# Patient Record
Sex: FEMALE | Race: BLACK OR AFRICAN AMERICAN | NOT HISPANIC OR LATINO | Employment: OTHER | URBAN - METROPOLITAN AREA
[De-identification: names, ages, dates, MRNs, and addresses within clinical notes are randomized per-mention and may not be internally consistent; named-entity substitution may affect disease eponyms.]

---

## 2017-01-10 ENCOUNTER — HISTORICAL (OUTPATIENT)
Dept: ADMINISTRATIVE | Facility: HOSPITAL | Age: 82
End: 2017-01-10

## 2018-08-06 ENCOUNTER — HISTORICAL (OUTPATIENT)
Dept: ADMINISTRATIVE | Facility: HOSPITAL | Age: 83
End: 2018-08-06

## 2018-08-08 LAB — FINAL CULTURE: NORMAL

## 2019-02-08 ENCOUNTER — HISTORICAL (OUTPATIENT)
Dept: ADMINISTRATIVE | Facility: HOSPITAL | Age: 84
End: 2019-02-08

## 2019-09-05 ENCOUNTER — HOSPITAL ENCOUNTER (OUTPATIENT)
Dept: MEDSURG UNIT | Facility: HOSPITAL | Age: 84
End: 2019-09-06
Attending: INTERNAL MEDICINE | Admitting: INTERNAL MEDICINE

## 2019-09-05 LAB
ABS NEUT (OLG): 5.85 X10(3)/MCL (ref 2.1–9.2)
APTT PPP: <20 SECOND(S) (ref 24.2–33.9)
BASOPHILS # BLD AUTO: 0 X10(3)/MCL (ref 0–0.2)
BASOPHILS NFR BLD AUTO: 0 %
BUN SERPL-MCNC: 37 MG/DL (ref 7–18)
CALCIUM SERPL-MCNC: 9.6 MG/DL (ref 8.5–10.1)
CHLORIDE SERPL-SCNC: 102 MMOL/L (ref 98–107)
CO2 SERPL-SCNC: 29 MMOL/L (ref 21–32)
CREAT SERPL-MCNC: 1.73 MG/DL (ref 0.55–1.02)
CREAT/UREA NIT SERPL: 21.4
EOSINOPHIL # BLD AUTO: 0.5 X10(3)/MCL (ref 0–0.9)
EOSINOPHIL NFR BLD AUTO: 6 %
ERYTHROCYTE [DISTWIDTH] IN BLOOD BY AUTOMATED COUNT: 17 % (ref 11.5–17)
GLUCOSE SERPL-MCNC: 130 MG/DL (ref 74–106)
HCT VFR BLD AUTO: 39.4 % (ref 37–47)
HGB BLD-MCNC: 12.2 GM/DL (ref 12–16)
INR PPP: 0.9 (ref 0–1.3)
LYMPHOCYTES # BLD AUTO: 2 X10(3)/MCL (ref 0.6–4.6)
LYMPHOCYTES NFR BLD AUTO: 23 %
MCH RBC QN AUTO: 28.4 PG (ref 27–31)
MCHC RBC AUTO-ENTMCNC: 31 GM/DL (ref 33–36)
MCV RBC AUTO: 91.8 FL (ref 80–94)
MONOCYTES # BLD AUTO: 0.5 X10(3)/MCL (ref 0.1–1.3)
MONOCYTES NFR BLD AUTO: 6 %
NEUTROPHILS # BLD AUTO: 5.85 X10(3)/MCL (ref 2.1–9.2)
NEUTROPHILS NFR BLD AUTO: 65 %
PLATELET # BLD AUTO: 268 X10(3)/MCL (ref 130–400)
PMV BLD AUTO: 9.2 FL (ref 9.4–12.4)
POTASSIUM SERPL-SCNC: 5.3 MMOL/L (ref 3.5–5.1)
PROTHROMBIN TIME: 12.4 SECOND(S) (ref 12–14)
RBC # BLD AUTO: 4.29 X10(6)/MCL (ref 4.2–5.4)
SODIUM SERPL-SCNC: 136 MMOL/L (ref 136–145)
WBC # SPEC AUTO: 9 X10(3)/MCL (ref 4.5–11.5)

## 2019-09-06 LAB
ABS NEUT (OLG): 9.45 X10(3)/MCL (ref 2.1–9.2)
BASOPHILS # BLD AUTO: 0 X10(3)/MCL (ref 0–0.2)
BASOPHILS NFR BLD AUTO: 0 %
BUN SERPL-MCNC: 27 MG/DL (ref 7–18)
CALCIUM SERPL-MCNC: 9.1 MG/DL (ref 8.5–10.1)
CHLORIDE SERPL-SCNC: 106 MMOL/L (ref 98–107)
CO2 SERPL-SCNC: 31 MMOL/L (ref 21–32)
CREAT SERPL-MCNC: 1.33 MG/DL (ref 0.55–1.02)
CREAT/UREA NIT SERPL: 20.3
EOSINOPHIL # BLD AUTO: 0.4 X10(3)/MCL (ref 0–0.9)
EOSINOPHIL NFR BLD AUTO: 3 %
ERYTHROCYTE [DISTWIDTH] IN BLOOD BY AUTOMATED COUNT: 17 % (ref 11.5–17)
GLUCOSE SERPL-MCNC: 98 MG/DL (ref 74–106)
HCT VFR BLD AUTO: 35.8 % (ref 37–47)
HGB BLD-MCNC: 11 GM/DL (ref 12–16)
LYMPHOCYTES # BLD AUTO: 2.5 X10(3)/MCL (ref 0.6–4.6)
LYMPHOCYTES NFR BLD AUTO: 19 %
MCH RBC QN AUTO: 28.1 PG (ref 27–31)
MCHC RBC AUTO-ENTMCNC: 30.7 GM/DL (ref 33–36)
MCV RBC AUTO: 91.6 FL (ref 80–94)
MONOCYTES # BLD AUTO: 0.7 X10(3)/MCL (ref 0.1–1.3)
MONOCYTES NFR BLD AUTO: 5 %
NEUTROPHILS # BLD AUTO: 9.45 X10(3)/MCL (ref 2.1–9.2)
NEUTROPHILS NFR BLD AUTO: 72 %
PLATELET # BLD AUTO: 272 X10(3)/MCL (ref 130–400)
PMV BLD AUTO: 9.7 FL (ref 9.4–12.4)
POTASSIUM SERPL-SCNC: 4.7 MMOL/L (ref 3.5–5.1)
RBC # BLD AUTO: 3.91 X10(6)/MCL (ref 4.2–5.4)
SODIUM SERPL-SCNC: 141 MMOL/L (ref 136–145)
WBC # SPEC AUTO: 13.1 X10(3)/MCL (ref 4.5–11.5)

## 2020-03-11 ENCOUNTER — HISTORICAL (OUTPATIENT)
Dept: ADMINISTRATIVE | Facility: HOSPITAL | Age: 85
End: 2020-03-11

## 2020-04-29 ENCOUNTER — HISTORICAL (OUTPATIENT)
Dept: ADMINISTRATIVE | Facility: HOSPITAL | Age: 85
End: 2020-04-29

## 2020-05-01 LAB — FINAL CULTURE: NORMAL

## 2021-03-19 ENCOUNTER — HISTORICAL (OUTPATIENT)
Dept: ADMINISTRATIVE | Facility: HOSPITAL | Age: 86
End: 2021-03-19

## 2021-03-21 LAB — FINAL CULTURE: NORMAL

## 2022-04-10 ENCOUNTER — HISTORICAL (OUTPATIENT)
Dept: ADMINISTRATIVE | Facility: HOSPITAL | Age: 87
End: 2022-04-10
Payer: MEDICARE

## 2022-04-29 VITALS
DIASTOLIC BLOOD PRESSURE: 83 MMHG | WEIGHT: 171.94 LBS | BODY MASS INDEX: 28.65 KG/M2 | SYSTOLIC BLOOD PRESSURE: 134 MMHG | HEIGHT: 65 IN

## 2022-04-30 NOTE — OP NOTE
DATE OF SURGERY:    09/06/2019    SURGEON:  German Mejia MD    PREOPERATIVE DIAGNOSIS:  Foreign body in the hypopharynx and esophagus.    POSTOPERATIVE DIAGNOSIS:  Foreign body in the hypopharynx and esophagus.    PROCEDURE PERFORMED:  Laryngoscopy with removal of foreign body.    ANESTHESIA:  MAC    PROCEDURE IN DETAIL:  The patient was brought to the operative suite and placed in the supine position.  Anesthesia was administered, propofol for anesthesia.  At the point patient was asleep, the rigid laryngoscope was placed in the patient's oral cavity to the level of the hypopharynx.  The epiglottis was elevated and noted to have a large, approximately 2.5 to 3 cm, bone within the esophageal inlet extending out into the hypopharynx.  This was removed with Magill forceps.  No evidence of other foreign bodies was noted.  The patient was turned over to Anesthesia without any issue.        ______________________________  MD SAEED [place]/MARIBEL  DD:  09/06/2019  Time:  07:42AM  DT:  09/06/2019  Time:  07:48AM  Job #:  [id number]

## 2022-04-30 NOTE — CONSULTS
DATE OF CONSULTATION:  09/05/2019    ATTENDING PHYSICIAN:  Physician ER  CONSULTING PHYSICIAN:  German Mejia MD    REASON FOR CONSULTATION:  Possible foreign body of the aerodigestive tract.    HISTORY OF PRESENT ILLNESS:  This is an 84-year-old female with a history of eating catfish tonight where she felt like there was a sticking sensation upon swallowing.  The patient is not able to eat further, but there is no respiratory distress.  No hoarseness.    PAST MEDICAL HISTORY:  Reviewed.    PAST SURGICAL HISTORY:  Reviewed.    PHYSICAL EXAMINATION:  GENERAL:  Patient is alert and oriented x3.  No apparent distress, talking comfortably, coughing intermittently.     HEENT:  Oral cavity is unremarkable.  Nares are unremarkable.   NECK:  Soft, supple.  Trachea is midline.     Flexible laryngoscopy shows a bone sitting at the insert of the esophageal inlet with a lot of secretions.  AP and lateral neck exam confirmed this finding.    IMPRESSION:  This is an 84-year-old female with catfish in the hypopharynx and endolarynx inserting into the esophageal inlet.  She will be brought to the operating room for removal as multiple attempts in the emergency room were unsuccessful.        ______________________________  MD SAEED [place]/UB  DD:  09/05/2019  Time:  09:45PM  DT:  09/05/2019  Time:  10:00PM  Job #:  [id number]

## 2022-04-30 NOTE — ED PROVIDER NOTES
Patient:   Ev Alberts            MRN: [id number]            FIN: [id number]               Age:   84 years     Sex:  Female     :  1935   Associated Diagnoses:   Foreign body in hypopharynx   Author:   Tila MEAD, Rae Negrete      Basic Information   Time seen: Date & time 2019 18:56:00.   History source: Patient.   Arrival mode: Private vehicle, wheelchair.   History limitation: None.   Additional information: Chief Complaint from Nursing Triage Note : Chief Complaint   2019 18:55 CDT       Chief Complaint           patient reports catfish bone stuck in throat.    .      History of Present Illness   The patient presents with an ingested foreign body, Patient states that she feels like she has a catfish bone stuck in her throat (david, NP).  and       I, Dr. Adorno assumed care of the patient at 1943.  85 y/o AAF presents to the ED c/o throat pain secondary to a foreign body in her throat. She reports eating catfish at approximately 1830 this evening when something became lodged inher throat on the right. She believes she has a catfish bone stuck in her throat. History of CHFrEF, HTN, Afib, on Eliquis..  The onset was 3  hours ago.  The course/duration of symptoms is constant.  Symptoms: pain.  Location: Right pharynx. The type of foreign body is a bone.  The degree of symptoms is moderate.  The exacerbating factor is swallowing.  The relieving factor is none.  Risk factors consist of age.  Prior episodes: none.  Therapy today: none.  Associated symptoms: none.        Review of Systems   Constitutional symptoms:  No fever,    Skin symptoms:  No rash,    ENMT symptoms:  Throat: Pain, foreign body.   Respiratory symptoms:  No shortness of breath,    Cardiovascular symptoms:  No chest pain,    Gastrointestinal symptoms:  No abdominal pain, no nausea, no vomiting.    Genitourinary symptoms   Musculoskeletal symptoms:  No back pain,    Neurologic symptoms:  No headache,       Health Status    Allergies:    Allergic Reactions (Selected)  Severity Not Documented  Anabolic steroids- No reactions were documented.  Ibuprofen- Allergy to sulfa drugs.  Lotrel- No reactions were documented.  Sulfa drugs- No reactions were documented.  Vioxx- No reactions were documented..   Medications:  (Selected)   Prescriptions  Prescribed  Ultram 50 mg oral tablet: 1-2 tab(s), Oral, q8hr, PRN PRN for pain, # 28 tab(s), 0 Refill(s), Pharmacy: [id number]  Voltaren 1% topical gel: 2 gm =, TOP, QID, PRN PRN pain, # 100 gm, 3 Refill(s), Pharmacy: [facility] [id number]  lidocaine 4% topical film: 1 patch(es), TOP, BID, # 6 EA, 1 Refill(s), Pharm[id number]  Documented Medications  Documented  ALLOPURINOL 300MG TABLETS: 300 mg = 1 tab(s), Oral, Daily  AMITIZA 24MCG CAPSULES: 24 mcg = 1 cap(s), Oral, BID  AMOXAPINE 50 MG TABLET: 50 mg = 1 tab(s), Oral, At Bedtime  BRIMONIDINE 0.15% OPHTH SOL 10ML: 1 drop(s), Eye-Both, BID  Benadryl 25 mg oral capsule: 25 mg = 1 cap(s), Oral, Once a day (at bedtime), PRN PRN for insomnia, 0 Refill(s)  Breo Ellipta 200 mcg-25 mcg/inh inhalation powder: 1 puff(s), INH, Daily, 0 Refill(s)  CARVEDILOL 3.125MG TABLETS: 3.125 mg = 1 tab(s), BID  COMBIGAN 0.2%-0.5% EYE DROPS:   ELIQUIS 2.5MG TABLETS: 2.5 mg = 1 tab(s), BID  FERROUS SULFATE 325MG (5GR) TABS: 325 mg = 1 tab(s), Oral, Daily  FLUOCINOLONE 0.01% CREAM: rah, TOP, BID  FUROSEMIDE 20 MG TABLET: 20 mg = 1 tab(s), Daily  FUROSEMIDE 40MG TABLETS: 40 mg = 1 tab(s), Oral, Daily  GABAPENTIN 300 MG CAPSULE:   HYDROXYZINE HCL 25 MG TABLET: 25 mg = 1 tab(s), qPM  LEVOTHYROXINE 25 MCG TABLET: 25 mcg = 1 tab(s), Oral, Daily  LOSARTAN 50MG TABLETS: 50 mg = 1 tab(s), Oral, Daily  LYRICA 50 MG CAPSULE: 50 mg = 1 cap(s), Oral, BID  MECLIZINE 25 MG TABLET: 25 mg = 1 tab(s), Oral, TID  MELOXICAM 15 MG TABLET: 15 mg = 1 tab(s), Oral, qAM  NEXIUM 40MG CAPSULES: 40 mg = 1 cap(s), Oral, Daily  NITROFURANTOIN MACRO 100MG  CAPSULES: 100 mg = 1 cap(s), BID  Nitrostat 0.4 mg sublingual tablet: 0 Refill(s)  OMEPRAZOLE 20MG CAPSULES: Daily  ProAir HFA 90 mcg/inh inhalation aerosol with adapter: 1 puff(s), INH, q4hr, PRN PRN for wheezing, 0 Refill(s)  QVAR 40MCG ORAL INHALER 120 DOSES: 2 puff(s), INH, BID  RANITIDINE 300MG TABLETS: 300 mg = 1 tab(s), Oral, qPM  RIVASTIGMINE 1.5 MG CAPSULE: 1.5 mg = 1 cap(s), Oral, BID  ROPINIROLE 0.5MG TABLETS: 0.5 mg = 1 tab(s), Oral, Daily  SPIRONOLACTONE 25 MG TABLET: 25 mg = 1 tab(s), Oral, Daily  TEMAZEPAM 15 MG CAPSULE: 15 mg = 1 cap(s), Oral, qPM  TRAMADOL 50MG TABLETS: 50 mg = 1 tab(s), Oral, q4hr, PRN PRN pain, 1 to 2 tabs  TRAVATAN Z 0.004% EYE DROP:   TRAZODONE 100 MG TABLET: 100 mg = 1 tab(s), qPM  TRAZODONE 50 MG TABLET: Oral  TRULANCE 3 MG TABLET: 3 mg = 1 tab(s), qAM  VITAMIN B-12 1000MCG TABLETS: 1000 mcg = 1 tab(s), Daily  XARELTO 15 MG TABLET: 15 mg = 1 tab(s), Oral, Daily  aspirin 81 mg oral tablet: 81 mg = 1 tab(s), Oral, Daily, # 30 tab(s), 0 Refill(s)  brimonidine 0.15% ophthalmic solution: BID, 0 Refill(s)  cetirizine 10 mg oral tablet: 10 mg = 1 tab(s), Oral, Daily, # 30 tab(s), 0 Refill(s)  fluocinonide 0.1% topical cream: 1 rah, TOP, BID, 0 Refill(s)  fluticasone 50 mcg/inh nasal spray: 2 spray(s), Nasal, BID, 0 Refill(s)  ipratropium 21 mcg/inh (0.03%) nasal spray: 2 spray(s), Nasal, TID, # 30 mL, 0 Refill(s)  losartan 25 mg oral tablet: 25 mg = 1 tab(s), Oral, qPM.      Past Medical/ Family/ Social History   Medical history:    Active  Lung disease ([id number])  Resolved  Hypertension ([id number]):  Resolved.  Irregular heart beat ([id number]):  Resolved.  Sleep apnea ([id number]):  Resolved.  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  GERD (gastroesophageal reflux disease) ([id number]):  Resolved.  Heart failure ([id number]):  Resolved..   Surgical history:    RIGHT KNEE SYNVISC INJECTION on 1/1/2017 at 81 Years.  RIGHT  KNEE STERIOD INJECTION on 7/12/2016 at 81 Years.  Esophagogastroduodenoscopy on 6/28/2016 at 81 Years.  Comments:  6/28/2016 14:58 Best Storey RN  auto-populated from documented surgical case  Colonoscopy on 6/28/2016 at 81 Years.  Comments:  6/28/2016 14:58 Best Storey RN  auto-populated from documented surgical case  RIGHT KNEE SYNVISC INJECTION on 2/24/2016 at 81 Years.  HYSTERECTOMY.  KIDNEY REMOVED.  GALLBLADDER.  HERNIA REPAIR.  ORAL SURGERY.  BILATERAL FOOT SURGERY..   Family history:    History is unknown..   Social history:    Social & Psychosocial Habits    Alcohol  02/24/2016  Use: Never    Employment/School  02/08/2019  Status: Retired    Exercise  02/08/2019  Duration (average number of minutes): 0    Home/Environment  02/08/2019  Lives with: Spouse    Living situation: Home/Independent    Nutrition/Health  02/08/2019  Type of diet: Diabetic    Sexual  02/08/2019  What is your current gender identity? (Check all that apply) Identifies as female    Substance Use  02/24/2016  Use: Never    Tobacco  02/08/2019  Use: Never (less than 100 in l    Patient Wants Consult For Cessation Counseling N/A    09/05/2019  Use: Never (less than 100 in l    Patient Wants Consult For Cessation Counseling N/A    Abuse/Neglect  09/05/2019  SHX Any signs of abuse or neglect No    .      Physical Examination               Vital Signs   Vital Signs   9/5/2019 18:55 CDT       Temperature Temporal Artery               36.5 DegC                             Peripheral Pulse Rate     67 bpm                             Respiratory Rate          20 br/min                             SpO2                      98 %                             Oxygen Therapy            Room air                             Systolic Blood Pressure   161 mmHg  HI                             Diastolic Blood Pressure  66 mmHg  .   Basic Oxygen Information   9/5/2019 18:55 CDT       SpO2                      98 %                              Oxygen Therapy            Room air  .   General:  Alert, no acute distress.    Skin:  Warm, dry, intact, no pallor, no rash, normal for ethnicity.    Head:  Normocephalic, atraumatic.    Neck:  Supple, trachea midline.    Eye:  Extraocular movements are intact, normal conjunctiva.    Ears, nose, mouth and throat:  No pharyngeal erythema or exudate, Lips dry, no foreign body visualized in the posterior pharynx; patient points to the right of her thyroid cartilage when asked the position of the foreign body sensation.    Cardiovascular:  Regular rate and rhythm, Normal peripheral perfusion.    Respiratory:  Lungs are clear to auscultation, respirations are non-labored, breath sounds are equal, Symmetrical chest wall expansion.    Back:  Normal range of motion.   Musculoskeletal:  Normal ROM.   Neurological:  Alert and oriented to person, place, time, and situation, No focal neurological deficit observed.    Psychiatric:  Cooperative.      Medical Decision Making   Differential Diagnosis:  Foreign body, gastrointestinal tract, foreign body, throat, pharyngeal abrasion.    Rationale:  85 yo F with foreign body sensation after eating catfish. Xray obtained at triage and is consistent with a linear foreign body at the level of the epiglottis. An attempt was made to remove the foreign body in the ED using  atomized and topical lidocaine, however the patient continued to gag and did not tolerate the procedure. Dr. German Mejia was contacted to evaluate the patient..   Documents reviewed:  Emergency department nurses' notes.   Orders  Laboratory    CBC - includes DiffTila MD, Rachel Allison, 09/05/19, 20:02, Ordered    PT (Protime), Rae Adorno MD, 09/05/19, 20:02, Ordered    PTTTila MD, Rachel Allison, 09/05/19, 20:02, Ordered    BMP, Rae Adorno MD, 09/05/19, 20:03, Ordered  Xray    XR Soft Tissue Neck, Alfredo ZARATE, Yany BYRNES, 09/05/19, 18:58, Ordered.   Radiology results:  X-ray,  soft tissue neck, emergency physician interpretation: linear foreign body at the level of the epiglottis.       Reexamination/ Reevaluation   Time: 9/5/2019 21:45:00 .   Course: unchanged.   Assessment: exam unchanged.   Notes: Dr. Mejia has evaluated the patient. He is able to visualize the bone with flexible laryngoscopy however patient did not tolerate attempts at retrieval. He recommends patient go to the OR in the morning for removal. Hospital medicine consulted for admission. Patient given an additional lidocaine neb with relief of her throat pain. Pre-operative labs sent. Patient amenable to admission.      Procedure   Laryngoscopy   Time: 9/5/2019 20:30:00 .    Confirmed: Patient, procedure, and site correct.    Consent: Patient, Has given verbal consent.    Indication: Possible foreign body.    Procedural sedation: None.    Monitoring: Cardiac, blood pressure, continuous pulse oximetry.    Preparation: Topical anesthetic spray to pharynx.    Technique: Direct visualization, With poor visualization, unable to visualize FB, unable to adequately suppress gag reflex.    Post procedure exam: Unchanged.    Patient tolerated: Well.    Performed by: Self, Jose MEAD, Shlomo LIVE.    Total time: 20 minutes.       Impression and Plan   Diagnosis   Foreign body in hypopharynx ([id number] T17.208A)      Calls-Consults   -  9/5/2019 20:45:00 , Jackie MEAD, eGrman Houser, recommends admit to medicine, OR in the morning, NPO after midnight.    -  9/5/2019 21:45:00 , hospital medicine.    Plan   Condition: Unchanged.    Disposition: Admit time  9/5/2019 21:45:00, Place in Observation Unit, Quentin Souza MD    Counseled: Patient, Family, Regarding diagnosis, Regarding diagnostic results, Regarding treatment plan, Patient indicated understanding of instructions.    Notes: IAmy, acted solely as a scribe for and in the presence of Dr. Adorno who performed the service., I, Dr. Rae Adorno, personally  evaluated and examined this patient and agree with the documentation as above. .

## 2022-05-04 NOTE — HISTORICAL [facility] CERNER
This is a historical note converted from [employer]. Formatting and pictures may have been removed.  Please reference [employer] for original formatting and attached multimedia. Chief Complaint  F/U bilat ?knee pain--here for repeat injections  History of Present Illness  85 Years?Female?non- smoker?presented to sports medicine clinic for?follow- up visit?for?bilateral?knee pain. Patient points to?diffusely.  Patient had an injection back 6/2019 with 3 months of relief and a repeat CSI 11/2029. She did get a good amount of relief from the injections.?Her pain returned about a week ago.?She is still bothered by her right knee more than her left.  Onset:?insidious over years  Current pain level: 10/10?( rated as?moderate)?medication relief?quality described as aching.  Modifying factors:?worse with activity and improved with rest?;?stiffness after immobilization?stiffness improved with less than 30 minutes of activity  Previous treatments:?conservative treatments for at least 3 months with HEP and medications  Previous injuries:?denies?  Associated symptoms:?no crepitus/grinding;?no numbness or tingling;?no swelling;?no skin changes;?no weakness;?no decreased ROM  Activity:?sedentary; full ADLs;?pain interferes with function/daily activity (mild)  Family History:?no family history of arthritis  Review of Systems  Constitutional: no fever, no chills, no weight loss  CV: no swelling, no edema?  Resp: no shortness of breath, no cough  GI: no fecal incontinence  : no urinary retention, no urinary incontinence  Skin: no rash, no wound  Neuro: no numbness/tingling, no weakness, no saddle anesthesia  MSK: as above  Psych: no depression, no anxiety  Heme/Lymph: no easy bruising, no easy bleeding, no lymphadenopathy  ?  Physical Exam  Vitals & Measurements  T:?36.8? ?C (Oral)? HR:?76(Peripheral)? RR:?20? BP:?133/81?  HT:?165?cm? WT:?77.4?kg? BMI:?28.43?  General:?well developed; well nourished; cooperative  Neck: no abnormalities  noted  Eyes: no injection in the conjunctiva, no lid lag noted  PSYCH: alert and oriented with?appropriate mood and affect  SKIN: inspection and palpation of skin and soft tissue normal; no scars noted on upper/lower extremities  CV: vascular integrity noted; +2 symmetrical pulses, no edema  Respiratory: no increased respiratory effort noted  NEURO: sensation intact by light touch; DTRs +2 bilateral and symmetrical  LYMPH: no LAD noted  ?  MSK exam of?left knee  Inspection:?  antalgic gait ; full weight bearing;?normal alignment;?no swelling; no erythema;?no bruising;?no atrophy or deconditioning noted  Palpation:?  tender to diffusely;?no crepitus  ROM:  Active Extension/Flexion ( 0-140): 0-120  Passive Extension/Flexion ( 0-140):0-120  Strength:  Flexion 4/5, Extension 4/5  Special tests:  Ballotable effusion:?negative  Fluid wave:?negative  Anterior drawer:?negative  Lachman:?negative  Pivot shift:?negative  Posterior sag sign:?negative  Posterior drawer:?negative  Dial test:?negative  Varus stress:?LCL stable at 0 and 30 degrees  Valgus stress:?Stable at 0 and 30 degrees  Patellar grind:?negative  Corky:?negative  Apleys compression:?negative  Neurovascular:?Intact; 2+?distal pulse, sensation intact to light touch  ?  MSK exam of?right knee  Inspection:?  antalgic gait ; full weight bearing;?normal alignment;?no swelling; no erythema;?no bruising;?no atrophy or deconditioning noted  Palpation:?  tender to diffusely;?no crepitus  ROM:  Active Extension/Flexion ( 0-140): loss of 10 degrees to 100  Passive Extension/Flexion ( 0-140):loss of 10 degrees to 100  Strength:  Flexion 4/5, Extension 4/5  Special tests:  Ballotable effusion:?negative  Fluid wave:?negative  Anterior drawer:?negative  Lachman:?negative  Pivot shift:?negative  Posterior sag sign:?negative  Posterior drawer:?negative  Dial test:?negative  Varus stress:?LCL stable at 0 and 30 degrees  Valgus stress:?Stable at 0 and 30 degrees  Patellar  grind:?negative  Corky:?negative  Apleys compression:?negative  Neurovascular:?Intact; 2+?distal pulse, sensation intact to light touch  Assessment/Plan  1.?Bilateral primary osteoarthritis of knee?M17.0  85 Years ?Female ?presents for?follow up?for?bilateral ?knee osteoarthritis. Condition is?worsening  Plan: Discussed with patient and diagnosis and treatment recommendations. Handout given.  Imaging:?Radiological studies ordered and independently reviewed, radiologist read pending  Treatment plan:?topical capsaicin as needed , topical lidocaine and Aspercreme  Weight management in paramount: recommend at least 10% weight loss  Procedure:?since conservative measures did not improve symptoms patient consented today for CSI todayB/L CSI done today with improvement in pain and ROM, NV intact ?  Activity: Activity as tolerated; HEP to include aerobic conditioning with non-painful activity and ROM/STG exercises, proper footwear, assistive device to avoid limping  Therapy: Formal PT/OT ordered  Medication:?Medication precautions given , topicals discussed  RTC:?3 months  Additional work-up:?none  Ordered:  Lidocaine inj., 5 mL, form: Soln, Intra-Articular, Once, first dose 03/11/20 9:21:00 CDT, stop date 03/11/20 9:21:00 CDT  Lidocaine inj., 5 mL, form: Soln, Intra-Articular, Once, first dose 03/11/20 9:21:00 CDT, stop date 03/11/20 9:21:00 CDT  triamcinolone, 40 mg, form: Injection, Intra-Articular, Once, first dose 03/11/20 10:00:00 CDT, stop date 03/11/20 10:00:00 CDT  triamcinolone, 40 mg, form: Injection, Intra-Articular, Once, first dose 03/11/20 10:00:00 CDT, stop date 03/11/20 10:00:00 CDT  ?  2.?Obesity?E66.9  ?- hand out given  Ordered:  triamcinolone, 40 mg, form: Injection, Intra-Articular, Once, first dose 03/11/20 10:00:00 CDT, stop date 03/11/20 10:00:00 CDT  triamcinolone, 40 mg, form: Injection, Intra-Articular, Once, first dose 03/11/20 10:00:00 CDT, stop date 03/11/20 10:00:00 CDT  ?  Orders:  XR  Knee Left 4 or More Views, Routine, 03/11/20 8:38:00 CDT, Arthritis, None, Stretcher, Patient Has IV?, Rad Type, Osteoarthritis of right knee, Not Scheduled, 03/11/20 8:38:00 CDT  XR Knee Right 4 or More Views, Routine, 03/11/20 8:38:00 CDT, Arthritis, None, Stretcher, Patient Has IV?, Rad Type, Osteoarthritis of right knee, Not Scheduled, 03/11/20 8:38:00 CDT   Medications  ALLOPURINOL 300MG TABLETS, 300 mg= 1 tab(s), Oral, Daily  AMITIZA 24MCG CAPSULES, 24 mcg= 1 cap(s), Oral, BID  aspirin 81 mg oral tablet, 81 mg= 1 tab(s), Oral, Daily  Benadryl 25 mg oral capsule, 25 mg= 1 cap(s), Oral, Once a day (at bedtime), PRN  Breo Ellipta 200 mcg-25 mcg/inh inhalation powder, 1 puff(s), INH, Daily  BRIMONIDINE 0.15% OPHTH SOL 10ML, 1 drop(s), Eye-Both, BID  CARVEDILOL 3.125MG TABLETS, 3.125 mg= 1 tab(s), BID  cetirizine 10 mg oral tablet, 10 mg= 1 tab(s), Oral, Daily  COMBIGAN 0.2%-0.5% EYE DROPS  ELIQUIS 2.5MG TABLETS, 2.5 mg= 1 tab(s), BID  FERROUS SULFATE 325MG (5GR) TABS, 325 mg= 1 tab(s), Oral, Daily  FLUOCINOLONE 0.01% CREAM, TOP, BID  fluticasone 50 mcg/inh nasal spray, 2 spray(s), Nasal, BID  FUROSEMIDE 40MG TABLETS, 40 mg= 1 tab(s), Oral, Daily  GABAPENTIN 300 MG CAPSULE  HYDROXYZINE HCL 25 MG TABLET, 25 mg= 1 tab(s), qPM  ipratropium 21 mcg/inh (0.03%) nasal spray, 2 spray(s), Nasal, TID  LEVOTHYROXINE 25 MCG TABLET, 25 mcg= 1 tab(s), Oral, Daily  lidocaine 4% topical film, 1 patch(es), TOP, BID, 1 refills  losartan 25 mg oral tablet, 25 mg= 1 tab(s), Oral, qPM  LOSARTAN 50MG TABLETS, 50 mg= 1 tab(s), Oral, Daily  LYRICA 50 MG CAPSULE, 50 mg= 1 cap(s), Oral, BID  MECLIZINE 25 MG TABLET, 25 mg= 1 tab(s), Oral, TID  MELOXICAM 15 MG TABLET, 15 mg= 1 tab(s), Oral, qAM  NEXIUM 40MG CAPSULES, 40 mg= 1 cap(s), Oral, Daily  NITROFURANTOIN MACRO 100MG CAPSULES, 100 mg= 1 cap(s), BID  Nitrostat 0.4 mg sublingual tablet  OMEPRAZOLE 20MG CAPSULES, Daily  ProAir HFA 90 mcg/inh inhalation aerosol with adapter, 1 puff(s), INH,  q4hr, PRN  QVAR 40MCG ORAL INHALER 120 DOSES, 2 puff(s), INH, BID  RANITIDINE 300MG TABLETS, 300 mg= 1 tab(s), Oral, qPM  RIVASTIGMINE 1.5 MG CAPSULE, 1.5 mg= 1 cap(s), Oral, BID  ROPINIROLE 0.5MG TABLETS, 0.5 mg= 1 tab(s), Oral, Daily  SPIRONOLACTONE 25 MG TABLET, 25 mg= 1 tab(s), Oral, Daily  traMADol 50 mg oral tablet, 50 mg= 1 tab(s), Oral, q4hr, PRN, 4 refills  TRAVATAN Z 0.004% EYE DROP  TRAZODONE 50 MG TABLET, Oral  TRULANCE 3 MG TABLET, 3 mg= 1 tab(s), qAM  Tylenol 325 mg oral capsule, 325 mg= 1 cap(s), Oral, q4hr, PRN, 1 refills  VITAMIN B-12 1000MCG TABLETS, 1000 mcg= 1 tab(s), Daily  Voltaren 1% topical gel, 2 gm, TOP, QID, PRN, 3 refills  Diagnostic Results  XR B/L my read  Patient with tricompartmental arthritis B/L, left worse than right, osteophytes noted diffusely bilaterally      Discussed with the fellow at time of visit? Patient chart reviewed. Patient seen and evaluated at time of visit.?HPI, PE, and Assessment and Plan reviewed. Treatment plan is reasonable and appropriate.? All questions were answered.?Compliance with treatment plan is appropriate.  ?Radiology images independently reviewed and agree with radiologist. ?Radiology images independently reviewed and agree with fellow.

## 2022-05-04 NOTE — HISTORICAL [facility] C[facility]
This is a historical note converted from [employer]ner. Formatting and pictures may have been removed.  Please reference Cerner for original formatting and attached multimedia. Admit and Discharge Dates  Admit Date: 09/05/2019  Discharge Date: 09/06/2019  Physicians  Attending Physician - Zoila MEAD, Dimitri STRONG  Attending Physician - Harley MEAD, Quentin CASTRO  Admitting Physician - Harley MEAD, Quentin CASTRO  Consulting Physician - Jackie MEAD, Gemran Houser  Primary Care Physician - Romeo MEAD, Chelsy FERRELL  Discharge Diagnosis  1.?Foreign body - Ingested?[id number]  2.?Atrial fibrillation?I48.91  3.?Anticoagulated?Z79.01  4.?Chronic systolic heart failure?I50.22  5.?Essential hypertension?I10  Surgical Procedures  09/06/2019 - [id number] - Esophagoscopy  Immunizations  No immunizations recorded for this visit.  Hospital Course  84 year old female with a medical history of HTN, CHF,?A Fib?on Eliquis, sleep apnea, and diverticulitis. She presented to [facility] ER ?for complaints of feeling like something is stuck in her throat after eating catfish. She denies respiratory distress, chest tightness, abdominal pain, or N/V. Reports she has been coughing to try to get it up, but has been unsuccessful. Denies any other complaints.  Initial ER VS: /66, HR 67, respirations 20, temporal artery temperature 36.5, and SPO2 9% on RA.? K5.3, BUN/Cr CR 37/1.73, coags and CBC unremarkable. XR soft tissue neck with food bolus per ER MD; official read pending.?Dr. Mejia, ENT, consulted;?multiple attempts of?removal of FB from esophagus was unsuccessful. She received Ofirmev IV, glucagon, lidocaine neb, Zofran IV and VIF while in the ER. Shes been admitted to the hospitalist services; Dr. Mejia took to OR on 9/6 for retrieval.  Time Spent on discharge  Time to discharge 31 minutes  Objective  Vitals & Measurements  T:?36.4? ?C (Oral)? TMIN:?36.4? ?C (Oral)? TMAX:?36.8? ?C (Oral)? HR:?70(Peripheral)? RR:?18? BP:?129/70?  SpO2:?95%? WT:?74.7?kg?  Physical Exam  General: No acute distress, Awake, Alert,Oriented x3  HEENT: PERRL, EOMI, no scleral icterus, OP clear  Respiratory:CTA bilateral , no wheezes, rales, or rhonchi  Cardiovascular:Regular rate and rhythm , no murmurs, rubs or gallops, +s1/s2,_  Gastrointestinal: soft,nontender , nondistended, +BS  Musculoskeletal: Normal range of motion, no pertinent deformity  Integumentary: clean, warm, dry, intact  Neurologic: CN 2 - 12 grossly intact, no acute deficit noted  Patient Discharge Condition  Stable  Discharge Disposition  Home   Discharge Medication Reconciliation  Continue  albuterol (ProAir HFA 90 mcg/inh inhalation aerosol with adapter)?1 puff(s), INH, q4hr, PRN for wheezing  allopurinol (ALLOPURINOL 300MG TABLETS)?300 mg, Oral, Daily  apixaban (ELIQUIS 2.5MG TABLETS)?2.5 mg, BID  aspirin (aspirin 81 mg oral tablet)?81 mg, Oral, Daily  beclomethasone (QVAR 40MCG ORAL INHALER 120 DOSES)?2 puff(s), INH, BID  brimonidine ophthalmic (BRIMONIDINE 0.15% OPHTH SOL 10ML)?1 drop(s), Eye-Both, BID  brimonidine-timolol ophthalmic (COMBIGAN 0.2%-0.5% EYE DROPS)  carvedilol (CARVEDILOL 3.125MG TABLETS)?3.125 mg, BID  cetirizine (cetirizine 10 mg oral tablet)?10 mg, Oral, Daily  cyanocobalamin (VITAMIN B-12 1000MCG TABLETS)?1000 mcg, Daily  diclofenac topical (Voltaren 1% topical gel)?2 gm, TOP, QID, PRN pain  diphenhydrAMINE (Benadryl 25 mg oral capsule)?25 mg, Oral, Once a day (at bedtime), PRN for insomnia  esomeprazole (NEXIUM 40MG CAPSULES)?40 mg, Oral, Daily  ferrous sulfate (FERROUS SULFATE 325MG (5GR) TABS)?325 mg, Oral, Daily  fluocinolone topical (FLUOCINOLONE 0.01% CREAM)??rah, TOP, BID  fluticasone nasal (fluticasone 50 mcg/inh nasal spray)?2 spray(s), Nasal, BID  fluticasone-vilanterol (Breo Ellipta 200 mcg-25 mcg/inh inhalation powder)?1 puff(s), INH, Daily  furosemide (FUROSEMIDE 40MG TABLETS)?40 mg, Oral, Daily  gabapentin (GABAPENTIN 300 MG CAPSULE)  hydrOXYzine  (HYDROXYZINE HCL 25 MG TABLET)?25 mg, qPM  ipratropium nasal (ipratropium 21 mcg/inh (0.03%) nasal spray)?2 spray(s), Nasal, TID  levothyroxine (LEVOTHYROXINE 25 MCG TABLET)?25 mcg, Oral, Daily  lidocaine topical (lidocaine 4% topical film)?1 patch(es), TOP, BID  losartan (LOSARTAN 50MG TABLETS)?50 mg, Oral, Daily  losartan (losartan 25 mg oral tablet)?25 mg, Oral, qPM  lubiprostone (AMITIZA 24MCG CAPSULES)?24 mcg, Oral, BID  meclizine (MECLIZINE 25 MG TABLET)?25 mg, Oral, TID  meloxicam (MELOXICAM 15 MG TABLET)?15 mg, Oral, qAM  nitrofurantoin (NITROFURANTOIN MACRO 100MG CAPSULES)?100 mg, BID  nitroglycerin (Nitrostat 0.4 mg sublingual tablet)  omeprazole (OMEPRAZOLE 20MG CAPSULES), Daily  plecanatide (TRULANCE 3 MG TABLET)?3 mg, qAM  pregabalin (LYRICA 50 MG CAPSULE)?50 mg, Oral, BID  rOPINIRole (ROPINIROLE 0.5MG TABLETS)?0.5 mg, Oral, Daily  ranitidine (RANITIDINE 300MG TABLETS)?300 mg, Oral, qPM  rivastigmine (RIVASTIGMINE 1.5 MG CAPSULE)?1.5 mg, Oral, BID  spironolactone (SPIRONOLACTONE 25 MG TABLET)?25 mg, Oral, Daily  traMADol (TRAMADOL 50MG TABLETS)?50 mg, Oral, q4hr, PRN pain  traZODone (TRAZODONE 50 MG TABLET), Oral  travoprost ophthalmic (TRAVATAN Z 0.004% EYE DROP)  Education and Orders Provided  Discharge - 09/06/19 9:10:00 CDT, Home, Give all scheduled vaccinations prior to discharge.?  Discharge Activity - Activity as Tolerated?  Discharge Diet - Regular?  Follow up  As needed

## 2022-05-04 NOTE — HISTORICAL [facility] CERNER
This is a historical note converted from Darinel. Formatting and pictures may have been removed.  Please reference Darinel for original formatting and attached multimedia. Chief Complaint  Bilat knee OA  History of Present Illness  84-year-old RHD?female nonsmoker with bilateral knee osteoarthritis?has been ongoing for many years.?Due to her medical issues and?age of decided to treat her conservatively.??  ?  date of injury: none  character: heavy  7/10 without medication  duration: stiffness in morning; loosens up after <30 mins  exacerbation: standing and walking  alleviating: elevation, rest, ice, heating pad  associated: swelling, no numbness or tingling  previous treatment: PT/OT, CSI, Tramadol, Tylenol, topicals, multiple steroid injections and Synvisc injection in the past.?  previously seen by Dr. Julian Roth MD  family history of arthritis: none  imaging: XR knees b/l  social history: denies smoking, alcohol, and drugs  occupation: retired  PMH: CHF, HTN, Asthma, anemic, GERD, s/p nephrectomy  ?  Review of Systems  Constitutional: no fever, no chills, no weight loss  CV: no swelling, no edema  Res: No cough, wheezing, sob  GI: no fecal incontinence  : no urinary retention, no urinary incontinence  Skin: no rash, no wound  Neuro: no numbness/tingling, no weakness, no saddle anesthesia  MSK: as above  Psych: no depression, no anxiety  Heme/Lymph: no easy bruising, no easy bleeding, no lymphadenopathy  Immuno: no MRSA history  Physical Exam  Vitals & Measurements  T:?36.8? ?C (Oral)? HR:?57(Peripheral)? RR:?15? BP:?111/67?  HT:?165?cm? WT:?80.7?kg?  MSK:Bilateral KNEE  General: No apparent distress;  Inspection:?varus deformity, ?uses rollator walker at baseline;?mild swelling;?no erythema;?No contusion;?no atrophy?/ deconditioning noted  Palpation:?tenderness noted at medial and lateral joint lines; ?Crepitus:?positive;?Normal temperature  ROM:?  ?????Active Extension/Flexion (0-140):?10-80 (L); 10-80  (R)  Strength:?  ?????Flexion??4/5  ?????Extension??4/5  Special Tests:  ?????Ballotable Effusion: ?Negative  ?????Fluid Wave:?Negative  ?????Anterior Drawer:Negative?  ?????Lachman:?Negative  ?????Varus Stress:?LCL stable at 0 and 30 degrees  ?????Valgus Stress:?MCL stable at 0 and 30 degrees  ?????Patellar Grind: ?Negative  ?????Corky:?Negative?  Neurovascular:?Intact; 2+?distal pulse, sensation intact to light touch  Neuro/Psych: Awake, Alert, Oriented; Normal mood and affect  Lymphatic: No LAD  Skin and Soft Tissue: No bruising, No ecchymosis; No rash; Skin intact  Assessment/Plan  Osteoarthritis of knees, bilateral?M17.0  DX: Severe tricompartment OA to bilateral knees; worse right compared to left.?  Imaging:?Radiological studies?ordered and independently reviewed by staff and myself; Discussed with patient  Procedure:?CSI injections offered today;?Consent sign; see procedure note  Activity:?Activity as tolerated  Therapy:HEP  Medication:?OTC Tylenol or NSAIDS  RTC:?3 months  Additional work-up:?None  Ordered:  Clinic Follow up, *Est. 05/08/19 3:00:00 CDT, Order for future visit, Osteoarthritis of knees, bilateral, [facility]  XR Knee Left 4 or More Views, Routine, 02/08/19 10:22:00 CST, Arthritis, None, Stretcher, Patient Has IV?, Rad Type, Osteoarthritis of knees, bilateral, Not Scheduled, 02/08/19 10:22:00 CST  XR Knee Right 4 or More Views, Routine, 02/08/19 10:23:00 CST, Arthritis, None, Stretcher, Patient Has IV?, Rad Type, Osteoarthritis of knees, bilateral, Not Scheduled, 02/08/19 10:23:00 CST  ?  Sofi Duarte MD  [facility] resident, -1?   Problem List/Past Medical History  Ongoing  Asthma  Diverticulitis  GI bleeding  Hemorrhoid  Iron deficiency anemia  Lung disease  Obesity  Osteoarthritis of right knee  Historical  GERD (gastroesophageal reflux disease)  Heart failure  Hypertension  Irregular heart beat  Sleep apnea  Total left nephrectomy  Procedure/Surgical  History  RIGHT KNEE SYNVISC INJECTION (01/01/2017)  RIGHT KNEE STERIOD INJECTION (07/12/2016)  Colonoscopy (06/28/2016)  Esophagogastroduodenoscopy (06/28/2016)  RIGHT KNEE SYNVISC INJECTION (02/24/2016)  BILATERAL FOOT SURGERY  GALLBLADDER  HERNIA REPAIR  HYSTERECTOMY  KIDNEY REMOVED  ORAL SURGERY   Medications  ALLOPURINOL 300MG TABLETS, 300 mg= 1 tab(s), Oral, Daily  AMITIZA 24MCG CAPSULES, 24 mcg= 1 cap(s), Oral, BID  AMOXAPINE 50 MG TABLET, 50 mg= 1 tab(s), Oral, At Bedtime,? ?Investigating  aspirin 81 mg oral tablet, 81 mg= 1 tab(s), Oral, Daily  Benadryl 25 mg oral capsule, 25 mg= 1 cap(s), Oral, Once a day (at bedtime), PRN  Breo Ellipta 200 mcg-25 mcg/inh inhalation powder, 1 puff(s), INH, Daily  BRIMONIDINE 0.15% OPHTH SOL 10ML, 1 drop(s), Eye-Both, BID  brimonidine 0.15% ophthalmic solution, BID,? ?Investigating  CARVEDILOL 3.125MG TABLETS, 3.125 mg= 1 tab(s), BID  cetirizine 10 mg oral tablet, 10 mg= 1 tab(s), Oral, Daily  COMBIGAN 0.2%-0.5% EYE DROPS  ELIQUIS 2.5MG TABLETS, 2.5 mg= 1 tab(s), BID  FERROUS SULFATE 325MG (5GR) TABS, 325 mg= 1 tab(s), Oral, Daily,? ?Investigating  FLUOCINOLONE 0.01% CREAM, TOP, BID,? ?Investigating  fluocinonide 0.1% topical cream, 1 rah, TOP, BID,? ?Investigating  fluticasone 50 mcg/inh nasal spray, 2 spray(s), Nasal, BID,? ?Investigating  FUROSEMIDE 20 MG TABLET, 20 mg= 1 tab(s), Daily  FUROSEMIDE 40MG TABLETS, 40 mg= 1 tab(s), Oral, Daily  GABAPENTIN 300 MG CAPSULE  HYDROXYZINE HCL 25 MG TABLET, 25 mg= 1 tab(s), qPM  ipratropium 21 mcg/inh (0.03%) nasal spray, 2 spray(s), Nasal, TID  LEVOTHYROXINE 25 MCG TABLET, 25 mcg= 1 tab(s), Oral, Daily  lidocaine 4% topical film, 1 patch(es), TOP, BID, 1 refills,? ?Investigating  LOSARTAN 50MG TABLETS, 50 mg= 1 tab(s), Oral, Daily,? ?Investigating  LYRICA 50 MG CAPSULE, 50 mg= 1 cap(s), Oral, BID  MECLIZINE 25 MG TABLET, 25 mg= 1 tab(s), Oral, TID  MELOXICAM 15 MG TABLET, 15 mg= 1 tab(s), Oral, qAM  NEXIUM 40MG CAPSULES, 40 mg= 1  cap(s), Oral, Daily  NITROFURANTOIN MACRO 100MG CAPSULES, 100 mg= 1 cap(s), BID  Nitrostat 0.4 mg sublingual tablet  OMEPRAZOLE 20MG CAPSULES, Daily  ProAir HFA 90 mcg/inh inhalation aerosol with adapter, 1 puff(s), INH, q4hr, PRN  QVAR 40MCG ORAL INHALER 120 DOSES, 2 puff(s), INH, BID,? ?Investigating  RANITIDINE 300MG TABLETS, 300 mg= 1 tab(s), Oral, qPM  RIVASTIGMINE 1.5 MG CAPSULE, 1.5 mg= 1 cap(s), Oral, BID  ROPINIROLE 0.5MG TABLETS, 0.5 mg= 1 tab(s), Oral, Daily  SPIRONOLACTONE 25 MG TABLET, 25 mg= 1 tab(s), Oral, Daily  TEMAZEPAM 15 MG CAPSULE, 15 mg= 1 cap(s), Oral, qPM,? ?Investigating  TRAMADOL 50MG TABLETS, 50 mg= 1 tab(s), Oral, q4hr, PRN,? ?Investigating  TRAVATAN Z 0.004% EYE DROP  TRAZODONE 100 MG TABLET, 100 mg= 1 tab(s), qPM  TRAZODONE 50 MG TABLET, Oral  TRULANCE 3 MG TABLET, 3 mg= 1 tab(s), qAM  Ultram 50 mg oral tablet, 1-2 tab(s), Oral, q8hr, PRN  VITAMIN B-12 1000MCG TABLETS, 1000 mcg= 1 tab(s), Daily  XARELTO 15 MG TABLET, 15 mg= 1 tab(s), Oral, Daily,? ?Investigating  Allergies  Lotrel  Vioxx  anabolic steroids  ibuprofen?(Allergy to sulfa drugs)  sulfa drugs  Social History  Alcohol  Never, 02/24/2016  Employment/School  Retired, 02/08/2019  Exercise  Exercise duration: 0., 02/08/2019  Home/Environment  Lives with Spouse. Living situation: Home/Independent., 02/08/2019  Nutrition/Health  Diabetic, 02/08/2019  Sexual  Gender Identity Identifies as female., 02/08/2019  Substance Abuse  Never, 02/24/2016  Tobacco  Never (less than 100 in lifetime), N/A, 02/08/2019  Family History  Family history is unknown  Diagnostic Results  XR knees right/left 4 views  My impression: Severe tricompartment OA? worse to lateral compartment to bilateral knees; worse right>left knee?  ?  ?      Discussed with the resident at time of visit.? HPI, PE, and Assessment and Plan reviewed.? Treatment plan is reasonable and appropriate.??Compliance with treatment plan is appropriate.??Radiology images independently  reviewed and interpreted by me.??

## 2022-05-04 NOTE — HISTORICAL [facility] CERNER
This is a historical note converted from [employer]ner. Formatting and pictures may have been removed.  Please reference [employer]ner for original formatting and attached multimedia. Chief Complaint  patient reports catfish bone stuck in throat.  History of Present Illness  Ms. Alberts is a 84 year old female with a medical history of HTN, CHF,?A Fib?on Eliquis, sleep apnea, and diverticulitis. She presented to [facility] ER ?for complaints of feeling like something is stuck in her throat after eating catfish. She denies respiratory distress, chest tightness, abdominal pain, or N/V. Reports she has been coughing to try to get it up, but has been unsuccessful. Denies any other complaints.  Initial ER VS: /66, HR 67, respirations 20, temporal artery temperature 36.5, and SPO2 9% on RA.? K5.3, BUN/Cr CR 37/1.73, coags and CBC unremarkable. XR soft tissue neck with food bolus per ER MD; official read pending.?Dr. Mejia, ENT, consulted;?multiple attempts of?removal of FB from esophagus was unsuccessful. She received Ofirmev IV, glucagon, lidocaine neb, Zofran IV and VIF while in the ER. Shes been admitted to the hospitalist services; Dr. Mejia planning to take her to the OR in the am for FB removal  Review of Systems  Except as documented, all other systems reviewed and negative.  Physical Exam  Vitals & Measurements  T:?36.5? ?C (Temporal Artery)? HR:?64(Peripheral)? RR:?18? BP:?127/56? SpO2:?96%?  General: Appears comfortable, no acute distress.  Cognition and speech:?Oriented, speech clear and coherent.  HEENT:?Normocephalic, normal hearing, oral mucosa is moist.  Neck:?No JVD, trachea at?midline, supple.  Respiratory:?Lungs CTA.?No accessory muscle use. ?Breath sounds are equal.  Cardiovascular:?Regular rhythm. Normal S1/S2. No pedal edema.  Gastrointestinal:?Normoactive bowel sound, soft and non-tender.  Integumentary:?Warm, dry, intact.  Musculoskeletal:?Normal strength, no tenderness, no swelling.  Skin:?Warm and dry, no  rashes or lesions  Neuro:?AAOx3, no focal neurological deficit, normal sensory.  Psych:?Cooperative. Anxious.  ?   Assessment:  Foreign Body Ingestion  Atrial Fibrillation on Eliquis  Hypertension, Essential  CHF - stable  HX: Asthma, Hypothyroidism, GERD  ?   Plan:?  - Dr. Mejia consulted - tentative plans for OR in am for FB removal  - NPO after 00:00; NS @ 75ml/hr  - PRN analgesics; lidocaine neb & glucagon?received in ER  - Resume HOME meds as patient is able to tolerate taking in am  ---- including BB, ARB, lasix, spironolactone, and levothyroxine?  ?  Source of history: Self. . Medical record.?  Present at bedside: .  History limitation: None.  Provider information: PCP:?Chelsy Walters MD  ?   Code status: Full code  DVT prophylaxis: SCDs bilaterally  Admission time 72 minutes.?  ?  I, IFTIKHAR Hatch-C, reviewed and discussed the case with Dr. Quentin Souza.  ?  ?  I, Quentin Souza MD, assumed care of this patient at the time of this addendum and assisted with the composition of the above assessment and plan. For this patient encounter, I reviewed the NP or PA documentation, treatment plan, and medical decision making; and I had face-to-face time with this patient. ?Labs and imaging were reviewed and I agree with history, physical and medical decision making as detailed above.??  ?  ?  84-year-old female presented to the ER after ingesting catfish with bones, has a cat fishbone lodged?in her throat. ?ENT attempted to extract this however they want to take the patient to the OR in the morning to receive anesthesia as they were unable to retrieve it. ?She received glucagon and lidocaine nebulizer. ?She is admitted to the hospitalist service with plans for early morning to remove the fishbone.?  ?   Problem List/Past Medical History  Hypertension  Congestive heart failure  Atrial Fibrillation on Eliquis  Sleep apnea  Asthma  Iron Deficiency  anemia  GERD  Diverticulitis  Hypothyroidism  Procedure/Surgical History  Total left nephrectomy, EGD, Colonoscopy, Hysterectomy, Cholecystectomy, Hernia repair, Bilateral foot surgery  Medications  Inpatient  albuterol, 2.5 mg= 3 mL, NEB, q4hr, PRN  morphine 4 mg/mL preservative-free intravenous solution, 1 mg= 0.25 mL, IV, q4hr, PRN  Sodium Chloride 0.9% intravenous solution 1,000 mL, 1000 mL, IV  Zofran, 4 mg= 2 mL, IV Push, q4hr, PRN  Home  ALLOPURINOL 300MG TABLETS, 300 mg= 1 tab(s), Oral, Daily  AMITIZA 24MCG CAPSULES, 24 mcg= 1 cap(s), Oral, BID  aspirin 81 mg oral tablet, 81 mg= 1 tab(s), Oral, Daily  Benadryl 25 mg oral capsule, 25 mg= 1 cap(s), Oral, Once a day (at bedtime), PRN  Breo Ellipta 200 mcg-25 mcg/inh inhalation powder, 1 puff(s), INH, Daily  BRIMONIDINE 0.15% OPHTH SOL 10ML, 1 drop(s), Eye-Both, BID  CARVEDILOL 3.125MG TABLETS, 3.125 mg= 1 tab(s), BID  cetirizine 10 mg oral tablet, 10 mg= 1 tab(s), Oral, Daily  COMBIGAN 0.2%-0.5% EYE DROPS  ELIQUIS 2.5MG TABLETS, 2.5 mg= 1 tab(s), BID  FERROUS SULFATE 325MG (5GR) TABS, 325 mg= 1 tab(s), Oral, Daily  FLUOCINOLONE 0.01% CREAM, TOP, BID  fluticasone 50 mcg/inh nasal spray, 2 spray(s), Nasal, BID  FUROSEMIDE 40MG TABLETS, 40 mg= 1 tab(s), Oral, Daily  GABAPENTIN 300 MG CAPSULE  HYDROXYZINE HCL 25 MG TABLET, 25 mg= 1 tab(s), qPM  ipratropium 21 mcg/inh (0.03%) nasal spray, 2 spray(s), Nasal, TID  LEVOTHYROXINE 25 MCG TABLET, 25 mcg= 1 tab(s), Oral, Daily  lidocaine 4% topical film, 1 patch(es), TOP, BID, 1 refills  losartan 25 mg oral tablet, 25 mg= 1 tab(s), Oral, qPM  LOSARTAN 50MG TABLETS, 50 mg= 1 tab(s), Oral, Daily  LYRICA 50 MG CAPSULE, 50 mg= 1 cap(s), Oral, BID  MECLIZINE 25 MG TABLET, 25 mg= 1 tab(s), Oral, TID  MELOXICAM 15 MG TABLET, 15 mg= 1 tab(s), Oral, qAM  NEXIUM 40MG CAPSULES, 40 mg= 1 cap(s), Oral, Daily  NITROFURANTOIN MACRO 100MG CAPSULES, 100 mg= 1 cap(s), BID  Nitrostat 0.4 mg sublingual tablet  OMEPRAZOLE 20MG CAPSULES,  Daily  ProAir HFA 90 mcg/inh inhalation aerosol with adapter, 1 puff(s), INH, q4hr, PRN  QVAR 40MCG ORAL INHALER 120 DOSES, 2 puff(s), INH, BID  RANITIDINE 300MG TABLETS, 300 mg= 1 tab(s), Oral, qPM  RIVASTIGMINE 1.5 MG CAPSULE, 1.5 mg= 1 cap(s), Oral, BID  ROPINIROLE 0.5MG TABLETS, 0.5 mg= 1 tab(s), Oral, Daily  SPIRONOLACTONE 25 MG TABLET, 25 mg= 1 tab(s), Oral, Daily  TRAMADOL 50MG TABLETS, 50 mg= 1 tab(s), Oral, q4hr, PRN  TRAVATAN Z 0.004% EYE DROP  TRAZODONE 50 MG TABLET, Oral  TRULANCE 3 MG TABLET, 3 mg= 1 tab(s), qAM  VITAMIN B-12 1000MCG TABLETS, 1000 mcg= 1 tab(s), Daily  Voltaren 1% topical gel, 2 gm, TOP, QID, PRN, 3 refills  Allergies  Lotrel  Vioxx  anabolic steroids  ibuprofen?(Allergy to sulfa drugs)  sulfa drugs  Social History  Denies EtOH, illicit drug use, or tobacco use.  Family History  Family history is unknown  Lab Results  Labs Last 24 Hours?  ?Chemistry? Hematology/Coagulation?   Sodium Lvl: 136 mmol/L (09/05/19 21:02:00) PT: 12.4 second(s) (09/05/19 21:02:00)   Potassium Lvl:?5.3 mmol/L?High (09/05/19 21:02:00) INR: 0.9 (09/05/19 21:02:00)   Chloride: 102 mmol/L (09/05/19 21:02:00) PTT:?<20.0?Low (09/05/19 21:02:00)   CO2: 29 mmol/L (09/05/19 21:02:00) WBC: 9 x10(3)/mcL (09/05/19 21:02:00)   Calcium Lvl: 9.6 mg/dL (09/05/19 21:02:00) RBC: 4.29 x10(6)/mcL (09/05/19 21:02:00)   Glucose Lvl:?130 mg/dL?High (09/05/19 21:02:00) Hgb: 12.2 gm/dL (09/05/19 21:02:00)   BUN:?37 mg/dL?High (09/05/19 21:02:00) Hct: 39.4 % (09/05/19 21:02:00)   Creatinine:?1.73 mg/dL?High (09/05/19 21:02:00) Platelet: 268 x10(3)/mcL (09/05/19 21:02:00)   BUN/Creat Ratio: 21.4 (09/05/19 21:02:00) MCV: 91.8 fL (09/05/19 21:02:00)   eGFR-AA: 36 mL/min/1.73 m2 (09/05/19 21:02:00) MCH: 28.4 pg (09/05/19 21:02:00)   eGFR-BROOKS: 30 mL/min/1.73 m2 (09/05/19 21:02:00) MCHC:?31 gm/dL?Low (09/05/19 21:02:00)    RDW: 17 % (09/05/19 21:02:00)    MPV:?9.2 fL?Low (09/05/19 21:02:00)    Abs Neut: 5.85 x10(3)/mcL (09/05/19 21:02:00)     Neutro Auto: 65 % (09/05/19 21:02:00)    Lymph Auto: 23 % (09/05/19 21:02:00)    Mono Auto: 6 % (09/05/19 21:02:00)    Eos Auto: 6 % (09/05/19 21:02:00)    Abs Eos: 0.5 x10(3)/mcL (09/05/19 21:02:00)    Basophil Auto: 0 % (09/05/19 21:02:00)    Abs Neutro: 5.85 x10(3)/mcL (09/05/19 21:02:00)    Abs Lymph: 2 x10(3)/mcL (09/05/19 21:02:00)    Abs Mono: 0.5 x10(3)/mcL (09/05/19 21:02:00)    Abs Baso: 0 x10(3)/mcL (09/05/19 21:02:00)   Diagnostic Results  XR Soft Tissue Neck pending.

## 2022-05-04 NOTE — HISTORICAL [facility] CERNER
This is a historical note converted from [employer]. Formatting and pictures may have been removed.  Please reference Darinel for original formatting and attached multimedia. Joint?injection procedure?  date: 2/8/2019 time: 11:26  Confirmed: patient, procedure, side, site, safety procedures followed.?  Supervised by: Dr. Mary Browne DO  Resident: Dr. Sofi Duarte MD  Informed consent: signed by patient.?  Indication: symptomatic relief of? bilateral knee pain.  Location: right and left knee  Preparation and technique: skin prep chlorhexidine gluconate (Hibiclens) scrub, sterile preparation of site in usual fashion, local anesthesia vapocoolant, approach anterolateral, sterile needle used (size # 21 gauge, length 1.5 inch)?  Joint injected: with?5 mL of 1% lidocaine plain with 40mg of Kenalog.?  Procedure tolerated: well.?  No Complications.?  Blood Loss: none?  Counseled: The patient regarding diagnosis, treatment, and medications.?  Patient Instructions: given at time of discharge; ER and RTC instructions given?  ?   PE Post Injection?  Post Injection Evaluation: patient reports decrease in pain; improvement in ROM; NVI post procedure?  ?   Sofi Duarte MD  [facility] resident, HO-1?   Procedure note reviewed.?Present for the entire procedure with the resident.?Patient tolerated the procedure well.

## 2024-02-17 ENCOUNTER — HOSPITAL ENCOUNTER (INPATIENT)
Facility: HOSPITAL | Age: 89
LOS: 2 days | Discharge: HOME HEALTH | DRG: 683 | End: 2024-02-19
Attending: EMERGENCY MEDICINE | Admitting: INTERNAL MEDICINE
Payer: MEDICARE

## 2024-02-17 DIAGNOSIS — R06.02 SOB (SHORTNESS OF BREATH): ICD-10-CM

## 2024-02-17 DIAGNOSIS — R53.1 GENERALIZED WEAKNESS: Primary | ICD-10-CM

## 2024-02-17 LAB
ALBUMIN SERPL-MCNC: 3.4 G/DL (ref 3.4–4.8)
ALBUMIN/GLOB SERPL: 0.6 RATIO (ref 1.1–2)
ALP SERPL-CCNC: 85 UNIT/L (ref 40–150)
ALT SERPL-CCNC: 14 UNIT/L (ref 0–55)
APPEARANCE UR: CLEAR
AST SERPL-CCNC: 24 UNIT/L (ref 5–34)
BACTERIA #/AREA URNS AUTO: ABNORMAL /HPF
BASOPHILS # BLD AUTO: 0.03 X10(3)/MCL
BASOPHILS NFR BLD AUTO: 0.4 %
BILIRUB SERPL-MCNC: 0.3 MG/DL
BILIRUB UR QL STRIP.AUTO: NEGATIVE
BUN SERPL-MCNC: 39.7 MG/DL (ref 9.8–20.1)
CALCIUM SERPL-MCNC: 10.4 MG/DL (ref 8.4–10.2)
CHLORIDE SERPL-SCNC: 100 MMOL/L (ref 98–107)
CO2 SERPL-SCNC: 23 MMOL/L (ref 23–31)
COLOR UR AUTO: ABNORMAL
CREAT SERPL-MCNC: 2.8 MG/DL (ref 0.55–1.02)
EOSINOPHIL # BLD AUTO: 0.15 X10(3)/MCL (ref 0–0.9)
EOSINOPHIL NFR BLD AUTO: 2 %
ERYTHROCYTE [DISTWIDTH] IN BLOOD BY AUTOMATED COUNT: 16.7 % (ref 11.5–17)
GFR SERPLBLD CREATININE-BSD FMLA CKD-EPI: 16 MLS/MIN/1.73/M2
GLOBULIN SER-MCNC: 5.3 GM/DL (ref 2.4–3.5)
GLUCOSE SERPL-MCNC: 101 MG/DL (ref 82–115)
GLUCOSE UR QL STRIP.AUTO: NORMAL
HCT VFR BLD AUTO: 40.8 % (ref 37–47)
HGB BLD-MCNC: 12.7 G/DL (ref 12–16)
IMM GRANULOCYTES # BLD AUTO: 0.02 X10(3)/MCL (ref 0–0.04)
IMM GRANULOCYTES NFR BLD AUTO: 0.3 %
KETONES UR QL STRIP.AUTO: NEGATIVE
LEUKOCYTE ESTERASE UR QL STRIP.AUTO: 75
LYMPHOCYTES # BLD AUTO: 2.45 X10(3)/MCL (ref 0.6–4.6)
LYMPHOCYTES NFR BLD AUTO: 32.4 %
MCH RBC QN AUTO: 29.6 PG (ref 27–31)
MCHC RBC AUTO-ENTMCNC: 31.1 G/DL (ref 33–36)
MCV RBC AUTO: 95.1 FL (ref 80–94)
MONOCYTES # BLD AUTO: 0.63 X10(3)/MCL (ref 0.1–1.3)
MONOCYTES NFR BLD AUTO: 8.3 %
MUCOUS THREADS URNS QL MICRO: ABNORMAL /LPF
NEUTROPHILS # BLD AUTO: 4.28 X10(3)/MCL (ref 2.1–9.2)
NEUTROPHILS NFR BLD AUTO: 56.6 %
NITRITE UR QL STRIP.AUTO: NEGATIVE
NRBC BLD AUTO-RTO: 0 %
PH UR STRIP.AUTO: 6 [PH]
PLATELET # BLD AUTO: 214 X10(3)/MCL (ref 130–400)
PMV BLD AUTO: 9.6 FL (ref 7.4–10.4)
POTASSIUM SERPL-SCNC: 3.6 MMOL/L (ref 3.5–5.1)
PROT SERPL-MCNC: 8.7 GM/DL (ref 5.8–7.6)
PROT UR QL STRIP.AUTO: NEGATIVE
RBC # BLD AUTO: 4.29 X10(6)/MCL (ref 4.2–5.4)
RBC #/AREA URNS AUTO: ABNORMAL /HPF
RBC UR QL AUTO: ABNORMAL
SODIUM SERPL-SCNC: 134 MMOL/L (ref 136–145)
SP GR UR STRIP.AUTO: 1.01 (ref 1–1.03)
SQUAMOUS #/AREA URNS LPF: ABNORMAL /HPF
TROPONIN I SERPL-MCNC: 0.02 NG/ML (ref 0–0.04)
TSH SERPL-ACNC: 1.16 UIU/ML (ref 0.35–4.94)
UROBILINOGEN UR STRIP-ACNC: NORMAL
WBC # SPEC AUTO: 7.56 X10(3)/MCL (ref 4.5–11.5)
WBC #/AREA URNS AUTO: ABNORMAL /HPF

## 2024-02-17 PROCEDURE — 99285 EMERGENCY DEPT VISIT HI MDM: CPT | Mod: 25

## 2024-02-17 PROCEDURE — 25000003 PHARM REV CODE 250: Performed by: EMERGENCY MEDICINE

## 2024-02-17 PROCEDURE — 84484 ASSAY OF TROPONIN QUANT: CPT

## 2024-02-17 PROCEDURE — 11000001 HC ACUTE MED/SURG PRIVATE ROOM

## 2024-02-17 PROCEDURE — 81001 URINALYSIS AUTO W/SCOPE: CPT

## 2024-02-17 PROCEDURE — 84443 ASSAY THYROID STIM HORMONE: CPT

## 2024-02-17 PROCEDURE — 80053 COMPREHEN METABOLIC PANEL: CPT

## 2024-02-17 PROCEDURE — 85025 COMPLETE CBC W/AUTO DIFF WBC: CPT

## 2024-02-17 PROCEDURE — 93005 ELECTROCARDIOGRAM TRACING: CPT

## 2024-02-17 PROCEDURE — 93010 ELECTROCARDIOGRAM REPORT: CPT | Mod: ,,, | Performed by: INTERNAL MEDICINE

## 2024-02-17 RX ORDER — MIRABEGRON 25 MG/1
1 TABLET, FILM COATED, EXTENDED RELEASE ORAL EVERY MORNING
COMMUNITY
Start: 2024-01-29

## 2024-02-17 RX ORDER — SODIUM CHLORIDE 9 MG/ML
500 INJECTION, SOLUTION INTRAVENOUS
Status: COMPLETED | OUTPATIENT
Start: 2024-02-17 | End: 2024-02-17

## 2024-02-17 RX ORDER — TALC
6 POWDER (GRAM) TOPICAL NIGHTLY PRN
Status: DISCONTINUED | OUTPATIENT
Start: 2024-02-17 | End: 2024-02-19 | Stop reason: HOSPADM

## 2024-02-17 RX ORDER — CARVEDILOL 3.12 MG/1
3.12 TABLET ORAL 2 TIMES DAILY
COMMUNITY
Start: 2021-07-12

## 2024-02-17 RX ORDER — SODIUM CHLORIDE 0.9 % (FLUSH) 0.9 %
10 SYRINGE (ML) INJECTION
Status: DISCONTINUED | OUTPATIENT
Start: 2024-02-17 | End: 2024-02-19 | Stop reason: HOSPADM

## 2024-02-17 RX ORDER — OMEPRAZOLE 20 MG/1
20 CAPSULE, DELAYED RELEASE ORAL NIGHTLY
Status: ON HOLD | COMMUNITY
Start: 2024-01-29 | End: 2024-04-09 | Stop reason: HOSPADM

## 2024-02-17 RX ORDER — RISPERIDONE 2 MG/1
2 TABLET ORAL NIGHTLY
COMMUNITY
Start: 2024-01-29

## 2024-02-17 RX ORDER — FUROSEMIDE 20 MG/1
40 TABLET ORAL EVERY MORNING
Status: ON HOLD | COMMUNITY
End: 2024-04-09 | Stop reason: HOSPADM

## 2024-02-17 RX ORDER — PREGABALIN 50 MG/1
50 CAPSULE ORAL 2 TIMES DAILY
Status: ON HOLD | COMMUNITY
End: 2024-02-19 | Stop reason: HOSPADM

## 2024-02-17 RX ADMIN — SODIUM CHLORIDE 500 ML: 9 INJECTION, SOLUTION INTRAVENOUS at 06:02

## 2024-02-17 NOTE — Clinical Note
Diagnosis: Generalized weakness [735153]   Future Attending Provider: ENEDINA LUCERO [[id number]]   Admit to which facility:: [facility] [[id number]]   Reason for IP Medical Treatment  (Clinical interventions that can only be accomplished in the IP setting? ) :: as above   I certify that Inpatient services for greater than or equal to 2 midnights are medically necessary:: Yes   Plans for Post-Acute care--if anticipated (pick the single best option):: A. No post acute care anticipated at this time

## 2024-02-18 LAB
ALBUMIN SERPL-MCNC: 2.6 G/DL (ref 3.4–4.8)
ALBUMIN/GLOB SERPL: 0.6 RATIO (ref 1.1–2)
ALP SERPL-CCNC: 67 UNIT/L (ref 40–150)
ALT SERPL-CCNC: 12 UNIT/L (ref 0–55)
AST SERPL-CCNC: 19 UNIT/L (ref 5–34)
BASOPHILS # BLD AUTO: 0.03 X10(3)/MCL
BASOPHILS NFR BLD AUTO: 0.4 %
BILIRUB SERPL-MCNC: 0.3 MG/DL
BUN SERPL-MCNC: 35.1 MG/DL (ref 9.8–20.1)
CALCIUM SERPL-MCNC: 9.4 MG/DL (ref 8.4–10.2)
CHLORIDE SERPL-SCNC: 106 MMOL/L (ref 98–107)
CO2 SERPL-SCNC: 23 MMOL/L (ref 23–31)
CREAT SERPL-MCNC: 2.34 MG/DL (ref 0.55–1.02)
EOSINOPHIL # BLD AUTO: 0.17 X10(3)/MCL (ref 0–0.9)
EOSINOPHIL NFR BLD AUTO: 2.4 %
ERYTHROCYTE [DISTWIDTH] IN BLOOD BY AUTOMATED COUNT: 16.5 % (ref 11.5–17)
FERRITIN SERPL-MCNC: 303.6 NG/ML (ref 4.63–204)
GFR SERPLBLD CREATININE-BSD FMLA CKD-EPI: 20 MLS/MIN/1.73/M2
GLOBULIN SER-MCNC: 4.5 GM/DL (ref 2.4–3.5)
GLUCOSE SERPL-MCNC: 88 MG/DL (ref 82–115)
HCT VFR BLD AUTO: 34.4 % (ref 37–47)
HGB BLD-MCNC: 10.9 G/DL (ref 12–16)
IMM GRANULOCYTES # BLD AUTO: 0.02 X10(3)/MCL (ref 0–0.04)
IMM GRANULOCYTES NFR BLD AUTO: 0.3 %
IRON SATN MFR SERPL: 17 % (ref 20–50)
IRON SERPL-MCNC: 26 UG/DL (ref 50–170)
LYMPHOCYTES # BLD AUTO: 2.32 X10(3)/MCL (ref 0.6–4.6)
LYMPHOCYTES NFR BLD AUTO: 33 %
MCH RBC QN AUTO: 29.6 PG (ref 27–31)
MCHC RBC AUTO-ENTMCNC: 31.7 G/DL (ref 33–36)
MCV RBC AUTO: 93.5 FL (ref 80–94)
MONOCYTES # BLD AUTO: 0.68 X10(3)/MCL (ref 0.1–1.3)
MONOCYTES NFR BLD AUTO: 9.7 %
NEUTROPHILS # BLD AUTO: 3.8 X10(3)/MCL (ref 2.1–9.2)
NEUTROPHILS NFR BLD AUTO: 54.2 %
NRBC BLD AUTO-RTO: 0 %
OHS QRS DURATION: 204 MS
OHS QTC CALCULATION: 531 MS
PLATELET # BLD AUTO: 177 X10(3)/MCL (ref 130–400)
PMV BLD AUTO: 9.3 FL (ref 7.4–10.4)
POTASSIUM SERPL-SCNC: 2.9 MMOL/L (ref 3.5–5.1)
PROT SERPL-MCNC: 7.1 GM/DL (ref 5.8–7.6)
RBC # BLD AUTO: 3.68 X10(6)/MCL (ref 4.2–5.4)
SODIUM SERPL-SCNC: 136 MMOL/L (ref 136–145)
TIBC SERPL-MCNC: 127 UG/DL (ref 70–310)
TIBC SERPL-MCNC: 153 UG/DL (ref 250–450)
TRANSFERRIN SERPL-MCNC: 141 MG/DL
VIT B12 SERPL-MCNC: 591 PG/ML (ref 213–816)
WBC # SPEC AUTO: 7.02 X10(3)/MCL (ref 4.5–11.5)

## 2024-02-18 PROCEDURE — 11000001 HC ACUTE MED/SURG PRIVATE ROOM

## 2024-02-18 PROCEDURE — 25000003 PHARM REV CODE 250: Performed by: INTERNAL MEDICINE

## 2024-02-18 PROCEDURE — 97162 PT EVAL MOD COMPLEX 30 MIN: CPT

## 2024-02-18 PROCEDURE — 82728 ASSAY OF FERRITIN: CPT | Performed by: INTERNAL MEDICINE

## 2024-02-18 PROCEDURE — 80053 COMPREHEN METABOLIC PANEL: CPT | Performed by: INTERNAL MEDICINE

## 2024-02-18 PROCEDURE — 83540 ASSAY OF IRON: CPT | Performed by: INTERNAL MEDICINE

## 2024-02-18 PROCEDURE — 82607 VITAMIN B-12: CPT | Performed by: INTERNAL MEDICINE

## 2024-02-18 PROCEDURE — 85025 COMPLETE CBC W/AUTO DIFF WBC: CPT | Performed by: INTERNAL MEDICINE

## 2024-02-18 RX ORDER — RISPERIDONE 1 MG/1
2 TABLET ORAL NIGHTLY
Status: DISCONTINUED | OUTPATIENT
Start: 2024-02-18 | End: 2024-02-19 | Stop reason: HOSPADM

## 2024-02-18 RX ORDER — CARVEDILOL 3.12 MG/1
3.12 TABLET ORAL 2 TIMES DAILY
Status: DISCONTINUED | OUTPATIENT
Start: 2024-02-18 | End: 2024-02-19 | Stop reason: HOSPADM

## 2024-02-18 RX ORDER — ALLOPURINOL 100 MG/1
100 TABLET ORAL DAILY
Status: DISCONTINUED | OUTPATIENT
Start: 2024-02-18 | End: 2024-02-19 | Stop reason: HOSPADM

## 2024-02-18 RX ORDER — RIVASTIGMINE TARTRATE 1.5 MG/1
1.5 CAPSULE ORAL 2 TIMES DAILY
Status: DISCONTINUED | OUTPATIENT
Start: 2024-02-18 | End: 2024-02-19 | Stop reason: HOSPADM

## 2024-02-18 RX ORDER — ATORVASTATIN CALCIUM 10 MG/1
10 TABLET, FILM COATED ORAL NIGHTLY
Status: DISCONTINUED | OUTPATIENT
Start: 2024-02-18 | End: 2024-02-19 | Stop reason: HOSPADM

## 2024-02-18 RX ORDER — FUROSEMIDE 20 MG/1
20 TABLET ORAL EVERY MORNING
Status: DISCONTINUED | OUTPATIENT
Start: 2024-02-18 | End: 2024-02-18

## 2024-02-18 RX ORDER — ROPINIROLE 0.25 MG/1
0.5 TABLET, FILM COATED ORAL NIGHTLY
Status: DISCONTINUED | OUTPATIENT
Start: 2024-02-18 | End: 2024-02-19 | Stop reason: HOSPADM

## 2024-02-18 RX ORDER — DOCUSATE SODIUM 100 MG/1
100 CAPSULE, LIQUID FILLED ORAL 2 TIMES DAILY
Status: DISCONTINUED | OUTPATIENT
Start: 2024-02-18 | End: 2024-02-19 | Stop reason: HOSPADM

## 2024-02-18 RX ORDER — TRAZODONE HYDROCHLORIDE 50 MG/1
50 TABLET ORAL NIGHTLY
Status: DISCONTINUED | OUTPATIENT
Start: 2024-02-18 | End: 2024-02-19 | Stop reason: HOSPADM

## 2024-02-18 RX ORDER — LEVOTHYROXINE SODIUM 25 UG/1
25 TABLET ORAL
Status: DISCONTINUED | OUTPATIENT
Start: 2024-02-18 | End: 2024-02-19 | Stop reason: HOSPADM

## 2024-02-18 RX ORDER — FUROSEMIDE 20 MG/1
20 TABLET ORAL DAILY
Status: DISCONTINUED | OUTPATIENT
Start: 2024-02-18 | End: 2024-02-19

## 2024-02-18 RX ORDER — LUBIPROSTONE 24 UG/1
24 CAPSULE ORAL
Status: DISCONTINUED | OUTPATIENT
Start: 2024-02-18 | End: 2024-02-19 | Stop reason: HOSPADM

## 2024-02-18 RX ORDER — POTASSIUM CHLORIDE 20 MEQ/1
40 TABLET, EXTENDED RELEASE ORAL EVERY 4 HOURS
Status: COMPLETED | OUTPATIENT
Start: 2024-02-18 | End: 2024-02-18

## 2024-02-18 RX ADMIN — POTASSIUM CHLORIDE 40 MEQ: 1500 TABLET, EXTENDED RELEASE ORAL at 01:02

## 2024-02-18 RX ADMIN — APIXABAN 2.5 MG: 2.5 TABLET, FILM COATED ORAL at 09:02

## 2024-02-18 RX ADMIN — ATORVASTATIN CALCIUM 10 MG: 10 TABLET, FILM COATED ORAL at 09:02

## 2024-02-18 RX ADMIN — RIVASTIGMINE TARTRATE 1.5 MG: 1.5 CAPSULE ORAL at 09:02

## 2024-02-18 RX ADMIN — ALLOPURINOL 100 MG: 100 TABLET ORAL at 09:02

## 2024-02-18 RX ADMIN — RISPERIDONE 2 MG: 1 TABLET ORAL at 09:02

## 2024-02-18 RX ADMIN — TRAZODONE HYDROCHLORIDE 50 MG: 50 TABLET ORAL at 09:02

## 2024-02-18 RX ADMIN — CARVEDILOL 3.12 MG: 3.12 TABLET, FILM COATED ORAL at 09:02

## 2024-02-18 RX ADMIN — ROPINIROLE HYDROCHLORIDE 0.5 MG: 0.25 TABLET, FILM COATED ORAL at 09:02

## 2024-02-18 RX ADMIN — POTASSIUM CHLORIDE 40 MEQ: 1500 TABLET, EXTENDED RELEASE ORAL at 09:02

## 2024-02-18 RX ADMIN — FUROSEMIDE 20 MG: 20 TABLET ORAL at 09:02

## 2024-02-18 RX ADMIN — LEVOTHYROXINE SODIUM 25 MCG: 25 TABLET ORAL at 06:02

## 2024-02-18 NOTE — H&P
[facility] Hospital Medicine History & Physical Examination       Patient Name: Ev Alberts  MRN: [id number]  Patient Class: IP- Inpatient   Admission Date: 2/17/2024  4:32 PM  Length of Stay: 0  Admitting Service: Hospital Medicine   Attending Physician: Quentin Souza MD   Primary Care Provider: Santi Walters MD  History source: EMR, patient and/or patient's family    CHIEF COMPLAINT   Fatigue (C/o weakness x 1 week. Seen [facility] Thursday with same complaint. States symptoms have not improved. )    HISTORY OF PRESENT ILLNESS:   Patient is 89-year-old female with past medical history of CKD 3, CHF, HTN, hypothyroidism and additional past medical history as below presented to the ER twice within the last week for generalized weakness.  This resulted in a fall today.  She denied fever, chest pain, nausea vomiting or diarrhea.    She arrived to the ER afebrile hemodynamically stable maintaining normal sats on room air.  Laboratory work was overall unremarkable except for a creatinine of 2.8, prior from 4 years ago was around 1.8.  CT head showed no acute process.    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)  Sick sinus syndrome  Gout    PAST SURGICAL HISTORY:   Left nephrectomy   Hysterectomy  Cholecystectomy  Hernia repair   Bilateral foot surgery    ALLERGIES:   Amlodipine-benazepril, Corticosteroids (glucocorticoids), Rofecoxib, Sulfa (sulfonamide antibiotics), Atorvastatin, and Ibuprofen    FAMILY HISTORY:   Reviewed and non-contributory     SOCIAL HISTORY:     Social History     Tobacco Use    Smoking status: Not on file    Smokeless tobacco: Not on file   Substance Use Topics    Alcohol use: Not on file        HOME MEDICATIONS:     Prior to Admission medications    Medication Sig Start Date End Date Taking? Authorizing Provider   acyclovir (ZOVIRAX) 800 MG Tab  acyclovir 800 mg tablet    Provider, Historical   albuterol (PROVENTIL) 2.5 mg /3 mL (0.083 %) nebulizer solution albuterol sulfate 2.5 mg/3 mL (0.083 %) solution for nebulization   USE 1 VIAL PER NEB Q 6 H PRN FOR WHEEZING OR SORTNESS OF BREATH    Provider, Historical   allopurinoL (ZYLOPRIM) 300 MG tablet allopurinol 300 mg tablet    Provider, Historical   amoxicillin (AMOXIL) 500 MG capsule amoxicillin 500 mg capsule    Provider, Historical   atorvastatin (LIPITOR) 10 MG tablet Take 10 mg by mouth nightly. 22   Provider, Historical   brimonidine 0.15 % OPTH DROP (ALPHAGAN) 0.15 % ophthalmic solution brimonidine 0.15 % eye drops    Provider, Historical   carvediloL (COREG) 12.5 MG tablet carvedilol 12.5 mg tablet   TK 1 T PO BID    Provider, Historical   diclofenac sodium (VOLTAREN) 1 % Gel Apply topically. 22   Provider, Historical   ELIQUIS 2.5 mg Tab SMARTSI Tablet(s) By Mouth Morning-Night 5/10/22   Provider, Historical   fluticasone propionate (FLONASE) 50 mcg/actuation nasal spray 1 spray by Each Nostril route once daily. 22   Provider, Historical   furosemide (LASIX) 40 MG tablet furosemide 40 mg tablet   TK 1 T PO QD    Provider, Historical   levocetirizine (XYZAL) 5 MG tablet levocetirizine 5 mg tablet    Provider, Historical   levothyroxine (SYNTHROID) 25 MCG tablet levothyroxine 25 mcg tablet    Provider, Historical   LINZESS 72 mcg Cap capsule Take 72 mcg by mouth every morning. 22   Provider, Historical   losartan (COZAAR) 50 MG tablet losartan 50 mg tablet    Provider, Historical   lubiprostone (AMITIZA) 24 MCG Cap Amitiza 24 mcg capsule    Provider, Historical   meclizine (ANTIVERT) 25 mg tablet meclizine 25 mg tablet    Provider, Historical   pregabalin (LYRICA) 150 MG capsule Lyrica 150 mg capsule    Provider, Historical   rivastigmine tartrate (EXELON) 1.5 MG capsule rivastigmine 1.5 mg capsule   TK 1 CAPSULE PO BID    Provider, Historical   rOPINIRole (REQUIP) 0.5 MG tablet  "ropinirole 0.5 mg tablet    Provider, Historical   temazepam (RESTORIL) 15 mg Cap temazepam 15 mg capsule    Provider, Historical   torsemide (DEMADEX) 20 MG Tab torsemide 20 mg tablet    Provider, Historical   travoprost (TRAVATAN Z) 0.004 % ophthalmic solution Travatan Z 0.004 % eye drops    Provider, Historical   traZODone (DESYREL) 100 MG tablet Take 50 mg by mouth 2 (two) times daily. 5/9/22   Provider, Historical       REVIEW OF SYSTEMS:   Except as documented, all other systems reviewed and negative     PHYSICAL EXAM:   T 97.5 °F (36.4 °C)   /70   P 60   RR 19   O2 100 %  GENERAL: awake, alert, and in no acute distress, non-toxic appearing   HEENT: normocephalic atraumatic   NECK: supple   LUNGS: Clear bilaterally, no wheezing or rales, no accessory muscle use   CVS: Regular rate and rhythm, normal peripheral perfusion  ABD: Soft, non-tender, non-distended, bowel sounds present  EXTREMITIES: no clubbing or cyanosis  SKIN: Warm, dry.   NEURO: alert , grossly without focal deficits a globally weak  PSYCHIATRIC: Cooperative    LABS AND IMAGING:     Recent Labs     02/17/24  1659   WBC 7.56   RBC 4.29   HGB 12.7   HCT 40.8   MCV 95.1*   MCH 29.6   MCHC 31.1*   RDW 16.7        No results for input(s): "LACTIC" in the last 72 hours.  No results for input(s): "INR", "APTT", "D-DIMER" in the last 72 hours.  No results for input(s): "HGBA1C", "CHOL", "TRIG", "LDL", "VLDL", "HDL" in the last 72 hours.   Recent Labs     02/17/24  1659   *   K 3.6   CHLORIDE 100   CO2 23   BUN 39.7*   CREATININE 2.80*   GLUCOSE 101   CALCIUM 10.4*   ALBUMIN 3.4   GLOBULIN 5.3*   ALKPHOS 85   ALT 14   AST 24   BILITOT 0.3   TSH 1.163     Recent Labs     02/17/24  1659   TROPONINI 0.023          CT Head Without Contrast  Narrative: EXAMINATION:  CT HEAD WITHOUT CONTRAST    CLINICAL HISTORY:  fall;    TECHNIQUE:  Sequential axial images were performed of the brain without contrast.    Dose product length of 1003 mGycm. " Automated exposure control was utilized to minimize radiation dose.    COMPARISON:  None available.    FINDINGS:  There is no intracranial mass effect, midline shift, hydrocephalus or hemorrhage. There is no sulcal effacement or low attenuation changes to suggest recent large vessel territory infarction. Chronic appearing periventricular and subcortical white matter low attenuation changes are present and are consistent with chronic microangiopathic ischemia. The ventricular system and sulcal markings prominence is consistent with atrophy. There is no acute extra axial fluid collection. Visualized paranasal sinuses are clear without mucosal thickening, polypoidal abnormality or air-fluid levels. Mastoid air cells aeration is optimal.  Impression: 1.  No acute intracranial findings identified.    2.  Chronic microangiopathic ischemia and atrophy.    Electronically signed by: Theodore Prince  Date:    02/17/2024  Time:    17:31      ASSESSMENT & PLAN:   Generalized weakness and falls   MICHEL, on underlying CKD 3     HX:  Dementia, CHF, HTN, hypothyroidism, YARI, CKD/solitary kidney, SSS, suspect history of AFib (has been on anticoagulation for many years)    -suspect weakness is from polypharmacy:  Stopping meclizine 25 mg t.i.d., changing trazodone 50 b.i.d. to only trazodone at night, will try to discontinue Lyrica permanently  -admission for PT eval, may need placement  -we will give a total of 1 L IV fluids slowly overnight then stop  -obtain echo for EF assessment     DVT prophylaxis:  Continue Eliquis  Code status: full     If patient was admitted under observational status it is with my approval/permission.     At least 55 min was spent on this history and physical.  Time seen: 11PM 2/17/24  Quentin Souza MD

## 2024-02-18 NOTE — NURSING
Nurses Note -- 4 Eyes      2/18/2024   1:52 AM      Skin assessed during: Admit      [] No Altered Skin Integrity Present    []Prevention Measures Documented      [x] Yes- Altered Skin Integrity Present or Discovered   [x] LDA Added if Not in Epic (Describe Wound)   [x] New Altered Skin Integrity was Present on Admit and Documented in LDA   [x] Wound Image Taken    Wound Care Consulted? Yes    Attending Nurse:  Elin Cramer RN/Staff Member:   Elin Huston RN

## 2024-02-18 NOTE — PLAN OF CARE
Problem: Physical Therapy  Goal: Physical Therapy Goal  Description: Goals to be met by: 3/18/24     Patient will increase functional independence with mobility by performin. Supine to sit with CGA  2. Sit to stand with SBA  3. Ambulate 100 ft w/ RW and SBA    Outcome: Ongoing, Progressing      Attending with

## 2024-02-18 NOTE — ED PROVIDER NOTES
Encounter Date: 2/17/2024       History     Chief Complaint   Patient presents with    Fatigue     C/o weakness x 1 week. Seen [facility] Thursday with same complaint. States symptoms have not improved.      89 year old female with a hx of CHF, HTN, hypothyroidism, SSS/bradycardia s/p dual chamber pacemaker placed on 9-19-19 and asthma who presented via EMS with C/C generalized weakness x 1 week. Pt was seen at outside facility on Thursday for similar complaint, reports that work up was unremarkable and was discharged home. She states her symptoms have not improved and reports falling this AM while trying to go use the restroom. She admits to hitting her head against the table. She denies bruises, vision changes, chest  pain, SOB, nausea, vomiting, fever, chills    The history is provided by the patient and the spouse. No  was used.     Review of patient's allergies indicates:   Allergen Reactions    Amlodipine-benazepril Edema     Other reaction(s): unknown    Corticosteroids (glucocorticoids) Edema and Swelling    Rofecoxib Anaphylaxis and Swelling    Sulfa (sulfonamide antibiotics) Edema    Atorvastatin      Other reaction(s): unknown    Ibuprofen      Other reaction(s): Allergy to sulfa drugs     No past medical history on file.  No past surgical history on file.  No family history on file.     Review of Systems   Constitutional:  Positive for fatigue. Negative for activity change, chills and fever.   HENT:  Negative for trouble swallowing.    Eyes:  Negative for visual disturbance.   Respiratory:  Negative for chest tightness and shortness of breath.    Cardiovascular:  Negative for chest pain and palpitations.   Gastrointestinal:  Negative for diarrhea, nausea and vomiting.   Genitourinary:  Negative for dysuria and hematuria.   Skin:  Negative for rash.   Neurological:  Positive for weakness. Negative for dizziness, syncope and light-headedness.       Physical Exam     Initial Vitals  [02/17/24 1629]   BP Pulse Resp Temp SpO2   117/67 71 16 97.5 °F (36.4 °C) 97 %      MAP       --         Physical Exam    Constitutional: No distress.   Look ill, soft spoken   HENT:   Head: Normocephalic and atraumatic.   Dry lips   Eyes: Pupils are equal, round, and reactive to light.   Neck: Neck supple.   Cardiovascular:  Normal rate, regular rhythm and normal heart sounds.           Pulmonary/Chest: Breath sounds normal. No respiratory distress. She has no wheezes. She has no rhonchi. She has no rales.   Abdominal: Bowel sounds are normal. She exhibits no distension. There is no abdominal tenderness. There is no rebound and no guarding.   Musculoskeletal:         General: No edema.      Cervical back: Neck supple.     Neurological: She is alert and oriented to person, place, and time. GCS score is 15. GCS eye subscore is 4. GCS verbal subscore is 5. GCS motor subscore is 6.   Skin: Skin is warm. No rash noted.   Psychiatric: Judgment normal.         ED Course   Procedures  Labs Reviewed   COMPREHENSIVE METABOLIC PANEL - Abnormal; Notable for the following components:       Result Value    Sodium Level 134 (*)     Blood Urea Nitrogen 39.7 (*)     Creatinine 2.80 (*)     Calcium Level Total 10.4 (*)     Protein Total 8.7 (*)     Globulin 5.3 (*)     Albumin/Globulin Ratio 0.6 (*)     All other components within normal limits   URINALYSIS, REFLEX TO URINE CULTURE - Abnormal; Notable for the following components:    Blood, UA Trace (*)     Leukocyte Esterase, UA 75 (*)     Mucous, UA Trace (*)     All other components within normal limits   CBC WITH DIFFERENTIAL - Abnormal; Notable for the following components:    MCV 95.1 (*)     MCHC 31.1 (*)     All other components within normal limits   TROPONIN I - Normal   TSH - Normal   CBC W/ AUTO DIFFERENTIAL    Narrative:     The following orders were created for panel order CBC Auto Differential.  Procedure                               Abnormality         Status                      ---------                               -----------         ------                     CBC with Differential[[id number]]       Abnormal            Final result                 Please view results for these tests on the individual orders.          Imaging Results              CT Head Without Contrast (Final result)  Result time 02/17/24 17:31:03      Final result by Theodore Prince MD (02/17/24 17:31:03)                   Impression:      1.  No acute intracranial findings identified.    2.  Chronic microangiopathic ischemia and atrophy.      Electronically signed by: Theodore Prince  Date:    02/17/2024  Time:    17:31               Narrative:    EXAMINATION:  CT HEAD WITHOUT CONTRAST    CLINICAL HISTORY:  fall;    TECHNIQUE:  Sequential axial images were performed of the brain without contrast.    Dose product length of 1003 mGycm. Automated exposure control was utilized to minimize radiation dose.    COMPARISON:  None available.    FINDINGS:  There is no intracranial mass effect, midline shift, hydrocephalus or hemorrhage. There is no sulcal effacement or low attenuation changes to suggest recent large vessel territory infarction. Chronic appearing periventricular and subcortical white matter low attenuation changes are present and are consistent with chronic microangiopathic ischemia. The ventricular system and sulcal markings prominence is consistent with atrophy. There is no acute extra axial fluid collection. Visualized paranasal sinuses are clear without mucosal thickening, polypoidal abnormality or air-fluid levels. Mastoid air cells aeration is optimal.                                       Medications   0.9%  NaCl infusion (0 mLs Intravenous Stopped 2/17/24 1951)     Medical Decision Making  Amount and/or Complexity of Data Reviewed  Labs: ordered.  Radiology: ordered.                          Medical Decision Making:   Initial Assessment:   89 year old female with a hx of CHF, HTN,  SSS/bradycardia s/p pacemaker placement, asthma who presented via EMS with C/C generalized weakness x 1 week.  Differential Diagnosis:   Hypothyroidism, UTI, ACS, metabolic derangement, intracranial bleed, dehydration  Clinical Tests:   Lab Tests: Ordered and Reviewed  The following lab test(s) were unremarkable: CBC, CMP, Troponin and Urinalysis  Radiological Study: Ordered  Medical Tests: Ordered  ED Management:  Labs remarkable for BUN/Cr 39/2.8, elevated calcium and total protein. Trop and TSH wnl; UA with LE and trace mucus and blood. CT head w/o contrast negative for acute abnormality. Discussed with pt and pt's family about admission. Pt agreeable with plan. Admit to hospital medicine for observation.   Other:   I have discussed this case with another health care provider.             Clinical Impression:  Final diagnoses:  [R53.1] Generalized weakness (Primary)          ED Disposition Condition    Admit                 Eliecer Pickering MD  Resident  02/17/24 2000

## 2024-02-18 NOTE — PT/OT/SLP EVAL
Physical Therapy Evaluation    Patient Name:  Ev Alberts   MRN:  [id number]    Recommendations:     Discharge therapy intensity: Moderate Intensity Therapy   Discharge Equipment Recommendations: none   Barriers to discharge: Impaired mobility and Ongoing medical needs    Assessment:     Ev Alberts is a 89 y.o. 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  She presents with the following impairments/functional limitations: weakness, impaired endurance, impaired functional mobility, gait instability, decreased coordination, decreased lower extremity function.  Pt required Mod A for supine > sit, demonstrated decreased core strength sitting EOB with tendency to posteriorly lean needing Min A to correct. She was Min A for STS to RW. Pt ambulated 28' w/ RW and CGA and wheelchair following, cueing for upright posture and AD placement. Pt with very slow gait speed, short step length, and flexed posture. Discussed rehab options with pt and pt's children in room, mod intensity therapy recommended however pt expressed she did not want to go. Plan to continue encouraging pt for rehab placement upon d/c.    Rehab Prognosis: Good; patient would benefit from acute skilled PT services to address these deficits and reach maximum level of function.    Recent Surgery: * No surgery found *      Plan:     During this hospitalization, patient to be seen 5 x/week to address the identified rehab impairments via gait training, therapeutic activities, therapeutic exercises and progress toward the following goals:    Plan of Care Expires:  03/18/24    Subjective     Chief Complaint: none  Patient/Family Comments/goals: pt's children would like her to go to rehab, pt refusing at this time  Pain/Comfort:  Pain Rating 1: 0/10    Patients cultural, spiritual, Druze  conflicts given the current situation: no    Living Environment:  Pt lives with her  and son in [facility].  Prior to admission, patients level of function was assist for ADLs, ambulating with a cane or walker in home, use of wheelchair in the community. Pt has a nurse who comes M-F to assist her.  Equipment used at home: cane, straight, rollator, walker, rolling, wheelchair, bedside commode, shower chair.  DME owned (not currently used): .  Upon discharge, patient will have assistance from children and nurse.    Objective:     Communicated with nurse prior to session.  Patient found HOB elevated with PureWick, telemetry, pulse ox (continuous), peripheral IV  upon PT entry to room.    General Precautions: Standard, fall  Orthopedic Precautions:N/A   Braces: N/A  Respiratory Status: Room air  Blood Pressure: NT      Exams:  RLE ROM: WFL  RLE Strength: Generalized weakness; 3-/5 grossly  LLE ROM: WFL  LLE Strength: Generalized weakness; 3-/5 grossly  Skin integrity: Visible skin intact      Functional Mobility:  Bed Mobility:     Supine to Sit: moderate assistance  Transfers:     Sit to Stand:  minimum assistance with rolling walker  Gait: 28' RW and CGA      AM-PAC 6 CLICK MOBILITY  Total Score:15       Treatment & Education:  Education Provided:  Role and goals of PT, transfer training, bed mobility, gait training, balance training, safety awareness, assistive device, strengthening exercises, and importance of participating in PT to return to PLOF.    Patient left up in wheelchair with all lines intact, call button in reach, and family present.    GOALS:   Multidisciplinary Problems       Physical Therapy Goals          Problem: Physical Therapy    Goal Priority Disciplines Outcome Goal Variances Interventions   Physical Therapy Goal     PT, PT/OT Ongoing, Progressing     Description: Goals to be met by: 3/18/24     Patient will increase functional independence with mobility by performin. Supine to sit with  CGA  2. Sit to stand with SBA  3. Ambulate 100 ft w/ RW and SBA                         History:     No past medical history on file.    No past surgical history on file.    Time Tracking:     PT Received On: 02/18/24  PT Start Time: 0936     PT Stop Time: 1007  PT Total Time (min): 31 min     Billable Minutes: Evaluation 31      02/18/2024

## 2024-02-19 VITALS
BODY MASS INDEX: 25.66 KG/M2 | HEART RATE: 64 BPM | TEMPERATURE: 98 F | RESPIRATION RATE: 18 BRPM | OXYGEN SATURATION: 98 % | DIASTOLIC BLOOD PRESSURE: 69 MMHG | HEIGHT: 65 IN | SYSTOLIC BLOOD PRESSURE: 119 MMHG | WEIGHT: 154 LBS

## 2024-02-19 LAB
ANION GAP SERPL CALC-SCNC: 6 MEQ/L
BUN SERPL-MCNC: 34.9 MG/DL (ref 9.8–20.1)
CALCIUM SERPL-MCNC: 9.7 MG/DL (ref 8.4–10.2)
CHLORIDE SERPL-SCNC: 111 MMOL/L (ref 98–107)
CO2 SERPL-SCNC: 23 MMOL/L (ref 23–31)
CREAT SERPL-MCNC: 1.73 MG/DL (ref 0.55–1.02)
CREAT/UREA NIT SERPL: 20
FOLATE SERPL-MCNC: 9.5 NG/ML (ref 7–31.4)
GFR SERPLBLD CREATININE-BSD FMLA CKD-EPI: 28 MLS/MIN/1.73/M2
GLUCOSE SERPL-MCNC: 91 MG/DL (ref 82–115)
MAGNESIUM SERPL-MCNC: 1.8 MG/DL (ref 1.6–2.6)
PHOSPHATE SERPL-MCNC: 2.3 MG/DL (ref 2.3–4.7)
POTASSIUM SERPL-SCNC: 4.2 MMOL/L (ref 3.5–5.1)
SODIUM SERPL-SCNC: 140 MMOL/L (ref 136–145)

## 2024-02-19 PROCEDURE — 83735 ASSAY OF MAGNESIUM: CPT | Performed by: INTERNAL MEDICINE

## 2024-02-19 PROCEDURE — 84100 ASSAY OF PHOSPHORUS: CPT | Performed by: INTERNAL MEDICINE

## 2024-02-19 PROCEDURE — 82746 ASSAY OF FOLIC ACID SERUM: CPT | Performed by: INTERNAL MEDICINE

## 2024-02-19 PROCEDURE — 97530 THERAPEUTIC ACTIVITIES: CPT

## 2024-02-19 PROCEDURE — 25000003 PHARM REV CODE 250: Performed by: INTERNAL MEDICINE

## 2024-02-19 PROCEDURE — 80048 BASIC METABOLIC PNL TOTAL CA: CPT | Performed by: INTERNAL MEDICINE

## 2024-02-19 RX ORDER — SODIUM CHLORIDE 9 MG/ML
INJECTION, SOLUTION INTRAVENOUS CONTINUOUS
Status: DISCONTINUED | OUTPATIENT
Start: 2024-02-19 | End: 2024-02-19

## 2024-02-19 RX ORDER — FUROSEMIDE 20 MG/1
20 TABLET ORAL DAILY
Status: DISCONTINUED | OUTPATIENT
Start: 2024-02-20 | End: 2024-02-19 | Stop reason: HOSPADM

## 2024-02-19 RX ORDER — TRAZODONE HYDROCHLORIDE 50 MG/1
50 TABLET ORAL NIGHTLY
Start: 2024-02-19 | End: 2024-02-25 | Stop reason: CLARIF

## 2024-02-19 RX ADMIN — RIVASTIGMINE TARTRATE 1.5 MG: 1.5 CAPSULE ORAL at 09:02

## 2024-02-19 RX ADMIN — LEVOTHYROXINE SODIUM 25 MCG: 25 TABLET ORAL at 06:02

## 2024-02-19 RX ADMIN — CARVEDILOL 3.12 MG: 3.12 TABLET, FILM COATED ORAL at 09:02

## 2024-02-19 RX ADMIN — DOCUSATE SODIUM 100 MG: 100 CAPSULE, LIQUID FILLED ORAL at 09:02

## 2024-02-19 RX ADMIN — APIXABAN 2.5 MG: 2.5 TABLET, FILM COATED ORAL at 09:02

## 2024-02-19 RX ADMIN — ALLOPURINOL 100 MG: 100 TABLET ORAL at 09:02

## 2024-02-19 RX ADMIN — SODIUM CHLORIDE: 9 INJECTION, SOLUTION INTRAVENOUS at 10:02

## 2024-02-19 NOTE — PROGRESS NOTES
[facility] 6th Floor Medical Telemetry  Wound Care    Patient Name:  Ev Alberts   MRN:  [id number]  Date: 2/19/2024  Diagnosis: Generalized weakness    History:     No past medical history on file.    Social History     Socioeconomic History    Marital status:        Precautions:     Allergies as of 02/17/2024 - Reviewed 02/17/2024   Allergen Reaction Noted    Amlodipine-benazepril Edema 06/01/2022    Corticosteroids (glucocorticoids) Edema and Swelling 05/11/2011    Rofecoxib Anaphylaxis and Swelling 05/11/2011    Sulfa (sulfonamide antibiotics) Edema 06/01/2022    Atorvastatin  10/26/2018    Ibuprofen  06/01/2022       [facility] Assessment Details/Treatment     Initial visit regarding affected area over sacrum/buttocks, noting pink nonpigmented skin intact yet vulnerable silicone bordered foam dressing in use. Patient remains resting on a pressure redistribution mattress with pressure injury prevention measures in place and demonstrated ability to assist with turning for assessment.         02/19/24 0900        Altered Skin Integrity 02/17/24 2130 Buttocks Ulceration   Date First Assessed/Time First Assessed: 02/17/24 2130   Altered Skin Integrity Present on Admission - Did Patient arrive to the hospital with altered skin?: yes  Location: Buttocks  Primary Wound Type: Ulceration   Wound Image    Dressing Appearance Dry;Intact;Clean   Drainage Amount None   Appearance Pink;Dry  (intact nonpigmented skin.epithelialized)   Tissue loss description Not applicable   Periwound Area Intact   Wound Length (cm) 1 cm   Wound Width (cm) 0.3 cm   Wound Surface Area (cm^2) 0.3 cm^2   Care Soap and water   Dressing Reinforced;Silicone;Foam   Dressing Change Due 02/20/24     Recommendations made to primary team for local preventive skin care measures and pressure injury  prevention measures  . Orders placed.     02/19/2024

## 2024-02-19 NOTE — PT/OT/SLP PROGRESS
Physical Therapy Treatment    Patient Name:  Ev Alberts   MRN:  [id number]    Recommendations:     Discharge therapy intensity: Low Intensity Therapy (24/7 assistance)   Discharge Equipment Recommendations: none  Barriers to discharge: Ongoing medical needs    Assessment:     Ev Alberts is a 89 y.o. 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   She presents with the following impairments/functional limitations: weakness, impaired endurance, impaired functional mobility, gait instability, decreased coordination, decreased lower extremity function. Pt has 24/7 assistance at home via caregiver during the day time, and son and nephew at night. Pt has a lift chair at baseline, and ambulates 30' at max with RW with CGA. Pt currently ambulating x 100' with RW CGA prior to seated rest in w/c. Pt required Fransico-CGA for sit <> stand t/fs from w/c but daughter present states they will be able to assist at this level. Issued gait belt after session for safety with transfers at d/c.     Rehab Prognosis: Good; patient would benefit from acute skilled PT services to address these deficits and reach maximum level of function.    Recent Surgery: * No surgery found *      Plan:     During this hospitalization, patient to be seen 5 x/week to address the identified rehab impairments via gait training, therapeutic activities, therapeutic exercises and progress toward the following goals:    Plan of Care Expires:  03/18/24    Subjective     Chief Complaint: ready to go home  Patient/Family Comments/goals: to go home  Pain/Comfort:  Pain Rating 1: 0/10      Objective:     Communicated with RN prior to session.  Patient found HOB elevated with   upon PT entry to room.     General Precautions: Standard, fall  Orthopedic Precautions: N/A  Braces: N/A  Respiratory Status:  Room air      Functional Mobility:  Bed Mobility:     Supine to Sit: contact guard assistance  Transfers:     Sit to Stand:  minimum assistance with rolling walker  Bed to Chair: contact guard assistance with  rolling walker  using  Step Transfer  Gait: Pt ambulated 100' with RW CGA prior to seated rest. Cues for upright posture, but no major LOB present.       Education:  Patient and family were provided with verbal education education regarding PT role/goals/POC, fall prevention, and safety awareness.  Understanding was verbalized.     Patient left up in chair with all lines intact, call button in reach, and family present..    GOALS:   Multidisciplinary Problems       Physical Therapy Goals          Problem: Physical Therapy    Goal Priority Disciplines Outcome Goal Variances Interventions   Physical Therapy Goal     PT, PT/OT Ongoing, Progressing     Description: Goals to be met by: 3/18/24     Patient will increase functional independence with mobility by performin. Supine to sit with CGA  2. Sit to stand with SBA  3. Ambulate 100 ft w/ RW and SBA                         Time Tracking:     PT Received On: 24  PT Start Time: 1330     PT Stop Time: 1353  PT Total Time (min): 23 min     Billable Minutes: Therapeutic Activity 23    Treatment Type: Treatment  PT/PTA: PT     Number of PTA visits since last PT visit: 2024

## 2024-02-19 NOTE — PLAN OF CARE
02/19/24 1310   Discharge Assessment   Assessment Type Discharge Planning Assessment   Confirmed/corrected address, phone number and insurance Yes   Source of Information patient;family   When was your last doctors appointment?   (PCP is Santi Walters MD. Patient reports last PCP appointment was last week.)   Reason For Admission Weakness, MICHEL   People in Home child(janice), adult;spouse   Do you expect to return to your current living situation? Yes   Do you have help at home or someone to help you manage your care at home? Yes   Who are your caregiver(s) and their phone number(s)? Mellissa/daughter/[phone]   Current cognitive status: Alert/Oriented   Walking or Climbing Stairs Difficulty yes   Walking or Climbing Stairs ambulation difficulty, requires equipment   Mobility Management Ambulates with Rolling Walker and uses wheelchair for long distances.   Dressing/Bathing Difficulty yes   Dressing/Bathing bathing difficulty, assistance 1 person   Home Accessibility wheelchair accessible   Home Layout Able to live on 1st floor   Equipment Currently Used at Home wheelchair;bedside commode;shower chair;walker, rolling;rollator;cane, straight   Readmission within 30 days? No   Do you currently have service(s) that help you manage your care at home? Yes   Name and Contact number of agency [facility] M-F from 8am-2p.   Do you take prescription medications? Yes   Do you have prescription coverage? Yes   Do you have any problems affording any of your prescribed medications? No   Who is going to help you get home at discharge? Children/spouse/PCA   How do you get to doctors appointments? family or friend will provide   Are you on dialysis? No   Do you take coumadin? No   Discharge Plan A Home Health   Discharge Plan B Home Health   DME Needed Upon Discharge  none   Discharge Plan discussed with: Spouse/sig other;Adult children;Patient     PT recommended moderate therapy. Spoke to patient/spouse/daughter. Patient declined  placement. Patient would like to discharge with home health/PT. Patient has used [facility] in the past and would like to use them again. Referral sent via [employer].

## 2024-02-19 NOTE — PROGRESS NOTES
[facility]  Hospital Medicine Progress Note        Chief Complaint: Inpatient Follow-up for fatigue     HPI:   Patient is 89-year-old female with past medical history of CKD 3, CHF, HTN, hypothyroidism and additional past medical history as below presented to the ER twice within the last week for generalized weakness.  This resulted in a fall today.  She denied fever, chest pain, nausea vomiting or diarrhea.     She arrived to the ER afebrile hemodynamically stable maintaining normal sats on room air.  Laboratory work was overall unremarkable except for a creatinine of 2.8, prior from 4 years ago was around 1.8.  CT head showed no acute process.        Interval Hx:   Still c/o feeling weak in general.   Worked with PT/OT   Pt reports increased back pain   Will get CT lumbar spine.       Case was discussed with patient's nurse and  on the floor.    Objective/physical exam:  General: In no acute distress, afebrile  Chest: Clear to auscultation bilaterally  Heart: RRR, +S1, S2, no appreciable murmur  Abdomen: Soft, nontender, BS +  MSK: Warm, no lower extremity edema, no clubbing or cyanosis  Neurologic: Alert and oriented x4, Cranial nerve II-XII intact, Moves all ext spontaneously     VITAL SIGNS: 24 HRS MIN & MAX LAST   Temp  Min: 97.5 °F (36.4 °C)  Max: 98.2 °F (36.8 °C) 98.2 °F (36.8 °C)   BP  Min: 110/68  Max: 149/68 (!) 149/68   Pulse  Min: 59  Max: 68  68   Resp  Min: 18  Max: 18 18   SpO2  Min: 97 %  Max: 99 % 99 %     I have reviewed the following labs:  Recent Labs   Lab 02/17/24 1659 02/18/24  0322   WBC 7.56 7.02   RBC 4.29 3.68*   HGB 12.7 10.9*   HCT 40.8 34.4*   MCV 95.1* 93.5   MCH 29.6 29.6   MCHC 31.1* 31.7*   RDW 16.7 16.5    177   MPV 9.6 9.3     Recent Labs   Lab 02/17/24  1659 02/18/24  0322   * 136   K 3.6 2.9*   CO2 23 23   BUN 39.7* 35.1*   CREATININE 2.80* 2.34*   CALCIUM 10.4* 9.4   ALBUMIN 3.4 2.6*   ALKPHOS 85 67   ALT 14 12   AST 24  19   BILITOT 0.3 0.3     Microbiology Results (last 7 days)       ** No results found for the last 168 hours. **             See below for Radiology    Scheduled Med:   allopurinoL  100 mg Oral Daily    apixaban  2.5 mg Oral BID    atorvastatin  10 mg Oral Nightly    carvediloL  3.125 mg Oral BID    docusate sodium  100 mg Oral BID    furosemide  20 mg Oral Daily    levothyroxine  25 mcg Oral Before breakfast    lubiprostone  24 mcg Oral Daily with breakfast    risperiDONE  2 mg Oral QHS    rivastigmine tartrate  1.5 mg Oral BID    rOPINIRole  0.5 mg Oral QHS    traZODone  50 mg Oral QHS      Continuous Infusions:     PRN Meds:  melatonin, sodium chloride 0.9%     Assessment/Plan:  Age related physical debility   Generalized weakness   [facility]  Adrianna on CKD 3  Hypokalemia , POA    Hx-Dementia, CHF, HTN, hypothyroidism, YARI, CKD/solitary kidney, SSS,  history of AFib     Plan  Get CT lumbar spine   Cont gentle hydration   Correct and replete electrolytes as appropriate   Monitor renal parameter and UOP  Review home meds and resume as appropriate   Consult PT/OT      VTE prophylaxis: Eliquis    Patient condition:  Fair    Anticipated discharge and Disposition:     TBD    All diagnosis and differential diagnosis have been reviewed; assessment and plan has been documented; I have personally reviewed the labs and test results that are presently available; I have reviewed the patients medication list; I have reviewed the consulting providers response and recommendations. I have reviewed or attempted to review medical records based upon their availability    All of the patient's questions have been  addressed and answered. Patient's is agreeable to the above stated plan. I will continue to monitor closely and make adjustments to medical management as needed.  _______________________________    Ally Erazo MD   02/18/2024

## 2024-02-21 NOTE — DISCHARGE SUMMARY
[facility] Hospital Medicine Discharge Summary    Admit Date: 2/17/2024  Discharge Date and Time: 2/19/2024, 04:02 pm  Admitting Physician:  Team  Discharging Physician: Ally Erazo MD.  Primary Care Physician: Santi Walters MD  Consults: None    Discharge Diagnoses:  Age related physical debility   Generalized weakness   [facility]  Adrianna on CKD 3- improved to baseline  Hypokalemia , POA     Hx-Dementia, CHF, HTN, hypothyroidism, YARI, CKD/solitary kidney, SSS,  history of AFib    Hospital Course:   Patient is 89-year-old female with past medical history of CKD 3, CHF, HTN, hypothyroidism, Solitary kidney, dementia  and PAF on eliquis presented to the ER twice within the last week for generalized weakness.  This resulted in a fall today.  She denied fever, chest pain, nausea vomiting or diarrhea.    She arrived to the ER afebrile hemodynamically stable maintaining normal sats on room air. Laboratory work was overall unremarkable except for a creatinine of 2.8, prior from 4 years ago was around 1.8.  CT head showed no acute process.Fracture survey did not detect any acute fractures. On reviewing pt's home med list noted [facility]. Pt admitted to  service for further management.     Pt started on gentle hydration with improvement of creatinine to baseline. Highly suspected weakness was due to some volume depletion and [facility]. We stopped her scheduled Meclizine, Decreased trazodone 50 b.i.d. to only trazodone at night,  discontinue Lyrica and requip permanently for now. PT/OT consulted and initially suggested moderate intensity therapy, however on subsequent session pt was noted improved mobility and recommendation changed to low intensity. Pt also reported feeling much better and desired to be discharged home today 2/19/2024. Reports she has help in the house from  and son as well as other help. Home Health setup completed per CM. Pt is examined and deemed  stable for discharge.       Pt was seen and examined on the day of discharge  Vitals:  VITAL SIGNS: 24 HRS MIN & MAX LAST   No data recorded 98.2 °F (36.8 °C)   No data recorded 119/69   No data recorded  64   No data recorded 18   No data recorded 98 %       Physical Exam:  General: In no acute distress, afebrile  Chest: Clear to auscultation bilaterally  Heart: RRR, +S1, S2, no appreciable murmur  Abdomen: Soft, nontender, BS +  MSK: Warm, no lower extremity edema, no clubbing or cyanosis  Neurologic: Alert and oriented x4, Cranial nerve II-XII intact, Moves all ext spontaneously     Procedures Performed: No admission procedures for hospital encounter.     Significant Diagnostic Studies: See Full reports for all details    Recent Labs   Lab 02/17/24 1659 02/18/24 0322   WBC 7.56 7.02   RBC 4.29 3.68*   HGB 12.7 10.9*   HCT 40.8 34.4*   MCV 95.1* 93.5   MCH 29.6 29.6   MCHC 31.1* 31.7*   RDW 16.7 16.5    177   MPV 9.6 9.3       Recent Labs   Lab 02/17/24 1659 02/18/24 0322 02/19/24  0407   * 136 140   K 3.6 2.9* 4.2   CO2 23 23 23   BUN 39.7* 35.1* 34.9*   CREATININE 2.80* 2.34* 1.73*   CALCIUM 10.4* 9.4 9.7   MG  --   --  1.80   ALBUMIN 3.4 2.6*  --    ALKPHOS 85 67  --    ALT 14 12  --    AST 24 19  --    BILITOT 0.3 0.3  --         Microbiology Results (last 7 days)       ** No results found for the last 168 hours. **             CT Lumbar Spine Without Contrast  Narrative: EXAMINATION:  CT LUMBAR SPINE WITHOUT CONTRAST    CLINICAL HISTORY:  Low back pain, increased fracture risk;H/OFall;    TECHNIQUE:  Noncontrast CT images of the lumbar spine. Axial, coronal, and sagittal reformatted images were obtained. Dose length product is 770 mGycm. Automatic exposure control, adjustment of mA/kV or iterative reconstruction technique was used to limit radiation dose.    COMPARISON:  None    FINDINGS:  There are 4 non-rib-bearing lumbar type vertebral bodies with partial sacralization of L5.  Alignment is  normal.  The vertebral body heights are maintained.  There are mild degenerative changes with small marginal osteophytes and facet arthropathy.  There is no acute fracture.  There is no paraspinal hematoma.  Impression: 1. No acute fracture identified.  2. Multilevel degenerative changes of the lumbar spine.  No significant change from the [facility] interpretation    Electronically signed by: Jesika Clay  Date:    02/19/2024  Time:    08:07         Medication List        CHANGE how you take these medications      traZODone 50 MG tablet  Commonly known as: DESYREL  Take 1 tablet (50 mg total) by mouth every evening.  What changed:   medication strength  when to take this            CONTINUE taking these medications      albuterol 2.5 mg /3 mL (0.083 %) nebulizer solution  Commonly known as: PROVENTIL     allopurinoL 300 MG tablet  Commonly known as: ZYLOPRIM     AMITIZA 24 MCG Cap  Generic drug: lubiprostone     atorvastatin 10 MG tablet  Commonly known as: LIPITOR     brimonidine 0.15 % OPTH DROP 0.15 % ophthalmic solution  Commonly known as: ALPHAGAN     carvediloL 3.125 MG tablet  Commonly known as: COREG     ELIQUIS 2.5 mg Tab  Generic drug: apixaban     fluticasone propionate 50 mcg/actuation nasal spray  Commonly known as: FLONASE     furosemide 20 MG tablet  Commonly known as: LASIX     levothyroxine 25 MCG tablet  Commonly known as: SYNTHROID     MYRBETRIQ 50 mg Tb24  Generic drug: mirabegron     omeprazole 20 MG capsule  Commonly known as: PRILOSEC     risperiDONE 2 MG tablet  Commonly known as: RISPERDAL     rivastigmine tartrate 1.5 MG capsule  Commonly known as: EXELON     TRAVATAN Z 0.004 % ophthalmic solution  Generic drug: travoprost            STOP taking these medications      diclofenac sodium 1 % Gel  Commonly known as: VOLTAREN     LYRICA 50 MG capsule  Generic drug: pregabalin     meclizine 25 mg tablet  Commonly known as: ANTIVERT     rOPINIRole 0.5 MG tablet  Commonly known as: REQUIP                Where to Get Your Medications        Information about where to get these medications is not yet available    Ask your nurse or doctor about these medications  traZODone 50 MG tablet          Explained in detail to the patient about the discharge plan, medications, and follow-up visits. Pt understands and agrees with the treatment plan  Discharge [place] [facility]   Discharged Condition: stable  Diet-    Medications Per DC med rec  Activities as tolerated   Follow-up Information       Santi Walters MD. Go on 2/22/2024.    Specialty: Family Medicine  Why: @9:15  Contact information:  [address] [place] [address]  [phone]               [facility] Follow up.    Specialties: Home Health Services, Home Therapy Services, Home Living Aide Services  Why: This is your home health agency.  Contact information:  [address] [place] [address] [phone]                         For further questions contact hospitalist office    Discharge time 33 minutes    For worsening symptoms, chest pain, shortness of breath, increased abdominal pain, high grade fever, stroke or stroke like symptoms, immediately go to the nearest Emergency Room or call 911 as soon as possible.      Ally Bryant M.D, on 2/19/2024, 04:02 pm

## 2024-02-23 ENCOUNTER — PATIENT OUTREACH (OUTPATIENT)
Dept: ADMINISTRATIVE | Facility: CLINIC | Age: 89
End: 2024-02-23
Payer: MEDICARE

## 2024-02-23 NOTE — PROGRESS NOTES
C3 nurse attempted to contact Ev Alberts  for a TCC post hospital discharge follow up call. The patient is unable to conduct the call @ this time. The patient requested a callback.    The patient has a scheduled [facility] appointment with Santi Walters MD (Family Medicine) on 2/22/2024; @9:15 am

## 2024-02-25 ENCOUNTER — HOSPITAL ENCOUNTER (INPATIENT)
Facility: HOSPITAL | Age: 89
LOS: 15 days | Discharge: NURSING, REHAB OR LONG-TERM CARE | DRG: 871 | End: 2024-03-11
Attending: EMERGENCY MEDICINE | Admitting: INTERNAL MEDICINE
Payer: MEDICARE

## 2024-02-25 DIAGNOSIS — R00.0 TACHYCARDIA: ICD-10-CM

## 2024-02-25 DIAGNOSIS — R79.89 ELEVATED TROPONIN: ICD-10-CM

## 2024-02-25 DIAGNOSIS — R53.1 GENERALIZED WEAKNESS: ICD-10-CM

## 2024-02-25 DIAGNOSIS — I25.10 CAD (CORONARY ARTERY DISEASE): ICD-10-CM

## 2024-02-25 DIAGNOSIS — R79.89 TROPONIN LEVEL ELEVATED: Primary | ICD-10-CM

## 2024-02-25 DIAGNOSIS — I82.409 DVT (DEEP VENOUS THROMBOSIS): ICD-10-CM

## 2024-02-25 DIAGNOSIS — R07.9 CHEST PAIN: ICD-10-CM

## 2024-02-25 DIAGNOSIS — R53.81 DEBILITY: ICD-10-CM

## 2024-02-25 DIAGNOSIS — S32.10XA CLOSED FRACTURE OF SACRUM, UNSPECIFIED PORTION OF SACRUM, INITIAL ENCOUNTER: ICD-10-CM

## 2024-02-25 LAB
ALBUMIN SERPL-MCNC: 3.3 G/DL (ref 3.4–4.8)
ALBUMIN/GLOB SERPL: 0.7 RATIO (ref 1.1–2)
ALP SERPL-CCNC: 92 UNIT/L (ref 40–150)
ALT SERPL-CCNC: 14 UNIT/L (ref 0–55)
APPEARANCE UR: ABNORMAL
AST SERPL-CCNC: 28 UNIT/L (ref 5–34)
BACTERIA #/AREA URNS AUTO: ABNORMAL /HPF
BASOPHILS # BLD AUTO: 0.04 X10(3)/MCL
BASOPHILS NFR BLD AUTO: 0.2 %
BILIRUB SERPL-MCNC: 0.4 MG/DL
BILIRUB UR QL STRIP.AUTO: NEGATIVE
BUN SERPL-MCNC: 32.6 MG/DL (ref 9.8–20.1)
CALCIUM SERPL-MCNC: 10.3 MG/DL (ref 8.4–10.2)
CHLORIDE SERPL-SCNC: 102 MMOL/L (ref 98–107)
CO2 SERPL-SCNC: 21 MMOL/L (ref 23–31)
COLOR UR AUTO: ABNORMAL
CREAT SERPL-MCNC: 1.91 MG/DL (ref 0.55–1.02)
EOSINOPHIL # BLD AUTO: 0 X10(3)/MCL (ref 0–0.9)
EOSINOPHIL NFR BLD AUTO: 0 %
ERYTHROCYTE [DISTWIDTH] IN BLOOD BY AUTOMATED COUNT: 16.3 % (ref 11.5–17)
EST. AVERAGE GLUCOSE BLD GHB EST-MCNC: 105.4 MG/DL
GFR SERPLBLD CREATININE-BSD FMLA CKD-EPI: 25 MLS/MIN/1.73/M2
GLOBULIN SER-MCNC: 4.9 GM/DL (ref 2.4–3.5)
GLUCOSE SERPL-MCNC: 122 MG/DL (ref 82–115)
GLUCOSE UR QL STRIP.AUTO: NORMAL
HBA1C MFR BLD: 5.3 %
HCT VFR BLD AUTO: 35 % (ref 37–47)
HGB BLD-MCNC: 10.8 G/DL (ref 12–16)
IMM GRANULOCYTES # BLD AUTO: 0.15 X10(3)/MCL (ref 0–0.04)
IMM GRANULOCYTES NFR BLD AUTO: 0.9 %
KETONES UR QL STRIP.AUTO: ABNORMAL
LEUKOCYTE ESTERASE UR QL STRIP.AUTO: 500
LYMPHOCYTES # BLD AUTO: 1.03 X10(3)/MCL (ref 0.6–4.6)
LYMPHOCYTES NFR BLD AUTO: 6.1 %
MCH RBC QN AUTO: 29.3 PG (ref 27–31)
MCHC RBC AUTO-ENTMCNC: 30.9 G/DL (ref 33–36)
MCV RBC AUTO: 95.1 FL (ref 80–94)
MONOCYTES # BLD AUTO: 0.74 X10(3)/MCL (ref 0.1–1.3)
MONOCYTES NFR BLD AUTO: 4.4 %
MUCOUS THREADS URNS QL MICRO: ABNORMAL /LPF
NEUTROPHILS # BLD AUTO: 14.84 X10(3)/MCL (ref 2.1–9.2)
NEUTROPHILS NFR BLD AUTO: 88.4 %
NITRITE UR QL STRIP.AUTO: NEGATIVE
NRBC BLD AUTO-RTO: 0 %
OHS QRS DURATION: 92 MS
OHS QTC CALCULATION: 499 MS
PH UR STRIP.AUTO: 5.5 [PH]
PLATELET # BLD AUTO: 218 X10(3)/MCL (ref 130–400)
PMV BLD AUTO: 9.7 FL (ref 7.4–10.4)
POTASSIUM SERPL-SCNC: 3.4 MMOL/L (ref 3.5–5.1)
PROT SERPL-MCNC: 8.2 GM/DL (ref 5.8–7.6)
PROT UR QL STRIP.AUTO: ABNORMAL
RBC # BLD AUTO: 3.68 X10(6)/MCL (ref 4.2–5.4)
RBC #/AREA URNS AUTO: ABNORMAL /HPF
RBC UR QL AUTO: ABNORMAL
SODIUM SERPL-SCNC: 133 MMOL/L (ref 136–145)
SP GR UR STRIP.AUTO: 1.01 (ref 1–1.03)
SQUAMOUS #/AREA URNS LPF: ABNORMAL /HPF
TROPONIN I SERPL-MCNC: 0.19 NG/ML (ref 0–0.04)
TROPONIN I SERPL-MCNC: 0.2 NG/ML (ref 0–0.04)
UROBILINOGEN UR STRIP-ACNC: NORMAL
WBC # SPEC AUTO: 16.8 X10(3)/MCL (ref 4.5–11.5)
WBC #/AREA URNS AUTO: ABNORMAL /HPF

## 2024-02-25 PROCEDURE — 93010 ELECTROCARDIOGRAM REPORT: CPT | Mod: ,,, | Performed by: INTERNAL MEDICINE

## 2024-02-25 PROCEDURE — 93005 ELECTROCARDIOGRAM TRACING: CPT

## 2024-02-25 PROCEDURE — 87086 URINE CULTURE/COLONY COUNT: CPT

## 2024-02-25 PROCEDURE — 11000001 HC ACUTE MED/SURG PRIVATE ROOM

## 2024-02-25 PROCEDURE — 84484 ASSAY OF TROPONIN QUANT: CPT

## 2024-02-25 PROCEDURE — 83036 HEMOGLOBIN GLYCOSYLATED A1C: CPT | Performed by: NURSE PRACTITIONER

## 2024-02-25 PROCEDURE — 85025 COMPLETE CBC W/AUTO DIFF WBC: CPT

## 2024-02-25 PROCEDURE — 99285 EMERGENCY DEPT VISIT HI MDM: CPT | Mod: 25

## 2024-02-25 PROCEDURE — 80053 COMPREHEN METABOLIC PANEL: CPT

## 2024-02-25 PROCEDURE — 81001 URINALYSIS AUTO W/SCOPE: CPT

## 2024-02-25 PROCEDURE — 84484 ASSAY OF TROPONIN QUANT: CPT | Performed by: NURSE PRACTITIONER

## 2024-02-25 RX ORDER — BISACODYL 10 MG/1
10 SUPPOSITORY RECTAL DAILY PRN
Status: DISCONTINUED | OUTPATIENT
Start: 2024-02-25 | End: 2024-03-11 | Stop reason: HOSPADM

## 2024-02-25 RX ORDER — AMOXICILLIN 250 MG
1 CAPSULE ORAL 2 TIMES DAILY PRN
Status: DISCONTINUED | OUTPATIENT
Start: 2024-02-25 | End: 2024-03-11 | Stop reason: HOSPADM

## 2024-02-25 RX ORDER — LATANOPROST 50 UG/ML
1 SOLUTION/ DROPS OPHTHALMIC NIGHTLY
COMMUNITY

## 2024-02-25 RX ORDER — TRAZODONE HYDROCHLORIDE 100 MG/1
100 TABLET ORAL NIGHTLY
COMMUNITY
End: 2024-02-26

## 2024-02-25 RX ORDER — NAPROXEN 500 MG/1
500 TABLET ORAL 2 TIMES DAILY WITH MEALS
COMMUNITY
End: 2024-02-26

## 2024-02-25 RX ORDER — MECLIZINE HYDROCHLORIDE 50 MG/1
25 TABLET ORAL 3 TIMES DAILY PRN
COMMUNITY
End: 2024-02-26

## 2024-02-25 RX ORDER — ALUMINUM HYDROXIDE, MAGNESIUM HYDROXIDE, AND SIMETHICONE 1200; 120; 1200 MG/30ML; MG/30ML; MG/30ML
30 SUSPENSION ORAL 4 TIMES DAILY PRN
Status: DISCONTINUED | OUTPATIENT
Start: 2024-02-25 | End: 2024-03-11 | Stop reason: HOSPADM

## 2024-02-25 RX ORDER — SODIUM CHLORIDE 0.9 % (FLUSH) 0.9 %
10 SYRINGE (ML) INJECTION
Status: DISCONTINUED | OUTPATIENT
Start: 2024-02-25 | End: 2024-03-11 | Stop reason: HOSPADM

## 2024-02-25 RX ORDER — ACETAMINOPHEN 500 MG
1000 TABLET ORAL EVERY 6 HOURS PRN
Status: DISCONTINUED | OUTPATIENT
Start: 2024-02-25 | End: 2024-02-25

## 2024-02-25 RX ORDER — IBUPROFEN 200 MG
24 TABLET ORAL
Status: DISCONTINUED | OUTPATIENT
Start: 2024-02-25 | End: 2024-02-25

## 2024-02-25 RX ORDER — ACETAMINOPHEN 325 MG/1
650 TABLET ORAL EVERY 4 HOURS PRN
Status: DISCONTINUED | OUTPATIENT
Start: 2024-02-25 | End: 2024-02-25

## 2024-02-25 RX ORDER — IBUPROFEN 200 MG
16 TABLET ORAL
Status: DISCONTINUED | OUTPATIENT
Start: 2024-02-25 | End: 2024-02-25

## 2024-02-25 RX ORDER — NALOXONE HCL 0.4 MG/ML
0.02 VIAL (ML) INJECTION
Status: DISCONTINUED | OUTPATIENT
Start: 2024-02-25 | End: 2024-03-11 | Stop reason: HOSPADM

## 2024-02-25 RX ORDER — ONDANSETRON HYDROCHLORIDE 2 MG/ML
4 INJECTION, SOLUTION INTRAVENOUS EVERY 4 HOURS PRN
Status: DISCONTINUED | OUTPATIENT
Start: 2024-02-25 | End: 2024-03-11 | Stop reason: HOSPADM

## 2024-02-25 RX ORDER — BRIMONIDINE TARTRATE AND TIMOLOL MALEATE 2; 5 MG/ML; MG/ML
1 SOLUTION OPHTHALMIC 2 TIMES DAILY
COMMUNITY

## 2024-02-25 RX ORDER — GLUCAGON 1 MG
1 KIT INJECTION
Status: DISCONTINUED | OUTPATIENT
Start: 2024-02-25 | End: 2024-02-25

## 2024-02-25 RX ORDER — ACETAMINOPHEN 500 MG
1000 TABLET ORAL EVERY 6 HOURS PRN
Status: DISCONTINUED | OUTPATIENT
Start: 2024-02-25 | End: 2024-03-11 | Stop reason: HOSPADM

## 2024-02-25 RX ORDER — PROCHLORPERAZINE EDISYLATE 5 MG/ML
5 INJECTION INTRAMUSCULAR; INTRAVENOUS EVERY 6 HOURS PRN
Status: DISCONTINUED | OUTPATIENT
Start: 2024-02-25 | End: 2024-03-11 | Stop reason: HOSPADM

## 2024-02-25 RX ORDER — PREGABALIN 50 MG/1
50 CAPSULE ORAL 2 TIMES DAILY
COMMUNITY
End: 2024-02-26

## 2024-02-25 RX ORDER — ROPINIROLE 0.5 MG/1
0.5 TABLET, FILM COATED ORAL NIGHTLY
COMMUNITY
End: 2024-02-26

## 2024-02-25 RX ORDER — TALC
6 POWDER (GRAM) TOPICAL NIGHTLY PRN
Status: DISCONTINUED | OUTPATIENT
Start: 2024-02-25 | End: 2024-03-11 | Stop reason: HOSPADM

## 2024-02-25 NOTE — ED PROVIDER NOTES
"Encounter Date: 2024       History     Chief Complaint   Patient presents with    Medical Problem     Presents to ED with c/o not being able to walk since being discharged on . Daughter reports she "Is not getting the signal to make her feet walk".  Rehab evaluation at home on Friday to begin services. Daughter reports "lethargic" this morning, CBG 99 therefore gave her a banana which improved symptom.     See MDM    The history is provided by a relative and the patient. No  was used.     Review of patient's allergies indicates:   Allergen Reactions    Amlodipine-benazepril Edema     Other reaction(s): unknown    Corticosteroids (glucocorticoids) Edema and Swelling    Rofecoxib Anaphylaxis and Swelling    Sulfa (sulfonamide antibiotics) Edema    Atorvastatin      Other reaction(s): unknown    Ibuprofen      Other reaction(s): Allergy to sulfa drugs     Past Medical History:   Diagnosis Date    Dementia     Hypertension     Sick sinus syndrome     Thyroid disease     Unspecified glaucoma      Past Surgical History:   Procedure Laterality Date    cataract surgery       SECTION      CHOLECYSTECTOMY      HYSTERECTOMY      INSERTION OF PACEMAKER      NEPHRECTOMY       History reviewed. No pertinent family history.  Social History     Tobacco Use    Smoking status: Never    Smokeless tobacco: Never   Substance Use Topics    Alcohol use: Never    Drug use: Not Currently     Review of Systems   Unable to perform ROS: Dementia       Physical Exam     Initial Vitals [24 1307]   BP Pulse Resp Temp SpO2   (!) 108/56 73 19 99.2 °F (37.3 °C) 96 %      MAP       --         Physical Exam    Nursing note and vitals reviewed.  Constitutional: She appears well-developed and well-nourished.   HENT:   Head: Normocephalic.   Eyes: EOM are normal.   Neck:   Normal range of motion.  Cardiovascular:  Normal rate, regular rhythm, normal heart sounds and intact distal pulses.         "   Pulmonary/Chest: Breath sounds normal. No respiratory distress.   Abdominal: Abdomen is soft. Bowel sounds are normal. There is no abdominal tenderness.   Musculoskeletal:      Cervical back: Normal range of motion.      Comments: No motor activity to lower extremities.  Has sensation to lower extremities     Neurological: She is alert and oriented to person, place, and time.   Skin: Skin is warm and dry.   Healed coccyx wound. Ecchymosis and tenderness lower back and buttocks     Psychiatric: She has a normal mood and affect. Her behavior is normal. Judgment and thought content normal.         ED Course   Procedures  Labs Reviewed   COMPREHENSIVE METABOLIC PANEL - Abnormal; Notable for the following components:       Result Value    Sodium Level 133 (*)     Potassium Level 3.4 (*)     Carbon Dioxide 21 (*)     Glucose Level 122 (*)     Blood Urea Nitrogen 32.6 (*)     Creatinine 1.91 (*)     Calcium Level Total 10.3 (*)     Protein Total 8.2 (*)     Albumin Level 3.3 (*)     Globulin 4.9 (*)     Albumin/Globulin Ratio 0.7 (*)     All other components within normal limits   TROPONIN I - Abnormal; Notable for the following components:    Troponin-I 0.191 (*)     All other components within normal limits   CBC WITH DIFFERENTIAL - Abnormal; Notable for the following components:    WBC 16.80 (*)     RBC 3.68 (*)     Hgb 10.8 (*)     Hct 35.0 (*)     MCV 95.1 (*)     MCHC 30.9 (*)     Neut # 14.84 (*)     IG# 0.15 (*)     All other components within normal limits   CBC W/ AUTO DIFFERENTIAL    Narrative:     The following orders were created for panel order CBC auto differential.  Procedure                               Abnormality         Status                     ---------                               -----------         ------                     CBC with Differential[[id number]]       Abnormal            Final result                 Please view results for these tests on the individual orders.   URINALYSIS, REFLEX  TO URINE CULTURE   TROPONIN I     EKG Readings: (Independently Interpreted)   Rhythm: Normal Sinus Rhythm. Heart Rate: 69. Ectopy: No Ectopy. Conduction: 1st Degree AV Block. ST Segments: Normal ST Segments. T Waves: Normal. Axis: Normal. Other Findings: Prolonged QT Interval. Clinical Impression: Normal Sinus Rhythm     ECG Results              EKG 12-lead (Final result)        Collection Time Result Time QRS Duration OHS QTC Calculation    02/25/24 14:50:07 02/25/24 16:24:58 92 499                     Final result by Interface, Lab In [facility] (02/25/24 16:25:04)                   Narrative:    Test Reason : R53.1,    Vent. Rate : 069 BPM     Atrial Rate : 069 BPM     P-R Int : 408 ms          QRS Dur : 092 ms      QT Int : 466 ms       P-R-T Axes : 073 -05 258 degrees     QTc Int : 499 ms    Sinus rhythm with 1st degree A-V block  ST and Marked T wave abnormality, consider inferior ischemia  ST and Marked T wave abnormality, consider anterolateral ischemia  Prolonged QT  Abnormal ECG  Confirmed by Jb Calhoun MD ([id number]) on 2/25/2024 4:24:56 PM    Referred By: [email or web address]   SELF           Confirmed By:Jb Calhoun MD                                  Imaging Results              CT Thoracic Spine Without Contrast (Final result)  Result time 02/25/24 16:00:20      Final result by Wiley Roberts MD (02/25/24 16:00:20)                   Impression:      1. No acute thoracic spine abnormality identified.    2. Ligament, spinal cord and/or vascular abnormalities cannot be excluded on the basis of this examination.      Electronically signed by: Wiley Roberts  Date:    02/25/2024  Time:    16:00               Narrative:    CLINICAL HISTORY:  Trauma.    TECHNIQUE:  CT of the thoracic spine Without contrast. Sagittal and coronal reconstructions were performed on the source images.    Automatic exposure control was utilized to reduce the patient's radiation dose.    DLP = 06/20/1993    COMPARISON:  No prior imaging  available for comparison.    FINDINGS:  There is no acute fracture, subluxation, or dislocation. Limited detail regarding thoracic discs, but there is no finding seen to suggest acute disc herniation. There is accentuated kyphotic curvature of the thoracic spine.    The soft tissues are normal. There is no lymphadenopathy. The visualized lungs are unremarkable.                                       CT Lumbar Spine Without Contrast (Final result)  Result time 02/25/24 16:00:04      Final result by Angélica Diehl MD (02/25/24 16:00:04)                   Impression:      Nondisplaced fracture at S4, age indeterminate    Multilevel degenerative change similar to prior.      Electronically signed by: Angélica Diehl  Date:    02/25/2024  Time:    16:00               Narrative:    EXAMINATION:  CT LUMBAR SPINE WITHOUT CONTRAST    CLINICAL HISTORY:  back pain;    TECHNIQUE:  Low dose helical acquired images with axial, sagittal and coronal reformations though the lumbar spine.  Contrast was not administered.    All CT scans at this location are performed using dose optimization techniques as appropriate to a performed exam including the following automated exposure control, adjustment of the mA and/or kV according to patient size and/or use of iterative reconstruction technique    DLP: 2693 mGycm    COMPARISON:  CT lumbar spine 02/18/2024    FINDINGS:  NUMBERING: Last fully formed disc space is designated L5-S1. there is sacralization of L5.    BONES: Fracture at S4 is age indeterminate, below the collimation of previous CT exam.    DISCS: Disc space narrowing with vacuum phenomenon at L2-L3.  Mild multilevel disc bulging, most pronounced at L4-L5 where disc bulge measures 4 mm.    SPINAL CANAL: Disc bulging and ligamentum flavum hypertrophy contribute to multilevel central canal stenosis at moderate to severe L2-L3, L3-L4 and severe at L4-L5. osseous ridging, facet arthropathy and disc bulging contributes to  multilevel neural foraminal stenosis.    SOFT TISSUES: Colonic diverticulosis.  Ectatic atherosclerotic aorta.                                       X-Ray Chest AP Portable (Final result)  Result time 02/25/24 13:23:44      Final result by Angélica Diehl MD (02/25/24 13:23:44)                   Impression:      Enlarged cardiac silhouette without edema.      Electronically signed by: Angélica Diehl  Date:    02/25/2024  Time:    13:23               Narrative:    EXAMINATION:  XR CHEST AP PORTABLE    CLINICAL HISTORY:  Weakness    TECHNIQUE:  Single frontal view of the chest was performed.    COMPARISON:  02/17/2024    FINDINGS:  LINES AND TUBES: Dual lead cardiac pacer device is in place via left subclavian approach with leads overlying the right atrium and right ventricle.    MEDIASTINUM AND SHAWNA: Cardiac silhouette is enlarged.    LUNGS: No lobar consolidation. No edema.    PLEURA:No pleural effusion.Chin obscures the lung apices.    BONES: No acute osseous abnormality.                                       Medications - No data to display  Medical Decision Making  Historian:  Patient and daughter.  Patient is a 89-year-old female  that presents with unable to that has been present 4 days. Associated symptoms back pain. Surrounding information is patient did have a fall go and was recently discharged from the hospital on 02/21.  She did have a CT of her lower back and head. Exacerbated by nothing. Relieved by nothing. Patient treatment prior to arrival none. Risk factors include none. Other history pertaining to this complaint nothing.   Assessment:  See physical exam.  DD:  Back pain, back injury, neurological deficits  ED Course: History was obtained.  Physical was performed.  Patient did have elevated troponin level.  She also had a fracture S4 on CT.  I will put her in the hospital with Hospital Medicine. Medical or surgical consults:  Hospital Medicine.  Spoke to Edda, nurse practitioner  hospitalist, and they accept admission.  Social determinants that affect healthcare:  Age.       Amount and/or Complexity of Data Reviewed  Independent Historian:      Details: Daughter gave information on history and present illness  External Data Reviewed: notes.     Details: 09/15/2019 reviewed notes from outside hospital for sick sinus syndrome.  03/19/2021 reviewed office visit.  Labs:      Details: Elevated troponin  Radiology: ordered.     Details: CT shows fracture S4    Risk  Decision regarding hospitalization.                                      Clinical Impression:  Final diagnoses:  [R53.1] Generalized weakness  [R79.89] Elevated troponin (Primary)  [S32.10XA] Closed fracture of sacrum, unspecified portion of sacrum, initial encounter          ED Disposition Condition    Admit Stable                Home Hernandez, FNP  02/25/24 0811

## 2024-02-25 NOTE — FIRST PROVIDER EVALUATION
"Medical screening examination initiated.  I have conducted a focused provider triage encounter, findings are as follows:    Brief history of present illness:  89 year old female presents to ER with c/o difficulty ambulating. Daughter states that she "can't move her feet when she walks". Daughter reports lethargy as well. Discharged from hospital last week for same.     Vitals:    02/25/24 1307   BP: (!) 108/56   BP Location: Left arm   Patient Position: Sitting   Pulse: 73   Resp: 19   Temp: 99.2 °F (37.3 °C)   TempSrc: Oral   SpO2: 96%   Weight: 69.9 kg (154 lb)   Height: 5' 5" (1.651 m)       Pertinent physical exam:  Awake and alert, NAD    Brief workup plan:  Labs, EKG    Preliminary workup initiated; this workup will be continued and followed by the physician or advanced practice provider that is assigned to the patient when roomed.  "

## 2024-02-26 LAB
ALBUMIN SERPL-MCNC: 2.2 G/DL (ref 3.4–4.8)
ALBUMIN SERPL-MCNC: 2.6 G/DL (ref 3.4–4.8)
ALBUMIN/GLOB SERPL: 0.5 RATIO (ref 1.1–2)
ALBUMIN/GLOB SERPL: 0.6 RATIO (ref 1.1–2)
ALP SERPL-CCNC: 73 UNIT/L (ref 40–150)
ALP SERPL-CCNC: 92 UNIT/L (ref 40–150)
ALT SERPL-CCNC: 10 UNIT/L (ref 0–55)
ALT SERPL-CCNC: 12 UNIT/L (ref 0–55)
AST SERPL-CCNC: 22 UNIT/L (ref 5–34)
AST SERPL-CCNC: 28 UNIT/L (ref 5–34)
AV INDEX (PROSTH): 0.5
AV MEAN GRADIENT: 7 MMHG
AV PEAK GRADIENT: 12 MMHG
AV VELOCITY RATIO: 0.48
BASOPHILS # BLD AUTO: 0.08 X10(3)/MCL
BASOPHILS # BLD AUTO: 0.09 X10(3)/MCL
BASOPHILS NFR BLD AUTO: 0.4 %
BASOPHILS NFR BLD AUTO: 0.4 %
BILIRUB SERPL-MCNC: 0.4 MG/DL
BILIRUB SERPL-MCNC: 0.5 MG/DL
BSA FOR ECHO PROCEDURE: 1.79 M2
BUN SERPL-MCNC: 31.1 MG/DL (ref 9.8–20.1)
BUN SERPL-MCNC: 35.8 MG/DL (ref 9.8–20.1)
CALCIUM SERPL-MCNC: 8.6 MG/DL (ref 8.4–10.2)
CALCIUM SERPL-MCNC: 9.6 MG/DL (ref 8.4–10.2)
CHLORIDE SERPL-SCNC: 102 MMOL/L (ref 98–107)
CHLORIDE SERPL-SCNC: 105 MMOL/L (ref 98–107)
CHOLEST SERPL-MCNC: 84 MG/DL
CHOLEST/HDLC SERPL: 3 {RATIO} (ref 0–5)
CO2 SERPL-SCNC: 20 MMOL/L (ref 23–31)
CO2 SERPL-SCNC: 23 MMOL/L (ref 23–31)
CREAT SERPL-MCNC: 1.74 MG/DL (ref 0.55–1.02)
CREAT SERPL-MCNC: 2.55 MG/DL (ref 0.55–1.02)
CV ECHO LV RWT: 0.65 CM
DOP CALC AO PEAK VEL: 1.75 M/S
DOP CALC AO VTI: 32.2 CM
DOP CALC LVOT PEAK VEL: 0.84 M/S
DOP CALCLVOT PEAK VEL VTI: 16.2 CM
E WAVE DECELERATION TIME: 209 MSEC
E/A RATIO: 3.16
E/E' RATIO: 9.8 M/S
ECHO LV POSTERIOR WALL: 1.4 CM (ref 0.6–1.1)
EOSINOPHIL # BLD AUTO: 0.01 X10(3)/MCL (ref 0–0.9)
EOSINOPHIL # BLD AUTO: 0.13 X10(3)/MCL (ref 0–0.9)
EOSINOPHIL NFR BLD AUTO: 0 %
EOSINOPHIL NFR BLD AUTO: 0.6 %
ERYTHROCYTE [DISTWIDTH] IN BLOOD BY AUTOMATED COUNT: 16.4 % (ref 11.5–17)
ERYTHROCYTE [DISTWIDTH] IN BLOOD BY AUTOMATED COUNT: 17 % (ref 11.5–17)
FRACTIONAL SHORTENING: 40 % (ref 28–44)
GFR SERPLBLD CREATININE-BSD FMLA CKD-EPI: 18 MLS/MIN/1.73/M2
GFR SERPLBLD CREATININE-BSD FMLA CKD-EPI: 28 MLS/MIN/1.73/M2
GLOBULIN SER-MCNC: 4.1 GM/DL (ref 2.4–3.5)
GLOBULIN SER-MCNC: 4.6 GM/DL (ref 2.4–3.5)
GLUCOSE SERPL-MCNC: 101 MG/DL (ref 82–115)
GLUCOSE SERPL-MCNC: 88 MG/DL (ref 82–115)
HCT VFR BLD AUTO: 30.5 % (ref 37–47)
HCT VFR BLD AUTO: 30.8 % (ref 37–47)
HDLC SERPL-MCNC: 32 MG/DL (ref 35–60)
HGB BLD-MCNC: 10 G/DL (ref 12–16)
HGB BLD-MCNC: 9.9 G/DL (ref 12–16)
IMM GRANULOCYTES # BLD AUTO: 0.25 X10(3)/MCL (ref 0–0.04)
IMM GRANULOCYTES # BLD AUTO: 0.85 X10(3)/MCL (ref 0–0.04)
IMM GRANULOCYTES NFR BLD AUTO: 1.2 %
IMM GRANULOCYTES NFR BLD AUTO: 4.1 %
INTERVENTRICULAR SEPTUM: 1.2 CM (ref 0.6–1.1)
LACTATE SERPL-SCNC: 2.9 MMOL/L (ref 0.5–2.2)
LACTATE SERPL-SCNC: 3.3 MMOL/L (ref 0.5–2.2)
LDLC SERPL CALC-MCNC: 38 MG/DL (ref 50–140)
LEFT ATRIUM SIZE: 4.1 CM
LEFT INTERNAL DIMENSION IN SYSTOLE: 2.6 CM (ref 2.1–4)
LEFT VENTRICLE DIASTOLIC VOLUME INDEX: 46.93 ML/M2
LEFT VENTRICLE DIASTOLIC VOLUME: 83.07 ML
LEFT VENTRICLE MASS INDEX: 117 G/M2
LEFT VENTRICLE SYSTOLIC VOLUME INDEX: 13.9 ML/M2
LEFT VENTRICLE SYSTOLIC VOLUME: 24.61 ML
LEFT VENTRICULAR INTERNAL DIMENSION IN DIASTOLE: 4.3 CM (ref 3.5–6)
LEFT VENTRICULAR MASS: 207.77 G
LV LATERAL E/E' RATIO: 7 M/S
LV SEPTAL E/E' RATIO: 16.33 M/S
LVOT MG: 1 MMHG
LVOT MV: 0.55 CM/S
LYMPHOCYTES # BLD AUTO: 0.72 X10(3)/MCL (ref 0.6–4.6)
LYMPHOCYTES # BLD AUTO: 2.02 X10(3)/MCL (ref 0.6–4.6)
LYMPHOCYTES NFR BLD AUTO: 3.5 %
LYMPHOCYTES NFR BLD AUTO: 9.4 %
MAGNESIUM SERPL-MCNC: 1.4 MG/DL (ref 1.6–2.6)
MCH RBC QN AUTO: 30 PG (ref 27–31)
MCH RBC QN AUTO: 30.5 PG (ref 27–31)
MCHC RBC AUTO-ENTMCNC: 32.1 G/DL (ref 33–36)
MCHC RBC AUTO-ENTMCNC: 32.8 G/DL (ref 33–36)
MCV RBC AUTO: 93 FL (ref 80–94)
MCV RBC AUTO: 93.3 FL (ref 80–94)
MONOCYTES # BLD AUTO: 0.66 X10(3)/MCL (ref 0.1–1.3)
MONOCYTES # BLD AUTO: 1.19 X10(3)/MCL (ref 0.1–1.3)
MONOCYTES NFR BLD AUTO: 3.2 %
MONOCYTES NFR BLD AUTO: 5.5 %
MV PEAK A VEL: 0.31 M/S
MV PEAK E VEL: 0.98 M/S
NEUTROPHILS # BLD AUTO: 17.87 X10(3)/MCL (ref 2.1–9.2)
NEUTROPHILS # BLD AUTO: 18.44 X10(3)/MCL (ref 2.1–9.2)
NEUTROPHILS NFR BLD AUTO: 82.9 %
NEUTROPHILS NFR BLD AUTO: 88.8 %
NRBC BLD AUTO-RTO: 0 %
NRBC BLD AUTO-RTO: 0.1 %
OHS LV EJECTION FRACTION SIMPSONS BIPLANE MOD: 63 %
PHOSPHATE SERPL-MCNC: 1.1 MG/DL (ref 2.3–4.7)
PISA TR MAX VEL: 2.52 M/S
PLATELET # BLD AUTO: 142 X10(3)/MCL (ref 130–400)
PLATELET # BLD AUTO: 161 X10(3)/MCL (ref 130–400)
PMV BLD AUTO: 10.9 FL (ref 7.4–10.4)
PMV BLD AUTO: 9.2 FL (ref 7.4–10.4)
POCT GLUCOSE: 117 MG/DL (ref 70–110)
POTASSIUM SERPL-SCNC: 3 MMOL/L (ref 3.5–5.1)
POTASSIUM SERPL-SCNC: 3.4 MMOL/L (ref 3.5–5.1)
PROT SERPL-MCNC: 6.3 GM/DL (ref 5.8–7.6)
PROT SERPL-MCNC: 7.2 GM/DL (ref 5.8–7.6)
PV PEAK GRADIENT: 5 MMHG
PV PEAK VELOCITY: 1.07 M/S
RBC # BLD AUTO: 3.28 X10(6)/MCL (ref 4.2–5.4)
RBC # BLD AUTO: 3.3 X10(6)/MCL (ref 4.2–5.4)
SODIUM SERPL-SCNC: 132 MMOL/L (ref 136–145)
SODIUM SERPL-SCNC: 133 MMOL/L (ref 136–145)
TDI LATERAL: 0.14 M/S
TDI SEPTAL: 0.06 M/S
TDI: 0.1 M/S
TR MAX PG: 25 MMHG
TRICUSPID ANNULAR PLANE SYSTOLIC EXCURSION: 1.46 CM
TRIGL SERPL-MCNC: 68 MG/DL (ref 37–140)
TROPONIN I SERPL-MCNC: 0.13 NG/ML (ref 0–0.04)
TROPONIN I SERPL-MCNC: 0.18 NG/ML (ref 0–0.04)
TROPONIN I SERPL-MCNC: 3.54 NG/ML (ref 0–0.04)
VLDLC SERPL CALC-MCNC: 14 MG/DL
WBC # SPEC AUTO: 20.76 X10(3)/MCL (ref 4.5–11.5)
WBC # SPEC AUTO: 21.55 X10(3)/MCL (ref 4.5–11.5)
Z-SCORE OF LEFT VENTRICULAR DIMENSION IN END DIASTOLE: -1.29
Z-SCORE OF LEFT VENTRICULAR DIMENSION IN END SYSTOLE: -1.2

## 2024-02-26 PROCEDURE — 93005 ELECTROCARDIOGRAM TRACING: CPT

## 2024-02-26 PROCEDURE — 63600175 PHARM REV CODE 636 W HCPCS

## 2024-02-26 PROCEDURE — 27000221 HC OXYGEN, UP TO 24 HOURS

## 2024-02-26 PROCEDURE — 80053 COMPREHEN METABOLIC PANEL: CPT | Performed by: INTERNAL MEDICINE

## 2024-02-26 PROCEDURE — 25000003 PHARM REV CODE 250: Performed by: INTERNAL MEDICINE

## 2024-02-26 PROCEDURE — 84484 ASSAY OF TROPONIN QUANT: CPT | Performed by: NURSE PRACTITIONER

## 2024-02-26 PROCEDURE — 25000003 PHARM REV CODE 250: Performed by: NURSE PRACTITIONER

## 2024-02-26 PROCEDURE — 83735 ASSAY OF MAGNESIUM: CPT | Performed by: INTERNAL MEDICINE

## 2024-02-26 PROCEDURE — 85025 COMPLETE CBC W/AUTO DIFF WBC: CPT | Performed by: INTERNAL MEDICINE

## 2024-02-26 PROCEDURE — 93010 ELECTROCARDIOGRAM REPORT: CPT | Mod: 76,,, | Performed by: INTERNAL MEDICINE

## 2024-02-26 PROCEDURE — 80061 LIPID PANEL: CPT | Performed by: NURSE PRACTITIONER

## 2024-02-26 PROCEDURE — 93010 ELECTROCARDIOGRAM REPORT: CPT | Mod: ,,, | Performed by: INTERNAL MEDICINE

## 2024-02-26 PROCEDURE — 85025 COMPLETE CBC W/AUTO DIFF WBC: CPT | Performed by: NURSE PRACTITIONER

## 2024-02-26 PROCEDURE — 25000003 PHARM REV CODE 250: Performed by: HOSPITALIST

## 2024-02-26 PROCEDURE — 21400001 HC TELEMETRY ROOM

## 2024-02-26 PROCEDURE — 84100 ASSAY OF PHOSPHORUS: CPT | Performed by: INTERNAL MEDICINE

## 2024-02-26 PROCEDURE — 63600175 PHARM REV CODE 636 W HCPCS: Performed by: NURSE PRACTITIONER

## 2024-02-26 PROCEDURE — 80053 COMPREHEN METABOLIC PANEL: CPT | Performed by: NURSE PRACTITIONER

## 2024-02-26 PROCEDURE — 25000003 PHARM REV CODE 250

## 2024-02-26 PROCEDURE — 51702 INSERT TEMP BLADDER CATH: CPT

## 2024-02-26 PROCEDURE — 83605 ASSAY OF LACTIC ACID: CPT | Performed by: INTERNAL MEDICINE

## 2024-02-26 RX ORDER — SODIUM CHLORIDE 9 MG/ML
INJECTION, SOLUTION INTRAVENOUS CONTINUOUS
Status: DISCONTINUED | OUTPATIENT
Start: 2024-02-26 | End: 2024-02-26

## 2024-02-26 RX ORDER — SODIUM CHLORIDE, SODIUM LACTATE, POTASSIUM CHLORIDE, CALCIUM CHLORIDE 600; 310; 30; 20 MG/100ML; MG/100ML; MG/100ML; MG/100ML
INJECTION, SOLUTION INTRAVENOUS CONTINUOUS
Status: DISCONTINUED | OUTPATIENT
Start: 2024-02-26 | End: 2024-03-01

## 2024-02-26 RX ORDER — ACETAMINOPHEN 650 MG/1
650 SUPPOSITORY RECTAL EVERY 6 HOURS PRN
Status: DISCONTINUED | OUTPATIENT
Start: 2024-02-26 | End: 2024-03-11 | Stop reason: HOSPADM

## 2024-02-26 RX ORDER — BRIMONIDINE TARTRATE 1.5 MG/ML
1 SOLUTION/ DROPS OPHTHALMIC 2 TIMES DAILY
Status: DISCONTINUED | OUTPATIENT
Start: 2024-02-26 | End: 2024-03-11 | Stop reason: HOSPADM

## 2024-02-26 RX ORDER — ATORVASTATIN CALCIUM 10 MG/1
10 TABLET, FILM COATED ORAL NIGHTLY
Status: DISCONTINUED | OUTPATIENT
Start: 2024-02-26 | End: 2024-03-11 | Stop reason: HOSPADM

## 2024-02-26 RX ORDER — RIVASTIGMINE TARTRATE 1.5 MG/1
1.5 CAPSULE ORAL 2 TIMES DAILY
Status: DISCONTINUED | OUTPATIENT
Start: 2024-02-26 | End: 2024-03-11 | Stop reason: HOSPADM

## 2024-02-26 RX ORDER — CARVEDILOL 3.12 MG/1
3.12 TABLET ORAL 2 TIMES DAILY
Status: DISCONTINUED | OUTPATIENT
Start: 2024-02-26 | End: 2024-02-26

## 2024-02-26 RX ORDER — ENOXAPARIN SODIUM 100 MG/ML
1 INJECTION SUBCUTANEOUS EVERY 24 HOURS
Status: DISCONTINUED | OUTPATIENT
Start: 2024-02-27 | End: 2024-03-03

## 2024-02-26 RX ORDER — METOPROLOL TARTRATE 1 MG/ML
5 INJECTION, SOLUTION INTRAVENOUS EVERY 5 MIN PRN
Status: DISCONTINUED | OUTPATIENT
Start: 2024-02-26 | End: 2024-03-11 | Stop reason: HOSPADM

## 2024-02-26 RX ORDER — LIDOCAINE HYDROCHLORIDE 10 MG/ML
1 INJECTION INFILTRATION; PERINEURAL ONCE
Status: COMPLETED | OUTPATIENT
Start: 2024-02-26 | End: 2024-02-27

## 2024-02-26 RX ORDER — TIMOLOL MALEATE 5 MG/ML
1 SOLUTION/ DROPS OPHTHALMIC 2 TIMES DAILY
Status: DISCONTINUED | OUTPATIENT
Start: 2024-02-26 | End: 2024-03-11 | Stop reason: HOSPADM

## 2024-02-26 RX ORDER — NOREPINEPHRINE BITARTRATE/D5W 8 MG/250ML
0-3 PLASTIC BAG, INJECTION (ML) INTRAVENOUS CONTINUOUS
Status: DISCONTINUED | OUTPATIENT
Start: 2024-02-26 | End: 2024-02-29

## 2024-02-26 RX ORDER — LEVOTHYROXINE SODIUM 25 UG/1
25 TABLET ORAL
Status: DISCONTINUED | OUTPATIENT
Start: 2024-02-26 | End: 2024-03-11 | Stop reason: HOSPADM

## 2024-02-26 RX ORDER — MAGNESIUM SULFATE HEPTAHYDRATE 40 MG/ML
2 INJECTION, SOLUTION INTRAVENOUS
Status: COMPLETED | OUTPATIENT
Start: 2024-02-26 | End: 2024-02-27

## 2024-02-26 RX ORDER — MAGNESIUM SULFATE HEPTAHYDRATE 40 MG/ML
4 INJECTION, SOLUTION INTRAVENOUS ONCE
Status: DISCONTINUED | OUTPATIENT
Start: 2024-02-26 | End: 2024-02-26

## 2024-02-26 RX ORDER — RISPERIDONE 1 MG/1
2 TABLET ORAL NIGHTLY
Status: DISCONTINUED | OUTPATIENT
Start: 2024-02-26 | End: 2024-03-11 | Stop reason: HOSPADM

## 2024-02-26 RX ORDER — LATANOPROST 50 UG/ML
1 SOLUTION/ DROPS OPHTHALMIC NIGHTLY
Status: DISCONTINUED | OUTPATIENT
Start: 2024-02-26 | End: 2024-03-11 | Stop reason: HOSPADM

## 2024-02-26 RX ORDER — POTASSIUM CHLORIDE 7.45 MG/ML
10 INJECTION INTRAVENOUS
Status: COMPLETED | OUTPATIENT
Start: 2024-02-26 | End: 2024-02-27

## 2024-02-26 RX ORDER — HYDROXYZINE PAMOATE 25 MG/1
25 CAPSULE ORAL EVERY 8 HOURS PRN
Status: DISCONTINUED | OUTPATIENT
Start: 2024-02-26 | End: 2024-03-11 | Stop reason: HOSPADM

## 2024-02-26 RX ORDER — BRIMONIDINE TARTRATE AND TIMOLOL MALEATE 2; 5 MG/ML; MG/ML
1 SOLUTION OPHTHALMIC 2 TIMES DAILY
Status: DISCONTINUED | OUTPATIENT
Start: 2024-02-26 | End: 2024-02-26

## 2024-02-26 RX ORDER — MUPIROCIN 20 MG/G
OINTMENT TOPICAL 2 TIMES DAILY
Status: COMPLETED | OUTPATIENT
Start: 2024-02-26 | End: 2024-03-02

## 2024-02-26 RX ADMIN — SODIUM CHLORIDE 1000 ML: 9 INJECTION, SOLUTION INTRAVENOUS at 08:02

## 2024-02-26 RX ADMIN — RISPERIDONE 2 MG: 1 TABLET ORAL at 02:02

## 2024-02-26 RX ADMIN — TIMOLOL MALEATE 1 DROP: 5 SOLUTION OPHTHALMIC at 02:02

## 2024-02-26 RX ADMIN — APIXABAN 2.5 MG: 2.5 TABLET, FILM COATED ORAL at 10:02

## 2024-02-26 RX ADMIN — CEFTRIAXONE SODIUM 1 G: 1 INJECTION, POWDER, FOR SOLUTION INTRAMUSCULAR; INTRAVENOUS at 02:02

## 2024-02-26 RX ADMIN — BRIMONIDINE TARTRATE 1 DROP: 1.5 SOLUTION OPHTHALMIC at 10:02

## 2024-02-26 RX ADMIN — BRIMONIDINE TARTRATE 1 DROP: 1.5 SOLUTION OPHTHALMIC at 02:02

## 2024-02-26 RX ADMIN — RIVASTIGMINE TARTRATE 1.5 MG: 1.5 CAPSULE ORAL at 10:02

## 2024-02-26 RX ADMIN — ATORVASTATIN CALCIUM 10 MG: 10 TABLET, FILM COATED ORAL at 02:02

## 2024-02-26 RX ADMIN — LEVOTHYROXINE SODIUM 25 MCG: 25 TABLET ORAL at 06:02

## 2024-02-26 RX ADMIN — SODIUM CHLORIDE: 9 INJECTION, SOLUTION INTRAVENOUS at 11:02

## 2024-02-26 RX ADMIN — SODIUM CHLORIDE: 9 INJECTION, SOLUTION INTRAVENOUS at 12:02

## 2024-02-26 RX ADMIN — ACETAMINOPHEN 650 MG: 650 SUPPOSITORY RECTAL at 04:02

## 2024-02-26 RX ADMIN — SODIUM CHLORIDE, POTASSIUM CHLORIDE, SODIUM LACTATE AND CALCIUM CHLORIDE: 600; 310; 30; 20 INJECTION, SOLUTION INTRAVENOUS at 10:02

## 2024-02-26 RX ADMIN — POTASSIUM CHLORIDE 10 MEQ: 7.46 INJECTION, SOLUTION INTRAVENOUS at 11:02

## 2024-02-26 RX ADMIN — LATANOPROST 1 DROP: 50 SOLUTION OPHTHALMIC at 02:02

## 2024-02-26 RX ADMIN — PIPERACILLIN AND TAZOBACTAM 4.5 G: 4; .5 INJECTION, POWDER, LYOPHILIZED, FOR SOLUTION INTRAVENOUS; PARENTERAL at 11:02

## 2024-02-26 RX ADMIN — MUPIROCIN: 20 OINTMENT TOPICAL at 10:02

## 2024-02-26 RX ADMIN — NOREPINEPHRINE BITARTRATE 0.02 MCG/KG/MIN: 8 INJECTION, SOLUTION INTRAVENOUS at 10:02

## 2024-02-26 RX ADMIN — SODIUM CHLORIDE: 9 INJECTION, SOLUTION INTRAVENOUS at 08:02

## 2024-02-26 RX ADMIN — ACETAMINOPHEN 1000 MG: 500 TABLET ORAL at 05:02

## 2024-02-26 RX ADMIN — MAGNESIUM SULFATE HEPTAHYDRATE 2 G: 40 INJECTION, SOLUTION INTRAVENOUS at 10:02

## 2024-02-26 NOTE — CONSULTS
Inpatient consult to Cardiology  Consult performed by: Angelia Babin FNP  Consult ordered by: Home Hernandez FNP  Reason for consult: Elevated Troponin      [facility] - Emergency Dept    Cardiology  Consult Note    Patient Name: Ev Alberts  MRN: [id number]  Admission Date: 2/25/2024  Hospital Length of Stay: 1 days  Code Status: Full Code   Attending Provider: Dimitri Cummings MD   Consulting Provider: IFITKHAR Jones  Primary Care Physician: Santi Walters MD  Principal Problem:Debility    Patient information was obtained from patient, past medical records, ER records, and primary team.     Subjective:     Chief Complaint/Reason for Consult:Elevated Troponin     HPI: Ms. Alberts is a 88 y/o female with a history of SSS, AVB II, Bradycardia, CHF, PAF on Eliquis, PAD, Left Carotid Atherosclerosis, HTN, CKD IV, VHD, YARI, who was briefly known to [facility] (Dr. Vigil). She presented to the ER on 2.25.24 with complaints of not being able to walk since she was discharged on 2.21.24 due to polypharmacy, weakness, and age related disability. Daughter reports she is unable to get the signal to make her walk. Daughter also reports she has been lethargic and is unsure she stopped taking Lyrica, Meclizine, and Requip because they come prepackaged. Significant labs include WBC 21.55, Na 133, K 3.4, BUN/Creat 31.1/1.74, Albumin 2.6, HDL 32. LDL 38, Troponin 0.199. UA +; UC pending. CT Lumbar spine shows nondisplaced fracture at S4 (age indeterminate) and multilevel degenerative changes. CT Thoracic spine shows ligament, spinal cord and/or vascular abnormalities. EKG shows sinus rhythm with 1st degree A-V block, ST and Marked T wave abnormality, consider inferior ischemia, ST and Marked T wave abnormality, consider anterolateral ischemia, and Prolonged QT. [facility] has been consulted for NSTEMI.        PMH: SSS, AVB II, Bradycardia, CHF, PAF on Eliquis, PAD, Left Carotid Atherosclerosis, HTN, CKD IV, VHD, YARI,  GERD, FERNANDEZ, Right Knee Osteoarthritis, GI Bleed, Dementia, Glaucoma, Hypothyroidism     PSH: Cataract Surgery, Cholecystectomy, Hysterectomy, PPM, Left, Nephrectomy, LHC, Hernia Repair, Oral Surgery, Bilateral Foot Surgery  Family History: Non-Contributory   Social History: Denies smoking, illicit drug use, and alcohol use.     Previous Cardiac Diagnostics:   ECHO (2.26.24):  Left Ventricle: The left ventricle is normal in size. Mildly increased wall thickness. There is normal systolic function with a visually estimated ejection fraction of 55 - 60%. Grade III diastolic dysfunction. Right Ventricle: Mild right ventricular enlargement. Wall thickness is normal. Right ventricle wall motion  is normal. Systolic function is mildly reduced. TAPSE is 1.46 cm. Left Atrium: Left atrium is moderately dilated. Aortic Valve: There is mild aortic valve sclerosis. Tricuspid Valve: There is mild regurgitation.    PPM Insertion (9.19.19): [employer]     ECHO (9.15.19):   Normal Left Ventricle cavity size. Normal wall thickness. Normal ejection fraction, 55-65%. The right ventricle cavity is mildly dilated. Left atrium is mildly dilated. Normal right atrium. Normal central venous pressure. Mild MR. Mild to Moderate TR. Moderate pulmonary HTN. No pericardial effusion.      Carotid US (7.26.12):  The study quality is good. Less than or equal to 50% left common carotid artery stenosis.Bilateral normal velocity measurements of the internal carotid and external caortid arteries are noted, with no plaque visualized.Antegrade right vertebral artery flow. Antegrade left vertebral artery flow.    KARI (1.18.12):  The resting right ankle brachial index is 1.01 consistent with no significant right peripheral vascular disease.The resting left ankle brachial index is 0.94 consistent with borderline left peripheral vascular disease.The study quality is good.     Lower Ext Arterial US (1.18.12):  The study quality is good. Biphasic waveforms and  diffuse plaque seen in both the bilateral infra-popliteal arteries.    Review of patient's allergies indicates:   Allergen Reactions    Amlodipine-benazepril Edema     Other reaction(s): unknown    Corticosteroids (glucocorticoids) Edema and Swelling    Rofecoxib Anaphylaxis and Swelling    Sulfa (sulfonamide antibiotics) Edema    Atorvastatin      Other reaction(s): unknown    Ibuprofen      Other reaction(s): Allergy to sulfa drugs     No current facility-administered medications on file prior to encounter.     Current Outpatient Medications on File Prior to Encounter   Medication Sig    allopurinoL (ZYLOPRIM) 300 MG tablet Take 300 mg by mouth once daily.    atorvastatin (LIPITOR) 10 MG tablet Take 10 mg by mouth nightly.    carvediloL (COREG) 3.125 MG tablet Take 3.125 mg by mouth 2 (two) times daily.    ELIQUIS 2.5 mg Tab Take 2.5 mg by mouth 2 (two) times daily.    furosemide (LASIX) 20 MG tablet Take 20 mg by mouth every morning.    levothyroxine (SYNTHROID) 25 MCG tablet Take 25 mcg by mouth before breakfast.    MYRBETRIQ 50 mg Tb24 Take 1 tablet by mouth every morning.    omeprazole (PRILOSEC) 20 MG capsule Take 20 mg by mouth every evening.    risperiDONE (RISPERDAL) 2 MG tablet Take 2 mg by mouth every evening.    rivastigmine tartrate (EXELON) 1.5 MG capsule Take 1.5 mg by mouth 2 (two) times daily.    [DISCONTINUED] brimonidine 0.15 % OPTH DROP (ALPHAGAN) 0.15 % ophthalmic solution Place 1 drop into both eyes 3 (three) times daily.    [DISCONTINUED] lubiprostone (AMITIZA) 24 MCG Cap Take 24 mcg by mouth daily with breakfast.    brimonidine-timoloL (COMBIGAN) 0.2-0.5 % Drop Place 1 drop into both eyes 2 (two) times a day.    latanoprost 0.005 % ophthalmic solution Place 1 drop into both eyes every evening.    linaCLOtide (LINZESS) 72 mcg Cap capsule Take 72 mcg by mouth before breakfast.    [DISCONTINUED] meclizine (ANTIVERT) 50 MG tablet Take 25 mg by mouth 3 (three) times daily as needed for Dizziness.     [DISCONTINUED] naproxen (NAPROSYN) 500 MG tablet Take 500 mg by mouth 2 (two) times daily with meals.    [DISCONTINUED] pregabalin (LYRICA) 50 MG capsule Take 50 mg by mouth 2 (two) times daily.    [DISCONTINUED] rOPINIRole (REQUIP) 0.5 MG tablet Take 0.5 mg by mouth every evening.    [DISCONTINUED] traZODone (DESYREL) 100 MG tablet Take 100 mg by mouth every evening.       Review of Systems   Unable to perform ROS: Acuity of condition     Objective:     Vital Signs (Most Recent):  Temp: 99.8 °F (37.7 °C) (02/26/24 0628)  Pulse: 71 (02/26/24 0144)  Resp: 18 (02/26/24 0144)  BP: 117/74 (02/26/24 0144)  SpO2: 100 % (02/26/24 0144) Vital Signs (24h Range):  Temp:  [99.2 °F (37.3 °C)-101.1 °F (38.4 °C)] 99.8 °F (37.7 °C)  Pulse:  [] 71  Resp:  [17-19] 18  SpO2:  [95 %-100 %] 100 %  BP: (106-139)/(54-74) 117/74   Weight: 69.9 kg (154 lb)  Body mass index is 25.63 kg/m².  SpO2: 100 %     No intake or output data in the 24 hours ending 02/26/24 0719  Lines/Drains/Airways       Drain  Duration             Female External Urinary Catheter w/ Suction 02/25/24 1459 <1 day              Peripheral Intravenous Line  Duration                  Peripheral IV - Single Lumen 02/25/24 1608 20 G Left;Posterior Forearm <1 day                  Significant Labs:  Recent Results (from the past 72 hour(s))   Comprehensive metabolic panel    Collection Time: 02/25/24  1:46 PM   Result Value Ref Range    Sodium Level 133 (L) 136 - 145 mmol/L    Potassium Level 3.4 (L) 3.5 - 5.1 mmol/L    Chloride 102 98 - 107 mmol/L    Carbon Dioxide 21 (L) 23 - 31 mmol/L    Glucose Level 122 (H) 82 - 115 mg/dL    Blood Urea Nitrogen 32.6 (H) 9.8 - 20.1 mg/dL    Creatinine 1.91 (H) 0.55 - 1.02 mg/dL    Calcium Level Total 10.3 (H) 8.4 - 10.2 mg/dL    Protein Total 8.2 (H) 5.8 - 7.6 gm/dL    Albumin Level 3.3 (L) 3.4 - 4.8 g/dL    Globulin 4.9 (H) 2.4 - 3.5 gm/dL    Albumin/Globulin Ratio 0.7 (L) 1.1 - 2.0 ratio    Bilirubin Total 0.4 <=1.5 mg/dL     Alkaline Phosphatase 92 40 - 150 unit/L    Alanine Aminotransferase 14 0 - 55 unit/L    Aspartate Aminotransferase 28 5 - 34 unit/L    eGFR 25 mls/min/1.73/m2   Troponin I    Collection Time: 02/25/24  1:46 PM   Result Value Ref Range    Troponin-I 0.191 (H) 0.000 - 0.045 ng/mL   CBC with Differential    Collection Time: 02/25/24  1:46 PM   Result Value Ref Range    WBC 16.80 (H) 4.50 - 11.50 x10(3)/mcL    RBC 3.68 (L) 4.20 - 5.40 x10(6)/mcL    Hgb 10.8 (L) 12.0 - 16.0 g/dL    Hct 35.0 (L) 37.0 - 47.0 %    MCV 95.1 (H) 80.0 - 94.0 fL    MCH 29.3 27.0 - 31.0 pg    MCHC 30.9 (L) 33.0 - 36.0 g/dL    RDW 16.3 11.5 - 17.0 %    Platelet 218 130 - 400 x10(3)/mcL    MPV 9.7 7.4 - 10.4 fL    Neut % 88.4 %    Lymph % 6.1 %    Mono % 4.4 %    Eos % 0.0 %    Basophil % 0.2 %    Lymph # 1.03 0.6 - 4.6 x10(3)/mcL    Neut # 14.84 (H) 2.1 - 9.2 x10(3)/mcL    Mono # 0.74 0.1 - 1.3 x10(3)/mcL    Eos # 0.00 0 - 0.9 x10(3)/mcL    Baso # 0.04 <=0.2 x10(3)/mcL    IG# 0.15 (H) 0 - 0.04 x10(3)/mcL    IG% 0.9 %    NRBC% 0.0 %   Hemoglobin A1C    Collection Time: 02/25/24  1:46 PM   Result Value Ref Range    Hemoglobin A1c 5.3 <=7.0 %    Estimated Average Glucose 105.4 mg/dL   EKG 12-lead    Collection Time: 02/25/24  2:50 PM   Result Value Ref Range    QRS Duration 92 ms    [facility] QTC Calculation 499 ms   Urinalysis, Reflex to Urine Culture    Collection Time: 02/25/24  7:34 PM    Specimen: Urine   Result Value Ref Range    Color, UA Light-Yellow Yellow, Light-Yellow, Colorless, Straw, Dark-Yellow    Appearance, UA Turbid (A) Clear    Specific Gravity, UA 1.010 1.005 - 1.030    pH, UA 5.5 5.0 - 8.5    Protein, UA Trace (A) Negative    Glucose, UA Normal Negative, Normal    Ketones, UA Trace (A) Negative    Blood, UA 2+ (A) Negative    Bilirubin, UA Negative Negative    Urobilinogen, UA Normal 0.2, 1.0, Normal    Nitrites, UA Negative Negative    Leukocyte Esterase,  (A) Negative    WBC, UA 21-50 (A) None Seen, 0-2, 3-5, 0-5 /HPF     Bacteria, UA Many (A) None Seen, Trace /HPF    Squamous Epithelial Cells, UA Trace None Seen /HPF    Mucous, UA Trace (A) None Seen /LPF    RBC, UA 6-10 (A) None Seen, 0-2, 3-5, 0-5 /HPF   Urine culture    Collection Time: 02/25/24  7:34 PM    Specimen: Urine   Result Value Ref Range    Urine Culture >/= 100,000 colonies/ml Gram-negative Rods (A)    Troponin I    Collection Time: 02/25/24  7:51 PM   Result Value Ref Range    Troponin-I 0.199 (H) 0.000 - 0.045 ng/mL   Comprehensive Metabolic Panel    Collection Time: 02/26/24  1:38 AM   Result Value Ref Range    Sodium Level 133 (L) 136 - 145 mmol/L    Potassium Level 3.4 (L) 3.5 - 5.1 mmol/L    Chloride 102 98 - 107 mmol/L    Carbon Dioxide 23 23 - 31 mmol/L    Glucose Level 88 82 - 115 mg/dL    Blood Urea Nitrogen 31.1 (H) 9.8 - 20.1 mg/dL    Creatinine 1.74 (H) 0.55 - 1.02 mg/dL    Calcium Level Total 9.6 8.4 - 10.2 mg/dL    Protein Total 7.2 5.8 - 7.6 gm/dL    Albumin Level 2.6 (L) 3.4 - 4.8 g/dL    Globulin 4.6 (H) 2.4 - 3.5 gm/dL    Albumin/Globulin Ratio 0.6 (L) 1.1 - 2.0 ratio    Bilirubin Total 0.4 <=1.5 mg/dL    Alkaline Phosphatase 73 40 - 150 unit/L    Alanine Aminotransferase 10 0 - 55 unit/L    Aspartate Aminotransferase 22 5 - 34 unit/L    eGFR 28 mls/min/1.73/m2   Troponin I    Collection Time: 02/26/24  1:38 AM   Result Value Ref Range    Troponin-I 0.178 (H) 0.000 - 0.045 ng/mL   Lipid Panel    Collection Time: 02/26/24  1:38 AM   Result Value Ref Range    Cholesterol Total 84 <=200 mg/dL    HDL Cholesterol 32 (L) 35 - 60 mg/dL    Triglyceride 68 37 - 140 mg/dL    Cholesterol/HDL Ratio 3 0 - 5    Very Low Density Lipoprotein 14     LDL Cholesterol 38.00 (L) 50.00 - 140.00 mg/dL   CBC with Differential    Collection Time: 02/26/24  1:38 AM   Result Value Ref Range    WBC 21.55 (H) 4.50 - 11.50 x10(3)/mcL    RBC 3.28 (L) 4.20 - 5.40 x10(6)/mcL    Hgb 10.0 (L) 12.0 - 16.0 g/dL    Hct 30.5 (L) 37.0 - 47.0 %    MCV 93.0 80.0 - 94.0 fL    MCH 30.5 27.0 -  31.0 pg    MCHC 32.8 (L) 33.0 - 36.0 g/dL    RDW 16.4 11.5 - 17.0 %    Platelet 161 130 - 400 x10(3)/mcL    MPV 9.2 7.4 - 10.4 fL    Neut % 82.9 %    Lymph % 9.4 %    Mono % 5.5 %    Eos % 0.6 %    Basophil % 0.4 %    Lymph # 2.02 0.6 - 4.6 x10(3)/mcL    Neut # 17.87 (H) 2.1 - 9.2 x10(3)/mcL    Mono # 1.19 0.1 - 1.3 x10(3)/mcL    Eos # 0.13 0 - 0.9 x10(3)/mcL    Baso # 0.09 <=0.2 x10(3)/mcL    IG# 0.25 (H) 0 - 0.04 x10(3)/mcL    IG% 1.2 %    NRBC% 0.0 %     Significant Imaging:  Imaging Results              CT Thoracic Spine Without Contrast (Final result)  Result time 02/25/24 16:00:20      Final result by Wiley Roberts MD (02/25/24 16:00:20)                   Impression:      1. No acute thoracic spine abnormality identified.    2. Ligament, spinal cord and/or vascular abnormalities cannot be excluded on the basis of this examination.      Electronically signed by: Wiley Roberts  Date:    02/25/2024  Time:    16:00               Narrative:    CLINICAL HISTORY:  Trauma.    TECHNIQUE:  CT of the thoracic spine Without contrast. Sagittal and coronal reconstructions were performed on the source images.    Automatic exposure control was utilized to reduce the patient's radiation dose.    DLP = 06/20/1993    COMPARISON:  No prior imaging available for comparison.    FINDINGS:  There is no acute fracture, subluxation, or dislocation. Limited detail regarding thoracic discs, but there is no finding seen to suggest acute disc herniation. There is accentuated kyphotic curvature of the thoracic spine.    The soft tissues are normal. There is no lymphadenopathy. The visualized lungs are unremarkable.                                       CT Lumbar Spine Without Contrast (Final result)  Result time 02/25/24 16:00:04      Final result by Angélica Diehl MD (02/25/24 16:00:04)                   Impression:      Nondisplaced fracture at S4, age indeterminate    Multilevel degenerative change similar to  prior.      Electronically signed by: Angélica Diehl  Date:    02/25/2024  Time:    16:00               Narrative:    EXAMINATION:  CT LUMBAR SPINE WITHOUT CONTRAST    CLINICAL HISTORY:  back pain;    TECHNIQUE:  Low dose helical acquired images with axial, sagittal and coronal reformations though the lumbar spine.  Contrast was not administered.    All CT scans at this location are performed using dose optimization techniques as appropriate to a performed exam including the following automated exposure control, adjustment of the mA and/or kV according to patient size and/or use of iterative reconstruction technique    DLP: 2693 mGycm    COMPARISON:  CT lumbar spine 02/18/2024    FINDINGS:  NUMBERING: Last fully formed disc space is designated L5-S1. there is sacralization of L5.    BONES: Fracture at S4 is age indeterminate, below the collimation of previous CT exam.    DISCS: Disc space narrowing with vacuum phenomenon at L2-L3.  Mild multilevel disc bulging, most pronounced at L4-L5 where disc bulge measures 4 mm.    SPINAL CANAL: Disc bulging and ligamentum flavum hypertrophy contribute to multilevel central canal stenosis at moderate to severe L2-L3, L3-L4 and severe at L4-L5. osseous ridging, facet arthropathy and disc bulging contributes to multilevel neural foraminal stenosis.    SOFT TISSUES: Colonic diverticulosis.  Ectatic atherosclerotic aorta.                                       X-Ray Chest AP Portable (Final result)  Result time 02/25/24 13:23:44      Final result by Angélica Diehl MD (02/25/24 13:23:44)                   Impression:      Enlarged cardiac silhouette without edema.      Electronically signed by: Angélica Diehl  Date:    02/25/2024  Time:    13:23               Narrative:    EXAMINATION:  XR CHEST AP PORTABLE    CLINICAL HISTORY:  Weakness    TECHNIQUE:  Single frontal view of the chest was performed.    COMPARISON:  02/17/2024    FINDINGS:  LINES AND TUBES: Dual lead  cardiac pacer device is in place via left subclavian approach with leads overlying the right atrium and right ventricle.    MEDIASTINUM AND SHAWNA: Cardiac silhouette is enlarged.    LUNGS: No lobar consolidation. No edema.    PLEURA:No pleural effusion.Chin obscures the lung apices.    BONES: No acute osseous abnormality.                                    EKG:       Telemetry: SR    Physical Exam  Vitals and nursing note reviewed.   HENT:      Head: Normocephalic.   Eyes:      Pupils: Pupils are equal, round, and reactive to light.   Cardiovascular:      Rate and Rhythm: Normal rate and regular rhythm.      Pulses: Normal pulses.      Heart sounds: Murmur heard.      Comments: Paced Rhythm   Pulmonary:      Effort: Pulmonary effort is normal.      Breath sounds: Normal breath sounds.   Abdominal:      General: Bowel sounds are normal.      Palpations: Abdomen is soft.   Skin:     General: Skin is warm.   Neurological:      Comments: Lethargic on Exam        Home Medications:   No current facility-administered medications on file prior to encounter.     Current Outpatient Medications on File Prior to Encounter   Medication Sig Dispense Refill    allopurinoL (ZYLOPRIM) 300 MG tablet Take 300 mg by mouth once daily.      atorvastatin (LIPITOR) 10 MG tablet Take 10 mg by mouth nightly.      carvediloL (COREG) 3.125 MG tablet Take 3.125 mg by mouth 2 (two) times daily.      ELIQUIS 2.5 mg Tab Take 2.5 mg by mouth 2 (two) times daily.      furosemide (LASIX) 20 MG tablet Take 20 mg by mouth every morning.      levothyroxine (SYNTHROID) 25 MCG tablet Take 25 mcg by mouth before breakfast.      MYRBETRIQ 50 mg Tb24 Take 1 tablet by mouth every morning.      omeprazole (PRILOSEC) 20 MG capsule Take 20 mg by mouth every evening.      risperiDONE (RISPERDAL) 2 MG tablet Take 2 mg by mouth every evening.      rivastigmine tartrate (EXELON) 1.5 MG capsule Take 1.5 mg by mouth 2 (two) times daily.      [DISCONTINUED] brimonidine  0.15 % OPTH DROP (ALPHAGAN) 0.15 % ophthalmic solution Place 1 drop into both eyes 3 (three) times daily.      [DISCONTINUED] lubiprostone (AMITIZA) 24 MCG Cap Take 24 mcg by mouth daily with breakfast.      brimonidine-timoloL (COMBIGAN) 0.2-0.5 % Drop Place 1 drop into both eyes 2 (two) times a day.      latanoprost 0.005 % ophthalmic solution Place 1 drop into both eyes every evening.      linaCLOtide (LINZESS) 72 mcg Cap capsule Take 72 mcg by mouth before breakfast.      [DISCONTINUED] meclizine (ANTIVERT) 50 MG tablet Take 25 mg by mouth 3 (three) times daily as needed for Dizziness.      [DISCONTINUED] naproxen (NAPROSYN) 500 MG tablet Take 500 mg by mouth 2 (two) times daily with meals.      [DISCONTINUED] pregabalin (LYRICA) 50 MG capsule Take 50 mg by mouth 2 (two) times daily.      [DISCONTINUED] rOPINIRole (REQUIP) 0.5 MG tablet Take 0.5 mg by mouth every evening.      [DISCONTINUED] traZODone (DESYREL) 100 MG tablet Take 100 mg by mouth every evening.       Current Inpatient Medications:  Current Facility-Administered Medications:     0.9%  NaCl infusion, , Intravenous, Continuous, Soila Kruse FNP, Last Rate: 75 mL/hr at 02/26/24 0053, New Bag at 02/26/24 0053    acetaminophen tablet 1,000 mg, 1,000 mg, Oral, Q6H PRN, Soila Kruse FNP, 1,000 mg at 02/26/24 0512    aluminum-magnesium hydroxide-simethicone 200-200-20 mg/5 mL suspension 30 mL, 30 mL, Oral, QID PRN, Soila Kruse FNP    apixaban tablet 2.5 mg, 2.5 mg, Oral, BID, Soila Kruse FNP    atorvastatin tablet 10 mg, 10 mg, Oral, Nightly, Soila Kruse FNP, 10 mg at 02/26/24 0204    bisacodyL suppository 10 mg, 10 mg, Rectal, Daily PRN, Soila Kruse FNP    brimonidine 0.15 % OPTH DROP ophthalmic solution 1 drop, 1 drop, Both Eyes, BID, 1 drop at 02/26/24 0209 **AND** timolol maleate 0.5% ophthalmic solution 1 drop, 1 drop, Both Eyes, BID, Soila Kruse, [facility], 1 drop at 02/26/24 0200    carvediloL tablet 3.125  mg, 3.125 mg, Oral, BID, Soila Kruse, FNP    cefTRIAXone (Rocephin) 1 g in dextrose 5 % in water (D5W) 100 mL IVPB (MB+), 1 g, Intravenous, Q24H, Soila Kruse FNP, Stopped at 02/26/24 0241    latanoprost 0.005 % ophthalmic solution 1 drop, 1 drop, Both Eyes, QHS, Soila Kruse FNP, 1 drop at 02/26/24 0205    levothyroxine tablet 25 mcg, 25 mcg, Oral, Before breakfast, Soila Kruse FNP, 25 mcg at 02/26/24 0600    melatonin tablet 6 mg, 6 mg, Oral, Nightly PRN, Soila Kruse FNP    naloxone 0.4 mg/mL injection 0.02 mg, 0.02 mg, Intravenous, PRN, Soila Kruse, FNP    ondansetron injection 4 mg, 4 mg, Intravenous, Q4H PRN, Soila Kruse, MELEP    prochlorperazine injection Soln 5 mg, 5 mg, Intravenous, Q6H PRN, Soila Kruse, FNP    risperiDONE tablet 2 mg, 2 mg, Oral, QHS, Soila Kruse FNP, 2 mg at 02/26/24 0204    rivastigmine tartrate capsule 1.5 mg, 1.5 mg, Oral, BID, Soila Kruse, FNP    senna-docusate 8.6-50 mg per tablet 1 tablet, 1 tablet, Oral, BID PRN, Soila Kruse, MELEP    sodium chloride 0.9% flush 10 mL, 10 mL, Intravenous, PRN, Soila Kruse, FNP    Current Outpatient Medications:     allopurinoL (ZYLOPRIM) 300 MG tablet, Take 300 mg by mouth once daily., Disp: , Rfl:     atorvastatin (LIPITOR) 10 MG tablet, Take 10 mg by mouth nightly., Disp: , Rfl:     carvediloL (COREG) 3.125 MG tablet, Take 3.125 mg by mouth 2 (two) times daily., Disp: , Rfl:     ELIQUIS 2.5 mg Tab, Take 2.5 mg by mouth 2 (two) times daily., Disp: , Rfl:     furosemide (LASIX) 20 MG tablet, Take 20 mg by mouth every morning., Disp: , Rfl:     levothyroxine (SYNTHROID) 25 MCG tablet, Take 25 mcg by mouth before breakfast., Disp: , Rfl:     MYRBETRIQ 50 mg Tb24, Take 1 tablet by mouth every morning., Disp: , Rfl:     omeprazole (PRILOSEC) 20 MG capsule, Take 20 mg by mouth every evening., Disp: , Rfl:     risperiDONE (RISPERDAL) 2 MG tablet, Take 2 mg by mouth every evening.,  Disp: , Rfl:     rivastigmine tartrate (EXELON) 1.5 MG capsule, Take 1.5 mg by mouth 2 (two) times daily., Disp: , Rfl:     brimonidine-timoloL (COMBIGAN) 0.2-0.5 % Drop, Place 1 drop into both eyes 2 (two) times a day., Disp: , Rfl:     latanoprost 0.005 % ophthalmic solution, Place 1 drop into both eyes every evening., Disp: , Rfl:     linaCLOtide (LINZESS) 72 mcg Cap capsule, Take 72 mcg by mouth before breakfast., Disp: , Rfl:   VTE Risk Mitigation (From admission, onward)           Ordered     apixaban tablet 2.5 mg  2 times daily         02/26/24 0040     IP VTE HIGH RISK PATIENT  Once         02/25/24 2209     Place sequential compression device  Until discontinued         02/25/24 2209     Reason for No Pharmacological VTE Prophylaxis  Once        Question:  Reasons:  Answer:  Already adequately anticoagulated on oral Anticoagulants    02/25/24 2209                  Assessment:   NSTEMI likely type II in the setting of Sepsis secondary to UTI, can not exclude Type I    - Significant ST Depression noted on EKG  Sepsis  Leukocytosis    Chronic Diastolic CHF  PAF - Currently Paced     - [id number] Score 5 (7.2% Stroke risk Per Year)    - on Eliquis (Last Dose 2.26.24 AM)  SSS    - s/p PPM (9.19.19): [employer]   PAD  Left Carotid Atherosclerosis  HTN  MICHEL on CKD IV - slightly improved   VHD    - Mild MR    - Mold to Moderate TR  YARI  GERD  Iron Deficiency Anemia   Right Knee Osteoarthritis  Dementia  Glaucoma  Hypothyroidism  History of GI Bleed    Plan:   ECHO and EKG Reviewed   Will need an Ischemic Evaluation once Sepsis/UTI has Cleared  Renally dosed Lovenox for Stroke Risk Prevention; Will stop Eliquis - Last Dose 2.26.24 AM   IV Antibiotics per Primary Team  Replete Lytes as needed   Keep Mag > 2 and Potassium > 4  Labs in AM: CBC, BMP, and Mag     Thank you for your consult.     IFTIKHAR Jones  Cardiology  [facility] - Emergency Dept  02/26/2024      I agree with the findings of the  complexity of problems addressed and take responsibility for the plan's risks and complications. I approved the plan documented by Angelia Babin NP.

## 2024-02-26 NOTE — NURSING
Nurses Note -- 4 Eyes      2/26/2024   1:19 PM      Skin assessed during: Admit      [x] No Altered Skin Integrity Present    []Prevention Measures Documented      [] Yes- Altered Skin Integrity Present or Discovered   [] LDA Added if Not in Epic (Describe Wound)   [] New Altered Skin Integrity was Present on Admit and Documented in LDA   [] Wound Image Taken    Wound Care Consulted? No    Attending Nurse:  Joselyn Reveles LPN    Second RN/Staff Member:  Shonna Vance RN

## 2024-02-26 NOTE — PLAN OF CARE
02/26/24 1545   Discharge Assessment   Assessment Type Discharge Planning Assessment   Confirmed/corrected address, phone number and insurance Yes   Confirmed Demographics Correct on Facesheet   Source of Information family   Communicated LUISITO with patient/caregiver Date not available/Unable to determine   Reason For Admission Debility   People in Home child(janice), adult;spouse   Facility Arrived From: Home   Do you expect to return to your current living situation? Yes  (Refusing SNF)   Do you have help at home or someone to help you manage your care at home? Yes   Who are your caregiver(s) and their phone number(s)? Spouse and Daughter Darius Alberts [phone].   Current cognitive status: Unable to Assess   Walking or Climbing Stairs Difficulty yes   Walking or Climbing Stairs ambulation difficulty, requires equipment;ambulation difficulty, assistance 1 person   Mobility Management Rolling walker and wheelchair   Dressing/Bathing Difficulty yes   Dressing/Bathing bathing difficulty, requires equipment;bathing difficulty, assistance 1 person   Dressing/Bathing Management Shower chair and person assist   Home Accessibility wheelchair accessible   Home Layout Able to live on 1st floor   Equipment Currently Used at Home rollator;shower chair;wheelchair;walker, rolling;bedside commode   Readmission within 30 days? No   Do you currently have service(s) that help you manage your care at home? Yes   How Many hours does patient receive services 30   Name and Contact number of agency [facility]   Is the pt/caregiver preference to resume services with current agency Yes   Do you take prescription medications? Yes   Do you have prescription coverage? Yes   Coverage MCR   Do you have any problems affording any of your prescribed medications? No   Is the patient taking medications as prescribed? yes   Who is going to help you get home at discharge? Daughter, Darius Alberts   How do you get to doctors  appointments? agency   Are you on dialysis? No   Do you take coumadin? No   Discharge Plan A Home Health   Discharge Plan B Home Health   DME Needed Upon Discharge  none   Discharge Plan discussed with: Spouse/sig other;Patient;Adult children   Name(s) and Number(s) Mellissa Bryan [phone]   Physical Activity   On average, how many days per week do you engage in moderate to strenuous exercise (like a brisk walk)? 0 days   On average, how many minutes do you engage in exercise at this level? 0 min   Financial Resource Strain   How hard is it for you to pay for the very basics like food, housing, medical care, and heating? Not hard   Housing Stability   In the last 12 months, was there a time when you were not able to pay the mortgage or rent on time? N   In the last 12 months, how many places have you lived? 1   Transportation Needs   In the past 12 months, has lack of transportation kept you from medical appointments or from getting medications? no   Food Insecurity   Within the past 12 months, you worried that your food would run out before you got the money to buy more. Never true   Within the past 12 months, the food you bought just didn't last and you didn't have money to get more. Never true   Stress   Do you feel stress - tense, restless, nervous, or anxious, or unable to sleep at night because your mind is troubled all the time - these days? Not at all   Social Connections   In a typical week, how many times do you talk on the phone with family, friends, or neighbors? More than 3   How often do you get together with friends or relatives? Once   How often do you attend Buddhism or Sikh services? More than 4   Do you belong to any clubs or organizations such as Buddhism groups, unions, fraternal or athletic groups, or school groups? No   How often do you attend meetings of the clubs or organizations you belong to? Never   Are you , , , , never , or living with a  partner?    Alcohol Use   Q1: How often do you have a drink containing alcohol? Never   Q2: How many drinks containing alcohol do you have on a typical day when you are drinking? None   Q3: How often do you have six or more drinks on one occasion? Never   OTHER   Name(s) of People in Home Spouse and son   Patient admitted from home.  Lives with her spouse and adult son.  Family assists with her care.  She also receives home health through [facility] and 30hrs of PCA services through [facility].  Patient is refusing any type of placement.  Patient plans to return home with current services.  Daughter, Mellissa Alberts will provide transportation at discharge.

## 2024-02-26 NOTE — PROGRESS NOTES
[facility] Hospital Medicine Progress Note        Chief Complaint: Inpatient Follow-up     HPI:   89 yr old female whose history includes HTN, hypothyroidism, CKD with solitary kidney, PAF. Presented to ED with c/o generalized weakness.      Just discharged on 2/21/24 following a 2 day admission during which time she was treated for generalized weakness and age related physical debility with polypharmacy. Trazodone dose decreased and lyrica, meclizine and requip discontinued. Prior to discharge she had improved mobility and discharged home with family and home health. Unclear if patient stopped these medications as instructed because her medications come prepackaged.      At the time of my exam patient is AAO x 3. Reports she was ambulating with her walker when both legs became very weak and she was unable to walk any further. Denies falling but  reports she's had multiple falls in the past. Denies any fever, chest pain, SOB, vomiting or diarrhea. Chest x-ray shows enlarged cardiac silhouette without edema. CT - lumbar spine shows nondisplaced fracture at S4 of indeterminate age. CT T-spine negative for acute findings.      VS on arrival: T 99.2, P 73, R 19, B/P 108/56, Sats 96% on room air. Initial labs WBC 16.8, Hgb 10.8, Hct 35, K 3.4, BUN 32.6, creatinine 1.91, Troponin 0.191 (repeat 0.199). EKG shows SR with ST-T changes in anterior and inferolateral leads.     Interval Hx:  Patient overall feeling weak.  No new complaints.  She did have a low-grade fever of 100.5 last night.  Significant leukocytosis increase this morning.  Urine culture also growing    Objective/physical exam:  General: In no acute distress, afebrile  Chest: Clear to auscultation bilaterally  Heart: RRR, +S1, S2, no appreciable murmur  Abdomen: Soft, nontender, BS +  MSK: Warm, no lower extremity edema, no clubbing or cyanosis  Neurologic: Alert and oriented x4, Cranial nerve II-XII intact, Strength 5/5  in all 4 extremities    VITAL SIGNS: 24 HRS MIN & MAX LAST   Temp  Min: 99.2 °F (37.3 °C)  Max: 101.1 °F (38.4 °C) 99.8 °F (37.7 °C)   BP  Min: 106/56  Max: 139/57 117/74   Pulse  Min: 70  Max: 106  71   Resp  Min: 17  Max: 19 18   SpO2  Min: 95 %  Max: 100 % 100 %       Recent Labs   Lab 02/25/24  1346 02/26/24  0138   WBC 16.80* 21.55*   RBC 3.68* 3.28*   HGB 10.8* 10.0*   HCT 35.0* 30.5*   MCV 95.1* 93.0   MCH 29.3 30.5   MCHC 30.9* 32.8*   RDW 16.3 16.4    161   MPV 9.7 9.2       Recent Labs   Lab 02/25/24  1346 02/26/24  0138   * 133*   K 3.4* 3.4*   CO2 21* 23   BUN 32.6* 31.1*   CREATININE 1.91* 1.74*   CALCIUM 10.3* 9.6   ALBUMIN 3.3* 2.6*   ALKPHOS 92 73   ALT 14 10   AST 28 22   BILITOT 0.4 0.4          Microbiology Results (last 7 days)       Procedure Component Value Units Date/Time    Urine culture [[id number]]  (Abnormal) Collected: 02/25/24 1934    Order Status: Completed Specimen: Urine Updated: 02/26/24 0628     Urine Culture >/= 100,000 colonies/ml Gram-negative Rods             Radiology:  CT Thoracic Spine Without Contrast  Narrative: CLINICAL HISTORY:  Trauma.    TECHNIQUE:  CT of the thoracic spine Without contrast. Sagittal and coronal reconstructions were performed on the source images.    Automatic exposure control was utilized to reduce the patient's radiation dose.    DLP = 06/20/1993    COMPARISON:  No prior imaging available for comparison.    FINDINGS:  There is no acute fracture, subluxation, or dislocation. Limited detail regarding thoracic discs, but there is no finding seen to suggest acute disc herniation. There is accentuated kyphotic curvature of the thoracic spine.    The soft tissues are normal. There is no lymphadenopathy. The visualized lungs are unremarkable.  Impression: 1. No acute thoracic spine abnormality identified.    2. Ligament, spinal cord and/or vascular abnormalities cannot be excluded on the basis of this examination.    Electronically signed  by: Wiley Roberts  Date:    02/25/2024  Time:    16:00  CT Lumbar Spine Without Contrast  Narrative: EXAMINATION:  CT LUMBAR SPINE WITHOUT CONTRAST    CLINICAL HISTORY:  back pain;    TECHNIQUE:  Low dose helical acquired images with axial, sagittal and coronal reformations though the lumbar spine.  Contrast was not administered.    All CT scans at this location are performed using dose optimization techniques as appropriate to a performed exam including the following automated exposure control, adjustment of the mA and/or kV according to patient size and/or use of iterative reconstruction technique    DLP: 2693 mGycm    COMPARISON:  CT lumbar spine 02/18/2024    FINDINGS:  NUMBERING: Last fully formed disc space is designated L5-S1. there is sacralization of L5.    BONES: Fracture at S4 is age indeterminate, below the collimation of previous CT exam.    DISCS: Disc space narrowing with vacuum phenomenon at L2-L3.  Mild multilevel disc bulging, most pronounced at L4-L5 where disc bulge measures 4 mm.    SPINAL CANAL: Disc bulging and ligamentum flavum hypertrophy contribute to multilevel central canal stenosis at moderate to severe L2-L3, L3-L4 and severe at L4-L5. osseous ridging, facet arthropathy and disc bulging contributes to multilevel neural foraminal stenosis.    SOFT TISSUES: Colonic diverticulosis.  Ectatic atherosclerotic aorta.  Impression: Nondisplaced fracture at S4, age indeterminate    Multilevel degenerative change similar to prior.    Electronically signed by: Angélica Diehl  Date:    02/25/2024  Time:    16:00  X-Ray Chest AP Portable  Narrative: EXAMINATION:  XR CHEST AP PORTABLE    CLINICAL HISTORY:  Weakness    TECHNIQUE:  Single frontal view of the chest was performed.    COMPARISON:  02/17/2024    FINDINGS:  LINES AND TUBES: Dual lead cardiac pacer device is in place via left subclavian approach with leads overlying the right atrium and right ventricle.    MEDIASTINUM AND SHAWNA: Cardiac  silhouette is enlarged.    LUNGS: No lobar consolidation. No edema.    PLEURA:No pleural effusion.Chin obscures the lung apices.    BONES: No acute osseous abnormality.  Impression: Enlarged cardiac silhouette without edema.    Electronically signed by: Angélica Diehl  Date:    02/25/2024  Time:    13:23      Scheduled Med:   apixaban  2.5 mg Oral BID    atorvastatin  10 mg Oral Nightly    brimonidine 0.15 % OPTH DROP  1 drop Both Eyes BID    And    timolol maleate 0.5%  1 drop Both Eyes BID    carvediloL  3.125 mg Oral BID    cefTRIAXone (Rocephin) IV (PEDS and ADULTS)  1 g Intravenous Q24H    latanoprost  1 drop Both Eyes QHS    levothyroxine  25 mcg Oral Before breakfast    risperiDONE  2 mg Oral QHS    rivastigmine tartrate  1.5 mg Oral BID        Continuous Infusions:   sodium chloride 0.9% 75 mL/hr at 02/26/24 0053        PRN Meds:  acetaminophen, aluminum-magnesium hydroxide-simethicone, bisacodyL, melatonin, naloxone, ondansetron, prochlorperazine, senna-docusate 8.6-50 mg, sodium chloride 0.9%       Assessment/Plan:   Acute UTI, POA  Severe sepsis   NSTEMI, type 2 demand ischemia  S4 vertebral fracture, chronic   Physical deconditioning    Suspect that elevated troponins from her acute infection.  Cardiology has already been consulted from the emergency room.     She was currently on Rocephin empirically.  Urine cultures growing Gram-negative rods.  Will continue to follow-up.    She did have a significant increase in her leukocytosis this morning.  May need to broaden out her coverage.  She did have a recent ER admission which could put her at high-risk of resistant infection.  Low-grade fever last night.  Continue with p.r.n. Tylenol.  PT and OT evaluation this morning if she was able to tolerate.  Creatinine he was little bit better this morning.  Replace potassium.  Continue IV fluids    Critical care diagnosis: sepsis, iv antibiotics  Critical care interventions: hands on evaluation, review of  labs/radiographs/records and discussions with family  Critical care time spent: >32 minutes        Dimitri Cummings MD   02/26/2024     All diagnosis and differential diagnosis have been reviewed; assessment and plan has been documented; I have personally reviewed the labs and test results that are presently available; I have reviewed the patients medication list; I have reviewed the consulting providers response and recommendations. I have reviewed or attempted to review medical records based upon their availability    All of the patient's questions have been  addressed and answered. Patient's is agreeable to the above stated plan. I will continue to monitor closely and make adjustments to medical management as needed.  _____________________________________________________________________

## 2024-02-26 NOTE — AI DETERIORATION ALERT
Artificial Intelligence Notification  [facility]  [address]  [place] [address]  Phone: [phone]    This documentation was triggered by an Artificial Intelligence Notification:    Admit Date: 2024   LOS: 1  Code Status: Full Code  : 1935  Age: 89 y.o.  Weight:   Wt Readings from Last 1 Encounters:   24 69.9 kg (154 lb)        Sex: female  Bed: 408/408 A  MRN: [id number]  Attending Physician: Ally Erazo MD     Date of Alert: 2024  Time AI Alert Received: 1503            Vitals:    24 1452   BP: (!) 166/84   Pulse: (!) 150   Resp: (!) 22   Temp: 98.2 °F (36.8 °C)     SpO2: (!) 94 %      Artificial Intelligence alert discussed with Provider:     Name:    Date/Time of Provider Notification:       Patient Condition: Chart reviewed.  Pt currently on rocephin, urine cx showing GNR.  Pt on room air o2 sats 94%. Pt being followed by [facility] d/t being consulted for elevated troponins. ICU available if needed.

## 2024-02-26 NOTE — PLAN OF CARE
Outbound call to [facility] to notify them of patient's hospital admission.  Spoke with Hope.  Progress notes sent via [facility].

## 2024-02-26 NOTE — H&P
[facility] Hospital Medicine - H&P Note    Patient Name: Ev Alberts  : 1935  MRN: [id number]  PCP: Santi Walters MD  Admitting Physician:  JOHANNA Savage MD  Admission Class: IP- Inpatient   Code status: --    Allergies   Amlodipine-benazepril, Corticosteroids (glucocorticoids), Rofecoxib, Sulfa (sulfonamide antibiotics), Atorvastatin, and Ibuprofen    Chief Complaint   Generalized weakness    History of Present Illness   89 yr old female whose history includes HTN, hypothyroidism, CKD with solitary kidney, PAF. Presented to ED with c/o generalized weakness.     Just discharged on 24 following a 2 day admission during which time she was treated for generalized weakness and age related physical debility with polypharmacy. Trazodone dose decreased and lyrica, meclizine and requip discontinued. Prior to discharge she had improved mobility and discharged home with family and home health. Unclear if patient stopped these medications as instructed because her medications come prepackaged.     At the time of my exam patient is AAO x 3. Reports she was ambulating with her walker when both legs became very weak and she was unable to walk any further. Denies falling but  reports she's had multiple falls in the past. Denies any fever, chest pain, SOB, vomiting or diarrhea. Chest x-ray shows enlarged cardiac silhouette without edema. CT - lumbar spine shows nondisplaced fracture at S4 of indeterminate age. CT T-spine negative for acute findings.     VS on arrival: T 99.2, P 73, R 19, B/P 108/56, Sats 96% on room air. Initial labs WBC 16.8, Hgb 10.8, Hct 35, K 3.4, BUN 32.6, creatinine 1.91, Troponin 0.191 (repeat 0.199). EKG shows SR with ST-T changes in anterior and inferolateral leads.     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 Lives at home with . Receives services from [facility].    Family History   Reviewed and negative    Home Medications     Prior to Admission medications    Medication Sig Start Date End Date Taking? Authorizing Provider       Yes Provider, Historical   allopurinoL (ZYLOPRIM) 300 MG tablet Take 300 mg by mouth once daily.   Yes Provider, Historical   atorvastatin (LIPITOR) 10 MG tablet Take 10 mg by mouth nightly. 22  Yes Provider, Historical   Latanoprost 0.005% one drop both eyes q hs    Yes Provider, Historical   carvediloL (COREG) 3.125 MG tablet Take 3.125 mg by mouth 2 (two) times daily. 21  Yes Provider, Historical   ELIQUIS 2.5 mg Tab SMARTSI Tablet(s) By Mouth Morning-Night 5/10/22  Yes Provider, Historical   Brim-Tart-timol 0.2/0.5 one drop both eyes BID        furosemide (LASIX) 20 MG tablet Take 20 mg by mouth every morning.   Yes Provider, Historical   levothyroxine (SYNTHROID) 25 MCG tablet Take 25 mcg by mouth before breakfast.   Yes Provider, Historical   Linzess 72 mg daily        MYRBETRIQ 50 mg Tb24 Take 1 tablet by mouth every morning. 24  Yes Provider, Historical   omeprazole (PRILOSEC) 20 MG capsule Take 20 mg by mouth every evening. 24  Yes Provider, Historical   risperiDONE (RISPERDAL) 2 MG tablet Take 2 mg by mouth every evening. 24  Yes Provider, Historical   rivastigmine tartrate (EXELON) 1.5 MG capsule Take 1.5 mg by mouth 2 (two) times daily.   Yes Provider, Historical           traZODone (DESYREL) 100 MG tablet Take 1 tablet (100 mg total) by mouth every evening. 24  Yes Ally Erazo MD    Meclizine 25 mg TID PRN  Naproxen 500 mg  "BID  Pregabalin 50 mg BID  Ropinirole 0.5 mg q hs    Physical Exam   Vital Signs  Temp:  [99.2 °F (37.3 °C)]   Pulse:  [70-73]   Resp:  [17-19]   BP: (108-139)/(56-61)   SpO2:  [96 %-100 %]    General: Appears comfortable  HEENT: NC/AT  Neck:  No JVD  Chest: CTABL  CVS: Regular rhythm. Normal S1/S2.  Abdomen: nondistended, normoactive BS, soft and non-tender.  MSK: No obvious deformity or joint swelling  Skin: Warm and dry  Neuro: AAOx3, no focal neurological deficit. Generalized weakness with significant bilateral LE weakness  Psych: Cooperative    Labs     Recent Labs     02/25/24  1346   WBC 16.80*   RBC 3.68*   HGB 10.8*   HCT 35.0*   MCV 95.1*   MCH 29.3   MCHC 30.9*   RDW 16.3        No results for input(s): "PROTIME", "INR", "PTT", "D-DIMER", "FERRITIN", "IRON", "TRANS", "TIBC", "LABIRON", "OIKJCALL16", "FOLATE", "LDH", "HAPTOGLOBIN", "RETICCNTAUTO", "RETABS", "PERIPSMEAREV" in the last 72 hours.   Recent Labs     02/25/24  1346   *   K 3.4*   CHLORIDE 102   CO2 21*   BUN 32.6*   CREATININE 1.91*   EGFRNORACEVR 25   GLUCOSE 122*   CALCIUM 10.3*   ALBUMIN 3.3*   GLOBULIN 4.9*   ALKPHOS 92   ALT 14   AST 28   BILITOT 0.4     No results for input(s): "LACTIC" in the last 72 hours.  Recent Labs     02/25/24  1346   TROPONINI 0.191*        Microbiology Results (last 7 days)       ** No results found for the last 168 hours. **           Imaging   CT Thoracic Spine Without Contrast  Narrative: CLINICAL HISTORY:  Trauma.    TECHNIQUE:  CT of the thoracic spine Without contrast. Sagittal and coronal reconstructions were performed on the source images.    Automatic exposure control was utilized to reduce the patient's radiation dose.    DLP = 06/20/1993    COMPARISON:  No prior imaging available for comparison.    FINDINGS:  There is no acute fracture, subluxation, or dislocation. Limited detail regarding thoracic discs, but there is no finding seen to suggest acute disc herniation. There is accentuated " kyphotic curvature of the thoracic spine.    The soft tissues are normal. There is no lymphadenopathy. The visualized lungs are unremarkable.  Impression: 1. No acute thoracic spine abnormality identified.    2. Ligament, spinal cord and/or vascular abnormalities cannot be excluded on the basis of this examination.    Electronically signed by: Wiley Roberts  Date:    02/25/2024  Time:    16:00  CT Lumbar Spine Without Contrast  Narrative: EXAMINATION:  CT LUMBAR SPINE WITHOUT CONTRAST    CLINICAL HISTORY:  back pain;    TECHNIQUE:  Low dose helical acquired images with axial, sagittal and coronal reformations though the lumbar spine.  Contrast was not administered.    All CT scans at this location are performed using dose optimization techniques as appropriate to a performed exam including the following automated exposure control, adjustment of the mA and/or kV according to patient size and/or use of iterative reconstruction technique    DLP: 2693 mGycm    COMPARISON:  CT lumbar spine 02/18/2024    FINDINGS:  NUMBERING: Last fully formed disc space is designated L5-S1. there is sacralization of L5.    BONES: Fracture at S4 is age indeterminate, below the collimation of previous CT exam.    DISCS: Disc space narrowing with vacuum phenomenon at L2-L3.  Mild multilevel disc bulging, most pronounced at L4-L5 where disc bulge measures 4 mm.    SPINAL CANAL: Disc bulging and ligamentum flavum hypertrophy contribute to multilevel central canal stenosis at moderate to severe L2-L3, L3-L4 and severe at L4-L5. osseous ridging, facet arthropathy and disc bulging contributes to multilevel neural foraminal stenosis.    SOFT TISSUES: Colonic diverticulosis.  Ectatic atherosclerotic aorta.  Impression: Nondisplaced fracture at S4, age indeterminate    Multilevel degenerative change similar to prior.    Electronically signed by: Angélica Diehl  Date:    02/25/2024  Time:    16:00  X-Ray Chest AP Portable  Narrative:  EXAMINATION:  XR CHEST AP PORTABLE    CLINICAL HISTORY:  Weakness    TECHNIQUE:  Single frontal view of the chest was performed.    COMPARISON:  02/17/2024    FINDINGS:  LINES AND TUBES: Dual lead cardiac pacer device is in place via left subclavian approach with leads overlying the right atrium and right ventricle.    MEDIASTINUM AND SHAWNA: Cardiac silhouette is enlarged.    LUNGS: No lobar consolidation. No edema.    PLEURA:No pleural effusion.Chin obscures the lung apices.    BONES: No acute osseous abnormality.  Impression: Enlarged cardiac silhouette without edema.    Electronically signed by: Angélica Diehl  Date:    02/25/2024  Time:    13:23    Assessment & Plan     NSTEMI  - no chest pain or SOB  - Trend troponin  - ECHO pending  - Cardiology consulted    MICHEL on CKD  - Avoid NSAIDs and nephrotoxic meds  - on Naproxen at home and will d/c  - Gentle hydration with NS at 75 ml/hr  - labs in AM    UTI - POA  - rocephin IV  - f/u on urine cultures    Nondisplaced S4 vertebral fracture - chronicity unknown  - has history of multiple falls  - Consult PT  - consider NS consult but patient not having any pain at this time    Generalized weakness and deconditioning  - patient and  agree she would benefit from more aggressive therapy than what she is getting at home and agree to SNF vs rehab placement at discharge if appropriate  - Case management consulted    VTE Prophylaxis: Soila Escudero, MELEP-BC have discussed this patients case with Dr. Savage who agrees with the diagnosis and treatment plan.

## 2024-02-26 NOTE — PLAN OF CARE
02/26/24 1545   Medicare Message   Important Message from Medicare regarding Discharge Appeal Rights Given to patient/caregiver;Explained to patient/caregiver

## 2024-02-26 NOTE — PT/OT/SLP PROGRESS
Physical Therapy      Patient Name:  Ev Alberts   MRN:  [id number]    Patient not seen today for PT. Pt resting in bed; shivering (had multiple blankets on). Pt unwilling to participate at this time. Will follow-up as schedule permits.

## 2024-02-27 LAB
ABS NEUT (OLG): 35.8 X10(3)/MCL (ref 2.1–9.2)
ACINETOBACTER CALCOACETICUS-BAUMANNII COMPLEX (OHS): NOT DETECTED
ALBUMIN SERPL-MCNC: 2.4 G/DL (ref 3.4–4.8)
ALBUMIN/GLOB SERPL: 0.6 RATIO (ref 1.1–2)
ALP SERPL-CCNC: 89 UNIT/L (ref 40–150)
ALT SERPL-CCNC: 18 UNIT/L (ref 0–55)
ANISOCYTOSIS BLD QL SMEAR: ABNORMAL
AST SERPL-CCNC: 43 UNIT/L (ref 5–34)
BACTERIA UR CULT: ABNORMAL
BACTEROIDES FRAGILIS (OHS): NOT DETECTED
BILIRUB SERPL-MCNC: 0.9 MG/DL
BUN SERPL-MCNC: 35.4 MG/DL (ref 9.8–20.1)
BURR CELLS (OLG): ABNORMAL
C AURIS DNA BLD POS QL NAA+NON-PROBE: NOT DETECTED
C GATTII+NEOFOR DNA CSF QL NAA+NON-PROBE: NOT DETECTED
CALCIUM SERPL-MCNC: 8.5 MG/DL (ref 8.4–10.2)
CANDIDA ALBICANS (OHS): NOT DETECTED
CANDIDA GLABRATA (OHS): NOT DETECTED
CANDIDA KRUSEI (OHS): NOT DETECTED
CANDIDA PARAPSILOSIS (OHS): NOT DETECTED
CANDIDA TROPICALIS (OHS): NOT DETECTED
CHLORIDE SERPL-SCNC: 106 MMOL/L (ref 98–107)
CO2 SERPL-SCNC: 15 MMOL/L (ref 23–31)
CREAT SERPL-MCNC: 2.67 MG/DL (ref 0.55–1.02)
CTX-M (OHS): NOT DETECTED
ENTEROBACTER CLOACAE COMPLEX (OHS): NOT DETECTED
ENTEROBACTERALES (OHS): DETECTED
ENTEROCOCCUS FAECALIS (OHS): NOT DETECTED
ENTEROCOCCUS FAECIUM (OHS): NOT DETECTED
ERYTHROCYTE [DISTWIDTH] IN BLOOD BY AUTOMATED COUNT: 17.2 % (ref 11.5–17)
ESCHERICHIA COLI (OHS): DETECTED
GFR SERPLBLD CREATININE-BSD FMLA CKD-EPI: 17 MLS/MIN/1.73/M2
GLOBULIN SER-MCNC: 3.9 GM/DL (ref 2.4–3.5)
GLUCOSE SERPL-MCNC: 156 MG/DL (ref 82–115)
GP B STREP DNA CSF QL NAA+NON-PROBE: NOT DETECTED
HAEM INFLU DNA CSF QL NAA+NON-PROBE: NOT DETECTED
HCT VFR BLD AUTO: 33.2 % (ref 37–47)
HGB BLD-MCNC: 10.9 G/DL (ref 12–16)
IMP (OHS): NOT DETECTED
INSTRUMENT WBC (OLG): 38.5 X10(3)/MCL
KLEBSIELLA AEROGENES (OHS): NOT DETECTED
KLEBSIELLA OXYTOCA (OHS): NOT DETECTED
KLEBSIELLA PNEUMONIAE GROUP (OHS): NOT DETECTED
KPC (OHS): NOT DETECTED
L MONOCYTOG DNA CSF QL NAA+NON-PROBE: NOT DETECTED
MACROCYTES BLD QL SMEAR: ABNORMAL
MAGNESIUM SERPL-MCNC: 2.6 MG/DL (ref 1.6–2.6)
MCH RBC QN AUTO: 30.1 PG (ref 27–31)
MCHC RBC AUTO-ENTMCNC: 32.8 G/DL (ref 33–36)
MCR-1 (OHS): NOT DETECTED
MCV RBC AUTO: 91.7 FL (ref 80–94)
MECA/C (OHS): ABNORMAL
MECA/C AND MREJ (MRSA)(OHS): ABNORMAL
METAMYELOCYTES NFR BLD MANUAL: 3 %
MONOCYTES NFR BLD MANUAL: 1.54 X10(3)/MCL (ref 0.1–1.3)
MONOCYTES NFR BLD MANUAL: 4 %
N MEN DNA CSF QL NAA+NON-PROBE: NOT DETECTED
NDM (OHS): NOT DETECTED
NEUTROPHILS NFR BLD MANUAL: 93 %
NRBC BLD AUTO-RTO: 0.1 %
OHS QRS DURATION: 80 MS
OHS QRS DURATION: 82 MS
OHS QTC CALCULATION: 472 MS
OHS QTC CALCULATION: 477 MS
OVALOCYTES (OLG): ABNORMAL
OXA-48-LIKE (OHS): NOT DETECTED
PLATELET # BLD AUTO: 163 X10(3)/MCL (ref 130–400)
PLATELET # BLD EST: NORMAL 10*3/UL
PMV BLD AUTO: 11.3 FL (ref 7.4–10.4)
POCT GLUCOSE: 156 MG/DL (ref 70–110)
POIKILOCYTOSIS BLD QL SMEAR: ABNORMAL
POTASSIUM SERPL-SCNC: 4.4 MMOL/L (ref 3.5–5.1)
PROT SERPL-MCNC: 6.3 GM/DL (ref 5.8–7.6)
PROTEUS SPP. (OHS): NOT DETECTED
PSEUDOMONAS AERUGINOSA (OHS): NOT DETECTED
RBC # BLD AUTO: 3.62 X10(6)/MCL (ref 4.2–5.4)
RBC MORPH BLD: ABNORMAL
S ENT+BONG DNA STL QL NAA+NON-PROBE: NOT DETECTED
S PNEUM DNA CSF QL NAA+NON-PROBE: NOT DETECTED
SERRATIA MARCESCENS (OHS): NOT DETECTED
SODIUM SERPL-SCNC: 130 MMOL/L (ref 136–145)
STAPHYLOCOCCUS AUREUS (OHS): NOT DETECTED
STAPHYLOCOCCUS EPIDERMIDIS (OHS): NOT DETECTED
STAPHYLOCOCCUS LUGDUNENSIS (OHS): NOT DETECTED
STAPHYLOCOCCUS SPP. (OHS): NOT DETECTED
STENOTROPHOMONAS MALTOPHILIA (OHS): NOT DETECTED
STREPTOCOCCUS PYOGENES (GROUP A)(OHS): NOT DETECTED
STREPTOCOCCUS SPP. (OHS): NOT DETECTED
TARGETS BLD QL SMEAR: ABNORMAL
TROPONIN I SERPL-MCNC: 3.36 NG/ML (ref 0–0.04)
TROPONIN I SERPL-MCNC: 4.64 NG/ML (ref 0–0.04)
VANA/B (OHS): ABNORMAL
VIM (OHS): NOT DETECTED
WBC # SPEC AUTO: 38.48 X10(3)/MCL (ref 4.5–11.5)
WBC VACUOLES (OHS): ABNORMAL

## 2024-02-27 PROCEDURE — 87040 BLOOD CULTURE FOR BACTERIA: CPT

## 2024-02-27 PROCEDURE — 25000003 PHARM REV CODE 250: Performed by: INTERNAL MEDICINE

## 2024-02-27 PROCEDURE — 36573 INSJ PICC RS&I 5 YR+: CPT

## 2024-02-27 PROCEDURE — A4216 STERILE WATER/SALINE, 10 ML: HCPCS

## 2024-02-27 PROCEDURE — 27000221 HC OXYGEN, UP TO 24 HOURS

## 2024-02-27 PROCEDURE — 80053 COMPREHEN METABOLIC PANEL: CPT

## 2024-02-27 PROCEDURE — 85027 COMPLETE CBC AUTOMATED: CPT

## 2024-02-27 PROCEDURE — 84484 ASSAY OF TROPONIN QUANT: CPT

## 2024-02-27 PROCEDURE — 02HV33Z INSERTION OF INFUSION DEVICE INTO SUPERIOR VENA CAVA, PERCUTANEOUS APPROACH: ICD-10-PCS | Performed by: INTERNAL MEDICINE

## 2024-02-27 PROCEDURE — C1751 CATH, INF, PER/CENT/MIDLINE: HCPCS

## 2024-02-27 PROCEDURE — 20000000 HC ICU ROOM

## 2024-02-27 PROCEDURE — 63600175 PHARM REV CODE 636 W HCPCS

## 2024-02-27 PROCEDURE — 25000003 PHARM REV CODE 250

## 2024-02-27 PROCEDURE — 87077 CULTURE AEROBIC IDENTIFY: CPT

## 2024-02-27 PROCEDURE — 25000003 PHARM REV CODE 250: Performed by: NURSE PRACTITIONER

## 2024-02-27 PROCEDURE — 63600175 PHARM REV CODE 636 W HCPCS: Performed by: INTERNAL MEDICINE

## 2024-02-27 PROCEDURE — 87154 CUL TYP ID BLD PTHGN 6+ TRGT: CPT

## 2024-02-27 PROCEDURE — 83735 ASSAY OF MAGNESIUM: CPT

## 2024-02-27 RX ORDER — ASPIRIN 81 MG/1
81 TABLET ORAL DAILY
Status: DISCONTINUED | OUTPATIENT
Start: 2024-02-27 | End: 2024-03-11 | Stop reason: HOSPADM

## 2024-02-27 RX ORDER — SODIUM CHLORIDE 0.9 % (FLUSH) 0.9 %
10 SYRINGE (ML) INJECTION
Status: DISCONTINUED | OUTPATIENT
Start: 2024-02-27 | End: 2024-02-28 | Stop reason: ALTCHOICE

## 2024-02-27 RX ORDER — SODIUM CHLORIDE 0.9 % (FLUSH) 0.9 %
10 SYRINGE (ML) INJECTION EVERY 6 HOURS
Status: DISCONTINUED | OUTPATIENT
Start: 2024-02-27 | End: 2024-02-28 | Stop reason: ALTCHOICE

## 2024-02-27 RX ORDER — CIPROFLOXACIN 2 MG/ML
400 INJECTION, SOLUTION INTRAVENOUS
Status: DISCONTINUED | OUTPATIENT
Start: 2024-02-27 | End: 2024-02-29

## 2024-02-27 RX ADMIN — CIPROFLOXACIN 400 MG: 2 INJECTION, SOLUTION INTRAVENOUS at 10:02

## 2024-02-27 RX ADMIN — BRIMONIDINE TARTRATE 1 DROP: 1.5 SOLUTION OPHTHALMIC at 08:02

## 2024-02-27 RX ADMIN — LIDOCAINE HYDROCHLORIDE 1 ML: 10 INJECTION, SOLUTION INFILTRATION; PERINEURAL at 01:02

## 2024-02-27 RX ADMIN — SODIUM CHLORIDE, PRESERVATIVE FREE 10 ML: 5 INJECTION INTRAVENOUS at 06:02

## 2024-02-27 RX ADMIN — POTASSIUM CHLORIDE 10 MEQ: 7.46 INJECTION, SOLUTION INTRAVENOUS at 01:02

## 2024-02-27 RX ADMIN — RIVASTIGMINE TARTRATE 1.5 MG: 1.5 CAPSULE ORAL at 08:02

## 2024-02-27 RX ADMIN — PIPERACILLIN AND TAZOBACTAM 4.5 G: 4; .5 INJECTION, POWDER, LYOPHILIZED, FOR SOLUTION INTRAVENOUS; PARENTERAL at 10:02

## 2024-02-27 RX ADMIN — VANCOMYCIN HYDROCHLORIDE 1500 MG: 1.5 INJECTION, POWDER, LYOPHILIZED, FOR SOLUTION INTRAVENOUS at 12:02

## 2024-02-27 RX ADMIN — MUPIROCIN: 20 OINTMENT TOPICAL at 08:02

## 2024-02-27 RX ADMIN — POTASSIUM CHLORIDE 10 MEQ: 7.46 INJECTION, SOLUTION INTRAVENOUS at 02:02

## 2024-02-27 RX ADMIN — ATORVASTATIN CALCIUM 10 MG: 10 TABLET, FILM COATED ORAL at 08:02

## 2024-02-27 RX ADMIN — RISPERIDONE 2 MG: 1 TABLET ORAL at 08:02

## 2024-02-27 RX ADMIN — POTASSIUM CHLORIDE 10 MEQ: 7.46 INJECTION, SOLUTION INTRAVENOUS at 12:02

## 2024-02-27 RX ADMIN — ENOXAPARIN SODIUM 70 MG: 80 INJECTION SUBCUTANEOUS at 01:02

## 2024-02-27 RX ADMIN — TIMOLOL MALEATE 1 DROP: 5 SOLUTION OPHTHALMIC at 08:02

## 2024-02-27 RX ADMIN — Medication 81 MG: at 12:02

## 2024-02-27 RX ADMIN — LATANOPROST 1 DROP: 50 SOLUTION OPHTHALMIC at 08:02

## 2024-02-27 RX ADMIN — RIVASTIGMINE TARTRATE 1.5 MG: 1.5 CAPSULE ORAL at 09:02

## 2024-02-27 RX ADMIN — MAGNESIUM SULFATE HEPTAHYDRATE 2 G: 40 INJECTION, SOLUTION INTRAVENOUS at 12:02

## 2024-02-27 RX ADMIN — NOREPINEPHRINE BITARTRATE 0.1 MCG/KG/MIN: 8 INJECTION, SOLUTION INTRAVENOUS at 06:02

## 2024-02-27 RX ADMIN — SODIUM CHLORIDE, PRESERVATIVE FREE 10 ML: 5 INJECTION INTRAVENOUS at 12:02

## 2024-02-27 NOTE — PT/OT/SLP PROGRESS
Pt transferred to ICU due to urosepsis.  Original consult placed by Dr. Cummings.  Pending clearance from ICU MD.  OT unable to locate today and no note in chart at this time.  OT to f/u 2/27.

## 2024-02-27 NOTE — PROGRESS NOTES
[facility] - [facility] ICU    Cardiology  Progress Note    Patient Name: Ev Alberts  MRN: [id number]  Admission Date: 2/25/2024  Hospital Length of Stay: 2 days  Code Status: Full Code   Attending Physician: No att. providers found   Primary Care Physician: Santi Walters MD  Expected Discharge Date:   Principal Problem:Debility    Subjective:   Chief Complaint/Reason for Consult:Elevated Troponin      HPI: Ms. Alberts is a 88 y/o female with a history of SSS, AVB II, Bradycardia, CHF, PAF on Eliquis, PAD, Left Carotid Atherosclerosis, HTN, CKD IV, VHD, YARI, who was briefly known to [facility] (Dr. Vigil). She presented to the ER on 2.25.24 with complaints of not being able to walk since she was discharged on 2.21.24 due to polypharmacy, weakness, and age related disability. Daughter reports she is unable to get the signal to make her walk. Daughter also reports she has been lethargic and is unsure she stopped taking Lyrica, Meclizine, and Requip because they come prepackaged. Significant labs include WBC 21.55, Na 133, K 3.4, BUN/Creat 31.1/1.74, Albumin 2.6, HDL 32. LDL 38, Troponin 0.199. UA +; UC pending. CT Lumbar spine shows nondisplaced fracture at S4 (age indeterminate) and multilevel degenerative changes. CT Thoracic spine shows ligament, spinal cord and/or vascular abnormalities. EKG shows sinus rhythm with 1st degree A-V block, ST and Marked T wave abnormality, consider inferior ischemia, ST and Marked T wave abnormality, consider anterolateral ischemia, and Prolonged QT. [facility] has been consulted for NSTEMI.        Hospital Course:  2.27.24: NAD Noted. On Levophed for BP Support. Denies CP/SOB. Paced at 60. On FD Lovenox for NSTEMI Treatment.      PMH: SSS, AVB II, Bradycardia, CHF, PAF on Eliquis, PAD, Left Carotid Atherosclerosis, HTN, CKD IV, VHD, YARI, GERD, FERNANDEZ, Right Knee Osteoarthritis, GI Bleed, Dementia, Glaucoma, Hypothyroidism     PSH: Cataract Surgery, Cholecystectomy,  Hysterectomy, PPM, Left, Nephrectomy, LHC, Hernia Repair, Oral Surgery, Bilateral Foot Surgery  Family History: Non-Contributory   Social History: Denies smoking, illicit drug use, and alcohol use.      Previous Cardiac Diagnostics:   ECHO (2.26.24):  Left Ventricle: The left ventricle is normal in size. Mildly increased wall thickness. There is normal systolic function with a visually estimated ejection fraction of 55 - 60%. Grade III diastolic dysfunction. Right Ventricle: Mild right ventricular enlargement. Wall thickness is normal. Right ventricle wall motion  is normal. Systolic function is mildly reduced. TAPSE is 1.46 cm. Left Atrium: Left atrium is moderately dilated. Aortic Valve: There is mild aortic valve sclerosis. Tricuspid Valve: There is mild regurgitation.     PPM Insertion (9.19.19): [employer]tr[employer]      ECHO (9.15.19):   Normal Left Ventricle cavity size. Normal wall thickness. Normal ejection fraction, 55-65%. The right ventricle cavity is mildly dilated. Left atrium is mildly dilated. Normal right atrium. Normal central venous pressure. Mild MR. Mild to Moderate TR. Moderate pulmonary HTN. No pericardial effusion.       Carotid US (7.26.12):  The study quality is good. Less than or equal to 50% left common carotid artery stenosis.Bilateral normal velocity measurements of the internal carotid and external caortid arteries are noted, with no plaque visualized.Antegrade right vertebral artery flow. Antegrade left vertebral artery flow.     KARI (1.18.12):  The resting right ankle brachial index is 1.01 consistent with no significant right peripheral vascular disease.The resting left ankle brachial index is 0.94 consistent with borderline left peripheral vascular disease.The study quality is good.      Lower Ext Arterial US (1.18.12):  The study quality is good. Biphasic waveforms and diffuse plaque seen in both the bilateral infra-popliteal arteries.    Review of Systems   Cardiovascular:  Negative for  chest pain.   Respiratory:  Negative for shortness of breath.    All other systems reviewed and are negative.    Objective:     Vital Signs (Most Recent):  Temp: 97.7 °F (36.5 °C) (02/27/24 0800)  Pulse: 60 (02/27/24 0800)  Resp: 15 (02/27/24 0800)  BP: 95/73 (02/27/24 0800)  SpO2: 97 % (02/27/24 0800) Vital Signs (24h Range):  Temp:  [97.3 °F (36.3 °C)-101.7 °F (38.7 °C)] 97.7 °F (36.5 °C)  Pulse:  [] 60  Resp:  [12-25] 15  SpO2:  [94 %-100 %] 97 %  BP: ()/(35-84) 95/73  Arterial Line BP: (0-164)/(0-62) 113/49   Weight: 69.9 kg (154 lb)  Body mass index is 25.63 kg/m².  SpO2: 97 %       Intake/Output Summary (Last 24 hours) at 2/27/2024 1030  Last data filed at 2/27/2024 0800  Gross per 24 hour   Intake 1307.35 ml   Output 130 ml   Net 1177.35 ml     Lines/Drains/Airways       Peripherally Inserted Central Catheter Line  Duration             PICC Triple Lumen 02/27/24 0031 right basilic <1 day              Drain  Duration                  Urethral Catheter 02/26/24 2320 Double-lumen <1 day              Arterial Line  Duration             Arterial Line 02/26/24 2320 Right Radial <1 day              Peripheral Intravenous Line  Duration                  Peripheral IV - Single Lumen 02/25/24 1608 20 G Left;Posterior Forearm 1 day         Peripheral IV - Single Lumen 02/26/24 2205 Anterior;Left Forearm <1 day         Peripheral IV - Single Lumen 02/26/24 2302 Anterior;Distal;Right Forearm <1 day                  Significant Labs:   Recent Results (from the past 72 hour(s))   Comprehensive metabolic panel    Collection Time: 02/25/24  1:46 PM   Result Value Ref Range    Sodium Level 133 (L) 136 - 145 mmol/L    Potassium Level 3.4 (L) 3.5 - 5.1 mmol/L    Chloride 102 98 - 107 mmol/L    Carbon Dioxide 21 (L) 23 - 31 mmol/L    Glucose Level 122 (H) 82 - 115 mg/dL    Blood Urea Nitrogen 32.6 (H) 9.8 - 20.1 mg/dL    Creatinine 1.91 (H) 0.55 - 1.02 mg/dL    Calcium Level Total 10.3 (H) 8.4 - 10.2 mg/dL    Protein  Total 8.2 (H) 5.8 - 7.6 gm/dL    Albumin Level 3.3 (L) 3.4 - 4.8 g/dL    Globulin 4.9 (H) 2.4 - 3.5 gm/dL    Albumin/Globulin Ratio 0.7 (L) 1.1 - 2.0 ratio    Bilirubin Total 0.4 <=1.5 mg/dL    Alkaline Phosphatase 92 40 - 150 unit/L    Alanine Aminotransferase 14 0 - 55 unit/L    Aspartate Aminotransferase 28 5 - 34 unit/L    eGFR 25 mls/min/1.73/m2   Troponin I    Collection Time: 02/25/24  1:46 PM   Result Value Ref Range    Troponin-I 0.191 (H) 0.000 - 0.045 ng/mL   CBC with Differential    Collection Time: 02/25/24  1:46 PM   Result Value Ref Range    WBC 16.80 (H) 4.50 - 11.50 x10(3)/mcL    RBC 3.68 (L) 4.20 - 5.40 x10(6)/mcL    Hgb 10.8 (L) 12.0 - 16.0 g/dL    Hct 35.0 (L) 37.0 - 47.0 %    MCV 95.1 (H) 80.0 - 94.0 fL    MCH 29.3 27.0 - 31.0 pg    MCHC 30.9 (L) 33.0 - 36.0 g/dL    RDW 16.3 11.5 - 17.0 %    Platelet 218 130 - 400 x10(3)/mcL    MPV 9.7 7.4 - 10.4 fL    Neut % 88.4 %    Lymph % 6.1 %    Mono % 4.4 %    Eos % 0.0 %    Basophil % 0.2 %    Lymph # 1.03 0.6 - 4.6 x10(3)/mcL    Neut # 14.84 (H) 2.1 - 9.2 x10(3)/mcL    Mono # 0.74 0.1 - 1.3 x10(3)/mcL    Eos # 0.00 0 - 0.9 x10(3)/mcL    Baso # 0.04 <=0.2 x10(3)/mcL    IG# 0.15 (H) 0 - 0.04 x10(3)/mcL    IG% 0.9 %    NRBC% 0.0 %   Hemoglobin A1C    Collection Time: 02/25/24  1:46 PM   Result Value Ref Range    Hemoglobin A1c 5.3 <=7.0 %    Estimated Average Glucose 105.4 mg/dL   EKG 12-lead    Collection Time: 02/25/24  2:50 PM   Result Value Ref Range    QRS Duration 92 ms    [facility] QTC Calculation 499 ms   Urinalysis, Reflex to Urine Culture    Collection Time: 02/25/24  7:34 PM    Specimen: Urine   Result Value Ref Range    Color, UA Light-Yellow Yellow, Light-Yellow, Colorless, Straw, Dark-Yellow    Appearance, UA Turbid (A) Clear    Specific Gravity, UA 1.010 1.005 - 1.030    pH, UA 5.5 5.0 - 8.5    Protein, UA Trace (A) Negative    Glucose, UA Normal Negative, Normal    Ketones, UA Trace (A) Negative    Blood, UA 2+ (A) Negative    Bilirubin, UA  Negative Negative    Urobilinogen, UA Normal 0.2, 1.0, Normal    Nitrites, UA Negative Negative    Leukocyte Esterase,  (A) Negative    WBC, UA 21-50 (A) None Seen, 0-2, 3-5, 0-5 /HPF    Bacteria, UA Many (A) None Seen, Trace /HPF    Squamous Epithelial Cells, UA Trace None Seen /HPF    Mucous, UA Trace (A) None Seen /LPF    RBC, UA 6-10 (A) None Seen, 0-2, 3-5, 0-5 /HPF   Urine culture    Collection Time: 02/25/24  7:34 PM    Specimen: Urine   Result Value Ref Range    Urine Culture >/= 100,000 colonies/ml Escherichia coli (A)        Susceptibility    Escherichia coli -  (no method available)     Amox/K Clav 8 Sensitive      Ampicillin >=32 Resistant      Cefepime <=0.12 Sensitive      Ceftriaxone <=0.25 Sensitive      Cefuroxime <=1 Sensitive      Ciprofloxacin <=0.25 Sensitive      Gentamicin <=1 Sensitive      Levofloxacin <=0.12 Sensitive      Meropenem <=0.25 Sensitive      Nitrofurantoin <=16 Sensitive      Piperacillin/Tazobactam <=4 Sensitive      Trimethoprim/Sulfamethoxazole <=20 Sensitive      Tetracycline <=1 Sensitive      Tobramycin <=1 Sensitive    Troponin I    Collection Time: 02/25/24  7:51 PM   Result Value Ref Range    Troponin-I 0.199 (H) 0.000 - 0.045 ng/mL   Comprehensive Metabolic Panel    Collection Time: 02/26/24  1:38 AM   Result Value Ref Range    Sodium Level 133 (L) 136 - 145 mmol/L    Potassium Level 3.4 (L) 3.5 - 5.1 mmol/L    Chloride 102 98 - 107 mmol/L    Carbon Dioxide 23 23 - 31 mmol/L    Glucose Level 88 82 - 115 mg/dL    Blood Urea Nitrogen 31.1 (H) 9.8 - 20.1 mg/dL    Creatinine 1.74 (H) 0.55 - 1.02 mg/dL    Calcium Level Total 9.6 8.4 - 10.2 mg/dL    Protein Total 7.2 5.8 - 7.6 gm/dL    Albumin Level 2.6 (L) 3.4 - 4.8 g/dL    Globulin 4.6 (H) 2.4 - 3.5 gm/dL    Albumin/Globulin Ratio 0.6 (L) 1.1 - 2.0 ratio    Bilirubin Total 0.4 <=1.5 mg/dL    Alkaline Phosphatase 73 40 - 150 unit/L    Alanine Aminotransferase 10 0 - 55 unit/L    Aspartate Aminotransferase 22 5 - 34  unit/L    eGFR 28 mls/min/1.73/m2   Troponin I    Collection Time: 02/26/24  1:38 AM   Result Value Ref Range    Troponin-I 0.178 (H) 0.000 - 0.045 ng/mL   Lipid Panel    Collection Time: 02/26/24  1:38 AM   Result Value Ref Range    Cholesterol Total 84 <=200 mg/dL    HDL Cholesterol 32 (L) 35 - 60 mg/dL    Triglyceride 68 37 - 140 mg/dL    Cholesterol/HDL Ratio 3 0 - 5    Very Low Density Lipoprotein 14     LDL Cholesterol 38.00 (L) 50.00 - 140.00 mg/dL   CBC with Differential    Collection Time: 02/26/24  1:38 AM   Result Value Ref Range    WBC 21.55 (H) 4.50 - 11.50 x10(3)/mcL    RBC 3.28 (L) 4.20 - 5.40 x10(6)/mcL    Hgb 10.0 (L) 12.0 - 16.0 g/dL    Hct 30.5 (L) 37.0 - 47.0 %    MCV 93.0 80.0 - 94.0 fL    MCH 30.5 27.0 - 31.0 pg    MCHC 32.8 (L) 33.0 - 36.0 g/dL    RDW 16.4 11.5 - 17.0 %    Platelet 161 130 - 400 x10(3)/mcL    MPV 9.2 7.4 - 10.4 fL    Neut % 82.9 %    Lymph % 9.4 %    Mono % 5.5 %    Eos % 0.6 %    Basophil % 0.4 %    Lymph # 2.02 0.6 - 4.6 x10(3)/mcL    Neut # 17.87 (H) 2.1 - 9.2 x10(3)/mcL    Mono # 1.19 0.1 - 1.3 x10(3)/mcL    Eos # 0.13 0 - 0.9 x10(3)/mcL    Baso # 0.09 <=0.2 x10(3)/mcL    IG# 0.25 (H) 0 - 0.04 x10(3)/mcL    IG% 1.2 %    NRBC% 0.0 %   Troponin I    Collection Time: 02/26/24  8:05 AM   Result Value Ref Range    Troponin-I 0.132 (H) 0.000 - 0.045 ng/mL   Echo    Collection Time: 02/26/24 10:32 AM   Result Value Ref Range    BSA 1.79 m2    Schmitz's Biplane MOD Ejection Fraction 63 %    LVIDd 4.30 3.5 - 6.0 cm    LV Systolic Volume 24.61 mL    LV Systolic Volume Index 13.9 mL/m2    LVIDs 2.60 2.1 - 4.0 cm    LV Diastolic Volume 83.07 mL    LV Diastolic Volume Index 46.93 mL/m2    IVS 1.20 (A) 0.6 - 1.1 cm    FS 40 28 - 44 %    Left Ventricle Relative Wall Thickness 0.65 cm    Posterior Wall 1.40 (A) 0.6 - 1.1 cm    LV mass 207.77 g    LV Mass Index 117 g/m2    MV Peak E Kofi 0.98 m/s    TDI LATERAL 0.14 m/s    TDI SEPTAL 0.06 m/s    E/E' ratio 9.80 m/s    MV Peak A Kofi 0.31 m/s     TR Max Kofi 2.52 m/s    E/A ratio 3.16     E wave deceleration time 209.00 msec    LV SEPTAL E/E' RATIO 16.33 m/s    LV LATERAL E/E' RATIO 7.00 m/s    LVOT peak kofi 0.84 m/s    Left Ventricular Outflow Tract Mean Velocity 0.55 cm/s    Left Ventricular Outflow Tract Mean Gradient 1.00 mmHg    TAPSE 1.46 cm    LA size 4.10 cm    AV mean gradient 7 mmHg    AV peak gradient 12 mmHg    Ao peak kofi 1.75 m/s    Ao VTI 32.20 cm    LVOT peak VTI 16.20 cm    AV Velocity Ratio 0.48     AV index (prosthetic) 0.50     Triscuspid Valve Regurgitation Peak Gradient 25 mmHg    PV PEAK VELOCITY 1.07 m/s    PV peak gradient 5 mmHg    Mean e' 0.10 m/s    ZLVIDS -1.20     ZLVIDD -1.29    POCT glucose    Collection Time: 02/26/24  7:56 PM   Result Value Ref Range    POCT Glucose 117 (H) 70 - 110 mg/dL   Comprehensive metabolic panel    Collection Time: 02/26/24  8:33 PM   Result Value Ref Range    Sodium Level 132 (L) 136 - 145 mmol/L    Potassium Level 3.0 (L) 3.5 - 5.1 mmol/L    Chloride 105 98 - 107 mmol/L    Carbon Dioxide 20 (L) 23 - 31 mmol/L    Glucose Level 101 82 - 115 mg/dL    Blood Urea Nitrogen 35.8 (H) 9.8 - 20.1 mg/dL    Creatinine 2.55 (H) 0.55 - 1.02 mg/dL    Calcium Level Total 8.6 8.4 - 10.2 mg/dL    Protein Total 6.3 5.8 - 7.6 gm/dL    Albumin Level 2.2 (L) 3.4 - 4.8 g/dL    Globulin 4.1 (H) 2.4 - 3.5 gm/dL    Albumin/Globulin Ratio 0.5 (L) 1.1 - 2.0 ratio    Bilirubin Total 0.5 <=1.5 mg/dL    Alkaline Phosphatase 92 40 - 150 unit/L    Alanine Aminotransferase 12 0 - 55 unit/L    Aspartate Aminotransferase 28 5 - 34 unit/L    eGFR 18 mls/min/1.73/m2   Magnesium    Collection Time: 02/26/24  8:33 PM   Result Value Ref Range    Magnesium Level 1.40 (L) 1.60 - 2.60 mg/dL   Phosphorus    Collection Time: 02/26/24  8:33 PM   Result Value Ref Range    Phosphorus Level 1.1 (L) 2.3 - 4.7 mg/dL   Lactic acid, plasma    Collection Time: 02/26/24  8:33 PM   Result Value Ref Range    Lactic Acid Level 3.3 (H) 0.5 - 2.2 mmol/L    Troponin I    Collection Time: 02/26/24  8:33 PM   Result Value Ref Range    Troponin-I 3.541 (H) 0.000 - 0.045 ng/mL   CBC with Differential    Collection Time: 02/26/24  8:34 PM   Result Value Ref Range    WBC 20.76 (H) 4.50 - 11.50 x10(3)/mcL    RBC 3.30 (L) 4.20 - 5.40 x10(6)/mcL    Hgb 9.9 (L) 12.0 - 16.0 g/dL    Hct 30.8 (L) 37.0 - 47.0 %    MCV 93.3 80.0 - 94.0 fL    MCH 30.0 27.0 - 31.0 pg    MCHC 32.1 (L) 33.0 - 36.0 g/dL    RDW 17.0 11.5 - 17.0 %    Platelet 142 130 - 400 x10(3)/mcL    MPV 10.9 (H) 7.4 - 10.4 fL    Neut % 88.8 %    Lymph % 3.5 %    Mono % 3.2 %    Eos % 0.0 %    Basophil % 0.4 %    Lymph # 0.72 0.6 - 4.6 x10(3)/mcL    Neut # 18.44 (H) 2.1 - 9.2 x10(3)/mcL    Mono # 0.66 0.1 - 1.3 x10(3)/mcL    Eos # 0.01 0 - 0.9 x10(3)/mcL    Baso # 0.08 <=0.2 x10(3)/mcL    IG# 0.85 (H) 0 - 0.04 x10(3)/mcL    IG% 4.1 %    NRBC% 0.1 %   Lactic Acid, Plasma    Collection Time: 02/26/24 10:46 PM   Result Value Ref Range    Lactic Acid Level 2.9 (H) 0.5 - 2.2 mmol/L   Magnesium    Collection Time: 02/27/24  4:18 AM   Result Value Ref Range    Magnesium Level 2.60 1.60 - 2.60 mg/dL   Comprehensive Metabolic Panel    Collection Time: 02/27/24  4:18 AM   Result Value Ref Range    Sodium Level 130 (L) 136 - 145 mmol/L    Potassium Level 4.4 3.5 - 5.1 mmol/L    Chloride 106 98 - 107 mmol/L    Carbon Dioxide 15 (L) 23 - 31 mmol/L    Glucose Level 156 (H) 82 - 115 mg/dL    Blood Urea Nitrogen 35.4 (H) 9.8 - 20.1 mg/dL    Creatinine 2.67 (H) 0.55 - 1.02 mg/dL    Calcium Level Total 8.5 8.4 - 10.2 mg/dL    Protein Total 6.3 5.8 - 7.6 gm/dL    Albumin Level 2.4 (L) 3.4 - 4.8 g/dL    Globulin 3.9 (H) 2.4 - 3.5 gm/dL    Albumin/Globulin Ratio 0.6 (L) 1.1 - 2.0 ratio    Bilirubin Total 0.9 <=1.5 mg/dL    Alkaline Phosphatase 89 40 - 150 unit/L    Alanine Aminotransferase 18 0 - 55 unit/L    Aspartate Aminotransferase 43 (H) 5 - 34 unit/L    eGFR 17 mls/min/1.73/m2   CBC with Differential    Collection Time: 02/27/24   4:18 AM   Result Value Ref Range    WBC 38.48 (H) 4.50 - 11.50 x10(3)/mcL    RBC 3.62 (L) 4.20 - 5.40 x10(6)/mcL    Hgb 10.9 (L) 12.0 - 16.0 g/dL    Hct 33.2 (L) 37.0 - 47.0 %    MCV 91.7 80.0 - 94.0 fL    MCH 30.1 27.0 - 31.0 pg    MCHC 32.8 (L) 33.0 - 36.0 g/dL    RDW 17.2 (H) 11.5 - 17.0 %    Platelet 163 130 - 400 x10(3)/mcL    MPV 11.3 (H) 7.4 - 10.4 fL    NRBC% 0.1 %   Troponin I    Collection Time: 02/27/24  4:18 AM   Result Value Ref Range    Troponin-I 4.643 (H) 0.000 - 0.045 ng/mL   Manual Differential    Collection Time: 02/27/24  4:18 AM   Result Value Ref Range    WBC 38.5 x10(3)/mcL    Neutrophils % 93 %    Monocytes % 4 %    Metamyelocytes % 3 (H) <=0 %    Neutrophils Abs 35.805 (H) 2.1 - 9.2 x10(3)/mcL    Monocytes Abs 1.54 (H) 0.1 - 1.3 x10(3)/mcL    Platelets Normal Normal, Adequate    RBC Morph Abnormal (A) Normal    Poikilocytosis 2+ (A) (none)    Anisocytosis 1+ (A) (none)    Macrocytosis 1+ (A) (none)    Forest Cells 2+ (A) (none)    Ovalocytes 1+ (A) (none)    Target Cells 1+ (A) (none)    Vacuolated Grans 2+ (A) (none)     Telemetry:  Paced at 60 BPM    Physical Exam  Vitals and nursing note reviewed.   Constitutional:       General: She is not in acute distress.     Appearance: Normal appearance.   HENT:      Head: Normocephalic.      Mouth/Throat:      Mouth: Mucous membranes are moist.      Pharynx: Oropharynx is clear.   Cardiovascular:      Rate and Rhythm: Normal rate and regular rhythm.   Pulmonary:      Effort: Pulmonary effort is normal. No respiratory distress.   Abdominal:      Palpations: Abdomen is soft.      Tenderness: There is no abdominal tenderness.   Musculoskeletal:      Cervical back: Neck supple.      Right lower leg: No edema.      Left lower leg: No edema.   Skin:     General: Skin is warm and dry.   Neurological:      Mental Status: She is alert.       Current Inpatient Medications:    Current Facility-Administered Medications:     acetaminophen suppository 650 mg, 650  mg, Rectal, Q6H PRN, Ally Erazo MD, 650 mg at 02/26/24 1613    acetaminophen tablet 1,000 mg, 1,000 mg, Oral, Q6H PRN, Soila Kruse FNP, 1,000 mg at 02/26/24 0512    aluminum-magnesium hydroxide-simethicone 200-200-20 mg/5 mL suspension 30 mL, 30 mL, Oral, QID PRN, Soila Kruse FNP    atorvastatin tablet 10 mg, 10 mg, Oral, Nightly, Soila Kruse, FNP, 10 mg at 02/26/24 0204    bisacodyL suppository 10 mg, 10 mg, Rectal, Daily PRN, Soila Kruse FNP    brimonidine 0.15 % OPTH DROP ophthalmic solution 1 drop, 1 drop, Both Eyes, BID, 1 drop at 02/27/24 0824 **AND** timolol maleate 0.5% ophthalmic solution 1 drop, 1 drop, Both Eyes, BID, Soila Kruse FNP, 1 drop at 02/27/24 0825    ciprofloxacin (CIPRO)400mg/200ml D5W IVPB 400 mg, 400 mg, Intravenous, Q24H, Heladio Suazo MD, Last Rate: 200 mL/hr at 02/27/24 1013, 400 mg at 02/27/24 1013    enoxaparin injection 70 mg, 1 mg/kg, Subcutaneous, Q24H (treatment, non-standard time), Angelia Babin, FNP, 70 mg at 02/27/24 0132    hydrOXYzine pamoate capsule 25 mg, 25 mg, Oral, Q8H PRN, Dimitri Cummings MD    lactated ringers infusion, , Intravenous, Continuous, Satish Tobias DO, Last Rate: 75 mL/hr at 02/27/24 0513, Rate Verify at 02/27/24 0513    latanoprost 0.005 % ophthalmic solution 1 drop, 1 drop, Both Eyes, QHS, Soila Kruse FNP, 1 drop at 02/26/24 0205    levothyroxine tablet 25 mcg, 25 mcg, Oral, Before breakfast, Soila Kruse FNP, 25 mcg at 02/26/24 0600    melatonin tablet 6 mg, 6 mg, Oral, Nightly PRN, Soila Kruse FNP    metoprolol injection 5 mg, 5 mg, Intravenous, Q5 Min PRN, Angelia Babin FNP    mupirocin 2 % ointment, , Nasal, BID, Ally Erazo MD, Given at 02/27/24 0825    naloxone 0.4 mg/mL injection 0.02 mg, 0.02 mg, Intravenous, PRN, Soila Kruse FNP    NORepinephrine 8 mg in dextrose 5% 250 mL infusion, 0-3 mcg/kg/min, Intravenous, Continuous, Satish Tobias DO, Last Rate: 13.1 mL/hr at  02/27/24 0624, 0.1 mcg/kg/min at 02/27/24 0624    ondansetron injection 4 mg, 4 mg, Intravenous, Q4H PRN, Soila Kruse, FNP    piperacillin-tazobactam (ZOSYN) 4.5 g in dextrose 5 % in water (D5W) 100 mL IVPB (MB+), 4.5 g, Intravenous, Q12H, Heladio Suazo MD, Last Rate: 25 mL/hr at 02/27/24 1015, 4.5 g at 02/27/24 1015    prochlorperazine injection Soln 5 mg, 5 mg, Intravenous, Q6H PRN, Soila Kruse L, FNP    risperiDONE tablet 2 mg, 2 mg, Oral, QHS, MolSoila lee L, FNP, 2 mg at 02/26/24 0204    rivastigmine tartrate capsule 1.5 mg, 1.5 mg, Oral, BID, Soila Kruse L, FNP, 1.5 mg at 02/26/24 1017    senna-docusate 8.6-50 mg per tablet 1 tablet, 1 tablet, Oral, BID PRN, Soila Kruse L, FNP    sodium chloride 0.9% bolus 1,000 mL 1,000 mL, 1,000 mL, Intravenous, Once, Josseline Nunez AGACNP-BC    sodium chloride 0.9% flush 10 mL, 10 mL, Intravenous, PRN, MolbertJean Mariey L, FNP    Flushing PICC/Midline Protocol, , , Until Discontinued **AND** sodium chloride 0.9% flush 10 mL, 10 mL, Intravenous, Q6H **AND** sodium chloride 0.9% flush 10 mL, 10 mL, Intravenous, PRN, Satish Tobias DO    vancomycin - pharmacy to dose, , Intravenous, pharmacy to manage frequency, Satish Tobias DO  VTE Risk Mitigation (From admission, onward)           Ordered     enoxaparin injection 70 mg  Every 24 hours         02/26/24 1209     IP VTE HIGH RISK PATIENT  Once         02/25/24 2209     Place sequential compression device  Until discontinued         02/25/24 2209     Reason for No Pharmacological VTE Prophylaxis  Once        Question:  Reasons:  Answer:  Already adequately anticoagulated on oral Anticoagulants    02/25/24 2209                  Assessment:   NSTEMI (Unspecified for now)    - EKG: SR with ST Segment Depression in Inferior & Anterolateral Leads    - Denies Angina/SOB at Current Time    - EF 55-60%  Urosepsis Requiring Vasopressor Support    - Leukocytosis  History of Hypertension  Chronic Diastolic CHF  (Compensated)  PAF - Currently Paced     - [id number] Score 5 (7.2% Stroke risk Per Year)    - on Eliquis (Last Dose 2.26.24 AM)- Now on FD Lovenox  SSS    - Status Post PPM (9.19.19): [employer]   PAD  CVD    - Left Carotid Atherosclerosis  Hyperlipidemia    - On Statin  MICHEL on CKD IV   VHD    - Mild MR    - Mold to Moderate TR  YARI  GERD  Iron Deficiency Anemia   Right Knee Osteoarthritis  Dementia    - CT Head Negative for Acute Abnormality  Glaucoma  Hypothyroidism    - On Oral Replacement Therapy  History of GI Bleed    Plan:   Start Aspirin 81 Mg Daily  Continue FD Lovenox Re: NSTEMI Treatment & PAF/Stroke Risk Reduction (Eliquis on Hold)  Unable to Add Beta Blocker given Hypotension  Trend Troponin Values until peak  Will plan LHC once medically optimized, will need renal optimization prior to proceeding with cath  Septic workup and treatment as per ICU Team  Will follow closely    IFTIKHAR Yee  Cardiology  [facility] - [facility] ICU  02/27/2024     I agree with the findings of the complexity of problems addressed and take responsibility for the plan's risks and complications. I approved the plan documented by Jeff Perez NP.  Elevated Trop EKG changes will need cath when medically stable

## 2024-02-27 NOTE — H&P
[facility] - 4th Floor Medical Telemetry  Pulmonary Critical Care Note    Patient Name: Ev Alberts  MRN: [id number]  Admission Date: 2024  Hospital Length of Stay: 1 days  Code Status: Full Code  Attending Provider: Ally Erazo MD  Primary Care Provider: Santi Walters MD     Subjective:     HPI:   89-year-old female with a past medical history of hypertension, hypothyroidism, CKD with solitary kidney, PAF who is being upgraded to ICU as she started to require vasopressor support despite 2L fluid bolus.  She was initially admitted to the hospital with complaints of generalized weakness.  She was started on Rocephin for a presumed UTI.  Also found to have an NSTEMI (initial trop 0.178 -> 0.132) and a TTE was ordered which revealed normal systolic function, grade 3 diastolic dysfunction.  Per chart review patient was not complaining of chest pain or difficulty breathing and Cardiology have been consulted. CXR imaging has been clear thus far.  Today patients family members state pt is somewhat slurring speech which started around 7pm after she began getting hypotensive. She was febrile around 4pm with Tmax 101.7.      Her most recent lab work is significant for a leukocytosis around 20, hemoglobin stable around 10.  Sodium 132, potassium 3, bicarb 20, BUN 35.8, creatinine 2.55, phos 1.1, magnesium 1.4, troponin 3.541, lactate 3.3      Hospital Course/Significant events:      24 Hour Interval History:      Past Medical History:   Diagnosis Date    Dementia     Hypertension     Sick sinus syndrome     Thyroid disease     Unspecified glaucoma        Past Surgical History:   Procedure Laterality Date    cataract surgery       SECTION      CHOLECYSTECTOMY      HYSTERECTOMY      INSERTION OF PACEMAKER      NEPHRECTOMY         Social History     Socioeconomic History    Marital status:    Tobacco Use    Smoking status: Never    Smokeless tobacco: Never   Substance and Sexual  Activity    Alcohol use: Never    Drug use: Not Currently     Social Determinants of Health     Financial Resource Strain: Low Risk  (2/26/2024)    Overall Financial Resource Strain (CARDIA)     Difficulty of Paying Living Expenses: Not hard at all   Food Insecurity: No Food Insecurity (2/26/2024)    Hunger Vital Sign     Worried About Running Out of Food in the Last Year: Never true     Ran Out of Food in the Last Year: Never true   Transportation Needs: Unknown (2/26/2024)    PRAPARE - Transportation     Lack of Transportation (Medical): No   Physical Activity: Inactive (2/26/2024)    Exercise Vital Sign     Days of Exercise per Week: 0 days     Minutes of Exercise per Session: 0 min   Stress: No Stress Concern Present (2/26/2024)    [employer] [place] [employer] - Occupational Stress Questionnaire     Feeling of Stress : Not at all   Social Connections: Moderately Integrated (2/26/2024)    Social Connection and Isolation Panel [NHANES]     Frequency of Communication with Friends and Family: More than three times a week     Frequency of Social Gatherings with Friends and Family: Once a week     Attends Latter day Services: More than 4 times per year     Active Member of Clubs or Organizations: No     Attends Club or Organization Meetings: Never     Marital Status:    Housing Stability: Unknown (2/26/2024)    Housing Stability Vital Sign     Unable to Pay for Housing in the Last Year: No     Number of Places Lived in the Last Year: 1           Current Outpatient Medications   Medication Instructions    allopurinoL (ZYLOPRIM) 300 mg, Oral, Daily    atorvastatin (LIPITOR) 10 mg, Oral, Nightly    brimonidine-timoloL (COMBIGAN) 0.2-0.5 % Drop 1 drop, Both Eyes, 2 times daily    carvediloL (COREG) 3.125 mg, Oral, 2 times daily    ELIQUIS 2.5 mg, Oral, 2 times daily    furosemide (LASIX) 20 mg, Oral, Every morning    latanoprost 0.005 % ophthalmic solution 1 drop, Both Eyes, Nightly    levothyroxine  (SYNTHROID) 25 mcg, Oral, Before breakfast    linaCLOtide (LINZESS) 72 mcg, Oral, Before breakfast    MYRBETRIQ 50 mg Tb24 1 tablet, Oral, Every morning    omeprazole (PRILOSEC) 20 mg, Oral, Nightly    risperiDONE (RISPERDAL) 2 mg, Oral, Nightly    rivastigmine tartrate (EXELON) 1.5 mg, Oral, 2 times daily       Current Inpatient Medications   atorvastatin  10 mg Oral Nightly    brimonidine 0.15 % OPTH DROP  1 drop Both Eyes BID    And    timolol maleate 0.5%  1 drop Both Eyes BID    carvediloL  3.125 mg Oral BID    cefTRIAXone (Rocephin) IV (PEDS and ADULTS)  1 g Intravenous Q24H    [START ON 2/27/2024] enoxparin  1 mg/kg Subcutaneous Q24H (treatment, non-standard time)    latanoprost  1 drop Both Eyes QHS    levothyroxine  25 mcg Oral Before breakfast    risperiDONE  2 mg Oral QHS    rivastigmine tartrate  1.5 mg Oral BID    sodium chloride 0.9%  1,000 mL Intravenous Once       Current Intravenous Infusions   sodium chloride 0.9% 75 mL/hr at 02/26/24 0053    sodium chloride 0.9% Stopped (02/26/24 1129)    sodium chloride 0.9% 100 mL/hr at 02/26/24 1130         Review of Systems   Unable to perform ROS: Critical illness (pt with pmhx dementia, who is critially ill and generally weak unable to perform ROS)          Objective:     No intake or output data in the 24 hours ending 02/26/24 2211      Vital Signs (Most Recent):  Temp: 97.3 °F (36.3 °C) (02/26/24 1929)  Pulse: 74 (02/26/24 2138)  Resp: 19 (02/26/24 2138)  BP: (!) 78/40 (02/26/24 2145)  SpO2: 100 % (02/26/24 2138)  Body mass index is 25.63 kg/m².  Weight: 69.9 kg (154 lb) Vital Signs (24h Range):  Temp:  [97.3 °F (36.3 °C)-101.7 °F (38.7 °C)] 97.3 °F (36.3 °C)  Pulse:  [] 74  Resp:  [17-25] 19  SpO2:  [94 %-100 %] 100 %  BP: ()/(35-84) 78/40     Physical Exam  Constitutional:       Appearance: Normal appearance. She is ill-appearing.   HENT:      Head: Normocephalic and atraumatic.      Mouth/Throat:      Mouth: Mucous membranes are dry.   Eyes:  "     General: No scleral icterus.     Conjunctiva/sclera: Conjunctivae normal.   Cardiovascular:      Rate and Rhythm: Normal rate and regular rhythm.      Pulses: Normal pulses.      Heart sounds: Murmur heard.   Pulmonary:      Effort: Pulmonary effort is normal. No respiratory distress.      Breath sounds: Normal breath sounds.   Abdominal:      General: There is no distension.      Palpations: Abdomen is soft.      Tenderness: There is abdominal tenderness (generalized, mild). There is no guarding or rebound.   Skin:     General: Skin is warm and dry.   Neurological:      Mental Status: She is alert.      Comments: Exam limited 2/2 pt with generalized weakness. She would move all extremities and follow commands although strength globally 2-3/5. Some slurring of speech per family members. CN2-12 grossly intact.            Lines/Drains/Airways       Drain  Duration             Female External Urinary Catheter w/ Suction 02/25/24 1459 1 day              Peripheral Intravenous Line  Duration                  Peripheral IV - Single Lumen 02/25/24 1608 20 G Left;Posterior Forearm 1 day                    Significant Labs:    Lab Results   Component Value Date    WBC 20.76 (H) 02/26/2024    HGB 9.9 (L) 02/26/2024    HCT 30.8 (L) 02/26/2024    MCV 93.3 02/26/2024     02/26/2024           BMP  Lab Results   Component Value Date     (L) 02/26/2024    K 3.0 (L) 02/26/2024    CHLORIDE 105 02/26/2024    CO2 20 (L) 02/26/2024    BUN 35.8 (H) 02/26/2024    CREATININE 2.55 (H) 02/26/2024    CALCIUM 8.6 02/26/2024    AGAP 6.0 02/19/2024    EGFRNONAA 41 09/06/2019         ABG  No results for input(s): "PH", "PO2", "PCO2", "HCO3", "POCBASEDEF" in the last 168 hours.    Mechanical Ventilation Support:         Significant Imaging:  I have reviewed the pertinent imaging within the past 24 hours.        Assessment/Plan:     Assessment  Shock likely secondary to urosepsis  New requirement for vasopressor support to " maintain mean arterial pressure greater than 65 despite 2 L IVF.  NSTEMI  Paroxysmal atrial fibrillation  Currently paced.  Bernard 2 Vasc score 5.  On Eliquis last dose 2/26  Sick sinus syndrome-s/p permanent pacemaker 09/19/2019, [employer]  Dementia, hypothyroidism, chronic anemia, YARI  MICHEL on CKD   History of hypertension, PAD, VHD  S4 vertebral fracture, chronic      Plan  Admit to ICU for further care and monitoring of vasopressor support.  Cardiology was consulted.  Renally dose Lovenox for stroke prevention and Eliquis has been stopped.  will need ischemic eval once cleared from an infectious standpoint.  Trend troponins, EKGs.  Telemetry.    Replete electrolytes.  2 L fluids given during RRT, we will continue maintenance rate 75 cc.  Levophed to maintain mean arterial pressure greater than 65, wean as tolerated.   Consider renal consult if worsening renal indices.   DC rocephin to vanc/zosyn.   Blood culture x2.  CT head    DVT Prophylaxis:  Renally dose Lovenox  GI Prophylaxis: na     35 minutes of critical care was time spent personally by me on the following activities: development of treatment plan with patient or surrogate and bedside caregivers, discussions with consultants, evaluation of patient's response to treatment, examination of patient, ordering and performing treatments and interventions, ordering and review of laboratory studies, ordering and review of radiographic studies, pulse oximetry, re-evaluation of patient's condition.  This critical care time did not overlap with that of any other provider or involve time for any procedures.     Satish Tobias DO  Pulmonary Critical Care Medicine  [facility] - 4th Floor Medical Telemetry  DOS: 02/26/2024

## 2024-02-27 NOTE — PROCEDURES
Arterial Catheter Insertion Procedure Note     Procedure: Insertion of Arterial Catheter     Indications: Hemodynamic Monitoring     Procedure Details     Maximum sterile technique was used including antiseptics, cap, sterile gloves, sterile gown, hand hygiene, mask and sterile maximum barrier drape.     Under sterile conditions the skin above the R radial  artery was prepped with Chloroprep and covered with a sterile drape. Local anesthesia was applied to the skin and subcutaneous tissues. Ultrasound guidance was used to identify the artery. A 20 -gauge catheter over a needle was then inserted into the artery. A guide wire was then passed easily through the needle. The needle was then withdrawn. A arterial catheter was then inserted into the vessel over the guide wire. The needle was then withdrawn and was connected to the monitor to ensure a proper waveform. The catheter was sutured into place and a sterile dressing was applied.     Ultrasound/Sonosite was used during the procedure.      Estimated blood loss: 4 ml    Patient tolerated procedure well.    Satish Tobias DO  2/27/2024

## 2024-02-27 NOTE — PT/OT/SLP PROGRESS
Physical Therapy      Patient Name:  Ev Alberts   MRN:  [id number]    Patient initially admitted under hospital medicine services and original order for PT placed by Dr. Cummings. Pt with decline in medical status requiring upgrade to ICU. No note in chart today from ICU attending nor able to locate on the floor today. PT to defer session and will f/u tomorrow as appropriate.

## 2024-02-27 NOTE — PROCEDURES
"Ev Alberts is a 89 y.o. female patient.    Temp: 97.9 °F (36.6 °C) (02/26/24 2300)  Pulse: 79 (02/26/24 2251)  Resp: 19 (02/26/24 2251)  BP: (!) 69/45 (02/26/24 2251)  SpO2: 100 % (02/26/24 2251)  Weight: 69.9 kg (154 lb) (02/25/24 1307)  Height: 5' 5" (165.1 cm) (02/25/24 1307)    PICC  Date/Time: 2/27/2024 12:33 AM  Performed by: Mahendra Dorantes RN  Consent Done: Yes  Time out: Immediately prior to procedure a time out was called to verify the correct patient, procedure, equipment, support staff and site/side marked as required  Indications: med administration and vascular access  Anesthesia: local infiltration  Local anesthetic: lidocaine 1% without epinephrine  Anesthetic Total (mL): 5  Preparation: skin prepped with ChloraPrep  Skin prep agent dried: skin prep agent completely dried prior to procedure  Sterile barriers: all five maximum sterile barriers used - cap, mask, sterile gown, sterile gloves, and large sterile sheet  Hand hygiene: hand hygiene performed prior to central venous catheter insertion  Location details: right basilic  Catheter type: triple lumen  Catheter size: 5 Fr  Catheter Length: 33cm    Ultrasound guidance: yes  Vessel Caliber: small and patent, compressibility normal  Needle advanced into vessel with real time Ultrasound guidance.  Guidewire confirmed in vessel.  Sterile sheath used.  Number of attempts: 1  Post-procedure: blood return through all ports, chlorhexidine patch and sterile dressing applied    Assessment: placement verified by x-ray  Comments: Xray viewed and use approved by Dr Tobias.           Name Mahendra Dorantes RN  2/27/2024    "

## 2024-02-27 NOTE — PROGRESS NOTES
Pt currently admitted to [address] INTENSIVE CARE UNIT Bed: IE26 A. No further call attempts at this time.

## 2024-02-27 NOTE — PLAN OF CARE
Problem: Adult Inpatient Plan of Care  Goal: Readiness for Transition of Care  Outcome: Ongoing, Progressing     Problem: Fall Injury Risk  Goal: Absence of Fall and Fall-Related Injury  Outcome: Ongoing, Progressing     Problem: Infection  Goal: Absence of Infection Signs and Symptoms  Outcome: Ongoing, Progressing

## 2024-02-27 NOTE — NURSING
Patient lethargic, diaphoretic, c/o facial numbness/ fatigue. Family at bedside reports to nurse that patient has been like this after she spiked a fever around 2 pm and this is a deviation from baseline. Manual BP 62/ 45 .  1 L NS bolus started.  RRT Called. Np paged @ 1950. Rapid response team arrived. . BC, Trop, Lactic CMP, CBC. Np updated on patient condition and vital signs throughout 1st bolus infusion. At end of 1st bolus SBP 74 - patient more alert, still very lethargic. NP to ordered another liter of fluids. RRT remained at beside throughout time. ICU resident paged by RRT ICU RN - Patient transferred after 2nd L bolus to ICU. Bedside report given to ICU Nurse. Family updated

## 2024-02-27 NOTE — PROGRESS NOTES
Pharmacokinetic Initial Assessment: IV Vancomycin    Assessment/Plan:    Initiate intravenous vancomycin with loading dose of 1500 mg once with subsequent doses when random concentrations are less than 20 mcg/mL  Desired empiric serum trough concentration is 15 to 20 mcg/mL  Draw vancomycin random level on 02/28 at 0430.  Pharmacy will continue to follow and monitor vancomycin.      Please contact pharmacy at extension [phone] with any questions regarding this assessment.     Thank you for the consult,   Fazal Crawfordlly       Patient brief summary:  Ev Alberts is a 89 y.o. female initiated on antimicrobial therapy with IV Vancomycin for treatment of suspected sepsis    Drug Allergies:   Review of patient's allergies indicates:   Allergen Reactions    Amlodipine-benazepril Edema     Other reaction(s): unknown    Corticosteroids (glucocorticoids) Edema and Swelling    Rofecoxib Anaphylaxis and Swelling    Sulfa (sulfonamide antibiotics) Edema    Atorvastatin      Other reaction(s): unknown    Ibuprofen      Other reaction(s): Allergy to sulfa drugs       Actual Body Weight:   69.9kg    Renal Function:   Estimated Creatinine Clearance: 15.3 mL/min (A) (based on SCr of 2.55 mg/dL (H)).,     Dialysis Method (if applicable):  N/A    CBC (last 72 hours):  Recent Labs   Lab Result Units 02/25/24  1346 02/26/24  0138 02/26/24 2034   WBC x10(3)/mcL 16.80* 21.55* 20.76*   Hgb g/dL 10.8* 10.0* 9.9*   Hemoglobin A1c % 5.3  --   --    Hct % 35.0* 30.5* 30.8*   Platelet x10(3)/mcL 218 161 142   Mono % % 4.4 5.5 3.2   Eos % % 0.0 0.6 0.0   Basophil % % 0.2 0.4 0.4       Metabolic Panel (last 72 hours):  Recent Labs   Lab Result Units 02/25/24  1346 02/25/24  1934 02/26/24  0138 02/26/24 2033   Sodium Level mmol/L 133*  --  133* 132*   Potassium Level mmol/L 3.4*  --  3.4* 3.0*   Chloride mmol/L 102  --  102 105   Carbon Dioxide mmol/L 21*  --  23 20*   Glucose Level mg/dL 122*  --  88 101   Glucose, UA   --  Normal  --   --   "  Blood Urea Nitrogen mg/dL 32.6*  --  31.1* 35.8*   Creatinine mg/dL 1.91*  --  1.74* 2.55*   Albumin Level g/dL 3.3*  --  2.6* 2.2*   Bilirubin Total mg/dL 0.4  --  0.4 0.5   Alkaline Phosphatase unit/L 92  --  73 92   Aspartate Aminotransferase unit/L 28  --  22 28   Alanine Aminotransferase unit/L 14  --  10 12   Magnesium Level mg/dL  --   --   --  1.40*   Phosphorus Level mg/dL  --   --   --  1.1*       Drug levels (last 3 results):  No results for input(s): "VANCOMYCINRA", "VANCORANDOM", "VANCOMYCINPE", "VANCOPEAK", "VANCOMYCINTR", "VANCOTROUGH" in the last 72 hours.    Microbiologic Results:  Microbiology Results (last 7 days)       Procedure Component Value Units Date/Time    Blood Culture [[id number]]     Order Status: Sent Specimen: Blood     Blood Culture [[id number]]     Order Status: Sent Specimen: Blood     Blood Culture [[id number]]     Order Status: Canceled Specimen: Blood     Blood Culture [[id number]]     Order Status: Canceled Specimen: Blood     Urine culture [[id number]]  (Abnormal) Collected: 02/25/24 1934    Order Status: Completed Specimen: Urine Updated: 02/26/24 0628     Urine Culture >/= 100,000 colonies/ml Gram-negative Rods            "

## 2024-02-28 LAB
ABS NEUT (OLG): 20.23 X10(3)/MCL (ref 2.1–9.2)
ALBUMIN SERPL-MCNC: 2 G/DL (ref 3.4–4.8)
ALBUMIN/GLOB SERPL: 0.5 RATIO (ref 1.1–2)
ALP SERPL-CCNC: 68 UNIT/L (ref 40–150)
ALT SERPL-CCNC: 19 UNIT/L (ref 0–55)
ANISOCYTOSIS BLD QL SMEAR: ABNORMAL
APPEARANCE UR: ABNORMAL
AST SERPL-CCNC: 34 UNIT/L (ref 5–34)
BACTERIA #/AREA URNS AUTO: ABNORMAL /HPF
BILIRUB SERPL-MCNC: 0.3 MG/DL
BILIRUB UR QL STRIP.AUTO: NEGATIVE
BUN SERPL-MCNC: 35.9 MG/DL (ref 9.8–20.1)
BURR CELLS (OLG): ABNORMAL
CALCIUM SERPL-MCNC: 8.1 MG/DL (ref 8.4–10.2)
CHLORIDE SERPL-SCNC: 106 MMOL/L (ref 98–107)
CO2 SERPL-SCNC: 19 MMOL/L (ref 23–31)
COLOR UR AUTO: COLORLESS
CREAT SERPL-MCNC: 2.22 MG/DL (ref 0.55–1.02)
CREAT UR-MCNC: 39.3 MG/DL (ref 45–106)
ERYTHROCYTE [DISTWIDTH] IN BLOOD BY AUTOMATED COUNT: 17 % (ref 11.5–17)
GFR SERPLBLD CREATININE-BSD FMLA CKD-EPI: 21 MLS/MIN/1.73/M2
GLOBULIN SER-MCNC: 3.9 GM/DL (ref 2.4–3.5)
GLUCOSE SERPL-MCNC: 108 MG/DL (ref 82–115)
GLUCOSE UR QL STRIP.AUTO: NORMAL
HCT VFR BLD AUTO: 27.7 % (ref 37–47)
HGB BLD-MCNC: 9 G/DL (ref 12–16)
INSTRUMENT WBC (OLG): 21.07 X10(3)/MCL
KETONES UR QL STRIP.AUTO: NEGATIVE
LEUKOCYTE ESTERASE UR QL STRIP.AUTO: 500
LYMPHOCYTES NFR BLD MANUAL: 0.42 X10(3)/MCL
LYMPHOCYTES NFR BLD MANUAL: 2 %
MACROCYTES BLD QL SMEAR: ABNORMAL
MAGNESIUM SERPL-MCNC: 2.4 MG/DL (ref 1.6–2.6)
MCH RBC QN AUTO: 29.5 PG (ref 27–31)
MCHC RBC AUTO-ENTMCNC: 32.5 G/DL (ref 33–36)
MCV RBC AUTO: 90.8 FL (ref 80–94)
METAMYELOCYTES NFR BLD MANUAL: 1 %
MONOCYTES NFR BLD MANUAL: 0.21 X10(3)/MCL (ref 0.1–1.3)
MONOCYTES NFR BLD MANUAL: 1 %
MUCOUS THREADS URNS QL MICRO: ABNORMAL /LPF
MYELOCYTES NFR BLD MANUAL: 1 %
NEUTROPHILS NFR BLD MANUAL: 96 %
NITRITE UR QL STRIP.AUTO: NEGATIVE
NRBC BLD AUTO-RTO: 0 %
OSMOLALITY UR: 199 MOSM/KG (ref 300–1300)
PH UR STRIP.AUTO: 5.5 [PH]
PLATELET # BLD AUTO: 117 X10(3)/MCL (ref 130–400)
PLATELET # BLD EST: ABNORMAL 10*3/UL
PMV BLD AUTO: 10.3 FL (ref 7.4–10.4)
POCT GLUCOSE: 97 MG/DL (ref 70–110)
POIKILOCYTOSIS BLD QL SMEAR: ABNORMAL
POTASSIUM SERPL-SCNC: 3.8 MMOL/L (ref 3.5–5.1)
PROT SERPL-MCNC: 5.9 GM/DL (ref 5.8–7.6)
PROT UR QL STRIP.AUTO: ABNORMAL
RBC # BLD AUTO: 3.05 X10(6)/MCL (ref 4.2–5.4)
RBC #/AREA URNS AUTO: >100 /HPF
RBC MORPH BLD: ABNORMAL
RBC UR QL AUTO: ABNORMAL
SODIUM SERPL-SCNC: 130 MMOL/L (ref 136–145)
SODIUM UR-SCNC: <20 MMOL/L
SP GR UR STRIP.AUTO: 1.01 (ref 1–1.03)
SQUAMOUS #/AREA URNS LPF: ABNORMAL /HPF
TARGETS BLD QL SMEAR: ABNORMAL
UROBILINOGEN UR STRIP-ACNC: NORMAL
VANCOMYCIN SERPL-MCNC: 14.5 UG/ML (ref 15–20)
WBC # SPEC AUTO: 21.07 X10(3)/MCL (ref 4.5–11.5)
WBC #/AREA URNS AUTO: >100 /HPF
WBC CLUMPS UR QL AUTO: ABNORMAL

## 2024-02-28 PROCEDURE — 84300 ASSAY OF URINE SODIUM: CPT | Performed by: INTERNAL MEDICINE

## 2024-02-28 PROCEDURE — 81001 URINALYSIS AUTO W/SCOPE: CPT | Performed by: STUDENT IN AN ORGANIZED HEALTH CARE EDUCATION/TRAINING PROGRAM

## 2024-02-28 PROCEDURE — 25000003 PHARM REV CODE 250: Performed by: NURSE PRACTITIONER

## 2024-02-28 PROCEDURE — 11000001 HC ACUTE MED/SURG PRIVATE ROOM

## 2024-02-28 PROCEDURE — 25000003 PHARM REV CODE 250

## 2024-02-28 PROCEDURE — 80053 COMPREHEN METABOLIC PANEL: CPT

## 2024-02-28 PROCEDURE — 83935 ASSAY OF URINE OSMOLALITY: CPT | Performed by: INTERNAL MEDICINE

## 2024-02-28 PROCEDURE — 63600175 PHARM REV CODE 636 W HCPCS

## 2024-02-28 PROCEDURE — 97162 PT EVAL MOD COMPLEX 30 MIN: CPT

## 2024-02-28 PROCEDURE — 63600175 PHARM REV CODE 636 W HCPCS: Performed by: INTERNAL MEDICINE

## 2024-02-28 PROCEDURE — A4216 STERILE WATER/SALINE, 10 ML: HCPCS

## 2024-02-28 PROCEDURE — 97166 OT EVAL MOD COMPLEX 45 MIN: CPT

## 2024-02-28 PROCEDURE — 80202 ASSAY OF VANCOMYCIN: CPT

## 2024-02-28 PROCEDURE — 82570 ASSAY OF URINE CREATININE: CPT | Performed by: INTERNAL MEDICINE

## 2024-02-28 PROCEDURE — 83735 ASSAY OF MAGNESIUM: CPT

## 2024-02-28 PROCEDURE — 25000003 PHARM REV CODE 250: Performed by: INTERNAL MEDICINE

## 2024-02-28 PROCEDURE — 85027 COMPLETE CBC AUTOMATED: CPT

## 2024-02-28 RX ADMIN — BRIMONIDINE TARTRATE 1 DROP: 1.5 SOLUTION OPHTHALMIC at 08:02

## 2024-02-28 RX ADMIN — Medication 81 MG: at 08:02

## 2024-02-28 RX ADMIN — ENOXAPARIN SODIUM 70 MG: 80 INJECTION SUBCUTANEOUS at 12:02

## 2024-02-28 RX ADMIN — RISPERIDONE 2 MG: 1 TABLET ORAL at 08:02

## 2024-02-28 RX ADMIN — SODIUM CHLORIDE, PRESERVATIVE FREE 10 ML: 5 INJECTION INTRAVENOUS at 12:02

## 2024-02-28 RX ADMIN — PIPERACILLIN AND TAZOBACTAM 4.5 G: 4; .5 INJECTION, POWDER, LYOPHILIZED, FOR SOLUTION INTRAVENOUS; PARENTERAL at 11:02

## 2024-02-28 RX ADMIN — TIMOLOL MALEATE 1 DROP: 5 SOLUTION OPHTHALMIC at 08:02

## 2024-02-28 RX ADMIN — RIVASTIGMINE TARTRATE 1.5 MG: 1.5 CAPSULE ORAL at 08:02

## 2024-02-28 RX ADMIN — MUPIROCIN: 20 OINTMENT TOPICAL at 08:02

## 2024-02-28 RX ADMIN — ATORVASTATIN CALCIUM 10 MG: 10 TABLET, FILM COATED ORAL at 08:02

## 2024-02-28 RX ADMIN — CIPROFLOXACIN 400 MG: 2 INJECTION, SOLUTION INTRAVENOUS at 10:02

## 2024-02-28 RX ADMIN — VANCOMYCIN HYDROCHLORIDE 500 MG: 500 INJECTION, POWDER, LYOPHILIZED, FOR SOLUTION INTRAVENOUS at 08:02

## 2024-02-28 RX ADMIN — ENOXAPARIN SODIUM 70 MG: 80 INJECTION SUBCUTANEOUS at 11:02

## 2024-02-28 RX ADMIN — LEVOTHYROXINE SODIUM 25 MCG: 25 TABLET ORAL at 06:02

## 2024-02-28 RX ADMIN — PIPERACILLIN AND TAZOBACTAM 4.5 G: 4; .5 INJECTION, POWDER, LYOPHILIZED, FOR SOLUTION INTRAVENOUS; PARENTERAL at 10:02

## 2024-02-28 RX ADMIN — SODIUM CHLORIDE, PRESERVATIVE FREE 10 ML: 5 INJECTION INTRAVENOUS at 06:02

## 2024-02-28 RX ADMIN — PIPERACILLIN AND TAZOBACTAM 4.5 G: 4; .5 INJECTION, POWDER, LYOPHILIZED, FOR SOLUTION INTRAVENOUS; PARENTERAL at 12:02

## 2024-02-28 RX ADMIN — LATANOPROST 1 DROP: 50 SOLUTION OPHTHALMIC at 08:02

## 2024-02-28 NOTE — NURSING
Nurses Note -- 4 Eyes      2/28/2024   7:43 AM      Skin assessed during: Daily Assessment      [x] No Altered Skin Integrity Present    [x]Prevention Measures Documented      [] Yes- Altered Skin Integrity Present or Discovered   [] LDA Added if Not in Epic (Describe Wound)   [] New Altered Skin Integrity was Present on Admit and Documented in LDA   [] Wound Image Taken    Wound Care Consulted? No    Attending Nurse:  MIO Nicholson    Second RN/Staff Member:  MIO Zamarripa

## 2024-02-28 NOTE — PT/OT/SLP EVAL
Physical Therapy Evaluation    Patient Name:  Ev Alberts   MRN:  [id number]    Recommendations:     Discharge therapy intensity: Low Intensity Therapy (per pt and family request- has 24/5 caregiver/family help; daughter reports trying to get weekend coverage as well)   Discharge Equipment Recommendations: none   Barriers to discharge:  medical dx, impaired mobility, decreased independence     Assessment:     Ev Alberts is a 89 y.o. female admitted with a medical diagnosis of UTI, sepsis, S4 vertebral body fracture (chronic), physical deconditioning. Pt with medical decline after being initially admitted to hospital medicine requiring upgrade to ICU d/y hypotension requiring pressors.  She presents with the following impairments/functional limitations: weakness, impaired endurance, impaired balance, decreased lower extremity function, decreased upper extremity function, gait instability, impaired self care skills, impaired functional mobility, pain. Patient with fair tolerance to PT eval. Pt with limited mobility most recently at baseline- main form of mobility is wheelchair with caregiver/family assistance for transfers. Pt and family wishing to return home with such assistance; plus looking to get weekend coverage.    Rehab Prognosis: Fair; patient would benefit from acute skilled PT services to address these deficits and reach maximum level of function.    Recent Surgery: * No surgery found *      Plan:     During this hospitalization, patient to be seen 3 x/week to address the identified rehab impairments via therapeutic activities, gait training, therapeutic exercises, wheelchair management/training and progress toward the following goals:    Plan of Care Expires:  03/28/24    Subjective     Chief Complaint: n/a  Patient/Family Comments/goals: to get stronger  Pain/Comfort:  Pain Rating 1:  (rating not stated, c/o pain to B knees and lower back)    Patients cultural, spiritual, Yazidism conflicts given  the current situation: no    Living Environment:  Pt lives with spouse, son, and grandson in a [place]; daughter reports wanting to build ramp  Weekday caregivers from 8am-2pm  Prior to admission, patients level of function was assist dependent for mobility and ADLs.    Equipment used/owned at home: walker, rolling, rollator, wheelchair, bedside commode, shower chair.    Upon discharge, patient will have assistance from family and caregivers.    Objective:     Communicated with NSG prior to session.  Patient found HOB elevated with blood pressure cuff, pulse ox (continuous), telemetry, arterial line, cook catheter, peripheral IV, oxygen  upon PT entry to room.    General Precautions: Standard, fall  Orthopedic Precautions:N/A   Braces: N/A  Respiratory Status: Nasal cannula, flow 4 L/min  Blood Pressure: 105/57 semi supine prior to ax, 117/63 sitting EOB with complaints of dizziness       Exams:  Cognitive Exam:  Patient is oriented to Person, Place, and Time  BLE ROM: limited active, full range 2/2 pain  BLE Strength: unable to formally assess 2/2 pain and limited ROM  Skin integrity: Visible skin intact      Functional Mobility:  Bed Mobility:     Supine to Sit: maximal assistance and of 2 persons  Sit to Supine: maximal assistance and of 2 persons  Transfers:     Sit to Stand:  moderate assistance and of 2 persons with rolling walker  Attempted lateral steps along EOB; B decreased step length and foot clearance       Treatment & Education:  Patient and daughter/s were provided with verbal education regarding PT role/goals/POC, fall prevention, safety awareness, discharge/DME recommendations, and pressure ulcer prevention.  Understanding was verbalized.     Patient left HOB elevated with all lines intact, call button in reach, RN notified, family present, and wedge placed on pt's R side, B pressure relief boots donned .    GOALS:   Multidisciplinary Problems       Physical Therapy Goals          Problem: Physical  Therapy    Goal Priority Disciplines Outcome Goal Variances Interventions   Physical Therapy Goal     PT, PT/OT Ongoing, Progressing     Description: Goals to be met by: 3/28/24     Patient will increase functional independence with mobility by performin. Supine to sit with MInimal Assistance  2. Sit to supine with MInimal Assistance  2. Sit to stand transfer with Minimal Assistance  4. Bed to chair transfer with Minimal Assistance using Rolling Walker  5. Gait  x 50 feet with Minimal Assistance using Rolling Walker.   6. Wheelchair propulsion x150 feet with Minimal Assistance using bilateral uppper extremities                         History:     Past Medical History:   Diagnosis Date    Dementia     Hypertension     Sick sinus syndrome     Thyroid disease     Unspecified glaucoma        Past Surgical History:   Procedure Laterality Date    cataract surgery       SECTION      CHOLECYSTECTOMY      HYSTERECTOMY      INSERTION OF PACEMAKER      NEPHRECTOMY         Time Tracking:     PT Received On: 24  PT Start Time: 957     PT Stop Time: 2  PT Total Time (min): 35 min     Billable Minutes: Evaluation mod      2024

## 2024-02-28 NOTE — CONSULTS
[facility] - [facility]  Nephrology  Consult Note    Patient Name: Ev Alberts  MRN: [id number]  Admission Date: 2/25/2024  Hospital Length of Stay: 3 days  Attending Provider: Vanessa Mays MD   Primary Care Physician: Santi Walters MD  Principal Problem:Debility    Inpatient consult to Nephrology  Consult performed by: Lalitah Harris AGNP  Consult ordered by: Vanessa Mays MD        Subjective:     HPI:  This is an 89-year-old female with hypertension, dementia, sick sinus syndrome, thyroid disease, nephrectomy 50 years prior admitted through the ED on 02/25 with complaints of not being able to walk since prior discharge on 02/21.  Admission workup revealed mild hypokalemia, renal insufficiency with creatinine 1.9, hypercalcemia and elevated troponin which later peaked at 3.5.  Low-grade fever present.  Urine culture from 02/25 and blood cultures from 02/27 all positive for E coli.   She was transferred to ICU on 02/26 for vaso pressor requirements after fluid resuscitation with 2 L normal saline.  Cardiology planning for left heart catheterization once medically optimized.  As of this morning Levophed has been discontinued.     Creatinine on admission 1.9 later peaked at 2.6 yesterday now improved to 2.2.  Urine output 875 cc over the past 24 hours.  During last admission she was discharged with creatinine 1.7 down from max of 2.8.  Of note, both this and most recent admission she was noted to have hypercalcemia during ER workup.  Both times hypercalcemia resolved. Left nephrectomy done 50 years ago secondary to pyelonephritis. No recurrent infection or renal stones. No NSAID use. She is currently eating lunch with assistance of . Receiving NS at 100 mL/hr.         Past Medical History:   Diagnosis Date    Dementia     Hypertension     Sick sinus syndrome     Thyroid disease     Unspecified glaucoma        Past Surgical History:   Procedure Laterality Date    cataract surgery        SECTION      CHOLECYSTECTOMY      HYSTERECTOMY      INSERTION OF PACEMAKER      NEPHRECTOMY         Review of patient's allergies indicates:   Allergen Reactions    Amlodipine-benazepril Edema     Other reaction(s): unknown    Corticosteroids (glucocorticoids) Edema and Swelling    Rofecoxib Anaphylaxis and Swelling    Sulfa (sulfonamide antibiotics) Edema    Atorvastatin      Other reaction(s): unknown    Ibuprofen      Other reaction(s): Allergy to sulfa drugs     Current Facility-Administered Medications   Medication Frequency    acetaminophen suppository 650 mg Q6H PRN    acetaminophen tablet 1,000 mg Q6H PRN    aluminum-magnesium hydroxide-simethicone 200-200-20 mg/5 mL suspension 30 mL QID PRN    aspirin EC tablet 81 mg Daily    atorvastatin tablet 10 mg Nightly    bisacodyL suppository 10 mg Daily PRN    brimonidine 0.15 % OPTH DROP ophthalmic solution 1 drop BID    And    timolol maleate 0.5% ophthalmic solution 1 drop BID    ciprofloxacin (CIPRO)400mg/200ml D5W IVPB 400 mg Q24H    enoxaparin injection 70 mg Q24H (treatment, non-standard time)    hydrOXYzine pamoate capsule 25 mg Q8H PRN    lactated ringers infusion Continuous    latanoprost 0.005 % ophthalmic solution 1 drop QHS    levothyroxine tablet 25 mcg Before breakfast    melatonin tablet 6 mg Nightly PRN    metoprolol injection 5 mg Q5 Min PRN    mupirocin 2 % ointment BID    naloxone 0.4 mg/mL injection 0.02 mg PRN    NORepinephrine 8 mg in dextrose 5% 250 mL infusion Continuous    ondansetron injection 4 mg Q4H PRN    piperacillin-tazobactam (ZOSYN) 4.5 g in dextrose 5 % in water (D5W) 100 mL IVPB (MB+) Q12H    prochlorperazine injection Soln 5 mg Q6H PRN    risperiDONE tablet 2 mg QHS    rivastigmine tartrate capsule 1.5 mg BID    senna-docusate 8.6-50 mg per tablet 1 tablet BID PRN    sodium chloride 0.9% bolus 1,000 mL 1,000 mL Once    sodium chloride 0.9% flush 10 mL PRN    sodium chloride 0.9% flush 10 mL Q6H    And    sodium  chloride 0.9% flush 10 mL PRN     Family History    None       Tobacco Use    Smoking status: Never    Smokeless tobacco: Never   Substance and Sexual Activity    Alcohol use: Never    Drug use: Not Currently    Sexual activity: Not on file     Review of Systems   Constitutional:  Positive for fever.   Genitourinary: Negative.      Objective:     Vital Signs (Most Recent):  Temp: 97.8 °F (36.6 °C) (02/28/24 0722)  Pulse: 60 (02/28/24 1000)  Resp: 16 (02/28/24 1000)  BP: (!) 106/56 (02/28/24 1000)  SpO2: 97 % (02/28/24 1000) Vital Signs (24h Range):  Temp:  [97.8 °F (36.6 °C)-97.9 °F (36.6 °C)] 97.8 °F (36.6 °C)  Pulse:  [55-70] 60  Resp:  [9-23] 16  SpO2:  [82 %-100 %] 97 %  BP: ()/() 106/56  Arterial Line BP: ()/(43-61) 127/52     Weight: 69.9 kg (154 lb) (02/25/24 1307)  Body mass index is 25.63 kg/m².  Body surface area is 1.79 meters squared.    I/O last 3 completed shifts:  In: 3903.2 [P.O.:240; I.V.:2429.3; IV Piggyback:1233.9]  Out: 945 [Urine:945]    Physical Exam    Significant Labs:  BMP:   Recent Labs   Lab 02/28/24 0212   *   K 3.8   CO2 19*   BUN 35.9*   CREATININE 2.22*   CALCIUM 8.1*   MG 2.40     CBC:   Recent Labs   Lab 02/28/24 0212   WBC 21.07  21.07*   RBC 3.05*   HGB 9.0*   HCT 27.7*   *   MCV 90.8   MCH 29.5   MCHC 32.5*     Microbiology Results (last 7 days)       Procedure Component Value Units Date/Time    Blood Culture [[id number]]  (Abnormal) Collected: 02/27/24 0655    Order Status: Completed Specimen: Blood from Antecubital, Left Updated: 02/28/24 1132     CULTURE, BLOOD ([facility]) Susceptibility To Follow      Escherichia coli     GRAM STAIN Gram Negative Rods      2 of 2 bottles positive      Seen in gram stain of broth only    Blood Culture [[id number]]  (Abnormal) Collected: 02/27/24 0418    Order Status: Completed Specimen: Blood Updated: 02/28/24 1132     CULTURE, BLOOD ([facility]) Susceptibility To Follow      Escherichia coli     GRAM STAIN Gram Negative  Rods      Seen in gram stain of broth only      2 of 2 bottles positive    BCID2 Panel [[id number]]  (Abnormal) Collected: 02/27/24 0418    Order Status: Completed Specimen: Blood Updated: 02/27/24 0416     CTX-M (ESBL ) Not Detected     IMP (Cabapenemase ) Not Detected     KPC resistance gene (Carbapenemase ) Not Detected     mcr-1 Not Detected     mecA ID N/A     Comment: Note: Antimicrobial resistance can occur via multiple mechanisms. A Not Detected result for antimicrobial resistance gene(s) does not indicate antimicrobial susceptibility. Subculturing is required for species identification and susceptibility testing of   isolates.        mecA/C and MREJ (MRSA) gene N/A     NDM (Carbapenemase ) Not Detected     OXA-48-like (Carbapenemase ) Not Detected     Sinai/B (VRE gene) N/A     VIM (Carbapenemase ) Not Detected     Enterococcus faecalis Not Detected     Enterococcus faecium Not Detected     Listeria monocytogenes Not Detected     Staphylococcus spp. Not Detected     Staphylococcus aureus Not Detected     Staphylococcus epidermidis Not Detected     Staphylococcus lugdunensis Not Detected     Streptococcus spp. Not Detected     Streptococcus agalactiae (Group B) Not Detected     Streptococcus pneumoniae Not Detected     Streptococcus pyogenes (Group A) Not Detected     Acinetobacter calcoaceticus/baumannii complex Not Detected     Bacteroides fragilis Not Detected     Enterobacterales Detected     Enterobacter cloacae complex Not Detected     Escherichia coli Detected     Klebsiella aerogenes Not Detected     Klebsiella oxytoca Not Detected     Klebsiella pneumoniae group Not Detected     Proteus spp. Not Detected     Salmonella spp. Not Detected     Serratia marcescens Not Detected     Haemophilus influenzae Not Detected     Neisseria meningitidis Not Detected     Pseudomonas aeruginosa Not Detected     Stenotrophomonas maltophilia Not Detected     Candida  albicans Not Detected     Candida auris Not Detected     Candida glabrata Not Detected     Candida krusei Not Detected     Candida parapsilosis Not Detected     Candida tropicalis Not Detected     Cryptococcus neoformans/gattii Not Detected    Narrative:      The [employer] BCID2 Panel is a multiplexed nucleic acid test intended for the use with payever® 2.0 or payever® [employer] for the simultaneous qualitative detection and identification of multiple bacterial and yeast nucleic acids and select genetic determinants associated with antimicrobial resistance.  The [employer] BCID2 Panel test is performed directly on blood culture samples identified as positive by a continuous monitoring blood culture system.  Results are intended to be interpreted in conjunction with Gram stain results.    Urine culture [[id number]]  (Abnormal)  (Susceptibility) Collected: 02/25/24 1934    Order Status: Completed Specimen: Urine Updated: 02/27/24 0903     Urine Culture >/= 100,000 colonies/ml Escherichia coli    Blood Culture [[id number]]     Order Status: Canceled Specimen: Blood     Blood Culture [[id number]]     Order Status: Canceled Specimen: Blood           Specimen (24h ago, onward)      None          Recent Labs   Lab 02/25/24 1934   PROTEINUA Trace*   BACTERIA Many*   LEUKOCYTESUR 500*   UROBILINOGEN Normal       Significant Imaging:  X-Ray Chest 1 View [[id number]] Resulted: 02/27/24 0725   Order Status: Completed Updated: 02/27/24 0727   Narrative:     EXAMINATION:  XR CHEST 1 VIEW    CLINICAL HISTORY:  PICC placmnt;    TECHNIQUE:  Single view of the chest    COMPARISON:  02/26/2024    FINDINGS:  The cardiomediastinal silhouette is unchanged.  Interval placement of right-sided PICC line which projects over the mid SVC.   Impression:       As above.      Electronically signed by: Wiley Roberts  Date: 02/27/2024  Time: 07:25       Assessment/Plan:     MICHEL resolving secondary to suspected clinical  dehydration and sepsis  CKD-baseline suspected 1.7  Urosepsis - urine culture 2/25 and Blood culture 2/27 positive for E coli  NSTEMI  Metabolic acidosis  Hyponatremia    -patient renal function improving nicely.  At the time of last discharge earlier this month she was discharged with creatinine 1.7.  Recommend continuing NS at 100 mL.hr for now until oral intake is more adequate. Can likely be decreased by tomorrow.   UPCR reviewed noting non nephrotic range proteinuria.   Will obtain renal US  Follow blood  cultures   NIYAH Thomas  Nephrology  [facility] - [facility]

## 2024-02-28 NOTE — PROGRESS NOTES
Pharmacokinetic Assessment Follow Up: IV Vancomycin    Vancomycin serum concentration assessment(s):  The random level was drawn correctly and can be used to guide therapy at this time. The measurement is below the desired definitive target range of 15 to 20 mcg/mL.    Vancomycin Regimen Plan:  Continue to pulse-dose due to patient's poor renal function.  Administer vancomycin 500 mg IVPB x 1, scheduled to be given today at 0900  Re-dose when the random level is less than 20 mcg/mL, next level to be drawn at 0430 on 02/29 with AM labs        Drug levels (last 3 results):  Recent Labs   Lab Result Units 02/28/24  0212   Vanc Lvl Random ug/ml 14.5*       Vancomycin Administrations:  vancomycin given in the last 96 hours                     vancomycin 1.5 g in dextrose 5 % 250 mL IVPB (ready to mix) (mg) 1,500 mg New Bag 02/27/24 0036                    Pharmacy will continue to follow and monitor vancomycin.    Please contact pharmacy at extension [phone] for questions regarding this assessment.    Thank you for the consult,   Nelida Jj       Patient brief summary:  Ev Alberts is a 89 y.o. female initiated on antimicrobial therapy with IV Vancomycin for treatment of sepsis    The patient's current regimen is pulse-dosed    Drug Allergies:   Review of patient's allergies indicates:   Allergen Reactions    Amlodipine-benazepril Edema     Other reaction(s): unknown    Corticosteroids (glucocorticoids) Edema and Swelling    Rofecoxib Anaphylaxis and Swelling    Sulfa (sulfonamide antibiotics) Edema    Atorvastatin      Other reaction(s): unknown    Ibuprofen      Other reaction(s): Allergy to sulfa drugs       Actual Body Weight:  Wt Readings from Last 1 Encounters:   02/25/24 69.9 kg (154 lb)       Renal Function:   Estimated Creatinine Clearance: 17.5 mL/min (A) (based on SCr of 2.22 mg/dL (H)).,     Dialysis Method (if applicable):  N/A    CBC (last 72 hours):  Recent Labs   Lab Result Units 02/25/24  1346  02/26/24 0138 02/26/24 2034 02/27/24 0418 02/28/24 0212   WBC x10(3)/mcL 16.80* 21.55* 20.76* 38.5  38.48* 21.07  21.07*   Hgb g/dL 10.8* 10.0* 9.9* 10.9* 9.0*   Hemoglobin A1c % 5.3  --   --   --   --    Hct % 35.0* 30.5* 30.8* 33.2* 27.7*   Platelet x10(3)/mcL 218 161 142 163 117*   Mono % % 4.4 5.5 3.2  --   --    Monocytes % %  --   --   --  4 1   Eos % % 0.0 0.6 0.0  --   --    Basophil % % 0.2 0.4 0.4  --   --        Metabolic Panel (last 72 hours):  Recent Labs   Lab Result Units 02/25/24  1346 02/25/24 1934 02/26/24 0138 02/26/24 2033 02/27/24 0418 02/28/24 0212   Sodium Level mmol/L 133*  --  133* 132* 130* 130*   Potassium Level mmol/L 3.4*  --  3.4* 3.0* 4.4 3.8   Chloride mmol/L 102  --  102 105 106 106   Carbon Dioxide mmol/L 21*  --  23 20* 15* 19*   Glucose Level mg/dL 122*  --  88 101 156* 108   Glucose, UA   --  Normal  --   --   --   --    Blood Urea Nitrogen mg/dL 32.6*  --  31.1* 35.8* 35.4* 35.9*   Creatinine mg/dL 1.91*  --  1.74* 2.55* 2.67* 2.22*   Albumin Level g/dL 3.3*  --  2.6* 2.2* 2.4* 2.0*   Bilirubin Total mg/dL 0.4  --  0.4 0.5 0.9 0.3   Alkaline Phosphatase unit/L 92  --  73 92 89 68   Aspartate Aminotransferase unit/L 28  --  22 28 43* 34   Alanine Aminotransferase unit/L 14  --  10 12 18 19   Magnesium Level mg/dL  --   --   --  1.40* 2.60 2.40   Phosphorus Level mg/dL  --   --   --  1.1*  --   --        Microbiologic Results:  Microbiology Results (last 7 days)       Procedure Component Value Units Date/Time    Blood Culture [[id number]]  (Abnormal) Collected: 02/27/24 0655    Order Status: Completed Specimen: Blood from Antecubital, Left Updated: 02/28/24 0638     CULTURE, BLOOD ([facility]) Identification and Susceptibility To Follow      Gram-negative Rods     GRAM STAIN Gram Negative Rods      2 of 2 bottles positive      Seen in gram stain of broth only    Blood Culture [[id number]]  (Abnormal) Collected: 02/27/24 0418    Order Status: Completed Specimen: Blood Updated:  02/28/24 0636     CULTURE, BLOOD ([facility]) Identification and Susceptibility To Follow      Gram-negative Rods     GRAM STAIN Gram Negative Rods      Seen in gram stain of broth only      2 of 2 bottles positive    BCID2 Panel [[id number]]  (Abnormal) Collected: 02/27/24 0418    Order Status: Completed Specimen: Blood Updated: 02/27/24 8100     CTX-M (ESBL ) Not Detected     IMP (Cabapenemase ) Not Detected     KPC resistance gene (Carbapenemase ) Not Detected     mcr-1 Not Detected     mecA ID N/A     Comment: Note: Antimicrobial resistance can occur via multiple mechanisms. A Not Detected result for antimicrobial resistance gene(s) does not indicate antimicrobial susceptibility. Subculturing is required for species identification and susceptibility testing of   isolates.        mecA/C and MREJ (MRSA) gene N/A     NDM (Carbapenemase ) Not Detected     OXA-48-like (Carbapenemase ) Not Detected     Sinai/B (VRE gene) N/A     VIM (Carbapenemase ) Not Detected     Enterococcus faecalis Not Detected     Enterococcus faecium Not Detected     Listeria monocytogenes Not Detected     Staphylococcus spp. Not Detected     Staphylococcus aureus Not Detected     Staphylococcus epidermidis Not Detected     Staphylococcus lugdunensis Not Detected     Streptococcus spp. Not Detected     Streptococcus agalactiae (Group B) Not Detected     Streptococcus pneumoniae Not Detected     Streptococcus pyogenes (Group A) Not Detected     Acinetobacter calcoaceticus/baumannii complex Not Detected     Bacteroides fragilis Not Detected     Enterobacterales Detected     Enterobacter cloacae complex Not Detected     Escherichia coli Detected     Klebsiella aerogenes Not Detected     Klebsiella oxytoca Not Detected     Klebsiella pneumoniae group Not Detected     Proteus spp. Not Detected     Salmonella spp. Not Detected     Serratia marcescens Not Detected     Haemophilus influenzae Not Detected      Neisseria meningitidis Not Detected     Pseudomonas aeruginosa Not Detected     Stenotrophomonas maltophilia Not Detected     Candida albicans Not Detected     Candida auris Not Detected     Candida glabrata Not Detected     Candida krusei Not Detected     Candida parapsilosis Not Detected     Candida tropicalis Not Detected     Cryptococcus neoformans/gattii Not Detected    Narrative:      The [employer] BCID2 Panel is a multiplexed nucleic acid test intended for the use with [employer]® 2.0 or [employer]® [employer] for the simultaneous qualitative detection and identification of multiple bacterial and yeast nucleic acids and select genetic determinants associated with antimicrobial resistance.  The [employer] BCID2 Panel test is performed directly on blood culture samples identified as positive by a continuous monitoring blood culture system.  Results are intended to be interpreted in conjunction with Gram stain results.    Urine culture [[id number]]  (Abnormal)  (Susceptibility) Collected: 02/25/24 1934    Order Status: Completed Specimen: Urine Updated: 02/27/24 0903     Urine Culture >/= 100,000 colonies/ml Escherichia coli    Blood Culture [[id number]]     Order Status: Canceled Specimen: Blood     Blood Culture [[id number]]     Order Status: Canceled Specimen: Blood

## 2024-02-28 NOTE — PROGRESS NOTES
[facility] - [facility]    Cardiology  Progress Note    Patient Name: Ev Alberts  MRN: [id number]  Admission Date: 2/25/2024  Hospital Length of Stay: 3 days  Code Status: Full Code   Attending Physician: Vanessa Mays MD   Primary Care Physician: Santi Walters MD  Expected Discharge Date:   Principal Problem:Debility    Subjective:   Chief Complaint/Reason for Consult:Elevated Troponin      HPI: Ms. Alberts is a 88 y/o female with a history of SSS, AVB II, Bradycardia, CHF, PAF on Eliquis, PAD, Left Carotid Atherosclerosis, HTN, CKD IV, VHD, YARI, who was briefly known to [facility] (Dr. Vigil). She presented to the ER on 2.25.24 with complaints of not being able to walk since she was discharged on 2.21.24 due to polypharmacy, weakness, and age related disability. Daughter reports she is unable to get the signal to make her walk. Daughter also reports she has been lethargic and is unsure she stopped taking Lyrica, Meclizine, and Requip because they come prepackaged. Significant labs include WBC 21.55, Na 133, K 3.4, BUN/Creat 31.1/1.74, Albumin 2.6, HDL 32. LDL 38, Troponin 0.199. UA +; UC pending. CT Lumbar spine shows nondisplaced fracture at S4 (age indeterminate) and multilevel degenerative changes. CT Thoracic spine shows ligament, spinal cord and/or vascular abnormalities. EKG shows sinus rhythm with 1st degree A-V block, ST and Marked T wave abnormality, consider inferior ischemia, ST and Marked T wave abnormality, consider anterolateral ischemia, and Prolonged QT. [facility] has been consulted for NSTEMI.        Hospital Course:  2.27.24: NAD Noted. On Levophed for BP Support. Denies CP/SOB. Paced at 60. On FD Lovenox for NSTEMI Treatment.   2.28.24: NAD Noted. Levophed has been weaned off and patient is waiting on floor transfer. Family at bedside, [facility] plan of care relayed to her daughter and , both at bedside. Patient reports no CP/SOB. Troponin values are trending down. No arrhythmias  noted overnight. On FD Lovenox.     PMH: SSS, AVB II, Bradycardia, CHF, PAF on Eliquis, PAD, Left Carotid Atherosclerosis, HTN, CKD IV, VHD, YARI, GERD, FERNANDEZ, Right Knee Osteoarthritis, GI Bleed, Dementia, Glaucoma, Hypothyroidism     PSH: Cataract Surgery, Cholecystectomy, Hysterectomy, PPM, Left, Nephrectomy, LHC, Hernia Repair, Oral Surgery, Bilateral Foot Surgery  Family History: Non-Contributory   Social History: Denies smoking, illicit drug use, and alcohol use.      Previous Cardiac Diagnostics:   ECHO (2.26.24):  Left Ventricle: The left ventricle is normal in size. Mildly increased wall thickness. There is normal systolic function with a visually estimated ejection fraction of 55 - 60%. Grade III diastolic dysfunction. Right Ventricle: Mild right ventricular enlargement. Wall thickness is normal. Right ventricle wall motion  is normal. Systolic function is mildly reduced. TAPSE is 1.46 cm. Left Atrium: Left atrium is moderately dilated. Aortic Valve: There is mild aortic valve sclerosis. Tricuspid Valve: There is mild regurgitation.     PPM Insertion (9.19.19): Insportant      ECHO (9.15.19):   Normal Left Ventricle cavity size. Normal wall thickness. Normal ejection fraction, 55-65%. The right ventricle cavity is mildly dilated. Left atrium is mildly dilated. Normal right atrium. Normal central venous pressure. Mild MR. Mild to Moderate TR. Moderate pulmonary HTN. No pericardial effusion.       Carotid US (7.26.12):  The study quality is good. Less than or equal to 50% left common carotid artery stenosis.Bilateral normal velocity measurements of the internal carotid and external caortid arteries are noted, with no plaque visualized.Antegrade right vertebral artery flow. Antegrade left vertebral artery flow.     KARI (1.18.12):  The resting right ankle brachial index is 1.01 consistent with no significant right peripheral vascular disease.The resting left ankle brachial index is 0.94 consistent with borderline  left peripheral vascular disease.The study quality is good.      Lower Ext Arterial US (1.18.12):  The study quality is good. Biphasic waveforms and diffuse plaque seen in both the bilateral infra-popliteal arteries.    Review of Systems   Cardiovascular:  Negative for chest pain.   Respiratory:  Negative for shortness of breath.    All other systems reviewed and are negative.    Objective:     Vital Signs (Most Recent):  Temp: 97.8 °F (36.6 °C) (02/28/24 0722)  Pulse: 60 (02/28/24 1000)  Resp: 16 (02/28/24 1000)  BP: (!) 106/56 (02/28/24 1000)  SpO2: 97 % (02/28/24 1000) Vital Signs (24h Range):  Temp:  [97.8 °F (36.6 °C)-97.9 °F (36.6 °C)] 97.8 °F (36.6 °C)  Pulse:  [55-70] 60  Resp:  [9-23] 16  SpO2:  [82 %-100 %] 97 %  BP: ()/() 106/56  Arterial Line BP: ()/(43-61) 127/52   Weight: 69.9 kg (154 lb)  Body mass index is 25.63 kg/m².  SpO2: 97 %       Intake/Output Summary (Last 24 hours) at 2/28/2024 1223  Last data filed at 2/28/2024 0722  Gross per 24 hour   Intake 2595.89 ml   Output 965 ml   Net 1630.89 ml       Lines/Drains/Airways       Peripherally Inserted Central Catheter Line  Duration             PICC Triple Lumen 02/27/24 0031 right basilic 1 day              Drain  Duration                  Urethral Catheter 02/26/24 2320 Double-lumen 1 day              Arterial Line  Duration             Arterial Line 02/26/24 2320 Right Radial 1 day              Peripheral Intravenous Line  Duration                  Peripheral IV - Single Lumen 02/25/24 1608 20 G Left;Posterior Forearm 2 days         Peripheral IV - Single Lumen 02/26/24 2205 Anterior;Left Forearm 1 day         Peripheral IV - Single Lumen 02/26/24 2302 Anterior;Distal;Right Forearm 1 day                  Significant Labs:   Recent Results (from the past 72 hour(s))   Comprehensive metabolic panel    Collection Time: 02/25/24  1:46 PM   Result Value Ref Range    Sodium Level 133 (L) 136 - 145 mmol/L    Potassium Level 3.4 (L) 3.5 -  5.1 mmol/L    Chloride 102 98 - 107 mmol/L    Carbon Dioxide 21 (L) 23 - 31 mmol/L    Glucose Level 122 (H) 82 - 115 mg/dL    Blood Urea Nitrogen 32.6 (H) 9.8 - 20.1 mg/dL    Creatinine 1.91 (H) 0.55 - 1.02 mg/dL    Calcium Level Total 10.3 (H) 8.4 - 10.2 mg/dL    Protein Total 8.2 (H) 5.8 - 7.6 gm/dL    Albumin Level 3.3 (L) 3.4 - 4.8 g/dL    Globulin 4.9 (H) 2.4 - 3.5 gm/dL    Albumin/Globulin Ratio 0.7 (L) 1.1 - 2.0 ratio    Bilirubin Total 0.4 <=1.5 mg/dL    Alkaline Phosphatase 92 40 - 150 unit/L    Alanine Aminotransferase 14 0 - 55 unit/L    Aspartate Aminotransferase 28 5 - 34 unit/L    eGFR 25 mls/min/1.73/m2   Troponin I    Collection Time: 02/25/24  1:46 PM   Result Value Ref Range    Troponin-I 0.191 (H) 0.000 - 0.045 ng/mL   CBC with Differential    Collection Time: 02/25/24  1:46 PM   Result Value Ref Range    WBC 16.80 (H) 4.50 - 11.50 x10(3)/mcL    RBC 3.68 (L) 4.20 - 5.40 x10(6)/mcL    Hgb 10.8 (L) 12.0 - 16.0 g/dL    Hct 35.0 (L) 37.0 - 47.0 %    MCV 95.1 (H) 80.0 - 94.0 fL    MCH 29.3 27.0 - 31.0 pg    MCHC 30.9 (L) 33.0 - 36.0 g/dL    RDW 16.3 11.5 - 17.0 %    Platelet 218 130 - 400 x10(3)/mcL    MPV 9.7 7.4 - 10.4 fL    Neut % 88.4 %    Lymph % 6.1 %    Mono % 4.4 %    Eos % 0.0 %    Basophil % 0.2 %    Lymph # 1.03 0.6 - 4.6 x10(3)/mcL    Neut # 14.84 (H) 2.1 - 9.2 x10(3)/mcL    Mono # 0.74 0.1 - 1.3 x10(3)/mcL    Eos # 0.00 0 - 0.9 x10(3)/mcL    Baso # 0.04 <=0.2 x10(3)/mcL    IG# 0.15 (H) 0 - 0.04 x10(3)/mcL    IG% 0.9 %    NRBC% 0.0 %   Hemoglobin A1C    Collection Time: 02/25/24  1:46 PM   Result Value Ref Range    Hemoglobin A1c 5.3 <=7.0 %    Estimated Average Glucose 105.4 mg/dL   EKG 12-lead    Collection Time: 02/25/24  2:50 PM   Result Value Ref Range    QRS Duration 92 ms    [facility] QTC Calculation 499 ms   Urinalysis, Reflex to Urine Culture    Collection Time: 02/25/24  7:34 PM    Specimen: Urine   Result Value Ref Range    Color, UA Light-Yellow Yellow, Light-Yellow, Colorless, Straw,  Dark-Yellow    Appearance, UA Turbid (A) Clear    Specific Gravity, UA 1.010 1.005 - 1.030    pH, UA 5.5 5.0 - 8.5    Protein, UA Trace (A) Negative    Glucose, UA Normal Negative, Normal    Ketones, UA Trace (A) Negative    Blood, UA 2+ (A) Negative    Bilirubin, UA Negative Negative    Urobilinogen, UA Normal 0.2, 1.0, Normal    Nitrites, UA Negative Negative    Leukocyte Esterase,  (A) Negative    WBC, UA 21-50 (A) None Seen, 0-2, 3-5, 0-5 /HPF    Bacteria, UA Many (A) None Seen, Trace /HPF    Squamous Epithelial Cells, UA Trace None Seen /HPF    Mucous, UA Trace (A) None Seen /LPF    RBC, UA 6-10 (A) None Seen, 0-2, 3-5, 0-5 /HPF   Urine culture    Collection Time: 02/25/24  7:34 PM    Specimen: Urine   Result Value Ref Range    Urine Culture >/= 100,000 colonies/ml Escherichia coli (A)        Susceptibility    Escherichia coli -  (no method available)     Amox/K Clav 8 Sensitive      Ampicillin >=32 Resistant      Cefepime <=0.12 Sensitive      Ceftriaxone <=0.25 Sensitive      Cefuroxime <=1 Sensitive      Ciprofloxacin <=0.25 Sensitive      Gentamicin <=1 Sensitive      Levofloxacin <=0.12 Sensitive      Meropenem <=0.25 Sensitive      Nitrofurantoin <=16 Sensitive      Piperacillin/Tazobactam <=4 Sensitive      Trimethoprim/Sulfamethoxazole <=20 Sensitive      Tetracycline <=1 Sensitive      Tobramycin <=1 Sensitive    Troponin I    Collection Time: 02/25/24  7:51 PM   Result Value Ref Range    Troponin-I 0.199 (H) 0.000 - 0.045 ng/mL   Comprehensive Metabolic Panel    Collection Time: 02/26/24  1:38 AM   Result Value Ref Range    Sodium Level 133 (L) 136 - 145 mmol/L    Potassium Level 3.4 (L) 3.5 - 5.1 mmol/L    Chloride 102 98 - 107 mmol/L    Carbon Dioxide 23 23 - 31 mmol/L    Glucose Level 88 82 - 115 mg/dL    Blood Urea Nitrogen 31.1 (H) 9.8 - 20.1 mg/dL    Creatinine 1.74 (H) 0.55 - 1.02 mg/dL    Calcium Level Total 9.6 8.4 - 10.2 mg/dL    Protein Total 7.2 5.8 - 7.6 gm/dL    Albumin Level 2.6  (L) 3.4 - 4.8 g/dL    Globulin 4.6 (H) 2.4 - 3.5 gm/dL    Albumin/Globulin Ratio 0.6 (L) 1.1 - 2.0 ratio    Bilirubin Total 0.4 <=1.5 mg/dL    Alkaline Phosphatase 73 40 - 150 unit/L    Alanine Aminotransferase 10 0 - 55 unit/L    Aspartate Aminotransferase 22 5 - 34 unit/L    eGFR 28 mls/min/1.73/m2   Troponin I    Collection Time: 02/26/24  1:38 AM   Result Value Ref Range    Troponin-I 0.178 (H) 0.000 - 0.045 ng/mL   Lipid Panel    Collection Time: 02/26/24  1:38 AM   Result Value Ref Range    Cholesterol Total 84 <=200 mg/dL    HDL Cholesterol 32 (L) 35 - 60 mg/dL    Triglyceride 68 37 - 140 mg/dL    Cholesterol/HDL Ratio 3 0 - 5    Very Low Density Lipoprotein 14     LDL Cholesterol 38.00 (L) 50.00 - 140.00 mg/dL   CBC with Differential    Collection Time: 02/26/24  1:38 AM   Result Value Ref Range    WBC 21.55 (H) 4.50 - 11.50 x10(3)/mcL    RBC 3.28 (L) 4.20 - 5.40 x10(6)/mcL    Hgb 10.0 (L) 12.0 - 16.0 g/dL    Hct 30.5 (L) 37.0 - 47.0 %    MCV 93.0 80.0 - 94.0 fL    MCH 30.5 27.0 - 31.0 pg    MCHC 32.8 (L) 33.0 - 36.0 g/dL    RDW 16.4 11.5 - 17.0 %    Platelet 161 130 - 400 x10(3)/mcL    MPV 9.2 7.4 - 10.4 fL    Neut % 82.9 %    Lymph % 9.4 %    Mono % 5.5 %    Eos % 0.6 %    Basophil % 0.4 %    Lymph # 2.02 0.6 - 4.6 x10(3)/mcL    Neut # 17.87 (H) 2.1 - 9.2 x10(3)/mcL    Mono # 1.19 0.1 - 1.3 x10(3)/mcL    Eos # 0.13 0 - 0.9 x10(3)/mcL    Baso # 0.09 <=0.2 x10(3)/mcL    IG# 0.25 (H) 0 - 0.04 x10(3)/mcL    IG% 1.2 %    NRBC% 0.0 %   Troponin I    Collection Time: 02/26/24  8:05 AM   Result Value Ref Range    Troponin-I 0.132 (H) 0.000 - 0.045 ng/mL   Echo    Collection Time: 02/26/24 10:32 AM   Result Value Ref Range    BSA 1.79 m2    Schmitz's Biplane MOD Ejection Fraction 63 %    LVIDd 4.30 3.5 - 6.0 cm    LV Systolic Volume 24.61 mL    LV Systolic Volume Index 13.9 mL/m2    LVIDs 2.60 2.1 - 4.0 cm    LV Diastolic Volume 83.07 mL    LV Diastolic Volume Index 46.93 mL/m2    IVS 1.20 (A) 0.6 - 1.1 cm    FS 40  28 - 44 %    Left Ventricle Relative Wall Thickness 0.65 cm    Posterior Wall 1.40 (A) 0.6 - 1.1 cm    LV mass 207.77 g    LV Mass Index 117 g/m2    MV Peak E Kofi 0.98 m/s    TDI LATERAL 0.14 m/s    TDI SEPTAL 0.06 m/s    E/E' ratio 9.80 m/s    MV Peak A Kofi 0.31 m/s    TR Max Kofi 2.52 m/s    E/A ratio 3.16     E wave deceleration time 209.00 msec    LV SEPTAL E/E' RATIO 16.33 m/s    LV LATERAL E/E' RATIO 7.00 m/s    LVOT peak kofi 0.84 m/s    Left Ventricular Outflow Tract Mean Velocity 0.55 cm/s    Left Ventricular Outflow Tract Mean Gradient 1.00 mmHg    TAPSE 1.46 cm    LA size 4.10 cm    AV mean gradient 7 mmHg    AV peak gradient 12 mmHg    Ao peak kofi 1.75 m/s    Ao VTI 32.20 cm    LVOT peak VTI 16.20 cm    AV Velocity Ratio 0.48     AV index (prosthetic) 0.50     Triscuspid Valve Regurgitation Peak Gradient 25 mmHg    PV PEAK VELOCITY 1.07 m/s    PV peak gradient 5 mmHg    Mean e' 0.10 m/s    ZLVIDS -1.20     ZLVIDD -1.29    EKG 12-lead    Collection Time: 02/26/24  3:41 PM   Result Value Ref Range    QRS Duration 82 ms    [facility] QTC Calculation 477 ms   POCT glucose    Collection Time: 02/26/24  7:56 PM   Result Value Ref Range    POCT Glucose 117 (H) 70 - 110 mg/dL   Comprehensive metabolic panel    Collection Time: 02/26/24  8:33 PM   Result Value Ref Range    Sodium Level 132 (L) 136 - 145 mmol/L    Potassium Level 3.0 (L) 3.5 - 5.1 mmol/L    Chloride 105 98 - 107 mmol/L    Carbon Dioxide 20 (L) 23 - 31 mmol/L    Glucose Level 101 82 - 115 mg/dL    Blood Urea Nitrogen 35.8 (H) 9.8 - 20.1 mg/dL    Creatinine 2.55 (H) 0.55 - 1.02 mg/dL    Calcium Level Total 8.6 8.4 - 10.2 mg/dL    Protein Total 6.3 5.8 - 7.6 gm/dL    Albumin Level 2.2 (L) 3.4 - 4.8 g/dL    Globulin 4.1 (H) 2.4 - 3.5 gm/dL    Albumin/Globulin Ratio 0.5 (L) 1.1 - 2.0 ratio    Bilirubin Total 0.5 <=1.5 mg/dL    Alkaline Phosphatase 92 40 - 150 unit/L    Alanine Aminotransferase 12 0 - 55 unit/L    Aspartate Aminotransferase 28 5 - 34 unit/L     eGFR 18 mls/min/1.73/m2   Magnesium    Collection Time: 02/26/24  8:33 PM   Result Value Ref Range    Magnesium Level 1.40 (L) 1.60 - 2.60 mg/dL   Phosphorus    Collection Time: 02/26/24  8:33 PM   Result Value Ref Range    Phosphorus Level 1.1 (L) 2.3 - 4.7 mg/dL   Lactic acid, plasma    Collection Time: 02/26/24  8:33 PM   Result Value Ref Range    Lactic Acid Level 3.3 (H) 0.5 - 2.2 mmol/L   Troponin I    Collection Time: 02/26/24  8:33 PM   Result Value Ref Range    Troponin-I 3.541 (H) 0.000 - 0.045 ng/mL   CBC with Differential    Collection Time: 02/26/24  8:34 PM   Result Value Ref Range    WBC 20.76 (H) 4.50 - 11.50 x10(3)/mcL    RBC 3.30 (L) 4.20 - 5.40 x10(6)/mcL    Hgb 9.9 (L) 12.0 - 16.0 g/dL    Hct 30.8 (L) 37.0 - 47.0 %    MCV 93.3 80.0 - 94.0 fL    MCH 30.0 27.0 - 31.0 pg    MCHC 32.1 (L) 33.0 - 36.0 g/dL    RDW 17.0 11.5 - 17.0 %    Platelet 142 130 - 400 x10(3)/mcL    MPV 10.9 (H) 7.4 - 10.4 fL    Neut % 88.8 %    Lymph % 3.5 %    Mono % 3.2 %    Eos % 0.0 %    Basophil % 0.4 %    Lymph # 0.72 0.6 - 4.6 x10(3)/mcL    Neut # 18.44 (H) 2.1 - 9.2 x10(3)/mcL    Mono # 0.66 0.1 - 1.3 x10(3)/mcL    Eos # 0.01 0 - 0.9 x10(3)/mcL    Baso # 0.08 <=0.2 x10(3)/mcL    IG# 0.85 (H) 0 - 0.04 x10(3)/mcL    IG% 4.1 %    NRBC% 0.1 %   EKG 12-lead    Collection Time: 02/26/24 10:30 PM   Result Value Ref Range    QRS Duration 80 ms    [facility] QTC Calculation 472 ms   Lactic Acid, Plasma    Collection Time: 02/26/24 10:46 PM   Result Value Ref Range    Lactic Acid Level 2.9 (H) 0.5 - 2.2 mmol/L   POCT glucose    Collection Time: 02/27/24  1:24 AM   Result Value Ref Range    POCT Glucose 156 (H) 70 - 110 mg/dL   Magnesium    Collection Time: 02/27/24  4:18 AM   Result Value Ref Range    Magnesium Level 2.60 1.60 - 2.60 mg/dL   Comprehensive Metabolic Panel    Collection Time: 02/27/24  4:18 AM   Result Value Ref Range    Sodium Level 130 (L) 136 - 145 mmol/L    Potassium Level 4.4 3.5 - 5.1 mmol/L    Chloride 106 98 - 107  mmol/L    Carbon Dioxide 15 (L) 23 - 31 mmol/L    Glucose Level 156 (H) 82 - 115 mg/dL    Blood Urea Nitrogen 35.4 (H) 9.8 - 20.1 mg/dL    Creatinine 2.67 (H) 0.55 - 1.02 mg/dL    Calcium Level Total 8.5 8.4 - 10.2 mg/dL    Protein Total 6.3 5.8 - 7.6 gm/dL    Albumin Level 2.4 (L) 3.4 - 4.8 g/dL    Globulin 3.9 (H) 2.4 - 3.5 gm/dL    Albumin/Globulin Ratio 0.6 (L) 1.1 - 2.0 ratio    Bilirubin Total 0.9 <=1.5 mg/dL    Alkaline Phosphatase 89 40 - 150 unit/L    Alanine Aminotransferase 18 0 - 55 unit/L    Aspartate Aminotransferase 43 (H) 5 - 34 unit/L    eGFR 17 mls/min/1.73/m2   CBC with Differential    Collection Time: 02/27/24  4:18 AM   Result Value Ref Range    WBC 38.48 (H) 4.50 - 11.50 x10(3)/mcL    RBC 3.62 (L) 4.20 - 5.40 x10(6)/mcL    Hgb 10.9 (L) 12.0 - 16.0 g/dL    Hct 33.2 (L) 37.0 - 47.0 %    MCV 91.7 80.0 - 94.0 fL    MCH 30.1 27.0 - 31.0 pg    MCHC 32.8 (L) 33.0 - 36.0 g/dL    RDW 17.2 (H) 11.5 - 17.0 %    Platelet 163 130 - 400 x10(3)/mcL    MPV 11.3 (H) 7.4 - 10.4 fL    NRBC% 0.1 %   Troponin I    Collection Time: 02/27/24  4:18 AM   Result Value Ref Range    Troponin-I 4.643 (H) 0.000 - 0.045 ng/mL   Blood Culture    Collection Time: 02/27/24  4:18 AM    Specimen: Blood   Result Value Ref Range    CULTURE, BLOOD ([facility]) Susceptibility To Follow     CULTURE, BLOOD ([facility]) Escherichia coli (A)     GRAM STAIN Gram Negative Rods (AA)     GRAM STAIN Seen in gram stain of broth only (AA)     GRAM STAIN 2 of 2 bottles positive (AA)    Manual Differential    Collection Time: 02/27/24  4:18 AM   Result Value Ref Range    WBC 38.5 x10(3)/mcL    Neutrophils % 93 %    Monocytes % 4 %    Metamyelocytes % 3 (H) <=0 %    Neutrophils Abs 35.805 (H) 2.1 - 9.2 x10(3)/mcL    Monocytes Abs 1.54 (H) 0.1 - 1.3 x10(3)/mcL    Platelets Normal Normal, Adequate    RBC Morph Abnormal (A) Normal    Poikilocytosis 2+ (A) (none)    Anisocytosis 1+ (A) (none)    Macrocytosis 1+ (A) (none)    [place] Cells 2+ (A) (none)    Ovalocytes  1+ (A) (none)    Target Cells 1+ (A) (none)    Vacuolated Grans 2+ (A) (none)   BCID2 Panel    Collection Time: 02/27/24  4:18 AM    Specimen: Blood   Result Value Ref Range    CTX-M (ESBL ) Not Detected Not Detected, N/A    IMP (Cabapenemase ) Not Detected Not Detected, N/A    KPC resistance gene (Carbapenemase ) Not Detected Not Detected, N/A    mcr-1 Not Detected Not Detected, N/A    mecA ID N/A Not Detected, N/A    mecA/C and MREJ (MRSA) gene N/A Not Detected, N/A    NDM (Carbapenemase ) Not Detected Not Detected, N/A    OXA-48-like (Carbapenemase ) Not Detected Not Detected, N/A    Sinai/B (VRE gene) N/A Not Detected, N/A    VIM (Carbapenemase ) Not Detected Not Detected, N/A    Enterococcus faecalis Not Detected Not Detected    Enterococcus faecium Not Detected Not Detected    Listeria monocytogenes Not Detected Not Detected    Staphylococcus spp. Not Detected Not Detected    Staphylococcus aureus Not Detected Not Detected    Staphylococcus epidermidis Not Detected Not Detected    Staphylococcus lugdunensis Not Detected Not Detected    Streptococcus spp. Not Detected Not Detected    Streptococcus agalactiae (Group B) Not Detected Not Detected    Streptococcus pneumoniae Not Detected Not Detected    Streptococcus pyogenes (Group A) Not Detected Not Detected    Acinetobacter calcoaceticus/baumannii complex Not Detected Not Detected    Bacteroides fragilis Not Detected Not Detected    Enterobacterales Detected (A) Not Detected    Enterobacter cloacae complex Not Detected Not Detected    Escherichia coli Detected (A) Not Detected    Klebsiella aerogenes Not Detected Not Detected    Klebsiella oxytoca Not Detected Not Detected    Klebsiella pneumoniae group Not Detected Not Detected    Proteus spp. Not Detected Not Detected    Salmonella spp. Not Detected Not Detected    Serratia marcescens Not Detected Not Detected    Haemophilus influenzae Not Detected Not Detected     Neisseria meningitidis Not Detected Not Detected    Pseudomonas aeruginosa Not Detected Not Detected    Stenotrophomonas maltophilia Not Detected Not Detected    Candida albicans Not Detected Not Detected    Candida auris Not Detected Not Detected    Candida glabrata Not Detected Not Detected    Candida krusei Not Detected Not Detected    Candida parapsilosis Not Detected Not Detected    Candida tropicalis Not Detected Not Detected    Cryptococcus neoformans/gattii Not Detected Not Detected   Blood Culture    Collection Time: 02/27/24  6:55 AM    Specimen: Antecubital, Left; Blood   Result Value Ref Range    CULTURE, BLOOD ([facility]) Susceptibility To Follow     CULTURE, BLOOD ([facility]) Escherichia coli (A)     GRAM STAIN Gram Negative Rods (AA)     GRAM STAIN 2 of 2 bottles positive (AA)     GRAM STAIN Seen in gram stain of broth only (AA)    Troponin I    Collection Time: 02/27/24 12:45 PM   Result Value Ref Range    Troponin-I 3.357 (H) 0.000 - 0.045 ng/mL   Vancomycin, Random    Collection Time: 02/28/24  2:12 AM   Result Value Ref Range    Vanc Lvl Random 14.5 (L) 15.0 - 20.0 ug/ml   Magnesium    Collection Time: 02/28/24  2:12 AM   Result Value Ref Range    Magnesium Level 2.40 1.60 - 2.60 mg/dL   Comprehensive Metabolic Panel    Collection Time: 02/28/24  2:12 AM   Result Value Ref Range    Sodium Level 130 (L) 136 - 145 mmol/L    Potassium Level 3.8 3.5 - 5.1 mmol/L    Chloride 106 98 - 107 mmol/L    Carbon Dioxide 19 (L) 23 - 31 mmol/L    Glucose Level 108 82 - 115 mg/dL    Blood Urea Nitrogen 35.9 (H) 9.8 - 20.1 mg/dL    Creatinine 2.22 (H) 0.55 - 1.02 mg/dL    Calcium Level Total 8.1 (L) 8.4 - 10.2 mg/dL    Protein Total 5.9 5.8 - 7.6 gm/dL    Albumin Level 2.0 (L) 3.4 - 4.8 g/dL    Globulin 3.9 (H) 2.4 - 3.5 gm/dL    Albumin/Globulin Ratio 0.5 (L) 1.1 - 2.0 ratio    Bilirubin Total 0.3 <=1.5 mg/dL    Alkaline Phosphatase 68 40 - 150 unit/L    Alanine Aminotransferase 19 0 - 55 unit/L    Aspartate  Aminotransferase 34 5 - 34 unit/L    eGFR 21 mls/min/1.73/m2   CBC with Differential    Collection Time: 02/28/24  2:12 AM   Result Value Ref Range    WBC 21.07 (H) 4.50 - 11.50 x10(3)/mcL    RBC 3.05 (L) 4.20 - 5.40 x10(6)/mcL    Hgb 9.0 (L) 12.0 - 16.0 g/dL    Hct 27.7 (L) 37.0 - 47.0 %    MCV 90.8 80.0 - 94.0 fL    MCH 29.5 27.0 - 31.0 pg    MCHC 32.5 (L) 33.0 - 36.0 g/dL    RDW 17.0 11.5 - 17.0 %    Platelet 117 (L) 130 - 400 x10(3)/mcL    MPV 10.3 7.4 - 10.4 fL    NRBC% 0.0 %   Manual Differential    Collection Time: 02/28/24  2:12 AM   Result Value Ref Range    WBC 21.07 x10(3)/mcL    Neutrophils % 96 %    Lymphs % 2 %    Monocytes % 1 %    Metamyelocytes % 1 (H) <=0 %    Myelocytes % 1 (H) <=0 %    Neutrophils Abs 20.2272 (H) 2.1 - 9.2 x10(3)/mcL    Lymphs Abs 0.4214 (L) 0.6 - 4.6 x10(3)/mcL    Monocytes Abs 0.2107 0.1 - 1.3 x10(3)/mcL    Platelets Decreased (A) Normal, Adequate    RBC Morph Abnormal (A) Normal    Poikilocytosis 3+ (A) (none)    Anisocytosis 2+ (A) (none)    Macrocytosis 2+ (A) (none)    [place] Cells 3+ (A) (none)    Target Cells 1+ (A) (none)     Telemetry:  Paced at 60 BPM    Physical Exam  Vitals and nursing note reviewed.   Constitutional:       General: She is not in acute distress.     Appearance: Normal appearance.   HENT:      Head: Normocephalic.      Mouth/Throat:      Mouth: Mucous membranes are moist.      Pharynx: Oropharynx is clear.   Cardiovascular:      Rate and Rhythm: Normal rate and regular rhythm.   Pulmonary:      Effort: Pulmonary effort is normal. No respiratory distress.   Abdominal:      Palpations: Abdomen is soft.      Tenderness: There is no abdominal tenderness.   Musculoskeletal:      Cervical back: Neck supple.      Right lower leg: No edema.      Left lower leg: No edema.   Skin:     General: Skin is warm and dry.   Neurological:      Mental Status: She is alert.       Current Inpatient Medications:    Current Facility-Administered Medications:     acetaminophen  suppository 650 mg, 650 mg, Rectal, Q6H PRN, Ally Erazo MD, 650 mg at 02/26/24 1613    acetaminophen tablet 1,000 mg, 1,000 mg, Oral, Q6H PRN, Soila Kruse FNP, 1,000 mg at 02/26/24 0512    aluminum-magnesium hydroxide-simethicone 200-200-20 mg/5 mL suspension 30 mL, 30 mL, Oral, QID PRN, Soila Kruse FNP    aspirin EC tablet 81 mg, 81 mg, Oral, Daily, Chris, Rein T, FNP, 81 mg at 02/28/24 0843    atorvastatin tablet 10 mg, 10 mg, Oral, Nightly, Soila Kruse FNP, 10 mg at 02/27/24 2050    bisacodyL suppository 10 mg, 10 mg, Rectal, Daily PRN, Soila Kruse FNKENNA    brimonidine 0.15 % OPTH DROP ophthalmic solution 1 drop, 1 drop, Both Eyes, BID, 1 drop at 02/28/24 0843 **AND** timolol maleate 0.5% ophthalmic solution 1 drop, 1 drop, Both Eyes, BID, Soila Kruse FNP, 1 drop at 02/28/24 0843    ciprofloxacin (CIPRO)400mg/200ml D5W IVPB 400 mg, 400 mg, Intravenous, Q24H, Heladio Suazo MD, Stopped at 02/28/24 1141    enoxaparin injection 70 mg, 1 mg/kg, Subcutaneous, Q24H (treatment, non-standard time), Angelia Babin, IFTIKHAR, 70 mg at 02/28/24 0019    hydrOXYzine pamoate capsule 25 mg, 25 mg, Oral, Q8H PRN, Dimitri Cummings MD    lactated ringers infusion, , Intravenous, Continuous, Satish Tobias DO, Last Rate: 75 mL/hr at 02/28/24 0545, Rate Verify at 02/28/24 0545    latanoprost 0.005 % ophthalmic solution 1 drop, 1 drop, Both Eyes, QHS, Soila Kruse FNP, 1 drop at 02/27/24 2051    levothyroxine tablet 25 mcg, 25 mcg, Oral, Before breakfast, Soila Kruse FNP, 25 mcg at 02/28/24 0613    melatonin tablet 6 mg, 6 mg, Oral, Nightly PRN, Soila Kruse FNP    metoprolol injection 5 mg, 5 mg, Intravenous, Q5 Min PRN, Angelia Babin FNP    mupirocin 2 % ointment, , Nasal, BID, Ally Erazo MD, Given at 02/28/24 0843    naloxone 0.4 mg/mL injection 0.02 mg, 0.02 mg, Intravenous, PRN, Soila Kruse FNP    NORepinephrine 8 mg in dextrose 5% 250 mL infusion, 0-3  mcg/kg/min, Intravenous, Continuous, Satish Tobias, DO, Stopped at 02/27/24 1903    ondansetron injection 4 mg, 4 mg, Intravenous, Q4H PRN, Soila Kruse, FNP    piperacillin-tazobactam (ZOSYN) 4.5 g in dextrose 5 % in water (D5W) 100 mL IVPB (MB+), 4.5 g, Intravenous, Q12H, Heladio Suazo MD, Last Rate: 25 mL/hr at 02/28/24 1041, 4.5 g at 02/28/24 1041    prochlorperazine injection Soln 5 mg, 5 mg, Intravenous, Q6H PRN, Soila Kruse, FNP    risperiDONE tablet 2 mg, 2 mg, Oral, QHS, Soila Kruse, FNP, 2 mg at 02/27/24 2050    rivastigmine tartrate capsule 1.5 mg, 1.5 mg, Oral, BID, Soila Kruse, FNP, 1.5 mg at 02/28/24 0843    senna-docusate 8.6-50 mg per tablet 1 tablet, 1 tablet, Oral, BID PRN, Soila Kruse L, FNP    sodium chloride 0.9% bolus 1,000 mL 1,000 mL, 1,000 mL, Intravenous, Once, Josseline Nunez AGACNP-BC    sodium chloride 0.9% flush 10 mL, 10 mL, Intravenous, PRN, Soila Kruse L, FNP    Flushing PICC/Midline Protocol, , , Until Discontinued **AND** sodium chloride 0.9% flush 10 mL, 10 mL, Intravenous, Q6H, 10 mL at 02/28/24 0600 **AND** sodium chloride 0.9% flush 10 mL, 10 mL, Intravenous, PRN, Satish Tobias, DO  VTE Risk Mitigation (From admission, onward)           Ordered     enoxaparin injection 70 mg  Every 24 hours         02/26/24 1209     IP VTE HIGH RISK PATIENT  Once         02/25/24 2209     Place sequential compression device  Until discontinued         02/25/24 2209     Reason for No Pharmacological VTE Prophylaxis  Once        Question:  Reasons:  Answer:  Already adequately anticoagulated on oral Anticoagulants    02/25/24 2209                  Assessment:   NSTEMI (Unspecified for now)    - EKG: SR with ST Segment Depression in Inferior & Anterolateral Leads    - Denies Angina/SOB at Current Time    - EF 55-60%  Urosepsis Requiring Vasopressor Support (Resolved)- Levophed has been weaned off    - Leukocytosis  Bacteremia  History of Hypertension (BP Now  Stable)  Chronic Diastolic CHF (Compensated)  PAF - Currently Paced     - [id number] Score 5 (7.2% Stroke risk Per Year)    - on Eliquis (Last Dose 2.26.24 AM)- Now on FD Lovenox  SSS    - Status Post PPM (9.19.19): [employer]   PAD  CVD    - Left Carotid Atherosclerosis  Hyperlipidemia    - On Statin  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 Chávez  VHD    - Mild MR    - Mold to Moderate TR  YARI  GERD  Iron Deficiency Anemia   Thrombocytopenia   Right Knee Osteoarthritis  Dementia    - CT Head Negative for Acute Abnormality  Glaucoma  Hypothyroidism    - On Oral Replacement Therapy  History of GI Bleed    Plan:   Continue Aspirin 81 Mg Daily  Continue FD Lovenox Re: NSTEMI Treatment & PAF/Stroke Risk Reduction (Eliquis on Hold)  Will plan LHC once medically optimized, will need renal optimization & Treatment of Sepsis prior to proceeding with invasive cardiac workup.  CIS Plan of care discussed with family who is bedside.  Will follow closely    IFTIKHAR Yee  Cardiology  [facility] - [facility] East ICU  02/28/2024     I agree with the findings of the complexity of problems addressed and take responsibility for the plan's risks and complications. I approved the plan documented by Jeff Perez NP.  As above

## 2024-02-28 NOTE — PLAN OF CARE
CM met with patient's daughter at bedside and answered questions regarding home health services after discharge. CM noted patient is current with [facility]. Patient's daughter then noted patient receives sitter services through Medicare (M-F 8 am to 2 pm). Patient's daughter inquired about receiving more hours for patient after discharge and CM suggested reaching out to pt's  through her insurance. Patient's daughter then added she did and patient was then approved for an additional 12 hours in which they hope to utilizes on the weekends etc. Patient's daughter then noted pt's  is expecting her to assisting with finding agencies that can provide staffing for the additional 12 hours timeframe etc. Again, CM recommended calling patient's insurance and discussing the staffing concerns.           CM sent updated clinicals to [facility] Phone: [phone] via [employer].

## 2024-02-28 NOTE — PLAN OF CARE
Problem: Occupational Therapy  Goal: Occupational Therapy Goal  Description: Goals to be met by: 3/28/24     Patient will increase functional independence with ADLs by performing:    UE Dressing with Moderate Assistance.  Grooming while seated with Stand-by Assistance.  Sitting at edge of bed x20 minutes with Stand-by Assistance.  Toilet transfer to toilet with Minimal Assistance.  Increased functional strength to 3+/5 through the[employer]Ex for participation in ADLs.    Outcome: Ongoing, Progressing

## 2024-02-28 NOTE — PROGRESS NOTES
[facility]   Pulmonary Critical Care Note    Patient Name: Ev Alberts  MRN: [id number]  Admission Date: 2/25/2024  Hospital Length of Stay: 3 days  Code Status: Full Code  Attending Provider: Heladio Suazo MD  Primary Care Provider: Santi Walters MD     Subjective:     HPI:   89-year-old female with a past medical history of hypertension, hypothyroidism, CKD with solitary kidney, PAF who is being upgraded to ICU as she started to require vasopressor support despite 2L fluid bolus.  She was initially admitted to the hospital with complaints of generalized weakness.  She was started on Rocephin for a presumed UTI.  Also found to have an NSTEMI (initial trop 0.178 -> 0.132) and a TTE was ordered which revealed normal systolic function, grade 3 diastolic dysfunction.  Per chart review patient was not complaining of chest pain or difficulty breathing and Cardiology have been consulted. CXR imaging has been clear thus far.  Today patients family members state pt is somewhat slurring speech which started around 7pm after she began getting hypotensive. She was febrile around 4pm with Tmax 101.7.      Her most recent lab work is significant for a leukocytosis around 20, hemoglobin stable around 10.  Sodium 132, potassium 3, bicarb 20, BUN 35.8, creatinine 2.55, phos 1.1, magnesium 1.4, troponin 3.541, lactate 3.3      Hospital Course/Significant events:      24 Hour Interval History:  Patient is hemodynamically stable.  Gram-negative tuan in the urine has been identified as E coli and is sensitive to everything except ampicillin.  Two of 2 blood cultures are positive for Gram-negative rods, suspect the same organism but not confirmed yet.  Patient is eating breakfast with the assistance of family and looks much more comfortable.    Past Medical History:   Diagnosis Date    Dementia     Hypertension     Sick sinus syndrome     Thyroid disease     Unspecified glaucoma        Past Surgical History:    Procedure Laterality Date    cataract surgery       SECTION      CHOLECYSTECTOMY      HYSTERECTOMY      INSERTION OF PACEMAKER      NEPHRECTOMY         Social History     Socioeconomic History    Marital status:    Tobacco Use    Smoking status: Never    Smokeless tobacco: Never   Substance and Sexual Activity    Alcohol use: Never    Drug use: Not Currently     Social Determinants of Health     Financial Resource Strain: Low Risk  (2024)    Overall Financial Resource Strain (CARDIA)     Difficulty of Paying Living Expenses: Not hard at all   Food Insecurity: No Food Insecurity (2024)    Hunger Vital Sign     Worried About Running Out of Food in the Last Year: Never true     Ran Out of Food in the Last Year: Never true   Transportation Needs: Unmet Transportation Needs (2024)    PRAPARE - Transportation     Lack of Transportation (Medical): No     Lack of Transportation (Non-Medical): Yes   Physical Activity: Inactive (2024)    Exercise Vital Sign     Days of Exercise per Week: 0 days     Minutes of Exercise per Session: 0 min   Stress: No Stress Concern Present (2024)    [employer] [place] [employer] - Occupational Stress Questionnaire     Feeling of Stress : Not at all   Social Connections: Moderately Integrated (2024)    Social Connection and Isolation Panel [NHANES]     Frequency of Communication with Friends and Family: More than three times a week     Frequency of Social Gatherings with Friends and Family: Once a week     Attends Tenriism Services: More than 4 times per year     Active Member of Clubs or Organizations: No     Attends Club or Organization Meetings: Never     Marital Status:    Housing Stability: High Risk (2024)    Housing Stability Vital Sign     Unable to Pay for Housing in the Last Year: No     Number of Places Lived in the Last Year: 1     Unstable Housing in the Last Year: Yes           Current Outpatient Medications    Medication Instructions    allopurinoL (ZYLOPRIM) 300 mg, Oral, Daily    atorvastatin (LIPITOR) 10 mg, Oral, Nightly    brimonidine-timoloL (COMBIGAN) 0.2-0.5 % Drop 1 drop, Both Eyes, 2 times daily    carvediloL (COREG) 3.125 mg, Oral, 2 times daily    ELIQUIS 2.5 mg, Oral, 2 times daily    furosemide (LASIX) 20 mg, Oral, Every morning    latanoprost 0.005 % ophthalmic solution 1 drop, Both Eyes, Nightly    levothyroxine (SYNTHROID) 25 mcg, Oral, Before breakfast    linaCLOtide (LINZESS) 72 mcg, Oral, Before breakfast    MYRBETRIQ 50 mg Tb24 1 tablet, Oral, Every morning    omeprazole (PRILOSEC) 20 mg, Oral, Nightly    risperiDONE (RISPERDAL) 2 mg, Oral, Nightly    rivastigmine tartrate (EXELON) 1.5 mg, Oral, 2 times daily       Current Inpatient Medications   aspirin  81 mg Oral Daily    atorvastatin  10 mg Oral Nightly    brimonidine 0.15 % OPTH DROP  1 drop Both Eyes BID    And    timolol maleate 0.5%  1 drop Both Eyes BID    ciprofloxacin  400 mg Intravenous Q24H    enoxparin  1 mg/kg Subcutaneous Q24H (treatment, non-standard time)    latanoprost  1 drop Both Eyes QHS    levothyroxine  25 mcg Oral Before breakfast    mupirocin   Nasal BID    piperacillin-tazobactam (Zosyn) IV (PEDS and ADULTS) (extended infusion is not appropriate)  4.5 g Intravenous Q12H    risperiDONE  2 mg Oral QHS    rivastigmine tartrate  1.5 mg Oral BID    sodium chloride 0.9%  1,000 mL Intravenous Once    sodium chloride 0.9%  10 mL Intravenous Q6H    vancomycin (VANCOCIN) IV (PEDS and ADULTS)  500 mg Intravenous Once       Current Intravenous Infusions   lactated ringers 75 mL/hr at 02/28/24 0545    NORepinephrine bitartrate-D5W Stopped (02/27/24 1903)         Review of Systems   Unable to perform ROS: Critical illness (pt with pmhx dementia, who is critially ill and generally weak unable to perform ROS)          Objective:       Intake/Output Summary (Last 24 hours) at 2/28/2024 0918  Last data filed at 2/28/2024 0722  Gross per 24  hour   Intake 2595.89 ml   Output 965 ml   Net 1630.89 ml         Vital Signs (Most Recent):  Temp: 97.8 °F (36.6 °C) (02/28/24 0722)  Pulse: 60 (02/28/24 0722)  Resp: 13 (02/28/24 0722)  BP: (!) 115/58 (02/28/24 0722)  SpO2: 100 % (02/28/24 0722)  Body mass index is 25.63 kg/m².  Weight: 69.9 kg (154 lb) Vital Signs (24h Range):  Temp:  [97.8 °F (36.6 °C)-97.9 °F (36.6 °C)] 97.8 °F (36.6 °C)  Pulse:  [55-70] 60  Resp:  [9-23] 13  SpO2:  [82 %-100 %] 100 %  BP: ()/() 115/58  Arterial Line BP: ()/(43-61) 139/46     Physical Exam  Constitutional:       Appearance: Normal appearance. She is ill-appearing.   HENT:      Head: Normocephalic and atraumatic.      Mouth/Throat:      Mouth: Mucous membranes are dry.   Eyes:      General: No scleral icterus.     Conjunctiva/sclera: Conjunctivae normal.   Cardiovascular:      Rate and Rhythm: Normal rate and regular rhythm.      Pulses: Normal pulses.      Heart sounds: Murmur heard.   Pulmonary:      Effort: Pulmonary effort is normal. No respiratory distress.      Breath sounds: Normal breath sounds.   Abdominal:      General: There is no distension.      Palpations: Abdomen is soft.      Tenderness: There is abdominal tenderness (generalized, mild). There is no guarding or rebound.   Skin:     General: Skin is warm and dry.   Neurological:      Mental Status: She is alert.      Comments: Exam limited 2/2 pt with generalized weakness. She would move all extremities and follow commands although strength globally 2-3/5. Some slurring of speech per family members. CN2-12 grossly intact.            Lines/Drains/Airways       Peripherally Inserted Central Catheter Line  Duration             PICC Triple Lumen 02/27/24 0031 right basilic 1 day              Drain  Duration                  Urethral Catheter 02/26/24 2320 Double-lumen 1 day              Arterial Line  Duration             Arterial Line 02/26/24 2320 Right Radial 1 day              Peripheral  "Intravenous Line  Duration                  Peripheral IV - Single Lumen 02/25/24 1608 20 G Left;Posterior Forearm 2 days         Peripheral IV - Single Lumen 02/26/24 2205 Anterior;Left Forearm 1 day         Peripheral IV - Single Lumen 02/26/24 2302 Anterior;Distal;Right Forearm 1 day                    Significant Labs:    Lab Results   Component Value Date    WBC 21.07 (H) 02/28/2024    WBC 21.07 02/28/2024    HGB 9.0 (L) 02/28/2024    HCT 27.7 (L) 02/28/2024    MCV 90.8 02/28/2024     (L) 02/28/2024           BMP  Lab Results   Component Value Date     (L) 02/28/2024    K 3.8 02/28/2024    CHLORIDE 106 02/28/2024    CO2 19 (L) 02/28/2024    BUN 35.9 (H) 02/28/2024    CREATININE 2.22 (H) 02/28/2024    CALCIUM 8.1 (L) 02/28/2024    AGAP 6.0 02/19/2024    EGFRNONAA 41 09/06/2019         ABG  No results for input(s): "PH", "PO2", "PCO2", "HCO3", "POCBASEDEF" in the last 168 hours.    Mechanical Ventilation Support:         Significant Imaging:  I have reviewed the pertinent imaging within the past 24 hours.        Assessment/Plan:     Assessment  Septic shock secondary to E coli urinary tract infection- now hemodynamically stable  NSTEMI  Paroxysmal atrial fibrillation  Currently paced.  Bernard 2 Vasc score 5.  On Eliquis last dose 2/26  Sick sinus syndrome-s/p permanent pacemaker 09/19/2019, [employer]tr[employer]  Dementia, hypothyroidism, chronic anemia, YARI  MICHEL on CKD   History of hypertension, PAD, VHD  S4 vertebral fracture, chronic      Plan  Patient is hemodynamically stable and suitable for downgrade to floor.  Continue double Gram-negative coverage until there is confirmation as Gram-negative rods in the blood are indeed the same E coli found in the urine.  Continue anticoagulation for AFib.     Heladio Suazo MD  Pulmonary Critical Care Medicine  [facility] - 4th Floor Medical Telemetry  DOS: 02/28/2024     " History of CKD, unknown baseline.  Also L RICARDO s/p stent in 2012 (surendra). Cr 2.31-->1.87-->2.15-->2.48  - renal US, no hydro, L renal atrophy  - c/w calcitriol daily, phos binder daily   - trend, renally dose meds, avoid nephrotoxins  - decreased lasix from BID to QD given elevated Cr on 2/25, euvolemic on exam  - bladder scan: 191

## 2024-02-28 NOTE — PLAN OF CARE
Problem: Physical Therapy  Goal: Physical Therapy Goal  Description: Goals to be met by: 3/28/24     Patient will increase functional independence with mobility by performin. Supine to sit with MInimal Assistance  2. Sit to supine with MInimal Assistance  2. Sit to stand transfer with Minimal Assistance  4. Bed to chair transfer with Minimal Assistance using Rolling Walker  5. Gait  x 50 feet with Minimal Assistance using Rolling Walker.   6. Wheelchair propulsion x150 feet with Minimal Assistance using bilateral uppper extremities    Outcome: Ongoing, Progressing

## 2024-02-29 LAB
ALBUMIN SERPL-MCNC: 2.1 G/DL (ref 3.4–4.8)
ALBUMIN/GLOB SERPL: 0.5 RATIO (ref 1.1–2)
ALP SERPL-CCNC: 76 UNIT/L (ref 40–150)
ALT SERPL-CCNC: 20 UNIT/L (ref 0–55)
AST SERPL-CCNC: 28 UNIT/L (ref 5–34)
BACTERIA BLD CULT: ABNORMAL
BACTERIA BLD CULT: ABNORMAL
BASOPHILS # BLD AUTO: 0.06 X10(3)/MCL
BASOPHILS NFR BLD AUTO: 0.4 %
BILIRUB SERPL-MCNC: 0.4 MG/DL
BUN SERPL-MCNC: 35.4 MG/DL (ref 9.8–20.1)
CALCIUM SERPL-MCNC: 8.9 MG/DL (ref 8.4–10.2)
CHLORIDE SERPL-SCNC: 108 MMOL/L (ref 98–107)
CO2 SERPL-SCNC: 19 MMOL/L (ref 23–31)
CREAT SERPL-MCNC: 1.93 MG/DL (ref 0.55–1.02)
EOSINOPHIL # BLD AUTO: 0.06 X10(3)/MCL (ref 0–0.9)
EOSINOPHIL NFR BLD AUTO: 0.4 %
ERYTHROCYTE [DISTWIDTH] IN BLOOD BY AUTOMATED COUNT: 17.4 % (ref 11.5–17)
GFR SERPLBLD CREATININE-BSD FMLA CKD-EPI: 25 MLS/MIN/1.73/M2
GLOBULIN SER-MCNC: 4.3 GM/DL (ref 2.4–3.5)
GLUCOSE SERPL-MCNC: 98 MG/DL (ref 82–115)
GRAM STN SPEC: ABNORMAL
HCT VFR BLD AUTO: 27.4 % (ref 37–47)
HGB BLD-MCNC: 9.2 G/DL (ref 12–16)
IMM GRANULOCYTES # BLD AUTO: 0.1 X10(3)/MCL (ref 0–0.04)
IMM GRANULOCYTES NFR BLD AUTO: 0.6 %
LYMPHOCYTES # BLD AUTO: 1.15 X10(3)/MCL (ref 0.6–4.6)
LYMPHOCYTES NFR BLD AUTO: 7 %
MAGNESIUM SERPL-MCNC: 2.3 MG/DL (ref 1.6–2.6)
MCH RBC QN AUTO: 30.2 PG (ref 27–31)
MCHC RBC AUTO-ENTMCNC: 33.6 G/DL (ref 33–36)
MCV RBC AUTO: 89.8 FL (ref 80–94)
MONOCYTES # BLD AUTO: 1.12 X10(3)/MCL (ref 0.1–1.3)
MONOCYTES NFR BLD AUTO: 6.8 %
NEUTROPHILS # BLD AUTO: 13.92 X10(3)/MCL (ref 2.1–9.2)
NEUTROPHILS NFR BLD AUTO: 84.8 %
NRBC BLD AUTO-RTO: 0.2 %
PHOSPHATE SERPL-MCNC: 2.5 MG/DL (ref 2.3–4.7)
PLATELET # BLD AUTO: 124 X10(3)/MCL (ref 130–400)
PMV BLD AUTO: 11.5 FL (ref 7.4–10.4)
POTASSIUM SERPL-SCNC: 4.1 MMOL/L (ref 3.5–5.1)
PROT SERPL-MCNC: 6.4 GM/DL (ref 5.8–7.6)
RBC # BLD AUTO: 3.05 X10(6)/MCL (ref 4.2–5.4)
SODIUM SERPL-SCNC: 134 MMOL/L (ref 136–145)
WBC # SPEC AUTO: 16.41 X10(3)/MCL (ref 4.5–11.5)

## 2024-02-29 PROCEDURE — 63600175 PHARM REV CODE 636 W HCPCS: Performed by: INTERNAL MEDICINE

## 2024-02-29 PROCEDURE — 21400001 HC TELEMETRY ROOM

## 2024-02-29 PROCEDURE — 25000003 PHARM REV CODE 250: Performed by: NURSE PRACTITIONER

## 2024-02-29 PROCEDURE — 80053 COMPREHEN METABOLIC PANEL: CPT

## 2024-02-29 PROCEDURE — 84100 ASSAY OF PHOSPHORUS: CPT | Performed by: INTERNAL MEDICINE

## 2024-02-29 PROCEDURE — 25000003 PHARM REV CODE 250: Performed by: HOSPITALIST

## 2024-02-29 PROCEDURE — 85025 COMPLETE CBC W/AUTO DIFF WBC: CPT

## 2024-02-29 PROCEDURE — 63600175 PHARM REV CODE 636 W HCPCS: Performed by: STUDENT IN AN ORGANIZED HEALTH CARE EDUCATION/TRAINING PROGRAM

## 2024-02-29 PROCEDURE — 11000001 HC ACUTE MED/SURG PRIVATE ROOM

## 2024-02-29 PROCEDURE — 97530 THERAPEUTIC ACTIVITIES: CPT | Mod: CQ

## 2024-02-29 PROCEDURE — 25000003 PHARM REV CODE 250: Performed by: STUDENT IN AN ORGANIZED HEALTH CARE EDUCATION/TRAINING PROGRAM

## 2024-02-29 PROCEDURE — 83735 ASSAY OF MAGNESIUM: CPT

## 2024-02-29 PROCEDURE — 63600175 PHARM REV CODE 636 W HCPCS

## 2024-02-29 RX ORDER — CARVEDILOL 3.12 MG/1
3.12 TABLET ORAL 2 TIMES DAILY
Status: DISCONTINUED | OUTPATIENT
Start: 2024-02-29 | End: 2024-03-11 | Stop reason: HOSPADM

## 2024-02-29 RX ORDER — DOBUTAMINE HYDROCHLORIDE 400 MG/100ML
3 INJECTION INTRAVENOUS CONTINUOUS
Status: DISCONTINUED | OUTPATIENT
Start: 2024-02-29 | End: 2024-02-29

## 2024-02-29 RX ADMIN — BRIMONIDINE TARTRATE 1 DROP: 1.5 SOLUTION OPHTHALMIC at 08:02

## 2024-02-29 RX ADMIN — RIVASTIGMINE TARTRATE 1.5 MG: 1.5 CAPSULE ORAL at 09:02

## 2024-02-29 RX ADMIN — MUPIROCIN: 20 OINTMENT TOPICAL at 08:02

## 2024-02-29 RX ADMIN — ATORVASTATIN CALCIUM 10 MG: 10 TABLET, FILM COATED ORAL at 08:02

## 2024-02-29 RX ADMIN — LEVOTHYROXINE SODIUM 25 MCG: 25 TABLET ORAL at 06:02

## 2024-02-29 RX ADMIN — ACETAMINOPHEN 1000 MG: 500 TABLET ORAL at 03:02

## 2024-02-29 RX ADMIN — TIMOLOL MALEATE 1 DROP: 5 SOLUTION OPHTHALMIC at 09:02

## 2024-02-29 RX ADMIN — TIMOLOL MALEATE 1 DROP: 5 SOLUTION OPHTHALMIC at 08:02

## 2024-02-29 RX ADMIN — LATANOPROST 1 DROP: 50 SOLUTION OPHTHALMIC at 08:02

## 2024-02-29 RX ADMIN — SODIUM CHLORIDE, POTASSIUM CHLORIDE, SODIUM LACTATE AND CALCIUM CHLORIDE: 600; 310; 30; 20 INJECTION, SOLUTION INTRAVENOUS at 06:02

## 2024-02-29 RX ADMIN — HYDROXYZINE PAMOATE 25 MG: 25 CAPSULE ORAL at 08:02

## 2024-02-29 RX ADMIN — Medication 81 MG: at 09:02

## 2024-02-29 RX ADMIN — MUPIROCIN: 20 OINTMENT TOPICAL at 09:02

## 2024-02-29 RX ADMIN — CIPROFLOXACIN 400 MG: 2 INJECTION, SOLUTION INTRAVENOUS at 09:02

## 2024-02-29 RX ADMIN — RIVASTIGMINE TARTRATE 1.5 MG: 1.5 CAPSULE ORAL at 08:02

## 2024-02-29 RX ADMIN — CARVEDILOL 3.12 MG: 3.12 TABLET, FILM COATED ORAL at 11:02

## 2024-02-29 RX ADMIN — BRIMONIDINE TARTRATE 1 DROP: 1.5 SOLUTION OPHTHALMIC at 09:02

## 2024-02-29 RX ADMIN — RISPERIDONE 2 MG: 1 TABLET ORAL at 08:02

## 2024-02-29 RX ADMIN — CARVEDILOL 3.12 MG: 3.12 TABLET, FILM COATED ORAL at 08:02

## 2024-02-29 RX ADMIN — CEFTRIAXONE SODIUM 2 G: 2 INJECTION, POWDER, FOR SOLUTION INTRAMUSCULAR; INTRAVENOUS at 11:02

## 2024-02-29 NOTE — PROGRESS NOTES
[facility] - [facility] ICU    Cardiology  Progress Note    Patient Name: Ev Alberts  MRN: [id number]  Admission Date: 2/25/2024  Hospital Length of Stay: 4 days  Code Status: Full Code   Attending Physician: Vanessa Mays MD   Primary Care Physician: Santi Walters MD  Expected Discharge Date:   Principal Problem:Debility    Subjective:   Chief Complaint/Reason for Consult:Elevated Troponin      HPI: Ms. Alberts is a 88 y/o female with a history of SSS, AVB II, Bradycardia, CHF, PAF on Eliquis, PAD, Left Carotid Atherosclerosis, HTN, CKD IV, VHD, YARI, who was briefly known to [facility] (Dr. Vigil). She presented to the ER on 2.25.24 with complaints of not being able to walk since she was discharged on 2.21.24 due to polypharmacy, weakness, and age related disability. Daughter reports she is unable to get the signal to make her walk. Daughter also reports she has been lethargic and is unsure she stopped taking Lyrica, Meclizine, and Requip because they come prepackaged. Significant labs include WBC 21.55, Na 133, K 3.4, BUN/Creat 31.1/1.74, Albumin 2.6, HDL 32. LDL 38, Troponin 0.199. UA +; UC pending. CT Lumbar spine shows nondisplaced fracture at S4 (age indeterminate) and multilevel degenerative changes. CT Thoracic spine shows ligament, spinal cord and/or vascular abnormalities. EKG shows sinus rhythm with 1st degree A-V block, ST and Marked T wave abnormality, consider inferior ischemia, ST and Marked T wave abnormality, consider anterolateral ischemia, and Prolonged QT. [facility] has been consulted for NSTEMI.        Hospital Course:  2.27.24: NAD Noted. On Levophed for BP Support. Denies CP/SOB. Paced at 60. On FD Lovenox for NSTEMI Treatment.   2.28.24: NAD Noted. Levophed has been weaned off and patient is waiting on floor transfer. Family at bedside, [facility] plan of care relayed to her daughter and , both at bedside. Patient reports no CP/SOB. Troponin values are trending down. No arrhythmias  noted overnight. On FD Lovenox.  2.29.24: NAD Noted. Denies CP/SOB. Hypertensive. Renal Team Following, renal indices are improving. Paced on Tele. Clinically Stable, now floor status (Boarding in ICU).     PMH: SSS, AVB II, Bradycardia, CHF, PAF on Eliquis, PAD, Left Carotid Atherosclerosis, HTN, CKD IV, VHD, YARI, GERD, FERNANDEZ, Right Knee Osteoarthritis, GI Bleed, Dementia, Glaucoma, Hypothyroidism     PSH: Cataract Surgery, Cholecystectomy, Hysterectomy, PPM, Left, Nephrectomy, LHC, Hernia Repair, Oral Surgery, Bilateral Foot Surgery  Family History: Non-Contributory   Social History: Denies smoking, illicit drug use, and alcohol use.      Previous Cardiac Diagnostics:   ECHO (2.26.24):  Left Ventricle: The left ventricle is normal in size. Mildly increased wall thickness. There is normal systolic function with a visually estimated ejection fraction of 55 - 60%. Grade III diastolic dysfunction. Right Ventricle: Mild right ventricular enlargement. Wall thickness is normal. Right ventricle wall motion  is normal. Systolic function is mildly reduced. TAPSE is 1.46 cm. Left Atrium: Left atrium is moderately dilated. Aortic Valve: There is mild aortic valve sclerosis. Tricuspid Valve: There is mild regurgitation.     PPM Insertion (9.19.19): [employer]      ECHO (9.15.19):   Normal Left Ventricle cavity size. Normal wall thickness. Normal ejection fraction, 55-65%. The right ventricle cavity is mildly dilated. Left atrium is mildly dilated. Normal right atrium. Normal central venous pressure. Mild MR. Mild to Moderate TR. Moderate pulmonary HTN. No pericardial effusion.       Carotid US (7.26.12):  The study quality is good. Less than or equal to 50% left common carotid artery stenosis.Bilateral normal velocity measurements of the internal carotid and external caortid arteries are noted, with no plaque visualized.Antegrade right vertebral artery flow. Antegrade left vertebral artery flow.     KARI (1.18.12):  The resting  right ankle brachial index is 1.01 consistent with no significant right peripheral vascular disease.The resting left ankle brachial index is 0.94 consistent with borderline left peripheral vascular disease.The study quality is good.      Lower Ext Arterial US (1.18.12):  The study quality is good. Biphasic waveforms and diffuse plaque seen in both the bilateral infra-popliteal arteries.    Review of Systems   Cardiovascular:  Negative for chest pain.   Respiratory:  Negative for shortness of breath.    All other systems reviewed and are negative.    Objective:     Vital Signs (Most Recent):  Temp: 97.6 °F (36.4 °C) (02/28/24 1600)  Pulse: 65 (02/28/24 1930)  Resp: 18 (02/28/24 1930)  BP: 111/60 (02/28/24 1639)  SpO2: 99 % (02/28/24 1938) Vital Signs (24h Range):  Temp:  [97.6 °F (36.4 °C)-98 °F (36.7 °C)] 97.6 °F (36.4 °C)  Pulse:  [56-65] 65  Resp:  [14-20] 18  SpO2:  [93 %-100 %] 99 %  BP: ()/(53-82) 111/60  Arterial Line BP: (121-170)/(47-57) 170/57   Weight: 69.9 kg (154 lb)  Body mass index is 25.63 kg/m².  SpO2: 99 %       Intake/Output Summary (Last 24 hours) at 2/29/2024 1034  Last data filed at 2/29/2024 0355  Gross per 24 hour   Intake --   Output 1150 ml   Net -1150 ml       Lines/Drains/Airways       Drain  Duration             Female External Urinary Catheter w/ Suction 02/28/24 1644 <1 day              Peripheral Intravenous Line  Duration                  Peripheral IV - Single Lumen 02/25/24 1608 20 G Left;Posterior Forearm 3 days         Peripheral IV - Single Lumen 02/26/24 2205 Anterior;Left Forearm 2 days         Peripheral IV - Single Lumen 02/26/24 2302 Anterior;Distal;Right Forearm 2 days                  Significant Labs:   Recent Results (from the past 72 hour(s))   EKG 12-lead    Collection Time: 02/26/24  3:41 PM   Result Value Ref Range    QRS Duration 82 ms    [facility] QTC Calculation 477 ms   POCT glucose    Collection Time: 02/26/24  7:56 PM   Result Value Ref Range    POCT Glucose  117 (H) 70 - 110 mg/dL   Comprehensive metabolic panel    Collection Time: 02/26/24  8:33 PM   Result Value Ref Range    Sodium Level 132 (L) 136 - 145 mmol/L    Potassium Level 3.0 (L) 3.5 - 5.1 mmol/L    Chloride 105 98 - 107 mmol/L    Carbon Dioxide 20 (L) 23 - 31 mmol/L    Glucose Level 101 82 - 115 mg/dL    Blood Urea Nitrogen 35.8 (H) 9.8 - 20.1 mg/dL    Creatinine 2.55 (H) 0.55 - 1.02 mg/dL    Calcium Level Total 8.6 8.4 - 10.2 mg/dL    Protein Total 6.3 5.8 - 7.6 gm/dL    Albumin Level 2.2 (L) 3.4 - 4.8 g/dL    Globulin 4.1 (H) 2.4 - 3.5 gm/dL    Albumin/Globulin Ratio 0.5 (L) 1.1 - 2.0 ratio    Bilirubin Total 0.5 <=1.5 mg/dL    Alkaline Phosphatase 92 40 - 150 unit/L    Alanine Aminotransferase 12 0 - 55 unit/L    Aspartate Aminotransferase 28 5 - 34 unit/L    eGFR 18 mls/min/1.73/m2   Magnesium    Collection Time: 02/26/24  8:33 PM   Result Value Ref Range    Magnesium Level 1.40 (L) 1.60 - 2.60 mg/dL   Phosphorus    Collection Time: 02/26/24  8:33 PM   Result Value Ref Range    Phosphorus Level 1.1 (L) 2.3 - 4.7 mg/dL   Lactic acid, plasma    Collection Time: 02/26/24  8:33 PM   Result Value Ref Range    Lactic Acid Level 3.3 (H) 0.5 - 2.2 mmol/L   Troponin I    Collection Time: 02/26/24  8:33 PM   Result Value Ref Range    Troponin-I 3.541 (H) 0.000 - 0.045 ng/mL   CBC with Differential    Collection Time: 02/26/24  8:34 PM   Result Value Ref Range    WBC 20.76 (H) 4.50 - 11.50 x10(3)/mcL    RBC 3.30 (L) 4.20 - 5.40 x10(6)/mcL    Hgb 9.9 (L) 12.0 - 16.0 g/dL    Hct 30.8 (L) 37.0 - 47.0 %    MCV 93.3 80.0 - 94.0 fL    MCH 30.0 27.0 - 31.0 pg    MCHC 32.1 (L) 33.0 - 36.0 g/dL    RDW 17.0 11.5 - 17.0 %    Platelet 142 130 - 400 x10(3)/mcL    MPV 10.9 (H) 7.4 - 10.4 fL    Neut % 88.8 %    Lymph % 3.5 %    Mono % 3.2 %    Eos % 0.0 %    Basophil % 0.4 %    Lymph # 0.72 0.6 - 4.6 x10(3)/mcL    Neut # 18.44 (H) 2.1 - 9.2 x10(3)/mcL    Mono # 0.66 0.1 - 1.3 x10(3)/mcL    Eos # 0.01 0 - 0.9 x10(3)/mcL    Baso #  0.08 <=0.2 x10(3)/mcL    IG# 0.85 (H) 0 - 0.04 x10(3)/mcL    IG% 4.1 %    NRBC% 0.1 %   EKG 12-lead    Collection Time: 02/26/24 10:30 PM   Result Value Ref Range    QRS Duration 80 ms    [facility] QTC Calculation 472 ms   Lactic Acid, Plasma    Collection Time: 02/26/24 10:46 PM   Result Value Ref Range    Lactic Acid Level 2.9 (H) 0.5 - 2.2 mmol/L   POCT glucose    Collection Time: 02/27/24  1:24 AM   Result Value Ref Range    POCT Glucose 156 (H) 70 - 110 mg/dL   Magnesium    Collection Time: 02/27/24  4:18 AM   Result Value Ref Range    Magnesium Level 2.60 1.60 - 2.60 mg/dL   Comprehensive Metabolic Panel    Collection Time: 02/27/24  4:18 AM   Result Value Ref Range    Sodium Level 130 (L) 136 - 145 mmol/L    Potassium Level 4.4 3.5 - 5.1 mmol/L    Chloride 106 98 - 107 mmol/L    Carbon Dioxide 15 (L) 23 - 31 mmol/L    Glucose Level 156 (H) 82 - 115 mg/dL    Blood Urea Nitrogen 35.4 (H) 9.8 - 20.1 mg/dL    Creatinine 2.67 (H) 0.55 - 1.02 mg/dL    Calcium Level Total 8.5 8.4 - 10.2 mg/dL    Protein Total 6.3 5.8 - 7.6 gm/dL    Albumin Level 2.4 (L) 3.4 - 4.8 g/dL    Globulin 3.9 (H) 2.4 - 3.5 gm/dL    Albumin/Globulin Ratio 0.6 (L) 1.1 - 2.0 ratio    Bilirubin Total 0.9 <=1.5 mg/dL    Alkaline Phosphatase 89 40 - 150 unit/L    Alanine Aminotransferase 18 0 - 55 unit/L    Aspartate Aminotransferase 43 (H) 5 - 34 unit/L    eGFR 17 mls/min/1.73/m2   CBC with Differential    Collection Time: 02/27/24  4:18 AM   Result Value Ref Range    WBC 38.48 (H) 4.50 - 11.50 x10(3)/mcL    RBC 3.62 (L) 4.20 - 5.40 x10(6)/mcL    Hgb 10.9 (L) 12.0 - 16.0 g/dL    Hct 33.2 (L) 37.0 - 47.0 %    MCV 91.7 80.0 - 94.0 fL    MCH 30.1 27.0 - 31.0 pg    MCHC 32.8 (L) 33.0 - 36.0 g/dL    RDW 17.2 (H) 11.5 - 17.0 %    Platelet 163 130 - 400 x10(3)/mcL    MPV 11.3 (H) 7.4 - 10.4 fL    NRBC% 0.1 %   Troponin I    Collection Time: 02/27/24  4:18 AM   Result Value Ref Range    Troponin-I 4.643 (H) 0.000 - 0.045 ng/mL   Blood Culture    Collection  Time: 02/27/24  4:18 AM    Specimen: Blood   Result Value Ref Range    CULTURE, BLOOD ([facility]) Escherichia coli (A)     GRAM STAIN Gram Negative Rods (AA)     GRAM STAIN Seen in gram stain of broth only (AA)     GRAM STAIN 2 of 2 bottles positive (AA)        Susceptibility    Escherichia coli -  (no method available)     Amox/K Clav 8 Sensitive      Ampicillin >=32 Resistant      Cefepime <=0.12 Sensitive      Ceftriaxone <=0.25 Sensitive      Cefuroxime <=1 Sensitive      Ciprofloxacin <=0.25 Sensitive      Gentamicin <=1 Sensitive      Levofloxacin <=0.12 Sensitive      Meropenem <=0.25 Sensitive      Piperacillin/Tazobactam <=4 Sensitive      Tobramycin <=1 Sensitive    Manual Differential    Collection Time: 02/27/24  4:18 AM   Result Value Ref Range    WBC 38.5 x10(3)/mcL    Neutrophils % 93 %    Monocytes % 4 %    Metamyelocytes % 3 (H) <=0 %    Neutrophils Abs 35.805 (H) 2.1 - 9.2 x10(3)/mcL    Monocytes Abs 1.54 (H) 0.1 - 1.3 x10(3)/mcL    Platelets Normal Normal, Adequate    RBC Morph Abnormal (A) Normal    Poikilocytosis 2+ (A) (none)    Anisocytosis 1+ (A) (none)    Macrocytosis 1+ (A) (none)    Adrianna Cells 2+ (A) (none)    Ovalocytes 1+ (A) (none)    Target Cells 1+ (A) (none)    Vacuolated Grans 2+ (A) (none)   BCID2 Panel    Collection Time: 02/27/24  4:18 AM    Specimen: Blood   Result Value Ref Range    CTX-M (ESBL ) Not Detected Not Detected, N/A    IMP (Cabapenemase ) Not Detected Not Detected, N/A    KPC resistance gene (Carbapenemase ) Not Detected Not Detected, N/A    mcr-1 Not Detected Not Detected, N/A    mecA ID N/A Not Detected, N/A    mecA/C and MREJ (MRSA) gene N/A Not Detected, N/A    NDM (Carbapenemase ) Not Detected Not Detected, N/A    OXA-48-like (Carbapenemase ) Not Detected Not Detected, N/A    Sinai/B (VRE gene) N/A Not Detected, N/A    VIM (Carbapenemase ) Not Detected Not Detected, N/A    Enterococcus faecalis Not Detected Not Detected     Enterococcus faecium Not Detected Not Detected    Listeria monocytogenes Not Detected Not Detected    Staphylococcus spp. Not Detected Not Detected    Staphylococcus aureus Not Detected Not Detected    Staphylococcus epidermidis Not Detected Not Detected    Staphylococcus lugdunensis Not Detected Not Detected    Streptococcus spp. Not Detected Not Detected    Streptococcus agalactiae (Group B) Not Detected Not Detected    Streptococcus pneumoniae Not Detected Not Detected    Streptococcus pyogenes (Group A) Not Detected Not Detected    Acinetobacter calcoaceticus/baumannii complex Not Detected Not Detected    Bacteroides fragilis Not Detected Not Detected    Enterobacterales Detected (A) Not Detected    Enterobacter cloacae complex Not Detected Not Detected    Escherichia coli Detected (A) Not Detected    Klebsiella aerogenes Not Detected Not Detected    Klebsiella oxytoca Not Detected Not Detected    Klebsiella pneumoniae group Not Detected Not Detected    Proteus spp. Not Detected Not Detected    Salmonella spp. Not Detected Not Detected    Serratia marcescens Not Detected Not Detected    Haemophilus influenzae Not Detected Not Detected    Neisseria meningitidis Not Detected Not Detected    Pseudomonas aeruginosa Not Detected Not Detected    Stenotrophomonas maltophilia Not Detected Not Detected    Candida albicans Not Detected Not Detected    Candida auris Not Detected Not Detected    Candida glabrata Not Detected Not Detected    Candida krusei Not Detected Not Detected    Candida parapsilosis Not Detected Not Detected    Candida tropicalis Not Detected Not Detected    Cryptococcus neoformans/gattii Not Detected Not Detected   Blood Culture    Collection Time: 02/27/24  6:55 AM    Specimen: Antecubital, Left; Blood   Result Value Ref Range    CULTURE, BLOOD ([facility]) Escherichia coli (A)     GRAM STAIN Gram Negative Rods (AA)     GRAM STAIN 2 of 2 bottles positive (AA)     GRAM STAIN Seen in gram stain of broth  only (AA)        Susceptibility    Escherichia coli -  (no method available)     Amox/K Clav 8 Sensitive      Ampicillin >=32 Resistant      Cefepime <=0.12 Sensitive      Ceftriaxone <=0.25 Sensitive      Cefuroxime <=1 Sensitive      Ciprofloxacin <=0.25 Sensitive      Gentamicin <=1 Sensitive      Levofloxacin <=0.12 Sensitive      Meropenem <=0.25 Sensitive      Piperacillin/Tazobactam <=4 Sensitive      Tobramycin <=1 Sensitive    Troponin I    Collection Time: 02/27/24 12:45 PM   Result Value Ref Range    Troponin-I 3.357 (H) 0.000 - 0.045 ng/mL   Vancomycin, Random    Collection Time: 02/28/24  2:12 AM   Result Value Ref Range    Vanc Lvl Random 14.5 (L) 15.0 - 20.0 ug/ml   Magnesium    Collection Time: 02/28/24  2:12 AM   Result Value Ref Range    Magnesium Level 2.40 1.60 - 2.60 mg/dL   Comprehensive Metabolic Panel    Collection Time: 02/28/24  2:12 AM   Result Value Ref Range    Sodium Level 130 (L) 136 - 145 mmol/L    Potassium Level 3.8 3.5 - 5.1 mmol/L    Chloride 106 98 - 107 mmol/L    Carbon Dioxide 19 (L) 23 - 31 mmol/L    Glucose Level 108 82 - 115 mg/dL    Blood Urea Nitrogen 35.9 (H) 9.8 - 20.1 mg/dL    Creatinine 2.22 (H) 0.55 - 1.02 mg/dL    Calcium Level Total 8.1 (L) 8.4 - 10.2 mg/dL    Protein Total 5.9 5.8 - 7.6 gm/dL    Albumin Level 2.0 (L) 3.4 - 4.8 g/dL    Globulin 3.9 (H) 2.4 - 3.5 gm/dL    Albumin/Globulin Ratio 0.5 (L) 1.1 - 2.0 ratio    Bilirubin Total 0.3 <=1.5 mg/dL    Alkaline Phosphatase 68 40 - 150 unit/L    Alanine Aminotransferase 19 0 - 55 unit/L    Aspartate Aminotransferase 34 5 - 34 unit/L    eGFR 21 mls/min/1.73/m2   CBC with Differential    Collection Time: 02/28/24  2:12 AM   Result Value Ref Range    WBC 21.07 (H) 4.50 - 11.50 x10(3)/mcL    RBC 3.05 (L) 4.20 - 5.40 x10(6)/mcL    Hgb 9.0 (L) 12.0 - 16.0 g/dL    Hct 27.7 (L) 37.0 - 47.0 %    MCV 90.8 80.0 - 94.0 fL    MCH 29.5 27.0 - 31.0 pg    MCHC 32.5 (L) 33.0 - 36.0 g/dL    RDW 17.0 11.5 - 17.0 %    Platelet 117 (L)  130 - 400 x10(3)/mcL    MPV 10.3 7.4 - 10.4 fL    NRBC% 0.0 %   Manual Differential    Collection Time: 02/28/24  2:12 AM   Result Value Ref Range    WBC 21.07 x10(3)/mcL    Neutrophils % 96 %    Lymphs % 2 %    Monocytes % 1 %    Metamyelocytes % 1 (H) <=0 %    Myelocytes % 1 (H) <=0 %    Neutrophils Abs 20.2272 (H) 2.1 - 9.2 x10(3)/mcL    Lymphs Abs 0.4214 (L) 0.6 - 4.6 x10(3)/mcL    Monocytes Abs 0.2107 0.1 - 1.3 x10(3)/mcL    Platelets Decreased (A) Normal, Adequate    RBC Morph Abnormal (A) Normal    Poikilocytosis 3+ (A) (none)    Anisocytosis 2+ (A) (none)    Macrocytosis 2+ (A) (none)    Adrianna Cells 3+ (A) (none)    Target Cells 1+ (A) (none)   POCT glucose    Collection Time: 02/28/24  2:41 PM   Result Value Ref Range    POCT Glucose 97 70 - 110 mg/dL   Urinalysis, Reflex to Urine Culture    Collection Time: 02/28/24  3:04 PM    Specimen: Urine   Result Value Ref Range    Color, UA Colorless Yellow, Light-Yellow, Colorless, Straw, Dark-Yellow    Appearance, UA Turbid (A) Clear    Specific Gravity, UA 1.010 1.005 - 1.030    pH, UA 5.5 5.0 - 8.5    Protein, UA Trace (A) Negative    Glucose, UA Normal Negative, Normal    Ketones, UA Negative Negative    Blood, UA 3+ (A) Negative    Bilirubin, UA Negative Negative    Urobilinogen, UA Normal 0.2, 1.0, Normal    Nitrites, UA Negative Negative    Leukocyte Esterase,  (A) Negative    WBC, UA >100 (A) None Seen, 0-2, 3-5, 0-5 /HPF    WBC Clumps, UA Moderate (A) None Seen, 0-5    Bacteria, UA Many (A) None Seen, Trace /HPF    Squamous Epithelial Cells, UA Trace None Seen /HPF    Mucous, UA Occasional (A) None Seen /LPF    RBC, UA >100 (A) None Seen, 0-2, 3-5, 0-5 /HPF   Sodium, Random Urine    Collection Time: 02/28/24  3:04 PM   Result Value Ref Range    Urine Sodium <20.0 mmol/L   Creatinine, Random Urine    Collection Time: 02/28/24  3:04 PM   Result Value Ref Range    Urine Creatinine 39.3 (L) 45.0 - 106.0 mg/dL   Osmolality, Urine    Collection Time:  02/28/24  3:04 PM   Result Value Ref Range    Urine Osmolality 199 (L) 300 - 1,300 mOsm/kg   Comprehensive Metabolic Panel    Collection Time: 02/29/24  1:52 AM   Result Value Ref Range    Sodium Level 134 (L) 136 - 145 mmol/L    Potassium Level 4.1 3.5 - 5.1 mmol/L    Chloride 108 (H) 98 - 107 mmol/L    Carbon Dioxide 19 (L) 23 - 31 mmol/L    Glucose Level 98 82 - 115 mg/dL    Blood Urea Nitrogen 35.4 (H) 9.8 - 20.1 mg/dL    Creatinine 1.93 (H) 0.55 - 1.02 mg/dL    Calcium Level Total 8.9 8.4 - 10.2 mg/dL    Protein Total 6.4 5.8 - 7.6 gm/dL    Albumin Level 2.1 (L) 3.4 - 4.8 g/dL    Globulin 4.3 (H) 2.4 - 3.5 gm/dL    Albumin/Globulin Ratio 0.5 (L) 1.1 - 2.0 ratio    Bilirubin Total 0.4 <=1.5 mg/dL    Alkaline Phosphatase 76 40 - 150 unit/L    Alanine Aminotransferase 20 0 - 55 unit/L    Aspartate Aminotransferase 28 5 - 34 unit/L    eGFR 25 mls/min/1.73/m2   Magnesium    Collection Time: 02/29/24  1:52 AM   Result Value Ref Range    Magnesium Level 2.30 1.60 - 2.60 mg/dL   CBC with Differential    Collection Time: 02/29/24  1:52 AM   Result Value Ref Range    WBC 16.41 (H) 4.50 - 11.50 x10(3)/mcL    RBC 3.05 (L) 4.20 - 5.40 x10(6)/mcL    Hgb 9.2 (L) 12.0 - 16.0 g/dL    Hct 27.4 (L) 37.0 - 47.0 %    MCV 89.8 80.0 - 94.0 fL    MCH 30.2 27.0 - 31.0 pg    MCHC 33.6 33.0 - 36.0 g/dL    RDW 17.4 (H) 11.5 - 17.0 %    Platelet 124 (L) 130 - 400 x10(3)/mcL    MPV 11.5 (H) 7.4 - 10.4 fL    Neut % 84.8 %    Lymph % 7.0 %    Mono % 6.8 %    Eos % 0.4 %    Basophil % 0.4 %    Lymph # 1.15 0.6 - 4.6 x10(3)/mcL    Neut # 13.92 (H) 2.1 - 9.2 x10(3)/mcL    Mono # 1.12 0.1 - 1.3 x10(3)/mcL    Eos # 0.06 0 - 0.9 x10(3)/mcL    Baso # 0.06 <=0.2 x10(3)/mcL    IG# 0.10 (H) 0 - 0.04 x10(3)/mcL    IG% 0.6 %    NRBC% 0.2 %     Telemetry:  Paced at 60 BPM    Physical Exam  Vitals and nursing note reviewed.   Constitutional:       General: She is not in acute distress.     Appearance: Normal appearance.   HENT:      Head: Normocephalic.       Mouth/Throat:      Mouth: Mucous membranes are moist.      Pharynx: Oropharynx is clear.   Cardiovascular:      Rate and Rhythm: Normal rate and regular rhythm.   Pulmonary:      Effort: Pulmonary effort is normal. No respiratory distress.      Breath sounds: Normal breath sounds. No wheezing or rales.      Comments: On Room Air  Abdominal:      Palpations: Abdomen is soft.      Tenderness: There is no abdominal tenderness.   Musculoskeletal:      Cervical back: Neck supple.      Right lower leg: No edema.      Left lower leg: No edema.   Skin:     General: Skin is warm and dry.   Neurological:      General: No focal deficit present.      Mental Status: She is alert.      Comments: At Baseline       Current Inpatient Medications:    Current Facility-Administered Medications:     acetaminophen suppository 650 mg, 650 mg, Rectal, Q6H PRN, Ally Erazo MD, 650 mg at 02/26/24 1613    acetaminophen tablet 1,000 mg, 1,000 mg, Oral, Q6H PRN, Soila Kruse FNP, 1,000 mg at 02/26/24 0512    aluminum-magnesium hydroxide-simethicone 200-200-20 mg/5 mL suspension 30 mL, 30 mL, Oral, QID PRN, Soila Kruse FNP    aspirin EC tablet 81 mg, 81 mg, Oral, Daily, Jeff Perez, FNP, 81 mg at 02/29/24 0919    atorvastatin tablet 10 mg, 10 mg, Oral, Nightly, Soila Kruse FNP, 10 mg at 02/28/24 2010    bisacodyL suppository 10 mg, 10 mg, Rectal, Daily PRN, Soila Kruse FNP    brimonidine 0.15 % OPTH DROP ophthalmic solution 1 drop, 1 drop, Both Eyes, BID, 1 drop at 02/29/24 0919 **AND** timolol maleate 0.5% ophthalmic solution 1 drop, 1 drop, Both Eyes, BID, Soila Kruse FNP, 1 drop at 02/29/24 0919    ciprofloxacin (CIPRO)400mg/200ml D5W IVPB 400 mg, 400 mg, Intravenous, Q24H, Heladio Suazo MD, Last Rate: 200 mL/hr at 02/29/24 0931, 400 mg at 02/29/24 0931    enoxaparin injection 70 mg, 1 mg/kg, Subcutaneous, Q24H (treatment, non-standard time), Angelia Babin FNP, 70 mg at 02/28/24 2780     hydrOXYzine pamoate capsule 25 mg, 25 mg, Oral, Q8H PRN, Dimitri Cummings MD    lactated ringers infusion, , Intravenous, Continuous, Satish Tobias DO, Last Rate: 75 mL/hr at 02/29/24 0631, New Bag at 02/29/24 0631    latanoprost 0.005 % ophthalmic solution 1 drop, 1 drop, Both Eyes, QHS, Soila Kruse, FNP, 1 drop at 02/28/24 2011    levothyroxine tablet 25 mcg, 25 mcg, Oral, Before breakfast, Soila Kruse, FNP, 25 mcg at 02/29/24 0631    melatonin tablet 6 mg, 6 mg, Oral, Nightly PRN, Soila Kruse, FNP    metoprolol injection 5 mg, 5 mg, Intravenous, Q5 Min PRN, Angelia Babin FNP    mupirocin 2 % ointment, , Nasal, BID, Ally Erazo MD, Given at 02/29/24 0919    naloxone 0.4 mg/mL injection 0.02 mg, 0.02 mg, Intravenous, PRN, Soila Kruse FNP    NORepinephrine 8 mg in dextrose 5% 250 mL infusion, 0-3 mcg/kg/min, Intravenous, Continuous, Satish Tobias DO, Stopped at 02/27/24 1903    ondansetron injection 4 mg, 4 mg, Intravenous, Q4H PRN, Soila Kruse, FNP    piperacillin-tazobactam (ZOSYN) 4.5 g in dextrose 5 % in water (D5W) 100 mL IVPB (MB+), 4.5 g, Intravenous, Q12H, Heladio Suazo MD, Stopped at 02/29/24 0305    prochlorperazine injection Soln 5 mg, 5 mg, Intravenous, Q6H PRN, Soila Kruse FNP    risperiDONE tablet 2 mg, 2 mg, Oral, QHS, Soila Kruse, FNP, 2 mg at 02/28/24 2010    rivastigmine tartrate capsule 1.5 mg, 1.5 mg, Oral, BID, Soila Kruse, FNP, 1.5 mg at 02/29/24 0919    senna-docusate 8.6-50 mg per tablet 1 tablet, 1 tablet, Oral, BID PRN, Soila Kruse, IFTIKHAR    sodium chloride 0.9% bolus 1,000 mL 1,000 mL, 1,000 mL, Intravenous, Once, Josseline Nunez AGACNP-BC    sodium chloride 0.9% flush 10 mL, 10 mL, Intravenous, PRN, Soila Kruse FNP  VTE Risk Mitigation (From admission, onward)           Ordered     enoxaparin injection 70 mg  Every 24 hours         02/26/24 1209     IP VTE HIGH RISK PATIENT  Once         02/25/24 2209     Place  sequential compression device  Until discontinued         02/25/24 2209     Reason for No Pharmacological VTE Prophylaxis  Once        Question:  Reasons:  Answer:  Already adequately anticoagulated on oral Anticoagulants    02/25/24 2209                  Assessment:   NSTEMI (Unspecified for now)    - EKG: SR with ST Segment Depression in Inferior & Anterolateral Leads    - Denies Angina/SOB at Current Time    - EF 55-60%  Urosepsis Requiring Vasopressor Support (Resolved)- Levophed has been weaned off    - Leukocytosis  Bacteremia  History of Hypertension (BP Now Stable)  Chronic Diastolic CHF (Compensated)  PAF - Currently Paced     - [id number] Score 5 (7.2% Stroke risk Per Year)    - on Eliquis (Last Dose 2.26.24 AM)- Now on FD Lovenox  SSS    - Status Post PPM (9.19.19): [employer]   PAD  CVD    - Left Carotid Atherosclerosis  Hyperlipidemia    - On Statin  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 Chávez  VHD    - Mild MR    - Mold to Moderate TR  YARI  GERD  Iron Deficiency Anemia   Thrombocytopenia   Right Knee Osteoarthritis  Dementia    - CT Head Negative for Acute Abnormality  Glaucoma  Hypothyroidism    - On Oral Replacement Therapy  History of GI Bleed    Plan:   Resume Coreg 3.125 Mg BID  Continue Aspirin 81 Mg Daily  Continue FD Lovenox Re: NSTEMI Treatment & PAF/Stroke Risk Reduction (Eliquis on Hold)  Will plan C once medically optimized, will need renal optimization & Treatment of Sepsis prior to proceeding with invasive cardiac workup.  Renal Team Following for Optimization- Appreciate the Assistance    IFTIKHAR Yee  Cardiology  [facility] - [address] [place] [address]  02/29/2024     I agree with the findings of the complexity of problems addressed and take responsibility for the plan's risks and complications. I approved the plan documented by Jeff Perez NP.  C when stable  Treating for urosepsis

## 2024-02-29 NOTE — PROGRESS NOTES
[facility]  Hospital Medicine Progress Note        Chief Complaint: Inpatient Follow-up for c/o generalized weakness.     HPI: 89 yr old female whose history includes HTN, hypothyroidism, CKD with solitary kidney, PAF. Presented to ED with c/o generalized weakness.      Just discharged on 2/21/24 following a 2 day admission during which time she was treated for generalized weakness and age related physical debility with polypharmacy. Trazodone dose decreased and lyrica, meclizine and requip discontinued. Prior to discharge she had improved mobility and discharged home with family and home health. Unclear if patient stopped these medications as instructed because her medications come prepackaged.      At the time of md's exam patient is AAO x 3. Reports she was ambulating with her walker when both legs became very weak and she was unable to walk any further. Denies falling but  reports she's had multiple falls in the past. Denies any fever, chest pain, SOB, vomiting or diarrhea. Chest x-ray shows enlarged cardiac silhouette without edema. CT - lumbar spine shows nondisplaced fracture at S4 of indeterminate age. CT T-spine negative for acute findings. .  CT head negative     VS on arrival: T 99.2, P 73, R 19, B/P 108/56, Sats 96% on room air. Initial labs WBC 16.8, Hgb 10.8, Hct 35, K 3.4, BUN 32.6, creatinine 1.91, Troponin 0.191 (repeat 0.199). EKG shows SR with ST-T changes in anterior and inferolateral leads.     Started on antibiotics for UTI, found to have NSTEMI.  Upgraded to the ICU for vasopressor support.  Found to have bacteremia.  Continues on antibiotics.  Cardiology planning for catheterization once renal function optimized.  Now being downgraded to the floors.       Interval Hx:   Nephrology was consulted.   Awaits [facility], Cardiology continues to follow up.  Continues to be on antibiotics for sensitivities.No overnight events reported.    Chart was reviewed, afebrile,  leukocytosis improving, 16.4 today, hemoglobin 9.2, platelets 124.  With e coli    Objective/physical exam:  General: In no acute distress, afebrile  Chest: Clear to auscultation bilaterally  Heart: RRR, +S1, S2, no appreciable murmur  Abdomen: Soft, nontender, BS +  MSK: Warm, no lower extremity edema, no clubbing or cyanosis  Neurologic: Alert and oriented x4, Cranial nerve II-XII intact, Strength 5/5 in all 4 extremities    VITAL SIGNS: 24 HRS MIN & MAX LAST   Temp  Min: 97.6 °F (36.4 °C)  Max: 98 °F (36.7 °C) 98 °F (36.7 °C)   BP  Min: 111/60  Max: 143/82 119/72   Pulse  Min: 58  Max: 65  60   Resp  Min: 14  Max: 23 20   SpO2  Min: 93 %  Max: 100 % 98 %     I have reviewed the following labs:  Recent Labs   Lab 02/27/24 0418 02/28/24 0212 02/29/24  0152   WBC 38.5  38.48* 21.07  21.07* 16.41*   RBC 3.62* 3.05* 3.05*   HGB 10.9* 9.0* 9.2*   HCT 33.2* 27.7* 27.4*   MCV 91.7 90.8 89.8   MCH 30.1 29.5 30.2   MCHC 32.8* 32.5* 33.6   RDW 17.2* 17.0 17.4*    117* 124*   MPV 11.3* 10.3 11.5*     Recent Labs   Lab 02/27/24 0418 02/28/24 0212 02/29/24  0152   * 130* 134*   K 4.4 3.8 4.1   CO2 15* 19* 19*   BUN 35.4* 35.9* 35.4*   CREATININE 2.67* 2.22* 1.93*   CALCIUM 8.5 8.1* 8.9   MG 2.60 2.40 2.30   ALBUMIN 2.4* 2.0* 2.1*   ALKPHOS 89 68 76   ALT 18 19 20   AST 43* 34 28   BILITOT 0.9 0.3 0.4     Microbiology Results (last 7 days)       Procedure Component Value Units Date/Time    Blood Culture [[id number]]  (Abnormal)  (Susceptibility) Collected: 02/27/24 0418    Order Status: Completed Specimen: Blood Updated: 02/29/24 0653     CULTURE, BLOOD ([facility]) Escherichia coli     GRAM STAIN Gram Negative Rods      Seen in gram stain of broth only      2 of 2 bottles positive    Blood Culture [[id number]]  (Abnormal)  (Susceptibility) Collected: 02/27/24 0655    Order Status: Completed Specimen: Blood from Antecubital, Left Updated: 02/29/24 0653     CULTURE, BLOOD ([facility]) Escherichia coli     GRAM STAIN Gram  Negative Rods      2 of 2 bottles positive      Seen in gram stain of broth only    BCID2 Panel [[id number]]  (Abnormal) Collected: 02/27/24 0418    Order Status: Completed Specimen: Blood Updated: 02/27/24 1556     CTX-M (ESBL ) Not Detected     IMP (Cabapenemase ) Not Detected     KPC resistance gene (Carbapenemase ) Not Detected     mcr-1 Not Detected     mecA ID N/A     Comment: Note: Antimicrobial resistance can occur via multiple mechanisms. A Not Detected result for antimicrobial resistance gene(s) does not indicate antimicrobial susceptibility. Subculturing is required for species identification and susceptibility testing of   isolates.        mecA/C and MREJ (MRSA) gene N/A     NDM (Carbapenemase ) Not Detected     OXA-48-like (Carbapenemase ) Not Detected     Sinai/B (VRE gene) N/A     VIM (Carbapenemase ) Not Detected     Enterococcus faecalis Not Detected     Enterococcus faecium Not Detected     Listeria monocytogenes Not Detected     Staphylococcus spp. Not Detected     Staphylococcus aureus Not Detected     Staphylococcus epidermidis Not Detected     Staphylococcus lugdunensis Not Detected     Streptococcus spp. Not Detected     Streptococcus agalactiae (Group B) Not Detected     Streptococcus pneumoniae Not Detected     Streptococcus pyogenes (Group A) Not Detected     Acinetobacter calcoaceticus/baumannii complex Not Detected     Bacteroides fragilis Not Detected     Enterobacterales Detected     Enterobacter cloacae complex Not Detected     Escherichia coli Detected     Klebsiella aerogenes Not Detected     Klebsiella oxytoca Not Detected     Klebsiella pneumoniae group Not Detected     Proteus spp. Not Detected     Salmonella spp. Not Detected     Serratia marcescens Not Detected     Haemophilus influenzae Not Detected     Neisseria meningitidis Not Detected     Pseudomonas aeruginosa Not Detected     Stenotrophomonas maltophilia Not Detected      Candida albicans Not Detected     Candida auris Not Detected     Candida glabrata Not Detected     Candida krusei Not Detected     Candida parapsilosis Not Detected     Candida tropicalis Not Detected     Cryptococcus neoformans/gattii Not Detected    Narrative:      The [employer] BCID2 Panel is a multiplexed nucleic acid test intended for the use with [employer]® 2.0 or [employer]® [employer] for the simultaneous qualitative detection and identification of multiple bacterial and yeast nucleic acids and select genetic determinants associated with antimicrobial resistance.  The [employer] BCID2 Panel test is performed directly on blood culture samples identified as positive by a continuous monitoring blood culture system.  Results are intended to be interpreted in conjunction with Gram stain results.    Urine culture [[id number]]  (Abnormal)  (Susceptibility) Collected: 02/25/24 1934    Order Status: Completed Specimen: Urine Updated: 02/27/24 0903     Urine Culture >/= 100,000 colonies/ml Escherichia coli    Blood Culture [[id number]]     Order Status: Canceled Specimen: Blood     Blood Culture [[id number]]     Order Status: Canceled Specimen: Blood              See below for Radiology    Scheduled Med:   aspirin  81 mg Oral Daily    atorvastatin  10 mg Oral Nightly    brimonidine 0.15 % OPTH DROP  1 drop Both Eyes BID    And    timolol maleate 0.5%  1 drop Both Eyes BID    carvediloL  3.125 mg Oral BID    cefTRIAXone (Rocephin) IV (PEDS and ADULTS)  2 g Intravenous Q24H    enoxparin  1 mg/kg Subcutaneous Q24H (treatment, non-standard time)    latanoprost  1 drop Both Eyes QHS    levothyroxine  25 mcg Oral Before breakfast    mupirocin   Nasal BID    risperiDONE  2 mg Oral QHS    rivastigmine tartrate  1.5 mg Oral BID    sodium chloride 0.9%  1,000 mL Intravenous Once      Continuous Infusions:   lactated ringers 50 mL/hr at 02/29/24 1313    NORepinephrine bitartrate-D5W Stopped (02/27/24 1903)       PRN Meds:  acetaminophen, acetaminophen, aluminum-magnesium hydroxide-simethicone, bisacodyL, hydrOXYzine pamoate, melatonin, metoprolol, naloxone, ondansetron, prochlorperazine, senna-docusate 8.6-50 mg, sodium chloride 0.9%     Assessment/Plan:  Septic shock secondary to E coli UTI  Gram-negative bacteremia from above  Leukocytosis from above  NSTEMI, unspecified, awaiting left heart catheterization  MICHEL on CKD  Nondisplaced S4  Vertebral fracture, age indeterminate  Paroxysmal atrial fibrillation  Currently paced.  Bernard 2 Vasc score 5.  On Eliquis last dose 2/26.  Now on Lovenox  Sick sinus syndrome-s/p permanent pacemaker 09/19/2019, [employer]  HFpEF - LVEF 55-60% - ECHO 9/15/19  Hypertension   Iron deficiency anemia  Hypothyroidism   YARI  GERD  Glaucoma  Diverticular disease    Plan   Continue ceftriaxone.  May need 2 weeks.  Monitor fever curve and WBC.    Cardiology planning for catheterization once renal function optimized.   Nephrology following, on fluids, improving.  Over nephrotoxins.  Analgesics p.r.n. therapy following  Monitor on telemetry, monitor electrolytes, on Lovenox for anticoagulation  Continue supportive care, frequent turning, monitor for decubitus ulcers, monitor bowel movements.  Home medications reviewed    Critical care Time Spent>35 min  Sepsis requiring IV antibiotics    VTE prophylaxis:  Lovenox       Anticipated discharge and Disposition:   To be decided      All diagnosis and differential diagnosis have been reviewed; assessment and plan has been documented; I have personally reviewed the labs and test results that are presently available; I have reviewed the patients medication list; I have reviewed the consulting providers response and recommendations. I have reviewed or attempted to review medical records based upon their availability    All of the patient's questions have been  addressed and answered. Patient's is agreeable to the above stated plan. I will continue to monitor  closely and make adjustments to medical management as needed.  _____________________________________________________________________    Nutrition Status:    Radiology:  I have personally reviewed the following imaging and agree with the radiologist.     US Retroperitoneal Complete  Narrative: EXAMINATION:  US RETROPERITONEAL COMPLETE    CLINICAL HISTORY:  MICHEL.    TECHNIQUE:  Multiple real-time images were performed of the kidneys in various planes by the sonographer.    COMPARISON:  None available.    FINDINGS:  The exam was technically difficult to perform due to body habitus and large bowel gas.    Patient is status post left nephrectomy.  Right kidney is small in size and measures 7.3 x 4.6 x 4.2 cm.  Right kidney is seen with limitations.  Grossly, right kidney corticomedullary differentiation is preserved and there is no definite hydronephrosis.    Urinary bladder is decompressed and difficult to assess.  Impression: 1.  Limited study due to patient's body habitus and bowel gas.    2.  Status post left nephrectomy.    3.  Small right kidney.    Electronically signed by: Theodore Prince  Date:    02/28/2024  Time:    21:22      Vanessa Mays MD  Department of Hospital Medicine   [facility]   02/29/2024

## 2024-02-29 NOTE — CARE UPDATE
I, Dr. Mays seen the patient today.    89-year-old old female with past medical history of hypertension, hypothyroidism, CKD with solitary kidney, PAF  present to the hospital for generalized weakness, started on antibiotics for UTI, found to have NSTEMI.  Upgraded to the ICU for vasopressor support.  Found to have bacteremia.Cardiology planning for catheterization once renal function optimized.  Now being downgraded to the floors.  Patient was seen and examined.  Denied any symptoms.  Case was discussed with the staff and the family.  Labs were reviewed.  Found to be with leukocytosis.  WBC 21.  Sodium 130, creatinine 2.2.  Bicarb 19.  With Gram-negative rods in blood    Medical decision making  Septic shock secondary to E coli UTI  Gram-negative bacteremia  NSTEMI  MICHEL on CKD  Paroxysmal atrial fibrillation  Currently paced.  Bernard 2 Vasc score 5.  On Eliquis last dose 2/26  Sick sinus syndrome-s/p permanent pacemaker 09/19/2019, [employer]  Vertebral fracture      Past medical history hypertension, PAD. hypothyroidism, CKD with solitary kidney    Plan  Continue current antibiotics, follow up on cultures and sensitivities.  May need at least 2 weeks of antibiotics.  Monitor fever curve and WBC  Cardiology planning for catheterization once renal function optimized.   Considering Nephrology consult, on LR,check renal ultrasound,urine studies  Monitor on telemetry, monitor electrolytes.  On lovenox anticoagulation  Analgesics p.r.n.  Continue supportive care, frequent turning, monitor for decubitus ulcers, monitor bowel movements.  Therapy following  Home medications reviewed

## 2024-02-29 NOTE — PROGRESS NOTES
[facility] - [facility]  Nephrology  Progress Note    Patient Name: Ev Alberts  MRN: [id number]  Admission Date: 2/25/2024  Hospital Length of Stay: 4 days  Attending Provider: Vanessa Mays MD   Primary Care Physician: Santi Walters MD  Principal Problem:Debility      Subjective:     HPI:  This is an 89-year-old female with hypertension, dementia, sick sinus syndrome, thyroid disease, nephrectomy 50 years prior admitted through the ED on 02/25 with complaints of not being able to walk since prior discharge on 02/21.  Admission workup revealed mild hypokalemia, renal insufficiency with creatinine 1.9, hypercalcemia and elevated troponin which later peaked at 3.5.  Low-grade fever present.  Urine culture from 02/25 and blood cultures from 02/27 all positive for E coli.   She was transferred to ICU on 02/26 for vaso pressor requirements after fluid resuscitation with 2 L normal saline.  Cardiology planning for left heart catheterization once medically optimized.  As of this morning Levophed has been discontinued.      Creatinine on admission 1.9 later peaked at 2.6 yesterday now improved to 2.2.  Urine output 875 cc over the past 24 hours.  During last admission she was discharged with creatinine 1.7 down from max of 2.8.  Of note, both this and most recent admission she was noted to have hypercalcemia during ER workup.  Both times hypercalcemia resolved. Left nephrectomy done 50 years ago secondary to pyelonephritis. No recurrent infection or renal stones. No NSAID use. She is currently eating lunch with assistance of . Receiving NS at 100 mL/hr.     Interval History: Renal indices further improved today. Cr returned to admit value of 1.9 with good UOP. She is now off oxygen.     Review of patient's allergies indicates:   Allergen Reactions    Amlodipine-benazepril Edema     Other reaction(s): unknown    Corticosteroids (glucocorticoids) Edema and Swelling    Rofecoxib Anaphylaxis and  Swelling    Sulfa (sulfonamide antibiotics) Edema    Atorvastatin      Other reaction(s): unknown    Ibuprofen      Other reaction(s): Allergy to sulfa drugs     Current Facility-Administered Medications   Medication Frequency    acetaminophen suppository 650 mg Q6H PRN    acetaminophen tablet 1,000 mg Q6H PRN    aluminum-magnesium hydroxide-simethicone 200-200-20 mg/5 mL suspension 30 mL QID PRN    aspirin EC tablet 81 mg Daily    atorvastatin tablet 10 mg Nightly    bisacodyL suppository 10 mg Daily PRN    brimonidine 0.15 % OPTH DROP ophthalmic solution 1 drop BID    And    timolol maleate 0.5% ophthalmic solution 1 drop BID    ciprofloxacin (CIPRO)400mg/200ml D5W IVPB 400 mg Q24H    enoxaparin injection 70 mg Q24H (treatment, non-standard time)    hydrOXYzine pamoate capsule 25 mg Q8H PRN    lactated ringers infusion Continuous    latanoprost 0.005 % ophthalmic solution 1 drop QHS    levothyroxine tablet 25 mcg Before breakfast    melatonin tablet 6 mg Nightly PRN    metoprolol injection 5 mg Q5 Min PRN    mupirocin 2 % ointment BID    naloxone 0.4 mg/mL injection 0.02 mg PRN    NORepinephrine 8 mg in dextrose 5% 250 mL infusion Continuous    ondansetron injection 4 mg Q4H PRN    piperacillin-tazobactam (ZOSYN) 4.5 g in dextrose 5 % in water (D5W) 100 mL IVPB (MB+) Q12H    prochlorperazine injection Soln 5 mg Q6H PRN    risperiDONE tablet 2 mg QHS    rivastigmine tartrate capsule 1.5 mg BID    senna-docusate 8.6-50 mg per tablet 1 tablet BID PRN    sodium chloride 0.9% bolus 1,000 mL 1,000 mL Once    sodium chloride 0.9% flush 10 mL PRN       Objective:     Vital Signs (Most Recent):  Temp: 97.6 °F (36.4 °C) (02/28/24 1600)  Pulse: 65 (02/28/24 1930)  Resp: 18 (02/28/24 1930)  BP: 111/60 (02/28/24 1639)  SpO2: 99 % (02/28/24 1938) Vital Signs (24h Range):  Temp:  [97.6 °F (36.4 °C)-98 °F (36.7 °C)] 97.6 °F (36.4 °C)  Pulse:  [56-65] 65  Resp:  [14-20] 18  SpO2:  [93 %-100 %] 99 %  BP: ()/(53-82)  111/60  Arterial Line BP: (121-170)/(47-57) 170/57     Weight: 69.9 kg (154 lb) (02/25/24 1307)  Body mass index is 25.63 kg/m².  Body surface area is 1.79 meters squared.    I/O last 3 completed shifts:  In: 2595.9 [P.O.:240; I.V.:1957.9; IV Piggyback:398]  Out: 2315 [Urine:2315]    Physical Exam  Constitutional:       General: She is not in acute distress.  HENT:      Head: Atraumatic.      Mouth/Throat:      Mouth: Mucous membranes are moist.   Eyes:      Extraocular Movements: Extraocular movements intact.      Conjunctiva/sclera: Conjunctivae normal.   Cardiovascular:      Rate and Rhythm: Normal rate and regular rhythm.      Heart sounds: Normal heart sounds.   Pulmonary:      Effort: Pulmonary effort is normal.      Breath sounds: Normal breath sounds.   Abdominal:      General: There is no distension.      Palpations: Abdomen is soft.   Musculoskeletal:         General: Swelling present.   Skin:     General: Skin is warm.   Neurological:      Mental Status: She is alert and oriented to person, place, and time.   Psychiatric:         Mood and Affect: Mood normal.         Behavior: Behavior normal.         Significant Labs:sureBMP:   Recent Labs   Lab 02/29/24 0152   *   K 4.1   CO2 19*   BUN 35.4*   CREATININE 1.93*   CALCIUM 8.9   MG 2.30     CBC:   Recent Labs   Lab 02/29/24 0152   WBC 16.41*   RBC 3.05*   HGB 9.2*   HCT 27.4*   *   MCV 89.8   MCH 30.2   MCHC 33.6     Microbiology Results (last 7 days)       Procedure Component Value Units Date/Time    Blood Culture [[id number]]  (Abnormal)  (Susceptibility) Collected: 02/27/24 0418    Order Status: Completed Specimen: Blood Updated: 02/29/24 0653     CULTURE, BLOOD ([facility]) Escherichia coli     GRAM STAIN Gram Negative Rods      Seen in gram stain of broth only      2 of 2 bottles positive    Blood Culture [[id number]]  (Abnormal)  (Susceptibility) Collected: 02/27/24 0655    Order Status: Completed Specimen: Blood from Antecubital, Left  Updated: 02/29/24 0653     CULTURE, BLOOD ([facility]) Escherichia coli     GRAM STAIN Gram Negative Rods      2 of 2 bottles positive      Seen in gram stain of broth only    BCID2 Panel [[id number]]  (Abnormal) Collected: 02/27/24 0418    Order Status: Completed Specimen: Blood Updated: 02/27/24 1556     CTX-M (ESBL ) Not Detected     IMP (Cabapenemase ) Not Detected     KPC resistance gene (Carbapenemase ) Not Detected     mcr-1 Not Detected     mecA ID N/A     Comment: Note: Antimicrobial resistance can occur via multiple mechanisms. A Not Detected result for antimicrobial resistance gene(s) does not indicate antimicrobial susceptibility. Subculturing is required for species identification and susceptibility testing of   isolates.        mecA/C and MREJ (MRSA) gene N/A     NDM (Carbapenemase ) Not Detected     OXA-48-like (Carbapenemase ) Not Detected     Sinai/B (VRE gene) N/A     VIM (Carbapenemase ) Not Detected     Enterococcus faecalis Not Detected     Enterococcus faecium Not Detected     Listeria monocytogenes Not Detected     Staphylococcus spp. Not Detected     Staphylococcus aureus Not Detected     Staphylococcus epidermidis Not Detected     Staphylococcus lugdunensis Not Detected     Streptococcus spp. Not Detected     Streptococcus agalactiae (Group B) Not Detected     Streptococcus pneumoniae Not Detected     Streptococcus pyogenes (Group A) Not Detected     Acinetobacter calcoaceticus/baumannii complex Not Detected     Bacteroides fragilis Not Detected     Enterobacterales Detected     Enterobacter cloacae complex Not Detected     Escherichia coli Detected     Klebsiella aerogenes Not Detected     Klebsiella oxytoca Not Detected     Klebsiella pneumoniae group Not Detected     Proteus spp. Not Detected     Salmonella spp. Not Detected     Serratia marcescens Not Detected     Haemophilus influenzae Not Detected     Neisseria meningitidis Not Detected      Pseudomonas aeruginosa Not Detected     Stenotrophomonas maltophilia Not Detected     Candida albicans Not Detected     Candida auris Not Detected     Candida glabrata Not Detected     Candida krusei Not Detected     Candida parapsilosis Not Detected     Candida tropicalis Not Detected     Cryptococcus neoformans/gattii Not Detected    Narrative:      The [employer] BCID2 Panel is a multiplexed nucleic acid test intended for the use with [employer]® 2.0 or [employer]® Clinical Insight Systems for the simultaneous qualitative detection and identification of multiple bacterial and yeast nucleic acids and select genetic determinants associated with antimicrobial resistance.  The [employer] BCID2 Panel test is performed directly on blood culture samples identified as positive by a continuous monitoring blood culture system.  Results are intended to be interpreted in conjunction with Gram stain results.    Urine culture [[id number]]  (Abnormal)  (Susceptibility) Collected: 02/25/24 1934    Order Status: Completed Specimen: Urine Updated: 02/27/24 0903     Urine Culture >/= 100,000 colonies/ml Escherichia coli    Blood Culture [[id number]]     Order Status: Canceled Specimen: Blood     Blood Culture [[id number]]     Order Status: Canceled Specimen: Blood           Specimen (24h ago, onward)      None          Recent Labs   Lab 02/28/24  1504   PROTEINUA Trace*   BACTERIA Many*   LEUKOCYTESUR 500*   UROBILINOGEN Normal       Significant Imaging:  US Retroperitoneal Complete [[id number]] Resulted: 02/28/24 2122   Order Status: Completed Updated: 02/28/24 2125   Narrative:     EXAMINATION:  US RETROPERITONEAL COMPLETE    CLINICAL HISTORY:  MICHEL.    TECHNIQUE:  Multiple real-time images were performed of the kidneys in various planes by the sonographer.    COMPARISON:  None available.    FINDINGS:  The exam was technically difficult to perform due to body habitus and large bowel gas.    Patient is status post left nephrectomy.   Right kidney is small in size and measures 7.3 x 4.6 x 4.2 cm.  Right kidney is seen with limitations.  Grossly, right kidney corticomedullary differentiation is preserved and there is no definite hydronephrosis.    Urinary bladder is decompressed and difficult to assess.   Impression:       1.  Limited study due to patient's body habitus and bowel gas.    2.  Status post left nephrectomy.    3.  Small right kidney.      Electronically signed by: Theodore Prince  Date: 02/28/2024  Time: 21:22       Assessment/Plan:     MICHEL resolving secondary to suspected clinical dehydration and sepsis  CKD-baseline suspected 1.7 with small solitary right kidney   Urosepsis - urine culture 2/25 and Blood culture 2/27 positive for E coli  NSTEMI  Metabolic acidosis  Hyponatremia     Decrease NS to 50 mL/hr   Repeat lab tomorrow. Renal function near baseline     NIYAH Thomas  Nephrology  [facility] - [facility] ICU

## 2024-02-29 NOTE — PT/OT/SLP PROGRESS
Physical Therapy Treatment    Patient Name:  Ev Alberts   MRN:  [id number]    Recommendations:     Discharge therapy intensity: Low Intensity Therapy   Discharge Equipment Recommendations: none  Barriers to discharge: Impaired mobility    Assessment:     Ev Alberts is a 89 y.o. female admitted with a medical diagnosis of UTI, sepsis, S4 vertebral body fracture (chronic), physical deconditioning. Pt with medical decline after being initially admitted to hospital medicine requiring upgrade to ICU d/y hypotension requiring pressors.  She presents with the following impairments/functional limitations: weakness, gait instability, impaired endurance, impaired balance, impaired self care skills, impaired functional mobility, decreased ROM, decreased lower extremity function, pain.    Rehab Prognosis: Fair; patient would benefit from acute skilled PT services to address these deficits and reach maximum level of function.    Recent Surgery: * No surgery found *      Plan:     During this hospitalization, patient to be seen 3 x/week to address the identified rehab impairments via gait training, therapeutic activities, therapeutic exercises, wheelchair management/training and progress toward the following goals:    Plan of Care Expires:  03/28/24    Subjective     Chief Complaint: no major complaints; reports her R knee is more painful than the L  Patient/Family Comments/goals: to get home  Pain/Comfort:  Pain Rating 1: other (see comments) (chronic knee pain; did not rate)  Pain Addressed 1: Reposition, Distraction      Objective:     Communicated with RN prior to session.  Patient found HOB elevated with blood pressure cuff, pulse ox (continuous), telemetry, PureWick upon PT entry to room.     General Precautions: Standard, fall  Orthopedic Precautions: N/A  Braces: N/A  Respiratory Status: Room air  Blood Pressure: 144/72, HR 66  BP after t/f to chair 139/77  BP after standing for ~ 20 seconds 137/76 (reported mild  dizziness)  Skin Integrity: Visible skin intact; known sacral ulcer but did not observe during session      Functional Mobility:  Bed Mobility:    Supine to sit with max x 2 assist  Scooting with mod assist  Transfers:    Sit<->stand with mod assist at RW  Stood from recliner with mod assist  Posterior lean upon standing requiring cues to correct  Gait: Side steps at EOB x 2' then again towards recliner ~ 4'    Therapeutic Activities/Exercises:      Education:  Patient and daughter/s were provided with verbal education education regarding PT role/goals/POC.  Understanding was verbalized.     Patient left up in chair with all lines intact, call button in reach, nurse notified, family present, and bryanna pad placed ..    GOALS:   Multidisciplinary Problems       Physical Therapy Goals          Problem: Physical Therapy    Goal Priority Disciplines Outcome Goal Variances Interventions   Physical Therapy Goal     PT, PT/OT Ongoing, Progressing     Description: Goals to be met by: 3/28/24     Patient will increase functional independence with mobility by performin. Supine to sit with MInimal Assistance  2. Sit to supine with MInimal Assistance  2. Sit to stand transfer with Minimal Assistance  4. Bed to chair transfer with Minimal Assistance using Rolling Walker  5. Gait  x 50 feet with Minimal Assistance using Rolling Walker.   6. Wheelchair propulsion x150 feet with Minimal Assistance using bilateral uppper extremities                         Time Tracking:     PT Received On: 24  PT Start Time: 1354     PT Stop Time: 1426  PT Total Time (min): 32 min     Billable Minutes: Therapeutic Activity 2 units    Treatment Type: Treatment  PT/PTA: PTA     Number of PTA visits since last PT visit: 2024

## 2024-03-01 LAB
ALBUMIN SERPL-MCNC: 2.4 G/DL (ref 3.4–4.8)
ALBUMIN/GLOB SERPL: 0.6 RATIO (ref 1.1–2)
ALP SERPL-CCNC: 94 UNIT/L (ref 40–150)
ALT SERPL-CCNC: 43 UNIT/L (ref 0–55)
AST SERPL-CCNC: 54 UNIT/L (ref 5–34)
BASOPHILS # BLD AUTO: 0.04 X10(3)/MCL
BASOPHILS NFR BLD AUTO: 0.4 %
BILIRUB SERPL-MCNC: 0.4 MG/DL
BUN SERPL-MCNC: 31.2 MG/DL (ref 9.8–20.1)
CALCIUM SERPL-MCNC: 8.9 MG/DL (ref 8.4–10.2)
CHLORIDE SERPL-SCNC: 106 MMOL/L (ref 98–107)
CO2 SERPL-SCNC: 23 MMOL/L (ref 23–31)
CREAT SERPL-MCNC: 1.82 MG/DL (ref 0.55–1.02)
EOSINOPHIL # BLD AUTO: 0.02 X10(3)/MCL (ref 0–0.9)
EOSINOPHIL NFR BLD AUTO: 0.2 %
ERYTHROCYTE [DISTWIDTH] IN BLOOD BY AUTOMATED COUNT: 17.2 % (ref 11.5–17)
GFR SERPLBLD CREATININE-BSD FMLA CKD-EPI: 27 MLS/MIN/1.73/M2
GLOBULIN SER-MCNC: 4.1 GM/DL (ref 2.4–3.5)
GLUCOSE SERPL-MCNC: 111 MG/DL (ref 82–115)
HCT VFR BLD AUTO: 31.1 % (ref 37–47)
HGB BLD-MCNC: 10.3 G/DL (ref 12–16)
IMM GRANULOCYTES # BLD AUTO: 0.06 X10(3)/MCL (ref 0–0.04)
IMM GRANULOCYTES NFR BLD AUTO: 0.6 %
LYMPHOCYTES # BLD AUTO: 1.41 X10(3)/MCL (ref 0.6–4.6)
LYMPHOCYTES NFR BLD AUTO: 13.9 %
MAGNESIUM SERPL-MCNC: 2.2 MG/DL (ref 1.6–2.6)
MCH RBC QN AUTO: 29.8 PG (ref 27–31)
MCHC RBC AUTO-ENTMCNC: 33.1 G/DL (ref 33–36)
MCV RBC AUTO: 89.9 FL (ref 80–94)
MONOCYTES # BLD AUTO: 0.89 X10(3)/MCL (ref 0.1–1.3)
MONOCYTES NFR BLD AUTO: 8.8 %
NEUTROPHILS # BLD AUTO: 7.69 X10(3)/MCL (ref 2.1–9.2)
NEUTROPHILS NFR BLD AUTO: 76.1 %
NRBC BLD AUTO-RTO: 0.4 %
PLATELET # BLD AUTO: 150 X10(3)/MCL (ref 130–400)
PMV BLD AUTO: 11.2 FL (ref 7.4–10.4)
POTASSIUM SERPL-SCNC: 4.4 MMOL/L (ref 3.5–5.1)
PROT SERPL-MCNC: 6.5 GM/DL (ref 5.8–7.6)
RBC # BLD AUTO: 3.46 X10(6)/MCL (ref 4.2–5.4)
SODIUM SERPL-SCNC: 133 MMOL/L (ref 136–145)
WBC # SPEC AUTO: 10.11 X10(3)/MCL (ref 4.5–11.5)

## 2024-03-01 PROCEDURE — 21400001 HC TELEMETRY ROOM

## 2024-03-01 PROCEDURE — 25000003 PHARM REV CODE 250: Performed by: STUDENT IN AN ORGANIZED HEALTH CARE EDUCATION/TRAINING PROGRAM

## 2024-03-01 PROCEDURE — 85025 COMPLETE CBC W/AUTO DIFF WBC: CPT

## 2024-03-01 PROCEDURE — 63600175 PHARM REV CODE 636 W HCPCS

## 2024-03-01 PROCEDURE — 63600175 PHARM REV CODE 636 W HCPCS: Performed by: STUDENT IN AN ORGANIZED HEALTH CARE EDUCATION/TRAINING PROGRAM

## 2024-03-01 PROCEDURE — 83735 ASSAY OF MAGNESIUM: CPT

## 2024-03-01 PROCEDURE — 25000003 PHARM REV CODE 250: Performed by: NURSE PRACTITIONER

## 2024-03-01 PROCEDURE — 80053 COMPREHEN METABOLIC PANEL: CPT

## 2024-03-01 PROCEDURE — 25000003 PHARM REV CODE 250: Performed by: HOSPITALIST

## 2024-03-01 PROCEDURE — 27000221 HC OXYGEN, UP TO 24 HOURS

## 2024-03-01 PROCEDURE — 25000003 PHARM REV CODE 250: Performed by: INTERNAL MEDICINE

## 2024-03-01 RX ORDER — SODIUM CHLORIDE, SODIUM LACTATE, POTASSIUM CHLORIDE, CALCIUM CHLORIDE 600; 310; 30; 20 MG/100ML; MG/100ML; MG/100ML; MG/100ML
INJECTION, SOLUTION INTRAVENOUS CONTINUOUS
Status: ACTIVE | OUTPATIENT
Start: 2024-03-01 | End: 2024-03-02

## 2024-03-01 RX ADMIN — TIMOLOL MALEATE 1 DROP: 5 SOLUTION OPHTHALMIC at 08:03

## 2024-03-01 RX ADMIN — CARVEDILOL 3.12 MG: 3.12 TABLET, FILM COATED ORAL at 09:03

## 2024-03-01 RX ADMIN — ENOXAPARIN SODIUM 70 MG: 80 INJECTION SUBCUTANEOUS at 08:03

## 2024-03-01 RX ADMIN — TIMOLOL MALEATE 1 DROP: 5 SOLUTION OPHTHALMIC at 12:03

## 2024-03-01 RX ADMIN — RISPERIDONE 2 MG: 1 TABLET ORAL at 08:03

## 2024-03-01 RX ADMIN — LATANOPROST 1 DROP: 50 SOLUTION OPHTHALMIC at 08:03

## 2024-03-01 RX ADMIN — Medication 81 MG: at 09:03

## 2024-03-01 RX ADMIN — SODIUM CHLORIDE, POTASSIUM CHLORIDE, SODIUM LACTATE AND CALCIUM CHLORIDE: 600; 310; 30; 20 INJECTION, SOLUTION INTRAVENOUS at 12:03

## 2024-03-01 RX ADMIN — RIVASTIGMINE TARTRATE 1.5 MG: 1.5 CAPSULE ORAL at 08:03

## 2024-03-01 RX ADMIN — CARVEDILOL 3.12 MG: 3.12 TABLET, FILM COATED ORAL at 08:03

## 2024-03-01 RX ADMIN — MUPIROCIN: 20 OINTMENT TOPICAL at 08:03

## 2024-03-01 RX ADMIN — CEFTRIAXONE SODIUM 2 G: 2 INJECTION, POWDER, FOR SOLUTION INTRAMUSCULAR; INTRAVENOUS at 12:03

## 2024-03-01 RX ADMIN — MUPIROCIN: 20 OINTMENT TOPICAL at 12:03

## 2024-03-01 RX ADMIN — SENNOSIDES AND DOCUSATE SODIUM 1 TABLET: 50; 8.6 TABLET ORAL at 08:03

## 2024-03-01 RX ADMIN — RIVASTIGMINE TARTRATE 1.5 MG: 1.5 CAPSULE ORAL at 09:03

## 2024-03-01 RX ADMIN — BRIMONIDINE TARTRATE 1 DROP: 1.5 SOLUTION OPHTHALMIC at 12:03

## 2024-03-01 RX ADMIN — Medication 6 MG: at 08:03

## 2024-03-01 RX ADMIN — HYDROXYZINE PAMOATE 25 MG: 25 CAPSULE ORAL at 08:03

## 2024-03-01 RX ADMIN — BRIMONIDINE TARTRATE 1 DROP: 1.5 SOLUTION OPHTHALMIC at 08:03

## 2024-03-01 RX ADMIN — ATORVASTATIN CALCIUM 10 MG: 10 TABLET, FILM COATED ORAL at 08:03

## 2024-03-01 RX ADMIN — LEVOTHYROXINE SODIUM 25 MCG: 25 TABLET ORAL at 05:03

## 2024-03-01 NOTE — PLAN OF CARE
Problem: Adult Inpatient Plan of Care  Goal: Readiness for Transition of Care  Outcome: Ongoing, Progressing     Problem: Fall Injury Risk  Goal: Absence of Fall and Fall-Related Injury  Outcome: Ongoing, Progressing     Problem: Infection  Goal: Absence of Infection Signs and Symptoms  Outcome: Ongoing, Progressing     Problem: Adult Inpatient Plan of Care  Goal: Plan of Care Review  Outcome: Met  Goal: Patient-Specific Goal (Individualized)  Outcome: Met  Goal: Absence of Hospital-Acquired Illness or Injury  Outcome: Met  Goal: Optimal Comfort and Wellbeing  Outcome: Met

## 2024-03-01 NOTE — NURSING
Nurses Note -- 4 Eyes      2/29/2024   10:04 PM      Skin assessed during: Transfer      [x] No Altered Skin Integrity Present    []Prevention Measures Documented      [] Yes- Altered Skin Integrity Present or Discovered   [] LDA Added if Not in Epic (Describe Wound)   [] New Altered Skin Integrity was Present on Admit and Documented in LDA   [] Wound Image Taken    Wound Care Consulted? No    Attending Nurse:  Imelda Suazo RN    Second RN/Staff Member:  Thomas Lainez RN

## 2024-03-01 NOTE — PROGRESS NOTES
[facility] - 4 floor    Cardiology  Progress Note    Patient Name: Ev Alberts  MRN: [id number]  Admission Date: 2/25/2024  Hospital Length of Stay: 5 days  Code Status: Full Code   Attending Physician: Vanessa Mays MD   Primary Care Physician: Santi Walters MD  Expected Discharge Date:   Principal Problem:Debility    Subjective:   Chief Complaint/Reason for Consult:Elevated Troponin      HPI: Ms. Alberts is a 89 y/o female with a history of SSS, AVB II, Bradycardia, CHF, PAF on Eliquis, PAD, Left Carotid Atherosclerosis, HTN, CKD IV, VHD, YARI, who was briefly known to [facility] (Dr. Vigil). She presented to the ER on 2.25.24 with complaints of not being able to walk since she was discharged on 2.21.24 due to polypharmacy, weakness, and age related disability. Daughter reports she is unable to get the signal to make her walk. Daughter also reports she has been lethargic and is unsure she stopped taking Lyrica, Meclizine, and Requip because they come prepackaged. Significant labs include WBC 21.55, Na 133, K 3.4, BUN/Creat 31.1/1.74, Albumin 2.6, HDL 32. LDL 38, Troponin 0.199. UA +; UC pending. CT Lumbar spine shows nondisplaced fracture at S4 (age indeterminate) and multilevel degenerative changes. CT Thoracic spine shows ligament, spinal cord and/or vascular abnormalities. EKG shows sinus rhythm with 1st degree A-V block, ST and Marked T wave abnormality, consider inferior ischemia, ST and Marked T wave abnormality, consider anterolateral ischemia, and Prolonged QT. [facility] has been consulted for NSTEMI.        Hospital Course:  2.27.24: NAD Noted. On Levophed for BP Support. Denies CP/SOB. Paced at 60. On FD Lovenox for NSTEMI Treatment.   2.28.24: NAD Noted. Levophed has been weaned off and patient is waiting on floor transfer. Family at bedside, [facility] plan of care relayed to her daughter and , both at bedside. Patient reports no CP/SOB. Troponin values are trending down. No arrhythmias  noted overnight. On FD Lovenox.  2.29.24: NAD Noted. Denies CP/SOB. Hypertensive. Renal Team Following, renal indices are improving. Paced on Tele. Clinically Stable, now floor status (Boarding in ICU).  3.01.24: NAD Noted. Pt endorses no complaints this AM tolerate fluids. Denies CP/palpitations/SOB. Renal Team Following, renal indices are improving close to baseline with hyponatremia. Paced on Tele. Clinically Stable, now on floors.      PMH: SSS, AVB II, Bradycardia, CHF, PAF on Eliquis, PAD, Left Carotid Atherosclerosis, HTN, CKD IV, VHD, YARI, GERD, FERNANDEZ, Right Knee Osteoarthritis, GI Bleed, Dementia, Glaucoma, Hypothyroidism     PSH: Cataract Surgery, Cholecystectomy, Hysterectomy, PPM, Left, Nephrectomy, LHC, Hernia Repair, Oral Surgery, Bilateral Foot Surgery  Family History: Non-Contributory   Social History: Denies smoking, illicit drug use, and alcohol use.      Previous Cardiac Diagnostics:   ECHO (2.26.24):  Left Ventricle: The left ventricle is normal in size. Mildly increased wall thickness. There is normal systolic function with a visually estimated ejection fraction of 55 - 60%. Grade III diastolic dysfunction. Right Ventricle: Mild right ventricular enlargement. Wall thickness is normal. Right ventricle wall motion  is normal. Systolic function is mildly reduced. TAPSE is 1.46 cm. Left Atrium: Left atrium is moderately dilated. Aortic Valve: There is mild aortic valve sclerosis. Tricuspid Valve: There is mild regurgitation.     PPM Insertion (9.19.19): Medtronic      ECHO (9.15.19):   Normal Left Ventricle cavity size. Normal wall thickness. Normal ejection fraction, 55-65%. The right ventricle cavity is mildly dilated. Left atrium is mildly dilated. Normal right atrium. Normal central venous pressure. Mild MR. Mild to Moderate TR. Moderate pulmonary HTN. No pericardial effusion.       Carotid US (7.26.12):  The study quality is good. Less than or equal to 50% left common carotid artery  stenosis.Bilateral normal velocity measurements of the internal carotid and external caortid arteries are noted, with no plaque visualized.Antegrade right vertebral artery flow. Antegrade left vertebral artery flow.     KARI (1.18.12):  The resting right ankle brachial index is 1.01 consistent with no significant right peripheral vascular disease.The resting left ankle brachial index is 0.94 consistent with borderline left peripheral vascular disease.The study quality is good.      Lower Ext Arterial US (1.18.12):  The study quality is good. Biphasic waveforms and diffuse plaque seen in both the bilateral infra-popliteal arteries.    Review of Systems   Cardiovascular:  Negative for chest pain.   Respiratory:  Negative for shortness of breath.    All other systems reviewed and are negative.    Objective:     Vital Signs (Most Recent):  Temp: 97.5 °F (36.4 °C) (03/01/24 1132)  Pulse: (!) 59 (03/01/24 1132)  Resp: 17 (03/01/24 1132)  BP: 125/80 (03/01/24 1132)  SpO2: 98 % (03/01/24 1132) Vital Signs (24h Range):  Temp:  [97.3 °F (36.3 °C)-98.1 °F (36.7 °C)] 97.5 °F (36.4 °C)  Pulse:  [59-81] 59  Resp:  [15-26] 17  SpO2:  [93 %-100 %] 98 %  BP: (112-141)/(73-84) 125/80   Weight: 69.9 kg (154 lb)  Body mass index is 25.63 kg/m².  SpO2: 98 %       Intake/Output Summary (Last 24 hours) at 3/1/2024 1437  Last data filed at 3/1/2024 0500  Gross per 24 hour   Intake 721.92 ml   Output 600 ml   Net 121.92 ml     Lines/Drains/Airways       Drain  Duration             Female External Urinary Catheter w/ Suction 02/28/24 1644 1 day              Peripheral Intravenous Line  Duration                  Peripheral IV - Single Lumen 02/26/24 2205 Anterior;Left Forearm 3 days         Peripheral IV - Single Lumen 02/26/24 2302 Anterior;Distal;Right Forearm 3 days                  Significant Labs:   Recent Results (from the past 72 hour(s))   Vancomycin, Random    Collection Time: 02/28/24  2:12 AM   Result Value Ref Range    Vanc Lvl  Random 14.5 (L) 15.0 - 20.0 ug/ml   Magnesium    Collection Time: 02/28/24  2:12 AM   Result Value Ref Range    Magnesium Level 2.40 1.60 - 2.60 mg/dL   Comprehensive Metabolic Panel    Collection Time: 02/28/24  2:12 AM   Result Value Ref Range    Sodium Level 130 (L) 136 - 145 mmol/L    Potassium Level 3.8 3.5 - 5.1 mmol/L    Chloride 106 98 - 107 mmol/L    Carbon Dioxide 19 (L) 23 - 31 mmol/L    Glucose Level 108 82 - 115 mg/dL    Blood Urea Nitrogen 35.9 (H) 9.8 - 20.1 mg/dL    Creatinine 2.22 (H) 0.55 - 1.02 mg/dL    Calcium Level Total 8.1 (L) 8.4 - 10.2 mg/dL    Protein Total 5.9 5.8 - 7.6 gm/dL    Albumin Level 2.0 (L) 3.4 - 4.8 g/dL    Globulin 3.9 (H) 2.4 - 3.5 gm/dL    Albumin/Globulin Ratio 0.5 (L) 1.1 - 2.0 ratio    Bilirubin Total 0.3 <=1.5 mg/dL    Alkaline Phosphatase 68 40 - 150 unit/L    Alanine Aminotransferase 19 0 - 55 unit/L    Aspartate Aminotransferase 34 5 - 34 unit/L    eGFR 21 mls/min/1.73/m2   CBC with Differential    Collection Time: 02/28/24  2:12 AM   Result Value Ref Range    WBC 21.07 (H) 4.50 - 11.50 x10(3)/mcL    RBC 3.05 (L) 4.20 - 5.40 x10(6)/mcL    Hgb 9.0 (L) 12.0 - 16.0 g/dL    Hct 27.7 (L) 37.0 - 47.0 %    MCV 90.8 80.0 - 94.0 fL    MCH 29.5 27.0 - 31.0 pg    MCHC 32.5 (L) 33.0 - 36.0 g/dL    RDW 17.0 11.5 - 17.0 %    Platelet 117 (L) 130 - 400 x10(3)/mcL    MPV 10.3 7.4 - 10.4 fL    NRBC% 0.0 %   Manual Differential    Collection Time: 02/28/24  2:12 AM   Result Value Ref Range    WBC 21.07 x10(3)/mcL    Neutrophils % 96 %    Lymphs % 2 %    Monocytes % 1 %    Metamyelocytes % 1 (H) <=0 %    Myelocytes % 1 (H) <=0 %    Neutrophils Abs 20.2272 (H) 2.1 - 9.2 x10(3)/mcL    Lymphs Abs 0.4214 (L) 0.6 - 4.6 x10(3)/mcL    Monocytes Abs 0.2107 0.1 - 1.3 x10(3)/mcL    Platelets Decreased (A) Normal, Adequate    RBC Morph Abnormal (A) Normal    Poikilocytosis 3+ (A) (none)    Anisocytosis 2+ (A) (none)    Macrocytosis 2+ (A) (none)    Adrianna Cells 3+ (A) (none)    Target Cells 1+ (A)  (none)   POCT glucose    Collection Time: 02/28/24  2:41 PM   Result Value Ref Range    POCT Glucose 97 70 - 110 mg/dL   Urinalysis, Reflex to Urine Culture    Collection Time: 02/28/24  3:04 PM    Specimen: Urine   Result Value Ref Range    Color, UA Colorless Yellow, Light-Yellow, Colorless, Straw, Dark-Yellow    Appearance, UA Turbid (A) Clear    Specific Gravity, UA 1.010 1.005 - 1.030    pH, UA 5.5 5.0 - 8.5    Protein, UA Trace (A) Negative    Glucose, UA Normal Negative, Normal    Ketones, UA Negative Negative    Blood, UA 3+ (A) Negative    Bilirubin, UA Negative Negative    Urobilinogen, UA Normal 0.2, 1.0, Normal    Nitrites, UA Negative Negative    Leukocyte Esterase,  (A) Negative    WBC, UA >100 (A) None Seen, 0-2, 3-5, 0-5 /HPF    WBC Clumps, UA Moderate (A) None Seen, 0-5    Bacteria, UA Many (A) None Seen, Trace /HPF    Squamous Epithelial Cells, UA Trace None Seen /HPF    Mucous, UA Occasional (A) None Seen /LPF    RBC, UA >100 (A) None Seen, 0-2, 3-5, 0-5 /HPF   Sodium, Random Urine    Collection Time: 02/28/24  3:04 PM   Result Value Ref Range    Urine Sodium <20.0 mmol/L   Creatinine, Random Urine    Collection Time: 02/28/24  3:04 PM   Result Value Ref Range    Urine Creatinine 39.3 (L) 45.0 - 106.0 mg/dL   Osmolality, Urine    Collection Time: 02/28/24  3:04 PM   Result Value Ref Range    Urine Osmolality 199 (L) 300 - 1,300 mOsm/kg   Comprehensive Metabolic Panel    Collection Time: 02/29/24  1:52 AM   Result Value Ref Range    Sodium Level 134 (L) 136 - 145 mmol/L    Potassium Level 4.1 3.5 - 5.1 mmol/L    Chloride 108 (H) 98 - 107 mmol/L    Carbon Dioxide 19 (L) 23 - 31 mmol/L    Glucose Level 98 82 - 115 mg/dL    Blood Urea Nitrogen 35.4 (H) 9.8 - 20.1 mg/dL    Creatinine 1.93 (H) 0.55 - 1.02 mg/dL    Calcium Level Total 8.9 8.4 - 10.2 mg/dL    Protein Total 6.4 5.8 - 7.6 gm/dL    Albumin Level 2.1 (L) 3.4 - 4.8 g/dL    Globulin 4.3 (H) 2.4 - 3.5 gm/dL    Albumin/Globulin Ratio 0.5  (L) 1.1 - 2.0 ratio    Bilirubin Total 0.4 <=1.5 mg/dL    Alkaline Phosphatase 76 40 - 150 unit/L    Alanine Aminotransferase 20 0 - 55 unit/L    Aspartate Aminotransferase 28 5 - 34 unit/L    eGFR 25 mls/min/1.73/m2   Magnesium    Collection Time: 02/29/24  1:52 AM   Result Value Ref Range    Magnesium Level 2.30 1.60 - 2.60 mg/dL   CBC with Differential    Collection Time: 02/29/24  1:52 AM   Result Value Ref Range    WBC 16.41 (H) 4.50 - 11.50 x10(3)/mcL    RBC 3.05 (L) 4.20 - 5.40 x10(6)/mcL    Hgb 9.2 (L) 12.0 - 16.0 g/dL    Hct 27.4 (L) 37.0 - 47.0 %    MCV 89.8 80.0 - 94.0 fL    MCH 30.2 27.0 - 31.0 pg    MCHC 33.6 33.0 - 36.0 g/dL    RDW 17.4 (H) 11.5 - 17.0 %    Platelet 124 (L) 130 - 400 x10(3)/mcL    MPV 11.5 (H) 7.4 - 10.4 fL    Neut % 84.8 %    Lymph % 7.0 %    Mono % 6.8 %    Eos % 0.4 %    Basophil % 0.4 %    Lymph # 1.15 0.6 - 4.6 x10(3)/mcL    Neut # 13.92 (H) 2.1 - 9.2 x10(3)/mcL    Mono # 1.12 0.1 - 1.3 x10(3)/mcL    Eos # 0.06 0 - 0.9 x10(3)/mcL    Baso # 0.06 <=0.2 x10(3)/mcL    IG# 0.10 (H) 0 - 0.04 x10(3)/mcL    IG% 0.6 %    NRBC% 0.2 %   Phosphorus    Collection Time: 02/29/24  1:52 AM   Result Value Ref Range    Phosphorus Level 2.5 2.3 - 4.7 mg/dL   Comprehensive Metabolic Panel    Collection Time: 03/01/24  4:07 AM   Result Value Ref Range    Sodium Level 133 (L) 136 - 145 mmol/L    Potassium Level 4.4 3.5 - 5.1 mmol/L    Chloride 106 98 - 107 mmol/L    Carbon Dioxide 23 23 - 31 mmol/L    Glucose Level 111 82 - 115 mg/dL    Blood Urea Nitrogen 31.2 (H) 9.8 - 20.1 mg/dL    Creatinine 1.82 (H) 0.55 - 1.02 mg/dL    Calcium Level Total 8.9 8.4 - 10.2 mg/dL    Protein Total 6.5 5.8 - 7.6 gm/dL    Albumin Level 2.4 (L) 3.4 - 4.8 g/dL    Globulin 4.1 (H) 2.4 - 3.5 gm/dL    Albumin/Globulin Ratio 0.6 (L) 1.1 - 2.0 ratio    Bilirubin Total 0.4 <=1.5 mg/dL    Alkaline Phosphatase 94 40 - 150 unit/L    Alanine Aminotransferase 43 0 - 55 unit/L    Aspartate Aminotransferase 54 (H) 5 - 34 unit/L     eGFR 27 mls/min/1.73/m2   Magnesium    Collection Time: 03/01/24  4:07 AM   Result Value Ref Range    Magnesium Level 2.20 1.60 - 2.60 mg/dL   CBC with Differential    Collection Time: 03/01/24  4:07 AM   Result Value Ref Range    WBC 10.11 4.50 - 11.50 x10(3)/mcL    RBC 3.46 (L) 4.20 - 5.40 x10(6)/mcL    Hgb 10.3 (L) 12.0 - 16.0 g/dL    Hct 31.1 (L) 37.0 - 47.0 %    MCV 89.9 80.0 - 94.0 fL    MCH 29.8 27.0 - 31.0 pg    MCHC 33.1 33.0 - 36.0 g/dL    RDW 17.2 (H) 11.5 - 17.0 %    Platelet 150 130 - 400 x10(3)/mcL    MPV 11.2 (H) 7.4 - 10.4 fL    Neut % 76.1 %    Lymph % 13.9 %    Mono % 8.8 %    Eos % 0.2 %    Basophil % 0.4 %    Lymph # 1.41 0.6 - 4.6 x10(3)/mcL    Neut # 7.69 2.1 - 9.2 x10(3)/mcL    Mono # 0.89 0.1 - 1.3 x10(3)/mcL    Eos # 0.02 0 - 0.9 x10(3)/mcL    Baso # 0.04 <=0.2 x10(3)/mcL    IG# 0.06 (H) 0 - 0.04 x10(3)/mcL    IG% 0.6 %    NRBC% 0.4 %     Telemetry:  Paced at 60 BPM    Physical Exam  Vitals and nursing note reviewed.   Constitutional:       General: She is not in acute distress.     Appearance: Normal appearance.   HENT:      Head: Normocephalic.      Mouth/Throat:      Mouth: Mucous membranes are moist.      Pharynx: Oropharynx is clear.   Cardiovascular:      Rate and Rhythm: Normal rate and regular rhythm.   Pulmonary:      Effort: Pulmonary effort is normal. No respiratory distress.      Breath sounds: Normal breath sounds. No wheezing or rales.      Comments: On Room Air  Abdominal:      Palpations: Abdomen is soft.      Tenderness: There is no abdominal tenderness.   Musculoskeletal:      Cervical back: Neck supple.      Right lower leg: No edema.      Left lower leg: No edema.   Skin:     General: Skin is warm and dry.   Neurological:      General: No focal deficit present.      Mental Status: She is alert.      Comments: At Baseline       Current Inpatient Medications:    Current Facility-Administered Medications:     acetaminophen suppository 650 mg, 650 mg, Rectal, Q6H PRN, Castro,  MD Ally, 650 mg at 02/26/24 1613    acetaminophen tablet 1,000 mg, 1,000 mg, Oral, Q6H PRN, Soila Kruse FNP, 1,000 mg at 02/29/24 1525    aluminum-magnesium hydroxide-simethicone 200-200-20 mg/5 mL suspension 30 mL, 30 mL, Oral, QID PRN, Soila Kruse, FNP    aspirin EC tablet 81 mg, 81 mg, Oral, Daily, Jeff Perez FNP, 81 mg at 03/01/24 0950    atorvastatin tablet 10 mg, 10 mg, Oral, Nightly, Soila Kruse, MELEP, 10 mg at 02/29/24 2014    bisacodyL suppository 10 mg, 10 mg, Rectal, Daily PRN, Soila Kruse FNP    brimonidine 0.15 % OPTH DROP ophthalmic solution 1 drop, 1 drop, Both Eyes, BID, 1 drop at 03/01/24 1219 **AND** timolol maleate 0.5% ophthalmic solution 1 drop, 1 drop, Both Eyes, BID, Soila Kruse FNP, 1 drop at 03/01/24 1219    carvediloL tablet 3.125 mg, 3.125 mg, Oral, BID, Jeff Perez FNP, 3.125 mg at 03/01/24 0950    cefTRIAXone (ROCEPHIN) 2 g in dextrose 5 % in water (D5W) 100 mL IVPB (MB+), 2 g, Intravenous, Q24H, Christian Mari DO, Stopped at 03/01/24 1248    enoxaparin injection 70 mg, 1 mg/kg, Subcutaneous, Q24H (treatment, non-standard time), Angelia Babin, IFTIKHAR, 70 mg at 02/28/24 2304    hydrOXYzine pamoate capsule 25 mg, 25 mg, Oral, Q8H PRN, Dimitri Cummings MD, 25 mg at 02/29/24 2014    lactated ringers infusion, , Intravenous, Continuous, Christian Mari DO, Last Rate: 50 mL/hr at 03/01/24 1200, Rate Change at 03/01/24 1200    latanoprost 0.005 % ophthalmic solution 1 drop, 1 drop, Both Eyes, QHS, Soila Kruse FNP, 1 drop at 02/29/24 2014    levothyroxine tablet 25 mcg, 25 mcg, Oral, Before breakfast, Soila Kruse FNP, 25 mcg at 03/01/24 0552    melatonin tablet 6 mg, 6 mg, Oral, Nightly PRN, Soila Kruse FNP    metoprolol injection 5 mg, 5 mg, Intravenous, Q5 Min PRN, Angelia Babin FNP    mupirocin 2 % ointment, , Nasal, BID, Ally Erazo MD, Given at 03/01/24 1219    naloxone 0.4 mg/mL injection 0.02 mg, 0.02 mg, Intravenous,  PRN, Soila Kruse L, FNP    ondansetron injection 4 mg, 4 mg, Intravenous, Q4H PRN, Jean Marie Krusey L, FNP    prochlorperazine injection Soln 5 mg, 5 mg, Intravenous, Q6H PRN, Jean Marie Krusey L, FNP    risperiDONE tablet 2 mg, 2 mg, Oral, QHS, MolJean Marie leey L, FNP, 2 mg at 02/29/24 2014    rivastigmine tartrate capsule 1.5 mg, 1.5 mg, Oral, BID, Soila Kruse L, FNP, 1.5 mg at 03/01/24 0950    senna-docusate 8.6-50 mg per tablet 1 tablet, 1 tablet, Oral, BID PRN, Soila Kruse L, FNP    sodium chloride 0.9% bolus 1,000 mL 1,000 mL, 1,000 mL, Intravenous, Once, Josseline Nunez AGACNP-BC    sodium chloride 0.9% flush 10 mL, 10 mL, Intravenous, PRN, Soila Kruse, FNP  VTE Risk Mitigation (From admission, onward)           Ordered     enoxaparin injection 70 mg  Every 24 hours         02/26/24 1209     IP VTE HIGH RISK PATIENT  Once         02/25/24 2209     Place sequential compression device  Until discontinued         02/25/24 2209     Reason for No Pharmacological VTE Prophylaxis  Once        Question:  Reasons:  Answer:  Already adequately anticoagulated on oral Anticoagulants    02/25/24 2209                  Assessment:   NSTEMI (Unspecified for now)    - EKG: SR with ST Segment Depression in Inferior & Anterolateral Leads    - Denies Angina/SOB at Current Time    - EF 55-60%  Urosepsis Requiring Vasopressor Support (Resolved)- Levophed has been weaned off    - Leukocytosis  Bacteremia  History of Hypertension (BP Now Stable)  Chronic Diastolic CHF (Compensated)  PAF - Currently Paced     - [id number] Score 5 (7.2% Stroke risk Per Year)    - on Eliquis (Last Dose 2.26.24 AM)- Now on FD Lovenox 70 mg qd  SSS    - Status Post PPM (9.19.19): [employer]   PAD  CVD    - Left Carotid Atherosclerosis  Hyperlipidemia    - On Statin  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 Chávez  [facility]    - Mild MR    - Mold to Moderate TR  YARI  GERD  Iron Deficiency Anemia   Thrombocytopenia   Right Knee  Osteoarthritis  Dementia    - CT Head Negative for Acute Abnormality  Glaucoma  Hypothyroidism    - On Oral Replacement Therapy  History of GI Bleed    Plan:   Continue Coreg 3.125 Mg BID  Continue Aspirin 81 Mg Daily  Continue FD Lovenox Re: NSTEMI Treatment & PAF/Stroke Risk Reduction (Eliquis on Hold)  Will plan [facility] on 03/04/24 due to renal function improving close to baseline; will continue to monitor for improvement  Renal Team Following for Optimization in setting of hyponatremia- Appreciate the Assistance    Mateo Hudson MD  Cardiology  [facility] - 4  03/01/2024     I agree with the findings of the complexity of problems addressed and take responsibility for the plan's risks and complications. I approved the plan documented by Resident.  Cath monday

## 2024-03-01 NOTE — PROGRESS NOTES
[facility] Hospital Medicine Progress Note        Chief Complaint: Inpatient Follow-up for     HPI:   89 yr old female whose history includes HTN, hypothyroidism, CKD with solitary kidney, PAF. Presented to ED with c/o generalized weakness.      Just discharged on 2/21/24 following a 2 day admission during which time she was treated for generalized weakness and age related physical debility with polypharmacy. Trazodone dose decreased and lyrica, meclizine and requip discontinued. Prior to discharge she had improved mobility and discharged home with family and home health. Unclear if patient stopped these medications as instructed because her medications come prepackaged.      At the time of md's exam patient is AAO x 3. Reports she was ambulating with her walker when both legs became very weak and she was unable to walk any further. Denies falling but  reports she's had multiple falls in the past. Denies any fever, chest pain, SOB, vomiting or diarrhea. Chest x-ray shows enlarged cardiac silhouette without edema. CT - lumbar spine shows nondisplaced fracture at S4 of indeterminate age. CT T-spine negative for acute findings. .  CT head negative     VS on arrival: T 99.2, P 73, R 19, B/P 108/56, Sats 96% on room air. Initial labs WBC 16.8, Hgb 10.8, Hct 35, K 3.4, BUN 32.6, creatinine 1.91, Troponin 0.191 (repeat 0.199). EKG shows SR with ST-T changes in anterior and inferolateral leads.      Started on antibiotics for UTI, found to have NSTEMI.  Upgraded to the ICU for vasopressor support.  Found to have bacteremia.  Continues on antibiotics.  Cardiology planning for catheterization once renal function optimized.  Now being downgraded to the floors.    Interval Hx:   No acute events overnight.  Afebrile.  Stable on minimal nasal cannula oxygen.  She was alert, lethargic, resting in the bed.  She has no new complaints or concerns.  No family members were seen at bedside.    Labs  from this morning showed hyponatremia, chronic low albumin, elevated serum creatinine but improving since admission.  Platelets are stable.  Blood cultures showed E coli.  Other lab work, records reviewed       Objective/physical exam:  Vitals:    02/29/24 2334 03/01/24 0343 03/01/24 0500 03/01/24 0730   BP: 118/84 121/79  122/77   Pulse: 67 68  72   Resp: 18 18  18   Temp: 97.6 °F (36.4 °C) 97.4 °F (36.3 °C)  97.3 °F (36.3 °C)   TempSrc: Oral Oral  Oral   SpO2: 100% 100%  (!) 93%   Weight:   64.9 kg (143 lb)    Height:         General: In no acute distress, afebrile  Respiratory: Clear to auscultation bilaterally  Cardiovascular: S1, S2, no appreciable murmur  Abdomen: Soft, nontender, BS +  MSK: Warm, no lower extremity edema, no clubbing or cyanosis  Neurologic: Alert and oriented x4, moving all extremities with good strength     Lab Results   Component Value Date     (L) 03/01/2024    K 4.4 03/01/2024    CO2 23 03/01/2024    BUN 31.2 (H) 03/01/2024    CREATININE 1.82 (H) 03/01/2024    CALCIUM 8.9 03/01/2024    EGFRNONAA 41 09/06/2019      Lab Results   Component Value Date    ALT 43 03/01/2024    AST 54 (H) 03/01/2024    ALKPHOS 94 03/01/2024    BILITOT 0.4 03/01/2024      Lab Results   Component Value Date    WBC 10.11 03/01/2024    HGB 10.3 (L) 03/01/2024    HCT 31.1 (L) 03/01/2024    MCV 89.9 03/01/2024     03/01/2024           Medications:   aspirin  81 mg Oral Daily    atorvastatin  10 mg Oral Nightly    brimonidine 0.15 % OPTH DROP  1 drop Both Eyes BID    And    timolol maleate 0.5%  1 drop Both Eyes BID    carvediloL  3.125 mg Oral BID    cefTRIAXone (Rocephin) IV (PEDS and ADULTS)  2 g Intravenous Q24H    enoxparin  1 mg/kg Subcutaneous Q24H (treatment, non-standard time)    latanoprost  1 drop Both Eyes QHS    levothyroxine  25 mcg Oral Before breakfast    mupirocin   Nasal BID    risperiDONE  2 mg Oral QHS    rivastigmine tartrate  1.5 mg Oral BID    sodium chloride 0.9%  1,000 mL  Intravenous Once      acetaminophen, acetaminophen, aluminum-magnesium hydroxide-simethicone, bisacodyL, hydrOXYzine pamoate, melatonin, metoprolol, naloxone, ondansetron, prochlorperazine, senna-docusate 8.6-50 mg, sodium chloride 0.9%     Assessment/Plan:    E coli bacteremia  Septic shock due to above-resolved  NSTEMI likely type 2 type 1-awaiting angiogram   MICHEL on CKD-improving  Age-indeterminate nondisplaced S4 vertebral fracture  Paroxysmal atrial fibrillation    HX:  SSS s/p pacemaker 09/2019, chronic HFpEF, HTN, FERNANDEZ, hypothyroidism, YARI, GERD, glaucoma, diverticular disease    Plan:  -cardiology following, renal function improving.  Awaiting left heart catheterization.  Continue telemetry  -continue ceftriaxone.  We will switch to cefdinir at discharge to complete 14 days of antibiotics.  TTE reviewed  -avoid further nephrotoxic medications.  Monitor renal function  -continue therapy as tolerated   -other home medications were reviewed and renewed      Mayur Carrillo MD

## 2024-03-01 NOTE — PROGRESS NOTES
[facility] - [facility]  Nephrology  Progress Note    Patient Name: Ev Alberts  MRN: [id number]  Admission Date: 2/25/2024  Hospital Length of Stay: 5 days  Attending Provider: Vanessa Mays MD   Primary Care Physician: Santi Walters MD  Principal Problem:Debility      Subjective:     HPI:  This is an 89-year-old female with hypertension, dementia, sick sinus syndrome, thyroid disease, nephrectomy 50 years prior admitted through the ED on 02/25 with complaints of not being able to walk since prior discharge on 02/21.  Admission workup revealed mild hypokalemia, renal insufficiency with creatinine 1.9, hypercalcemia and elevated troponin which later peaked at 3.5.  Low-grade fever present.  Urine culture from 02/25 and blood cultures from 02/27 all positive for E coli. She was transferred to ICU on 02/26 for vaso pressor requirements after fluid resuscitation with 2 L normal saline.  Cardiology planning for left heart catheterization once medically optimized.  As of this morning Levophed has been discontinued.      Creatinine on admission 1.9 later peaked at 2.6 yesterday now improved to 2.2.  Urine output 875 cc over the past 24 hours.  During last admission she was discharged with creatinine 1.7 down from max of 2.8.  Of note, both this and most recent admission she was noted to have hypercalcemia during ER workup.  Both times hypercalcemia resolved. Left nephrectomy done 50 years ago secondary to pyelonephritis. No recurrent infection or renal stones. No NSAID use. She is currently eating lunch with assistance of . Receiving NS at 100 mL/hr.     Interval History:  mLs overnight. Now on 1L NC. Resting comfortably in bed. Creatinine near baseline at 1.8 today.    Review of Systems   Respiratory: Negative.     Cardiovascular: Negative.    Gastrointestinal: Negative.          Review of patient's allergies indicates:   Allergen Reactions    Amlodipine-benazepril Edema     Other  reaction(s): unknown    Corticosteroids (glucocorticoids) Edema and Swelling    Rofecoxib Anaphylaxis and Swelling    Sulfa (sulfonamide antibiotics) Edema    Atorvastatin      Other reaction(s): unknown    Ibuprofen      Other reaction(s): Allergy to sulfa drugs     Current Facility-Administered Medications   Medication Frequency    acetaminophen suppository 650 mg Q6H PRN    acetaminophen tablet 1,000 mg Q6H PRN    aluminum-magnesium hydroxide-simethicone 200-200-20 mg/5 mL suspension 30 mL QID PRN    aspirin EC tablet 81 mg Daily    atorvastatin tablet 10 mg Nightly    bisacodyL suppository 10 mg Daily PRN    brimonidine 0.15 % OPTH DROP ophthalmic solution 1 drop BID    And    timolol maleate 0.5% ophthalmic solution 1 drop BID    carvediloL tablet 3.125 mg BID    cefTRIAXone (ROCEPHIN) 2 g in dextrose 5 % in water (D5W) 100 mL IVPB (MB+) Q24H    enoxaparin injection 70 mg Q24H (treatment, non-standard time)    hydrOXYzine pamoate capsule 25 mg Q8H PRN    lactated ringers infusion Continuous    latanoprost 0.005 % ophthalmic solution 1 drop QHS    levothyroxine tablet 25 mcg Before breakfast    melatonin tablet 6 mg Nightly PRN    metoprolol injection 5 mg Q5 Min PRN    mupirocin 2 % ointment BID    naloxone 0.4 mg/mL injection 0.02 mg PRN    ondansetron injection 4 mg Q4H PRN    prochlorperazine injection Soln 5 mg Q6H PRN    risperiDONE tablet 2 mg QHS    rivastigmine tartrate capsule 1.5 mg BID    senna-docusate 8.6-50 mg per tablet 1 tablet BID PRN    sodium chloride 0.9% bolus 1,000 mL 1,000 mL Once    sodium chloride 0.9% flush 10 mL PRN       Objective:     Vital Signs (Most Recent):  Temp: 97.3 °F (36.3 °C) (03/01/24 0730)  Pulse: 72 (03/01/24 0730)  Resp: 18 (03/01/24 0730)  BP: 122/77 (03/01/24 0730)  SpO2: (!) 93 % (03/01/24 0730) Vital Signs (24h Range):  Temp:  [97.3 °F (36.3 °C)-98.1 °F (36.7 °C)] 97.3 °F (36.3 °C)  Pulse:  [60-81] 72  Resp:  [15-26] 18  SpO2:  [93 %-100 %] 93 %  BP:  (112-144)/(70-84) 122/77     Weight: 64.9 kg (143 lb) (03/01/24 0500)  Body mass index is 23.8 kg/m².  Body surface area is 1.73 meters squared.    I/O last 3 completed shifts:  In: 1288.3 [I.V.:988.3; IV Piggyback:300]  Out: 1250 [Urine:1250]    Physical Exam  Constitutional:       General: She is not in acute distress.  HENT:      Head: Atraumatic.      Mouth/Throat:      Mouth: Mucous membranes are moist.   Eyes:      Extraocular Movements: Extraocular movements intact.   Cardiovascular:      Rate and Rhythm: Normal rate and regular rhythm.      Heart sounds: Normal heart sounds. No murmur heard.  Pulmonary:      Effort: Pulmonary effort is normal.      Breath sounds: Normal breath sounds.   Abdominal:      General: There is no distension.      Palpations: Abdomen is soft.   Musculoskeletal:         General: Swelling present.   Skin:     General: Skin is warm and dry.   Neurological:      General: No focal deficit present.      Mental Status: She is alert and oriented to person, place, and time.   Psychiatric:         Mood and Affect: Mood normal.         Behavior: Behavior normal.         Significant Labs:sureBMP:   Recent Labs   Lab 03/01/24  0407   *   K 4.4   CO2 23   BUN 31.2*   CREATININE 1.82*   CALCIUM 8.9   MG 2.20       CBC:   Recent Labs   Lab 03/01/24  0407   WBC 10.11   RBC 3.46*   HGB 10.3*   HCT 31.1*      MCV 89.9   MCH 29.8   MCHC 33.1       Microbiology Results (last 7 days)       Procedure Component Value Units Date/Time    Blood Culture [[id number]]  (Abnormal)  (Susceptibility) Collected: 02/27/24 0418    Order Status: Completed Specimen: Blood Updated: 02/29/24 0653     CULTURE, BLOOD ([facility]) Escherichia coli     GRAM STAIN Gram Negative Rods      Seen in gram stain of broth only      2 of 2 bottles positive    Blood Culture [[id number]]  (Abnormal)  (Susceptibility) Collected: 02/27/24 0655    Order Status: Completed Specimen: Blood from Antecubital, Left Updated: 02/29/24 0653      CULTURE, BLOOD ([facility]) Escherichia coli     GRAM STAIN Gram Negative Rods      2 of 2 bottles positive      Seen in gram stain of broth only    BCID2 Panel [[id number]]  (Abnormal) Collected: 02/27/24 0418    Order Status: Completed Specimen: Blood Updated: 02/27/24 1550     CTX-M (ESBL ) Not Detected     IMP (Cabapenemase ) Not Detected     KPC resistance gene (Carbapenemase ) Not Detected     mcr-1 Not Detected     mecA ID N/A     Comment: Note: Antimicrobial resistance can occur via multiple mechanisms. A Not Detected result for antimicrobial resistance gene(s) does not indicate antimicrobial susceptibility. Subculturing is required for species identification and susceptibility testing of   isolates.        mecA/C and MREJ (MRSA) gene N/A     NDM (Carbapenemase ) Not Detected     OXA-48-like (Carbapenemase ) Not Detected     Sinai/B (VRE gene) N/A     VIM (Carbapenemase ) Not Detected     Enterococcus faecalis Not Detected     Enterococcus faecium Not Detected     Listeria monocytogenes Not Detected     Staphylococcus spp. Not Detected     Staphylococcus aureus Not Detected     Staphylococcus epidermidis Not Detected     Staphylococcus lugdunensis Not Detected     Streptococcus spp. Not Detected     Streptococcus agalactiae (Group B) Not Detected     Streptococcus pneumoniae Not Detected     Streptococcus pyogenes (Group A) Not Detected     Acinetobacter calcoaceticus/baumannii complex Not Detected     Bacteroides fragilis Not Detected     Enterobacterales Detected     Enterobacter cloacae complex Not Detected     Escherichia coli Detected     Klebsiella aerogenes Not Detected     Klebsiella oxytoca Not Detected     Klebsiella pneumoniae group Not Detected     Proteus spp. Not Detected     Salmonella spp. Not Detected     Serratia marcescens Not Detected     Haemophilus influenzae Not Detected     Neisseria meningitidis Not Detected     Pseudomonas aeruginosa Not  Detected     Stenotrophomonas maltophilia Not Detected     Candida albicans Not Detected     Candida auris Not Detected     Candida glabrata Not Detected     Candida krusei Not Detected     Candida parapsilosis Not Detected     Candida tropicalis Not Detected     Cryptococcus neoformans/gattii Not Detected    Narrative:      The Fly6 BCID2 Panel is a multiplexed nucleic acid test intended for the use with katena® 2.0 or katena® DeNA Systems for the simultaneous qualitative detection and identification of multiple bacterial and yeast nucleic acids and select genetic determinants associated with antimicrobial resistance.  The [facility] BCID2 Panel test is performed directly on blood culture samples identified as positive by a continuous monitoring blood culture system.  Results are intended to be interpreted in conjunction with Gram stain results.    Urine culture [[id number]]  (Abnormal)  (Susceptibility) Collected: 02/25/24 1934    Order Status: Completed Specimen: Urine Updated: 02/27/24 0903     Urine Culture >/= 100,000 colonies/ml Escherichia coli    Blood Culture [[id number]]     Order Status: Canceled Specimen: Blood     Blood Culture [[id number]]     Order Status: Canceled Specimen: Blood           Specimen (24h ago, onward)      None          Recent Labs   Lab 02/28/24  1504   PROTEINUA Trace*   BACTERIA Many*   LEUKOCYTESUR 500*   UROBILINOGEN Normal         Significant Imaging:  US Retroperitoneal Complete [[id number]] Resulted: 02/28/24 2122   Order Status: Completed Updated: 02/28/24 2125   Narrative:     EXAMINATION:  US RETROPERITONEAL COMPLETE    CLINICAL HISTORY:  MICHEL.    TECHNIQUE:  Multiple real-time images were performed of the kidneys in various planes by the sonographer.    COMPARISON:  None available.    FINDINGS:  The exam was technically difficult to perform due to body habitus and large bowel gas.    Patient is status post left nephrectomy.  Right kidney is small in  size and measures 7.3 x 4.6 x 4.2 cm.  Right kidney is seen with limitations.  Grossly, right kidney corticomedullary differentiation is preserved and there is no definite hydronephrosis.    Urinary bladder is decompressed and difficult to assess.   Impression:       1.  Limited study due to patient's body habitus and bowel gas.    2.  Status post left nephrectomy.    3.  Small right kidney.      Electronically signed by: Theodore Prince  Date: 02/28/2024  Time: 21:22       Assessment/Plan:     MICHEL resolving secondary to suspected clinical dehydration and sepsis  CKD-baseline suspected 1.7 with small solitary right kidney   Urosepsis - urine culture 2/25 and Blood culture 2/27 positive for E coli  NSTEMI  Metabolic acidosis  Hyponatremia     Plan:  Continue LR at 50 mL/hr another 24 hours  BMP in AM

## 2024-03-01 NOTE — PROGRESS NOTES
Inpatient Nutrition Assessment    Admit Date: 2/25/2024   Total duration of encounter: 5 days   Patient Age: 89 y.o.    Nutrition Recommendation/Prescription     Continue current diet (Diet heart healthy) as tolerated.     Communication of Recommendations: reviewed with patient and reviewed with family    Nutrition Assessment     Chart Review    Reason Seen: length of stay    Malnutrition Screening Tool Results   Have you recently lost weight without trying?: No  Have you been eating poorly because of a decreased appetite?: No   MST Score: 0   Diagnosis:  E coli bacteremia  Septic shock due to above-resolved  NSTEMI likely type 2 type 1-awaiting angiogram   MICHEL on CKD-improving  Age-indeterminate nondisplaced S4 vertebral fracture  Paroxysmal atrial fibrillation     HX: SSS s/p pacemaker 09/2019, chronic HFpEF, HTN, FERNANDEZ, hypothyroidism, YARI, GERD, glaucoma, diverticular disease    Scheduled Medications:  aspirin, 81 mg, Daily  atorvastatin, 10 mg, Nightly  brimonidine 0.15 % OPTH DROP, 1 drop, BID   And  timolol maleate 0.5%, 1 drop, BID  carvediloL, 3.125 mg, BID  cefTRIAXone (Rocephin) IV (PEDS and ADULTS), 2 g, Q24H  enoxparin, 1 mg/kg, Q24H (treatment, non-standard time)  latanoprost, 1 drop, QHS  levothyroxine, 25 mcg, Before breakfast  mupirocin, , BID  risperiDONE, 2 mg, QHS  rivastigmine tartrate, 1.5 mg, BID  sodium chloride 0.9%, 1,000 mL, Once    Continuous Infusions:  lactated ringers, Last Rate: 50 mL/hr at 03/01/24 1200    PRN Medications: acetaminophen, acetaminophen, aluminum-magnesium hydroxide-simethicone, bisacodyL, hydrOXYzine pamoate, melatonin, metoprolol, naloxone, ondansetron, prochlorperazine, senna-docusate 8.6-50 mg, sodium chloride 0.9%    Calorie Containing IV Medications: no significant kcals from medications at this time    Recent Labs   Lab 02/25/24  1346 02/26/24  0138 02/26/24  2033 02/26/24  2034 02/27/24  0418 02/28/24  0212 02/29/24  0152 03/01/24  0407   * 133* 132*  --   "130* 130* 134* 133*   K 3.4* 3.4* 3.0*  --  4.4 3.8 4.1 4.4   CALCIUM 10.3* 9.6 8.6  --  8.5 8.1* 8.9 8.9   PHOS  --   --  1.1*  --   --   --  2.5  --    MG  --   --  1.40*  --  2.60 2.40 2.30 2.20   CHLORIDE 102 102 105  --  106 106 108* 106   CO2 21* 23 20*  --  15* 19* 19* 23   BUN 32.6* 31.1* 35.8*  --  35.4* 35.9* 35.4* 31.2*   CREATININE 1.91* 1.74* 2.55*  --  2.67* 2.22* 1.93* 1.82*   EGFRNORACEVR 25 28 18  --  17 21 25 27   GLUCOSE 122* 88 101  --  156* 108 98 111   BILITOT 0.4 0.4 0.5  --  0.9 0.3 0.4 0.4   ALKPHOS 92 73 92  --  89 68 76 94   ALT 14 10 12  --  18 19 20 43   AST 28 22 28  --  43* 34 28 54*   ALBUMIN 3.3* 2.6* 2.2*  --  2.4* 2.0* 2.1* 2.4*   TRIG  --  68  --   --   --   --   --   --    HGBA1C 5.3  --   --   --   --   --   --   --    WBC 16.80* 21.55*  --  20.76* 38.5  38.48* 21.07  21.07* 16.41* 10.11   HGB 10.8* 10.0*  --  9.9* 10.9* 9.0* 9.2* 10.3*   HCT 35.0* 30.5*  --  30.8* 33.2* 27.7* 27.4* 31.1*     Nutrition Orders:  Diet heart healthy    Appetite/Oral Intake: good/% of meals  Factors Affecting Nutritional Intake: none identified  Food/Cheondoism/Cultural Preferences: none reported  Food Allergies: none reported  Last Bowel Movement: 24  Wound(s): no pressure injuries documented at this time     Comments    3/1/24 Patient reports good appetite currently and prior to admission; denies weight loss, questionable accuracy of bed weights, monitor.    Anthropometrics    Height: 5' 5" (165.1 cm),    Last Weight: 69.9 kg (154 lb) (24 1405), Weight Method: Stated  BMI (Calculated): 25.6  BMI Classification: overweight (BMI 25-29.9)     Ideal Body Weight (IBW), Female: 125 lb     % Ideal Body Weight, Female (lb): 123.2 %                    Usual Body Weight (UBW), k.85 kg  % Usual Body Weight: 100.21  % Weight Change From Usual Weight: 0 %  Usual Weight Provided By: patient denies unintentional weight loss    Wt Readings from Last 5 Encounters:   24 69.9 kg (154 " lb)   02/17/24 69.9 kg (154 lb)   06/01/22 72.6 kg (160 lb)   10/29/21 78 kg (171 lb 15.3 oz)     Weight Change(s) Since Admission:   (3/1) question accuracy of bed weights, bed weight taken during rounds of 134 lb, on specialty bed, patient denies weight loss and believes these weights to be inaccurate, reports usually weights around 154 lb, h/o HF noted, possibly fluid-related (?), will continue to monitor  Wt Readings from Last 1 Encounters:   03/01/24 1405 69.9 kg (154 lb)   03/01/24 0500 64.9 kg (143 lb)   02/25/24 1307 69.9 kg (154 lb)   Admit Weight: 69.9 kg (154 lb) (02/25/24 1307), Weight Method: Bed Scale    Nutrition Goals & Monitoring     Dietitian will monitor: food and beverage intake    Nutrition Risk/Follow-Up: low (follow-up in 5-7 days)   Please consult if re-assessment needed sooner.

## 2024-03-02 LAB
ALBUMIN SERPL-MCNC: 2.3 G/DL (ref 3.4–4.8)
ALBUMIN/GLOB SERPL: 0.6 RATIO (ref 1.1–2)
ALP SERPL-CCNC: 93 UNIT/L (ref 40–150)
ALT SERPL-CCNC: 83 UNIT/L (ref 0–55)
AST SERPL-CCNC: 91 UNIT/L (ref 5–34)
BASOPHILS # BLD AUTO: 0.05 X10(3)/MCL
BASOPHILS NFR BLD AUTO: 0.6 %
BILIRUB SERPL-MCNC: 0.3 MG/DL
BUN SERPL-MCNC: 36.5 MG/DL (ref 9.8–20.1)
CALCIUM SERPL-MCNC: 8.7 MG/DL (ref 8.4–10.2)
CHLORIDE SERPL-SCNC: 103 MMOL/L (ref 98–107)
CHLORIDE UR-SCNC: <20 MMOL/L
CO2 SERPL-SCNC: 20 MMOL/L (ref 23–31)
CREAT SERPL-MCNC: 1.69 MG/DL (ref 0.55–1.02)
CREAT UR-MCNC: 62.9 MG/DL (ref 45–106)
EOSINOPHIL # BLD AUTO: 0.04 X10(3)/MCL (ref 0–0.9)
EOSINOPHIL NFR BLD AUTO: 0.5 %
ERYTHROCYTE [DISTWIDTH] IN BLOOD BY AUTOMATED COUNT: 17 % (ref 11.5–17)
GFR SERPLBLD CREATININE-BSD FMLA CKD-EPI: 29 MLS/MIN/1.73/M2
GLOBULIN SER-MCNC: 4.1 GM/DL (ref 2.4–3.5)
GLUCOSE SERPL-MCNC: 119 MG/DL (ref 82–115)
HCT VFR BLD AUTO: 32.3 % (ref 37–47)
HGB BLD-MCNC: 10.7 G/DL (ref 12–16)
IMM GRANULOCYTES # BLD AUTO: 0.1 X10(3)/MCL (ref 0–0.04)
IMM GRANULOCYTES NFR BLD AUTO: 1.1 %
LYMPHOCYTES # BLD AUTO: 2.05 X10(3)/MCL (ref 0.6–4.6)
LYMPHOCYTES NFR BLD AUTO: 23.2 %
MAGNESIUM SERPL-MCNC: 2 MG/DL (ref 1.6–2.6)
MCH RBC QN AUTO: 29.6 PG (ref 27–31)
MCHC RBC AUTO-ENTMCNC: 33.1 G/DL (ref 33–36)
MCV RBC AUTO: 89.2 FL (ref 80–94)
MONOCYTES # BLD AUTO: 1.09 X10(3)/MCL (ref 0.1–1.3)
MONOCYTES NFR BLD AUTO: 12.3 %
NEUTROPHILS # BLD AUTO: 5.51 X10(3)/MCL (ref 2.1–9.2)
NEUTROPHILS NFR BLD AUTO: 62.3 %
NRBC BLD AUTO-RTO: 0 %
OSMOLALITY SERPL: 288 MOSM/KG (ref 280–300)
OSMOLALITY UR: 337 MOSM/KG (ref 300–1300)
PLATELET # BLD AUTO: 177 X10(3)/MCL (ref 130–400)
PMV BLD AUTO: 12 FL (ref 7.4–10.4)
POCT GLUCOSE: 123 MG/DL (ref 70–110)
POTASSIUM SERPL-SCNC: 4.7 MMOL/L (ref 3.5–5.1)
PROT SERPL-MCNC: 6.4 GM/DL (ref 5.8–7.6)
RBC # BLD AUTO: 3.62 X10(6)/MCL (ref 4.2–5.4)
SODIUM SERPL-SCNC: 131 MMOL/L (ref 136–145)
SODIUM UR-SCNC: <20 MMOL/L
WBC # SPEC AUTO: 8.84 X10(3)/MCL (ref 4.5–11.5)

## 2024-03-02 PROCEDURE — 83930 ASSAY OF BLOOD OSMOLALITY: CPT | Performed by: INTERNAL MEDICINE

## 2024-03-02 PROCEDURE — 25000003 PHARM REV CODE 250: Performed by: NURSE PRACTITIONER

## 2024-03-02 PROCEDURE — 63600175 PHARM REV CODE 636 W HCPCS

## 2024-03-02 PROCEDURE — 25000003 PHARM REV CODE 250: Performed by: STUDENT IN AN ORGANIZED HEALTH CARE EDUCATION/TRAINING PROGRAM

## 2024-03-02 PROCEDURE — 82570 ASSAY OF URINE CREATININE: CPT | Performed by: INTERNAL MEDICINE

## 2024-03-02 PROCEDURE — 83735 ASSAY OF MAGNESIUM: CPT

## 2024-03-02 PROCEDURE — 80053 COMPREHEN METABOLIC PANEL: CPT

## 2024-03-02 PROCEDURE — 25000242 PHARM REV CODE 250 ALT 637 W/ HCPCS: Performed by: INTERNAL MEDICINE

## 2024-03-02 PROCEDURE — 85025 COMPLETE CBC W/AUTO DIFF WBC: CPT

## 2024-03-02 PROCEDURE — 25000003 PHARM REV CODE 250

## 2024-03-02 PROCEDURE — 82436 ASSAY OF URINE CHLORIDE: CPT | Performed by: INTERNAL MEDICINE

## 2024-03-02 PROCEDURE — 63600175 PHARM REV CODE 636 W HCPCS: Performed by: STUDENT IN AN ORGANIZED HEALTH CARE EDUCATION/TRAINING PROGRAM

## 2024-03-02 PROCEDURE — 21400001 HC TELEMETRY ROOM

## 2024-03-02 PROCEDURE — 84300 ASSAY OF URINE SODIUM: CPT | Performed by: INTERNAL MEDICINE

## 2024-03-02 PROCEDURE — 83935 ASSAY OF URINE OSMOLALITY: CPT | Performed by: INTERNAL MEDICINE

## 2024-03-02 RX ORDER — ALBUTEROL SULFATE 90 UG/1
1 AEROSOL, METERED RESPIRATORY (INHALATION) EVERY 6 HOURS PRN
Status: DISCONTINUED | OUTPATIENT
Start: 2024-03-02 | End: 2024-03-11 | Stop reason: HOSPADM

## 2024-03-02 RX ORDER — SODIUM BICARBONATE 650 MG/1
650 TABLET ORAL 3 TIMES DAILY
Status: DISCONTINUED | OUTPATIENT
Start: 2024-03-02 | End: 2024-03-03

## 2024-03-02 RX ADMIN — MUPIROCIN: 20 OINTMENT TOPICAL at 08:03

## 2024-03-02 RX ADMIN — CARVEDILOL 3.12 MG: 3.12 TABLET, FILM COATED ORAL at 09:03

## 2024-03-02 RX ADMIN — BRIMONIDINE TARTRATE 1 DROP: 1.5 SOLUTION OPHTHALMIC at 08:03

## 2024-03-02 RX ADMIN — RISPERIDONE 2 MG: 1 TABLET ORAL at 09:03

## 2024-03-02 RX ADMIN — TIMOLOL MALEATE 1 DROP: 5 SOLUTION OPHTHALMIC at 09:03

## 2024-03-02 RX ADMIN — ATORVASTATIN CALCIUM 10 MG: 10 TABLET, FILM COATED ORAL at 09:03

## 2024-03-02 RX ADMIN — TIMOLOL MALEATE 1 DROP: 5 SOLUTION OPHTHALMIC at 08:03

## 2024-03-02 RX ADMIN — SODIUM BICARBONATE 650 MG TABLET 650 MG: at 09:03

## 2024-03-02 RX ADMIN — BRIMONIDINE TARTRATE 1 DROP: 1.5 SOLUTION OPHTHALMIC at 09:03

## 2024-03-02 RX ADMIN — SODIUM BICARBONATE 650 MG TABLET 650 MG: at 12:03

## 2024-03-02 RX ADMIN — SODIUM BICARBONATE 650 MG TABLET 650 MG: at 04:03

## 2024-03-02 RX ADMIN — RIVASTIGMINE TARTRATE 1.5 MG: 1.5 CAPSULE ORAL at 08:03

## 2024-03-02 RX ADMIN — CEFTRIAXONE SODIUM 2 G: 2 INJECTION, POWDER, FOR SOLUTION INTRAMUSCULAR; INTRAVENOUS at 12:03

## 2024-03-02 RX ADMIN — LATANOPROST 1 DROP: 50 SOLUTION OPHTHALMIC at 09:03

## 2024-03-02 RX ADMIN — CARVEDILOL 3.12 MG: 3.12 TABLET, FILM COATED ORAL at 08:03

## 2024-03-02 RX ADMIN — ENOXAPARIN SODIUM 70 MG: 80 INJECTION SUBCUTANEOUS at 09:03

## 2024-03-02 RX ADMIN — Medication 6 MG: at 09:03

## 2024-03-02 RX ADMIN — Medication 81 MG: at 08:03

## 2024-03-02 RX ADMIN — ALBUTEROL SULFATE 1 PUFF: 90 AEROSOL, METERED RESPIRATORY (INHALATION) at 12:03

## 2024-03-02 RX ADMIN — LEVOTHYROXINE SODIUM 25 MCG: 25 TABLET ORAL at 05:03

## 2024-03-02 RX ADMIN — RIVASTIGMINE TARTRATE 1.5 MG: 1.5 CAPSULE ORAL at 09:03

## 2024-03-02 NOTE — PROGRESS NOTES
[facility] - 4 floor    Cardiology  Progress Note    Patient Name: Ev Alberts  MRN: [id number]  Admission Date: 2/25/2024  Hospital Length of Stay: 6 days  Code Status: Full Code   Attending Physician: Vanessa Mays MD   Primary Care Physician: Santi Walters MD  Expected Discharge Date:   Principal Problem:Debility    Subjective:   Chief Complaint/Reason for Consult:Elevated Troponin      HPI: Ms. Alberts is a 88 y/o female with a history of SSS, AVB II, Bradycardia, CHF, PAF on Eliquis, PAD, Left Carotid Atherosclerosis, HTN, CKD IV, VHD, YARI, who was briefly known to [facility] (Dr. Vigil). She presented to the ER on 2.25.24 with complaints of not being able to walk since she was discharged on 2.21.24 due to polypharmacy, weakness, and age related disability. Daughter reports she is unable to get the signal to make her walk. Daughter also reports she has been lethargic and is unsure she stopped taking Lyrica, Meclizine, and Requip because they come prepackaged. Significant labs include WBC 21.55, Na 133, K 3.4, BUN/Creat 31.1/1.74, Albumin 2.6, HDL 32. LDL 38, Troponin 0.199. UA +; UC pending. CT Lumbar spine shows nondisplaced fracture at S4 (age indeterminate) and multilevel degenerative changes. CT Thoracic spine shows ligament, spinal cord and/or vascular abnormalities. EKG shows sinus rhythm with 1st degree A-V block, ST and Marked T wave abnormality, consider inferior ischemia, ST and Marked T wave abnormality, consider anterolateral ischemia, and Prolonged QT. [facility] has been consulted for NSTEMI.        Hospital Course:  2.27.24: NAD Noted. On Levophed for BP Support. Denies CP/SOB. Paced at 60. On FD Lovenox for NSTEMI Treatment.   2.28.24: NAD Noted. Levophed has been weaned off and patient is waiting on floor transfer. Family at bedside, [facility] plan of care relayed to her daughter and , both at bedside. Patient reports no CP/SOB. Troponin values are trending down. No arrhythmias  "noted overnight. On FD Lovenox.  2.29.24: NAD Noted. Denies CP/SOB. Hypertensive. Renal Team Following, renal indices are improving. Paced on Tele. Clinically Stable, now floor status (Boarding in ICU).  3.01.24: NAD Noted. Pt endorses no complaints this AM tolerate fluids. Denies CP/palpitations/SOB. Renal Team Following, renal indices are improving close to baseline with hyponatremia. Paced on Tele. Clinically Stable, now on floors.  3.2.24:  NAD. Patient resting comfortably in bed with family at bedside. Reports some shortness of breath due to "asthma". Denies CP or palpitations. Renal indices continuing to improve. SR on tele. Stable from yesterday.      PMH: SSS, AVB II, Bradycardia, CHF, PAF on Eliquis, PAD, Left Carotid Atherosclerosis, HTN, CKD IV, VHD, YARI, GERD, FERNANDEZ, Right Knee Osteoarthritis, GI Bleed, Dementia, Glaucoma, Hypothyroidism     PSH: Cataract Surgery, Cholecystectomy, Hysterectomy, PPM, Left, Nephrectomy, LHC, Hernia Repair, Oral Surgery, Bilateral Foot Surgery  Family History: Non-Contributory   Social History: Denies smoking, illicit drug use, and alcohol use.      Previous Cardiac Diagnostics:   ECHO (2.26.24):  Left Ventricle: The left ventricle is normal in size. Mildly increased wall thickness. There is normal systolic function with a visually estimated ejection fraction of 55 - 60%. Grade III diastolic dysfunction. Right Ventricle: Mild right ventricular enlargement. Wall thickness is normal. Right ventricle wall motion  is normal. Systolic function is mildly reduced. TAPSE is 1.46 cm. Left Atrium: Left atrium is moderately dilated. Aortic Valve: There is mild aortic valve sclerosis. Tricuspid Valve: There is mild regurgitation.     PPM Insertion (9.19.19): Medtronic      ECHO (9.15.19):   Normal Left Ventricle cavity size. Normal wall thickness. Normal ejection fraction, 55-65%. The right ventricle cavity is mildly dilated. Left atrium is mildly dilated. Normal right atrium. Normal " central venous pressure. Mild MR. Mild to Moderate TR. Moderate pulmonary HTN. No pericardial effusion.       Carotid US (7.26.12):  The study quality is good. Less than or equal to 50% left common carotid artery stenosis.Bilateral normal velocity measurements of the internal carotid and external caortid arteries are noted, with no plaque visualized.Antegrade right vertebral artery flow. Antegrade left vertebral artery flow.     KARI (1.18.12):  The resting right ankle brachial index is 1.01 consistent with no significant right peripheral vascular disease.The resting left ankle brachial index is 0.94 consistent with borderline left peripheral vascular disease.The study quality is good.      Lower Ext Arterial US (1.18.12):  The study quality is good. Biphasic waveforms and diffuse plaque seen in both the bilateral infra-popliteal arteries.    Review of Systems   Cardiovascular:  Negative for chest pain, leg swelling and palpitations.   Respiratory:  Negative for shortness of breath (Occasional, mild).    All other systems reviewed and are negative.    Objective:     Vital Signs (Most Recent):  Temp: 98 °F (36.7 °C) (03/02/24 0731)  Pulse: 67 (03/02/24 0731)  Resp: 18 (03/02/24 0731)  BP: 105/68 (03/02/24 0731)  SpO2: 98 % (03/02/24 0731) Vital Signs (24h Range):  Temp:  [97.4 °F (36.3 °C)-98.6 °F (37 °C)] 98 °F (36.7 °C)  Pulse:  [] 67  Resp:  [17-20] 18  SpO2:  [94 %-98 %] 98 %  BP: (105-140)/(68-91) 105/68   Weight: 66.7 kg (147 lb)  Body mass index is 24.46 kg/m².  SpO2: 98 %       Intake/Output Summary (Last 24 hours) at 3/2/2024 0858  Last data filed at 3/2/2024 0602  Gross per 24 hour   Intake 1319.22 ml   Output 500 ml   Net 819.22 ml       Lines/Drains/Airways       Drain  Duration             Female External Urinary Catheter w/ Suction 02/28/24 1644 2 days              Peripheral Intravenous Line  Duration                  Peripheral IV - Single Lumen 02/26/24 2205 Anterior;Left Forearm 4 days          Peripheral IV - Single Lumen 02/26/24 2302 Anterior;Distal;Right Forearm 4 days                  Significant Labs:   Recent Results (from the past 72 hour(s))   POCT glucose    Collection Time: 02/28/24  2:41 PM   Result Value Ref Range    POCT Glucose 97 70 - 110 mg/dL   Urinalysis, Reflex to Urine Culture    Collection Time: 02/28/24  3:04 PM    Specimen: Urine   Result Value Ref Range    Color, UA Colorless Yellow, Light-Yellow, Colorless, Straw, Dark-Yellow    Appearance, UA Turbid (A) Clear    Specific Gravity, UA 1.010 1.005 - 1.030    pH, UA 5.5 5.0 - 8.5    Protein, UA Trace (A) Negative    Glucose, UA Normal Negative, Normal    Ketones, UA Negative Negative    Blood, UA 3+ (A) Negative    Bilirubin, UA Negative Negative    Urobilinogen, UA Normal 0.2, 1.0, Normal    Nitrites, UA Negative Negative    Leukocyte Esterase,  (A) Negative    WBC, UA >100 (A) None Seen, 0-2, 3-5, 0-5 /HPF    WBC Clumps, UA Moderate (A) None Seen, 0-5    Bacteria, UA Many (A) None Seen, Trace /HPF    Squamous Epithelial Cells, UA Trace None Seen /HPF    Mucous, UA Occasional (A) None Seen /LPF    RBC, UA >100 (A) None Seen, 0-2, 3-5, 0-5 /HPF   Sodium, Random Urine    Collection Time: 02/28/24  3:04 PM   Result Value Ref Range    Urine Sodium <20.0 mmol/L   Creatinine, Random Urine    Collection Time: 02/28/24  3:04 PM   Result Value Ref Range    Urine Creatinine 39.3 (L) 45.0 - 106.0 mg/dL   Osmolality, Urine    Collection Time: 02/28/24  3:04 PM   Result Value Ref Range    Urine Osmolality 199 (L) 300 - 1,300 mOsm/kg   Comprehensive Metabolic Panel    Collection Time: 02/29/24  1:52 AM   Result Value Ref Range    Sodium Level 134 (L) 136 - 145 mmol/L    Potassium Level 4.1 3.5 - 5.1 mmol/L    Chloride 108 (H) 98 - 107 mmol/L    Carbon Dioxide 19 (L) 23 - 31 mmol/L    Glucose Level 98 82 - 115 mg/dL    Blood Urea Nitrogen 35.4 (H) 9.8 - 20.1 mg/dL    Creatinine 1.93 (H) 0.55 - 1.02 mg/dL    Calcium Level Total 8.9 8.4 - 10.2  mg/dL    Protein Total 6.4 5.8 - 7.6 gm/dL    Albumin Level 2.1 (L) 3.4 - 4.8 g/dL    Globulin 4.3 (H) 2.4 - 3.5 gm/dL    Albumin/Globulin Ratio 0.5 (L) 1.1 - 2.0 ratio    Bilirubin Total 0.4 <=1.5 mg/dL    Alkaline Phosphatase 76 40 - 150 unit/L    Alanine Aminotransferase 20 0 - 55 unit/L    Aspartate Aminotransferase 28 5 - 34 unit/L    eGFR 25 mls/min/1.73/m2   Magnesium    Collection Time: 02/29/24  1:52 AM   Result Value Ref Range    Magnesium Level 2.30 1.60 - 2.60 mg/dL   CBC with Differential    Collection Time: 02/29/24  1:52 AM   Result Value Ref Range    WBC 16.41 (H) 4.50 - 11.50 x10(3)/mcL    RBC 3.05 (L) 4.20 - 5.40 x10(6)/mcL    Hgb 9.2 (L) 12.0 - 16.0 g/dL    Hct 27.4 (L) 37.0 - 47.0 %    MCV 89.8 80.0 - 94.0 fL    MCH 30.2 27.0 - 31.0 pg    MCHC 33.6 33.0 - 36.0 g/dL    RDW 17.4 (H) 11.5 - 17.0 %    Platelet 124 (L) 130 - 400 x10(3)/mcL    MPV 11.5 (H) 7.4 - 10.4 fL    Neut % 84.8 %    Lymph % 7.0 %    Mono % 6.8 %    Eos % 0.4 %    Basophil % 0.4 %    Lymph # 1.15 0.6 - 4.6 x10(3)/mcL    Neut # 13.92 (H) 2.1 - 9.2 x10(3)/mcL    Mono # 1.12 0.1 - 1.3 x10(3)/mcL    Eos # 0.06 0 - 0.9 x10(3)/mcL    Baso # 0.06 <=0.2 x10(3)/mcL    IG# 0.10 (H) 0 - 0.04 x10(3)/mcL    IG% 0.6 %    NRBC% 0.2 %   Phosphorus    Collection Time: 02/29/24  1:52 AM   Result Value Ref Range    Phosphorus Level 2.5 2.3 - 4.7 mg/dL   Comprehensive Metabolic Panel    Collection Time: 03/01/24  4:07 AM   Result Value Ref Range    Sodium Level 133 (L) 136 - 145 mmol/L    Potassium Level 4.4 3.5 - 5.1 mmol/L    Chloride 106 98 - 107 mmol/L    Carbon Dioxide 23 23 - 31 mmol/L    Glucose Level 111 82 - 115 mg/dL    Blood Urea Nitrogen 31.2 (H) 9.8 - 20.1 mg/dL    Creatinine 1.82 (H) 0.55 - 1.02 mg/dL    Calcium Level Total 8.9 8.4 - 10.2 mg/dL    Protein Total 6.5 5.8 - 7.6 gm/dL    Albumin Level 2.4 (L) 3.4 - 4.8 g/dL    Globulin 4.1 (H) 2.4 - 3.5 gm/dL    Albumin/Globulin Ratio 0.6 (L) 1.1 - 2.0 ratio    Bilirubin Total 0.4 <=1.5  mg/dL    Alkaline Phosphatase 94 40 - 150 unit/L    Alanine Aminotransferase 43 0 - 55 unit/L    Aspartate Aminotransferase 54 (H) 5 - 34 unit/L    eGFR 27 mls/min/1.73/m2   Magnesium    Collection Time: 03/01/24  4:07 AM   Result Value Ref Range    Magnesium Level 2.20 1.60 - 2.60 mg/dL   CBC with Differential    Collection Time: 03/01/24  4:07 AM   Result Value Ref Range    WBC 10.11 4.50 - 11.50 x10(3)/mcL    RBC 3.46 (L) 4.20 - 5.40 x10(6)/mcL    Hgb 10.3 (L) 12.0 - 16.0 g/dL    Hct 31.1 (L) 37.0 - 47.0 %    MCV 89.9 80.0 - 94.0 fL    MCH 29.8 27.0 - 31.0 pg    MCHC 33.1 33.0 - 36.0 g/dL    RDW 17.2 (H) 11.5 - 17.0 %    Platelet 150 130 - 400 x10(3)/mcL    MPV 11.2 (H) 7.4 - 10.4 fL    Neut % 76.1 %    Lymph % 13.9 %    Mono % 8.8 %    Eos % 0.2 %    Basophil % 0.4 %    Lymph # 1.41 0.6 - 4.6 x10(3)/mcL    Neut # 7.69 2.1 - 9.2 x10(3)/mcL    Mono # 0.89 0.1 - 1.3 x10(3)/mcL    Eos # 0.02 0 - 0.9 x10(3)/mcL    Baso # 0.04 <=0.2 x10(3)/mcL    IG# 0.06 (H) 0 - 0.04 x10(3)/mcL    IG% 0.6 %    NRBC% 0.4 %   POCT glucose    Collection Time: 03/02/24  1:05 AM   Result Value Ref Range    POCT Glucose 123 (H) 70 - 110 mg/dL   Comprehensive Metabolic Panel    Collection Time: 03/02/24  4:02 AM   Result Value Ref Range    Sodium Level 131 (L) 136 - 145 mmol/L    Potassium Level 4.7 3.5 - 5.1 mmol/L    Chloride 103 98 - 107 mmol/L    Carbon Dioxide 20 (L) 23 - 31 mmol/L    Glucose Level 119 (H) 82 - 115 mg/dL    Blood Urea Nitrogen 36.5 (H) 9.8 - 20.1 mg/dL    Creatinine 1.69 (H) 0.55 - 1.02 mg/dL    Calcium Level Total 8.7 8.4 - 10.2 mg/dL    Protein Total 6.4 5.8 - 7.6 gm/dL    Albumin Level 2.3 (L) 3.4 - 4.8 g/dL    Globulin 4.1 (H) 2.4 - 3.5 gm/dL    Albumin/Globulin Ratio 0.6 (L) 1.1 - 2.0 ratio    Bilirubin Total 0.3 <=1.5 mg/dL    Alkaline Phosphatase 93 40 - 150 unit/L    Alanine Aminotransferase 83 (H) 0 - 55 unit/L    Aspartate Aminotransferase 91 (H) 5 - 34 unit/L    eGFR 29 mls/min/1.73/m2   Magnesium     Collection Time: 03/02/24  4:02 AM   Result Value Ref Range    Magnesium Level 2.00 1.60 - 2.60 mg/dL   CBC with Differential    Collection Time: 03/02/24  4:02 AM   Result Value Ref Range    WBC 8.84 4.50 - 11.50 x10(3)/mcL    RBC 3.62 (L) 4.20 - 5.40 x10(6)/mcL    Hgb 10.7 (L) 12.0 - 16.0 g/dL    Hct 32.3 (L) 37.0 - 47.0 %    MCV 89.2 80.0 - 94.0 fL    MCH 29.6 27.0 - 31.0 pg    MCHC 33.1 33.0 - 36.0 g/dL    RDW 17.0 11.5 - 17.0 %    Platelet 177 130 - 400 x10(3)/mcL    MPV 12.0 (H) 7.4 - 10.4 fL    Neut % 62.3 %    Lymph % 23.2 %    Mono % 12.3 %    Eos % 0.5 %    Basophil % 0.6 %    Lymph # 2.05 0.6 - 4.6 x10(3)/mcL    Neut # 5.51 2.1 - 9.2 x10(3)/mcL    Mono # 1.09 0.1 - 1.3 x10(3)/mcL    Eos # 0.04 0 - 0.9 x10(3)/mcL    Baso # 0.05 <=0.2 x10(3)/mcL    IG# 0.10 (H) 0 - 0.04 x10(3)/mcL    IG% 1.1 %    NRBC% 0.0 %     Telemetry:  Paced at 60 BPM    Physical Exam  Vitals and nursing note reviewed.   Constitutional:       General: She is not in acute distress.     Appearance: Normal appearance.   HENT:      Head: Normocephalic.      Mouth/Throat:      Mouth: Mucous membranes are moist.      Pharynx: Oropharynx is clear.   Cardiovascular:      Rate and Rhythm: Normal rate and regular rhythm.   Pulmonary:      Effort: Pulmonary effort is normal. No respiratory distress.      Breath sounds: Normal breath sounds. No wheezing or rales.      Comments: Room Air  Abdominal:      Palpations: Abdomen is soft.      Tenderness: There is no abdominal tenderness.   Musculoskeletal:      Cervical back: Neck supple.      Right lower leg: No edema.      Left lower leg: No edema.   Skin:     General: Skin is warm and dry.   Neurological:      General: No focal deficit present.      Mental Status: She is alert.      Comments: At Baseline       Current Inpatient Medications:    Current Facility-Administered Medications:     acetaminophen suppository 650 mg, 650 mg, Rectal, Q6H PRN, Ally Erazo MD, 650 mg at 02/26/24 2153     acetaminophen tablet 1,000 mg, 1,000 mg, Oral, Q6H PRN, Soila Kruse, FNP, 1,000 mg at 02/29/24 1525    aluminum-magnesium hydroxide-simethicone 200-200-20 mg/5 mL suspension 30 mL, 30 mL, Oral, QID PRN, Soila Kruse, FNP    aspirin EC tablet 81 mg, 81 mg, Oral, Daily, Jeff Perez, MELEP, 81 mg at 03/02/24 0834    atorvastatin tablet 10 mg, 10 mg, Oral, Nightly, Soila Kruse, FNP, 10 mg at 03/01/24 2042    bisacodyL suppository 10 mg, 10 mg, Rectal, Daily PRN, Soila Kruse, FNP    brimonidine 0.15 % OPTH DROP ophthalmic solution 1 drop, 1 drop, Both Eyes, BID, 1 drop at 03/02/24 0836 **AND** timolol maleate 0.5% ophthalmic solution 1 drop, 1 drop, Both Eyes, BID, Soila Kruse FNP, 1 drop at 03/02/24 0838    carvediloL tablet 3.125 mg, 3.125 mg, Oral, BID, Jeff Perez, FNP, 3.125 mg at 03/02/24 0834    cefTRIAXone (ROCEPHIN) 2 g in dextrose 5 % in water (D5W) 100 mL IVPB (MB+), 2 g, Intravenous, Q24H, Christian Mari DO, Stopped at 03/01/24 1248    enoxaparin injection 70 mg, 1 mg/kg, Subcutaneous, Q24H (treatment, non-standard time), Angelia Babin FNP, 70 mg at 03/01/24 2044    hydrOXYzine pamoate capsule 25 mg, 25 mg, Oral, Q8H PRN, Dimitri Cummings MD, 25 mg at 03/01/24 2042    latanoprost 0.005 % ophthalmic solution 1 drop, 1 drop, Both Eyes, QHS, Soila Kruse, FNP, 1 drop at 03/01/24 2043    levothyroxine tablet 25 mcg, 25 mcg, Oral, Before breakfast, Soila Kruse FNP, 25 mcg at 03/02/24 0548    melatonin tablet 6 mg, 6 mg, Oral, Nightly PRN, Soila Kruse FNP, 6 mg at 03/01/24 2042    metoprolol injection 5 mg, 5 mg, Intravenous, Q5 Min PRN, Angelia Babin FNP    naloxone 0.4 mg/mL injection 0.02 mg, 0.02 mg, Intravenous, PRN, Soila Kruse, IFTIKHAR    ondansetron injection 4 mg, 4 mg, Intravenous, Q4H PRN, Soila Kruse, IFTIKHAR    prochlorperazine injection Soln 5 mg, 5 mg, Intravenous, Q6H PRN, Soila Kruse FNP    risperiDONE tablet 2 mg, 2 mg, Oral, QHS,  Soila Kruse FNP, 2 mg at 03/01/24 2042    rivastigmine tartrate capsule 1.5 mg, 1.5 mg, Oral, BID, Soila Kruse FNP, 1.5 mg at 03/02/24 0843    senna-docusate 8.6-50 mg per tablet 1 tablet, 1 tablet, Oral, BID PRN, Soila Kruse FNP, 1 tablet at 03/01/24 2044    sodium chloride 0.9% bolus 1,000 mL 1,000 mL, 1,000 mL, Intravenous, Once, Josseline Nunez AGACNP-BC    sodium chloride 0.9% flush 10 mL, 10 mL, Intravenous, PRN, Soila Kruse FNP  VTE Risk Mitigation (From admission, onward)           Ordered     enoxaparin injection 70 mg  Every 24 hours         02/26/24 1209     IP VTE HIGH RISK PATIENT  Once         02/25/24 2209     Place sequential compression device  Until discontinued         02/25/24 2209     Reason for No Pharmacological VTE Prophylaxis  Once        Question:  Reasons:  Answer:  Already adequately anticoagulated on oral Anticoagulants    02/25/24 2209                  Assessment:   NSTEMI (Unspecified for now)    - EKG: SR with ST Segment Depression in Inferior & Anterolateral Leads    - Denies Angina/SOB at Current Time    - EF 55-60%  Urosepsis Requiring Vasopressor Support (Resolved)- Levophed has been weaned off    - Leukocytosis  Bacteremia  History of Hypertension (BP Now Stable)  Chronic Diastolic CHF (Compensated)  PAF - Currently Paced     - [id number] Score 5 (7.2% Stroke risk Per Year)    - on Eliquis (Last Dose 2.26.24 AM)- Now on FD Lovenox 70 mg qd  SSS    - Status Post PPM (9.19.19): [employer]tr[employer]   PAD  CVD    - Left Carotid Atherosclerosis  Hyperlipidemia    - On Statin  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 Chávez  D    - Mild MR    - Mold to Moderate TR  YARI  GERD  Iron Deficiency Anemia   Thrombocytopenia   Right Knee Osteoarthritis  Dementia    - CT Head Negative for Acute Abnormality  Glaucoma  Hypothyroidism    - On Oral Replacement Therapy  History of GI Bleed    Plan:   Stable from yesterday  Labs reviewed and continuing to improve. Urine output  good.  Continue Coreg 3.125 Mg BID  Continue Aspirin 81 Mg Daily  Continue FD Lovenox Re: NSTEMI Treatment & PAF/Stroke Risk Reduction (Eliquis on Hold)  Will plan C on 03/04/24 due to renal function improving close to baseline; will continue to monitor for improvement  Renal Team Following for Optimization in setting of hyponatremia- Appreciate the Assistance    Yaneth Purcell PA-C  Cardiology  [facility] - 4  03/02/2024     I agree with the findings of the complexity of problems addressed and take responsibility for the plan's risks and complications. I approved the plan documented by CECELIA Pearson.  Cath Monday discussed with nephrology

## 2024-03-02 NOTE — PROGRESS NOTES
[facility] - [facility]  Nephrology  Progress Note    Patient Name: Ev Alberts  MRN: [id number]  Admission Date: 2/25/2024  Hospital Length of Stay: 6 days  Attending Provider: Vanessa Mays MD   Primary Care Physician: Santi Walters MD  Principal Problem:Debility      Subjective:     HPI:  This is an 89-year-old female with hypertension, dementia, sick sinus syndrome, thyroid disease, nephrectomy 50 years prior admitted through the ED on 02/25 with complaints of not being able to walk since prior discharge on 02/21.  Admission workup revealed mild hypokalemia, renal insufficiency with creatinine 1.9, hypercalcemia and elevated troponin which later peaked at 3.5.  Low-grade fever present.  Urine culture from 02/25 and blood cultures from 02/27 all positive for E coli. She was transferred to ICU on 02/26 for vaso pressor requirements after fluid resuscitation with 2 L normal saline.  Cardiology planning for left heart catheterization once medically optimized.  As of this morning Levophed has been discontinued.      Creatinine on admission 1.9 later peaked at 2.6 yesterday now improved to 2.2.  Urine output 875 cc over the past 24 hours.  During last admission she was discharged with creatinine 1.7 down from max of 2.8.  Of note, both this and most recent admission she was noted to have hypercalcemia during ER workup.  Both times hypercalcemia resolved. Left nephrectomy done 50 years ago secondary to pyelonephritis. No recurrent infection or renal stones. No NSAID use. She is currently eating lunch with assistance of . Receiving NS at 100 mL/hr.     Interval History:  3/2/24- On 2L NC. Decreased UOP. Renal indices improved to baseline at 1.69 today. LR infusion completed yesterday.      Review of Systems   Respiratory: Negative.     Cardiovascular: Negative.    Gastrointestinal: Negative.          Review of patient's allergies indicates:   Allergen Reactions    Amlodipine-benazepril Edema      Other reaction(s): unknown    Corticosteroids (glucocorticoids) Edema and Swelling    Rofecoxib Anaphylaxis and Swelling    Sulfa (sulfonamide antibiotics) Edema    Atorvastatin      Other reaction(s): unknown    Ibuprofen      Other reaction(s): Allergy to sulfa drugs     Current Facility-Administered Medications   Medication Frequency    acetaminophen suppository 650 mg Q6H PRN    acetaminophen tablet 1,000 mg Q6H PRN    aluminum-magnesium hydroxide-simethicone 200-200-20 mg/5 mL suspension 30 mL QID PRN    aspirin EC tablet 81 mg Daily    atorvastatin tablet 10 mg Nightly    bisacodyL suppository 10 mg Daily PRN    brimonidine 0.15 % OPTH DROP ophthalmic solution 1 drop BID    And    timolol maleate 0.5% ophthalmic solution 1 drop BID    carvediloL tablet 3.125 mg BID    cefTRIAXone (ROCEPHIN) 2 g in dextrose 5 % in water (D5W) 100 mL IVPB (MB+) Q24H    enoxaparin injection 70 mg Q24H (treatment, non-standard time)    hydrOXYzine pamoate capsule 25 mg Q8H PRN    latanoprost 0.005 % ophthalmic solution 1 drop QHS    levothyroxine tablet 25 mcg Before breakfast    melatonin tablet 6 mg Nightly PRN    metoprolol injection 5 mg Q5 Min PRN    naloxone 0.4 mg/mL injection 0.02 mg PRN    ondansetron injection 4 mg Q4H PRN    prochlorperazine injection Soln 5 mg Q6H PRN    risperiDONE tablet 2 mg QHS    rivastigmine tartrate capsule 1.5 mg BID    senna-docusate 8.6-50 mg per tablet 1 tablet BID PRN    sodium chloride 0.9% bolus 1,000 mL 1,000 mL Once    sodium chloride 0.9% flush 10 mL PRN       Objective:     Vital Signs (Most Recent):  Temp: 98 °F (36.7 °C) (03/02/24 0731)  Pulse: 67 (03/02/24 0731)  Resp: 18 (03/02/24 0731)  BP: 105/68 (03/02/24 0731)  SpO2: 98 % (03/02/24 0731) Vital Signs (24h Range):  Temp:  [97.4 °F (36.3 °C)-98.6 °F (37 °C)] 98 °F (36.7 °C)  Pulse:  [] 67  Resp:  [17-20] 18  SpO2:  [94 %-98 %] 98 %  BP: (105-140)/(68-91) 105/68     Weight: 66.7 kg (147 lb) (03/02/24 0602)  Body mass  index is 24.46 kg/m².  Body surface area is 1.75 meters squared.    I/O last 3 completed shifts:  In: 2041.1 [P.O.:1220; I.V.:721.9; IV Piggyback:99.2]  Out: 500 [Urine:500]    Physical Exam  Constitutional:       General: She is not in acute distress.  HENT:      Head: Atraumatic.      Mouth/Throat:      Mouth: Mucous membranes are moist.   Eyes:      Extraocular Movements: Extraocular movements intact.   Cardiovascular:      Rate and Rhythm: Normal rate and regular rhythm.      Heart sounds: Normal heart sounds. No murmur heard.  Pulmonary:      Effort: Pulmonary effort is normal.      Breath sounds: Normal breath sounds.   Abdominal:      General: There is no distension.      Palpations: Abdomen is soft.   Musculoskeletal:         General: Swelling present.   Skin:     General: Skin is warm and dry.   Neurological:      General: No focal deficit present.      Mental Status: She is alert and oriented to person, place, and time.   Psychiatric:         Mood and Affect: Mood normal.         Behavior: Behavior normal.         Significant Labs:sureBMP:   Recent Labs   Lab 03/02/24  0402   *   K 4.7   CO2 20*   BUN 36.5*   CREATININE 1.69*   CALCIUM 8.7   MG 2.00       CBC:   Recent Labs   Lab 03/02/24 0402   WBC 8.84   RBC 3.62*   HGB 10.7*   HCT 32.3*      MCV 89.2   MCH 29.6   MCHC 33.1       Microbiology Results (last 7 days)       Procedure Component Value Units Date/Time    Blood Culture [[id number]]  (Abnormal)  (Susceptibility) Collected: 02/27/24 0418    Order Status: Completed Specimen: Blood Updated: 02/29/24 0653     CULTURE, BLOOD ([facility]) Escherichia coli     GRAM STAIN Gram Negative Rods      Seen in gram stain of broth only      2 of 2 bottles positive    Blood Culture [[id number]]  (Abnormal)  (Susceptibility) Collected: 02/27/24 0655    Order Status: Completed Specimen: Blood from Antecubital, Left Updated: 02/29/24 0653     CULTURE, BLOOD ([facility]) Escherichia coli     GRAM STAIN Gram Negative  Rods      2 of 2 bottles positive      Seen in gram stain of broth only    BCID2 Panel [[id number]]  (Abnormal) Collected: 02/27/24 0418    Order Status: Completed Specimen: Blood Updated: 02/27/24 8997     CTX-M (ESBL ) Not Detected     IMP (Cabapenemase ) Not Detected     KPC resistance gene (Carbapenemase ) Not Detected     mcr-1 Not Detected     mecA ID N/A     Comment: Note: Antimicrobial resistance can occur via multiple mechanisms. A Not Detected result for antimicrobial resistance gene(s) does not indicate antimicrobial susceptibility. Subculturing is required for species identification and susceptibility testing of   isolates.        mecA/C and MREJ (MRSA) gene N/A     NDM (Carbapenemase ) Not Detected     OXA-48-like (Carbapenemase ) Not Detected     Sinai/B (VRE gene) N/A     VIM (Carbapenemase ) Not Detected     Enterococcus faecalis Not Detected     Enterococcus faecium Not Detected     Listeria monocytogenes Not Detected     Staphylococcus spp. Not Detected     Staphylococcus aureus Not Detected     Staphylococcus epidermidis Not Detected     Staphylococcus lugdunensis Not Detected     Streptococcus spp. Not Detected     Streptococcus agalactiae (Group B) Not Detected     Streptococcus pneumoniae Not Detected     Streptococcus pyogenes (Group A) Not Detected     Acinetobacter calcoaceticus/baumannii complex Not Detected     Bacteroides fragilis Not Detected     Enterobacterales Detected     Enterobacter cloacae complex Not Detected     Escherichia coli Detected     Klebsiella aerogenes Not Detected     Klebsiella oxytoca Not Detected     Klebsiella pneumoniae group Not Detected     Proteus spp. Not Detected     Salmonella spp. Not Detected     Serratia marcescens Not Detected     Haemophilus influenzae Not Detected     Neisseria meningitidis Not Detected     Pseudomonas aeruginosa Not Detected     Stenotrophomonas maltophilia Not Detected     Candida  albicans Not Detected     Candida auris Not Detected     Candida glabrata Not Detected     Candida krusei Not Detected     Candida parapsilosis Not Detected     Candida tropicalis Not Detected     Cryptococcus neoformans/gattii Not Detected    Narrative:      The [facility] BCID2 Panel is a multiplexed nucleic acid test intended for the use with Garmentory® 2.0 or BioFire® FilmArray® Roomtag Systems for the simultaneous qualitative detection and identification of multiple bacterial and yeast nucleic acids and select genetic determinants associated with antimicrobial resistance.  The [employer] BCID2 Panel test is performed directly on blood culture samples identified as positive by a continuous monitoring blood culture system.  Results are intended to be interpreted in conjunction with Gram stain results.    Urine culture [[id number]]  (Abnormal)  (Susceptibility) Collected: 02/25/24 1934    Order Status: Completed Specimen: Urine Updated: 02/27/24 0903     Urine Culture >/= 100,000 colonies/ml Escherichia coli    Blood Culture [[id number]]     Order Status: Canceled Specimen: Blood     Blood Culture [[id number]]     Order Status: Canceled Specimen: Blood           Specimen (24h ago, onward)      None          Recent Labs   Lab 02/28/24  1504   PROTEINUA Trace*   BACTERIA Many*   LEUKOCYTESUR 500*   UROBILINOGEN Normal         Significant Imaging:  US Retroperitoneal Complete [[id number]] Resulted: 02/28/24 2122   Order Status: Completed Updated: 02/28/24 2125   Narrative:     EXAMINATION:  US RETROPERITONEAL COMPLETE    CLINICAL HISTORY:  MICHEL.    TECHNIQUE:  Multiple real-time images were performed of the kidneys in various planes by the sonographer.    COMPARISON:  None available.    FINDINGS:  The exam was technically difficult to perform due to body habitus and large bowel gas.    Patient is status post left nephrectomy.  Right kidney is small in size and measures 7.3 x 4.6 x 4.2 cm.  Right kidney is seen with  limitations.  Grossly, right kidney corticomedullary differentiation is preserved and there is no definite hydronephrosis.    Urinary bladder is decompressed and difficult to assess.   Impression:       1.  Limited study due to patient's body habitus and bowel gas.    2.  Status post left nephrectomy.    3.  Small right kidney.      Electronically signed by: Theodore Prince  Date: 02/28/2024  Time: 21:22       Assessment/Plan:     MICHEL resolving secondary to suspected clinical dehydration and sepsis  CKD-baseline suspected 1.7 with small solitary right kidney   Urosepsis - urine culture 2/25 and Blood culture 2/27 positive for E coli  NSTEMI  Metabolic acidosis  Hyponatremia     Plan:  Add PO NaHCO3 at 650 mg TID  Check serum osmo, urine osmo, urine Na for completeness of hyponatremia workup  Encourage PO intake  BMP in AM    Plans for LHC on 3/4/24.  Patient's daughter reports today that she normally follows with Dr. Chávez for CKD. Discussed with nursing to notify Dr. Chávez of his patient and offer transfer of care to his service.    Patient seen and examined independently  Agree with above assessment and findings  Vitals signs and nursing note reviewed.   Temp:  [97.4 °F (36.3 °C)-98.6 °F (37 °C)]   Pulse:  [58-80]   Resp:  [18-23]   BP: (102-140)/(67-91)   SpO2:  [96 %-98 %]      General: NAD  HEENT: NC/AT. MM moist  Neck: No JVD, no carotid bruit  Resp: HIRA present. No w/r/c  Cardiac: S1S2 heard, no m/r/g  Abd: Soft, NT, BS present, no organomegaly appreciated, nondistended  Ext: No edema, clubbing, color change.  Neuro: no focal deficits, sensory deficits, AAOx3  Skin: no rash, lesions. dry    Reviewed available labs and imaging  Renal function is at baseline  Educated about good fluid intake  Continue sodium bicarbonate  Discussed with nursing, Dr. Chávez will be seeing her tomorrow from nephrology    Components of this note were documented using voice recognition systems; and are subject to  errors not corrected at proof reading.  Please contact the author for any clarifications.

## 2024-03-02 NOTE — PROGRESS NOTES
[facility] Hospital Medicine Progress Note        Chief Complaint: Inpatient Follow-up for     HPI:   89 yr old female whose history includes HTN, hypothyroidism, CKD with solitary kidney, PAF. Presented to ED with c/o generalized weakness.      Just discharged on 2/21/24 following a 2 day admission during which time she was treated for generalized weakness and age related physical debility with polypharmacy. Trazodone dose decreased and lyrica, meclizine and requip discontinued. Prior to discharge she had improved mobility and discharged home with family and home health. Unclear if patient stopped these medications as instructed because her medications come prepackaged.      At the time of md's exam patient is AAO x 3. Reports she was ambulating with her walker when both legs became very weak and she was unable to walk any further. Denies falling but  reports she's had multiple falls in the past. Denies any fever, chest pain, SOB, vomiting or diarrhea. Chest x-ray shows enlarged cardiac silhouette without edema. CT - lumbar spine shows nondisplaced fracture at S4 of indeterminate age. CT T-spine negative for acute findings. .  CT head negative     VS on arrival: T 99.2, P 73, R 19, B/P 108/56, Sats 96% on room air. Initial labs WBC 16.8, Hgb 10.8, Hct 35, K 3.4, BUN 32.6, creatinine 1.91, Troponin 0.191 (repeat 0.199). EKG shows SR with ST-T changes in anterior and inferolateral leads.      Started on antibiotics for UTI, found to have NSTEMI.  Upgraded to the ICU for vasopressor support.  Found to have bacteremia.  Continues on antibiotics.  Cardiology planning for catheterization once renal function optimized.  Now being downgraded to the floors.  Antibiotics were continued cardiology closely following for renal recovery before attempting angiogram.    Interval Hx:     Afebrile, much more alert responsive today.  Doing well on room air.  Family members at bedside.  Patient  "has no new complaints or concerns.  Denies any fever, chills, shortness for breath, chest pain.    Follow up labs today showed stable hemoglobin and platelets, mild hyponatremia with improving BUN and serum creatinine.  Chronic low albumin noted.        Objective/physical exam:  Vitals:    03/02/24 0001 03/02/24 0426 03/02/24 0602 03/02/24 0731   BP: 117/79 (!) 140/91  105/68   BP Location:       Patient Position:       Pulse: 66 72  67   Resp: 20 18  18   Temp: 97.5 °F (36.4 °C) 97.4 °F (36.3 °C)  98 °F (36.7 °C)   TempSrc: Oral Oral  Oral   SpO2: 97% 98%  98%   Weight:   66.7 kg (147 lb)    Height:   5' 5" (1.651 m)      General: In no acute distress, afebrile  Respiratory: Clear to auscultation bilaterally  Cardiovascular: S1, S2, no appreciable murmur  Abdomen: Soft, nontender, BS +  MSK: Warm, no lower extremity edema, no clubbing or cyanosis  Neurologic: Alert and oriented x4, moving all extremities with good strength     Lab Results   Component Value Date     (L) 03/02/2024    K 4.7 03/02/2024    CO2 20 (L) 03/02/2024    BUN 36.5 (H) 03/02/2024    CREATININE 1.69 (H) 03/02/2024    CALCIUM 8.7 03/02/2024    EGFRNONAA 41 09/06/2019      Lab Results   Component Value Date    ALT 83 (H) 03/02/2024    AST 91 (H) 03/02/2024    ALKPHOS 93 03/02/2024    BILITOT 0.3 03/02/2024      Lab Results   Component Value Date    WBC 8.84 03/02/2024    HGB 10.7 (L) 03/02/2024    HCT 32.3 (L) 03/02/2024    MCV 89.2 03/02/2024     03/02/2024           Medications:   aspirin  81 mg Oral Daily    atorvastatin  10 mg Oral Nightly    brimonidine 0.15 % OPTH DROP  1 drop Both Eyes BID    And    timolol maleate 0.5%  1 drop Both Eyes BID    carvediloL  3.125 mg Oral BID    cefTRIAXone (Rocephin) IV (PEDS and ADULTS)  2 g Intravenous Q24H    enoxparin  1 mg/kg Subcutaneous Q24H (treatment, non-standard time)    latanoprost  1 drop Both Eyes QHS    levothyroxine  25 mcg Oral Before breakfast    mupirocin   Nasal BID    " risperiDONE  2 mg Oral QHS    rivastigmine tartrate  1.5 mg Oral BID    sodium chloride 0.9%  1,000 mL Intravenous Once      acetaminophen, acetaminophen, aluminum-magnesium hydroxide-simethicone, bisacodyL, hydrOXYzine pamoate, melatonin, metoprolol, naloxone, ondansetron, prochlorperazine, senna-docusate 8.6-50 mg, sodium chloride 0.9%     Assessment/Plan:    E coli bacteremia  Septic shock due to above-resolved  NSTEMI likely type 2 type 1-awaiting angiogram   MICHEL on CKD-improving  Age-indeterminate nondisplaced S4 vertebral fracture  Paroxysmal atrial fibrillation    HX:  SSS s/p pacemaker 09/2019, chronic HFpEF, HTN, FERNANDEZ, hypothyroidism, YARI, GERD, glaucoma, diverticular disease    Plan:  -continue full-dose Lovenox.  Switch to Eliquis when appropriate   - Renal function improving steadily.  Awaiting left heart catheterization.  Continue telemetry monitoring   -continue ceftriaxone.  Switch to cefdinir at discharge to complete 14 days total.  TTE reviewed   -nephrology on board, appreciate assistance.  Monitor sodium   -therapy as tolerated when appropriate  -other home medications were reviewed and renewed        Mayur Carrillo MD

## 2024-03-03 LAB
ALBUMIN SERPL-MCNC: 2.1 G/DL (ref 3.4–4.8)
ALBUMIN/GLOB SERPL: 0.5 RATIO (ref 1.1–2)
ALP SERPL-CCNC: 87 UNIT/L (ref 40–150)
ALT SERPL-CCNC: 63 UNIT/L (ref 0–55)
AST SERPL-CCNC: 58 UNIT/L (ref 5–34)
BASOPHILS # BLD AUTO: 0.06 X10(3)/MCL
BASOPHILS NFR BLD AUTO: 0.8 %
BILIRUB SERPL-MCNC: 0.3 MG/DL
BUN SERPL-MCNC: 38.3 MG/DL (ref 9.8–20.1)
CALCIUM SERPL-MCNC: 8.7 MG/DL (ref 8.4–10.2)
CHLORIDE SERPL-SCNC: 103 MMOL/L (ref 98–107)
CO2 SERPL-SCNC: 18 MMOL/L (ref 23–31)
CREAT SERPL-MCNC: 1.63 MG/DL (ref 0.55–1.02)
EOSINOPHIL # BLD AUTO: 0.1 X10(3)/MCL (ref 0–0.9)
EOSINOPHIL NFR BLD AUTO: 1.4 %
ERYTHROCYTE [DISTWIDTH] IN BLOOD BY AUTOMATED COUNT: 16.7 % (ref 11.5–17)
FERRITIN SERPL-MCNC: 606.92 NG/ML (ref 4.63–204)
GFR SERPLBLD CREATININE-BSD FMLA CKD-EPI: 30 MLS/MIN/1.73/M2
GLOBULIN SER-MCNC: 4.2 GM/DL (ref 2.4–3.5)
GLUCOSE SERPL-MCNC: 89 MG/DL (ref 82–115)
HCT VFR BLD AUTO: 29 % (ref 37–47)
HGB BLD-MCNC: 10.1 G/DL (ref 12–16)
IMM GRANULOCYTES # BLD AUTO: 0.22 X10(3)/MCL (ref 0–0.04)
IMM GRANULOCYTES NFR BLD AUTO: 3.1 %
IRON SATN MFR SERPL: 38 % (ref 20–50)
IRON SERPL-MCNC: 60 UG/DL (ref 50–170)
LYMPHOCYTES # BLD AUTO: 1.89 X10(3)/MCL (ref 0.6–4.6)
LYMPHOCYTES NFR BLD AUTO: 26.4 %
MAGNESIUM SERPL-MCNC: 1.9 MG/DL (ref 1.6–2.6)
MCH RBC QN AUTO: 30.1 PG (ref 27–31)
MCHC RBC AUTO-ENTMCNC: 34.8 G/DL (ref 33–36)
MCV RBC AUTO: 86.6 FL (ref 80–94)
MONOCYTES # BLD AUTO: 1.08 X10(3)/MCL (ref 0.1–1.3)
MONOCYTES NFR BLD AUTO: 15.1 %
NEUTROPHILS # BLD AUTO: 3.82 X10(3)/MCL (ref 2.1–9.2)
NEUTROPHILS NFR BLD AUTO: 53.2 %
NRBC BLD AUTO-RTO: 0.6 %
PLATELET # BLD AUTO: 160 X10(3)/MCL (ref 130–400)
PMV BLD AUTO: 11 FL (ref 7.4–10.4)
POTASSIUM SERPL-SCNC: 4.4 MMOL/L (ref 3.5–5.1)
PROT SERPL-MCNC: 6.3 GM/DL (ref 5.8–7.6)
RBC # BLD AUTO: 3.35 X10(6)/MCL (ref 4.2–5.4)
SODIUM SERPL-SCNC: 129 MMOL/L (ref 136–145)
TIBC SERPL-MCNC: 156 UG/DL (ref 250–450)
TIBC SERPL-MCNC: 96 UG/DL (ref 70–310)
TRANSFERRIN SERPL-MCNC: 142 MG/DL
WBC # SPEC AUTO: 7.17 X10(3)/MCL (ref 4.5–11.5)

## 2024-03-03 PROCEDURE — 25000003 PHARM REV CODE 250

## 2024-03-03 PROCEDURE — 63600175 PHARM REV CODE 636 W HCPCS: Performed by: STUDENT IN AN ORGANIZED HEALTH CARE EDUCATION/TRAINING PROGRAM

## 2024-03-03 PROCEDURE — 21400001 HC TELEMETRY ROOM

## 2024-03-03 PROCEDURE — 82728 ASSAY OF FERRITIN: CPT | Performed by: INTERNAL MEDICINE

## 2024-03-03 PROCEDURE — 63600175 PHARM REV CODE 636 W HCPCS: Performed by: INTERNAL MEDICINE

## 2024-03-03 PROCEDURE — 25000003 PHARM REV CODE 250: Performed by: STUDENT IN AN ORGANIZED HEALTH CARE EDUCATION/TRAINING PROGRAM

## 2024-03-03 PROCEDURE — 25000003 PHARM REV CODE 250: Performed by: NURSE PRACTITIONER

## 2024-03-03 PROCEDURE — 25000003 PHARM REV CODE 250: Performed by: INTERNAL MEDICINE

## 2024-03-03 PROCEDURE — 83735 ASSAY OF MAGNESIUM: CPT

## 2024-03-03 PROCEDURE — 83540 ASSAY OF IRON: CPT | Performed by: INTERNAL MEDICINE

## 2024-03-03 PROCEDURE — 80053 COMPREHEN METABOLIC PANEL: CPT

## 2024-03-03 PROCEDURE — 85025 COMPLETE CBC W/AUTO DIFF WBC: CPT

## 2024-03-03 RX ORDER — SODIUM CHLORIDE 0.9 % (FLUSH) 0.9 %
10 SYRINGE (ML) INJECTION
Status: DISCONTINUED | OUTPATIENT
Start: 2024-03-03 | End: 2024-03-11 | Stop reason: HOSPADM

## 2024-03-03 RX ORDER — ENOXAPARIN SODIUM 100 MG/ML
1 INJECTION SUBCUTANEOUS EVERY 24 HOURS
Status: DISCONTINUED | OUTPATIENT
Start: 2024-03-03 | End: 2024-03-05

## 2024-03-03 RX ORDER — SODIUM BICARBONATE 650 MG/1
1300 TABLET ORAL 2 TIMES DAILY
Status: DISCONTINUED | OUTPATIENT
Start: 2024-03-03 | End: 2024-03-11 | Stop reason: HOSPADM

## 2024-03-03 RX ORDER — SODIUM CHLORIDE 9 MG/ML
INJECTION, SOLUTION INTRAVENOUS ONCE
Status: CANCELLED | OUTPATIENT
Start: 2024-03-03 | End: 2024-03-03

## 2024-03-03 RX ADMIN — CARVEDILOL 3.12 MG: 3.12 TABLET, FILM COATED ORAL at 09:03

## 2024-03-03 RX ADMIN — SODIUM BICARBONATE 650 MG TABLET 1300 MG: at 09:03

## 2024-03-03 RX ADMIN — LATANOPROST 1 DROP: 50 SOLUTION OPHTHALMIC at 10:03

## 2024-03-03 RX ADMIN — Medication 81 MG: at 09:03

## 2024-03-03 RX ADMIN — TIMOLOL MALEATE 1 DROP: 5 SOLUTION OPHTHALMIC at 01:03

## 2024-03-03 RX ADMIN — LEVOTHYROXINE SODIUM 25 MCG: 25 TABLET ORAL at 06:03

## 2024-03-03 RX ADMIN — BRIMONIDINE TARTRATE 1 DROP: 1.5 SOLUTION OPHTHALMIC at 10:03

## 2024-03-03 RX ADMIN — SODIUM BICARBONATE 650 MG TABLET 650 MG: at 09:03

## 2024-03-03 RX ADMIN — Medication 6 MG: at 09:03

## 2024-03-03 RX ADMIN — TIMOLOL MALEATE 1 DROP: 5 SOLUTION OPHTHALMIC at 10:03

## 2024-03-03 RX ADMIN — SODIUM BICARBONATE: 84 INJECTION, SOLUTION INTRAVENOUS at 01:03

## 2024-03-03 RX ADMIN — RIVASTIGMINE TARTRATE 1.5 MG: 1.5 CAPSULE ORAL at 09:03

## 2024-03-03 RX ADMIN — ENOXAPARIN SODIUM 70 MG: 80 INJECTION SUBCUTANEOUS at 01:03

## 2024-03-03 RX ADMIN — ATORVASTATIN CALCIUM 10 MG: 10 TABLET, FILM COATED ORAL at 09:03

## 2024-03-03 RX ADMIN — BRIMONIDINE TARTRATE 1 DROP: 1.5 SOLUTION OPHTHALMIC at 01:03

## 2024-03-03 RX ADMIN — CEFTRIAXONE SODIUM 2 G: 2 INJECTION, POWDER, FOR SOLUTION INTRAMUSCULAR; INTRAVENOUS at 01:03

## 2024-03-03 RX ADMIN — RISPERIDONE 2 MG: 1 TABLET ORAL at 09:03

## 2024-03-03 NOTE — PROGRESS NOTES
[facility] - 4 floor    Cardiology  Progress Note    Patient Name: Ev Alberts  MRN: [id number]  Admission Date: 2/25/2024  Hospital Length of Stay: 7 days  Code Status: Full Code   Attending Physician: Vanessa Mays MD   Primary Care Physician: Santi Walters MD  Expected Discharge Date:   Principal Problem:Debility    Subjective:   Chief Complaint/Reason for Consult:Elevated Troponin      HPI: Ms. Alberts is a 89 y/o female with a history of SSS, AVB II, Bradycardia, CHF, PAF on Eliquis, PAD, Left Carotid Atherosclerosis, HTN, CKD IV, VHD, YARI, who was briefly known to [facility] (Dr. Vigil). She presented to the ER on 2.25.24 with complaints of not being able to walk since she was discharged on 2.21.24 due to polypharmacy, weakness, and age related disability. Daughter reports she is unable to get the signal to make her walk. Daughter also reports she has been lethargic and is unsure she stopped taking Lyrica, Meclizine, and Requip because they come prepackaged. Significant labs include WBC 21.55, Na 133, K 3.4, BUN/Creat 31.1/1.74, Albumin 2.6, HDL 32. LDL 38, Troponin 0.199. UA +; UC pending. CT Lumbar spine shows nondisplaced fracture at S4 (age indeterminate) and multilevel degenerative changes. CT Thoracic spine shows ligament, spinal cord and/or vascular abnormalities. EKG shows sinus rhythm with 1st degree A-V block, ST and Marked T wave abnormality, consider inferior ischemia, ST and Marked T wave abnormality, consider anterolateral ischemia, and Prolonged QT. [facility] has been consulted for NSTEMI.        Hospital Course:  2.27.24: NAD Noted. On Levophed for BP Support. Denies CP/SOB. Paced at 60. On FD Lovenox for NSTEMI Treatment.   2.28.24: NAD Noted. Levophed has been weaned off and patient is waiting on floor transfer. Family at bedside, [facility] plan of care relayed to her daughter and , both at bedside. Patient reports no CP/SOB. Troponin values are trending down. No arrhythmias  "noted overnight. On FD Lovenox.  2.29.24: NAD Noted. Denies CP/SOB. Hypertensive. Renal Team Following, renal indices are improving. Paced on Tele. Clinically Stable, now floor status (Boarding in ICU).  3.01.24: NAD Noted. Pt endorses no complaints this AM tolerate fluids. Denies CP/palpitations/SOB. Renal Team Following, renal indices are improving close to baseline with hyponatremia. Paced on Tele. Clinically Stable, now on floors.  3.2.24:  NAD. Patient resting comfortably in bed with family at bedside. Reports some shortness of breath due to "asthma". Denies CP or palpitations. Renal indices continuing to improve. SR on tele. Stable from yesterday.   3.3.24: Plans for C tomorrow. NPO at midnight. NAD. Sitting in bed. Complaints of some difficulty swallowing since initial hospital admission.      PMH: SSS, AVB II, Bradycardia, CHF, PAF on Eliquis, PAD, Left Carotid Atherosclerosis, HTN, CKD IV, VHD, YARI, GERD, FERNANDEZ, Right Knee Osteoarthritis, GI Bleed, Dementia, Glaucoma, Hypothyroidism     PSH: Cataract Surgery, Cholecystectomy, Hysterectomy, PPM, Left, Nephrectomy, LHC, Hernia Repair, Oral Surgery, Bilateral Foot Surgery  Family History: Non-Contributory   Social History: Denies smoking, illicit drug use, and alcohol use.      Previous Cardiac Diagnostics:   ECHO (2.26.24):  Left Ventricle: The left ventricle is normal in size. Mildly increased wall thickness. There is normal systolic function with a visually estimated ejection fraction of 55 - 60%. Grade III diastolic dysfunction. Right Ventricle: Mild right ventricular enlargement. Wall thickness is normal. Right ventricle wall motion  is normal. Systolic function is mildly reduced. TAPSE is 1.46 cm. Left Atrium: Left atrium is moderately dilated. Aortic Valve: There is mild aortic valve sclerosis. Tricuspid Valve: There is mild regurgitation.     PPM Insertion (9.19.19): Medtronic      ECHO (9.15.19):   Normal Left Ventricle cavity size. Normal wall " thickness. Normal ejection fraction, 55-65%. The right ventricle cavity is mildly dilated. Left atrium is mildly dilated. Normal right atrium. Normal central venous pressure. Mild MR. Mild to Moderate TR. Moderate pulmonary HTN. No pericardial effusion.       Carotid US (7.26.12):  The study quality is good. Less than or equal to 50% left common carotid artery stenosis.Bilateral normal velocity measurements of the internal carotid and external caortid arteries are noted, with no plaque visualized.Antegrade right vertebral artery flow. Antegrade left vertebral artery flow.     KARI (1.18.12):  The resting right ankle brachial index is 1.01 consistent with no significant right peripheral vascular disease.The resting left ankle brachial index is 0.94 consistent with borderline left peripheral vascular disease.The study quality is good.      Lower Ext Arterial US (1.18.12):  The study quality is good. Biphasic waveforms and diffuse plaque seen in both the bilateral infra-popliteal arteries.    Review of Systems   Cardiovascular:  Negative for chest pain, leg swelling and palpitations.   Respiratory:  Negative for shortness of breath (Occasional, mild).    All other systems reviewed and are negative.    Objective:     Vital Signs (Most Recent):  Temp: 98.4 °F (36.9 °C) (03/03/24 0745)  Pulse: 67 (03/03/24 0745)  Resp: 18 (03/03/24 0745)  BP: 116/76 (03/03/24 0745)  SpO2: 97 % (03/03/24 0745) Vital Signs (24h Range):  Temp:  [97.9 °F (36.6 °C)-98.4 °F (36.9 °C)] 98.4 °F (36.9 °C)  Pulse:  [56-67] 67  Resp:  [16-23] 18  SpO2:  [95 %-98 %] 97 %  BP: (102-127)/(67-76) 116/76   Weight: 66.7 kg (147 lb)  Body mass index is 24.46 kg/m².  SpO2: 97 %       Intake/Output Summary (Last 24 hours) at 3/3/2024 0954  Last data filed at 3/2/2024 1300  Gross per 24 hour   Intake 1220 ml   Output 500 ml   Net 720 ml       Lines/Drains/Airways       Drain  Duration             Female External Urinary Catheter w/ Suction 02/28/24 1644 3 days               Peripheral Intravenous Line  Duration                  Peripheral IV - Single Lumen 02/26/24 2205 Anterior;Left Forearm 5 days         Peripheral IV - Single Lumen 02/26/24 2302 Anterior;Distal;Right Forearm 5 days                  Significant Labs:   Recent Results (from the past 72 hour(s))   Comprehensive Metabolic Panel    Collection Time: 03/01/24  4:07 AM   Result Value Ref Range    Sodium Level 133 (L) 136 - 145 mmol/L    Potassium Level 4.4 3.5 - 5.1 mmol/L    Chloride 106 98 - 107 mmol/L    Carbon Dioxide 23 23 - 31 mmol/L    Glucose Level 111 82 - 115 mg/dL    Blood Urea Nitrogen 31.2 (H) 9.8 - 20.1 mg/dL    Creatinine 1.82 (H) 0.55 - 1.02 mg/dL    Calcium Level Total 8.9 8.4 - 10.2 mg/dL    Protein Total 6.5 5.8 - 7.6 gm/dL    Albumin Level 2.4 (L) 3.4 - 4.8 g/dL    Globulin 4.1 (H) 2.4 - 3.5 gm/dL    Albumin/Globulin Ratio 0.6 (L) 1.1 - 2.0 ratio    Bilirubin Total 0.4 <=1.5 mg/dL    Alkaline Phosphatase 94 40 - 150 unit/L    Alanine Aminotransferase 43 0 - 55 unit/L    Aspartate Aminotransferase 54 (H) 5 - 34 unit/L    eGFR 27 mls/min/1.73/m2   Magnesium    Collection Time: 03/01/24  4:07 AM   Result Value Ref Range    Magnesium Level 2.20 1.60 - 2.60 mg/dL   CBC with Differential    Collection Time: 03/01/24  4:07 AM   Result Value Ref Range    WBC 10.11 4.50 - 11.50 x10(3)/mcL    RBC 3.46 (L) 4.20 - 5.40 x10(6)/mcL    Hgb 10.3 (L) 12.0 - 16.0 g/dL    Hct 31.1 (L) 37.0 - 47.0 %    MCV 89.9 80.0 - 94.0 fL    MCH 29.8 27.0 - 31.0 pg    MCHC 33.1 33.0 - 36.0 g/dL    RDW 17.2 (H) 11.5 - 17.0 %    Platelet 150 130 - 400 x10(3)/mcL    MPV 11.2 (H) 7.4 - 10.4 fL    Neut % 76.1 %    Lymph % 13.9 %    Mono % 8.8 %    Eos % 0.2 %    Basophil % 0.4 %    Lymph # 1.41 0.6 - 4.6 x10(3)/mcL    Neut # 7.69 2.1 - 9.2 x10(3)/mcL    Mono # 0.89 0.1 - 1.3 x10(3)/mcL    Eos # 0.02 0 - 0.9 x10(3)/mcL    Baso # 0.04 <=0.2 x10(3)/mcL    IG# 0.06 (H) 0 - 0.04 x10(3)/mcL    IG% 0.6 %    NRBC% 0.4 %   POCT glucose     Collection Time: 03/02/24  1:05 AM   Result Value Ref Range    POCT Glucose 123 (H) 70 - 110 mg/dL   Comprehensive Metabolic Panel    Collection Time: 03/02/24  4:02 AM   Result Value Ref Range    Sodium Level 131 (L) 136 - 145 mmol/L    Potassium Level 4.7 3.5 - 5.1 mmol/L    Chloride 103 98 - 107 mmol/L    Carbon Dioxide 20 (L) 23 - 31 mmol/L    Glucose Level 119 (H) 82 - 115 mg/dL    Blood Urea Nitrogen 36.5 (H) 9.8 - 20.1 mg/dL    Creatinine 1.69 (H) 0.55 - 1.02 mg/dL    Calcium Level Total 8.7 8.4 - 10.2 mg/dL    Protein Total 6.4 5.8 - 7.6 gm/dL    Albumin Level 2.3 (L) 3.4 - 4.8 g/dL    Globulin 4.1 (H) 2.4 - 3.5 gm/dL    Albumin/Globulin Ratio 0.6 (L) 1.1 - 2.0 ratio    Bilirubin Total 0.3 <=1.5 mg/dL    Alkaline Phosphatase 93 40 - 150 unit/L    Alanine Aminotransferase 83 (H) 0 - 55 unit/L    Aspartate Aminotransferase 91 (H) 5 - 34 unit/L    eGFR 29 mls/min/1.73/m2   Magnesium    Collection Time: 03/02/24  4:02 AM   Result Value Ref Range    Magnesium Level 2.00 1.60 - 2.60 mg/dL   CBC with Differential    Collection Time: 03/02/24  4:02 AM   Result Value Ref Range    WBC 8.84 4.50 - 11.50 x10(3)/mcL    RBC 3.62 (L) 4.20 - 5.40 x10(6)/mcL    Hgb 10.7 (L) 12.0 - 16.0 g/dL    Hct 32.3 (L) 37.0 - 47.0 %    MCV 89.2 80.0 - 94.0 fL    MCH 29.6 27.0 - 31.0 pg    MCHC 33.1 33.0 - 36.0 g/dL    RDW 17.0 11.5 - 17.0 %    Platelet 177 130 - 400 x10(3)/mcL    MPV 12.0 (H) 7.4 - 10.4 fL    Neut % 62.3 %    Lymph % 23.2 %    Mono % 12.3 %    Eos % 0.5 %    Basophil % 0.6 %    Lymph # 2.05 0.6 - 4.6 x10(3)/mcL    Neut # 5.51 2.1 - 9.2 x10(3)/mcL    Mono # 1.09 0.1 - 1.3 x10(3)/mcL    Eos # 0.04 0 - 0.9 x10(3)/mcL    Baso # 0.05 <=0.2 x10(3)/mcL    IG# 0.10 (H) 0 - 0.04 x10(3)/mcL    IG% 1.1 %    NRBC% 0.0 %   Osmolality, Serum    Collection Time: 03/02/24  4:02 AM   Result Value Ref Range    Osmolality 288 280 - 300 mOsm/kg   Creatinine, Random Urine    Collection Time: 03/02/24  9:25 PM   Result Value Ref Range     Urine Creatinine 62.9 45.0 - 106.0 mg/dL   Chloride, Random Urine    Collection Time: 03/02/24  9:25 PM   Result Value Ref Range    Urine Chloride <20.0 mmol/L   Osmolality, Urine    Collection Time: 03/02/24  9:25 PM   Result Value Ref Range    Urine Osmolality 337 300 - 1,300 mOsm/kg   Sodium, Random Urine    Collection Time: 03/02/24  9:25 PM   Result Value Ref Range    Urine Sodium <20.0 mmol/L   Comprehensive Metabolic Panel    Collection Time: 03/03/24  3:47 AM   Result Value Ref Range    Sodium Level 129 (L) 136 - 145 mmol/L    Potassium Level 4.4 3.5 - 5.1 mmol/L    Chloride 103 98 - 107 mmol/L    Carbon Dioxide 18 (L) 23 - 31 mmol/L    Glucose Level 89 82 - 115 mg/dL    Blood Urea Nitrogen 38.3 (H) 9.8 - 20.1 mg/dL    Creatinine 1.63 (H) 0.55 - 1.02 mg/dL    Calcium Level Total 8.7 8.4 - 10.2 mg/dL    Protein Total 6.3 5.8 - 7.6 gm/dL    Albumin Level 2.1 (L) 3.4 - 4.8 g/dL    Globulin 4.2 (H) 2.4 - 3.5 gm/dL    Albumin/Globulin Ratio 0.5 (L) 1.1 - 2.0 ratio    Bilirubin Total 0.3 <=1.5 mg/dL    Alkaline Phosphatase 87 40 - 150 unit/L    Alanine Aminotransferase 63 (H) 0 - 55 unit/L    Aspartate Aminotransferase 58 (H) 5 - 34 unit/L    eGFR 30 mls/min/1.73/m2   Magnesium    Collection Time: 03/03/24  3:47 AM   Result Value Ref Range    Magnesium Level 1.90 1.60 - 2.60 mg/dL   CBC with Differential    Collection Time: 03/03/24  3:47 AM   Result Value Ref Range    WBC 7.17 4.50 - 11.50 x10(3)/mcL    RBC 3.35 (L) 4.20 - 5.40 x10(6)/mcL    Hgb 10.1 (L) 12.0 - 16.0 g/dL    Hct 29.0 (L) 37.0 - 47.0 %    MCV 86.6 80.0 - 94.0 fL    MCH 30.1 27.0 - 31.0 pg    MCHC 34.8 33.0 - 36.0 g/dL    RDW 16.7 11.5 - 17.0 %    Platelet 160 130 - 400 x10(3)/mcL    MPV 11.0 (H) 7.4 - 10.4 fL    Neut % 53.2 %    Lymph % 26.4 %    Mono % 15.1 %    Eos % 1.4 %    Basophil % 0.8 %    Lymph # 1.89 0.6 - 4.6 x10(3)/mcL    Neut # 3.82 2.1 - 9.2 x10(3)/mcL    Mono # 1.08 0.1 - 1.3 x10(3)/mcL    Eos # 0.10 0 - 0.9 x10(3)/mcL    Baso # 0.06  <=0.2 x10(3)/mcL    IG# 0.22 (H) 0 - 0.04 x10(3)/mcL    IG% 3.1 %    NRBC% 0.6 %     Telemetry:  Paced at 60 BPM    Physical Exam  Vitals and nursing note reviewed.   Constitutional:       General: She is not in acute distress.     Appearance: Normal appearance.   HENT:      Head: Normocephalic.      Mouth/Throat:      Mouth: Mucous membranes are moist.      Pharynx: Oropharynx is clear.   Cardiovascular:      Rate and Rhythm: Normal rate and regular rhythm.   Pulmonary:      Effort: Pulmonary effort is normal. No respiratory distress.      Breath sounds: Normal breath sounds. No wheezing or rales.      Comments: Room Air  Abdominal:      Palpations: Abdomen is soft.      Tenderness: There is no abdominal tenderness.   Musculoskeletal:      Cervical back: Neck supple.      Right lower leg: No edema.      Left lower leg: No edema.   Skin:     General: Skin is warm and dry.   Neurological:      General: No focal deficit present.      Mental Status: She is alert.      Comments: At Baseline       Current Inpatient Medications:    Current Facility-Administered Medications:     acetaminophen suppository 650 mg, 650 mg, Rectal, Q6H PRN, Ally Erazo MD, 650 mg at 02/26/24 1613    acetaminophen tablet 1,000 mg, 1,000 mg, Oral, Q6H PRN, Soila Kruse FNP, 1,000 mg at 02/29/24 1525    albuterol inhaler 1 puff, 1 puff, Inhalation, Q6H PRN, Mayur Carrillo MD, 1 puff at 03/02/24 1225    aluminum-magnesium hydroxide-simethicone 200-200-20 mg/5 mL suspension 30 mL, 30 mL, Oral, QID PRN, Soila Kruse FNP    aspirin EC tablet 81 mg, 81 mg, Oral, Daily, Jeff Perez FNP, 81 mg at 03/03/24 0951    atorvastatin tablet 10 mg, 10 mg, Oral, Nightly, Soila Kruse FNP, 10 mg at 03/02/24 2102    bisacodyL suppository 10 mg, 10 mg, Rectal, Daily PRN, Soila Kruse FNP    brimonidine 0.15 % OPTH DROP ophthalmic solution 1 drop, 1 drop, Both Eyes, BID, 1 drop at 03/02/24 3166 **AND** timolol maleate 0.5%  ophthalmic solution 1 drop, 1 drop, Both Eyes, BID, Soila Kruse, FNP, 1 drop at 03/02/24 2148    carvediloL tablet 3.125 mg, 3.125 mg, Oral, BID, Jeff Perez FNP, 3.125 mg at 03/03/24 0950    cefTRIAXone (ROCEPHIN) 2 g in dextrose 5 % in water (D5W) 100 mL IVPB (MB+), 2 g, Intravenous, Q24H, Christian Mari, DO, Stopped at 03/02/24 1256    enoxaparin injection 70 mg, 1 mg/kg, Subcutaneous, Q24H (treatment, non-standard time), Angelia Babin FNP, 70 mg at 03/02/24 0952    hydrOXYzine pamoate capsule 25 mg, 25 mg, Oral, Q8H PRN, Dimitri Cummings MD, 25 mg at 03/01/24 2042    latanoprost 0.005 % ophthalmic solution 1 drop, 1 drop, Both Eyes, QHS, Soila Kruse FNP, 1 drop at 03/02/24 2108    levothyroxine tablet 25 mcg, 25 mcg, Oral, Before breakfast, Soila Kruse FNP, 25 mcg at 03/03/24 0637    melatonin tablet 6 mg, 6 mg, Oral, Nightly PRN, Soila Kruse, FNP, 6 mg at 03/02/24 2102    metoprolol injection 5 mg, 5 mg, Intravenous, Q5 Min PRN, Angelia Babin FNP    naloxone 0.4 mg/mL injection 0.02 mg, 0.02 mg, Intravenous, PRN, Soila Kruse, FNP    ondansetron injection 4 mg, 4 mg, Intravenous, Q4H PRN, Soila Kruse, MELEP    prochlorperazine injection Soln 5 mg, 5 mg, Intravenous, Q6H PRN, Soila rKuse FNP    risperiDONE tablet 2 mg, 2 mg, Oral, QHS, Soila Kruse, FNP, 2 mg at 03/02/24 2102    rivastigmine tartrate capsule 1.5 mg, 1.5 mg, Oral, BID, MolSoila lee FNP, 1.5 mg at 03/02/24 2102    senna-docusate 8.6-50 mg per tablet 1 tablet, 1 tablet, Oral, BID PRN, Soila Kruse FNP, 1 tablet at 03/01/24 2044    sodium bicarbonate tablet 650 mg, 650 mg, Oral, TID, Lakshmi Sarmiento, NP, 650 mg at 03/03/24 0950    sodium chloride 0.9% bolus 1,000 mL 1,000 mL, 1,000 mL, Intravenous, Once, Josseline Nunez AGACNP-BC    sodium chloride 0.9% flush 10 mL, 10 mL, Intravenous, PRN, Soila Kruse FNP  VTE Risk Mitigation (From admission, onward)           Ordered      enoxaparin injection 70 mg  Every 24 hours         02/26/24 1209     IP VTE HIGH RISK PATIENT  Once         02/25/24 2209     Place sequential compression device  Until discontinued         02/25/24 2209     Reason for No Pharmacological VTE Prophylaxis  Once        Question:  Reasons:  Answer:  Already adequately anticoagulated on oral Anticoagulants    02/25/24 2209                  Assessment:   NSTEMI (Unspecified for now)    - EKG: SR with ST Segment Depression in Inferior & Anterolateral Leads    - Denies Angina/SOB at Current Time    - EF 55-60%  Urosepsis Requiring Vasopressor Support (Resolved)- Levophed has been weaned off    - Leukocytosis  Bacteremia  History of Hypertension (BP Now Stable)  Chronic Diastolic CHF (Compensated)  PAF - Currently Paced     - [id number] Score 5 (7.2% Stroke risk Per Year)    - on Eliquis (Last Dose 2.26.24 AM)- Now on FD Lovenox 70 mg qd  SSS    - Status Post PPM (9.19.19): Medtronic   PAD  CVD    - Left Carotid Atherosclerosis  Hyperlipidemia    - On Statin  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 Chávez  VHD    - Mild MR    - Mold to Moderate TR  YARI  GERD  Iron Deficiency Anemia   Thrombocytopenia   Right Knee Osteoarthritis  Dementia    - CT Head Negative for Acute Abnormality  Glaucoma  Hypothyroidism    - On Oral Replacement Therapy  History of GI Bleed    Plan:   Stable  NPO at midnight  Plan for LHC tomorrow, 3.4.24  Risk, Benefits and Alternatives Reviewed and Discussed with the PT and their Family and they wish to proceed with above Procedure.   Consent in chart   Labs reviewed and continuing to improve.   Continue Coreg 3.125 Mg BID  Continue Aspirin 81 Mg Daily  Continue FD Lovenox Re: NSTEMI Treatment & PAF/Stroke Risk Reduction (Eliquis on Hold)  Complaining of some difficulty swallowing and increased saliva production since admission, consider speech consult for evaluation  Renal Team Following for Optimization in setting of hyponatremia- Appreciate the  Assistance    Yaneth Purcell PA-C  Cardiology  [facility] - 4  03/03/2024     I agree with the findings of the complexity of problems addressed and take responsibility for the plan's risks and complications. I approved the plan documented by CECELIA Pearson.  Cath in am

## 2024-03-03 NOTE — PROGRESS NOTES
[facility] Hospital Medicine Progress Note        Chief Complaint: Inpatient Follow-up for     HPI:   89 yr old female whose history includes HTN, hypothyroidism, CKD with solitary kidney, PAF. Presented to ED with c/o generalized weakness.      Just discharged on 2/21/24 following a 2 day admission during which time she was treated for generalized weakness and age related physical debility with polypharmacy. Trazodone dose decreased and lyrica, meclizine and requip discontinued. Prior to discharge she had improved mobility and discharged home with family and home health. Unclear if patient stopped these medications as instructed because her medications come prepackaged.      At the time of md's exam patient is AAO x 3. Reports she was ambulating with her walker when both legs became very weak and she was unable to walk any further. Denies falling but  reports she's had multiple falls in the past. Denies any fever, chest pain, SOB, vomiting or diarrhea. Chest x-ray shows enlarged cardiac silhouette without edema. CT - lumbar spine shows nondisplaced fracture at S4 of indeterminate age. CT T-spine negative for acute findings. .  CT head negative     VS on arrival: T 99.2, P 73, R 19, B/P 108/56, Sats 96% on room air. Initial labs WBC 16.8, Hgb 10.8, Hct 35, K 3.4, BUN 32.6, creatinine 1.91, Troponin 0.191 (repeat 0.199). EKG shows SR with ST-T changes in anterior and inferolateral leads.      Started on antibiotics for UTI, found to have NSTEMI.  Upgraded to the ICU for vasopressor support.  Found to have bacteremia.  Continues on antibiotics.  Cardiology planning for catheterization once renal function optimized.  Now being downgraded to the floors.  Antibiotics were continued cardiology closely following for renal recovery before attempting angiogram.    Interval Hx:     No acute events overnight.  Hemodynamically stable.  Doing well on room air.  Patient was comfortably  resting.  Family member at bedside.  Patient denies any shortness a breath.  Tolerating p.o..    Stable hemoglobin and platelets, worsening of sodium, mild acidosis noted today.  BUN and serum creatinine improving.  Transaminases improving as well    Objective/physical exam:  Vitals:    03/02/24 1933 03/02/24 2102 03/03/24 0007 03/03/24 0745   BP: 117/74 117/74 111/73 116/76   BP Location:       Patient Position:       Pulse: 60  (!) 56 67   Resp: 16  16 18   Temp: 97.9 °F (36.6 °C)   98.4 °F (36.9 °C)   TempSrc: Oral   Oral   SpO2: 98%  95% 97%   Weight:       Height:         General: In no acute distress, afebrile  Respiratory: Clear to auscultation bilaterally  Cardiovascular: S1, S2, no appreciable murmur  Abdomen: Soft, nontender, BS +  MSK: Warm, no lower extremity edema, no clubbing or cyanosis  Neurologic: Alert and oriented x4, moving all extremities with good strength     Lab Results   Component Value Date     (L) 03/03/2024    K 4.4 03/03/2024    CO2 18 (L) 03/03/2024    BUN 38.3 (H) 03/03/2024    CREATININE 1.63 (H) 03/03/2024    CALCIUM 8.7 03/03/2024    EGFRNONAA 41 09/06/2019      Lab Results   Component Value Date    ALT 63 (H) 03/03/2024    AST 58 (H) 03/03/2024    ALKPHOS 87 03/03/2024    BILITOT 0.3 03/03/2024      Lab Results   Component Value Date    WBC 7.17 03/03/2024    HGB 10.1 (L) 03/03/2024    HCT 29.0 (L) 03/03/2024    MCV 86.6 03/03/2024     03/03/2024           Medications:   aspirin  81 mg Oral Daily    atorvastatin  10 mg Oral Nightly    brimonidine 0.15 % OPTH DROP  1 drop Both Eyes BID    And    timolol maleate 0.5%  1 drop Both Eyes BID    carvediloL  3.125 mg Oral BID    cefTRIAXone (Rocephin) IV (PEDS and ADULTS)  2 g Intravenous Q24H    enoxparin  1 mg/kg Subcutaneous Q24H (treatment, non-standard time)    latanoprost  1 drop Both Eyes QHS    levothyroxine  25 mcg Oral Before breakfast    risperiDONE  2 mg Oral QHS    rivastigmine tartrate  1.5 mg Oral BID    sodium  bicarbonate  650 mg Oral TID    sodium chloride 0.9%  1,000 mL Intravenous Once      acetaminophen, acetaminophen, albuterol, aluminum-magnesium hydroxide-simethicone, bisacodyL, hydrOXYzine pamoate, melatonin, metoprolol, naloxone, ondansetron, prochlorperazine, senna-docusate 8.6-50 mg, sodium chloride 0.9%     Assessment/Plan:    E coli bacteremia  Septic shock due to above-resolved  NSTEMI likely type 2 type 1-awaiting angiogram   MICHEL on CKD-improving  Age-indeterminate nondisplaced S4 vertebral fracture  Paroxysmal atrial fibrillation    HX:  SSS s/p pacemaker 09/2019, chronic HFpEF, HTN, FERNANDEZ, hypothyroidism, YARI, GERD, glaucoma, diverticular disease    Plan:  -sodium worsening.  Nephrology following.  Urine labs reviewed.  Increase p.o. bicarb to 1300 b.i.d..   -continue full-dose Lovenox.  Switch to Eliquis when appropriate   -continue ceftriaxone.  Switch to cefdinir at discharge to complete 14 days total.  TTE reviewed   -nephrology on board, appreciate assistance.  Monitor sodium   -therapy as tolerated when appropriate  -other home medications were reviewed and renewed        Mayur Carrillo MD

## 2024-03-03 NOTE — PROGRESS NOTES
Chief complaint: none    Interval History:  Assuming nephrology care as she is followed for CKD outpt in [place].  Today she has no acute c/o, good po intake per nsg, she remains on rocephin for UTI / E coli sepsis.  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  She is scheduled for [facility] tomorrow, had NSTEMI on admit.  She denies SOB/CP/N/V/constipation at this time     Review of Systems   Constitutional:  Positive for fatigue.   HENT: Negative.     Respiratory: Negative.     Cardiovascular: Negative.    Gastrointestinal: Negative.    Genitourinary: Negative.    Musculoskeletal: Negative.    Neurological:  Positive for weakness.   Psychiatric/Behavioral: Negative.        all else Neg     aspirin  81 mg Oral Daily    atorvastatin  10 mg Oral Nightly    brimonidine 0.15 % OPTH DROP  1 drop Both Eyes BID    And    timolol maleate 0.5%  1 drop Both Eyes BID    carvediloL  3.125 mg Oral BID    cefTRIAXone (Rocephin) IV (PEDS and ADULTS)  2 g Intravenous Q24H    enoxparin  1 mg/kg Subcutaneous Q24H (treatment, non-standard time)    latanoprost  1 drop Both Eyes QHS    levothyroxine  25 mcg Oral Before breakfast    risperiDONE  2 mg Oral QHS    rivastigmine tartrate  1.5 mg Oral BID    sodium bicarbonate  1,300 mg Oral BID    sodium chloride 0.9%  1,000 mL Intravenous Once       Objective     VITAL SIGNS: 24 HR MIN & MAX LAST    Temp  Min: 97.9 °F (36.6 °C)  Max: 98.4 °F (36.9 °C)  98.4 °F (36.9 °C)    BP  Min: 111/73  Max: 131/77  131/77     Pulse  Min: 56  Max: 68  68     Resp  Min: 16  Max: 23  18    SpO2  Min: 95 %  Max: 98 %  97 %      Wt Readings from Last 3 Encounters:   03/02/24 66.7 kg (147 lb)   02/17/24 69.9 kg (154 lb)   06/01/22 72.6 kg (160 lb)       Intake/Output Summary (Last 24 hours) at 3/3/2024 1136  Last data filed at 3/2/2024 1300  Gross per 24 hour   Intake 1220 ml   Output 500 ml   Net 720 ml       Physical Exam  Constitutional:        General: She is not in acute distress.  Cardiovascular:      Rate and Rhythm: Normal rate.   Pulmonary:      Effort: Pulmonary effort is normal. No respiratory distress.   Abdominal:      General: Bowel sounds are normal.      Palpations: Abdomen is soft.      Tenderness: There is no abdominal tenderness.   Musculoskeletal:      Cervical back: Normal range of motion.      Comments: Trace BLE edema   Neurological:      General: No focal deficit present.      Mental Status: She is alert and oriented to person, place, and time.   Psychiatric:         Mood and Affect: Mood normal.          Recent Labs     03/01/24 0407 03/02/24 0402 03/03/24  0347   * 131* 129*   K 4.4 4.7 4.4   CHLORIDE 106 103 103   CO2 23 20* 18*   BUN 31.2* 36.5* 38.3*   CREATININE 1.82* 1.69* 1.63*   GLUCOSE 111 119* 89   CALCIUM 8.9 8.7 8.7   MG 2.20 2.00 1.90   ALBUMIN 2.4* 2.3* 2.1*      Recent Labs     03/01/24 0407 03/02/24 0402 03/03/24  0347   WBC 10.11 8.84 7.17   HGB 10.3* 10.7* 10.1*   HCT 31.1* 32.3* 29.0*    177 160         Assessment & Plan         MICHEL / CKD 3b, single kidney - resolved to prior baseline GFR though likely prerenal in setting of sepsis, poor intake, will add mildly hypertonic IVF 60 cc/hr, reassess in am  Hyponatremia, NAGMA - U Na<20, likely prerenal, will add 1/2 NS with 2 amps Na bicarb/L at 60/hr, reassess in am  Anemia - Hb stable, last Fe sat low, rpt Fe profile to see if she may benefit from Fe replacement while inpt  UTI / sepsis - blood cx's + E coli, on rocephin, clinically improving  NSTEMI, CAD- on low-dose coreg, ASA, VSS, plans for [facility] tomorrow, IVF as above

## 2024-03-04 LAB
ALBUMIN SERPL-MCNC: 2.2 G/DL (ref 3.4–4.8)
ALBUMIN/GLOB SERPL: 0.6 RATIO (ref 1.1–2)
ALP SERPL-CCNC: 79 UNIT/L (ref 40–150)
ALT SERPL-CCNC: 62 UNIT/L (ref 0–55)
AST SERPL-CCNC: 50 UNIT/L (ref 5–34)
BASOPHILS # BLD AUTO: 0.06 X10(3)/MCL
BASOPHILS NFR BLD AUTO: 0.7 %
BILIRUB SERPL-MCNC: 0.3 MG/DL
BNP BLD-MCNC: 1428.3 PG/ML
BUN SERPL-MCNC: 39.4 MG/DL (ref 9.8–20.1)
CALCIUM SERPL-MCNC: 8.7 MG/DL (ref 8.4–10.2)
CHLORIDE SERPL-SCNC: 102 MMOL/L (ref 98–107)
CO2 SERPL-SCNC: 23 MMOL/L (ref 23–31)
CREAT SERPL-MCNC: 1.51 MG/DL (ref 0.55–1.02)
EOSINOPHIL # BLD AUTO: 0.09 X10(3)/MCL (ref 0–0.9)
EOSINOPHIL NFR BLD AUTO: 1 %
ERYTHROCYTE [DISTWIDTH] IN BLOOD BY AUTOMATED COUNT: 16.7 % (ref 11.5–17)
GFR SERPLBLD CREATININE-BSD FMLA CKD-EPI: 33 MLS/MIN/1.73/M2
GLOBULIN SER-MCNC: 4 GM/DL (ref 2.4–3.5)
GLUCOSE SERPL-MCNC: 94 MG/DL (ref 82–115)
HCT VFR BLD AUTO: 32.2 % (ref 37–47)
HGB BLD-MCNC: 10.8 G/DL (ref 12–16)
IMM GRANULOCYTES # BLD AUTO: 0.12 X10(3)/MCL (ref 0–0.04)
IMM GRANULOCYTES NFR BLD AUTO: 1.3 %
LYMPHOCYTES # BLD AUTO: 2.15 X10(3)/MCL (ref 0.6–4.6)
LYMPHOCYTES NFR BLD AUTO: 23.9 %
MAGNESIUM SERPL-MCNC: 2 MG/DL (ref 1.6–2.6)
MCH RBC QN AUTO: 29.8 PG (ref 27–31)
MCHC RBC AUTO-ENTMCNC: 33.5 G/DL (ref 33–36)
MCV RBC AUTO: 89 FL (ref 80–94)
MONOCYTES # BLD AUTO: 0.97 X10(3)/MCL (ref 0.1–1.3)
MONOCYTES NFR BLD AUTO: 10.8 %
NEUTROPHILS # BLD AUTO: 5.59 X10(3)/MCL (ref 2.1–9.2)
NEUTROPHILS NFR BLD AUTO: 62.3 %
NRBC BLD AUTO-RTO: 0.9 %
PLATELET # BLD AUTO: 231 X10(3)/MCL (ref 130–400)
PMV BLD AUTO: 10.8 FL (ref 7.4–10.4)
POTASSIUM SERPL-SCNC: 4.5 MMOL/L (ref 3.5–5.1)
PROT SERPL-MCNC: 6.2 GM/DL (ref 5.8–7.6)
RBC # BLD AUTO: 3.62 X10(6)/MCL (ref 4.2–5.4)
SODIUM SERPL-SCNC: 133 MMOL/L (ref 136–145)
WBC # SPEC AUTO: 8.98 X10(3)/MCL (ref 4.5–11.5)

## 2024-03-04 PROCEDURE — 25000003 PHARM REV CODE 250: Performed by: INTERNAL MEDICINE

## 2024-03-04 PROCEDURE — B2111ZZ FLUOROSCOPY OF MULTIPLE CORONARY ARTERIES USING LOW OSMOLAR CONTRAST: ICD-10-PCS | Performed by: STUDENT IN AN ORGANIZED HEALTH CARE EDUCATION/TRAINING PROGRAM

## 2024-03-04 PROCEDURE — 63600175 PHARM REV CODE 636 W HCPCS: Mod: JZ,JG | Performed by: STUDENT IN AN ORGANIZED HEALTH CARE EDUCATION/TRAINING PROGRAM

## 2024-03-04 PROCEDURE — 94761 N-INVAS EAR/PLS OXIMETRY MLT: CPT

## 2024-03-04 PROCEDURE — 21400001 HC TELEMETRY ROOM

## 2024-03-04 PROCEDURE — 27000221 HC OXYGEN, UP TO 24 HOURS

## 2024-03-04 PROCEDURE — 25000003 PHARM REV CODE 250: Performed by: HOSPITALIST

## 2024-03-04 PROCEDURE — C1894 INTRO/SHEATH, NON-LASER: HCPCS | Performed by: STUDENT IN AN ORGANIZED HEALTH CARE EDUCATION/TRAINING PROGRAM

## 2024-03-04 PROCEDURE — 25000003 PHARM REV CODE 250: Performed by: NURSE PRACTITIONER

## 2024-03-04 PROCEDURE — 83735 ASSAY OF MAGNESIUM: CPT | Performed by: INTERNAL MEDICINE

## 2024-03-04 PROCEDURE — 4A023N7 MEASUREMENT OF CARDIAC SAMPLING AND PRESSURE, LEFT HEART, PERCUTANEOUS APPROACH: ICD-10-PCS | Performed by: STUDENT IN AN ORGANIZED HEALTH CARE EDUCATION/TRAINING PROGRAM

## 2024-03-04 PROCEDURE — 93458 L HRT ARTERY/VENTRICLE ANGIO: CPT | Performed by: STUDENT IN AN ORGANIZED HEALTH CARE EDUCATION/TRAINING PROGRAM

## 2024-03-04 PROCEDURE — 25000003 PHARM REV CODE 250: Performed by: STUDENT IN AN ORGANIZED HEALTH CARE EDUCATION/TRAINING PROGRAM

## 2024-03-04 PROCEDURE — C1887 CATHETER, GUIDING: HCPCS | Performed by: STUDENT IN AN ORGANIZED HEALTH CARE EDUCATION/TRAINING PROGRAM

## 2024-03-04 PROCEDURE — 83880 ASSAY OF NATRIURETIC PEPTIDE: CPT | Performed by: INTERNAL MEDICINE

## 2024-03-04 PROCEDURE — C1769 GUIDE WIRE: HCPCS | Performed by: STUDENT IN AN ORGANIZED HEALTH CARE EDUCATION/TRAINING PROGRAM

## 2024-03-04 PROCEDURE — 85025 COMPLETE CBC W/AUTO DIFF WBC: CPT | Performed by: INTERNAL MEDICINE

## 2024-03-04 PROCEDURE — 63600175 PHARM REV CODE 636 W HCPCS: Performed by: STUDENT IN AN ORGANIZED HEALTH CARE EDUCATION/TRAINING PROGRAM

## 2024-03-04 PROCEDURE — 25500020 PHARM REV CODE 255: Performed by: STUDENT IN AN ORGANIZED HEALTH CARE EDUCATION/TRAINING PROGRAM

## 2024-03-04 PROCEDURE — 80053 COMPREHEN METABOLIC PANEL: CPT | Performed by: INTERNAL MEDICINE

## 2024-03-04 PROCEDURE — 63600175 PHARM REV CODE 636 W HCPCS: Performed by: INTERNAL MEDICINE

## 2024-03-04 RX ORDER — VERAPAMIL HYDROCHLORIDE 2.5 MG/ML
INJECTION, SOLUTION INTRAVENOUS
Status: DISCONTINUED | OUTPATIENT
Start: 2024-03-04 | End: 2024-03-04 | Stop reason: HOSPADM

## 2024-03-04 RX ORDER — FENTANYL CITRATE 50 UG/ML
INJECTION, SOLUTION INTRAMUSCULAR; INTRAVENOUS
Status: DISCONTINUED | OUTPATIENT
Start: 2024-03-04 | End: 2024-03-04 | Stop reason: HOSPADM

## 2024-03-04 RX ORDER — MORPHINE SULFATE 4 MG/ML
2 INJECTION, SOLUTION INTRAMUSCULAR; INTRAVENOUS EVERY 4 HOURS PRN
Status: DISCONTINUED | OUTPATIENT
Start: 2024-03-04 | End: 2024-03-11 | Stop reason: HOSPADM

## 2024-03-04 RX ORDER — MIDAZOLAM HYDROCHLORIDE 1 MG/ML
INJECTION INTRAMUSCULAR; INTRAVENOUS
Status: DISCONTINUED | OUTPATIENT
Start: 2024-03-04 | End: 2024-03-04 | Stop reason: HOSPADM

## 2024-03-04 RX ORDER — HYDROCODONE BITARTRATE AND ACETAMINOPHEN 5; 325 MG/1; MG/1
1 TABLET ORAL EVERY 4 HOURS PRN
Status: DISCONTINUED | OUTPATIENT
Start: 2024-03-04 | End: 2024-03-11 | Stop reason: HOSPADM

## 2024-03-04 RX ORDER — SODIUM CHLORIDE 9 MG/ML
INJECTION, SOLUTION INTRAVENOUS CONTINUOUS
Status: DISCONTINUED | OUTPATIENT
Start: 2024-03-04 | End: 2024-03-05

## 2024-03-04 RX ORDER — HYDRALAZINE HYDROCHLORIDE 20 MG/ML
10 INJECTION INTRAMUSCULAR; INTRAVENOUS EVERY 4 HOURS PRN
Status: DISCONTINUED | OUTPATIENT
Start: 2024-03-04 | End: 2024-03-11 | Stop reason: HOSPADM

## 2024-03-04 RX ORDER — HEPARIN SODIUM 1000 [USP'U]/ML
INJECTION, SOLUTION INTRAVENOUS; SUBCUTANEOUS
Status: DISCONTINUED | OUTPATIENT
Start: 2024-03-04 | End: 2024-03-04 | Stop reason: HOSPADM

## 2024-03-04 RX ORDER — LIDOCAINE HYDROCHLORIDE 10 MG/ML
INJECTION INFILTRATION; PERINEURAL
Status: DISCONTINUED | OUTPATIENT
Start: 2024-03-04 | End: 2024-03-04 | Stop reason: HOSPADM

## 2024-03-04 RX ORDER — NITROGLYCERIN 20 MG/100ML
INJECTION INTRAVENOUS
Status: DISCONTINUED | OUTPATIENT
Start: 2024-03-04 | End: 2024-03-04 | Stop reason: HOSPADM

## 2024-03-04 RX ADMIN — RIVASTIGMINE TARTRATE 1.5 MG: 1.5 CAPSULE ORAL at 08:03

## 2024-03-04 RX ADMIN — BRIMONIDINE TARTRATE 1 DROP: 1.5 SOLUTION OPHTHALMIC at 11:03

## 2024-03-04 RX ADMIN — Medication 81 MG: at 08:03

## 2024-03-04 RX ADMIN — ATORVASTATIN CALCIUM 10 MG: 10 TABLET, FILM COATED ORAL at 09:03

## 2024-03-04 RX ADMIN — LATANOPROST 1 DROP: 50 SOLUTION OPHTHALMIC at 11:03

## 2024-03-04 RX ADMIN — SODIUM BICARBONATE 650 MG TABLET 1300 MG: at 08:03

## 2024-03-04 RX ADMIN — HYDROXYZINE PAMOATE 25 MG: 25 CAPSULE ORAL at 09:03

## 2024-03-04 RX ADMIN — Medication 6 MG: at 09:03

## 2024-03-04 RX ADMIN — SODIUM CHLORIDE: 9 INJECTION, SOLUTION INTRAVENOUS at 11:03

## 2024-03-04 RX ADMIN — BRIMONIDINE TARTRATE 1 DROP: 1.5 SOLUTION OPHTHALMIC at 10:03

## 2024-03-04 RX ADMIN — RISPERIDONE 2 MG: 1 TABLET ORAL at 09:03

## 2024-03-04 RX ADMIN — HYDROCODONE BITARTRATE AND ACETAMINOPHEN 1 TABLET: 5; 325 TABLET ORAL at 02:03

## 2024-03-04 RX ADMIN — TIMOLOL MALEATE 1 DROP: 5 SOLUTION OPHTHALMIC at 11:03

## 2024-03-04 RX ADMIN — SODIUM BICARBONATE 650 MG TABLET 1300 MG: at 09:03

## 2024-03-04 RX ADMIN — CEFTRIAXONE SODIUM 2 G: 2 INJECTION, POWDER, FOR SOLUTION INTRAMUSCULAR; INTRAVENOUS at 12:03

## 2024-03-04 RX ADMIN — TIMOLOL MALEATE 1 DROP: 5 SOLUTION OPHTHALMIC at 10:03

## 2024-03-04 RX ADMIN — SODIUM CHLORIDE: 9 INJECTION, SOLUTION INTRAVENOUS at 12:03

## 2024-03-04 RX ADMIN — ENOXAPARIN SODIUM 70 MG: 80 INJECTION SUBCUTANEOUS at 02:03

## 2024-03-04 RX ADMIN — RIVASTIGMINE TARTRATE 1.5 MG: 1.5 CAPSULE ORAL at 09:03

## 2024-03-04 RX ADMIN — CARVEDILOL 3.12 MG: 3.12 TABLET, FILM COATED ORAL at 08:03

## 2024-03-04 NOTE — PT/OT/SLP PROGRESS
Orders received, chart reviewed, POC discussed with nursing.  SLP attempting to see pt x2 for bedside swallow evaluation however upon first attempt nursing reports pt just returned from procedure and remains lethargic.  Upon second attempt, pt seen and is arousable however remains with increased lethargy.  Attempts at PO intake unsafe at this time.  Daughter at bedside reports pt with difficulty chewing foods.  SLP to complete evaluation as appropriate.

## 2024-03-04 NOTE — PROGRESS NOTES
Renal      Chief complaint:   none    Interval History:  Started on bicarb drip yesterday. Went for [facility] this AM with no significant disease. Denies SOB or pain    ROS:  all else Neg     aspirin  81 mg Oral Daily    atorvastatin  10 mg Oral Nightly    brimonidine 0.15 % OPTH DROP  1 drop Both Eyes BID    And    timolol maleate 0.5%  1 drop Both Eyes BID    carvediloL  3.125 mg Oral BID    cefTRIAXone (Rocephin) IV (PEDS and ADULTS)  2 g Intravenous Q24H    enoxparin  1 mg/kg Subcutaneous Q24H (treatment, non-standard time)    latanoprost  1 drop Both Eyes QHS    levothyroxine  25 mcg Oral Before breakfast    risperiDONE  2 mg Oral QHS    rivastigmine tartrate  1.5 mg Oral BID    sodium bicarbonate  1,300 mg Oral BID    sodium chloride 0.9%  1,000 mL Intravenous Once       VITAL SIGNS: 24 HR MIN & MAX LAST    Temp  Min: 97.5 °F (36.4 °C)  Max: 98.4 °F (36.9 °C)  98 °F (36.7 °C)        BP  Min: 98/65  Max: 131/77  123/78     Pulse  Min: 55  Max: 79  64     Resp  Min: 18  Max: 18  18    SpO2  Min: 96 %  Max: 100 %  96 %      Wt Readings from Last 3 Encounters:   03/04/24 61.2 kg (135 lb)   02/17/24 69.9 kg (154 lb)   06/01/22 72.6 kg (160 lb)       Intake/Output Summary (Last 24 hours) at 3/4/2024 1016  Last data filed at 3/4/2024 0854  Gross per 24 hour   Intake 360 ml   Output 550 ml   Net -190 ml     A&O x 4  EOMI  (B) symmetrical  Unlabored  Soft, NT, ND  Edema tr    Recent Labs     03/02/24  0402 03/03/24  0347 03/04/24  0450   * 129* 133*   K 4.7 4.4 4.5   CHLORIDE 103 103 102   CO2 20* 18* 23   BUN 36.5* 38.3* 39.4*   CREATININE 1.69* 1.63* 1.51*   GLUCOSE 119* 89 94   CALCIUM 8.7 8.7 8.7   MG 2.00 1.90 2.00   ALBUMIN 2.3* 2.1* 2.2*      Recent Labs     03/02/24  0402 03/03/24  0347 03/04/24  0450   WBC 8.84 7.17 8.98   HGB 10.7* 10.1* 10.8*   HCT 32.3* 29.0* 32.2*    160 231       ASSESSMENT / PLAN    UTI / E coli sepsis, NSTEMI, MICHEL. H/o CKD with single kidney due to remote nephrectomy    MICHEL / CKD  3b, single kidney - resolved to prior baseline GFR though likely prerenal in setting of sepsis, poor intake. Gfr stable. Cont IVF @ 60/hr for NIRAV PPX  Hyponatremia, NAGMA - U Na<20, likely prerenal. Improved change bicarb drip to NS  Anemia - Hb stable, Fe levels OK  UTI / sepsis - blood cx's + E coli, on rocephin, clinically improving  NSTEMI, CAD- on low-dose coreg, ASA. LHC today, was negative          Sudhir Ramirez M.D.  Nephrology

## 2024-03-04 NOTE — PROGRESS NOTES
[facility]     Cardiology  Progress Note    Patient Name: Ev Alberts  MRN: [id number]  Admission Date: 2/25/2024  Hospital Length of Stay: 8 days  Code Status: Full Code   Attending Physician: Vanessa Mays MD   Primary Care Physician: Santi Walters MD  Expected Discharge Date:   Principal Problem:Debility    Subjective:   Chief Complaint/Reason for Consult:Elevated Troponin      HPI: Ms. Alberts is a 89 y/o female with a history of SSS, AVB II, Bradycardia, CHF, PAF on Eliquis, PAD, Left Carotid Atherosclerosis, HTN, CKD IV, VHD, YARI, who was briefly known to [facility] (Dr. Vigil). She presented to the ER on 2.25.24 with complaints of not being able to walk since she was discharged on 2.21.24 due to polypharmacy, weakness, and age related disability. Daughter reports she is unable to get the signal to make her walk. Daughter also reports she has been lethargic and is unsure she stopped taking Lyrica, Meclizine, and Requip because they come prepackaged. Significant labs include WBC 21.55, Na 133, K 3.4, BUN/Creat 31.1/1.74, Albumin 2.6, HDL 32. LDL 38, Troponin 0.199. UA +; UC pending. CT Lumbar spine shows nondisplaced fracture at S4 (age indeterminate) and multilevel degenerative changes. CT Thoracic spine shows ligament, spinal cord and/or vascular abnormalities. EKG shows sinus rhythm with 1st degree A-V block, ST and Marked T wave abnormality, consider inferior ischemia, ST and Marked T wave abnormality, consider anterolateral ischemia, and Prolonged QT. [facility] has been consulted for NSTEMI.        Hospital Course:  2.27.24: NAD Noted. On Levophed for BP Support. Denies CP/SOB. Paced at 60. On FD Lovenox for NSTEMI Treatment.   2.28.24: NAD Noted. Levophed has been weaned off and patient is waiting on floor transfer. Family at bedside, [facility] plan of care relayed to her daughter and , both at bedside. Patient reports no CP/SOB. Troponin values are trending down. No arrhythmias noted  "overnight. On FD Lovenox.  2.29.24: NAD Noted. Denies CP/SOB. Hypertensive. Renal Team Following, renal indices are improving. Paced on Tele. Clinically Stable, now floor status (Boarding in ICU).  3.01.24: NAD Noted. Pt endorses no complaints this AM tolerate fluids. Denies CP/palpitations/SOB. Renal Team Following, renal indices are improving close to baseline with hyponatremia. Paced on Tele. Clinically Stable, now on floors.  3.2.24:  NAD. Patient resting comfortably in bed with family at bedside. Reports some shortness of breath due to "asthma". Denies CP or palpitations. Renal indices continuing to improve. SR on tele. Stable from yesterday.   3.3.24: Plans for LHC tomorrow. NPO at midnight. NAD. Sitting in bed. Complaints of some difficulty swallowing since initial hospital admission.   3.4.24: NAD. Denies current CP, SOB, or palps. Renal indices improving. NPO for scheduled LHC today. Paced on tele. BP stable.      PMH: SSS, AVB II, Bradycardia, CHF, PAF on Eliquis, PAD, Left Carotid Atherosclerosis, HTN, CKD IV, VHD, YARI, GERD, FERNANDEZ, Right Knee Osteoarthritis, GI Bleed, Dementia, Glaucoma, Hypothyroidism     PSH: Cataract Surgery, Cholecystectomy, Hysterectomy, PPM, Left, Nephrectomy, LHC, Hernia Repair, Oral Surgery, Bilateral Foot Surgery  Family History: Non-Contributory   Social History: Denies smoking, illicit drug use, and alcohol use.      Previous Cardiac Diagnostics:   ECHO (2.26.24):  Left Ventricle: The left ventricle is normal in size. Mildly increased wall thickness. There is normal systolic function with a visually estimated ejection fraction of 55 - 60%. Grade III diastolic dysfunction. Right Ventricle: Mild right ventricular enlargement. Wall thickness is normal. Right ventricle wall motion  is normal. Systolic function is mildly reduced. TAPSE is 1.46 cm. Left Atrium: Left atrium is moderately dilated. Aortic Valve: There is mild aortic valve sclerosis. Tricuspid Valve: There is mild " regurgitation.     PPM Insertion (9.19.19): Medtronic      ECHO (9.15.19):   Normal Left Ventricle cavity size. Normal wall thickness. Normal ejection fraction, 55-65%. The right ventricle cavity is mildly dilated. Left atrium is mildly dilated. Normal right atrium. Normal central venous pressure. Mild MR. Mild to Moderate TR. Moderate pulmonary HTN. No pericardial effusion.       Carotid US (7.26.12):  The study quality is good. Less than or equal to 50% left common carotid artery stenosis.Bilateral normal velocity measurements of the internal carotid and external caortid arteries are noted, with no plaque visualized.Antegrade right vertebral artery flow. Antegrade left vertebral artery flow.     KARI (1.18.12):  The resting right ankle brachial index is 1.01 consistent with no significant right peripheral vascular disease.The resting left ankle brachial index is 0.94 consistent with borderline left peripheral vascular disease.The study quality is good.      Lower Ext Arterial US (1.18.12):  The study quality is good. Biphasic waveforms and diffuse plaque seen in both the bilateral infra-popliteal arteries.    Review of Systems   Cardiovascular:  Negative for chest pain, leg swelling and palpitations.   Respiratory:  Negative for shortness of breath.    All other systems reviewed and are negative.    Objective:     Vital Signs (Most Recent):  Temp: 98 °F (36.7 °C) (03/04/24 0711)  Pulse: 64 (03/04/24 0824)  Resp: 18 (03/04/24 0711)  BP: 123/78 (03/04/24 0824)  SpO2: 96 % (03/04/24 0711) Vital Signs (24h Range):  Temp:  [97.5 °F (36.4 °C)-98.4 °F (36.9 °C)] 98 °F (36.7 °C)  Pulse:  [55-79] 64  Resp:  [18] 18  SpO2:  [96 %-100 %] 96 %  BP: ()/(65-80) 123/78   Weight: 61.2 kg (135 lb)  Body mass index is 22.47 kg/m².  SpO2: 96 %       Intake/Output Summary (Last 24 hours) at 3/4/2024 0836  Last data filed at 3/4/2024 0559  Gross per 24 hour   Intake 360 ml   Output 550 ml   Net -190 ml       Lines/Drains/Airways        Drain  Duration             Female External Urinary Catheter w/ Suction 02/28/24 1644 4 days              Peripheral Intravenous Line  Duration                  Peripheral IV - Single Lumen 02/26/24 2205 Anterior;Left Forearm 6 days         Peripheral IV - Single Lumen 02/26/24 2302 Anterior;Distal;Right Forearm 6 days                  Significant Labs:   Recent Results (from the past 72 hour(s))   POCT glucose    Collection Time: 03/02/24  1:05 AM   Result Value Ref Range    POCT Glucose 123 (H) 70 - 110 mg/dL   Comprehensive Metabolic Panel    Collection Time: 03/02/24  4:02 AM   Result Value Ref Range    Sodium Level 131 (L) 136 - 145 mmol/L    Potassium Level 4.7 3.5 - 5.1 mmol/L    Chloride 103 98 - 107 mmol/L    Carbon Dioxide 20 (L) 23 - 31 mmol/L    Glucose Level 119 (H) 82 - 115 mg/dL    Blood Urea Nitrogen 36.5 (H) 9.8 - 20.1 mg/dL    Creatinine 1.69 (H) 0.55 - 1.02 mg/dL    Calcium Level Total 8.7 8.4 - 10.2 mg/dL    Protein Total 6.4 5.8 - 7.6 gm/dL    Albumin Level 2.3 (L) 3.4 - 4.8 g/dL    Globulin 4.1 (H) 2.4 - 3.5 gm/dL    Albumin/Globulin Ratio 0.6 (L) 1.1 - 2.0 ratio    Bilirubin Total 0.3 <=1.5 mg/dL    Alkaline Phosphatase 93 40 - 150 unit/L    Alanine Aminotransferase 83 (H) 0 - 55 unit/L    Aspartate Aminotransferase 91 (H) 5 - 34 unit/L    eGFR 29 mls/min/1.73/m2   Magnesium    Collection Time: 03/02/24  4:02 AM   Result Value Ref Range    Magnesium Level 2.00 1.60 - 2.60 mg/dL   CBC with Differential    Collection Time: 03/02/24  4:02 AM   Result Value Ref Range    WBC 8.84 4.50 - 11.50 x10(3)/mcL    RBC 3.62 (L) 4.20 - 5.40 x10(6)/mcL    Hgb 10.7 (L) 12.0 - 16.0 g/dL    Hct 32.3 (L) 37.0 - 47.0 %    MCV 89.2 80.0 - 94.0 fL    MCH 29.6 27.0 - 31.0 pg    MCHC 33.1 33.0 - 36.0 g/dL    RDW 17.0 11.5 - 17.0 %    Platelet 177 130 - 400 x10(3)/mcL    MPV 12.0 (H) 7.4 - 10.4 fL    Neut % 62.3 %    Lymph % 23.2 %    Mono % 12.3 %    Eos % 0.5 %    Basophil % 0.6 %    Lymph # 2.05 0.6 - 4.6  x10(3)/mcL    Neut # 5.51 2.1 - 9.2 x10(3)/mcL    Mono # 1.09 0.1 - 1.3 x10(3)/mcL    Eos # 0.04 0 - 0.9 x10(3)/mcL    Baso # 0.05 <=0.2 x10(3)/mcL    IG# 0.10 (H) 0 - 0.04 x10(3)/mcL    IG% 1.1 %    NRBC% 0.0 %   Osmolality, Serum    Collection Time: 03/02/24  4:02 AM   Result Value Ref Range    Osmolality 288 280 - 300 mOsm/kg   Creatinine, Random Urine    Collection Time: 03/02/24  9:25 PM   Result Value Ref Range    Urine Creatinine 62.9 45.0 - 106.0 mg/dL   Chloride, Random Urine    Collection Time: 03/02/24  9:25 PM   Result Value Ref Range    Urine Chloride <20.0 mmol/L   Osmolality, Urine    Collection Time: 03/02/24  9:25 PM   Result Value Ref Range    Urine Osmolality 337 300 - 1,300 mOsm/kg   Sodium, Random Urine    Collection Time: 03/02/24  9:25 PM   Result Value Ref Range    Urine Sodium <20.0 mmol/L   Comprehensive Metabolic Panel    Collection Time: 03/03/24  3:47 AM   Result Value Ref Range    Sodium Level 129 (L) 136 - 145 mmol/L    Potassium Level 4.4 3.5 - 5.1 mmol/L    Chloride 103 98 - 107 mmol/L    Carbon Dioxide 18 (L) 23 - 31 mmol/L    Glucose Level 89 82 - 115 mg/dL    Blood Urea Nitrogen 38.3 (H) 9.8 - 20.1 mg/dL    Creatinine 1.63 (H) 0.55 - 1.02 mg/dL    Calcium Level Total 8.7 8.4 - 10.2 mg/dL    Protein Total 6.3 5.8 - 7.6 gm/dL    Albumin Level 2.1 (L) 3.4 - 4.8 g/dL    Globulin 4.2 (H) 2.4 - 3.5 gm/dL    Albumin/Globulin Ratio 0.5 (L) 1.1 - 2.0 ratio    Bilirubin Total 0.3 <=1.5 mg/dL    Alkaline Phosphatase 87 40 - 150 unit/L    Alanine Aminotransferase 63 (H) 0 - 55 unit/L    Aspartate Aminotransferase 58 (H) 5 - 34 unit/L    eGFR 30 mls/min/1.73/m2   Magnesium    Collection Time: 03/03/24  3:47 AM   Result Value Ref Range    Magnesium Level 1.90 1.60 - 2.60 mg/dL   CBC with Differential    Collection Time: 03/03/24  3:47 AM   Result Value Ref Range    WBC 7.17 4.50 - 11.50 x10(3)/mcL    RBC 3.35 (L) 4.20 - 5.40 x10(6)/mcL    Hgb 10.1 (L) 12.0 - 16.0 g/dL    Hct 29.0 (L) 37.0 -  47.0 %    MCV 86.6 80.0 - 94.0 fL    MCH 30.1 27.0 - 31.0 pg    MCHC 34.8 33.0 - 36.0 g/dL    RDW 16.7 11.5 - 17.0 %    Platelet 160 130 - 400 x10(3)/mcL    MPV 11.0 (H) 7.4 - 10.4 fL    Neut % 53.2 %    Lymph % 26.4 %    Mono % 15.1 %    Eos % 1.4 %    Basophil % 0.8 %    Lymph # 1.89 0.6 - 4.6 x10(3)/mcL    Neut # 3.82 2.1 - 9.2 x10(3)/mcL    Mono # 1.08 0.1 - 1.3 x10(3)/mcL    Eos # 0.10 0 - 0.9 x10(3)/mcL    Baso # 0.06 <=0.2 x10(3)/mcL    IG# 0.22 (H) 0 - 0.04 x10(3)/mcL    IG% 3.1 %    NRBC% 0.6 %   Iron Panel    Collection Time: 03/03/24  3:47 AM   Result Value Ref Range    Iron Binding Capacity Unsaturated 96 70 - 310 ug/dL    Iron Level 60 50 - 170 ug/dL    Transferrin 142 mg/dL    Iron Binding Capacity Total 156 (L) 250 - 450 ug/dL    Iron Saturation 38 20 - 50 %   Ferritin    Collection Time: 03/03/24  3:47 AM   Result Value Ref Range    Ferritin Level 606.92 (H) 4.63 - 204.00 ng/mL   Comprehensive Metabolic Panel    Collection Time: 03/04/24  4:50 AM   Result Value Ref Range    Sodium Level 133 (L) 136 - 145 mmol/L    Potassium Level 4.5 3.5 - 5.1 mmol/L    Chloride 102 98 - 107 mmol/L    Carbon Dioxide 23 23 - 31 mmol/L    Glucose Level 94 82 - 115 mg/dL    Blood Urea Nitrogen 39.4 (H) 9.8 - 20.1 mg/dL    Creatinine 1.51 (H) 0.55 - 1.02 mg/dL    Calcium Level Total 8.7 8.4 - 10.2 mg/dL    Protein Total 6.2 5.8 - 7.6 gm/dL    Albumin Level 2.2 (L) 3.4 - 4.8 g/dL    Globulin 4.0 (H) 2.4 - 3.5 gm/dL    Albumin/Globulin Ratio 0.6 (L) 1.1 - 2.0 ratio    Bilirubin Total 0.3 <=1.5 mg/dL    Alkaline Phosphatase 79 40 - 150 unit/L    Alanine Aminotransferase 62 (H) 0 - 55 unit/L    Aspartate Aminotransferase 50 (H) 5 - 34 unit/L    eGFR 33 mls/min/1.73/m2   CBC with Differential    Collection Time: 03/04/24  4:50 AM   Result Value Ref Range    WBC 8.98 4.50 - 11.50 x10(3)/mcL    RBC 3.62 (L) 4.20 - 5.40 x10(6)/mcL    Hgb 10.8 (L) 12.0 - 16.0 g/dL    Hct 32.2 (L) 37.0 - 47.0 %    MCV 89.0 80.0 - 94.0 fL    MCH  29.8 27.0 - 31.0 pg    MCHC 33.5 33.0 - 36.0 g/dL    RDW 16.7 11.5 - 17.0 %    Platelet 231 130 - 400 x10(3)/mcL    MPV 10.8 (H) 7.4 - 10.4 fL    Neut % 62.3 %    Lymph % 23.9 %    Mono % 10.8 %    Eos % 1.0 %    Basophil % 0.7 %    Lymph # 2.15 0.6 - 4.6 x10(3)/mcL    Neut # 5.59 2.1 - 9.2 x10(3)/mcL    Mono # 0.97 0.1 - 1.3 x10(3)/mcL    Eos # 0.09 0 - 0.9 x10(3)/mcL    Baso # 0.06 <=0.2 x10(3)/mcL    IG# 0.12 (H) 0 - 0.04 x10(3)/mcL    IG% 1.3 %    NRBC% 0.9 %   Magnesium    Collection Time: 03/04/24  4:50 AM   Result Value Ref Range    Magnesium Level 2.00 1.60 - 2.60 mg/dL     Telemetry:  Paced at 60 BPM    Physical Exam  Vitals and nursing note reviewed.   Constitutional:       General: She is not in acute distress.     Appearance: Normal appearance.   HENT:      Head: Normocephalic.      Mouth/Throat:      Mouth: Mucous membranes are moist.      Pharynx: Oropharynx is clear.   Cardiovascular:      Rate and Rhythm: Normal rate and regular rhythm.   Pulmonary:      Effort: Pulmonary effort is normal. No respiratory distress.      Breath sounds: Normal breath sounds. No wheezing or rales.      Comments: Room Air  Abdominal:      Palpations: Abdomen is soft.      Tenderness: There is no abdominal tenderness.   Musculoskeletal:      Cervical back: Neck supple.      Right lower leg: No edema.      Left lower leg: No edema.   Skin:     General: Skin is warm and dry.   Neurological:      General: No focal deficit present.      Mental Status: She is alert.      Comments: At Baseline       Current Inpatient Medications:    Current Facility-Administered Medications:     acetaminophen suppository 650 mg, 650 mg, Rectal, Q6H PRN, Ally Erazo MD, 650 mg at 02/26/24 1613    acetaminophen tablet 1,000 mg, 1,000 mg, Oral, Q6H PRN, Soila Kruse FNP, 1,000 mg at 02/29/24 1525    albuterol inhaler 1 puff, 1 puff, Inhalation, Q6H PRN, Mayur Carrillo MD, 1 puff at 03/02/24 1225    aluminum-magnesium  hydroxide-simethicone 200-200-20 mg/5 mL suspension 30 mL, 30 mL, Oral, QID PRN, Soila Kruse, FNP    aspirin EC tablet 81 mg, 81 mg, Oral, Daily, Jeff Perez, FNP, 81 mg at 03/04/24 0824    atorvastatin tablet 10 mg, 10 mg, Oral, Nightly, Soila Kruse, FNP, 10 mg at 03/03/24 2153    bisacodyL suppository 10 mg, 10 mg, Rectal, Daily PRN, Soila Kruse, FNP    brimonidine 0.15 % OPTH DROP ophthalmic solution 1 drop, 1 drop, Both Eyes, BID, 1 drop at 03/03/24 2206 **AND** timolol maleate 0.5% ophthalmic solution 1 drop, 1 drop, Both Eyes, BID, Soila Kruse, FNP, 1 drop at 03/03/24 2226    carvediloL tablet 3.125 mg, 3.125 mg, Oral, BID, Jeff Perez, FNP, 3.125 mg at 03/04/24 0824    cefTRIAXone (ROCEPHIN) 2 g in dextrose 5 % in water (D5W) 100 mL IVPB (MB+), 2 g, Intravenous, Q24H, Christian Mari, DO, Stopped at 03/03/24 1401    enoxaparin injection 70 mg, 1 mg/kg, Subcutaneous, Q24H (treatment, non-standard time), Vanessa Mays MD, 70 mg at 03/03/24 1332    hydrOXYzine pamoate capsule 25 mg, 25 mg, Oral, Q8H PRN, Dimitri Cummings MD, 25 mg at 03/01/24 2042    latanoprost 0.005 % ophthalmic solution 1 drop, 1 drop, Both Eyes, QHS, Soila Kruse, FNP, 1 drop at 03/03/24 2220    levothyroxine tablet 25 mcg, 25 mcg, Oral, Before breakfast, Soila Kruse, FNP, 25 mcg at 03/03/24 0637    melatonin tablet 6 mg, 6 mg, Oral, Nightly PRN, Soila Kruse, FNP, 6 mg at 03/03/24 2153    metoprolol injection 5 mg, 5 mg, Intravenous, Q5 Min PRN, Angelia Babin FNP    naloxone 0.4 mg/mL injection 0.02 mg, 0.02 mg, Intravenous, PRN, Soila Kruse FNP    ondansetron injection 4 mg, 4 mg, Intravenous, Q4H PRN, Soila Kruse FNP    prochlorperazine injection Soln 5 mg, 5 mg, Intravenous, Q6H PRN, Soila Kruse FNP    risperiDONE tablet 2 mg, 2 mg, Oral, QHS, Soila Kruse FNP, 2 mg at 03/03/24 2153    rivastigmine tartrate capsule 1.5 mg, 1.5 mg, Oral, BID, Soila Kruse FNP,  1.5 mg at 03/04/24 0824    senna-docusate 8.6-50 mg per tablet 1 tablet, 1 tablet, Oral, BID PRN, Soila Kruse, FNP, 1 tablet at 03/01/24 2044    sodium bicarbonate 100 mEq in sodium chloride 0.45% 1,000 mL infusion, , Intravenous, Continuous, Mateo Chávez MD, Last Rate: 60 mL/hr at 03/03/24 1329, New Bag at 03/03/24 1329    sodium bicarbonate tablet 1,300 mg, 1,300 mg, Oral, BID, Mayur Carrillo MD, 1,300 mg at 03/04/24 0824    sodium chloride 0.9% bolus 1,000 mL 1,000 mL, 1,000 mL, Intravenous, Once, Josseline Nunez AGACNP-BC    sodium chloride 0.9% flush 10 mL, 10 mL, Intravenous, PRN, Soila Kruse, FNP    sodium chloride 0.9% flush 10 mL, 10 mL, Intravenous, PRN, DaYaneth krause PA-C  VTE Risk Mitigation (From admission, onward)           Ordered     enoxaparin injection 70 mg  Every 24 hours         03/03/24 1055     IP VTE HIGH RISK PATIENT  Once         02/25/24 2209     Place sequential compression device  Until discontinued         02/25/24 2209     Reason for No Pharmacological VTE Prophylaxis  Once        Question:  Reasons:  Answer:  Already adequately anticoagulated on oral Anticoagulants    02/25/24 2209                  Assessment:   NSTEMI (Unspecified for now)    - EKG: SR with ST Segment Depression in Inferior & Anterolateral Leads    - Denies Angina/SOB at Current Time    - EF 55-60%  Urosepsis Requiring Vasopressor Support (Resolved)- Levophed has been weaned off    - Leukocytosis  Bacteremia  History of Hypertension (BP Now Stable)  Chronic Diastolic CHF (Compensated)  PAF - Currently Paced     - [id number] Score 5 (7.2% Stroke risk Per Year)    - on Eliquis (Last Dose 2.26.24 AM)- Now on FD Lovenox 70 mg qd  SSS    - Status Post PPM (9.19.19): Medtronic   PAD  CVD    - Left Carotid Atherosclerosis  Hyperlipidemia    - On Statin  MICHEL on CKD IV     - Followed by Dr. Kyler  VHD    - Mild MR    - Mold to Moderate TR  YARI  GERD  Iron Deficiency Anemia    Thrombocytopenia - Resolved   Right Knee Osteoarthritis  Dementia    - CT Head Negative for Acute Abnormality  Glaucoma  Hypothyroidism    - On Oral Replacement Therapy  History of GI Bleed    Plan:   Keep NPO for scheduled LHC today.   Signed consent on the chart.   Continue Coreg 3.125 Mg BID  Continue Aspirin 81 Mg Daily  Interrogate Medtronic PPM.   Continue FD Lovenox Re: NSTEMI Treatment & PAF/Stroke Risk Reduction (Eliquis on Hold). Hold AM dosage.   Complaining of some difficulty swallowing and increased saliva production since admission, consider speech consult for evaluation  Renal indices continue to improve. Renal Team Following for Optimization in setting of hyponatremia- Appreciate the Assistance  Labs and EKG in AM: CBC, CMP, & Mg.     IFTIKHAR Obando  Cardiology  [facility]   03/04/2024

## 2024-03-04 NOTE — PT/OT/SLP PROGRESS
Attempted to see pt for OT treatment. Pt off unit for LHC. Will re-attempt OT treatment as schedule allows and as appropriate.

## 2024-03-04 NOTE — PHYSICIAN QUERY
PT Name: Ev Alberts  MR #: [id number]     DOCUMENTATION CLARIFICATION      CDS/: MANNIE Rodriguez, RN, CCDS               Contact information: [email or web address]  This form is a permanent document in the medical record.    Query Date: March 4, 2024    By submitting this query, we are merely seeking further clarification of documentation. Please utilize your independent clinical judgment when addressing the question(s) below.     The Medical Record contains the following:   Indicators   Supporting Clinical Findings Location in Medical Record    Chest Pain, Angina      Coronary Artery Disease     X EKG  EKG shows sinus rhythm with 1st degree A-V block, ST and Marked T wave abnormality, consider inferior ischemia, ST and Marked T wave abnormality, consider anterolateral ischemia, and Prolonged QT.    Cards: Dr. Saavedra 3/2   X Troponin Troponin 0.191 (repeat 0.199)       PN: Dr. Carrillo 3/2    Echo Results     X Angiography Procedure: left heart cath    Operative Findings: no significant coronary disease     Post-op Diagnosis:       * Elevated troponin      * CAD (coronary artery disease)      * Chest pain      * Troponin level elevated   Brief Op Note: Dr. Raymundo 3/4   X Documentation of acute cardiac condition NSTEMI likely type 2 type 1-awaiting angiogram     NSTEMI (Unspecified for now)    - EKG: SR with ST Segment Depression in Inferior & Anterolateral Leads    - Denies Angina/SOB at Current Time    NSTEMI (Unspecified for now)     PN: Dr. Carrillo 3/2    Cards: Dr. Saavedra 3/2          Cards: ANNIE Ferrari NP 3/4   X Medication/Treatment Continue Coreg 3.125 Mg BID  Continue Aspirin 81 Mg Daily  Continue FD Lovenox Re: NSTEMI Treatment & PAF/Stroke Risk Reduction (Eliquis on Hold)  Will plan [facility] on 03/04/24   Cards: Dr. Saavedra 3/2    Other        Provider, please clarify the type of cardiac diagnosis related to the above documentation:  [   ] NSTEMI   [  X ] NSTEMI/Myocardial Infarction Type 2  due to (please specify): ______demand ischemia____   [  ] Other Cardiac Diagnosis (please specify): _______________       Please document in your progress notes daily for the duration of treatment until resolved, and include in your discharge summary.    Form No. [id number]

## 2024-03-04 NOTE — BRIEF OP NOTE
[facility] - Cath Lab Services  Brief Operative Note    SUMMARY     Surgery Date: 3/4/2024     Surgeon(s) and Role:     * Mark Raymundo Jr., MD - Primary    Assisting Surgeon: None    Pre-op Diagnosis:  Elevated troponin [R79.89]  CAD (coronary artery disease) [I25.10]  Chest pain [R07.9]  Troponin level elevated [R79.89]    Post-op Diagnosis:  Post-Op Diagnosis Codes:     * Elevated troponin [R79.89]     * CAD (coronary artery disease) [I25.10]     * Chest pain [R07.9]     * Troponin level elevated [R79.89]    Procedure(s) (LRB):  Left heart cath (Left)    Anesthesia: RN IV Sedation    Implants:  * No implants in log *    Operative Findings: no significant coronary disease    Estimated Blood Loss: * No values recorded between 3/4/2024 11:06 AM and 3/4/2024 11:16 AM *    Estimated Blood Loss has been documented.         Specimens:   Specimen (24h ago, onward)      None            [id number]

## 2024-03-04 NOTE — PROGRESS NOTES
[facility] Hospital Medicine Progress Note        Chief Complaint: Inpatient Follow-up for     HPI:   89 yr old female whose history includes HTN, hypothyroidism, CKD with solitary kidney, PAF. Presented to ED with c/o generalized weakness.      Just discharged on 2/21/24 following a 2 day admission during which time she was treated for generalized weakness and age related physical debility with polypharmacy. Trazodone dose decreased and lyrica, meclizine and requip discontinued. Prior to discharge she had improved mobility and discharged home with family and home health. Unclear if patient stopped these medications as instructed because her medications come prepackaged.      At the time of md's exam patient is AAO x 3. Reports she was ambulating with her walker when both legs became very weak and she was unable to walk any further. Denies falling but  reports she's had multiple falls in the past. Denies any fever, chest pain, SOB, vomiting or diarrhea. Chest x-ray shows enlarged cardiac silhouette without edema. CT - lumbar spine shows nondisplaced fracture at S4 of indeterminate age. CT T-spine negative for acute findings. .  CT head negative     VS on arrival: T 99.2, P 73, R 19, B/P 108/56, Sats 96% on room air. Initial labs WBC 16.8, Hgb 10.8, Hct 35, K 3.4, BUN 32.6, creatinine 1.91, Troponin 0.191 (repeat 0.199). EKG shows SR with ST-T changes in anterior and inferolateral leads.      Started on antibiotics for UTI, found to have NSTEMI.  Upgraded to the ICU for vasopressor support.  Found to have bacteremia.  Continues on antibiotics.  Cardiology planning for catheterization once renal function optimized.  Now being downgraded to the floors.  Antibiotics were continued cardiology closely following for renal recovery before attempting angiogram.    Interval Hx:     No new events overnight.  She was alert, resting in the bed.  Doing well on room air.  Scheduled for  angiogram today.  Family members at bedside.  Patient reports difficulty in swallowing.  P.o. intake is inadequate.    Labs this morning showing stable hemoglobin platelets, improving sodium, BUN and serum creatinine.  LFTs stable.    Objective/physical exam:  Vitals:    03/03/24 2332 03/04/24 0317 03/04/24 0457 03/04/24 0711   BP: 100/67 128/80  123/78   BP Location:       Patient Position:       Pulse: (!) 59 (!) 55  64   Resp: 18 18  18   Temp: 97.5 °F (36.4 °C) 97.6 °F (36.4 °C)  98 °F (36.7 °C)   TempSrc: Oral Oral  Oral   SpO2: 99% 97%  96%   Weight:   61.2 kg (135 lb)    Height:         General: In no acute distress, afebrile  Respiratory: Clear to auscultation bilaterally  Cardiovascular: S1, S2, no appreciable murmur  Abdomen: Soft, nontender, BS +  MSK: Warm, no lower extremity edema, no clubbing or cyanosis  Neurologic: Alert and oriented x4, moving all extremities with good strength     Lab Results   Component Value Date     (L) 03/04/2024    K 4.5 03/04/2024    CO2 23 03/04/2024    BUN 39.4 (H) 03/04/2024    CREATININE 1.51 (H) 03/04/2024    CALCIUM 8.7 03/04/2024    EGFRNONAA 41 09/06/2019      Lab Results   Component Value Date    ALT 62 (H) 03/04/2024    AST 50 (H) 03/04/2024    ALKPHOS 79 03/04/2024    BILITOT 0.3 03/04/2024      Lab Results   Component Value Date    WBC 8.98 03/04/2024    HGB 10.8 (L) 03/04/2024    HCT 32.2 (L) 03/04/2024    MCV 89.0 03/04/2024     03/04/2024           Medications:   aspirin  81 mg Oral Daily    atorvastatin  10 mg Oral Nightly    brimonidine 0.15 % OPTH DROP  1 drop Both Eyes BID    And    timolol maleate 0.5%  1 drop Both Eyes BID    carvediloL  3.125 mg Oral BID    cefTRIAXone (Rocephin) IV (PEDS and ADULTS)  2 g Intravenous Q24H    enoxparin  1 mg/kg Subcutaneous Q24H (treatment, non-standard time)    latanoprost  1 drop Both Eyes QHS    levothyroxine  25 mcg Oral Before breakfast    risperiDONE  2 mg Oral QHS    rivastigmine tartrate  1.5 mg Oral  BID    sodium bicarbonate  1,300 mg Oral BID    sodium chloride 0.9%  1,000 mL Intravenous Once      acetaminophen, acetaminophen, albuterol, aluminum-magnesium hydroxide-simethicone, bisacodyL, hydrOXYzine pamoate, melatonin, metoprolol, naloxone, ondansetron, prochlorperazine, senna-docusate 8.6-50 mg, sodium chloride 0.9%, sodium chloride 0.9%     Assessment/Plan:    E coli bacteremia  Septic shock due to above-resolved  NSTEMI likely type 2 type 1-awaiting angiogram   MICHEL on CKD-improving  Age-indeterminate nondisplaced S4 vertebral fracture  Paroxysmal atrial fibrillation    HX:  SSS s/p pacemaker 09/2019, chronic HFpEF, HTN, FERNANDEZ, hypothyroidism, YARI, GERD, glaucoma, diverticular disease    Plan:  -scheduled for angiogram today.  -sodium improving.  Nephrology on board, appreciate assistance.  -  Encourage p.o. intake.  Consult SLP for swallow evaluation  -continue full-dose Lovenox.  Switch to Eliquis when appropriate   -continue ceftriaxone.  Switch to cefdinir at discharge to complete 14 days total.  TTE reviewed   -therapy from tomorrow  -other home medications were reviewed and renewed      Mayur Carrillo MD

## 2024-03-05 LAB
ALBUMIN SERPL-MCNC: 2.3 G/DL (ref 3.4–4.8)
ALBUMIN/GLOB SERPL: 0.5 RATIO (ref 1.1–2)
ALP SERPL-CCNC: 83 UNIT/L (ref 40–150)
ALT SERPL-CCNC: 59 UNIT/L (ref 0–55)
AST SERPL-CCNC: 51 UNIT/L (ref 5–34)
BASOPHILS # BLD AUTO: 0.06 X10(3)/MCL
BASOPHILS NFR BLD AUTO: 0.6 %
BILIRUB SERPL-MCNC: 0.3 MG/DL
BUN SERPL-MCNC: 37.6 MG/DL (ref 9.8–20.1)
CALCIUM SERPL-MCNC: 8.4 MG/DL (ref 8.4–10.2)
CHLORIDE SERPL-SCNC: 103 MMOL/L (ref 98–107)
CO2 SERPL-SCNC: 22 MMOL/L (ref 23–31)
CREAT SERPL-MCNC: 1.4 MG/DL (ref 0.55–1.02)
EOSINOPHIL # BLD AUTO: 0.13 X10(3)/MCL (ref 0–0.9)
EOSINOPHIL NFR BLD AUTO: 1.3 %
ERYTHROCYTE [DISTWIDTH] IN BLOOD BY AUTOMATED COUNT: 17.2 % (ref 11.5–17)
GFR SERPLBLD CREATININE-BSD FMLA CKD-EPI: 36 MLS/MIN/1.73/M2
GLOBULIN SER-MCNC: 4.3 GM/DL (ref 2.4–3.5)
GLUCOSE SERPL-MCNC: 88 MG/DL (ref 82–115)
HCT VFR BLD AUTO: 34.6 % (ref 37–47)
HGB BLD-MCNC: 11.1 G/DL (ref 12–16)
IMM GRANULOCYTES # BLD AUTO: 0.14 X10(3)/MCL (ref 0–0.04)
IMM GRANULOCYTES NFR BLD AUTO: 1.4 %
LYMPHOCYTES # BLD AUTO: 2.49 X10(3)/MCL (ref 0.6–4.6)
LYMPHOCYTES NFR BLD AUTO: 25.5 %
MAGNESIUM SERPL-MCNC: 1.9 MG/DL (ref 1.6–2.6)
MCH RBC QN AUTO: 29.8 PG (ref 27–31)
MCHC RBC AUTO-ENTMCNC: 32.1 G/DL (ref 33–36)
MCV RBC AUTO: 92.8 FL (ref 80–94)
MONOCYTES # BLD AUTO: 0.93 X10(3)/MCL (ref 0.1–1.3)
MONOCYTES NFR BLD AUTO: 9.5 %
NEUTROPHILS # BLD AUTO: 6.02 X10(3)/MCL (ref 2.1–9.2)
NEUTROPHILS NFR BLD AUTO: 61.7 %
NRBC BLD AUTO-RTO: 0.9 %
PLATELET # BLD AUTO: 185 X10(3)/MCL (ref 130–400)
PMV BLD AUTO: 10.9 FL (ref 7.4–10.4)
POTASSIUM SERPL-SCNC: 4.2 MMOL/L (ref 3.5–5.1)
PROT SERPL-MCNC: 6.6 GM/DL (ref 5.8–7.6)
RBC # BLD AUTO: 3.73 X10(6)/MCL (ref 4.2–5.4)
SODIUM SERPL-SCNC: 133 MMOL/L (ref 136–145)
WBC # SPEC AUTO: 9.77 X10(3)/MCL (ref 4.5–11.5)

## 2024-03-05 PROCEDURE — 63600175 PHARM REV CODE 636 W HCPCS: Performed by: STUDENT IN AN ORGANIZED HEALTH CARE EDUCATION/TRAINING PROGRAM

## 2024-03-05 PROCEDURE — 85025 COMPLETE CBC W/AUTO DIFF WBC: CPT | Performed by: INTERNAL MEDICINE

## 2024-03-05 PROCEDURE — 27000221 HC OXYGEN, UP TO 24 HOURS

## 2024-03-05 PROCEDURE — 92610 EVALUATE SWALLOWING FUNCTION: CPT

## 2024-03-05 PROCEDURE — 25000003 PHARM REV CODE 250: Performed by: STUDENT IN AN ORGANIZED HEALTH CARE EDUCATION/TRAINING PROGRAM

## 2024-03-05 PROCEDURE — 83735 ASSAY OF MAGNESIUM: CPT | Performed by: INTERNAL MEDICINE

## 2024-03-05 PROCEDURE — 80053 COMPREHEN METABOLIC PANEL: CPT | Performed by: INTERNAL MEDICINE

## 2024-03-05 PROCEDURE — 25000003 PHARM REV CODE 250: Performed by: NURSE PRACTITIONER

## 2024-03-05 PROCEDURE — 21400001 HC TELEMETRY ROOM

## 2024-03-05 PROCEDURE — 25000003 PHARM REV CODE 250: Performed by: INTERNAL MEDICINE

## 2024-03-05 PROCEDURE — 51798 US URINE CAPACITY MEASURE: CPT

## 2024-03-05 RX ADMIN — TIMOLOL MALEATE 1 DROP: 5 SOLUTION OPHTHALMIC at 09:03

## 2024-03-05 RX ADMIN — CARVEDILOL 3.12 MG: 3.12 TABLET, FILM COATED ORAL at 08:03

## 2024-03-05 RX ADMIN — LEVOTHYROXINE SODIUM 25 MCG: 25 TABLET ORAL at 05:03

## 2024-03-05 RX ADMIN — HYDROCODONE BITARTRATE AND ACETAMINOPHEN 1 TABLET: 5; 325 TABLET ORAL at 09:03

## 2024-03-05 RX ADMIN — Medication 81 MG: at 08:03

## 2024-03-05 RX ADMIN — LATANOPROST 1 DROP: 50 SOLUTION OPHTHALMIC at 09:03

## 2024-03-05 RX ADMIN — HYDROCODONE BITARTRATE AND ACETAMINOPHEN 1 TABLET: 5; 325 TABLET ORAL at 12:03

## 2024-03-05 RX ADMIN — SODIUM BICARBONATE 650 MG TABLET 1300 MG: at 08:03

## 2024-03-05 RX ADMIN — ATORVASTATIN CALCIUM 10 MG: 10 TABLET, FILM COATED ORAL at 09:03

## 2024-03-05 RX ADMIN — CEFTRIAXONE SODIUM 2 G: 2 INJECTION, POWDER, FOR SOLUTION INTRAMUSCULAR; INTRAVENOUS at 12:03

## 2024-03-05 RX ADMIN — APIXABAN 5 MG: 5 TABLET, FILM COATED ORAL at 09:03

## 2024-03-05 RX ADMIN — RIVASTIGMINE TARTRATE 1.5 MG: 1.5 CAPSULE ORAL at 08:03

## 2024-03-05 RX ADMIN — RIVASTIGMINE TARTRATE 1.5 MG: 1.5 CAPSULE ORAL at 09:03

## 2024-03-05 RX ADMIN — BRIMONIDINE TARTRATE 1 DROP: 1.5 SOLUTION OPHTHALMIC at 09:03

## 2024-03-05 RX ADMIN — CARVEDILOL 3.12 MG: 3.12 TABLET, FILM COATED ORAL at 09:03

## 2024-03-05 RX ADMIN — RISPERIDONE 2 MG: 1 TABLET ORAL at 09:03

## 2024-03-05 RX ADMIN — APIXABAN 5 MG: 5 TABLET, FILM COATED ORAL at 12:03

## 2024-03-05 NOTE — PROGRESS NOTES
[facility]     Cardiology  Progress Note    Patient Name: Ev Alberts  MRN: [id number]  Admission Date: 2/25/2024  Hospital Length of Stay: 9 days  Code Status: Full Code   Attending Physician: Vanessa Mays MD   Primary Care Physician: Santi Walters MD  Expected Discharge Date:   Principal Problem:Debility    Subjective:   Chief Complaint/Reason for Consult:Elevated Troponin      HPI: Ms. Alberts is a 89 y/o female with a history of SSS, AVB II, Bradycardia, CHF, PAF on Eliquis, PAD, Left Carotid Atherosclerosis, HTN, CKD IV, VHD, YARI, who was briefly known to [facility] (Dr. Vigil). She presented to the ER on 2.25.24 with complaints of not being able to walk since she was discharged on 2.21.24 due to polypharmacy, weakness, and age related disability. Daughter reports she is unable to get the signal to make her walk. Daughter also reports she has been lethargic and is unsure she stopped taking Lyrica, Meclizine, and Requip because they come prepackaged. Significant labs include WBC 21.55, Na 133, K 3.4, BUN/Creat 31.1/1.74, Albumin 2.6, HDL 32. LDL 38, Troponin 0.199. UA +; UC pending. CT Lumbar spine shows nondisplaced fracture at S4 (age indeterminate) and multilevel degenerative changes. CT Thoracic spine shows ligament, spinal cord and/or vascular abnormalities. EKG shows sinus rhythm with 1st degree A-V block, ST and Marked T wave abnormality, consider inferior ischemia, ST and Marked T wave abnormality, consider anterolateral ischemia, and Prolonged QT. [facility] has been consulted for NSTEMI.        Hospital Course:  2.27.24: NAD Noted. On Levophed for BP Support. Denies CP/SOB. Paced at 60. On FD Lovenox for NSTEMI Treatment.   2.28.24: NAD Noted. Levophed has been weaned off and patient is waiting on floor transfer. Family at bedside, [facility] plan of care relayed to her daughter and , both at bedside. Patient reports no CP/SOB. Troponin values are trending down. No arrhythmias noted  "overnight. On FD Lovenox.  2.29.24: NAD Noted. Denies CP/SOB. Hypertensive. Renal Team Following, renal indices are improving. Paced on Tele. Clinically Stable, now floor status (Boarding in ICU).  3.01.24: NAD Noted. Pt endorses no complaints this AM tolerate fluids. Denies CP/palpitations/SOB. Renal Team Following, renal indices are improving close to baseline with hyponatremia. Paced on Tele. Clinically Stable, now on floors.  3.2.24:  NAD. Patient resting comfortably in bed with family at bedside. Reports some shortness of breath due to "asthma". Denies CP or palpitations. Renal indices continuing to improve. SR on tele. Stable from yesterday.   3.3.24: Plans for C tomorrow. NPO at midnight. NAD. Sitting in bed. Complaints of some difficulty swallowing since initial hospital admission.   3.4.24: NAD. Denies current CP, SOB, or palps. Renal indices improving. NPO for scheduled LHC today. Paced on tele. BP stable.   3.5.24: NAD. Denies CP, SOB, or palps. Right wrist benign. Family at bedside. Paced on tele. BP stable.      PMH: SSS, AVB II, Bradycardia, CHF, PAF on Eliquis, PAD, Left Carotid Atherosclerosis, HTN, CKD IV, VHD, YARI, GERD, FERNANDEZ, Right Knee Osteoarthritis, GI Bleed, Dementia, Glaucoma, Hypothyroidism     PSH: Cataract Surgery, Cholecystectomy, Hysterectomy, PPM, Left, Nephrectomy, LHC, Hernia Repair, Oral Surgery, Bilateral Foot Surgery  Family History: Non-Contributory   Social History: Denies smoking, illicit drug use, and alcohol use.      Previous Cardiac Diagnostics:   LHC (3.4.24):  There is no obstructive coronary artery disease.  LVEDP is elevated at 24 mmHg.    ECHO (2.26.24):  Left Ventricle: The left ventricle is normal in size. Mildly increased wall thickness. There is normal systolic function with a visually estimated ejection fraction of 55 - 60%. Grade III diastolic dysfunction. Right Ventricle: Mild right ventricular enlargement. Wall thickness is normal. Right ventricle wall motion  " is normal. Systolic function is mildly reduced. TAPSE is 1.46 cm. Left Atrium: Left atrium is moderately dilated. Aortic Valve: There is mild aortic valve sclerosis. Tricuspid Valve: There is mild regurgitation.     PPM Insertion (9.19.19): Medtronic      ECHO (9.15.19):   Normal Left Ventricle cavity size. Normal wall thickness. Normal ejection fraction, 55-65%. The right ventricle cavity is mildly dilated. Left atrium is mildly dilated. Normal right atrium. Normal central venous pressure. Mild MR. Mild to Moderate TR. Moderate pulmonary HTN. No pericardial effusion.       Carotid US (7.26.12):  The study quality is good. Less than or equal to 50% left common carotid artery stenosis.Bilateral normal velocity measurements of the internal carotid and external caortid arteries are noted, with no plaque visualized.Antegrade right vertebral artery flow. Antegrade left vertebral artery flow.     KARI (1.18.12):  The resting right ankle brachial index is 1.01 consistent with no significant right peripheral vascular disease.The resting left ankle brachial index is 0.94 consistent with borderline left peripheral vascular disease.The study quality is good.      Lower Ext Arterial US (1.18.12):  The study quality is good. Biphasic waveforms and diffuse plaque seen in both the bilateral infra-popliteal arteries.    Review of Systems   Cardiovascular:  Negative for chest pain, leg swelling and palpitations.   Respiratory:  Negative for shortness of breath.    All other systems reviewed and are negative.    Objective:     Vital Signs (Most Recent):  Temp: 97.9 °F (36.6 °C) (03/05/24 1126)  Pulse: 63 (03/05/24 1126)  Resp: 20 (03/05/24 1256)  BP: 111/70 (03/05/24 1126)  SpO2: 98 % (03/05/24 1126) Vital Signs (24h Range):  Temp:  [97.5 °F (36.4 °C)-98.2 °F (36.8 °C)] 97.9 °F (36.6 °C)  Pulse:  [60-80] 63  Resp:  [16-20] 20  SpO2:  [94 %-100 %] 98 %  BP: (102-134)/(67-85) 111/70   Weight: 61.2 kg (135 lb)  Body mass index is 22.47  kg/m².  SpO2: 98 %       Intake/Output Summary (Last 24 hours) at 3/5/2024 1323  Last data filed at 3/5/2024 0533  Gross per 24 hour   Intake 1358.5 ml   Output 280 ml   Net 1078.5 ml       Lines/Drains/Airways       Drain  Duration             Female External Urinary Catheter w/ Suction 02/28/24 1644 5 days              Peripheral Intravenous Line  Duration                  Peripheral IV - Single Lumen 02/26/24 2205 Anterior;Left Forearm 7 days         Peripheral IV - Single Lumen 02/26/24 2302 Anterior;Distal;Right Forearm 7 days                  Significant Labs:   Recent Results (from the past 72 hour(s))   Creatinine, Random Urine    Collection Time: 03/02/24  9:25 PM   Result Value Ref Range    Urine Creatinine 62.9 45.0 - 106.0 mg/dL   Chloride, Random Urine    Collection Time: 03/02/24  9:25 PM   Result Value Ref Range    Urine Chloride <20.0 mmol/L   Osmolality, Urine    Collection Time: 03/02/24  9:25 PM   Result Value Ref Range    Urine Osmolality 337 300 - 1,300 mOsm/kg   Sodium, Random Urine    Collection Time: 03/02/24  9:25 PM   Result Value Ref Range    Urine Sodium <20.0 mmol/L   Comprehensive Metabolic Panel    Collection Time: 03/03/24  3:47 AM   Result Value Ref Range    Sodium Level 129 (L) 136 - 145 mmol/L    Potassium Level 4.4 3.5 - 5.1 mmol/L    Chloride 103 98 - 107 mmol/L    Carbon Dioxide 18 (L) 23 - 31 mmol/L    Glucose Level 89 82 - 115 mg/dL    Blood Urea Nitrogen 38.3 (H) 9.8 - 20.1 mg/dL    Creatinine 1.63 (H) 0.55 - 1.02 mg/dL    Calcium Level Total 8.7 8.4 - 10.2 mg/dL    Protein Total 6.3 5.8 - 7.6 gm/dL    Albumin Level 2.1 (L) 3.4 - 4.8 g/dL    Globulin 4.2 (H) 2.4 - 3.5 gm/dL    Albumin/Globulin Ratio 0.5 (L) 1.1 - 2.0 ratio    Bilirubin Total 0.3 <=1.5 mg/dL    Alkaline Phosphatase 87 40 - 150 unit/L    Alanine Aminotransferase 63 (H) 0 - 55 unit/L    Aspartate Aminotransferase 58 (H) 5 - 34 unit/L    eGFR 30 mls/min/1.73/m2   Magnesium    Collection Time: 03/03/24  3:47 AM    Result Value Ref Range    Magnesium Level 1.90 1.60 - 2.60 mg/dL   CBC with Differential    Collection Time: 03/03/24  3:47 AM   Result Value Ref Range    WBC 7.17 4.50 - 11.50 x10(3)/mcL    RBC 3.35 (L) 4.20 - 5.40 x10(6)/mcL    Hgb 10.1 (L) 12.0 - 16.0 g/dL    Hct 29.0 (L) 37.0 - 47.0 %    MCV 86.6 80.0 - 94.0 fL    MCH 30.1 27.0 - 31.0 pg    MCHC 34.8 33.0 - 36.0 g/dL    RDW 16.7 11.5 - 17.0 %    Platelet 160 130 - 400 x10(3)/mcL    MPV 11.0 (H) 7.4 - 10.4 fL    Neut % 53.2 %    Lymph % 26.4 %    Mono % 15.1 %    Eos % 1.4 %    Basophil % 0.8 %    Lymph # 1.89 0.6 - 4.6 x10(3)/mcL    Neut # 3.82 2.1 - 9.2 x10(3)/mcL    Mono # 1.08 0.1 - 1.3 x10(3)/mcL    Eos # 0.10 0 - 0.9 x10(3)/mcL    Baso # 0.06 <=0.2 x10(3)/mcL    IG# 0.22 (H) 0 - 0.04 x10(3)/mcL    IG% 3.1 %    NRBC% 0.6 %   Iron Panel    Collection Time: 03/03/24  3:47 AM   Result Value Ref Range    Iron Binding Capacity Unsaturated 96 70 - 310 ug/dL    Iron Level 60 50 - 170 ug/dL    Transferrin 142 mg/dL    Iron Binding Capacity Total 156 (L) 250 - 450 ug/dL    Iron Saturation 38 20 - 50 %   Ferritin    Collection Time: 03/03/24  3:47 AM   Result Value Ref Range    Ferritin Level 606.92 (H) 4.63 - 204.00 ng/mL   Comprehensive Metabolic Panel    Collection Time: 03/04/24  4:50 AM   Result Value Ref Range    Sodium Level 133 (L) 136 - 145 mmol/L    Potassium Level 4.5 3.5 - 5.1 mmol/L    Chloride 102 98 - 107 mmol/L    Carbon Dioxide 23 23 - 31 mmol/L    Glucose Level 94 82 - 115 mg/dL    Blood Urea Nitrogen 39.4 (H) 9.8 - 20.1 mg/dL    Creatinine 1.51 (H) 0.55 - 1.02 mg/dL    Calcium Level Total 8.7 8.4 - 10.2 mg/dL    Protein Total 6.2 5.8 - 7.6 gm/dL    Albumin Level 2.2 (L) 3.4 - 4.8 g/dL    Globulin 4.0 (H) 2.4 - 3.5 gm/dL    Albumin/Globulin Ratio 0.6 (L) 1.1 - 2.0 ratio    Bilirubin Total 0.3 <=1.5 mg/dL    Alkaline Phosphatase 79 40 - 150 unit/L    Alanine Aminotransferase 62 (H) 0 - 55 unit/L    Aspartate Aminotransferase 50 (H) 5 - 34 unit/L     eGFR 33 mls/min/1.73/m2   CBC with Differential    Collection Time: 03/04/24  4:50 AM   Result Value Ref Range    WBC 8.98 4.50 - 11.50 x10(3)/mcL    RBC 3.62 (L) 4.20 - 5.40 x10(6)/mcL    Hgb 10.8 (L) 12.0 - 16.0 g/dL    Hct 32.2 (L) 37.0 - 47.0 %    MCV 89.0 80.0 - 94.0 fL    MCH 29.8 27.0 - 31.0 pg    MCHC 33.5 33.0 - 36.0 g/dL    RDW 16.7 11.5 - 17.0 %    Platelet 231 130 - 400 x10(3)/mcL    MPV 10.8 (H) 7.4 - 10.4 fL    Neut % 62.3 %    Lymph % 23.9 %    Mono % 10.8 %    Eos % 1.0 %    Basophil % 0.7 %    Lymph # 2.15 0.6 - 4.6 x10(3)/mcL    Neut # 5.59 2.1 - 9.2 x10(3)/mcL    Mono # 0.97 0.1 - 1.3 x10(3)/mcL    Eos # 0.09 0 - 0.9 x10(3)/mcL    Baso # 0.06 <=0.2 x10(3)/mcL    IG# 0.12 (H) 0 - 0.04 x10(3)/mcL    IG% 1.3 %    NRBC% 0.9 %   Magnesium    Collection Time: 03/04/24  4:50 AM   Result Value Ref Range    Magnesium Level 2.00 1.60 - 2.60 mg/dL   BNP    Collection Time: 03/04/24  4:50 AM   Result Value Ref Range    Natriuretic Peptide 1,428.3 (H) <=100.0 pg/mL   Comprehensive Metabolic Panel    Collection Time: 03/05/24  4:13 AM   Result Value Ref Range    Sodium Level 133 (L) 136 - 145 mmol/L    Potassium Level 4.2 3.5 - 5.1 mmol/L    Chloride 103 98 - 107 mmol/L    Carbon Dioxide 22 (L) 23 - 31 mmol/L    Glucose Level 88 82 - 115 mg/dL    Blood Urea Nitrogen 37.6 (H) 9.8 - 20.1 mg/dL    Creatinine 1.40 (H) 0.55 - 1.02 mg/dL    Calcium Level Total 8.4 8.4 - 10.2 mg/dL    Protein Total 6.6 5.8 - 7.6 gm/dL    Albumin Level 2.3 (L) 3.4 - 4.8 g/dL    Globulin 4.3 (H) 2.4 - 3.5 gm/dL    Albumin/Globulin Ratio 0.5 (L) 1.1 - 2.0 ratio    Bilirubin Total 0.3 <=1.5 mg/dL    Alkaline Phosphatase 83 40 - 150 unit/L    Alanine Aminotransferase 59 (H) 0 - 55 unit/L    Aspartate Aminotransferase 51 (H) 5 - 34 unit/L    eGFR 36 mls/min/1.73/m2   CBC with Differential    Collection Time: 03/05/24  4:13 AM   Result Value Ref Range    WBC 9.77 4.50 - 11.50 x10(3)/mcL    RBC 3.73 (L) 4.20 - 5.40 x10(6)/mcL    Hgb 11.1 (L)  12.0 - 16.0 g/dL    Hct 34.6 (L) 37.0 - 47.0 %    MCV 92.8 80.0 - 94.0 fL    MCH 29.8 27.0 - 31.0 pg    MCHC 32.1 (L) 33.0 - 36.0 g/dL    RDW 17.2 (H) 11.5 - 17.0 %    Platelet 185 130 - 400 x10(3)/mcL    MPV 10.9 (H) 7.4 - 10.4 fL    Neut % 61.7 %    Lymph % 25.5 %    Mono % 9.5 %    Eos % 1.3 %    Basophil % 0.6 %    Lymph # 2.49 0.6 - 4.6 x10(3)/mcL    Neut # 6.02 2.1 - 9.2 x10(3)/mcL    Mono # 0.93 0.1 - 1.3 x10(3)/mcL    Eos # 0.13 0 - 0.9 x10(3)/mcL    Baso # 0.06 <=0.2 x10(3)/mcL    IG# 0.14 (H) 0 - 0.04 x10(3)/mcL    IG% 1.4 %    NRBC% 0.9 %   Magnesium    Collection Time: 03/05/24  4:13 AM   Result Value Ref Range    Magnesium Level 1.90 1.60 - 2.60 mg/dL     Telemetry:  Paced at 60 BPM    Physical Exam  Vitals and nursing note reviewed.   Constitutional:       General: She is not in acute distress.     Appearance: Normal appearance.   HENT:      Head: Normocephalic.      Mouth/Throat:      Mouth: Mucous membranes are moist.      Pharynx: Oropharynx is clear.   Cardiovascular:      Rate and Rhythm: Normal rate and regular rhythm.      Comments: Right wrist soft, nontender. No hematoma noted. + 2 peripheral pulse  Pulmonary:      Effort: Pulmonary effort is normal. No respiratory distress.      Breath sounds: Normal breath sounds. No wheezing or rales.      Comments: Room Air  Abdominal:      Palpations: Abdomen is soft.      Tenderness: There is no abdominal tenderness.   Musculoskeletal:      Cervical back: Neck supple.      Right lower leg: No edema.      Left lower leg: No edema.   Skin:     General: Skin is warm and dry.   Neurological:      General: No focal deficit present.      Mental Status: She is alert.      Comments: At Baseline       Current Inpatient Medications:    Current Facility-Administered Medications:     acetaminophen suppository 650 mg, 650 mg, Rectal, Q6H PRN, Ally Erazo MD, 650 mg at 02/26/24 1613    acetaminophen tablet 1,000 mg, 1,000 mg, Oral, Q6H PRN, Molbert, Soila L,  FNP, 1,000 mg at 02/29/24 1525    albuterol inhaler 1 puff, 1 puff, Inhalation, Q6H PRN, Mayur Carrillo MD, 1 puff at 03/02/24 1225    aluminum-magnesium hydroxide-simethicone 200-200-20 mg/5 mL suspension 30 mL, 30 mL, Oral, QID PRN, Soila Kruse FNP    apixaban tablet 5 mg, 5 mg, Oral, BID, Mayur Carrillo MD, 5 mg at 03/05/24 1221    aspirin EC tablet 81 mg, 81 mg, Oral, Daily, Jeff Perez, FNP, 81 mg at 03/05/24 0839    atorvastatin tablet 10 mg, 10 mg, Oral, Nightly, Soila Kruse FNP, 10 mg at 03/04/24 2129    bisacodyL suppository 10 mg, 10 mg, Rectal, Daily PRN, Soila Kruse FNP    brimonidine 0.15 % OPTH DROP ophthalmic solution 1 drop, 1 drop, Both Eyes, BID, 1 drop at 03/05/24 0900 **AND** timolol maleate 0.5% ophthalmic solution 1 drop, 1 drop, Both Eyes, BID, Soila Kruse FNP, 1 drop at 03/05/24 0900    carvediloL tablet 3.125 mg, 3.125 mg, Oral, BID, Jeff Perez, FNP, 3.125 mg at 03/05/24 0839    cefTRIAXone (ROCEPHIN) 2 g in dextrose 5 % in water (D5W) 100 mL IVPB (MB+), 2 g, Intravenous, Q24H, Christian Mari DO, Last Rate: 200 mL/hr at 03/05/24 1232, 2 g at 03/05/24 1232    hydrALAZINE injection 10 mg, 10 mg, Intravenous, Q4H PRN, Mark Raymundo Jr., MD    HYDROcodone-acetaminophen 5-325 mg per tablet 1 tablet, 1 tablet, Oral, Q4H PRN, Mark Raymundo Jr., MD, 1 tablet at 03/05/24 1256    hydrOXYzine pamoate capsule 25 mg, 25 mg, Oral, Q8H PRN, Dimitri Cummings MD, 25 mg at 03/04/24 2129    latanoprost 0.005 % ophthalmic solution 1 drop, 1 drop, Both Eyes, QHS, Soila Kruse FNP, 1 drop at 03/04/24 2326    levothyroxine tablet 25 mcg, 25 mcg, Oral, Before breakfast, Soila Kruse FNP, 25 mcg at 03/05/24 0533    melatonin tablet 6 mg, 6 mg, Oral, Nightly PRN, Soila Kruse FNP, 6 mg at 03/04/24 2129    metoprolol injection 5 mg, 5 mg, Intravenous, Q5 Min PRN, Angelia Babin FNP    morphine injection 2 mg, 2 mg, Intravenous, Q4H PRN, Zackary  Mark SCHAFFER Jr., MD    naloxone 0.4 mg/mL injection 0.02 mg, 0.02 mg, Intravenous, PRN, Moljesus, Soila L, FNP    ondansetron injection 4 mg, 4 mg, Intravenous, Q4H PRN, MolJean Marie leey L, FNP    prochlorperazine injection Soln 5 mg, 5 mg, Intravenous, Q6H PRN, MolJean Marie leey L, FNP    risperiDONE tablet 2 mg, 2 mg, Oral, QHS, Moljesus Soila L, FNP, 2 mg at 03/04/24 2129    rivastigmine tartrate capsule 1.5 mg, 1.5 mg, Oral, BID, Molbert Soila L, FNP, 1.5 mg at 03/05/24 0839    senna-docusate 8.6-50 mg per tablet 1 tablet, 1 tablet, Oral, BID PRN, Soila Kruse, FNP, 1 tablet at 03/01/24 2044    sodium bicarbonate tablet 1,300 mg, 1,300 mg, Oral, BID, Mayur Carrillo MD, 1,300 mg at 03/05/24 0839    sodium chloride 0.9% bolus 1,000 mL 1,000 mL, 1,000 mL, Intravenous, Once, Josseline Nunez AGACNP-BC    sodium chloride 0.9% flush 10 mL, 10 mL, Intravenous, PRN, MolSoila lee L, FNP    sodium chloride 0.9% flush 10 mL, 10 mL, Intravenous, PRN, Yaneth Purcell, PAHectorC  VTE Risk Mitigation (From admission, onward)           Ordered     apixaban tablet 5 mg  2 times daily         03/05/24 0938     IP VTE HIGH RISK PATIENT  Once         02/25/24 2209     Place sequential compression device  Until discontinued         02/25/24 2209     Reason for No Pharmacological VTE Prophylaxis  Once        Question:  Reasons:  Answer:  Already adequately anticoagulated on oral Anticoagulants    02/25/24 2209                  Assessment:   NSTEMI (Unspecified for now)    - EKG: SR with ST Segment Depression in Inferior & Anterolateral Leads    - Denies Angina/SOB at Current Time    - EF 55-60%  Urosepsis Requiring Vasopressor Support (Resolved)- Levophed has been weaned off    - Leukocytosis  Bacteremia  History of Hypertension (BP Now Stable)  Chronic Diastolic CHF (Compensated)  PAF - Currently Paced     - [id number] Score 5 (7.2% Stroke risk Per Year)    - on Eliquis   SSS    - Status Post PPM (9.19.19): [employer]    PAD  CVD    - Left Carotid Atherosclerosis  Hyperlipidemia    - On Statin  MICHEL on CKD IV     - Followed by Dr. Chávez  VHD    - Mild MR    - Mold to Moderate TR  YARI  GERD  Iron Deficiency Anemia   Thrombocytopenia - Resolved   Right Knee Osteoarthritis  Dementia    - CT Head Negative for Acute Abnormality  Glaucoma  Hypothyroidism    - On Oral Replacement Therapy  History of GI Bleed    Plan:   Continue ASA, statin, & BB.   [employer] interrogation reviewed with Dr. Donnelly. Adjustments were made per rep.   Eliquis has been removed Re: NSTEMI Treatment & PAF/Stroke Risk   Renal indices continue to improve. Renal Team Following for Optimization.  F/U with Dr. Vigil in [place] at discharge.     IFTIKHAR Obando  Cardiology  [facility]   03/05/2024

## 2024-03-05 NOTE — PT/OT/SLP EVAL
[facility]  Speech Language Pathology Department  Clinical Swallow Evaluation    Patient Name:  Ev Alberts   MRN:  [id number]    Recommendations     General recommendations:  SLP follow up regarding diet tolerance  Diet texture/consistency recommendations:  Soft & Bite-sized solids (IDDSI 6) and thin liquids (IDDSI 0)  Medications: per patient preference  Swallow strategies/precautions: small bites/sips, slow rate, upright for PO intake, and assist with feeding as needed  Precautions: Standard, aspiration    History     Ev Alberts is a/n 89 y.o. female admitted to ED with c/o generalized weakness.      Past Medical History:   Diagnosis Date    Dementia     Hypertension     Sick sinus syndrome     Thyroid disease     Unspecified glaucoma      Past Surgical History:   Procedure Laterality Date    cataract surgery       SECTION      CHOLECYSTECTOMY      HYSTERECTOMY      INSERTION OF PACEMAKER      LEFT HEART CATHETERIZATION Left 3/4/2024    Procedure: Left heart cath;  Surgeon: Mark Raymundo Jr., MD;  Location: [facility] CATH LAB;  Service: Cardiology;  Laterality: Left;    NEPHRECTOMY         Home diet texture/consistency: Regular and thin liquids  Current method of nutrition: NPO    Patient complaint: to eat/drink    Imaging   Results for orders placed during the hospital encounter of 24    X-Ray Chest 1 View    Narrative  EXAMINATION:  XR CHEST 1 VIEW    CLINICAL HISTORY:  PICC placmnt;    TECHNIQUE:  Single view of the chest    COMPARISON:  2024    FINDINGS:  The cardiomediastinal silhouette is unchanged.  Interval placement of right-sided PICC line which projects over the mid SVC.    Impression  As above.      Electronically signed by: Wiley Roberts  Date:    2024  Time:    07:25    No results found for this or any previous visit.    No results found for this or any previous visit.    Subjective     Patient awake, alert, and oriented person and place  .    Patient goals: to eat/drink   Spiritual/Cultural/Hinduism Beliefs/Practices that affect care: no    Pain/Comfort: Pain Rating 1: 0/10  Respiratory status: room air  Restraints/positioning devices: none    Objective     ORAL MUSCULATURE  Dentition: ill-fitting dentures (upper and lower)  Secretion Management: adequate  Mucosal Quality: good  Facial Movement: WFL  Buccal Strength & Mobility: WFL  Mandibular Strength & Mobility: WFL  Oral Labial Strength & Mobility: WFL  Lingual Strength & Mobility: WFL  Vocal Quality: adequate  Volitional Cough: Weak  Volitional Swallow: present    Consistency Fed By Oral Symptoms Pharyngeal Symptoms   Ice chips SLP Prolonged bolus formation/mastication None   Puree SLP None None   Thin liquid by straw SLP None None   Chewable solid SLP Prolonged bolus formation/mastication None     Assessment     Pt presented with no signs/sx of aspiration however due to ill fitting dentures pt demonstrated prolonged mastication. Rec: soft and bite sized solids, thin liquids, assist feed and ensure pt upright for all PO intake.  Daughter present at bedside and agreed with recommendations.    Goals     Multidisciplinary Problems       SLP Goals       Not on file                  Education     Patient and daughter/s were provided with verbal education regarding safe PO intake.  Understanding was verbalized.    Plan     SLP Follow-Up:  Yes   Patient to be seen:      Plan of Care expires:     Plan of Care reviewed with:        Time Tracking     SLP Treatment Date:   03/05/24  Speech Start Time:  1045  Speech Stop Time:  1100     Speech Total Time (min):  15 min    Billable minutes:  Swallow and Oral Function Evaluation, 15 minutes     03/05/2024

## 2024-03-05 NOTE — PROGRESS NOTES
[facility] Hospital Medicine Progress Note        Chief Complaint: Inpatient Follow-up for     HPI:   89 yr old female whose history includes HTN, hypothyroidism, CKD with solitary kidney, PAF. Presented to ED with c/o generalized weakness.      Just discharged on 2/21/24 following a 2 day admission during which time she was treated for generalized weakness and age related physical debility with polypharmacy. Trazodone dose decreased and lyrica, meclizine and requip discontinued. Prior to discharge she had improved mobility and discharged home with family and home health. Unclear if patient stopped these medications as instructed because her medications come prepackaged.      At the time of md's exam patient is AAO x 3. Reports she was ambulating with her walker when both legs became very weak and she was unable to walk any further. Denies falling but  reports she's had multiple falls in the past. Denies any fever, chest pain, SOB, vomiting or diarrhea. Chest x-ray shows enlarged cardiac silhouette without edema. CT - lumbar spine shows nondisplaced fracture at S4 of indeterminate age. CT T-spine negative for acute findings. .  CT head negative     VS on arrival: T 99.2, P 73, R 19, B/P 108/56, Sats 96% on room air. Initial labs WBC 16.8, Hgb 10.8, Hct 35, K 3.4, BUN 32.6, creatinine 1.91, Troponin 0.191 (repeat 0.199). EKG shows SR with ST-T changes in anterior and inferolateral leads.      Started on antibiotics for UTI, found to have NSTEMI.  Upgraded to the ICU for vasopressor support.  Found to have bacteremia.  Continues on antibiotics.  Cardiology planning for catheterization once renal function optimized.  Now being downgraded to the floors.  Antibiotics were continued cardiology closely following for renal recovery before attempting angiogram.    Interval Hx:       Angiogram yesterday showed nonobstructive CAD.  Medical management was recommended     No acute events  overnight.  She is alert, comfortably resting in the bed.  P.o. intake is inadequate and dysphagia symptoms continue.  Stable from respiratory standpoint.  Denies any fever, chills, nausea or vomiting.      Labs this morning showed stable hemoglobin and platelets, stable sodium with  improving BUN and serum creatinine.  LFTs stable as well.      Objective/physical exam:  Vitals:    03/04/24 1931 03/04/24 2312 03/05/24 0348 03/05/24 0752   BP: 105/69 122/71 102/67 134/85   Pulse: 80 60 60 61   Resp: 20 18 16 18   Temp: 97.7 °F (36.5 °C) 97.5 °F (36.4 °C) 98.2 °F (36.8 °C) 97.8 °F (36.6 °C)   TempSrc: Oral Oral Axillary Oral   SpO2: (!) 94% 100% 100% 100%   Weight:       Height:         General: In no acute distress, afebrile  Respiratory: Clear to auscultation bilaterally  Cardiovascular: S1, S2, no appreciable murmur  Abdomen: Soft, nontender, BS +  MSK: Warm, no lower extremity edema, no clubbing or cyanosis  Neurologic: Alert and oriented x4, moving all extremities with good strength     Lab Results   Component Value Date     (L) 03/05/2024    K 4.2 03/05/2024    CO2 22 (L) 03/05/2024    BUN 37.6 (H) 03/05/2024    CREATININE 1.40 (H) 03/05/2024    CALCIUM 8.4 03/05/2024    EGFRNONAA 41 09/06/2019      Lab Results   Component Value Date    ALT 59 (H) 03/05/2024    AST 51 (H) 03/05/2024    ALKPHOS 83 03/05/2024    BILITOT 0.3 03/05/2024      Lab Results   Component Value Date    WBC 9.77 03/05/2024    HGB 11.1 (L) 03/05/2024    HCT 34.6 (L) 03/05/2024    MCV 92.8 03/05/2024     03/05/2024           Medications:   aspirin  81 mg Oral Daily    atorvastatin  10 mg Oral Nightly    brimonidine 0.15 % OPTH DROP  1 drop Both Eyes BID    And    timolol maleate 0.5%  1 drop Both Eyes BID    carvediloL  3.125 mg Oral BID    cefTRIAXone (Rocephin) IV (PEDS and ADULTS)  2 g Intravenous Q24H    enoxparin  1 mg/kg Subcutaneous Q24H (treatment, non-standard time)    latanoprost  1 drop Both Eyes QHS    levothyroxine   25 mcg Oral Before breakfast    risperiDONE  2 mg Oral QHS    rivastigmine tartrate  1.5 mg Oral BID    sodium bicarbonate  1,300 mg Oral BID    sodium chloride 0.9%  1,000 mL Intravenous Once      acetaminophen, acetaminophen, albuterol, aluminum-magnesium hydroxide-simethicone, bisacodyL, hydrALAZINE, HYDROcodone-acetaminophen, hydrOXYzine pamoate, melatonin, metoprolol, morphine, naloxone, ondansetron, prochlorperazine, senna-docusate 8.6-50 mg, sodium chloride 0.9%, sodium chloride 0.9%     Assessment/Plan:    E coli bacteremia  Septic shock due to above-resolved  NSTEMI likely type 2 type 1-awaiting angiogram   MICHEL on CKD-improving  Age-indeterminate nondisplaced S4 vertebral fracture  Paroxysmal atrial fibrillation    HX:  SSS s/p pacemaker 09/2019, chronic HFpEF, HTN, FERNANDEZ, hypothyroidism, YARI, GERD, glaucoma, diverticular disease    Plan:  -angiogram findings reviewed.  Continue current medical management.  DC Lovenox, Switch to Eliquis.  Continue telemetry  -obtain bilateral upper extremity Doppler ruled out DVT.  Encourage limb elevation  -sodium improving.  Nephrology on board, appreciate assistance.  -  Encourage p.o. intake.  Consult SLP for swallow evaluation  -continue ceftriaxone.  Switch to cefdinir at discharge to complete 14 days total.  TTE reviewed   -PT OT  -other home medications were reviewed and renewed      Mayur Carrillo MD

## 2024-03-05 NOTE — PLAN OF CARE
03/05/24 0825   Medicare Message   Important Message from Medicare regarding Discharge Appeal Rights Given to patient/caregiver;Explained to patient/caregiver

## 2024-03-05 NOTE — PROGRESS NOTES
Renal      Chief complaint:   none    Interval History:  No issues ovenight. Hands a little swollen    ROS:  all else Neg     apixaban  5 mg Oral BID    aspirin  81 mg Oral Daily    atorvastatin  10 mg Oral Nightly    brimonidine 0.15 % OPTH DROP  1 drop Both Eyes BID    And    timolol maleate 0.5%  1 drop Both Eyes BID    carvediloL  3.125 mg Oral BID    cefTRIAXone (Rocephin) IV (PEDS and ADULTS)  2 g Intravenous Q24H    latanoprost  1 drop Both Eyes QHS    levothyroxine  25 mcg Oral Before breakfast    risperiDONE  2 mg Oral QHS    rivastigmine tartrate  1.5 mg Oral BID    sodium bicarbonate  1,300 mg Oral BID    sodium chloride 0.9%  1,000 mL Intravenous Once       VITAL SIGNS: 24 HR MIN & MAX LAST    Temp  Min: 97.5 °F (36.4 °C)  Max: 98.2 °F (36.8 °C)  97.9 °F (36.6 °C)        BP  Min: 102/67  Max: 134/85  111/70     Pulse  Min: 60  Max: 80  63     Resp  Min: 16  Max: 20  18    SpO2  Min: 94 %  Max: 100 %  98 %      Wt Readings from Last 3 Encounters:   03/04/24 61.2 kg (135 lb)   02/17/24 69.9 kg (154 lb)   06/01/22 72.6 kg (160 lb)       Intake/Output Summary (Last 24 hours) at 3/5/2024 1146  Last data filed at 3/5/2024 0533  Gross per 24 hour   Intake 1358.5 ml   Output 280 ml   Net 1078.5 ml     A&O x 4  EOMI  (B) symmetrical  Unlabored  Soft, NT, ND  Edema tr BLE, 1+ BUE    Recent Labs     03/03/24  0347 03/04/24  0450 03/05/24  0413   * 133* 133*   K 4.4 4.5 4.2   CHLORIDE 103 102 103   CO2 18* 23 22*   BUN 38.3* 39.4* 37.6*   CREATININE 1.63* 1.51* 1.40*   GLUCOSE 89 94 88   CALCIUM 8.7 8.7 8.4   MG 1.90 2.00 1.90   ALBUMIN 2.1* 2.2* 2.3*      Recent Labs     03/03/24  0347 03/04/24  0450 03/05/24  0413   WBC 7.17 8.98 9.77   HGB 10.1* 10.8* 11.1*   HCT 29.0* 32.2* 34.6*    231 185       ASSESSMENT / PLAN    UTI / E coli sepsis, NSTEMI, MICHEL. 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 Gfr stable.   Hyponatremia, NAGMA - U Na<20, likely prerenal. Improved  Anemia - Hb stable, Fe levels OK  UTI / sepsis - blood cx's + E coli, on rocephin, clinically improving  NSTEMI, CAD, Afib - on low-dose coreg, ASA. [facility] 3/4 was negative, back on Eliquis    D/c IVF  Monitor labs      Sudhir Ramirez M.D.  Nephrology

## 2024-03-05 NOTE — PLAN OF CARE
03/05/24 1533   Discharge Reassessment   Discharge Plan A Skilled Nursing Facility   Discharge Plan B Skilled Nursing Facility

## 2024-03-06 LAB
ANION GAP SERPL CALC-SCNC: 6 MEQ/L
BUN SERPL-MCNC: 31.4 MG/DL (ref 9.8–20.1)
CALCIUM SERPL-MCNC: 8.2 MG/DL (ref 8.4–10.2)
CHLORIDE SERPL-SCNC: 104 MMOL/L (ref 98–107)
CO2 SERPL-SCNC: 23 MMOL/L (ref 23–31)
CREAT SERPL-MCNC: 1.17 MG/DL (ref 0.55–1.02)
CREAT/UREA NIT SERPL: 27
GFR SERPLBLD CREATININE-BSD FMLA CKD-EPI: 45 MLS/MIN/1.73/M2
GLUCOSE SERPL-MCNC: 90 MG/DL (ref 82–115)
MAGNESIUM SERPL-MCNC: 1.9 MG/DL (ref 1.6–2.6)
POTASSIUM SERPL-SCNC: 4.3 MMOL/L (ref 3.5–5.1)
SODIUM SERPL-SCNC: 133 MMOL/L (ref 136–145)

## 2024-03-06 PROCEDURE — 80048 BASIC METABOLIC PNL TOTAL CA: CPT | Performed by: INTERNAL MEDICINE

## 2024-03-06 PROCEDURE — 97116 GAIT TRAINING THERAPY: CPT | Mod: CQ

## 2024-03-06 PROCEDURE — 63600175 PHARM REV CODE 636 W HCPCS: Performed by: INTERNAL MEDICINE

## 2024-03-06 PROCEDURE — 94761 N-INVAS EAR/PLS OXIMETRY MLT: CPT

## 2024-03-06 PROCEDURE — 21400001 HC TELEMETRY ROOM

## 2024-03-06 PROCEDURE — 25000003 PHARM REV CODE 250: Performed by: INTERNAL MEDICINE

## 2024-03-06 PROCEDURE — 25000003 PHARM REV CODE 250: Performed by: NURSE PRACTITIONER

## 2024-03-06 PROCEDURE — 83735 ASSAY OF MAGNESIUM: CPT | Performed by: INTERNAL MEDICINE

## 2024-03-06 PROCEDURE — 97535 SELF CARE MNGMENT TRAINING: CPT | Mod: CO

## 2024-03-06 PROCEDURE — 27000221 HC OXYGEN, UP TO 24 HOURS

## 2024-03-06 PROCEDURE — 97530 THERAPEUTIC ACTIVITIES: CPT | Mod: CQ

## 2024-03-06 RX ADMIN — BRIMONIDINE TARTRATE 1 DROP: 1.5 SOLUTION OPHTHALMIC at 09:03

## 2024-03-06 RX ADMIN — SODIUM BICARBONATE 650 MG TABLET 1300 MG: at 09:03

## 2024-03-06 RX ADMIN — CARVEDILOL 3.12 MG: 3.12 TABLET, FILM COATED ORAL at 08:03

## 2024-03-06 RX ADMIN — RISPERIDONE 2 MG: 1 TABLET ORAL at 08:03

## 2024-03-06 RX ADMIN — ATORVASTATIN CALCIUM 10 MG: 10 TABLET, FILM COATED ORAL at 08:03

## 2024-03-06 RX ADMIN — APIXABAN 5 MG: 5 TABLET, FILM COATED ORAL at 08:03

## 2024-03-06 RX ADMIN — RIVASTIGMINE TARTRATE 1.5 MG: 1.5 CAPSULE ORAL at 09:03

## 2024-03-06 RX ADMIN — TIMOLOL MALEATE 1 DROP: 5 SOLUTION OPHTHALMIC at 09:03

## 2024-03-06 RX ADMIN — CARVEDILOL 3.12 MG: 3.12 TABLET, FILM COATED ORAL at 09:03

## 2024-03-06 RX ADMIN — CEFTRIAXONE SODIUM 2 G: 2 INJECTION, POWDER, FOR SOLUTION INTRAMUSCULAR; INTRAVENOUS at 12:03

## 2024-03-06 RX ADMIN — RIVASTIGMINE TARTRATE 1.5 MG: 1.5 CAPSULE ORAL at 08:03

## 2024-03-06 RX ADMIN — Medication 81 MG: at 09:03

## 2024-03-06 RX ADMIN — LATANOPROST 1 DROP: 50 SOLUTION OPHTHALMIC at 09:03

## 2024-03-06 RX ADMIN — SODIUM BICARBONATE 650 MG TABLET 1300 MG: at 08:03

## 2024-03-06 RX ADMIN — LEVOTHYROXINE SODIUM 25 MCG: 25 TABLET ORAL at 05:03

## 2024-03-06 RX ADMIN — APIXABAN 5 MG: 5 TABLET, FILM COATED ORAL at 09:03

## 2024-03-06 NOTE — PROGRESS NOTES
[facility] Medicine Progress [facility]        Chief Complaint: Inpatient Follow-up for     HPI:   89 yr old female whose history includes HTN, hypothyroidism, CKD with solitary kidney, PAF. Presented to ED with c/o generalized weakness.      Just discharged on 2/21/24 following a 2 day admission during which time she was treated for generalized weakness and age related physical debility with polypharmacy. Trazodone dose decreased and lyrica, meclizine and requip discontinued. Prior to discharge she had improved mobility and discharged home with family and home health. Unclear if patient stopped these medications as instructed because her medications come prepackaged.      At the time of md's exam patient is AAO x 3. Reports she was ambulating with her walker when both legs became very weak and she was unable to walk any further. Denies falling but  reports she's had multiple falls in the past. Denies any fever, chest pain, SOB, vomiting or diarrhea. Chest x-ray shows enlarged cardiac silhouette without edema. CT - lumbar spine shows nondisplaced fracture at S4 of indeterminate age. CT T-spine negative for acute findings. .  CT head negative     VS on arrival: T 99.2, P 73, R 19, B/P 108/56, Sats 96% on room air. Initial labs WBC 16.8, Hgb 10.8, Hct 35, K 3.4, BUN 32.6, creatinine 1.91, Troponin 0.191 (repeat 0.199). EKG shows SR with ST-T changes in anterior and inferolateral leads.      Started on antibiotics for UTI, found to have NSTEMI.  Upgraded to the ICU for vasopressor support.  Found to have bacteremia.  Continues on antibiotics.  Cardiology planning for catheterization once renal function optimized.  downgraded to the floors.      Antibiotics were continued cardiology &closely following for renal recovery before attempting angiogram. Renal function continued to improve and she underwent angiogram that showed no obstruction.  Medical management was recommended.  SLP  was consulted due to concern for dysphagia and they recommended modified diet.  PT recommended placement.      Interval Hx:     Hemodynamically stable.  Alert, comfortably resting.  No new complaints or concerns.  Bilateral upper extremity Doppler obtained due to swelling showed no signs of DVT.  IV fluids were discontinued yesterday.  She has no new complaints of shortness a breath or chest pain this morning.  Tolerating modified diet.  Family members were at bedside.      Sodium stable, MICHEL improving.        Objective/physical exam:  Vitals:    03/05/24 2100 03/05/24 2323 03/06/24 0419 03/06/24 0725   BP:  103/70 131/82 107/70   BP Location:       Patient Position:       Pulse:  60 60 60   Resp: 18 20 20 18   Temp:  97.4 °F (36.3 °C) 97.3 °F (36.3 °C) 97.8 °F (36.6 °C)   TempSrc:  Oral Oral Oral   SpO2:  100% 100% 100%   Weight:       Height:         General: In no acute distress, afebrile  Respiratory: Clear to auscultation bilaterally  Cardiovascular: S1, S2, no appreciable murmur  Abdomen: Soft, nontender, BS +  MSK: Warm, no lower extremity edema, no clubbing or cyanosis  Neurologic: Alert and oriented x4, moving all extremities with good strength     Lab Results   Component Value Date     (L) 03/06/2024    K 4.3 03/06/2024    CO2 23 03/06/2024    BUN 31.4 (H) 03/06/2024    CREATININE 1.17 (H) 03/06/2024    CALCIUM 8.2 (L) 03/06/2024    EGFRNONAA 41 09/06/2019      Lab Results   Component Value Date    ALT 59 (H) 03/05/2024    AST 51 (H) 03/05/2024    ALKPHOS 83 03/05/2024    BILITOT 0.3 03/05/2024      Lab Results   Component Value Date    WBC 9.77 03/05/2024    HGB 11.1 (L) 03/05/2024    HCT 34.6 (L) 03/05/2024    MCV 92.8 03/05/2024     03/05/2024           Medications:   apixaban  5 mg Oral BID    aspirin  81 mg Oral Daily    atorvastatin  10 mg Oral Nightly    brimonidine 0.15 % OPTH DROP  1 drop Both Eyes BID    And    timolol maleate 0.5%  1 drop Both Eyes BID    carvediloL  3.125 mg Oral  BID    cefTRIAXone (Rocephin) IV (PEDS and ADULTS)  2 g Intravenous Q24H    latanoprost  1 drop Both Eyes QHS    levothyroxine  25 mcg Oral Before breakfast    risperiDONE  2 mg Oral QHS    rivastigmine tartrate  1.5 mg Oral BID    sodium bicarbonate  1,300 mg Oral BID    sodium chloride 0.9%  1,000 mL Intravenous Once      acetaminophen, acetaminophen, albuterol, aluminum-magnesium hydroxide-simethicone, bisacodyL, hydrALAZINE, HYDROcodone-acetaminophen, hydrOXYzine pamoate, melatonin, metoprolol, morphine, naloxone, ondansetron, prochlorperazine, senna-docusate 8.6-50 mg, sodium chloride 0.9%, sodium chloride 0.9%     Assessment/Plan:    E coli bacteremia  Septic shock due to above-resolved  NSTEMI likely type 2   MICHEL on CKD-improving  Age-indeterminate nondisplaced S4 vertebral fracture  Paroxysmal atrial fibrillation    HX:  SSS s/p pacemaker 09/2019, chronic HFpEF, HTN, FERNANDEZ, hypothyroidism, YARI, GERD, glaucoma, diverticular disease    Plan:  -angiogram findings reviewed.  Continue aspirin, statin, Coreg.  Eliquis for stroke prophylaxis.  electrolyte monitoring   -MICHEL improving.  Appreciate Nephrology assistance.  Encourage p.o. intake  -continue ceftriaxone for total 14 days.  Okay to switch to p.o. cefdinir at discharge to complete 14 days total  -SLP following.  PT OT as tolerated.  She needs SNF placement      Funmilayo Carrillo MD

## 2024-03-06 NOTE — PT/OT/SLP PROGRESS
Physical Therapy Treatment    Patient Name:  Ev Alberts   MRN:  [id number]    Recommendations:     Discharge therapy intensity: Low Intensity Therapy (per pt and family request- has / caregiver/family help; daughter reports trying to get weekend coverage as well)  Discharge Equipment Recommendations: none  Barriers to discharge: Impaired mobility    Assessment:     Ev Alberts is a 89 y.o. female admitted with a medical diagnosis of UTI, sepsis, S4 vertebral body fracture (chronic), physical deconditioning. Pt with medical decline after being initially admitted to hospital medicine requiring upgrade to ICU d/y hypotension requiring pressors.  She presents with the following impairments/functional limitations: weakness, gait instability, impaired endurance, impaired balance, impaired self care skills, impaired functional mobility, decreased ROM, decreased lower extremity function, pain.    Rehab Prognosis: Good; patient would benefit from acute skilled PT services to address these deficits and reach maximum level of function.    Recent Surgery: Procedure(s) (LRB):  Left heart cath (Left) 2 Days Post-Op    Plan:     During this hospitalization, patient to be seen 3 x/week to address the identified rehab impairments via gait training, therapeutic activities, therapeutic exercises, wheelchair management/training and progress toward the following goals:    Plan of Care Expires:  24    Subjective     Chief Complaint:   Patient/Family Comments/goals:   Pain/Comfort:         Objective:     Communicated with nursing prior to session.  Patient found sitting edge of bed with OT with telemetry, pulse ox (continuous), PureWick upon PT entry to room.     General Precautions: Standard, aspiration  Orthopedic Precautions: N/A  Braces: N/A  Respiratory Status: Nasal cannula, flow 2 L/min  Blood Pressure:   Seated after amb ~3': 144/73  Seated w/legs elevated: 138/78  Seated w/legs down: 130/73  Standin/102 (nurse  notified)  Seated @ end of tx: 124/74    Functional Mobility:  Transfers:     Sit to Stand:  moderate assistance and of 2 persons with rolling walker  Bed to Chair: minimum assistance with  rolling walker  using  Step Transfer  Gait: ~3' w/RW, CGA  VC for posture & downward gaze & decrease foot clearance  Noted slow sally     Pt. T/f from bed to chair. Pt. Brought out into patel to amb. Once amb pt. C/o feeling like she's going to pass out. BP was assessed as above.       Patient left up in chair with all lines intact, call button in reach, and nurse notified.    GOALS:   Multidisciplinary Problems       Physical Therapy Goals          Problem: Physical Therapy    Goal Priority Disciplines Outcome Goal Variances Interventions   Physical Therapy Goal     PT, PT/OT Ongoing, Progressing     Description: Goals to be met by: 3/28/24     Patient will increase functional independence with mobility by performin. Supine to sit with MInimal Assistance  2. Sit to supine with MInimal Assistance  2. Sit to stand transfer with Minimal Assistance  4. Bed to chair transfer with Minimal Assistance using Rolling Walker  5. Gait  x 50 feet with Minimal Assistance using Rolling Walker.   6. Wheelchair propulsion x150 feet with Minimal Assistance using bilateral uppper extremities                         Time Tracking:     PT Received On: 24  PT Start Time: 0910     PT Stop Time: 0950  PT Total Time (min): 40 min     Billable Minutes: Gait Training 15 and Therapeutic Activity 25    Treatment Type: Treatment  PT/PTA: PTA     Number of PTA visits since last PT visit: 2     2024

## 2024-03-06 NOTE — PT/OT/SLP DISCHARGE
[facility]  Speech Language Pathology Department  Diet Tolerance Follow-up    Patient Name:  Ev Alberts   MRN:  [id number]  Admitting Diagnosis: generalized weakness     Recommendations     General recommendations:  SLP intervention not indicated  Diet texture/consistency recommendations:  Soft & Bite-sized solids (IDDSI 6) and thin liquids (IDDSI 0)  Medications: per patient preference  Swallow strategies/precautions: small bites/sips, slow rate, upright for PO intake, and assist with feeding as needed  Precautions: Standard, aspiration    Diet Tolerance     Nursing reports no difficulty regarding diet tolerance.    Plan     Skilled SLP services no longer warranted, please reconsult as needed.      03/06/2024

## 2024-03-06 NOTE — PT/OT/SLP PROGRESS
Occupational Therapy   Treatment    Name: Ev Alberts  MRN: [id number]  Admitting Diagnosis:  Debility  2 Days Post-Op    Recommendations:     Recommended therapy intensity at discharge: Moderate Intensity Therapy   Discharge Equipment Recommendations:  none  Barriers to discharge:       Assessment:     Ev Alberts is a 89 y.o. female with a medical diagnosis of generalized weakness, nstemi, urosepsis requiring vasopressor support, R knee YARI, hx glaucoma.  Performance deficits affecting function are weakness, impaired endurance, impaired self care skills, impaired functional mobility, gait instability, impaired balance, visual deficits, decreased upper extremity function, decreased lower extremity function, pain, decreased ROM.     Rehab Prognosis:  Fair; patient would benefit from acute skilled OT services to address these deficits and reach maximum level of function.       Plan:     Patient to be seen 3 x/week to address the above listed problems via self-care/home management, therapeutic exercises  Plan of Care Expires: 03/28/24  Plan of Care Reviewed with: patient, family    Subjective     Pain/Comfort:       Objective:     Communicated with: RN prior to session.  Patient found HOB elevated with   upon OT entry to room.    General Precautions: Standard, fall    Orthopedic Precautions:N/A  Braces: N/A    Occupational Performance:     Bed Mobility:    Patient completed Supine to Sit with minimum assistance     Functional Mobility/Transfers:  Patient completed Sit <> Stand Transfer with minimum assistance  with  rolling walker   Patient completed Bed <> Chair Transfer using Step Transfer technique with minimum assistance with rolling walker  Functional Mobility: no LOB    Activities of Daily Living:  Lower Body Dressing: maximal assistance patient attempting to teresa sock however unsuccessful in forward flexion and unable to achieve figure 4  Toileting: total assistance pericare 2/2 incontinent BM    Patient  Education:  Patient provided with verbal education education regarding OT role/goals/POC.  Understanding was verbalized.      Patient left up in chair with all lines intact and call button in reach    GOALS:   Multidisciplinary Problems       Occupational Therapy Goals          Problem: Occupational Therapy    Goal Priority Disciplines Outcome Interventions   Occupational Therapy Goal     OT, PT/OT Ongoing, Progressing    Description: Goals to be met by: 3/28/24     Patient will increase functional independence with ADLs by performing:    UE Dressing with Moderate Assistance.  Grooming while seated with Stand-by Assistance.  Sitting at edge of bed x20 minutes with Stand-by Assistance.  Toilet transfer to toilet with Minimal Assistance.  Increased functional strength to 3+/5 through therEx for participation in ADLs.                         Time Tracking:     OT Date of Treatment: 03/06/24  OT Start Time: 0903  OT Stop Time: 0926  OT Total Time (min): 23 min    Billable Minutes:Self Care/Home Management 2    OT/SARANYA: SARANYA     Number of SARANYA visits since last OT visit: 2    3/6/2024

## 2024-03-06 NOTE — PLAN OF CARE
Order received for SNF placement. Patient/ family in agreement.  Patient list provided.  1. [facility], 2. [facility].  Patient choice signed.

## 2024-03-07 PROBLEM — E43 SEVERE MALNUTRITION: Status: ACTIVE | Noted: 2024-03-07

## 2024-03-07 LAB
ANION GAP SERPL CALC-SCNC: 5 MEQ/L
ANION GAP SERPL CALC-SCNC: 6 MEQ/L
BASOPHILS # BLD AUTO: 0.06 X10(3)/MCL
BASOPHILS NFR BLD AUTO: 0.7 %
BUN SERPL-MCNC: 25 MG/DL (ref 9.8–20.1)
BUN SERPL-MCNC: 25.1 MG/DL (ref 9.8–20.1)
CALCIUM SERPL-MCNC: 8.3 MG/DL (ref 8.4–10.2)
CALCIUM SERPL-MCNC: 8.5 MG/DL (ref 8.4–10.2)
CHLORIDE SERPL-SCNC: 102 MMOL/L (ref 98–107)
CHLORIDE SERPL-SCNC: 102 MMOL/L (ref 98–107)
CO2 SERPL-SCNC: 27 MMOL/L (ref 23–31)
CO2 SERPL-SCNC: 29 MMOL/L (ref 23–31)
CREAT SERPL-MCNC: 1.1 MG/DL (ref 0.55–1.02)
CREAT SERPL-MCNC: 1.12 MG/DL (ref 0.55–1.02)
CREAT/UREA NIT SERPL: 22
CREAT/UREA NIT SERPL: 23
EOSINOPHIL # BLD AUTO: 0.07 X10(3)/MCL (ref 0–0.9)
EOSINOPHIL NFR BLD AUTO: 0.8 %
ERYTHROCYTE [DISTWIDTH] IN BLOOD BY AUTOMATED COUNT: 18.3 % (ref 11.5–17)
GFR SERPLBLD CREATININE-BSD FMLA CKD-EPI: 48 MLS/MIN/1.73/M2
GFR SERPLBLD CREATININE-BSD FMLA CKD-EPI: 49 MLS/MIN/1.73/M2
GLUCOSE SERPL-MCNC: 117 MG/DL (ref 82–115)
GLUCOSE SERPL-MCNC: 91 MG/DL (ref 82–115)
HCT VFR BLD AUTO: 30 % (ref 37–47)
HGB BLD-MCNC: 9.4 G/DL (ref 12–16)
IMM GRANULOCYTES # BLD AUTO: 0.05 X10(3)/MCL (ref 0–0.04)
IMM GRANULOCYTES NFR BLD AUTO: 0.6 %
LYMPHOCYTES # BLD AUTO: 1.4 X10(3)/MCL (ref 0.6–4.6)
LYMPHOCYTES NFR BLD AUTO: 16.5 %
MAGNESIUM SERPL-MCNC: 1.9 MG/DL (ref 1.6–2.6)
MCH RBC QN AUTO: 30 PG (ref 27–31)
MCHC RBC AUTO-ENTMCNC: 31.3 G/DL (ref 33–36)
MCV RBC AUTO: 95.8 FL (ref 80–94)
MONOCYTES # BLD AUTO: 0.6 X10(3)/MCL (ref 0.1–1.3)
MONOCYTES NFR BLD AUTO: 7.1 %
NEUTROPHILS # BLD AUTO: 6.28 X10(3)/MCL (ref 2.1–9.2)
NEUTROPHILS NFR BLD AUTO: 74.3 %
NRBC BLD AUTO-RTO: 0.2 %
PLATELET # BLD AUTO: 234 X10(3)/MCL (ref 130–400)
PMV BLD AUTO: 10.4 FL (ref 7.4–10.4)
POTASSIUM SERPL-SCNC: 4.1 MMOL/L (ref 3.5–5.1)
POTASSIUM SERPL-SCNC: 4.2 MMOL/L (ref 3.5–5.1)
RBC # BLD AUTO: 3.13 X10(6)/MCL (ref 4.2–5.4)
SODIUM SERPL-SCNC: 135 MMOL/L (ref 136–145)
SODIUM SERPL-SCNC: 136 MMOL/L (ref 136–145)
WBC # SPEC AUTO: 8.46 X10(3)/MCL (ref 4.5–11.5)

## 2024-03-07 PROCEDURE — 25000003 PHARM REV CODE 250: Performed by: INTERNAL MEDICINE

## 2024-03-07 PROCEDURE — 80048 BASIC METABOLIC PNL TOTAL CA: CPT | Performed by: INTERNAL MEDICINE

## 2024-03-07 PROCEDURE — 83735 ASSAY OF MAGNESIUM: CPT | Performed by: INTERNAL MEDICINE

## 2024-03-07 PROCEDURE — 25000003 PHARM REV CODE 250: Performed by: HOSPITALIST

## 2024-03-07 PROCEDURE — 25000003 PHARM REV CODE 250: Performed by: NURSE PRACTITIONER

## 2024-03-07 PROCEDURE — 27000221 HC OXYGEN, UP TO 24 HOURS

## 2024-03-07 PROCEDURE — 63600175 PHARM REV CODE 636 W HCPCS: Performed by: INTERNAL MEDICINE

## 2024-03-07 PROCEDURE — 85025 COMPLETE CBC W/AUTO DIFF WBC: CPT | Performed by: INTERNAL MEDICINE

## 2024-03-07 PROCEDURE — 97116 GAIT TRAINING THERAPY: CPT | Mod: CQ

## 2024-03-07 PROCEDURE — 97535 SELF CARE MNGMENT TRAINING: CPT | Mod: CO

## 2024-03-07 PROCEDURE — 97530 THERAPEUTIC ACTIVITIES: CPT | Mod: CQ

## 2024-03-07 PROCEDURE — 21400001 HC TELEMETRY ROOM

## 2024-03-07 RX ADMIN — SODIUM BICARBONATE 650 MG TABLET 1300 MG: at 08:03

## 2024-03-07 RX ADMIN — Medication 81 MG: at 08:03

## 2024-03-07 RX ADMIN — APIXABAN 5 MG: 5 TABLET, FILM COATED ORAL at 08:03

## 2024-03-07 RX ADMIN — LATANOPROST 1 DROP: 50 SOLUTION OPHTHALMIC at 08:03

## 2024-03-07 RX ADMIN — Medication 6 MG: at 08:03

## 2024-03-07 RX ADMIN — TIMOLOL MALEATE 1 DROP: 5 SOLUTION OPHTHALMIC at 08:03

## 2024-03-07 RX ADMIN — HYDROXYZINE PAMOATE 25 MG: 25 CAPSULE ORAL at 08:03

## 2024-03-07 RX ADMIN — BRIMONIDINE TARTRATE 1 DROP: 1.5 SOLUTION OPHTHALMIC at 08:03

## 2024-03-07 RX ADMIN — BRIMONIDINE TARTRATE 1 DROP: 1.5 SOLUTION OPHTHALMIC at 09:03

## 2024-03-07 RX ADMIN — LEVOTHYROXINE SODIUM 25 MCG: 25 TABLET ORAL at 07:03

## 2024-03-07 RX ADMIN — CEFTRIAXONE SODIUM 2 G: 2 INJECTION, POWDER, FOR SOLUTION INTRAMUSCULAR; INTRAVENOUS at 01:03

## 2024-03-07 RX ADMIN — CARVEDILOL 3.12 MG: 3.12 TABLET, FILM COATED ORAL at 08:03

## 2024-03-07 RX ADMIN — RIVASTIGMINE TARTRATE 1.5 MG: 1.5 CAPSULE ORAL at 08:03

## 2024-03-07 RX ADMIN — ATORVASTATIN CALCIUM 10 MG: 10 TABLET, FILM COATED ORAL at 08:03

## 2024-03-07 RX ADMIN — RISPERIDONE 2 MG: 1 TABLET ORAL at 08:03

## 2024-03-07 NOTE — ASSESSMENT & PLAN NOTE
Patient meets ASPEN criteria for severe malnutrition of acute illness or injury per RD assessment as evidenced by:  Energy Intake (Malnutrition): less than or equal to 50% for greater than or equal to 5 days  Weight Loss (Malnutrition): greater than 7.5% in 3 months (time frame estimated)  Subcutaneous Fat (Malnutrition): moderate depletion  Muscle Mass (Malnutrition): mild depletion  Fluid Accumulation (Malnutrition): mild        A minimum of two characteristics is recommended for diagnosis of either severe or non-severe malnutrition.

## 2024-03-07 NOTE — PT/OT/SLP PROGRESS
Occupational Therapy   Treatment    Name: Ev Alberts  MRN: [id number]  Admitting Diagnosis:  Debility  3 Days Post-Op    Recommendations:     Recommended therapy intensity at discharge: Moderate Intensity Therapy   Discharge Equipment Recommendations:  none  Barriers to discharge:       Assessment:     Ev Alberts is a 89 y.o. female with a medical diagnosis of Debility.   Performance deficits affecting function are weakness, impaired endurance, impaired self care skills, impaired functional mobility, gait instability, impaired balance, visual deficits, decreased upper extremity function, decreased lower extremity function, pain, decreased ROM.     Rehab Prognosis:  Fair; patient would benefit from acute skilled OT services to address these deficits and reach maximum level of function.       Plan:     Patient to be seen 3 x/week to address the above listed problems via self-care/home management, therapeutic exercises  Plan of Care Expires: 03/28/24  Plan of Care Reviewed with: patient, family    Subjective     Pain/Comfort:       Objective:     Communicated with: RN prior to session.  Patient found HOB elevated with   upon OT entry to room.    General Precautions: Standard, fall    Orthopedic Precautions:N/A  Braces: N/A     Occupational Performance:     Bed Mobility:    Patient completed Supine to Sit with minimum assistance     Functional Mobility/Transfers:  Patient completed Sit <> Stand Transfer with minimum assistance  with  rolling walker   Functional Mobility: patient mobilizing from EOB to in room sink for grooming task requiring 2 sitting rest breaks    Activities of Daily Living:  Grooming: stand by assistance standing at sink washing face  Lower Body Dressing: moderate assistance donning brief like underwear requiring assistance to get over heels. Patient requiring max motivation to perform task    Patient Education:  Patient provided with verbal education education regarding OT role/goals/POC.   Understanding was verbalized.      Patient left up in chair with all lines intact, call button in reach, and RN notified.    GOALS:   Multidisciplinary Problems       Occupational Therapy Goals          Problem: Occupational Therapy    Goal Priority Disciplines Outcome Interventions   Occupational Therapy Goal     OT, PT/OT Ongoing, Progressing    Description: Goals to be met by: 3/28/24     Patient will increase functional independence with ADLs by performing:    UE Dressing with Moderate Assistance.  Grooming while seated with Stand-by Assistance.  Sitting at edge of bed x20 minutes with Stand-by Assistance.  Toilet transfer to toilet with Minimal Assistance.  Increased functional strength to 3+/5 through therEx for participation in ADLs.                         Time Tracking:     OT Date of Treatment: 03/07/24  OT Start Time: 1012  OT Stop Time: 1055  OT Total Time (min): 43 min    Billable Minutes:Self Care/Home Management 3    OT/SARANYA: SARANYA     Number of SARANYA visits since last OT visit: 3    3/7/2024

## 2024-03-07 NOTE — PROGRESS NOTES
[facility]        Chief Complaint: Inpatient Follow-up for     HPI:   89 yr old female whose history includes HTN, hypothyroidism, CKD with solitary kidney, PAF. Presented to ED with c/o generalized weakness.      Just discharged on 2/21/24 following a 2 day admission during which time she was treated for generalized weakness and age related physical debility with polypharmacy. Trazodone dose decreased and lyrica, meclizine and requip discontinued. Prior to discharge she had improved mobility and discharged home with family and home health. Unclear if patient stopped these medications as instructed because her medications come prepackaged.      At the time of md's exam patient is AAO x 3. Reports she was ambulating with her walker when both legs became very weak and she was unable to walk any further. Denies falling but  reports she's had multiple falls in the past. Denies any fever, chest pain, SOB, vomiting or diarrhea. Chest x-ray shows enlarged cardiac silhouette without edema. CT - lumbar spine shows nondisplaced fracture at S4 of indeterminate age. CT T-spine negative for acute findings. .  CT head negative     VS on arrival: T 99.2, P 73, R 19, B/P 108/56, Sats 96% on room air. Initial labs WBC 16.8, Hgb 10.8, Hct 35, K 3.4, BUN 32.6, creatinine 1.91, Troponin 0.191 (repeat 0.199). EKG shows SR with ST-T changes in anterior and inferolateral leads.      Started on antibiotics for UTI, found to have NSTEMI.  Upgraded to the ICU for vasopressor support.  Found to have bacteremia.  Continues on antibiotics.  Cardiology planning for catheterization once renal function optimized.  downgraded to the floors.      Antibiotics were continued cardiology &closely following for renal recovery before attempting angiogram. Renal function continued to improve and she underwent angiogram that showed no obstruction.  Medical management was recommended.  SLP  was consulted due to concern for dysphagia and they recommended modified diet.  PT recommended placement.      Interval Hx:   Patient seen and examined this morning was about to work with physical therapy on 2 L of nasal cannula daughter just walked in no other issues reported      Objective/physical exam:  Vitals:    03/06/24 1917 03/06/24 2349 03/07/24 0400 03/07/24 0735   BP: 118/67 139/79 127/87 120/76   Pulse: 60 61 65 60   Resp: 18 20 20    Temp: 98 °F (36.7 °C) 98.4 °F (36.9 °C) 98.2 °F (36.8 °C) 98 °F (36.7 °C)   TempSrc: Oral Oral Oral Oral   SpO2: 100% 98% 97% 100%   Weight:       Height:         General: In no acute distress, afebrile  Respiratory: Clear to auscultation bilaterally  Cardiovascular: S1, S2, no appreciable murmur  Abdomen: Soft, nontender, BS +  MSK: Warm, no lower extremity edema, no clubbing or cyanosis  Neurologic: Alert and oriented x4, moving all extremities with good strength     Lab Results   Component Value Date     (L) 03/07/2024    K 4.1 03/07/2024    CO2 27 03/07/2024    BUN 25.1 (H) 03/07/2024    CREATININE 1.10 (H) 03/07/2024    CALCIUM 8.5 03/07/2024    EGFRNONAA 41 09/06/2019      Lab Results   Component Value Date    ALT 59 (H) 03/05/2024    AST 51 (H) 03/05/2024    ALKPHOS 83 03/05/2024    BILITOT 0.3 03/05/2024      Lab Results   Component Value Date    WBC 9.77 03/05/2024    HGB 11.1 (L) 03/05/2024    HCT 34.6 (L) 03/05/2024    MCV 92.8 03/05/2024     03/05/2024           Medications:   apixaban  5 mg Oral BID    aspirin  81 mg Oral Daily    atorvastatin  10 mg Oral Nightly    brimonidine 0.15 % OPTH DROP  1 drop Both Eyes BID    And    timolol maleate 0.5%  1 drop Both Eyes BID    carvediloL  3.125 mg Oral BID    cefTRIAXone (Rocephin) IV (PEDS and ADULTS)  2 g Intravenous Q24H    latanoprost  1 drop Both Eyes QHS    levothyroxine  25 mcg Oral Before breakfast    risperiDONE  2 mg Oral QHS    rivastigmine tartrate  1.5 mg Oral BID    sodium bicarbonate   1,300 mg Oral BID    sodium chloride 0.9%  1,000 mL Intravenous Once      acetaminophen, acetaminophen, albuterol, aluminum-magnesium hydroxide-simethicone, bisacodyL, hydrALAZINE, HYDROcodone-acetaminophen, hydrOXYzine pamoate, melatonin, metoprolol, morphine, naloxone, ondansetron, prochlorperazine, senna-docusate 8.6-50 mg, sodium chloride 0.9%, sodium chloride 0.9%     Assessment/Plan:    E coli bacteremia most likely secondary to UTI   UTI with urine culture growing E coli  Septic shock due to above-resolved  NSTEMI likely type 2   MICHEL on CKD-improving  Age-indeterminate nondisplaced S4 vertebral fracture  Paroxysmal atrial fibrillation    HX:  SSS s/p pacemaker 09/2019, chronic HFpEF, HTN, FERNANDEZ, hypothyroidism, YARI, GERD, glaucoma, diverticular disease    Plan:  Continue with Rocephin for now for total of 14 days of antibiotics  Creatinine is back to normal, Nephrology's has signed off  Continue with PT and OT  Creatinine of 1.12   Patient is on 2 L of nasal cannula will get chest x-ray for now  Case management is working on skilled nursing facility placement  Continue with aspirin, Eliquis, Coreg, Synthroid, risperidone, rivastigmine, sodium bicarb and atorvastatin    Prophylaxis: Charlie webster MD

## 2024-03-07 NOTE — PROGRESS NOTES
Inpatient Nutrition Assessment    Admit Date: 2/25/2024   Total duration of encounter: 11 days   Patient Age: 89 y.o.    Nutrition Recommendation/Prescription     Continue current diet (Diet Soft & Bite Sized (IDDSI Level 6) Supervision with Meals, Cardiac) as tolerated.   Boost Very High Calorie (provides 530 kcal, 22 g protein per serving) BID.  Encouraged intake.    Communication of Recommendations: reviewed with patient and reviewed with family    Nutrition Assessment     Malnutrition Assessment/Nutrition-Focused Physical Exam    Malnutrition Context: acute illness or injury (03/07/24 1336)  Malnutrition Level: severe (03/07/24 1336)  Energy Intake (Malnutrition): less than or equal to 50% for greater than or equal to 5 days (03/07/24 1336)  Weight Loss (Malnutrition): greater than 7.5% in 3 months (time frame estimated) (03/07/24 1336)  Subcutaneous Fat (Malnutrition): moderate depletion (03/07/24 1336)  Orbital Region (Subcutaneous Fat Loss): moderate depletion        Muscle Mass (Malnutrition): mild depletion (03/07/24 1336)  [place] Region (Muscle Loss): mild depletion                       Fluid Accumulation (Malnutrition): mild (03/07/24 1336)        A minimum of two characteristics is recommended for diagnosis of either severe or non-severe malnutrition.    Chart Review    Reason Seen: follow-up    Malnutrition Screening Tool Results   Have you recently lost weight without trying?: No  Have you been eating poorly because of a decreased appetite?: No   MST Score: 0   Diagnosis:  E coli bacteremia  Septic shock due to above-resolved  NSTEMI likely type 2 type 1-awaiting angiogram   MICHEL on CKD-improving  Age-indeterminate nondisplaced S4 vertebral fracture  Paroxysmal atrial fibrillation     HX: SSS s/p pacemaker 09/2019, chronic HFpEF, HTN, FERNANDEZ, hypothyroidism, YARI, GERD, glaucoma, diverticular disease    Scheduled Medications:  apixaban, 5 mg, BID  aspirin, 81 mg, Daily  atorvastatin, 10 mg,  Nightly  brimonidine 0.15 % OPTH DROP, 1 drop, BID   And  timolol maleate 0.5%, 1 drop, BID  carvediloL, 3.125 mg, BID  cefTRIAXone (Rocephin) IV (PEDS and ADULTS), 2 g, Q24H  latanoprost, 1 drop, QHS  levothyroxine, 25 mcg, Before breakfast  risperiDONE, 2 mg, QHS  rivastigmine tartrate, 1.5 mg, BID  sodium bicarbonate, 1,300 mg, BID  sodium chloride 0.9%, 1,000 mL, Once    Continuous Infusions: none       PRN Medications: acetaminophen, acetaminophen, albuterol, aluminum-magnesium hydroxide-simethicone, bisacodyL, hydrALAZINE, HYDROcodone-acetaminophen, hydrOXYzine pamoate, melatonin, metoprolol, morphine, naloxone, ondansetron, prochlorperazine, senna-docusate 8.6-50 mg, sodium chloride 0.9%, sodium chloride 0.9%    Calorie Containing IV Medications: no significant kcals from medications at this time    Recent Labs   Lab 03/01/24  0407 03/02/24  0402 03/03/24  0347 03/04/24  0450 03/05/24  0413 03/06/24  0446 03/07/24  0400 03/07/24  0958   * 131* 129* 133* 133* 133* 135* 136   K 4.4 4.7 4.4 4.5 4.2 4.3 4.1 4.2   CALCIUM 8.9 8.7 8.7 8.7 8.4 8.2* 8.5 8.3*   MG 2.20 2.00 1.90 2.00 1.90 1.90 1.90  --    CHLORIDE 106 103 103 102 103 104 102 102   CO2 23 20* 18* 23 22* 23 27 29   BUN 31.2* 36.5* 38.3* 39.4* 37.6* 31.4* 25.1* 25.0*   CREATININE 1.82* 1.69* 1.63* 1.51* 1.40* 1.17* 1.10* 1.12*   EGFRNORACEVR 27 29 30 33 36 45 49 48   GLUCOSE 111 119* 89 94 88 90 91 117*   BILITOT 0.4 0.3 0.3 0.3 0.3  --   --   --    ALKPHOS 94 93 87 79 83  --   --   --    ALT 43 83* 63* 62* 59*  --   --   --    AST 54* 91* 58* 50* 51*  --   --   --    ALBUMIN 2.4* 2.3* 2.1* 2.2* 2.3*  --   --   --    WBC 10.11 8.84 7.17 8.98 9.77  --   --  8.46   HGB 10.3* 10.7* 10.1* 10.8* 11.1*  --   --  9.4*   HCT 31.1* 32.3* 29.0* 32.2* 34.6*  --   --  30.0*     Nutrition Orders:  Diet Soft & Bite Sized (IDDSI Level 6) Supervision with Meals, Cardiac    Appetite/Oral Intake: poor/0-25% of meals  Factors Affecting Nutritional Intake: decreased  "appetite  Food/Mormonism/Cultural Preferences: none reported  Food Allergies: none reported  Last Bowel Movement: 24  Wound(s): no pressure injuries documented at this time     Comments    3/1/24 Patient reports good appetite currently and prior to admission; denies weight loss, questionable accuracy of bed weights, monitor.     3/7/24 Today, family member at bedside reports poor appetite/intake currently and prior to admission as well as weight loss. Patient agreeable to strawberry oral supplement.    Anthropometrics    Height: 5' 5" (165.1 cm), Height Method: Stated  Last Weight: 61.2 kg (135 lb) (24 0457), Weight Method: Bed Scale  BMI (Calculated): 22.5  BMI Classification: normal (BMI 18.5-24.9)     Ideal Body Weight (IBW), Female: 125 lb     % Ideal Body Weight, Female (lb): 117.6 %                    Usual Body Weight (UBW), k.85 kg, unsure time frame  % Usual Body Weight: 100.21  % Weight Change From Usual Weight: 0 %  Usual Weight Provided By: family/caregiver and EMR weight history    Wt Readings from Last 5 Encounters:   24 61.2 kg (135 lb)   24 69.9 kg (154 lb)   22 72.6 kg (160 lb)   10/29/21 78 kg (171 lb 15.3 oz)     Weight Change(s) Since Admission:   (3/7) family member at bedside today reports weight loss prior to admission, new bed weight of 61.2 kg documented, now wondering if prior weights were inaccurate  (3/1) question accuracy of bed weights, bed weight taken during rounds of 134 lb, on specialty bed, patient denies weight loss and believes this weight to be inaccurate, reports usually weighs around 154 lb, h/o HF noted, possibly fluid-related (?), will continue to monitor   Wt Readings from Last 1 Encounters:   24 0457 61.2 kg (135 lb)   24 0602 66.7 kg (147 lb)   24 1405 69.9 kg (154 lb)   24 0500 64.9 kg (143 lb)   24 1307 69.9 kg (154 lb)   Admit Weight: 69.9 kg (154 lb) (24 1307), Weight Method: Bed Scale    Estimated " Needs    Weight Used For Calorie Calculations: 61.2 kg (134 lb 14.7 oz)  Energy Calorie Requirements (kcal): 4227-3787, 1.2-1.4 stress factor  Energy Need Method: Wilson-St Jeor  Weight Used For Protein Calculations: 61.2 kg (134 lb 14.7 oz)  Protein Requirements: 74-86 g, 1.2-1.4 g/kg  Fluid Requirements (mL): 1530, 25 ml/kg    Enteral Nutrition Patient not receiving enteral nutrition at this time.    Parenteral Nutrition Patient not receiving parenteral nutrition support at this time.    Evaluation of Received Nutrient Intake    Calories: not meeting estimated needs  Protein: not meeting estimated needs    Patient Education Not applicable.    Nutrition Diagnosis     PES: Inadequate energy intake related to inability to consume sufficient nutrients as evidenced by less than 75% needs met. (new)    PES: Severe acute disease or injury related malnutrition related to suboptimal protein/energy intake as evidenced by less than or equal to 50% needs met for greater than or equal to 5 days, moderate fat depletion, mild muscle depletion, edema, and greater than 7.5% weight loss in 3 months. (new)    Nutrition Interventions     Intervention(s): general/healthful diet, commercial beverage, and collaboration with other providers    Goal: Meet greater than 80% of nutritional needs by follow-up. (new)  Goal: Consume % of oral supplements by follow-up. (new)    Nutrition Goals & Monitoring     Dietitian will monitor: food and beverage intake, energy intake, weight, electrolyte/renal panel, and glucose/endocrine profile    Nutrition Risk/Follow-Up: high (follow-up in 1-4 days)   Please consult if re-assessment needed sooner.

## 2024-03-07 NOTE — PROGRESS NOTES
Renal      Chief complaint:   none    Interval History:  No new issues reported    ROS:  all else Neg     apixaban  5 mg Oral BID    aspirin  81 mg Oral Daily    atorvastatin  10 mg Oral Nightly    brimonidine 0.15 % OPTH DROP  1 drop Both Eyes BID    And    timolol maleate 0.5%  1 drop Both Eyes BID    carvediloL  3.125 mg Oral BID    cefTRIAXone (Rocephin) IV (PEDS and ADULTS)  2 g Intravenous Q24H    latanoprost  1 drop Both Eyes QHS    levothyroxine  25 mcg Oral Before breakfast    risperiDONE  2 mg Oral QHS    rivastigmine tartrate  1.5 mg Oral BID    sodium bicarbonate  1,300 mg Oral BID    sodium chloride 0.9%  1,000 mL Intravenous Once       VITAL SIGNS: 24 HR MIN & MAX LAST    Temp  Min: 97.3 °F (36.3 °C)  Max: 98.1 °F (36.7 °C)  98.1 °F (36.7 °C)        BP  Min: 103/70  Max: 136/79  124/81     Pulse  Min: 58  Max: 62  (!) 58     Resp  Min: 18  Max: 20  18    SpO2  Min: 98 %  Max: 100 %  100 %      Wt Readings from Last 3 Encounters:   03/04/24 61.2 kg (135 lb)   02/17/24 69.9 kg (154 lb)   06/01/22 72.6 kg (160 lb)       Intake/Output Summary (Last 24 hours) at 3/6/2024 1907  Last data filed at 3/6/2024 1324  Gross per 24 hour   Intake 820 ml   Output 300 ml   Net 520 ml     A&O x 4  EOMI  (B) symmetrical  Unlabored  Soft, NT, ND  Edema tr BLE, 1+ BUE    Recent Labs     03/04/24  0450 03/05/24  0413 03/06/24  0446   * 133* 133*   K 4.5 4.2 4.3   CHLORIDE 102 103 104   CO2 23 22* 23   BUN 39.4* 37.6* 31.4*   CREATININE 1.51* 1.40* 1.17*   GLUCOSE 94 88 90   CALCIUM 8.7 8.4 8.2*   MG 2.00 1.90 1.90   ALBUMIN 2.2* 2.3*  --       Recent Labs     03/04/24  0450 03/05/24  0413   WBC 8.98 9.77   HGB 10.8* 11.1*   HCT 32.2* 34.6*    185       ASSESSMENT / PLAN    UTI / E coli sepsis, NSTEMI, MICHEL. 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 [facility] 3/4 was negative, back on Eliquis    Doing well  Will sign off  F/u with Dr. Chávez as previously scheduled  Please call PRGHASSAN Ramirez M.D.  Nephrology

## 2024-03-07 NOTE — PLAN OF CARE
03/07/24 1219   Discharge Reassessment   Assessment Type Discharge Planning Reassessment   Did the patient's condition or plan change since previous assessment? Yes   Communicated LUISITO with patient/caregiver Date not available/Unable to determine   Discharge Plan A Skilled Nursing Facility   Discharge Plan B Skilled Nursing Facility   DME Needed Upon Discharge  none   Transition of Care Barriers None   Why the patient remains in the hospital Requires continued medical care   Post-Acute Status   Post-Acute Authorization Placement   Post-Acute Placement Status Pending payor review/awaiting authorization (if required)

## 2024-03-07 NOTE — PT/OT/SLP PROGRESS
Physical Therapy Treatment    Patient Name:  Ev Alberts   MRN:  [id number]    Recommendations:     Discharge therapy intensity: Moderate Intensity Therapy  Discharge Equipment Recommendations: none  Barriers to discharge: Impaired mobility    Assessment:     Ev Alberts is a 89 y.o. female admitted with a medical diagnosis of UTI, sepsis, S4 vertebral body fracture (chronic), physical deconditioning. Pt with medical decline after being initially admitted to hospital medicine requiring upgrade to ICU d/y hypotension requiring pressors.  She presents with the following impairments/functional limitations: weakness, gait instability, impaired endurance, impaired balance, impaired self care skills, impaired functional mobility, decreased ROM, decreased lower extremity function, pain.    Rehab Prognosis: Good; patient would benefit from acute skilled PT services to address these deficits and reach maximum level of function.    Recent Surgery: Procedure(s) (LRB):  Left heart cath (Left) 3 Days Post-Op    Plan:     During this hospitalization, patient to be seen 3 x/week to address the identified rehab impairments via gait training, therapeutic activities, therapeutic exercises, wheelchair management/training and progress toward the following goals:    Plan of Care Expires:  03/28/24    Subjective     Chief Complaint:   Patient/Family Comments/goals:   Pain/Comfort:         Objective:     Communicated with nursing prior to session.  Patient found sitting edge of bed with OT with pulse ox (continuous), telemetry, PureWick upon PT entry to room.     General Precautions: Standard, aspiration  Orthopedic Precautions: N/A  Braces: N/A  Respiratory Status: Nasal cannula, flow 2 L/min  Blood Pressure: NT    Functional Mobility:  Transfers:     Sit to Stand:  Fransico w/RW  Gait: multiple small gait trials, (2'-4') w/RW, CGA  Max VC/TC for posture, encouragement and safety   Noted very slow sally    Pt. Amb from bed to sink in  room. Ambulation attempted in patel. Pt. Appears to be self limiting      Patient left up in chair with all lines intact, call button in reach, and nurse notified.    GOALS:   Multidisciplinary Problems       Physical Therapy Goals          Problem: Physical Therapy    Goal Priority Disciplines Outcome Goal Variances Interventions   Physical Therapy Goal     PT, PT/OT Ongoing, Progressing     Description: Goals to be met by: 3/28/24     Patient will increase functional independence with mobility by performin. Supine to sit with MInimal Assistance  2. Sit to supine with MInimal Assistance  2. Sit to stand transfer with Minimal Assistance  4. Bed to chair transfer with Minimal Assistance using Rolling Walker  5. Gait  x 50 feet with Minimal Assistance using Rolling Walker.   6. Wheelchair propulsion x150 feet with Minimal Assistance using bilateral uppper extremities                         Time Tracking:     PT Received On: 24  PT Start Time: 1012     PT Stop Time: 1057  PT Total Time (min): 45 min     Billable Minutes: Gait Training 20 and Therapeutic Activity 25    Treatment Type: Treatment  PT/PTA: PTA     Number of PTA visits since last PT visit: 3     2024

## 2024-03-07 NOTE — PLAN OF CARE
Called Shweta @ [facility] No Answer Left a Vm.    Called Dian @ [facility] she is out for the day and no ones else can handle the referrals is what I was told.

## 2024-03-08 LAB
ANION GAP SERPL CALC-SCNC: 11 MEQ/L
BUN SERPL-MCNC: 19 MG/DL (ref 9.8–20.1)
CALCIUM SERPL-MCNC: 8.5 MG/DL (ref 8.4–10.2)
CHLORIDE SERPL-SCNC: 101 MMOL/L (ref 98–107)
CO2 SERPL-SCNC: 21 MMOL/L (ref 23–31)
CREAT SERPL-MCNC: 1.11 MG/DL (ref 0.55–1.02)
CREAT/UREA NIT SERPL: 17
GFR SERPLBLD CREATININE-BSD FMLA CKD-EPI: 48 MLS/MIN/1.73/M2
GLUCOSE SERPL-MCNC: 141 MG/DL (ref 82–115)
MRSA PCR SCRN (OHS): NOT DETECTED
POTASSIUM SERPL-SCNC: 4.4 MMOL/L (ref 3.5–5.1)
SODIUM SERPL-SCNC: 133 MMOL/L (ref 136–145)

## 2024-03-08 PROCEDURE — 63600175 PHARM REV CODE 636 W HCPCS: Performed by: INTERNAL MEDICINE

## 2024-03-08 PROCEDURE — 21400001 HC TELEMETRY ROOM

## 2024-03-08 PROCEDURE — 25000003 PHARM REV CODE 250: Performed by: STUDENT IN AN ORGANIZED HEALTH CARE EDUCATION/TRAINING PROGRAM

## 2024-03-08 PROCEDURE — 25000003 PHARM REV CODE 250: Performed by: NURSE PRACTITIONER

## 2024-03-08 PROCEDURE — 25000003 PHARM REV CODE 250: Performed by: HOSPITALIST

## 2024-03-08 PROCEDURE — 94761 N-INVAS EAR/PLS OXIMETRY MLT: CPT

## 2024-03-08 PROCEDURE — 25000003 PHARM REV CODE 250: Performed by: INTERNAL MEDICINE

## 2024-03-08 PROCEDURE — 97116 GAIT TRAINING THERAPY: CPT | Mod: CQ

## 2024-03-08 PROCEDURE — 80048 BASIC METABOLIC PNL TOTAL CA: CPT | Performed by: INTERNAL MEDICINE

## 2024-03-08 PROCEDURE — 97535 SELF CARE MNGMENT TRAINING: CPT | Mod: CO

## 2024-03-08 PROCEDURE — 97530 THERAPEUTIC ACTIVITIES: CPT | Mod: CQ

## 2024-03-08 PROCEDURE — 87641 MR-STAPH DNA AMP PROBE: CPT | Performed by: INTERNAL MEDICINE

## 2024-03-08 RX ORDER — DOXYCYCLINE HYCLATE 100 MG
100 TABLET ORAL EVERY 12 HOURS
Status: DISCONTINUED | OUTPATIENT
Start: 2024-03-08 | End: 2024-03-11 | Stop reason: HOSPADM

## 2024-03-08 RX ADMIN — LEVOTHYROXINE SODIUM 25 MCG: 25 TABLET ORAL at 06:03

## 2024-03-08 RX ADMIN — RIVASTIGMINE TARTRATE 1.5 MG: 1.5 CAPSULE ORAL at 09:03

## 2024-03-08 RX ADMIN — SODIUM BICARBONATE 650 MG TABLET 1300 MG: at 09:03

## 2024-03-08 RX ADMIN — TIMOLOL MALEATE 1 DROP: 5 SOLUTION OPHTHALMIC at 09:03

## 2024-03-08 RX ADMIN — LATANOPROST 1 DROP: 50 SOLUTION OPHTHALMIC at 09:03

## 2024-03-08 RX ADMIN — ATORVASTATIN CALCIUM 10 MG: 10 TABLET, FILM COATED ORAL at 09:03

## 2024-03-08 RX ADMIN — APIXABAN 5 MG: 5 TABLET, FILM COATED ORAL at 09:03

## 2024-03-08 RX ADMIN — HYDROCODONE BITARTRATE AND ACETAMINOPHEN 1 TABLET: 5; 325 TABLET ORAL at 09:03

## 2024-03-08 RX ADMIN — CARVEDILOL 3.12 MG: 3.12 TABLET, FILM COATED ORAL at 09:03

## 2024-03-08 RX ADMIN — Medication 81 MG: at 09:03

## 2024-03-08 RX ADMIN — Medication 6 MG: at 09:03

## 2024-03-08 RX ADMIN — CEFTRIAXONE SODIUM 2 G: 2 INJECTION, POWDER, FOR SOLUTION INTRAMUSCULAR; INTRAVENOUS at 01:03

## 2024-03-08 RX ADMIN — BRIMONIDINE TARTRATE 1 DROP: 1.5 SOLUTION OPHTHALMIC at 09:03

## 2024-03-08 RX ADMIN — HYDROXYZINE PAMOATE 25 MG: 25 CAPSULE ORAL at 09:03

## 2024-03-08 RX ADMIN — RISPERIDONE 2 MG: 1 TABLET ORAL at 09:03

## 2024-03-08 RX ADMIN — DOXYCYCLINE HYCLATE 100 MG: 100 TABLET, COATED ORAL at 09:03

## 2024-03-08 NOTE — PT/OT/SLP PROGRESS
Physical Therapy Treatment    Patient Name:  vE Alberts   MRN:  [id number]    Recommendations:     Discharge therapy intensity: Moderate Intensity Therapy   Discharge Equipment Recommendations: none  Barriers to discharge: Impaired mobility    Assessment:     Ev Alberts is a 89 y.o. female admitted with a medical diagnosis of UTI, sepsis, S4 vertebral body fracture (chronic), physical deconditioning. Pt with medical decline after being initially admitted to hospital medicine requiring upgrade to ICU d/y hypotension requiring pressors.  She presents with the following impairments/functional limitations: weakness, gait instability, impaired endurance, impaired balance, impaired self care skills, impaired functional mobility, decreased ROM, decreased lower extremity function, pain.    Rehab Prognosis: Good; patient would benefit from acute skilled PT services to address these deficits and reach maximum level of function.    Recent Surgery: Procedure(s) (LRB):  Left heart cath (Left) 4 Days Post-Op    Plan:     During this hospitalization, patient to be seen 3 x/week to address the identified rehab impairments via gait training, therapeutic activities, therapeutic exercises, wheelchair management/training and progress toward the following goals:    Plan of Care Expires:  03/28/24    Subjective     Chief Complaint:   Patient/Family Comments/goals:   Pain/Comfort:         Objective:     Communicated with nursing prior to session.  Patient found supine with pulse ox (continuous), telemetry, PureWick upon PT entry to room.     General Precautions: Standard, aspiration  Orthopedic Precautions: N/A  Braces: N/A  Respiratory Status: Room air  Blood Pressure: NT  Skin Integrity:     Functional Mobility:  Bed Mobility:     Scooting: stand by assistance  Supine to Sit: stand by assistance  Transfers:     Sit to Stand:  minimum assistance with rolling walker  X2 trails  Gait: 14'/10' w/RW, Fransico  Slow sally, forward flexed  posture  Pt. Appears to be self limiting, pt. Attempted to impulsively sit during 2nd gait trial.     Patient left up in chair with all lines intact, call button in reach, bryanna pad in place, nurse notified, and family present    GOALS:   Multidisciplinary Problems       Physical Therapy Goals          Problem: Physical Therapy    Goal Priority Disciplines Outcome Goal Variances Interventions   Physical Therapy Goal     PT, PT/OT Ongoing, Progressing     Description: Goals to be met by: 3/28/24     Patient will increase functional independence with mobility by performin. Supine to sit with MInimal Assistance  2. Sit to supine with MInimal Assistance  2. Sit to stand transfer with Minimal Assistance  4. Bed to chair transfer with Minimal Assistance using Rolling Walker  5. Gait  x 50 feet with Minimal Assistance using Rolling Walker.   6. Wheelchair propulsion x150 feet with Minimal Assistance using bilateral uppper extremities                         Time Tracking:     PT Received On: 24  PT Start Time: 1340     PT Stop Time: 1404  PT Total Time (min): 24 min     Billable Minutes: Gait Training 14 and Therapeutic Activity 10    Treatment Type: Treatment  PT/PTA: PTA     Number of PTA visits since last PT visit: 4     2024

## 2024-03-08 NOTE — PROGRESS NOTES
[facility]        Chief Complaint: Inpatient Follow-up for     HPI:   89 yr old female whose history includes HTN, hypothyroidism, CKD with solitary kidney, PAF. Presented to ED with c/o generalized weakness.      Just discharged on 2/21/24 following a 2 day admission during which time she was treated for generalized weakness and age related physical debility with polypharmacy. Trazodone dose decreased and lyrica, meclizine and requip discontinued. Prior to discharge she had improved mobility and discharged home with family and home health. Unclear if patient stopped these medications as instructed because her medications come prepackaged.      At the time of md's exam patient is AAO x 3. Reports she was ambulating with her walker when both legs became very weak and she was unable to walk any further. Denies falling but  reports she's had multiple falls in the past. Denies any fever, chest pain, SOB, vomiting or diarrhea. Chest x-ray shows enlarged cardiac silhouette without edema. CT - lumbar spine shows nondisplaced fracture at S4 of indeterminate age. CT T-spine negative for acute findings. .  CT head negative     VS on arrival: T 99.2, P 73, R 19, B/P 108/56, Sats 96% on room air. Initial labs WBC 16.8, Hgb 10.8, Hct 35, K 3.4, BUN 32.6, creatinine 1.91, Troponin 0.191 (repeat 0.199). EKG shows SR with ST-T changes in anterior and inferolateral leads.      Started on antibiotics for UTI, found to have NSTEMI.  Upgraded to the ICU for vasopressor support.  Found to have bacteremia.  Continues on antibiotics.  Cardiology planning for catheterization once renal function optimized.  downgraded to the floors.      Antibiotics were continued cardiology &closely following for renal recovery before attempting angiogram. Renal function continued to improve and she underwent angiogram that showed no obstruction.  Medical management was recommended.  SLP  was consulted due to concern for dysphagia and they recommended modified diet.  PT recommended placement.      Interval Hx:   Patient seen and examined this morning sitting comfortably in the chair was on room air chest x-ray done this morning showed left-sided pneumonia?    Objective/physical exam:  Vitals:    03/08/24 0030 03/08/24 0435 03/08/24 0739 03/08/24 1138   BP: (!) 141/82 (!) 150/82 137/82 119/78   Pulse: 64 60 81 60   Resp: 16 16     Temp: 97.7 °F (36.5 °C) 97.7 °F (36.5 °C) 98.2 °F (36.8 °C)    TempSrc: Oral Oral Oral    SpO2:  96%     Weight:  63.5 kg (140 lb)     Height:         General: In no acute distress, afebrile  Respiratory: Clear to auscultation bilaterally  Cardiovascular: S1, S2, no appreciable murmur  Abdomen: Soft, nontender, BS +  MSK: Warm, no lower extremity edema, no clubbing or cyanosis  Neurologic: Alert and oriented x4, moving all extremities with good strength     Lab Results   Component Value Date     03/07/2024    K 4.2 03/07/2024    CO2 29 03/07/2024    BUN 25.0 (H) 03/07/2024    CREATININE 1.12 (H) 03/07/2024    CALCIUM 8.3 (L) 03/07/2024    EGFRNONAA 41 09/06/2019      Lab Results   Component Value Date    ALT 59 (H) 03/05/2024    AST 51 (H) 03/05/2024    ALKPHOS 83 03/05/2024    BILITOT 0.3 03/05/2024      Lab Results   Component Value Date    WBC 8.46 03/07/2024    HGB 9.4 (L) 03/07/2024    HCT 30.0 (L) 03/07/2024    MCV 95.8 (H) 03/07/2024     03/07/2024           Medications:   apixaban  5 mg Oral BID    aspirin  81 mg Oral Daily    atorvastatin  10 mg Oral Nightly    brimonidine 0.15 % OPTH DROP  1 drop Both Eyes BID    And    timolol maleate 0.5%  1 drop Both Eyes BID    carvediloL  3.125 mg Oral BID    cefTRIAXone (Rocephin) IV (PEDS and ADULTS)  2 g Intravenous Q24H    latanoprost  1 drop Both Eyes QHS    levothyroxine  25 mcg Oral Before breakfast    risperiDONE  2 mg Oral QHS    rivastigmine tartrate  1.5 mg Oral BID    sodium bicarbonate  1,300 mg Oral  BID    sodium chloride 0.9%  1,000 mL Intravenous Once      acetaminophen, acetaminophen, albuterol, aluminum-magnesium hydroxide-simethicone, bisacodyL, hydrALAZINE, HYDROcodone-acetaminophen, hydrOXYzine pamoate, melatonin, metoprolol, morphine, naloxone, ondansetron, prochlorperazine, senna-docusate 8.6-50 mg, sodium chloride 0.9%, sodium chloride 0.9%     Assessment/Plan:    E coli bacteremia most likely secondary to UTI   UTI with urine culture growing E coli  Left-sided pneumonia  Septic shock due to above-resolved  NSTEMI likely type 2   MICHEL on CKD-improving  Age-indeterminate nondisplaced S4 vertebral fracture  Paroxysmal atrial fibrillation    HX:  SSS s/p pacemaker 09/2019, chronic HFpEF, HTN, FERNANDEZ, hypothyroidism, YARI, GERD, glaucoma, diverticular disease    Plan:  Continue with Rocephin for now for total of 14 days of antibiotics  Rocephin should take care of pneumonia and I will also start on p.o. doxycycline, order MRSA PCR  Creatinine is back to normal, Nephrology's has signed off  Continue with PT and OT  Creatinine of 1.12   Case management is working on skilled nursing facility placement  Continue with aspirin, Eliquis, Coreg, Synthroid, risperidone, rivastigmine, sodium bicarb and atorvastatin  Repeat blood work in a.m. otherwise patient has been afebrile    Prophylaxis: Charlie webster MD

## 2024-03-08 NOTE — PHYSICIAN QUERY
PT Name: Ev Alberts  MR #: [id number]    DOCUMENTATION CLARIFICATION     CDS/: MANNIE Rodriguez, RN, CCDS               Contact information: [email or web address]    This form is a permanent document in the medical record.     Query Date: March 8, 2024    By submitting this query, we are merely seeking further clarification of documentation.  Please utilize your independent clinical judgment when addressing the question(s) below.    The medical record contains the following:   Indicators  Supporting Clinical Findings Location in Medical Record   X Registered Dietician Diagnosis Malnutrition Context: acute illness or injury (03/07/24 1336)  Malnutrition Level: severe     PES: Severe acute disease or injury related malnutrition related to suboptimal protein/energy intake as evidenced by less than or equal to 50% needs met for greater than or equal to 5 days, moderate fat depletion, mild muscle depletion, edema, and greater than 7.5% weight loss in 3 months    Nutrition: E. Wali, RD 3/7   X Energy Intake Energy Intake (Malnutrition): less than or equal to 50% for greater than or equal to 5 days    Nutrition: E. Wali, RD 3/7   X Weight Loss Weight Loss (Malnutrition): greater than 7.5% in 3 months    Nutrition: E. Wali, RD 3/7   X Fat Loss Subcutaneous Fat (Malnutrition): moderate depletion  Orbital Region (Subcutaneous Fat Loss): moderate depletion   Nutrition: E. Wali, RD 3/7   X Muscle Loss Muscle Mass (Malnutrition): mild depletion   [place] (Muscle Loss): mild depletion   Nutrition: E. Wali, RD 3/7   X Edema/Fluid Accumulation Fluid Accumulation (Malnutrition): mild    Nutrition: E. Wali, RD 3/7    Reduced  Strength (by dynamometer)     X Weight, BMI, Usual Body Weight BMI (Calculated): 22.5  Nutrition: E. Wali, RD 3/7    Delayed Wound Healing     X Acute or Chronic Illness Diagnosis:  E coli bacteremia  Septic shock due to above-resolved  NSTEMI likely type 2 type 1-awaiting  angiogram   MICHEL on CKD-improving  Age-indeterminate nondisplaced S4 vertebral fracture  Paroxysmal atrial fibrillation     HX: SSS s/p pacemaker 09/2019, chronic HFpEF, HTN, FERNANDEZ, hypothyroidism, YARI, GERD, glaucoma, diverticular disease   Nutrition: BA Keyes RD 3/7    Social or Environmental Circumstances      Treatment      Other       Academy of Nutrition and Dietetics (Academy) and the American Society for Parenteral and Enteral Nutrition (A.S.P.E.N.) Clinical Characteristics to support Malnutrition      Criteria for mild malnutrition is defined as 1 characteristic outlined above within the established moderate or severe parameters.  A minimum of 2 out of the 6 characteristics noted above are recommended for a diagnosis of moderate or severe malnutrition.  Chronic illness/injury is a disease/condition lasting 3 months or longer.    The noted clinical guidelines are only system guidelines and do not replace the providers clinical judgment.    Provider, please specify the nutritional diagnosis associated with above clinical findings.     [ x ] Severe Malnutrition - a minimum of 2 of the 6 severe malnutrition characteristics noted above    [  ] Other Nutritional Diagnosis (please specify): _______       Please document in your progress notes daily for the duration of treatment until resolved and  include in your discharge summary.      Reference:    CALOS Saab, PhD, RD, Naresh LOPEZ P., PhD, RN, TRACEY Collins MD, PhD, Abi SMITH A., MS, RD, [facility], SHERRY Barlow, MS, RD, The Academy Malnutrition Work Group, The A.S.P.E.N. Board of Directors. (2012). Consensus Statement: Academy of Nutrition and Dietetics and American Society for Parenteral and Enteral Nutrition: Characteristics Recommended for the Identification and Documentation of Adult Malnutrition (Undernutrition). Journal of Parenteral and Enteral Nutrition, 36(3), 275-283. doi:10.1177/1180597520633677     Form No. 60852

## 2024-03-08 NOTE — PT/OT/SLP PROGRESS
Occupational Therapy   Treatment    Name: Ev Alberts  MRN: [id number]  Admitting Diagnosis:  Debility  4 Days Post-Op    Recommendations:     Recommended therapy intensity at discharge: Moderate Intensity Therapy   Discharge Equipment Recommendations:  none  Barriers to discharge:       Assessment:     vE Alberts is a 89 y.o. female with a medical diagnosis of Debility.  Performance deficits affecting function are weakness, impaired endurance, impaired self care skills, impaired functional mobility, gait instability, impaired balance, visual deficits, decreased upper extremity function, decreased lower extremity function, pain, decreased ROM.     Rehab Prognosis:  Fair; patient would benefit from acute skilled OT services to address these deficits and reach maximum level of function.       Plan:     Patient to be seen 3 x/week to address the above listed problems via self-care/home management, therapeutic exercises  Plan of Care Expires: 03/28/24  Plan of Care Reviewed with: patient, family    Subjective     Pain/Comfort:       Objective:     Communicated with: RN prior to session.  Patient found HOB elevated with   upon OT entry to room.    General Precautions: Standard, fall    Orthopedic Precautions:N/A  Braces: N/A     Occupational Performance:     Bed Mobility:    Patient completed Supine to Sit with minimum assistance     Functional Mobility/Transfers:  Patient completed Sit <> Stand Transfer with minimum assistance  with  rolling walker   Patient completed Bed <> Chair Transfer using Step Transfer technique with minimum assistance with rolling walker  Functional Mobility: patient mobilizing from EOB to wheelchair requiring min A    Activities of Daily Living:  Lower Body Dressing: minimum assistance donning brief like underwear in forward flexion    Patient Education:  Patient provided with verbal education education regarding OT role/goals/POC.  Understanding was verbalized.      Patient left up in  chair with all lines intact and call button in reach.    GOALS:   Multidisciplinary Problems       Occupational Therapy Goals          Problem: Occupational Therapy    Goal Priority Disciplines Outcome Interventions   Occupational Therapy Goal     OT, PT/OT Ongoing, Progressing    Description: Goals to be met by: 3/28/24     Patient will increase functional independence with ADLs by performing:    UE Dressing with Moderate Assistance.  Grooming while seated with Stand-by Assistance.  Sitting at edge of bed x20 minutes with Stand-by Assistance.  Toilet transfer to toilet with Minimal Assistance.  Increased functional strength to 3+/5 through therEx for participation in ADLs.                         Time Tracking:     OT Date of Treatment: 03/08/24  OT Start Time: 0900  OT Stop Time: 0923  OT Total Time (min): 23 min    Billable Minutes:Self Care/Home Management 2    OT/SARANYA: SARANYA     Number of SARANYA visits since last OT visit: 4    3/8/2024

## 2024-03-09 LAB
BASOPHILS # BLD AUTO: 0.06 X10(3)/MCL
BASOPHILS NFR BLD AUTO: 0.9 %
EOSINOPHIL # BLD AUTO: 0.07 X10(3)/MCL (ref 0–0.9)
EOSINOPHIL NFR BLD AUTO: 1.1 %
ERYTHROCYTE [DISTWIDTH] IN BLOOD BY AUTOMATED COUNT: 18.6 % (ref 11.5–17)
HCT VFR BLD AUTO: 29.7 % (ref 37–47)
HGB BLD-MCNC: 9.7 G/DL (ref 12–16)
IMM GRANULOCYTES # BLD AUTO: 0.02 X10(3)/MCL (ref 0–0.04)
IMM GRANULOCYTES NFR BLD AUTO: 0.3 %
LYMPHOCYTES # BLD AUTO: 1.57 X10(3)/MCL (ref 0.6–4.6)
LYMPHOCYTES NFR BLD AUTO: 24.5 %
MCH RBC QN AUTO: 30.6 PG (ref 27–31)
MCHC RBC AUTO-ENTMCNC: 32.7 G/DL (ref 33–36)
MCV RBC AUTO: 93.7 FL (ref 80–94)
MONOCYTES # BLD AUTO: 0.66 X10(3)/MCL (ref 0.1–1.3)
MONOCYTES NFR BLD AUTO: 10.3 %
NEUTROPHILS # BLD AUTO: 4.04 X10(3)/MCL (ref 2.1–9.2)
NEUTROPHILS NFR BLD AUTO: 62.9 %
NRBC BLD AUTO-RTO: 0 %
PLATELET # BLD AUTO: 256 X10(3)/MCL (ref 130–400)
PLATELETS.RETICULATED NFR BLD AUTO: 2.5 % (ref 0.9–11.2)
PMV BLD AUTO: 10.3 FL (ref 7.4–10.4)
RBC # BLD AUTO: 3.17 X10(6)/MCL (ref 4.2–5.4)
WBC # SPEC AUTO: 6.42 X10(3)/MCL (ref 4.5–11.5)

## 2024-03-09 PROCEDURE — 25000003 PHARM REV CODE 250: Performed by: INTERNAL MEDICINE

## 2024-03-09 PROCEDURE — 85025 COMPLETE CBC W/AUTO DIFF WBC: CPT | Performed by: INTERNAL MEDICINE

## 2024-03-09 PROCEDURE — 21400001 HC TELEMETRY ROOM

## 2024-03-09 PROCEDURE — 25000003 PHARM REV CODE 250: Performed by: NURSE PRACTITIONER

## 2024-03-09 PROCEDURE — 25000003 PHARM REV CODE 250: Performed by: HOSPITALIST

## 2024-03-09 PROCEDURE — 63600175 PHARM REV CODE 636 W HCPCS: Performed by: INTERNAL MEDICINE

## 2024-03-09 PROCEDURE — 25000003 PHARM REV CODE 250: Performed by: STUDENT IN AN ORGANIZED HEALTH CARE EDUCATION/TRAINING PROGRAM

## 2024-03-09 RX ADMIN — DOXYCYCLINE HYCLATE 100 MG: 100 TABLET, COATED ORAL at 09:03

## 2024-03-09 RX ADMIN — CEFTRIAXONE SODIUM 2 G: 2 INJECTION, POWDER, FOR SOLUTION INTRAMUSCULAR; INTRAVENOUS at 11:03

## 2024-03-09 RX ADMIN — RISPERIDONE 2 MG: 1 TABLET ORAL at 09:03

## 2024-03-09 RX ADMIN — RIVASTIGMINE TARTRATE 1.5 MG: 1.5 CAPSULE ORAL at 09:03

## 2024-03-09 RX ADMIN — SODIUM BICARBONATE 650 MG TABLET 1300 MG: at 09:03

## 2024-03-09 RX ADMIN — APIXABAN 5 MG: 5 TABLET, FILM COATED ORAL at 08:03

## 2024-03-09 RX ADMIN — BRIMONIDINE TARTRATE 1 DROP: 1.5 SOLUTION OPHTHALMIC at 09:03

## 2024-03-09 RX ADMIN — HYDROCODONE BITARTRATE AND ACETAMINOPHEN 1 TABLET: 5; 325 TABLET ORAL at 09:03

## 2024-03-09 RX ADMIN — Medication 6 MG: at 09:03

## 2024-03-09 RX ADMIN — CARVEDILOL 3.12 MG: 3.12 TABLET, FILM COATED ORAL at 08:03

## 2024-03-09 RX ADMIN — ATORVASTATIN CALCIUM 10 MG: 10 TABLET, FILM COATED ORAL at 09:03

## 2024-03-09 RX ADMIN — APIXABAN 5 MG: 5 TABLET, FILM COATED ORAL at 09:03

## 2024-03-09 RX ADMIN — HYDROXYZINE PAMOATE 25 MG: 25 CAPSULE ORAL at 08:03

## 2024-03-09 RX ADMIN — DOXYCYCLINE HYCLATE 100 MG: 100 TABLET, COATED ORAL at 08:03

## 2024-03-09 RX ADMIN — CARVEDILOL 3.12 MG: 3.12 TABLET, FILM COATED ORAL at 09:03

## 2024-03-09 RX ADMIN — LATANOPROST 1 DROP: 50 SOLUTION OPHTHALMIC at 09:03

## 2024-03-09 RX ADMIN — TIMOLOL MALEATE 1 DROP: 5 SOLUTION OPHTHALMIC at 09:03

## 2024-03-09 RX ADMIN — HYDROXYZINE PAMOATE 25 MG: 25 CAPSULE ORAL at 09:03

## 2024-03-09 RX ADMIN — Medication 81 MG: at 08:03

## 2024-03-09 RX ADMIN — LEVOTHYROXINE SODIUM 25 MCG: 25 TABLET ORAL at 05:03

## 2024-03-09 RX ADMIN — SODIUM BICARBONATE 650 MG TABLET 1300 MG: at 08:03

## 2024-03-09 NOTE — PROGRESS NOTES
[facility]        Chief Complaint: Inpatient Follow-up for     HPI:   89 yr old female whose history includes HTN, hypothyroidism, CKD with solitary kidney, PAF. Presented to ED with c/o generalized weakness.      Just discharged on 2/21/24 following a 2 day admission during which time she was treated for generalized weakness and age related physical debility with polypharmacy. Trazodone dose decreased and lyrica, meclizine and requip discontinued. Prior to discharge she had improved mobility and discharged home with family and home health. Unclear if patient stopped these medications as instructed because her medications come prepackaged.      At the time of md's exam patient is AAO x 3. Reports she was ambulating with her walker when both legs became very weak and she was unable to walk any further. Denies falling but  reports she's had multiple falls in the past. Denies any fever, chest pain, SOB, vomiting or diarrhea. Chest x-ray shows enlarged cardiac silhouette without edema. CT - lumbar spine shows nondisplaced fracture at S4 of indeterminate age. CT T-spine negative for acute findings. .  CT head negative     VS on arrival: T 99.2, P 73, R 19, B/P 108/56, Sats 96% on room air. Initial labs WBC 16.8, Hgb 10.8, Hct 35, K 3.4, BUN 32.6, creatinine 1.91, Troponin 0.191 (repeat 0.199). EKG shows SR with ST-T changes in anterior and inferolateral leads.      Started on antibiotics for UTI, found to have NSTEMI.  Upgraded to the ICU for vasopressor support.  Found to have bacteremia.  Continues on antibiotics.  Cardiology planning for catheterization once renal function optimized.  downgraded to the floors.      Antibiotics were continued cardiology &closely following for renal recovery before attempting angiogram. Renal function continued to improve and she underwent angiogram that showed no obstruction.  Medical management was recommended.  SLP  was consulted due to concern for dysphagia and they recommended modified diet.  PT recommended placement.      Interval Hx:   Patient seen and examined this morning no complaints saturating well on room air    Objective/physical exam:  Vitals:    03/08/24 2227 03/09/24 0420 03/09/24 0735 03/09/24 1100   BP: (!) 152/85 (!) 144/84 129/82 (!) 145/89   Pulse: 61 60 61    Resp: 20 20     Temp: 97.4 °F (36.3 °C) 97.9 °F (36.6 °C) 98.1 °F (36.7 °C) 98.1 °F (36.7 °C)   TempSrc: Oral Oral Oral Oral   SpO2: 97% 96% (!) 94% 98%   Weight:       Height:         General: In no acute distress, afebrile  Respiratory: Clear to auscultation bilaterally  Cardiovascular: S1, S2, no appreciable murmur  Abdomen: Soft, nontender, BS +  MSK: Warm, no lower extremity edema, no clubbing or cyanosis  Neurologic: Alert and oriented x4, moving all extremities with good strength     Lab Results   Component Value Date     (L) 03/08/2024    K 4.4 03/08/2024    CO2 21 (L) 03/08/2024    BUN 19.0 03/08/2024    CREATININE 1.11 (H) 03/08/2024    CALCIUM 8.5 03/08/2024    EGFRNONAA 41 09/06/2019      Lab Results   Component Value Date    ALT 59 (H) 03/05/2024    AST 51 (H) 03/05/2024    ALKPHOS 83 03/05/2024    BILITOT 0.3 03/05/2024      Lab Results   Component Value Date    WBC 6.42 03/09/2024    HGB 9.7 (L) 03/09/2024    HCT 29.7 (L) 03/09/2024    MCV 93.7 03/09/2024     03/09/2024           Medications:   apixaban  5 mg Oral BID    aspirin  81 mg Oral Daily    atorvastatin  10 mg Oral Nightly    brimonidine 0.15 % OPTH DROP  1 drop Both Eyes BID    And    timolol maleate 0.5%  1 drop Both Eyes BID    carvediloL  3.125 mg Oral BID    cefTRIAXone (Rocephin) IV (PEDS and ADULTS)  2 g Intravenous Q24H    doxycycline  100 mg Oral Q12H    latanoprost  1 drop Both Eyes QHS    levothyroxine  25 mcg Oral Before breakfast    risperiDONE  2 mg Oral QHS    rivastigmine tartrate  1.5 mg Oral BID    sodium bicarbonate  1,300 mg Oral BID    sodium  chloride 0.9%  1,000 mL Intravenous Once      acetaminophen, acetaminophen, albuterol, aluminum-magnesium hydroxide-simethicone, bisacodyL, hydrALAZINE, HYDROcodone-acetaminophen, hydrOXYzine pamoate, melatonin, metoprolol, morphine, naloxone, ondansetron, prochlorperazine, senna-docusate 8.6-50 mg, sodium chloride 0.9%, sodium chloride 0.9%     Assessment/Plan:    E coli bacteremia most likely secondary to UTI   UTI with urine culture growing E coli  Left-sided pneumonia  Septic shock due to above-resolved  NSTEMI likely type 2   MICHEL on CKD-improving  Age-indeterminate nondisplaced S4 vertebral fracture  Paroxysmal atrial fibrillation    HX:  SSS s/p pacemaker 09/2019, chronic HFpEF, HTN, FERNANDEZ, hypothyroidism, YARI, GERD, glaucoma, diverticular disease    Plan:  Continue PT and OT all other vitals have been stable  Continue with Rocephin for now for total of 14 days of antibiotics  Rocephin should take care of pneumonia and I will also start on p.o. doxycycline, order MRSA PCR  Creatinine is back to normal, Nephrology's has signed off  Continue with PT and OT  Creatinine of 1.12   Case management is working on skilled nursing facility placement  Continue with aspirin, Eliquis, Coreg, Synthroid, risperidone, rivastigmine, sodium bicarb and atorvastatin  Repeat blood work in a.m. otherwise patient has been afebrile    Prophylaxis: Charlie webster MD

## 2024-03-10 LAB
ANION GAP SERPL CALC-SCNC: 8 MEQ/L
BUN SERPL-MCNC: 15.4 MG/DL (ref 9.8–20.1)
CALCIUM SERPL-MCNC: 8.5 MG/DL (ref 8.4–10.2)
CHLORIDE SERPL-SCNC: 100 MMOL/L (ref 98–107)
CO2 SERPL-SCNC: 28 MMOL/L (ref 23–31)
CREAT SERPL-MCNC: 0.9 MG/DL (ref 0.55–1.02)
CREAT/UREA NIT SERPL: 17
GFR SERPLBLD CREATININE-BSD FMLA CKD-EPI: >60 MLS/MIN/1.73/M2
GLUCOSE SERPL-MCNC: 87 MG/DL (ref 82–115)
POTASSIUM SERPL-SCNC: 4.2 MMOL/L (ref 3.5–5.1)
SODIUM SERPL-SCNC: 136 MMOL/L (ref 136–145)

## 2024-03-10 PROCEDURE — 25000003 PHARM REV CODE 250: Performed by: INTERNAL MEDICINE

## 2024-03-10 PROCEDURE — 21400001 HC TELEMETRY ROOM

## 2024-03-10 PROCEDURE — 25000003 PHARM REV CODE 250: Performed by: HOSPITALIST

## 2024-03-10 PROCEDURE — 80048 BASIC METABOLIC PNL TOTAL CA: CPT | Performed by: INTERNAL MEDICINE

## 2024-03-10 PROCEDURE — 25000003 PHARM REV CODE 250: Performed by: NURSE PRACTITIONER

## 2024-03-10 PROCEDURE — 63600175 PHARM REV CODE 636 W HCPCS: Performed by: INTERNAL MEDICINE

## 2024-03-10 PROCEDURE — 25000003 PHARM REV CODE 250: Performed by: STUDENT IN AN ORGANIZED HEALTH CARE EDUCATION/TRAINING PROGRAM

## 2024-03-10 RX ORDER — ACYCLOVIR 200 MG/1
400 CAPSULE ORAL 3 TIMES DAILY
Status: DISCONTINUED | OUTPATIENT
Start: 2024-03-10 | End: 2024-03-11 | Stop reason: HOSPADM

## 2024-03-10 RX ADMIN — LATANOPROST 1 DROP: 50 SOLUTION OPHTHALMIC at 08:03

## 2024-03-10 RX ADMIN — Medication 81 MG: at 08:03

## 2024-03-10 RX ADMIN — DOXYCYCLINE HYCLATE 100 MG: 100 TABLET, COATED ORAL at 08:03

## 2024-03-10 RX ADMIN — SODIUM BICARBONATE 650 MG TABLET 1300 MG: at 08:03

## 2024-03-10 RX ADMIN — RISPERIDONE 2 MG: 1 TABLET ORAL at 08:03

## 2024-03-10 RX ADMIN — HYDROXYZINE PAMOATE 25 MG: 25 CAPSULE ORAL at 08:03

## 2024-03-10 RX ADMIN — HYDROCODONE BITARTRATE AND ACETAMINOPHEN 1 TABLET: 5; 325 TABLET ORAL at 08:03

## 2024-03-10 RX ADMIN — CARVEDILOL 3.12 MG: 3.12 TABLET, FILM COATED ORAL at 08:03

## 2024-03-10 RX ADMIN — TIMOLOL MALEATE 1 DROP: 5 SOLUTION OPHTHALMIC at 08:03

## 2024-03-10 RX ADMIN — Medication 6 MG: at 08:03

## 2024-03-10 RX ADMIN — RIVASTIGMINE TARTRATE 1.5 MG: 1.5 CAPSULE ORAL at 08:03

## 2024-03-10 RX ADMIN — BRIMONIDINE TARTRATE 1 DROP: 1.5 SOLUTION OPHTHALMIC at 08:03

## 2024-03-10 RX ADMIN — CEFTRIAXONE SODIUM 2 G: 2 INJECTION, POWDER, FOR SOLUTION INTRAMUSCULAR; INTRAVENOUS at 11:03

## 2024-03-10 RX ADMIN — APIXABAN 5 MG: 5 TABLET, FILM COATED ORAL at 08:03

## 2024-03-10 RX ADMIN — LEVOTHYROXINE SODIUM 25 MCG: 25 TABLET ORAL at 06:03

## 2024-03-10 RX ADMIN — ATORVASTATIN CALCIUM 10 MG: 10 TABLET, FILM COATED ORAL at 08:03

## 2024-03-10 RX ADMIN — ACYCLOVIR 400 MG: 200 CAPSULE ORAL at 08:03

## 2024-03-10 NOTE — PROGRESS NOTES
[facility]        Chief Complaint: Inpatient Follow-up for     HPI:   89 yr old female whose history includes HTN, hypothyroidism, CKD with solitary kidney, PAF. Presented to ED with c/o generalized weakness.      Just discharged on 2/21/24 following a 2 day admission during which time she was treated for generalized weakness and age related physical debility with polypharmacy. Trazodone dose decreased and lyrica, meclizine and requip discontinued. Prior to discharge she had improved mobility and discharged home with family and home health. Unclear if patient stopped these medications as instructed because her medications come prepackaged.      At the time of md's exam patient is AAO x 3. Reports she was ambulating with her walker when both legs became very weak and she was unable to walk any further. Denies falling but  reports she's had multiple falls in the past. Denies any fever, chest pain, SOB, vomiting or diarrhea. Chest x-ray shows enlarged cardiac silhouette without edema. CT - lumbar spine shows nondisplaced fracture at S4 of indeterminate age. CT T-spine negative for acute findings. .  CT head negative     VS on arrival: T 99.2, P 73, R 19, B/P 108/56, Sats 96% on room air. Initial labs WBC 16.8, Hgb 10.8, Hct 35, K 3.4, BUN 32.6, creatinine 1.91, Troponin 0.191 (repeat 0.199). EKG shows SR with ST-T changes in anterior and inferolateral leads.      Started on antibiotics for UTI, found to have NSTEMI.  Upgraded to the ICU for vasopressor support.  Found to have bacteremia.  Continues on antibiotics.  Cardiology planning for catheterization once renal function optimized.  downgraded to the floors.      Antibiotics were continued cardiology &closely following for renal recovery before attempting angiogram. Renal function continued to improve and she underwent angiogram that showed no obstruction.  Medical management was recommended.  SLP  was consulted due to concern for dysphagia and they recommended modified diet.  PT recommended placement.      Interval Hx:   Patient seen and examined this morning no complaints saturating well on room air    Objective/physical exam:  Vitals:    03/09/24 2103 03/09/24 2356 03/10/24 0803 03/10/24 1208   BP:  132/84 136/76 134/81   Pulse:  77 60 60   Resp: 17 17     Temp:  97.8 °F (36.6 °C) 97.7 °F (36.5 °C) 97.6 °F (36.4 °C)   TempSrc:  Oral Oral Oral   SpO2:  (!) 94% 97% 96%   Weight:       Height:         General: In no acute distress, afebrile    Respiratory: Clear to auscultation bilaterally  Cardiovascular: S1, S2, no appreciable murmur  Abdomen: Soft, nontender, BS +  MSK: Warm, no lower extremity edema, no clubbing or cyanosis  Neurologic: Alert and oriented x4, moving all extremities with good strength     Lab Results   Component Value Date     03/10/2024    K 4.2 03/10/2024    CO2 28 03/10/2024    BUN 15.4 03/10/2024    CREATININE 0.90 03/10/2024    CALCIUM 8.5 03/10/2024    EGFRNONAA 41 09/06/2019      Lab Results   Component Value Date    ALT 59 (H) 03/05/2024    AST 51 (H) 03/05/2024    ALKPHOS 83 03/05/2024    BILITOT 0.3 03/05/2024      Lab Results   Component Value Date    WBC 6.42 03/09/2024    HGB 9.7 (L) 03/09/2024    HCT 29.7 (L) 03/09/2024    MCV 93.7 03/09/2024     03/09/2024           Medications:   acyclovir  400 mg Oral TID    apixaban  5 mg Oral BID    aspirin  81 mg Oral Daily    atorvastatin  10 mg Oral Nightly    brimonidine 0.15 % OPTH DROP  1 drop Both Eyes BID    And    timolol maleate 0.5%  1 drop Both Eyes BID    carvediloL  3.125 mg Oral BID    doxycycline  100 mg Oral Q12H    latanoprost  1 drop Both Eyes QHS    levothyroxine  25 mcg Oral Before breakfast    risperiDONE  2 mg Oral QHS    rivastigmine tartrate  1.5 mg Oral BID    sodium bicarbonate  1,300 mg Oral BID    sodium chloride 0.9%  1,000 mL Intravenous Once      acetaminophen, acetaminophen, albuterol,  aluminum-magnesium hydroxide-simethicone, bisacodyL, hydrALAZINE, HYDROcodone-acetaminophen, hydrOXYzine pamoate, melatonin, metoprolol, morphine, naloxone, ondansetron, prochlorperazine, senna-docusate 8.6-50 mg, sodium chloride 0.9%, sodium chloride 0.9%     Assessment/Plan:    E coli bacteremia most likely secondary to UTI   UTI with urine culture growing E coli  Left-sided pneumonia  Oral labial HSV  Septic shock due to above-resolved  NSTEMI likely type 2   MICHEL on CKD-improving  Age-indeterminate nondisplaced S4 vertebral fracture  Paroxysmal atrial fibrillation    HX:  SSS s/p pacemaker 09/2019, chronic HFpEF, HTN, FERNANDEZ, hypothyroidism, YARI, GERD, glaucoma, diverticular disease    Plan:  Will start on p.o. acyclovir for 7 days  Continue PT and OT all other vitals have been stable  Continue with Rocephin for now for total of 14 days of antibiotics  Rocephin should take care of pneumonia and doxy   Creatinine is back to normal, Nephrology's has signed off  Continue with PT and OT  Creatinine of 0.90  Case management is working on skilled nursing facility placement  Continue with aspirin, Eliquis, Coreg, Synthroid, risperidone, rivastigmine, sodium bicarb and atorvastatin  Repeat blood work in a.m. otherwise patient has been afebrile    Prophylaxis: Charlie webster MD

## 2024-03-11 VITALS
WEIGHT: 140 LBS | DIASTOLIC BLOOD PRESSURE: 84 MMHG | OXYGEN SATURATION: 99 % | TEMPERATURE: 99 F | HEIGHT: 65 IN | SYSTOLIC BLOOD PRESSURE: 132 MMHG | RESPIRATION RATE: 18 BRPM | HEART RATE: 60 BPM | BODY MASS INDEX: 23.32 KG/M2

## 2024-03-11 LAB
ANION GAP SERPL CALC-SCNC: 10 MEQ/L
BASOPHILS # BLD AUTO: 0.07 X10(3)/MCL
BASOPHILS NFR BLD AUTO: 1.3 %
BUN SERPL-MCNC: 13.5 MG/DL (ref 9.8–20.1)
CALCIUM SERPL-MCNC: 8.7 MG/DL (ref 8.4–10.2)
CHLORIDE SERPL-SCNC: 100 MMOL/L (ref 98–107)
CO2 SERPL-SCNC: 26 MMOL/L (ref 23–31)
CREAT SERPL-MCNC: 0.88 MG/DL (ref 0.55–1.02)
CREAT/UREA NIT SERPL: 15
EOSINOPHIL # BLD AUTO: 0.09 X10(3)/MCL (ref 0–0.9)
EOSINOPHIL NFR BLD AUTO: 1.7 %
ERYTHROCYTE [DISTWIDTH] IN BLOOD BY AUTOMATED COUNT: 18.7 % (ref 11.5–17)
GFR SERPLBLD CREATININE-BSD FMLA CKD-EPI: >60 MLS/MIN/1.73/M2
GLUCOSE SERPL-MCNC: 90 MG/DL (ref 82–115)
HCT VFR BLD AUTO: 32.2 % (ref 37–47)
HGB BLD-MCNC: 10.5 G/DL (ref 12–16)
IMM GRANULOCYTES # BLD AUTO: 0.01 X10(3)/MCL (ref 0–0.04)
IMM GRANULOCYTES NFR BLD AUTO: 0.2 %
LYMPHOCYTES # BLD AUTO: 1.76 X10(3)/MCL (ref 0.6–4.6)
LYMPHOCYTES NFR BLD AUTO: 32.8 %
MCH RBC QN AUTO: 30 PG (ref 27–31)
MCHC RBC AUTO-ENTMCNC: 32.6 G/DL (ref 33–36)
MCV RBC AUTO: 92 FL (ref 80–94)
MONOCYTES # BLD AUTO: 0.69 X10(3)/MCL (ref 0.1–1.3)
MONOCYTES NFR BLD AUTO: 12.8 %
NEUTROPHILS # BLD AUTO: 2.75 X10(3)/MCL (ref 2.1–9.2)
NEUTROPHILS NFR BLD AUTO: 51.2 %
NRBC BLD AUTO-RTO: 0 %
PLATELET # BLD AUTO: 194 X10(3)/MCL (ref 130–400)
PMV BLD AUTO: 10.4 FL (ref 7.4–10.4)
POTASSIUM SERPL-SCNC: 4.4 MMOL/L (ref 3.5–5.1)
RBC # BLD AUTO: 3.5 X10(6)/MCL (ref 4.2–5.4)
SODIUM SERPL-SCNC: 136 MMOL/L (ref 136–145)
WBC # SPEC AUTO: 5.37 X10(3)/MCL (ref 4.5–11.5)

## 2024-03-11 PROCEDURE — 25000003 PHARM REV CODE 250: Performed by: NURSE PRACTITIONER

## 2024-03-11 PROCEDURE — 25000003 PHARM REV CODE 250: Performed by: INTERNAL MEDICINE

## 2024-03-11 PROCEDURE — 97535 SELF CARE MNGMENT TRAINING: CPT | Mod: CO

## 2024-03-11 PROCEDURE — 80048 BASIC METABOLIC PNL TOTAL CA: CPT | Performed by: INTERNAL MEDICINE

## 2024-03-11 PROCEDURE — 85025 COMPLETE CBC W/AUTO DIFF WBC: CPT | Performed by: INTERNAL MEDICINE

## 2024-03-11 RX ORDER — ACYCLOVIR 200 MG/1
400 CAPSULE ORAL 3 TIMES DAILY
Qty: 30 CAPSULE | Refills: 0 | Status: ON HOLD | OUTPATIENT
Start: 2024-03-11 | End: 2024-04-09 | Stop reason: HOSPADM

## 2024-03-11 RX ORDER — HYDROCODONE BITARTRATE AND ACETAMINOPHEN 5; 325 MG/1; MG/1
1 TABLET ORAL EVERY 8 HOURS PRN
Qty: 12 TABLET | Refills: 0 | Status: SHIPPED | OUTPATIENT
Start: 2024-03-11 | End: 2024-03-15

## 2024-03-11 RX ORDER — INFANT FORMULA WITH IRON
POWDER (GRAM) ORAL 3 TIMES DAILY
Status: DISCONTINUED | OUTPATIENT
Start: 2024-03-11 | End: 2024-03-11 | Stop reason: HOSPADM

## 2024-03-11 RX ORDER — ASPIRIN 81 MG/1
81 TABLET ORAL DAILY
Qty: 30 TABLET | Refills: 0 | Status: SHIPPED | OUTPATIENT
Start: 2024-03-12 | End: 2024-06-26

## 2024-03-11 RX ORDER — DOXYCYCLINE HYCLATE 100 MG
100 TABLET ORAL EVERY 12 HOURS
Qty: 6 TABLET | Refills: 0 | Status: SHIPPED | OUTPATIENT
Start: 2024-03-11 | End: 2024-03-14

## 2024-03-11 RX ADMIN — LEVOTHYROXINE SODIUM 25 MCG: 25 TABLET ORAL at 05:03

## 2024-03-11 RX ADMIN — Medication: at 04:03

## 2024-03-11 RX ADMIN — APIXABAN 5 MG: 5 TABLET, FILM COATED ORAL at 08:03

## 2024-03-11 RX ADMIN — DOXYCYCLINE HYCLATE 100 MG: 100 TABLET, COATED ORAL at 08:03

## 2024-03-11 RX ADMIN — ACYCLOVIR 400 MG: 200 CAPSULE ORAL at 04:03

## 2024-03-11 RX ADMIN — ACYCLOVIR 400 MG: 200 CAPSULE ORAL at 08:03

## 2024-03-11 RX ADMIN — SODIUM BICARBONATE 650 MG TABLET 1300 MG: at 08:03

## 2024-03-11 RX ADMIN — RIVASTIGMINE TARTRATE 1.5 MG: 1.5 CAPSULE ORAL at 08:03

## 2024-03-11 RX ADMIN — TIMOLOL MALEATE 1 DROP: 5 SOLUTION OPHTHALMIC at 08:03

## 2024-03-11 RX ADMIN — CARVEDILOL 3.12 MG: 3.12 TABLET, FILM COATED ORAL at 08:03

## 2024-03-11 RX ADMIN — BRIMONIDINE TARTRATE 1 DROP: 1.5 SOLUTION OPHTHALMIC at 08:03

## 2024-03-11 RX ADMIN — Medication 81 MG: at 08:03

## 2024-03-11 NOTE — PLAN OF CARE
Discharge packet sent to [facility] via [facility].

## 2024-03-11 NOTE — PLAN OF CARE
03/11/24 1455   Final Note   Assessment Type Final Discharge Note   Anticipated Discharge Disposition SNF   Post-Acute Status   Post-Acute Authorization Placement   Post-Acute Placement Status Set-up Complete/Auth obtained   Discharge Delays None known at this time     Patient will discharge to [facility] via [facility].  Daughter Melissa updated.

## 2024-03-11 NOTE — PLAN OF CARE
Problem: Fall Injury Risk  Goal: Absence of Fall and Fall-Related Injury  Intervention: Identify and Manage Contributors  Flowsheets (Taken 3/11/2024 0819)  Self-Care Promotion: independence encouraged  Intervention: Promote Injury-Free Environment  Flowsheets (Taken 3/11/2024 0819)  Safety Promotion/Fall Prevention: assistive device/personal item within reach     Problem: Infection  Goal: Absence of Infection Signs and Symptoms  Intervention: Prevent or Manage Infection  Flowsheets (Taken 3/11/2024 0819)  Infection Management: aseptic technique maintained     Problem: Impaired Wound Healing  Goal: Optimal Wound Healing  Intervention: Promote Wound Healing  Flowsheets (Taken 3/11/2024 0819)  Activity Management:   Rolling - L1   Arm raise - L1

## 2024-03-11 NOTE — PT/OT/SLP PROGRESS
Occupational Therapy   Treatment    Name: Ev Alberts  MRN: [id number]  Admitting Diagnosis:  Debility  7 Days Post-Op    Recommendations:     Recommended therapy intensity at discharge: Moderate Intensity Therapy   Discharge Equipment Recommendations:  none  Barriers to discharge:       Assessment:     Ev Alberts is a 89 y.o. female with a medical diagnosis of Debility.  Performance deficits affecting function are weakness, impaired endurance, impaired self care skills, impaired functional mobility, gait instability, impaired balance, visual deficits, decreased upper extremity function, decreased lower extremity function, pain, decreased ROM.     Rehab Prognosis:  Fair; patient would benefit from acute skilled OT services to address these deficits and reach maximum level of function.       Plan:     Patient to be seen 3 x/week to address the above listed problems via self-care/home management, therapeutic exercises  Plan of Care Expires: 03/28/24  Plan of Care Reviewed with: patient, family    Subjective     Pain/Comfort:       Objective:     Communicated with: RN prior to session.  Patient found HOB elevated with   upon OT entry to room.    General Precautions: Standard, fall    Orthopedic Precautions:N/A  Braces: N/A     Occupational Performance:     Bed Mobility:    Patient completed Supine to Sit with moderate assistance     Functional Mobility/Transfers:  Patient completed Sit <> Stand Transfer with moderate assistance  with  no assistive device   Patient completed Bed <> Chair Transfer using Step Transfer technique with minimum assistance with rolling walker  Functional Mobility: no LOB    Activities of Daily Living:  Grooming: stand by assistance patient standing at sink brushing teeth  Toileting: total assistance pericare performed in sidelying following incontinent episode    Patient Education:  Patient provided with verbal education education regarding OT role/goals/POC.  Understanding was  verbalized.      Patient left up in chair with all lines intact, call button in reach, bryanna pad in place, and RN notified.    GOALS:   Multidisciplinary Problems       Occupational Therapy Goals          Problem: Occupational Therapy    Goal Priority Disciplines Outcome Interventions   Occupational Therapy Goal     OT, PT/OT Ongoing, Progressing    Description: Goals to be met by: 3/28/24     Patient will increase functional independence with ADLs by performing:    UE Dressing with Moderate Assistance.  Grooming while seated with Stand-by Assistance.  Sitting at edge of bed x20 minutes with Stand-by Assistance.  Toilet transfer to toilet with Minimal Assistance.  Increased functional strength to 3+/5 through therEx for participation in ADLs.                         Time Tracking:     OT Date of Treatment: 03/11/24  OT Start Time: 0915  OT Stop Time: 0942  OT Total Time (min): 27 min    Billable Minutes:Self Care/Home Management 2    OT/SARANYA: SARANYA     Number of SARANYA visits since last OT visit: 5    3/11/2024

## 2024-03-11 NOTE — NURSING
Attempted to visit patient regarding consult for affected area over sacrum, patient currently seated up in wheel chair. Discussed same with assigned nurse Shonna LIEBERMAN, and reviewed EMR noting photo documenting nonpigmented area in gluteal cleft , which Shonna LIEBERMAN reports as intact skin . Recommended local wound care measures to include use of moisture barrier and pressure injury prevention measures. Will revisit.

## 2024-03-11 NOTE — PLAN OF CARE
Clinical update sent to [facility] via [facility]> Spoke to Shweta she has  insurance authorization for Ms. Deon.

## 2024-03-11 NOTE — PROGRESS NOTES
Inpatient Nutrition Assessment    Admit Date: 2/25/2024   Total duration of encounter: 15 days   Patient Age: 89 y.o.    Nutrition Recommendation/Prescription     Continue current diet (Diet Soft & Bite Sized (IDDSI Level 6) Supervision with Meals, Cardiac) as tolerated.   Discontinue Boost Very High Calorie (provides 530 kcal, 22 g protein per serving) BID.  Trial Boost Breeze (provides 250 kcal, 9 g protein per serving) BID.  Encouraged intake.    Communication of Recommendations: reviewed with patient and reviewed with family    Nutrition Assessment     Malnutrition Assessment/Nutrition-Focused Physical Exam    Malnutrition Context: acute illness or injury (03/07/24 1336)  Malnutrition Level: severe (03/07/24 1336)  Energy Intake (Malnutrition): less than or equal to 50% for greater than or equal to 5 days (03/07/24 1336)  Weight Loss (Malnutrition): greater than 7.5% in 3 months (time frame estimated) (03/07/24 1336)  Subcutaneous Fat (Malnutrition): moderate depletion (03/07/24 1336)  Orbital Region (Subcutaneous Fat Loss): moderate depletion        Muscle Mass (Malnutrition): mild depletion (03/07/24 1336)  Yarsanism Region (Muscle Loss): mild depletion                       Fluid Accumulation (Malnutrition): mild (03/07/24 1336)        A minimum of two characteristics is recommended for diagnosis of either severe or non-severe malnutrition.    Chart Review    Reason Seen: follow-up    Malnutrition Screening Tool Results   Have you recently lost weight without trying?: No  Have you been eating poorly because of a decreased appetite?: No   MST Score: 0   Diagnosis:  E coli bacteremia  Septic shock due to above-resolved  NSTEMI likely type 2 type 1  MICHEL on CKD-improving  Age-indeterminate nondisplaced S4 vertebral fracture  Paroxysmal atrial fibrillation     HX: SSS s/p pacemaker 09/2019, chronic HFpEF, HTN, FERNANDEZ, hypothyroidism, YARI, GERD, glaucoma, diverticular disease    Scheduled Medications:  acyclovir, 400 mg,  TID  apixaban, 5 mg, BID  aspirin, 81 mg, Daily  atorvastatin, 10 mg, Nightly  brimonidine 0.15 % OPTH DROP, 1 drop, BID   And  timolol maleate 0.5%, 1 drop, BID  carvediloL, 3.125 mg, BID  doxycycline, 100 mg, Q12H  latanoprost, 1 drop, QHS  levothyroxine, 25 mcg, Before breakfast  risperiDONE, 2 mg, QHS  rivastigmine tartrate, 1.5 mg, BID  sodium bicarbonate, 1,300 mg, BID  sodium chloride 0.9%, 1,000 mL, Once  vits A and D-white pet-lanolin, , TID    Continuous Infusions: none       PRN Medications: acetaminophen, acetaminophen, albuterol, aluminum-magnesium hydroxide-simethicone, bisacodyL, hydrALAZINE, HYDROcodone-acetaminophen, hydrOXYzine pamoate, melatonin, metoprolol, morphine, naloxone, ondansetron, prochlorperazine, senna-docusate 8.6-50 mg, sodium chloride 0.9%, sodium chloride 0.9%    Calorie Containing IV Medications: no significant kcals from medications at this time    Recent Labs   Lab 03/05/24  0413 03/06/24  0446 03/07/24  0400 03/07/24  0958 03/08/24  1854 03/09/24  0745 03/10/24  0429 03/11/24  0421   * 133* 135* 136 133*  --  136 136   K 4.2 4.3 4.1 4.2 4.4  --  4.2 4.4   CALCIUM 8.4 8.2* 8.5 8.3* 8.5  --  8.5 8.7   MG 1.90 1.90 1.90  --   --   --   --   --    CHLORIDE 103 104 102 102 101  --  100 100   CO2 22* 23 27 29 21*  --  28 26   BUN 37.6* 31.4* 25.1* 25.0* 19.0  --  15.4 13.5   CREATININE 1.40* 1.17* 1.10* 1.12* 1.11*  --  0.90 0.88   EGFRNORACEVR 36 45 49 48 48  --  >60 >60   GLUCOSE 88 90 91 117* 141*  --  87 90   BILITOT 0.3  --   --   --   --   --   --   --    ALKPHOS 83  --   --   --   --   --   --   --    ALT 59*  --   --   --   --   --   --   --    AST 51*  --   --   --   --   --   --   --    ALBUMIN 2.3*  --   --   --   --   --   --   --    WBC 9.77  --   --  8.46  --  6.42  --  5.37   HGB 11.1*  --   --  9.4*  --  9.7*  --  10.5*   HCT 34.6*  --   --  30.0*  --  29.7*  --  32.2*       Nutrition Orders:  Diet Soft & Bite Sized (IDDSI Level 6) Supervision with Meals,  "Cardiac  Dietary nutrition supplements Boost Very High Calorie Nutritional Drink - Strawberry; BID     Appetite/Oral Intake: good/% of meals  Factors Affecting Nutritional Intake: none identified  Food/Adventism/Cultural Preferences: none reported  Food Allergies: none reported  Last Bowel Movement: 03/10/24  Wound(s): no pressure injuries documented at this time     Comments    3/1/24 Patient reports good appetite currently and prior to admission; denies weight loss, questionable accuracy of bed weights, monitor.     3/7/24 Today, family member at bedside reports poor appetite/intake currently and prior to admission as well as weight loss. Patient agreeable to strawberry oral supplement.    3/11/24 Patient reports good appetite, reports eating most of her meals, nurse confirms 75% intake of breakfast this morning. Patient states she is not drinking Boost VHC, reports it upset her stomach, will trial Boost Breeze.    Anthropometrics    Height: 5' 5" (165.1 cm), Height Method: Stated  Last Weight: 63.5 kg (140 lb) (24 0435), Weight Method: Bed Scale  BMI (Calculated): 23.3  BMI Classification: normal (BMI 18.5-24.9)     Ideal Body Weight (IBW), Female: 125 lb     % Ideal Body Weight, Female (lb): 117.6 %                    Usual Body Weight (UBW), k.85 kg, unsure time frame  % Usual Body Weight: 100.21  % Weight Change From Usual Weight: 0 %  Usual Weight Provided By: family/caregiver and EMR weight history    Wt Readings from Last 5 Encounters:   24 63.5 kg (140 lb)   24 69.9 kg (154 lb)   22 72.6 kg (160 lb)   10/29/21 78 kg (171 lb 15.3 oz)     Weight Change(s) Since Admission:   (3/11) weight fluctuations continue, monitor  (3/7) family member at bedside today reports weight loss prior to admission, new bed weight of 61.2 kg documented, now wondering if prior weights were inaccurate  (3/1) question accuracy of bed weights, bed weight taken during rounds of 134 lb, on specialty " bed, patient denies weight loss and believes this weight to be inaccurate, reports usually weighs around 154 lb, h/o HF noted, possibly fluid-related (?), will continue to monitor   Wt Readings from Last 1 Encounters:   03/08/24 0435 63.5 kg (140 lb)   03/04/24 0457 61.2 kg (135 lb)   03/02/24 0602 66.7 kg (147 lb)   03/01/24 1405 69.9 kg (154 lb)   03/01/24 0500 64.9 kg (143 lb)   02/25/24 1307 69.9 kg (154 lb)   Admit Weight: 69.9 kg (154 lb) (02/25/24 1307), Weight Method: Bed Scale    Estimated Needs    Weight Used For Calorie Calculations: 61.2 kg (134 lb 14.7 oz)  Energy Calorie Requirements (kcal): 4682-0989, 1.2-1.4 stress factor  Energy Need Method: Fulton-St Jeor  Weight Used For Protein Calculations: 61.2 kg (134 lb 14.7 oz)  Protein Requirements: 74-86 g, 1.2-1.4 g/kg  Fluid Requirements (mL): 1530, 25 ml/kg    Enteral Nutrition Patient not receiving enteral nutrition at this time.    Parenteral Nutrition Patient not receiving parenteral nutrition support at this time.    Evaluation of Received Nutrient Intake    Calories: meeting estimated needs  Protein: meeting estimated needs    Patient Education Not applicable.    Nutrition Diagnosis     PES: Inadequate energy intake related to inability to consume sufficient nutrients as evidenced by less than 75% needs met. (resolved)    PES: Severe acute disease or injury related malnutrition related to suboptimal protein/energy intake as evidenced by less than or equal to 50% needs met for greater than or equal to 5 days, moderate fat depletion, mild muscle depletion, edema, and greater than 7.5% weight loss in 3 months. (active)    Nutrition Interventions     Intervention(s): general/healthful diet, commercial beverage, and collaboration with other providers    Goal: Meet greater than 80% of nutritional needs by follow-up. (goal met)  Goal: Consume % of oral supplements by follow-up. (goal not met)    Nutrition Goals & Monitoring     Dietitian will  monitor: food and beverage intake, energy intake, weight, electrolyte/renal panel, and glucose/endocrine profile    Nutrition Risk/Follow-Up: high (follow-up in 1-4 days)   Please consult if re-assessment needed sooner.

## 2024-03-27 NOTE — DISCHARGE SUMMARY
[facility] Medicine Discharge Summary    Admit Date: 2/25/2024  Discharge Date and Time:  March 11, 2023 Admitting Physician:  Team  Discharging Physician: Paula Senior MD.  Primary Care Physician: Santi Walters MD  Consults: Cardiology    Discharge Diagnoses:  E coli bacteremia most likely secondary to UTI   UTI with urine culture growing E coli  Left-sided pneumonia  Oral labial HSV  Septic shock due to above-resolved  NSTEMI likely type 2   MICHEL on CKD-improving  Age-indeterminate nondisplaced S4 vertebral fracture  Paroxysmal atrial fibrillation     HX:  SSS s/p pacemaker 09/2019, chronic HFpEF, HTN, FERNANDEZ, hypothyroidism, YARI, GERD, glaucoma, diverticular disease    Hospital Course:   89 yr old female whose history includes HTN, hypothyroidism, CKD with solitary kidney, PAF. Presented to ED with c/o generalized weakness.      Just discharged on 2/21/24 following a 2 day admission during which time she was treated for generalized weakness and age related physical debility with polypharmacy. Trazodone dose decreased and lyrica, meclizine and requip discontinued. Prior to discharge she had improved mobility and discharged home with family and home health. Unclear if patient stopped these medications as instructed because her medications come prepackaged.      At the time of md's exam patient is AAO x 3. Reports she was ambulating with her walker when both legs became very weak and she was unable to walk any further. Denies falling but  reports she's had multiple falls in the past. Denies any fever, chest pain, SOB, vomiting or diarrhea. Chest x-ray shows enlarged cardiac silhouette without edema. CT - lumbar spine shows nondisplaced fracture at S4 of indeterminate age. CT T-spine negative for acute findings. .  CT head negative     VS on arrival: T 99.2, P 73, R 19, B/P 108/56, Sats 96% on room air. Initial labs WBC 16.8, Hgb 10.8, Hct 35, K 3.4, BUN 32.6,  "creatinine 1.91, Troponin 0.191 (repeat 0.199). EKG shows SR with ST-T changes in anterior and inferolateral leads.      Started on antibiotics for UTI, found to have NSTEMI.  Upgraded to the ICU for vasopressor support.  Found to have bacteremia.  Continues on antibiotics.  Cardiology planning for catheterization once renal function optimized.  downgraded to the floors.       Antibiotics were continued cardiology &closely following for renal recovery before attempting angiogram. Renal function continued to improve and she underwent angiogram that showed no obstruction.  Medical management was recommended.  SLP was consulted due to concern for dysphagia and they recommended modified diet.  PT recommended placement.   Patient developed oral labial HSV for which patient was started on p.o. acyclovir continued with IV antibiotics for total of 14 days of antibiotics creatinine was back to normal patient was discharged to skilled nursing facility in stable condition  Pt was seen and examined on the day of discharge  Vitals:  VITAL SIGNS: 24 HRS MIN & MAX LAST   No data recorded 98.5 °F (36.9 °C)   No data recorded 132/84   No data recorded  60   No data recorded 18   No data recorded 99 %       Physical Exam:  General: In no acute distress, afebrile     Respiratory: Clear to auscultation bilaterally  Cardiovascular: S1, S2, no appreciable murmur  Abdomen: Soft, nontender, BS +  MSK: Warm, no lower extremity edema, no clubbing or cyanosis  Neurologic: Alert and oriented x4    Procedures Performed: No admission procedures for hospital encounter.     Significant Diagnostic Studies: See Full reports for all details    No results for input(s): "WBC", "RBC", "HGB", "HCT", "MCV", "MCH", "MCHC", "RDW", "PLT", "MPV", "GRAN", "LYMPH", "MONO", "BASO", "NRBC" in the last 168 hours.    No results for input(s): "NA", "K", "CL", "CO2", "ANIONGAP", "BUN", "CREATININE", "GLU", "CALCIUM", "PH", "MG", "ALBUMIN", "PROT", "ALKPHOS", "ALT", " ""AST", "BILITOT" in the last 168 hours.     Microbiology Results (last 7 days)       ** No results found for the last 168 hours. **             X-Ray Chest 1 View  Narrative: EXAMINATION:  XR CHEST 1 VIEW    CLINICAL HISTORY:  shortness of breath;    TECHNIQUE:  Single view of the chest    COMPARISON:  02/27/2024    FINDINGS:  The cardiomediastinal silhouette is unchanged with concern for left retrocardiac opacification.  Impression: Findings concerning for left retrocardiac opacification.  Infectious process not excluded.    Electronically signed by: Wiley Roberts  Date:    03/08/2024  Time:    08:03         Medication List        START taking these medications      acyclovir 200 MG capsule  Commonly known as: ZOVIRAX  Take 2 capsules (400 mg total) by mouth 3 (three) times daily. for 5 days     aspirin 81 MG EC tablet  Commonly known as: ECOTRIN  Take 1 tablet (81 mg total) by mouth once daily.            CONTINUE taking these medications      allopurinoL 300 MG tablet  Commonly known as: ZYLOPRIM     atorvastatin 10 MG tablet  Commonly known as: LIPITOR     carvediloL 3.125 MG tablet  Commonly known as: COREG     COMBIGAN 0.2-0.5 % Drop  Generic drug: brimonidine-timoloL     ELIQUIS 2.5 mg Tab  Generic drug: apixaban     furosemide 20 MG tablet  Commonly known as: LASIX     INV albuterol 90 mcg inhalation     latanoprost 0.005 % ophthalmic solution     levothyroxine 25 MCG tablet  Commonly known as: SYNTHROID     LINZESS 72 mcg Cap capsule  Generic drug: linaCLOtide     MYRBETRIQ 50 mg Tb24  Generic drug: mirabegron     omeprazole 20 MG capsule  Commonly known as: PRILOSEC     risperiDONE 2 MG tablet  Commonly known as: RISPERDAL     rivastigmine tartrate 1.5 MG capsule  Commonly known as: EXELON            ASK your doctor about these medications      doxycycline 100 MG tablet  Commonly known as: VIBRA-TABS  Take 1 tablet (100 mg total) by mouth every 12 (twelve) hours. for 3 days  Ask about: Should I take this " medication?     HYDROcodone-acetaminophen 5-325 mg per tablet  Commonly known as: NORCO  Take 1 tablet by mouth every 8 (eight) hours as needed for Pain.  Ask about: Should I take this medication?               Where to Get Your Medications        These [facility] [address] [facility]  [address] [facility] [address]      Phone: [phone]   acyclovir 200 MG capsule  aspirin 81 MG EC tablet  doxycycline 100 MG tablet  HYDROcodone-acetaminophen 5-325 mg per tablet          Explained in detail to the patient about the discharge plan, medications, and follow-up visits. Pt understands and agrees with the treatment plan  Discharge Disposition: Skilled Nursing Facility   Discharged Condition: stable  Diet-    Medications Per DC med rec  Activities as tolerated   Follow-up Information       Santi Walters MD Follow up in 1 week(s).    Specialty: Family Medicine  Contact information:  [address] [facility] [address]  [phone]                           For further questions contact hospitalist office    Discharge time 33 minutes    For worsening symptoms, chest pain, shortness of breath, increased abdominal pain, high grade fever, stroke or stroke like symptoms, immediately go to the nearest Emergency Room or call 911 as soon as possible.      Paula Koehler M.D, on 3/27/2024. at 3:54 PM.

## 2024-04-03 ENCOUNTER — HOSPITAL ENCOUNTER (INPATIENT)
Facility: HOSPITAL | Age: 89
LOS: 5 days | Discharge: HOME HEALTH | DRG: 280 | End: 2024-04-09
Attending: EMERGENCY MEDICINE | Admitting: INTERNAL MEDICINE
Payer: MEDICARE

## 2024-04-03 DIAGNOSIS — R07.9 CHEST PAIN: ICD-10-CM

## 2024-04-03 DIAGNOSIS — N18.9 CHRONIC KIDNEY DISEASE, UNSPECIFIED CKD STAGE: ICD-10-CM

## 2024-04-03 DIAGNOSIS — R60.0 PERIPHERAL EDEMA: ICD-10-CM

## 2024-04-03 DIAGNOSIS — R53.1 WEAKNESS: ICD-10-CM

## 2024-04-03 DIAGNOSIS — I50.33 ACUTE ON CHRONIC DIASTOLIC HEART FAILURE: Primary | ICD-10-CM

## 2024-04-03 LAB
ALBUMIN SERPL-MCNC: 3 G/DL (ref 3.4–4.8)
ALBUMIN/GLOB SERPL: 0.7 RATIO (ref 1.1–2)
ALP SERPL-CCNC: 68 UNIT/L (ref 40–150)
ALT SERPL-CCNC: 10 UNIT/L (ref 0–55)
APPEARANCE UR: CLEAR
AST SERPL-CCNC: 25 UNIT/L (ref 5–34)
BACTERIA #/AREA URNS AUTO: ABNORMAL /HPF
BASOPHILS # BLD AUTO: 0.01 X10(3)/MCL
BASOPHILS NFR BLD AUTO: 0.1 %
BILIRUB SERPL-MCNC: 0.7 MG/DL
BILIRUB UR QL STRIP.AUTO: NEGATIVE
BUN SERPL-MCNC: 20.8 MG/DL (ref 9.8–20.1)
CALCIUM SERPL-MCNC: 9.5 MG/DL (ref 8.4–10.2)
CHLORIDE SERPL-SCNC: 99 MMOL/L (ref 98–107)
CO2 SERPL-SCNC: 32 MMOL/L (ref 23–31)
COLOR UR AUTO: YELLOW
CREAT SERPL-MCNC: 1.7 MG/DL (ref 0.55–1.02)
EOSINOPHIL # BLD AUTO: 0.09 X10(3)/MCL (ref 0–0.9)
EOSINOPHIL NFR BLD AUTO: 1.3 %
ERYTHROCYTE [DISTWIDTH] IN BLOOD BY AUTOMATED COUNT: 19.3 % (ref 11.5–17)
GFR SERPLBLD CREATININE-BSD FMLA CKD-EPI: 29 MLS/MIN/1.73/M2
GLOBULIN SER-MCNC: 4.4 GM/DL (ref 2.4–3.5)
GLUCOSE SERPL-MCNC: 95 MG/DL (ref 82–115)
GLUCOSE UR QL STRIP.AUTO: NORMAL
HCT VFR BLD AUTO: 29.6 % (ref 37–47)
HGB BLD-MCNC: 9.3 G/DL (ref 12–16)
HYALINE CASTS #/AREA URNS LPF: ABNORMAL /LPF
IMM GRANULOCYTES # BLD AUTO: 0.02 X10(3)/MCL (ref 0–0.04)
IMM GRANULOCYTES NFR BLD AUTO: 0.3 %
KETONES UR QL STRIP.AUTO: NEGATIVE
LEUKOCYTE ESTERASE UR QL STRIP.AUTO: 75
LYMPHOCYTES # BLD AUTO: 1.99 X10(3)/MCL (ref 0.6–4.6)
LYMPHOCYTES NFR BLD AUTO: 28.5 %
MCH RBC QN AUTO: 29.9 PG (ref 27–31)
MCHC RBC AUTO-ENTMCNC: 31.4 G/DL (ref 33–36)
MCV RBC AUTO: 95.2 FL (ref 80–94)
MONOCYTES # BLD AUTO: 0.57 X10(3)/MCL (ref 0.1–1.3)
MONOCYTES NFR BLD AUTO: 8.2 %
MUCOUS THREADS URNS QL MICRO: ABNORMAL /LPF
NEUTROPHILS # BLD AUTO: 4.31 X10(3)/MCL (ref 2.1–9.2)
NEUTROPHILS NFR BLD AUTO: 61.6 %
NITRITE UR QL STRIP.AUTO: NEGATIVE
NRBC BLD AUTO-RTO: 0 %
PH UR STRIP.AUTO: 6 [PH]
PLATELET # BLD AUTO: 197 X10(3)/MCL (ref 130–400)
PMV BLD AUTO: 10 FL (ref 7.4–10.4)
POTASSIUM SERPL-SCNC: 3.6 MMOL/L (ref 3.5–5.1)
PROT SERPL-MCNC: 7.4 GM/DL (ref 5.8–7.6)
PROT UR QL STRIP.AUTO: NEGATIVE
RBC # BLD AUTO: 3.11 X10(6)/MCL (ref 4.2–5.4)
RBC #/AREA URNS AUTO: ABNORMAL /HPF
RBC UR QL AUTO: ABNORMAL
SODIUM SERPL-SCNC: 139 MMOL/L (ref 136–145)
SP GR UR STRIP.AUTO: 1.02 (ref 1–1.03)
SQUAMOUS #/AREA URNS LPF: ABNORMAL /HPF
TROPONIN I SERPL-MCNC: 0.08 NG/ML (ref 0–0.04)
UROBILINOGEN UR STRIP-ACNC: 2
WBC # SPEC AUTO: 6.99 X10(3)/MCL (ref 4.5–11.5)
WBC #/AREA URNS AUTO: ABNORMAL /HPF

## 2024-04-03 PROCEDURE — 25000003 PHARM REV CODE 250: Performed by: STUDENT IN AN ORGANIZED HEALTH CARE EDUCATION/TRAINING PROGRAM

## 2024-04-03 PROCEDURE — 63600175 PHARM REV CODE 636 W HCPCS: Performed by: STUDENT IN AN ORGANIZED HEALTH CARE EDUCATION/TRAINING PROGRAM

## 2024-04-03 PROCEDURE — 93005 ELECTROCARDIOGRAM TRACING: CPT

## 2024-04-03 PROCEDURE — 84484 ASSAY OF TROPONIN QUANT: CPT | Performed by: PHYSICIAN ASSISTANT

## 2024-04-03 PROCEDURE — G0378 HOSPITAL OBSERVATION PER HR: HCPCS

## 2024-04-03 PROCEDURE — 99285 EMERGENCY DEPT VISIT HI MDM: CPT | Mod: 25

## 2024-04-03 PROCEDURE — 25000003 PHARM REV CODE 250: Performed by: EMERGENCY MEDICINE

## 2024-04-03 PROCEDURE — 81001 URINALYSIS AUTO W/SCOPE: CPT | Performed by: EMERGENCY MEDICINE

## 2024-04-03 PROCEDURE — 85025 COMPLETE CBC W/AUTO DIFF WBC: CPT | Performed by: PHYSICIAN ASSISTANT

## 2024-04-03 PROCEDURE — 93010 ELECTROCARDIOGRAM REPORT: CPT | Mod: ,,, | Performed by: INTERNAL MEDICINE

## 2024-04-03 PROCEDURE — 80053 COMPREHEN METABOLIC PANEL: CPT | Performed by: PHYSICIAN ASSISTANT

## 2024-04-03 RX ORDER — GABAPENTIN 300 MG/1
300 CAPSULE ORAL 3 TIMES DAILY
Status: ON HOLD | COMMUNITY
End: 2024-04-09 | Stop reason: HOSPADM

## 2024-04-03 RX ORDER — NALOXONE HCL 0.4 MG/ML
0.02 VIAL (ML) INJECTION
Status: DISCONTINUED | OUTPATIENT
Start: 2024-04-03 | End: 2024-04-09 | Stop reason: HOSPADM

## 2024-04-03 RX ORDER — FUROSEMIDE 40 MG/1
40 TABLET ORAL EVERY MORNING
Status: DISCONTINUED | OUTPATIENT
Start: 2024-04-04 | End: 2024-04-03

## 2024-04-03 RX ORDER — HYDROCODONE BITARTRATE AND ACETAMINOPHEN 5; 325 MG/1; MG/1
1 TABLET ORAL
Status: COMPLETED | OUTPATIENT
Start: 2024-04-03 | End: 2024-04-03

## 2024-04-03 RX ORDER — ALLOPURINOL 300 MG/1
300 TABLET ORAL DAILY
Status: DISCONTINUED | OUTPATIENT
Start: 2024-04-04 | End: 2024-04-09 | Stop reason: HOSPADM

## 2024-04-03 RX ORDER — TRAZODONE HYDROCHLORIDE 100 MG/1
100 TABLET ORAL NIGHTLY
COMMUNITY

## 2024-04-03 RX ORDER — ATORVASTATIN CALCIUM 10 MG/1
10 TABLET, FILM COATED ORAL NIGHTLY
Status: DISCONTINUED | OUTPATIENT
Start: 2024-04-03 | End: 2024-04-09 | Stop reason: HOSPADM

## 2024-04-03 RX ORDER — CETIRIZINE HYDROCHLORIDE 10 MG/1
10 TABLET ORAL DAILY
COMMUNITY

## 2024-04-03 RX ORDER — TRAMADOL HYDROCHLORIDE 50 MG/1
50 TABLET ORAL EVERY 8 HOURS PRN
COMMUNITY

## 2024-04-03 RX ORDER — CARVEDILOL 3.12 MG/1
3.12 TABLET ORAL 2 TIMES DAILY
Status: DISCONTINUED | OUTPATIENT
Start: 2024-04-03 | End: 2024-04-09 | Stop reason: HOSPADM

## 2024-04-03 RX ORDER — TRAMADOL HYDROCHLORIDE 50 MG/1
50 TABLET ORAL EVERY 8 HOURS PRN
Status: DISCONTINUED | OUTPATIENT
Start: 2024-04-03 | End: 2024-04-09 | Stop reason: HOSPADM

## 2024-04-03 RX ORDER — GABAPENTIN 100 MG/1
200 CAPSULE ORAL 2 TIMES DAILY
Status: DISCONTINUED | OUTPATIENT
Start: 2024-04-03 | End: 2024-04-09 | Stop reason: HOSPADM

## 2024-04-03 RX ORDER — ASPIRIN 81 MG/1
81 TABLET ORAL DAILY
Status: DISCONTINUED | OUTPATIENT
Start: 2024-04-04 | End: 2024-04-09 | Stop reason: HOSPADM

## 2024-04-03 RX ORDER — RIVASTIGMINE TARTRATE 1.5 MG/1
1.5 CAPSULE ORAL 2 TIMES DAILY
Status: DISCONTINUED | OUTPATIENT
Start: 2024-04-03 | End: 2024-04-09 | Stop reason: HOSPADM

## 2024-04-03 RX ORDER — RISPERIDONE 1 MG/1
2 TABLET ORAL NIGHTLY
Status: DISCONTINUED | OUTPATIENT
Start: 2024-04-03 | End: 2024-04-09 | Stop reason: HOSPADM

## 2024-04-03 RX ORDER — FUROSEMIDE 10 MG/ML
60 INJECTION INTRAMUSCULAR; INTRAVENOUS EVERY 12 HOURS
Status: DISCONTINUED | OUTPATIENT
Start: 2024-04-03 | End: 2024-04-04

## 2024-04-03 RX ORDER — SODIUM CHLORIDE 0.9 % (FLUSH) 0.9 %
10 SYRINGE (ML) INJECTION EVERY 12 HOURS PRN
Status: DISCONTINUED | OUTPATIENT
Start: 2024-04-03 | End: 2024-04-09 | Stop reason: HOSPADM

## 2024-04-03 RX ORDER — GLUCAGON 1 MG
1 KIT INJECTION
Status: DISCONTINUED | OUTPATIENT
Start: 2024-04-03 | End: 2024-04-09 | Stop reason: HOSPADM

## 2024-04-03 RX ORDER — IBUPROFEN 200 MG
24 TABLET ORAL
Status: DISCONTINUED | OUTPATIENT
Start: 2024-04-03 | End: 2024-04-09 | Stop reason: HOSPADM

## 2024-04-03 RX ORDER — IBUPROFEN 200 MG
16 TABLET ORAL
Status: DISCONTINUED | OUTPATIENT
Start: 2024-04-03 | End: 2024-04-09 | Stop reason: HOSPADM

## 2024-04-03 RX ORDER — ACETAMINOPHEN 325 MG/1
650 TABLET ORAL EVERY 8 HOURS PRN
Status: DISCONTINUED | OUTPATIENT
Start: 2024-04-03 | End: 2024-04-09 | Stop reason: HOSPADM

## 2024-04-03 RX ADMIN — ATORVASTATIN CALCIUM 10 MG: 10 TABLET, FILM COATED ORAL at 10:04

## 2024-04-03 RX ADMIN — RIVASTIGMINE TARTRATE 1.5 MG: 1.5 CAPSULE ORAL at 10:04

## 2024-04-03 RX ADMIN — CARVEDILOL 3.12 MG: 3.12 TABLET, FILM COATED ORAL at 10:04

## 2024-04-03 RX ADMIN — FUROSEMIDE 60 MG: 10 INJECTION, SOLUTION INTRAMUSCULAR; INTRAVENOUS at 10:04

## 2024-04-03 RX ADMIN — APIXABAN 2.5 MG: 2.5 TABLET, FILM COATED ORAL at 10:04

## 2024-04-03 RX ADMIN — GABAPENTIN 200 MG: 100 CAPSULE ORAL at 09:04

## 2024-04-03 RX ADMIN — HYDROCODONE BITARTRATE AND ACETAMINOPHEN 1 TABLET: 5; 325 TABLET ORAL at 04:04

## 2024-04-03 RX ADMIN — RISPERIDONE 2 MG: 1 TABLET ORAL at 10:04

## 2024-04-03 NOTE — FIRST PROVIDER EVALUATION
"Medical screening examination initiated.  I have conducted a focused provider triage encounter, findings are as follows:    Chief Complaint   Patient presents with    Fatigue     C/o inc generalized weakness since d/c from LTAC 2 weeks ago. Pt reports "not feeling well" on arrival. GCS 15. Denies chest pain or sob.      Brief history of present illness:  89 y.o. female presents to the ED via EMS with weakness since d/c from LTAC 2 weeks ago    Vitals:    04/03/24 1441   BP: (!) 129/59   Pulse: 70   Resp: 16   Temp: 97.5 °F (36.4 °C)   TempSrc: Temporal   SpO2: 98%   Weight: 68 kg (150 lb)   Height: 5' 5" (1.651 m)     Pertinent physical exam:  Awake, alert, non-labored respirations    Brief workup plan:  labs    Preliminary workup initiated; this workup will be continued and followed by the physician or advanced practice provider that is assigned to the patient when roomed.  "

## 2024-04-03 NOTE — ED PROVIDER NOTES
"Encounter Date: 4/3/2024       History     Chief Complaint   Patient presents with    Fatigue     C/o inc generalized weakness since d/c from LTAC 2 weeks ago. Pt reports "not feeling well" on arrival. GCS 15. Denies chest pain or sob.      The history is provided by the patient and a relative.   Fatigue  This is a recurrent problem. The current episode started 2 days ago. The problem occurs constantly. The problem has been gradually worsening. Pertinent negatives include no chest pain and no shortness of breath. Nothing aggravates the symptoms. Nothing relieves the symptoms.   Daughter reports leg edema and generalized weakness, now unable to walk.  Discharged from LTAC for similar symptoms 3 days ago.    Review of patient's allergies indicates:   Allergen Reactions    Amlodipine-benazepril Edema     Other reaction(s): unknown    Corticosteroids (glucocorticoids) Edema and Swelling    Rofecoxib Anaphylaxis and Swelling    Sulfa (sulfonamide antibiotics) Edema    Atorvastatin      Other reaction(s): unknown    Ibuprofen      Other reaction(s): Allergy to sulfa drugs     Past Medical History:   Diagnosis Date    Dementia     Hypertension     Sick sinus syndrome     Thyroid disease     Unspecified glaucoma      Past Surgical History:   Procedure Laterality Date    cataract surgery       SECTION      CHOLECYSTECTOMY      HYSTERECTOMY      INSERTION OF PACEMAKER      LEFT HEART CATHETERIZATION Left 3/4/2024    Procedure: Left heart cath;  Surgeon: Mark Raymundo Jr., MD;  Location: [facility] CATH LAB;  Service: Cardiology;  Laterality: Left;    NEPHRECTOMY       No family history on file.  Social History     Tobacco Use    Smoking status: Never    Smokeless tobacco: Never   Substance Use Topics    Alcohol use: Never    Drug use: Not Currently     Review of Systems   Constitutional:  Positive for fatigue. Negative for fever.   HENT:  Negative for sore throat.    Respiratory:  Negative for shortness of breath.  "   Cardiovascular:  Negative for chest pain.   Gastrointestinal:  Negative for nausea.   Genitourinary:  Negative for dysuria.   Musculoskeletal:  Negative for back pain.   Skin:  Negative for rash.   Neurological:  Negative for weakness.   Hematological:  Does not bruise/bleed easily.       Physical Exam     Initial Vitals [24 1441]   BP Pulse Resp Temp SpO2   (!) 129/59 70 16 97.5 °F (36.4 °C) 98 %      MAP       --         Physical Exam    Nursing note and vitals reviewed.  Constitutional: She appears well-developed and well-nourished.   HENT:   Head: Normocephalic and atraumatic.   Right Ear: External ear normal.   Left Ear: External ear normal.   Eyes: Conjunctivae and EOM are normal. Pupils are equal, round, and reactive to light.   Neck: Neck supple.   Normal range of motion.  Cardiovascular:  Normal rate, regular rhythm, normal heart sounds and intact distal pulses.           Pulmonary/Chest: Breath sounds normal.   Abdominal: Abdomen is soft. Bowel sounds are normal.   Musculoskeletal:         General: Normal range of motion.      Cervical back: Normal range of motion and neck supple.      Right lower le+ Pitting Edema present.      Left lower le+ Pitting Edema present.     Neurological: She is alert and oriented to person, place, and time. GCS score is 15. GCS eye subscore is 4. GCS verbal subscore is 5. GCS motor subscore is 6.   Skin: Skin is warm and dry. Capillary refill takes less than 2 seconds.   Psychiatric: She has a normal mood and affect. Her behavior is normal. Judgment and thought content normal.         ED Course   Procedures  Labs Reviewed   COMPREHENSIVE METABOLIC PANEL - Abnormal; Notable for the following components:       Result Value    Carbon Dioxide 32 (*)     Blood Urea Nitrogen 20.8 (*)     Creatinine 1.70 (*)     Albumin Level 3.0 (*)     Globulin 4.4 (*)     Albumin/Globulin Ratio 0.7 (*)     All other components within normal limits   URINALYSIS, REFLEX TO URINE  CULTURE - Abnormal; Notable for the following components:    Blood, UA 2+ (*)     Urobilinogen, UA 2.0 (*)     Leukocyte Esterase, UA 75 (*)     WBC, UA 6-10 (*)     Bacteria, UA Occasional (*)     Mucous, UA Trace (*)     Hyaline Casts, UA 3-5 (*)     All other components within normal limits   TROPONIN I - Abnormal; Notable for the following components:    Troponin-I 0.081 (*)     All other components within normal limits   CBC WITH DIFFERENTIAL - Abnormal; Notable for the following components:    RBC 3.11 (*)     Hgb 9.3 (*)     Hct 29.6 (*)     MCV 95.2 (*)     MCHC 31.4 (*)     RDW 19.3 (*)     All other components within normal limits   CBC W/ AUTO DIFFERENTIAL    Narrative:     The following orders were created for panel order CBC auto differential.  Procedure                               Abnormality         Status                     ---------                               -----------         ------                     CBC with Differential[[id number]]       Abnormal            Final result                 Please view results for these tests on the individual orders.     EKG Readings: (Independently Interpreted)   Initial Reading: No STEMI. Rhythm: Paced Rhythm. Heart Rate: 61. Ectopy: No Ectopy. ST Segments: Normal ST Segments. T Waves: Normal. T Waves Flipped: I and AVL. Axis: Normal. Clinical Impression: Paced Rhythm     ECG Results              EKG 12-lead (Final result)        Collection Time Result Time QRS Duration OHS QTC Calculation    04/03/24 14:39:49 04/04/24 02:21:05 184 525                     Final result by Interface, Lab In [facility] (04/04/24 02:21:14)                   Narrative:    Test Reason : R53.1,    Vent. Rate : 061 BPM     Atrial Rate : 079 BPM     P-R Int : 000 ms          QRS Dur : 184 ms      QT Int : 522 ms       P-R-T Axes : 000 -78 095 degrees     QTc Int : 525 ms    Ventricular-paced rhythm  Abnormal ECG  When compared with ECG of 03-APR-2024 14:39,  No significant change was  found  Confirmed by Steven Donnelly MD ([id number]) on 4/4/2024 2:21:01 AM    Referred By:             Confirmed By:Steven Donnelly MD                                  Imaging Results              X-Ray Chest AP Portable (Final result)  Result time 04/03/24 16:15:15      Final result by Theodore Prince MD (04/03/24 16:15:15)                   Impression:      Cardiomegaly and mild congestive changes.      Electronically signed by: Theodore Prince  Date:    04/03/2024  Time:    16:15               Narrative:    EXAMINATION:  XR CHEST AP PORTABLE    CLINICAL HISTORY:  SOB;    TECHNIQUE:  One view    COMPARISON:  March 8, 2024.    FINDINGS:  Cardiopericardial silhouette enlarged appearance similar.  Cardiac device electrodes terminate within the right atrium and the right ventricle.  Lungs are remarkable for minimal to mild congestive changes.  Left lower lung lobe retrocardiac area is rather obscured.  No significant fluid within the pleural space.  No pneumothorax.                                       Medications   HYDROcodone-acetaminophen 5-325 mg per tablet 1 tablet (1 tablet Oral Given 4/3/24 1606)     Medical Decision Making  Amount and/or Complexity of Data Reviewed  External Data Reviewed: notes.     Details: Details of prior admission reviewed - admitted with UTI, sepsis, generalized weakness.  Admitted 2/25, discharged to LTAC 3/11.  Labs: ordered. Decision-making details documented in ED Course.  Radiology: ordered.  ECG/medicine tests: ordered and independent interpretation performed. Decision-making details documented in ED Course.    Risk  Prescription drug management.  Decision regarding hospitalization.    Differential includes:  MICHEL, dehydration, physical deconditioning, anemia, infectious process, protein-calorie malnutrition.  Will obtain CBC, CMP, troponin, UA and plan to admit, as she cannot mobilize.  May need long-term placement.           ED Course as of 04/13/24 0625   Wed Apr 03, 2024   0844 Paged  hospitalist  [OBEY]   5472 Consult with hospitalist  [OBEY]   7605 I spoke with ANDRA Perez (hospital medicine) - recommends admitting to Dr. Blake. [CL]      ED Course User Index  [CL] Quentin Raymundo MD  [OBEY] Sushant Ramachandran                           Clinical Impression:  Final diagnoses:  [R53.1] Weakness  [R60.0] Peripheral edema  [N18.9] Chronic kidney disease, unspecified CKD stage          ED Disposition Condition    Observation Stable                Quentin Raymundo MD  04/03/24 1637       Quentin Raymundo MD  04/13/24 0632

## 2024-04-03 NOTE — H&P
"[facility] Medicine History & Physical Examination       Patient Name: Ev Alberts  MRN: [id number]  Patient Class: OP- Observation   Admission Date: 4/3/2024   Admitting Physician: SAMANTHA Service   Length of Stay: 0  Attending Physician: Milla Blake DO  Primary Care Provider: Santi Walters MD  Face-to-Face encounter date: 04/03/2024  Code Status:FULL Code   Chief Complaint: Fatigue (C/o inc generalized weakness since d/c from [facility] 2 weeks ago. Pt reports "not feeling well" on arrival. GCS 15. Denies chest pain or sob. )     Patient information was obtained from patient, patient's family, past medical records and ER records.  ED records were reviewed in detail and documented below    HISTORY OF PRESENT ILLNESS:   Ev Alberts is a 89 y.o. female who  has a past medical history of Dementia, Hypertension, Sick sinus syndrome, Thyroid disease, and Unspecified glaucoma..     The patient presented to [facility] on 4/3/2024 with a primary complaint of fatigue  Patient was recently discharged from LT.    She has [facility].      Patient was discharged from our facility on 03/11/2024 was treated for UTI, NSTEMI, pneumonia.    Patient lives at home with her  and typically does ADLs and I ADLs with no assistance however since last admission patient has significantly declined.    She is unable to mobilize due to significant swelling in lower extremities bilaterally.    She has good appetite with normal bowel movements in spite of external hemorrhoids.    Other than fatigue and weakness due to lower extremity edema patient has no other complaints.    She was taken 40 mg Lasix post discharge from LTAC but on yesterday she was told by her doctor to increase to 40 mg b.i.d. however patient noticed no significant improvement.    She is making urine however the amount is unknown.      PAST MEDICAL HISTORY:     Past Medical History:   Diagnosis Date    Dementia     " Hypertension     Sick sinus syndrome     Thyroid disease     Unspecified glaucoma        PAST SURGICAL HISTORY:     Past Surgical History:   Procedure Laterality Date    cataract surgery       SECTION      CHOLECYSTECTOMY      HYSTERECTOMY      INSERTION OF PACEMAKER      LEFT HEART CATHETERIZATION Left 3/4/2024    Procedure: Left heart cath;  Surgeon: Mark Raymundo Jr., MD;  Location: [facility] CATH LAB;  Service: Cardiology;  Laterality: Left;    NEPHRECTOMY         ALLERGIES:   Amlodipine-benazepril, Corticosteroids (glucocorticoids), Rofecoxib, Sulfa (sulfonamide antibiotics), Atorvastatin, and Ibuprofen    FAMILY HISTORY:   Reviewed and negative    SOCIAL HISTORY:     Social History     Tobacco Use    Smoking status: Never    Smokeless tobacco: Never   Substance Use Topics    Alcohol use: Never        HOME MEDICATIONS:     Prior to Admission medications    Medication Sig Start Date End Date Taking? Authorizing Provider   allopurinoL (ZYLOPRIM) 300 MG tablet Take 300 mg by mouth once daily.   Yes Provider, Historical   aspirin (ECOTRIN) 81 MG EC tablet Take 1 tablet (81 mg total) by mouth once daily. 3/12/24 4/11/24 Yes Paula Senior MD   atorvastatin (LIPITOR) 10 MG tablet Take 10 mg by mouth nightly. 22  Yes Provider, Historical   brimonidine-timoloL (COMBIGAN) 0.2-0.5 % Drop Place 1 drop into both eyes 2 (two) times a day.   Yes Provider, Historical   carvediloL (COREG) 3.125 MG tablet Take 3.125 mg by mouth 2 (two) times daily. 21  Yes Provider, Historical   cetirizine (ZYRTEC) 10 MG tablet Take 10 mg by mouth once daily.   Yes Provider, Historical   ELIQUIS 2.5 mg Tab Take 2.5 mg by mouth 2 (two) times daily. 5/10/22  Yes Provider, Historical   furosemide (LASIX) 20 MG tablet Take 40 mg by mouth every morning.   Yes Provider, Historical   latanoprost 0.005 % ophthalmic solution Place 1 drop into both eyes every evening.   Yes Provider, Historical   levothyroxine (SYNTHROID) 25 MCG  tablet Take 25 mcg by mouth before breakfast.   Yes Provider, Historical   mirabegron (MYRBETRIQ) 25 mg Tb24 ER tablet Take 1 tablet by mouth every morning. 1/29/24  Yes Provider, Historical   omeprazole (PRILOSEC) 20 MG capsule Take 20 mg by mouth every evening. 1/29/24  Yes Provider, Historical   risperiDONE (RISPERDAL) 2 MG tablet Take 2 mg by mouth every evening. 1/29/24  Yes Provider, Historical   rivastigmine tartrate (EXELON) 1.5 MG capsule Take 1.5 mg by mouth 2 (two) times daily.   Yes Provider, Historical   acyclovir (ZOVIRAX) 200 MG capsule Take 2 capsules (400 mg total) by mouth 3 (three) times daily. for 5 days 3/11/24 3/16/24  Paula Senior MD   albuterol sulfate (INV ALBUTEROL) 90 mcg inhalation Inhale 90 mcg into the lungs as needed (SOB, Wheezing). Take one puff by mouth as directed by Physician.    Provider, Historical   gabapentin (NEURONTIN) 300 MG capsule Take 300 mg by mouth 3 (three) times daily.    Provider, Historical   traMADoL (ULTRAM) 50 mg tablet Take 50 mg by mouth every 8 (eight) hours as needed for Pain.    Provider, Historical   traZODone (DESYREL) 100 MG tablet Take 100 mg by mouth every evening.    Provider, Historical   linaCLOtide (LINZESS) 72 mcg Cap capsule Take 72 mcg by mouth before breakfast.  4/3/24  Provider, Historical       REVIEW OF SYSTEMS:   Except as documented, all other systems reviewed and negative     PHYSICAL EXAM:     VITAL SIGNS: 24 HRS MIN & MAX LAST   Temp  Min: 97.5 °F (36.4 °C)  Max: 97.5 °F (36.4 °C) 97.5 °F (36.4 °C)   BP  Min: 127/69  Max: 153/78 (!) 153/78   Pulse  Min: 60  Max: 70  60   Resp  Min: 16  Max: 20 20   SpO2  Min: 96 %  Max: 98 % 97 %     Patient's  and daughter present at bedside  General: Appears comfortable, no acute distress.    Integumentary: Warm, dry, intact.  Neuro:  Alert awake oriented.  Comprehension intact.    Appropriate insight.  Answer questions appropriately.  No aphasia noted.      Musculoskeletal: Purposeful  movement noted.     Respiratory:  No increased work of breathing, no use of supplemental oxygen.    Vitals reviewed and O2 saturation 95-97% on room air    No accessory muscle use. Breath sounds are equal.  Cardiovascular: Regular rate 3+ pitting edema    LABS AND IMAGING:     Recent Labs   Lab 04/03/24  1523   WBC 6.99   RBC 3.11*   HGB 9.3*   HCT 29.6*   MCV 95.2*   MCH 29.9   MCHC 31.4*   RDW 19.3*      MPV 10.0       Recent Labs   Lab 03/28/24  1255 03/30/24  0604 04/03/24  1523    145 139   K 2.6* 2.8* 3.6   CO2 34* 37* 32*   BUN 14.2 13.4 20.8*   CREATININE 1.26* 1.12* 1.70*   CALCIUM 9.0 8.9 9.5   ALBUMIN 2.6*  --  3.0*   ALKPHOS 73  --  68   ALT 11  --  10   AST 23  --  25   BILITOT 0.4  --  0.7       Microbiology Results (last 7 days)       ** No results found for the last 168 hours. **             X-Ray Chest AP Portable  Narrative: EXAMINATION:  XR CHEST AP PORTABLE    CLINICAL HISTORY:  SOB;    TECHNIQUE:  One view    COMPARISON:  March 8, 2024.    FINDINGS:  Cardiopericardial silhouette enlarged appearance similar.  Cardiac device electrodes terminate within the right atrium and the right ventricle.  Lungs are remarkable for minimal to mild congestive changes.  Left lower lung lobe retrocardiac area is rather obscured.  No significant fluid within the pleural space.  No pneumothorax.  Impression: Cardiomegaly and mild congestive changes.    Electronically signed by: Theodore Prince  Date:    04/03/2024  Time:    16:15      ASSESSMENT & PLAN:     ----lower extremity edema, secondary to heart failure, echocardiogram was performed on 02/26/2024 shows EF 55-60% with grade 3 diastolic dysfunction. Patient noted to have ventricular enlargement no report of pulmonary hypertension.; will continue diuretic therapy.    Lasix was adjusted to 40 mg b.i.d. with no improvement.  We will consult Cardiology for CHF exacerbation/medication recommendation.    We will place indwelling Grey catheter for accurate  I&Os and monitor appropriately.    Consider [facility] CHF program with home health services at discharge    -----fatigue, not acute more chronic in nature.  TSH checked less than 3 months ago was within normal limits.  We will consult PT/OT    -----troponinemia, will trend appropriately, consult Cardiology continue telemetry.  Cardiologist's evaluated and recommended continuation of medical optimization we will discontinuation of Eliquis.  No indication for further evaluation/testing at discharge with recommendation to follow-up outpatient.   Continue medical optimization aspirin beta-blocker therapy, maintain oxygen greater than 94%.    Patient will benefit from hospice care when readmissions and poor overall prognosis.  We will consult palliative care team    VTE Prophylaxis: will be placed on apixaban prophylaxis and will be advised to be as mobile as possible and sit in a chair as tolerated    Patient condition:  Guarded overall prognosis     Advanced care planning--FULL CODE   I have spent greater than thirty minutes in communication with regard to advance care planning. Patient's decision is noted and made known to the care team.     Critical care  This is a critical care document  Critical Care Diagnosis:  CHF exacerbation  Critical care interventions: hands on evaluation, review of labs/radiographs/records and discussions; assessing and managing the high probability of imminent or life-threatening deterioration of cardiorespiratory status.  Critical care time spent > 40 minutes.       __________________________________________________________________________  INPATIENT LIST OF MEDICATIONS           Milla Blake,    04/03/2024

## 2024-04-04 LAB
ANION GAP SERPL CALC-SCNC: 9 MEQ/L
BNP BLD-MCNC: 487.7 PG/ML
BUN SERPL-MCNC: 17.5 MG/DL (ref 9.8–20.1)
CALCIUM SERPL-MCNC: 9.7 MG/DL (ref 8.4–10.2)
CHLORIDE SERPL-SCNC: 98 MMOL/L (ref 98–107)
CO2 SERPL-SCNC: 31 MMOL/L (ref 23–31)
CREAT SERPL-MCNC: 1.43 MG/DL (ref 0.55–1.02)
CREAT/UREA NIT SERPL: 12
FERRITIN SERPL-MCNC: 503.24 NG/ML (ref 4.63–204)
FOLATE SERPL-MCNC: 11 NG/ML (ref 7–31.4)
GFR SERPLBLD CREATININE-BSD FMLA CKD-EPI: 36 MLS/MIN/1.73/M2
GLUCOSE SERPL-MCNC: 87 MG/DL (ref 82–115)
IRON SATN MFR SERPL: 18 % (ref 20–50)
IRON SERPL-MCNC: 31 UG/DL (ref 50–170)
MAGNESIUM SERPL-MCNC: 1.9 MG/DL (ref 1.6–2.6)
OHS QRS DURATION: 184 MS
OHS QTC CALCULATION: 525 MS
PHOSPHATE SERPL-MCNC: 3.2 MG/DL (ref 2.3–4.7)
POTASSIUM SERPL-SCNC: 3.6 MMOL/L (ref 3.5–5.1)
SODIUM SERPL-SCNC: 138 MMOL/L (ref 136–145)
TIBC SERPL-MCNC: 141 UG/DL (ref 70–310)
TIBC SERPL-MCNC: 172 UG/DL (ref 250–450)
TRANSFERRIN SERPL-MCNC: 162 MG/DL
TROPONIN I SERPL-MCNC: 0.06 NG/ML (ref 0–0.04)
TSH SERPL-ACNC: 2.86 UIU/ML (ref 0.35–4.94)
VIT B12 SERPL-MCNC: 913 PG/ML (ref 213–816)

## 2024-04-04 PROCEDURE — 87086 URINE CULTURE/COLONY COUNT: CPT | Performed by: INTERNAL MEDICINE

## 2024-04-04 PROCEDURE — 84484 ASSAY OF TROPONIN QUANT: CPT | Performed by: INTERNAL MEDICINE

## 2024-04-04 PROCEDURE — 25000003 PHARM REV CODE 250: Performed by: INTERNAL MEDICINE

## 2024-04-04 PROCEDURE — 84100 ASSAY OF PHOSPHORUS: CPT | Performed by: STUDENT IN AN ORGANIZED HEALTH CARE EDUCATION/TRAINING PROGRAM

## 2024-04-04 PROCEDURE — 84443 ASSAY THYROID STIM HORMONE: CPT | Performed by: INTERNAL MEDICINE

## 2024-04-04 PROCEDURE — 83735 ASSAY OF MAGNESIUM: CPT | Performed by: STUDENT IN AN ORGANIZED HEALTH CARE EDUCATION/TRAINING PROGRAM

## 2024-04-04 PROCEDURE — 83540 ASSAY OF IRON: CPT | Performed by: INTERNAL MEDICINE

## 2024-04-04 PROCEDURE — 21400001 HC TELEMETRY ROOM

## 2024-04-04 PROCEDURE — 63600175 PHARM REV CODE 636 W HCPCS: Performed by: STUDENT IN AN ORGANIZED HEALTH CARE EDUCATION/TRAINING PROGRAM

## 2024-04-04 PROCEDURE — 83880 ASSAY OF NATRIURETIC PEPTIDE: CPT | Performed by: INTERNAL MEDICINE

## 2024-04-04 PROCEDURE — 99223 1ST HOSP IP/OBS HIGH 75: CPT | Mod: ,,, | Performed by: NURSE PRACTITIONER

## 2024-04-04 PROCEDURE — 82607 VITAMIN B-12: CPT | Performed by: INTERNAL MEDICINE

## 2024-04-04 PROCEDURE — 25000003 PHARM REV CODE 250: Performed by: STUDENT IN AN ORGANIZED HEALTH CARE EDUCATION/TRAINING PROGRAM

## 2024-04-04 PROCEDURE — 80048 BASIC METABOLIC PNL TOTAL CA: CPT | Performed by: STUDENT IN AN ORGANIZED HEALTH CARE EDUCATION/TRAINING PROGRAM

## 2024-04-04 PROCEDURE — 11000001 HC ACUTE MED/SURG PRIVATE ROOM

## 2024-04-04 PROCEDURE — 82728 ASSAY OF FERRITIN: CPT | Performed by: INTERNAL MEDICINE

## 2024-04-04 PROCEDURE — 82746 ASSAY OF FOLIC ACID SERUM: CPT | Performed by: INTERNAL MEDICINE

## 2024-04-04 RX ORDER — BRIMONIDINE TARTRATE 1.5 MG/ML
1 SOLUTION/ DROPS OPHTHALMIC 2 TIMES DAILY
Status: DISCONTINUED | OUTPATIENT
Start: 2024-04-04 | End: 2024-04-09 | Stop reason: HOSPADM

## 2024-04-04 RX ORDER — FUROSEMIDE 10 MG/ML
40 INJECTION INTRAMUSCULAR; INTRAVENOUS EVERY 12 HOURS
Status: DISCONTINUED | OUTPATIENT
Start: 2024-04-05 | End: 2024-04-05

## 2024-04-04 RX ORDER — PANTOPRAZOLE SODIUM 40 MG/1
40 TABLET, DELAYED RELEASE ORAL DAILY
Status: DISCONTINUED | OUTPATIENT
Start: 2024-04-04 | End: 2024-04-09 | Stop reason: HOSPADM

## 2024-04-04 RX ORDER — LEVOTHYROXINE SODIUM 25 UG/1
25 TABLET ORAL
Status: DISCONTINUED | OUTPATIENT
Start: 2024-04-05 | End: 2024-04-09 | Stop reason: HOSPADM

## 2024-04-04 RX ORDER — TIMOLOL MALEATE 5 MG/ML
1 SOLUTION/ DROPS OPHTHALMIC 2 TIMES DAILY
Status: DISCONTINUED | OUTPATIENT
Start: 2024-04-04 | End: 2024-04-09 | Stop reason: HOSPADM

## 2024-04-04 RX ORDER — LATANOPROST 50 UG/ML
1 SOLUTION/ DROPS OPHTHALMIC NIGHTLY
Status: DISCONTINUED | OUTPATIENT
Start: 2024-04-04 | End: 2024-04-09 | Stop reason: HOSPADM

## 2024-04-04 RX ORDER — BRIMONIDINE TARTRATE AND TIMOLOL MALEATE 2; 5 MG/ML; MG/ML
1 SOLUTION OPHTHALMIC 2 TIMES DAILY
Status: DISCONTINUED | OUTPATIENT
Start: 2024-04-04 | End: 2024-04-04

## 2024-04-04 RX ORDER — POTASSIUM CHLORIDE 20 MEQ/1
20 TABLET, EXTENDED RELEASE ORAL DAILY
Status: DISCONTINUED | OUTPATIENT
Start: 2024-04-04 | End: 2024-04-09 | Stop reason: HOSPADM

## 2024-04-04 RX ADMIN — RIVASTIGMINE TARTRATE 1.5 MG: 1.5 CAPSULE ORAL at 12:04

## 2024-04-04 RX ADMIN — BRIMONIDINE TARTRATE 1 DROP: 1.5 SOLUTION OPHTHALMIC at 09:04

## 2024-04-04 RX ADMIN — LATANOPROST 1 DROP: 50 SOLUTION OPHTHALMIC at 08:04

## 2024-04-04 RX ADMIN — CARVEDILOL 3.12 MG: 3.12 TABLET, FILM COATED ORAL at 09:04

## 2024-04-04 RX ADMIN — PANTOPRAZOLE SODIUM 40 MG: 40 TABLET, DELAYED RELEASE ORAL at 09:04

## 2024-04-04 RX ADMIN — TIMOLOL MALEATE 1 DROP: 5 SOLUTION OPHTHALMIC at 09:04

## 2024-04-04 RX ADMIN — APIXABAN 2.5 MG: 2.5 TABLET, FILM COATED ORAL at 09:04

## 2024-04-04 RX ADMIN — FUROSEMIDE 60 MG: 10 INJECTION, SOLUTION INTRAMUSCULAR; INTRAVENOUS at 09:04

## 2024-04-04 RX ADMIN — POTASSIUM CHLORIDE 20 MEQ: 1500 TABLET, EXTENDED RELEASE ORAL at 09:04

## 2024-04-04 RX ADMIN — BRIMONIDINE TARTRATE 1 DROP: 1.5 SOLUTION OPHTHALMIC at 12:04

## 2024-04-04 RX ADMIN — TIMOLOL MALEATE 1 DROP: 5 SOLUTION OPHTHALMIC at 12:04

## 2024-04-04 RX ADMIN — ALLOPURINOL 300 MG: 300 TABLET ORAL at 09:04

## 2024-04-04 RX ADMIN — GABAPENTIN 200 MG: 100 CAPSULE ORAL at 09:04

## 2024-04-04 RX ADMIN — ASPIRIN 81 MG: 81 TABLET, COATED ORAL at 09:04

## 2024-04-04 NOTE — CONSULTS
[facility] - Observation Unit    Cardiology  Consult Note    Patient Name: Ev Alberts  MRN: [id number]  Admission Date: 4/3/2024  Hospital Length of Stay: 0 days  Code Status: Full Code   Attending Provider: Ally Erazo MD   Consulting Provider: Mateo Hudson MD  Primary Care Physician: Santi Walters MD  Principal Problem:<principal problem not specified>    Patient information was obtained from patient, past medical records, ER records, and primary team.     Subjective:     Chief Complaint/Reason for Consult: HF    HPI: Ms. Alberts is a 88 y/o female with a history of SSS, AVB II, Bradycardia, CHF, PAF on Eliquis, PAD, Left Carotid Atherosclerosis, HTN, CKD IV, VHD, YARI, who was briefly known to [facility] (Dr. Vigil).  She presented to ED on 04/03/24 with complaint of fatigue. Patient was recently discharged on 03/11/2024 after undergoing LHC in setting of NSTEMI. Since being discharged, patient endorses fatigue, shortness of breath, lower leg swelling and generalized weakness, unable to ambulation without assistance.        PMH: SSS, AVB II, Bradycardia, CHF, PAF on Eliquis, PAD, Left Carotid Atherosclerosis, HTN, CKD IV, VHD, YARI, GERD, FERNANDEZ, Right Knee Osteoarthritis, GI Bleed, Dementia, Glaucoma, Hypothyroidism     PSH: Cataract Surgery, Cholecystectomy, Hysterectomy, PPM, Left, Nephrectomy, LHC, Hernia Repair, Oral Surgery, Bilateral Foot Surgery  Family History: Non-Contributory   Social History: Denies smoking, illicit drug use, and alcohol use.      Previous Cardiac Diagnostics:   C 2/2 NSTEMI and EKG: SR with ST Segment Depression in Inferior & Anterolateral Leads (3.04.24):  Findings:  There is no obstructive coronary artery disease.  LVEDP is elevated at 24 mmHg.     ECHO (2.26.24):  Left Ventricle: The left ventricle is normal in size. Mildly increased wall thickness. There is normal systolic function with a visually estimated ejection fraction of 55 - 60%. Grade III diastolic  dysfunction. Right Ventricle: Mild right ventricular enlargement. Wall thickness is normal. Right ventricle wall motion  is normal. Systolic function is mildly reduced. TAPSE is 1.46 cm. Left Atrium: Left atrium is moderately dilated. Aortic Valve: There is mild aortic valve sclerosis. Tricuspid Valve: There is mild regurgitation.     PPM Insertion (9.19.19): [employer]      ECHO (9.15.19):   Normal Left Ventricle cavity size. Normal wall thickness. Normal ejection fraction, 55-65%. The right ventricle cavity is mildly dilated. Left atrium is mildly dilated. Normal right atrium. Normal central venous pressure. Mild MR. Mild to Moderate TR. Moderate pulmonary HTN. No pericardial effusion.       Carotid US (7.26.12):  The study quality is good. Less than or equal to 50% left common carotid artery stenosis.Bilateral normal velocity measurements of the internal carotid and external caortid arteries are noted, with no plaque visualized.Antegrade right vertebral artery flow. Antegrade left vertebral artery flow.     KARI (1.18.12):  The resting right ankle brachial index is 1.01 consistent with no significant right peripheral vascular disease.The resting left ankle brachial index is 0.94 consistent with borderline left peripheral vascular disease.The study quality is good.      Lower Ext Arterial US (1.18.12):  The study quality is good. Biphasic waveforms and diffuse plaque seen in both the bilateral infra-popliteal arteries.      Past Medical History:   Diagnosis Date    Dementia     Hypertension     Sick sinus syndrome     Thyroid disease     Unspecified glaucoma      Past Surgical History:   Procedure Laterality Date    cataract surgery       SECTION      CHOLECYSTECTOMY      HYSTERECTOMY      INSERTION OF PACEMAKER      LEFT HEART CATHETERIZATION Left 3/4/2024    Procedure: Left heart cath;  Surgeon: Mark Raymundo Jr., MD;  Location: [facility] CATH LAB;  Service: Cardiology;  Laterality: Left;    NEPHRECTOMY        Review of patient's allergies indicates:   Allergen Reactions    Amlodipine-benazepril Edema     Other reaction(s): unknown    Corticosteroids (glucocorticoids) Edema and Swelling    Rofecoxib Anaphylaxis and Swelling    Sulfa (sulfonamide antibiotics) Edema    Atorvastatin      Other reaction(s): unknown    Ibuprofen      Other reaction(s): Allergy to sulfa drugs     No current facility-administered medications on file prior to encounter.     Current Outpatient Medications on File Prior to Encounter   Medication Sig    allopurinoL (ZYLOPRIM) 300 MG tablet Take 300 mg by mouth once daily.    aspirin (ECOTRIN) 81 MG EC tablet Take 1 tablet (81 mg total) by mouth once daily.    atorvastatin (LIPITOR) 10 MG tablet Take 10 mg by mouth nightly.    brimonidine-timoloL (COMBIGAN) 0.2-0.5 % Drop Place 1 drop into both eyes 2 (two) times a day.    carvediloL (COREG) 3.125 MG tablet Take 3.125 mg by mouth 2 (two) times daily.    cetirizine (ZYRTEC) 10 MG tablet Take 10 mg by mouth once daily.    ELIQUIS 2.5 mg Tab Take 2.5 mg by mouth 2 (two) times daily.    furosemide (LASIX) 20 MG tablet Take 40 mg by mouth every morning.    latanoprost 0.005 % ophthalmic solution Place 1 drop into both eyes every evening.    levothyroxine (SYNTHROID) 25 MCG tablet Take 25 mcg by mouth before breakfast.    mirabegron (MYRBETRIQ) 25 mg Tb24 ER tablet Take 1 tablet by mouth every morning.    omeprazole (PRILOSEC) 20 MG capsule Take 20 mg by mouth every evening.    risperiDONE (RISPERDAL) 2 MG tablet Take 2 mg by mouth every evening.    rivastigmine tartrate (EXELON) 1.5 MG capsule Take 1.5 mg by mouth 2 (two) times daily.    acyclovir (ZOVIRAX) 200 MG capsule Take 2 capsules (400 mg total) by mouth 3 (three) times daily. for 5 days    albuterol sulfate (INV ALBUTEROL) 90 mcg inhalation Inhale 90 mcg into the lungs as needed (SOB, Wheezing). Take one puff by mouth as directed by Physician.    gabapentin (NEURONTIN) 300 MG capsule Take 300  mg by mouth 3 (three) times daily.    traMADoL (ULTRAM) 50 mg tablet Take 50 mg by mouth every 8 (eight) hours as needed for Pain.    traZODone (DESYREL) 100 MG tablet Take 100 mg by mouth every evening.     Family History    None       Tobacco Use    Smoking status: Never    Smokeless tobacco: Never   Substance and Sexual Activity    Alcohol use: Never    Drug use: Not Currently    Sexual activity: Not on file       Review of Systems   Constitutional:  Positive for activity change. Negative for appetite change and fever.   Respiratory:  Negative for chest tightness, shortness of breath and wheezing.    Cardiovascular:  Negative for chest pain and palpitations.   Gastrointestinal:  Negative for abdominal pain, constipation and diarrhea.   Genitourinary:  Negative for difficulty urinating.     Objective:     Vital Signs (Most Recent):  Temp: 98.1 °F (36.7 °C) (04/04/24 0730)  Pulse: 65 (04/04/24 0730)  Resp: 16 (04/04/24 0730)  BP: 123/71 (04/04/24 0730)  SpO2: 98 % (04/04/24 0730) Vital Signs (24h Range):  Temp:  [97.5 °F (36.4 °C)-98.1 °F (36.7 °C)] 98.1 °F (36.7 °C)  Pulse:  [57-70] 65  Resp:  [16-20] 16  SpO2:  [94 %-98 %] 98 %  BP: (123-153)/(59-78) 123/71   Weight: 69.2 kg (152 lb 8.9 oz)  Body mass index is 25.39 kg/m².  SpO2: 98 %       Intake/Output Summary (Last 24 hours) at 4/4/2024 0816  Last data filed at 4/4/2024 0631  Gross per 24 hour   Intake 0 ml   Output 300 ml   Net -300 ml     Lines/Drains/Airways       Drain  Duration             Female External Urinary Catheter w/ Suction 04/03/24 1529 <1 day              Peripheral Intravenous Line  Duration                  Peripheral IV - Single Lumen 04/03/24 1520 22 G Left Antecubital <1 day                  Significant Labs:  Recent Results (from the past 72 hour(s))   EKG 12-lead    Collection Time: 04/03/24  2:39 PM   Result Value Ref Range    QRS Duration 184 ms    [facility] QTC Calculation 525 ms   Comprehensive metabolic panel    Collection Time: 04/03/24   3:23 PM   Result Value Ref Range    Sodium Level 139 136 - 145 mmol/L    Potassium Level 3.6 3.5 - 5.1 mmol/L    Chloride 99 98 - 107 mmol/L    Carbon Dioxide 32 (H) 23 - 31 mmol/L    Glucose Level 95 82 - 115 mg/dL    Blood Urea Nitrogen 20.8 (H) 9.8 - 20.1 mg/dL    Creatinine 1.70 (H) 0.55 - 1.02 mg/dL    Calcium Level Total 9.5 8.4 - 10.2 mg/dL    Protein Total 7.4 5.8 - 7.6 gm/dL    Albumin Level 3.0 (L) 3.4 - 4.8 g/dL    Globulin 4.4 (H) 2.4 - 3.5 gm/dL    Albumin/Globulin Ratio 0.7 (L) 1.1 - 2.0 ratio    Bilirubin Total 0.7 <=1.5 mg/dL    Alkaline Phosphatase 68 40 - 150 unit/L    Alanine Aminotransferase 10 0 - 55 unit/L    Aspartate Aminotransferase 25 5 - 34 unit/L    eGFR 29 mls/min/1.73/m2   Troponin I    Collection Time: 04/03/24  3:23 PM   Result Value Ref Range    Troponin-I 0.081 (H) 0.000 - 0.045 ng/mL   CBC with Differential    Collection Time: 04/03/24  3:23 PM   Result Value Ref Range    WBC 6.99 4.50 - 11.50 x10(3)/mcL    RBC 3.11 (L) 4.20 - 5.40 x10(6)/mcL    Hgb 9.3 (L) 12.0 - 16.0 g/dL    Hct 29.6 (L) 37.0 - 47.0 %    MCV 95.2 (H) 80.0 - 94.0 fL    MCH 29.9 27.0 - 31.0 pg    MCHC 31.4 (L) 33.0 - 36.0 g/dL    RDW 19.3 (H) 11.5 - 17.0 %    Platelet 197 130 - 400 x10(3)/mcL    MPV 10.0 7.4 - 10.4 fL    Neut % 61.6 %    Lymph % 28.5 %    Mono % 8.2 %    Eos % 1.3 %    Basophil % 0.1 %    Lymph # 1.99 0.6 - 4.6 x10(3)/mcL    Neut # 4.31 2.1 - 9.2 x10(3)/mcL    Mono # 0.57 0.1 - 1.3 x10(3)/mcL    Eos # 0.09 0 - 0.9 x10(3)/mcL    Baso # 0.01 <=0.2 x10(3)/mcL    IG# 0.02 0 - 0.04 x10(3)/mcL    IG% 0.3 %    NRBC% 0.0 %   Urinalysis, Reflex to Urine Culture    Collection Time: 04/03/24  5:12 PM    Specimen: Urine   Result Value Ref Range    Color, UA Yellow Yellow, Light-Yellow, Colorless, Straw, Dark-Yellow    Appearance, UA Clear Clear    Specific Gravity, UA 1.019 1.005 - 1.030    pH, UA 6.0 5.0 - 8.5    Protein, UA Negative Negative    Glucose, UA Normal Negative, Normal    Ketones, UA Negative  Negative    Blood, UA 2+ (A) Negative    Bilirubin, UA Negative Negative    Urobilinogen, UA 2.0 (A) 0.2, 1.0, Normal    Nitrites, UA Negative Negative    Leukocyte Esterase, UA 75 (A) Negative    WBC, UA 6-10 (A) None Seen, 0-2, 3-5, 0-5 /HPF    Bacteria, UA Occasional (A) None Seen, Trace /HPF    Squamous Epithelial Cells, UA Trace None Seen /HPF    Mucous, UA Trace (A) None Seen /LPF    Hyaline Casts, UA 3-5 (A) None Seen /lpf    RBC, UA 0-5 None Seen, 0-2, 3-5, 0-5 /HPF   Basic Metabolic Panel (BMP)    Collection Time: 04/04/24  4:40 AM   Result Value Ref Range    Sodium Level 138 136 - 145 mmol/L    Potassium Level 3.6 3.5 - 5.1 mmol/L    Chloride 98 98 - 107 mmol/L    Carbon Dioxide 31 23 - 31 mmol/L    Glucose Level 87 82 - 115 mg/dL    Blood Urea Nitrogen 17.5 9.8 - 20.1 mg/dL    Creatinine 1.43 (H) 0.55 - 1.02 mg/dL    BUN/Creatinine Ratio 12     Calcium Level Total 9.7 8.4 - 10.2 mg/dL    Anion Gap 9.0 mEq/L    eGFR 36 mls/min/1.73/m2   Magnesium    Collection Time: 04/04/24  4:40 AM   Result Value Ref Range    Magnesium Level 1.90 1.60 - 2.60 mg/dL   Phosphorus    Collection Time: 04/04/24  4:40 AM   Result Value Ref Range    Phosphorus Level 3.2 2.3 - 4.7 mg/dL     Significant Imaging:  Imaging Results              X-Ray Chest AP Portable (Final result)  Result time 04/03/24 16:15:15      Final result by Theodore Prince MD (04/03/24 16:15:15)                   Impression:      Cardiomegaly and mild congestive changes.      Electronically signed by: Theodore Prince  Date:    04/03/2024  Time:    16:15               Narrative:    EXAMINATION:  XR CHEST AP PORTABLE    CLINICAL HISTORY:  SOB;    TECHNIQUE:  One view    COMPARISON:  March 8, 2024.    FINDINGS:  Cardiopericardial silhouette enlarged appearance similar.  Cardiac device electrodes terminate within the right atrium and the right ventricle.  Lungs are remarkable for minimal to mild congestive changes.  Left lower lung lobe retrocardiac area is  rather obscured.  No significant fluid within the pleural space.  No pneumothorax.                                    EKG: Ventricular-paced rhythm     Telemetry:  Ventricular-paced rhythm     Physical Exam  Vitals and nursing note reviewed.   Constitutional:       Appearance: Normal appearance.   HENT:      Head: Normocephalic and atraumatic.      Nose: Nose normal.      Mouth/Throat:      Mouth: Mucous membranes are moist.      Pharynx: Oropharynx is clear.   Eyes:      Extraocular Movements: Extraocular movements intact.      Conjunctiva/sclera: Conjunctivae normal.      Pupils: Pupils are equal, round, and reactive to light.   Cardiovascular:      Rate and Rhythm: Normal rate and regular rhythm.      Pulses: Normal pulses.      Heart sounds: No murmur heard.  Pulmonary:      Effort: Pulmonary effort is normal. No respiratory distress.      Breath sounds: Normal breath sounds. No wheezing or rales.   Abdominal:      General: Abdomen is flat. Bowel sounds are normal.      Palpations: Abdomen is soft.   Musculoskeletal:      Cervical back: Normal range of motion and neck supple.      Right lower leg: Edema present.      Left lower leg: Edema present.      Comments: Trace bilaterally edema   Skin:     General: Skin is warm.      Capillary Refill: Capillary refill takes less than 2 seconds.   Neurological:      General: No focal deficit present.      Mental Status: She is alert and oriented to person, place, and time.       Home Medications:   No current facility-administered medications on file prior to encounter.     Current Outpatient Medications on File Prior to Encounter   Medication Sig Dispense Refill    allopurinoL (ZYLOPRIM) 300 MG tablet Take 300 mg by mouth once daily.      aspirin (ECOTRIN) 81 MG EC tablet Take 1 tablet (81 mg total) by mouth once daily. 30 tablet 0    atorvastatin (LIPITOR) 10 MG tablet Take 10 mg by mouth nightly.      brimonidine-timoloL (COMBIGAN) 0.2-0.5 % Drop Place 1 drop into both  eyes 2 (two) times a day.      carvediloL (COREG) 3.125 MG tablet Take 3.125 mg by mouth 2 (two) times daily.      cetirizine (ZYRTEC) 10 MG tablet Take 10 mg by mouth once daily.      ELIQUIS 2.5 mg Tab Take 2.5 mg by mouth 2 (two) times daily.      furosemide (LASIX) 20 MG tablet Take 40 mg by mouth every morning.      latanoprost 0.005 % ophthalmic solution Place 1 drop into both eyes every evening.      levothyroxine (SYNTHROID) 25 MCG tablet Take 25 mcg by mouth before breakfast.      mirabegron (MYRBETRIQ) 25 mg Tb24 ER tablet Take 1 tablet by mouth every morning.      omeprazole (PRILOSEC) 20 MG capsule Take 20 mg by mouth every evening.      risperiDONE (RISPERDAL) 2 MG tablet Take 2 mg by mouth every evening.      rivastigmine tartrate (EXELON) 1.5 MG capsule Take 1.5 mg by mouth 2 (two) times daily.      acyclovir (ZOVIRAX) 200 MG capsule Take 2 capsules (400 mg total) by mouth 3 (three) times daily. for 5 days 30 capsule 0    albuterol sulfate (INV ALBUTEROL) 90 mcg inhalation Inhale 90 mcg into the lungs as needed (SOB, Wheezing). Take one puff by mouth as directed by Physician.      gabapentin (NEURONTIN) 300 MG capsule Take 300 mg by mouth 3 (three) times daily.      traMADoL (ULTRAM) 50 mg tablet Take 50 mg by mouth every 8 (eight) hours as needed for Pain.      traZODone (DESYREL) 100 MG tablet Take 100 mg by mouth every evening.       Current Inpatient Medications:    Current Facility-Administered Medications:     acetaminophen tablet 650 mg, 650 mg, Oral, Q8H PRN, Milla Blake D, DO    allopurinoL tablet 300 mg, 300 mg, Oral, Daily, Milla Blake, DO    apixaban tablet 2.5 mg, 2.5 mg, Oral, BID, Milla Blake, DO, 2.5 mg at 04/03/24 2200    aspirin EC tablet 81 mg, 81 mg, Oral, Daily, Milla Blake, DO    atorvastatin tablet 10 mg, 10 mg, Oral, Nightly, Milla Blake D, DO, 10 mg at 04/03/24 2200    carvediloL tablet 3.125 mg, 3.125 mg, Oral, BID, Milla Blake DO, 3.125 mg at  04/03/24 2200    dextrose 10% bolus 125 mL 125 mL, 12.5 g, Intravenous, PRN, Diana Blakea D, DO    dextrose 10% bolus 250 mL 250 mL, 25 g, Intravenous, PRN, Diana Blakea D, DO    furosemide injection 60 mg, 60 mg, Intravenous, Q12H, Diana Blakea D, DO, 60 mg at 04/03/24 2200    gabapentin capsule 200 mg, 200 mg, Oral, BID, Diana Blakea D, DO, 200 mg at 04/03/24 2159    glucagon (human recombinant) injection 1 mg, 1 mg, Intramuscular, PRN, Diana Blakea D, DO    glucose chewable tablet 16 g, 16 g, Oral, PRN, Diana Blakea D, DO    glucose chewable tablet 24 g, 24 g, Oral, PRN, Diana Blakea D, DO    naloxone 0.4 mg/mL injection 0.02 mg, 0.02 mg, Intravenous, PRN, Diana Blakea D, DO    risperiDONE tablet 2 mg, 2 mg, Oral, QHS, Diana Blakea D, DO, 2 mg at 04/03/24 2200    rivastigmine tartrate capsule 1.5 mg, 1.5 mg, Oral, BID, Milla Blake, DO, 1.5 mg at 04/03/24 2242    sodium chloride 0.9% flush 10 mL, 10 mL, Intravenous, Q12H PRN, Diana Blakea D, DO    traMADoL tablet 50 mg, 50 mg, Oral, Q8H PRN, Milla Blake, DO  VTE Risk Mitigation (From admission, onward)           Ordered     apixaban tablet 2.5 mg  2 times daily         04/03/24 1704                  Assessment:   Acute on Chronic Diastolic CHF   -  compared 681 previously admission  - Echo 02/26/24 showed EF 55-60% grade 3 diastolic dysfunction   History of Hypertension  PAF - Currently Paced     - [id number] Score 5 (7.2% Stroke risk Per Year)    - on Eliquis 2.5 mg  SSS    - Status Post PPM (9.19.19): Taecanet   PAD  CVD    - Left Carotid Atherosclerosis  Hyperlipidemia    - On Statin  CKD IV     - Followed by Dr. Chávez  VHD    - Mild MR    - Mold to Moderate TR  YARI  GERD  Iron Deficiency Anemia   Thrombocytopenia - Resolved   Right Knee Osteoarthritis  Dementia    - CT Head Negative for Acute Abnormality  Glaucoma  Hypothyroidism    - On Oral Replacement Therapy  History of GI Bleed    Plan:   Patient  diuresing well  Continue to Diuresis with IV Lasix 60 mg BID for today  Consider switching to PO Lasix 40 mg qAM on 04/05/24 back to home dose  Monitor urinary output  Continue Eliquis 2.5 mg BID, Lipitor 10 mg qd, Coreg 3.125 mg BID  Rest per primary team  Cardiology will be signing off at this time; please re-consult in case of any new findings/changes      Thank you for your consult.     Mateo Hudson MD  Cardiology  [facility] - [facility]ation [facility]  04/04/2024

## 2024-04-04 NOTE — PROGRESS NOTES
Inpatient Nutrition Assessment    Admit Date: 4/3/2024   Total duration of encounter: 1 day   Patient Age: 89 y.o.    Nutrition Recommendation/Prescription     - Continue Heart Healthy Diet as ordered  - Add Boost Breeze BID - provides 250 kcals and 9 g Pro per ONS  - Add Boost Plus - provides 360 kcals and 14 g Pro per ONS  - Monitor PO intake, labs, and wt  Pt may benefit from appetite stimulant as medically feasible.     Communication of Recommendations: reviewed with family    Nutrition Assessment     Malnutrition Assessment/Nutrition-Focused Physical Exam    Malnutrition Context: other (see comments) (unable to assess) (04/04/24 8188)                                                           A minimum of two characteristics is recommended for diagnosis of either severe or non-severe malnutrition.    Chart Review    Reason Seen: malnutrition screening tool (MST)    Malnutrition Screening Tool Results   Have you recently lost weight without trying?: Unsure  Have you been eating poorly because of a decreased appetite?: No   MST Score: 2   Diagnosis:  Weakness  Peripheral edema  CKD, unspecified stage       Relevant Medical History:   Dementia, HTN, sick sinus syndrome, thyroid disease, glaucoma    Scheduled Medications:  allopurinoL, 300 mg, Daily  apixaban, 2.5 mg, BID  aspirin, 81 mg, Daily  atorvastatin, 10 mg, Nightly  brimonidine 0.15 % OPTH DROP, 1 drop, BID  carvediloL, 3.125 mg, BID  furosemide (LASIX) injection, 60 mg, Q12H  gabapentin, 200 mg, BID  latanoprost, 1 drop, QHS  [START ON 4/5/2024] levothyroxine, 25 mcg, Before breakfast  pantoprazole, 40 mg, Daily  potassium chloride, 20 mEq, Daily  risperiDONE, 2 mg, QHS  rivastigmine tartrate, 1.5 mg, BID  timolol maleate 0.5%, 1 drop, BID    Continuous Infusions:   PRN Medications: acetaminophen, dextrose 10%, dextrose 10%, glucagon (human recombinant), glucose, glucose, naloxone, sodium chloride 0.9%, traMADoL    Calorie Containing IV Medications: no  "significant kcals from medications at this time    Recent Labs   Lab 24  0604 24  1523 24  0440    139 138   K 2.8* 3.6 3.6   CALCIUM 8.9 9.5 9.7   PHOS  --   --  3.2   MG  --   --  1.90   CHLORIDE 102 99 98   CO2 37* 32* 31   BUN 13.4 20.8* 17.5   CREATININE 1.12* 1.70* 1.43*   EGFRNORACEVR 48 29 36   GLUCOSE 95 95 87   BILITOT  --  0.7  --    ALKPHOS  --  68  --    ALT  --  10  --    AST  --  25  --    ALBUMIN  --  3.0*  --    WBC  --  6.99  --    HGB  --  9.3*  --    HCT  --  29.6*  --      Nutrition Orders:  Diet Heart Healthy      Appetite/Oral Intake: poor/0-25% of meals  Factors Affecting Nutritional Intake: decreased appetite  Food/Shinto/Cultural Preferences: none reported  Food Allergies: none reported  Last Bowel Movement: 24  Wound(s):  No partial or full thickness pressure injuries documented    Comments    24: Pt asleep at time of rounds. Spoke with family at bedside who state pt has had a very poor appetite. She only ever eats a few bites at each meal, even at family gatherings. Pt's daughter has purchased nutrition supplements for her in the past but does not believe she drank them. Pt's weight has been fluctuating lately d/t fluid retention but daughter states she definitely appears to have lost some weight since 2023. Will re-attempt NFPE at follow-up.     Anthropometrics    Height: 5' 5" (165.1 cm), Height Method: Stated  Last Weight: 69.2 kg (152 lb 8.9 oz) (24), Weight Method: Bed Scale  BMI (Calculated): 25.4  BMI Classification: overweight (BMI 25-29.9)     Ideal Body Weight (IBW), Female: 125 lb     % Ideal Body Weight, Female (lb): 122.05 %                    Usual Body Weight (UBW), k.7 kg  % Usual Body Weight: 95.38     Usual Weight Provided By: family/caregiver    Wt Readings from Last 5 Encounters:   24 69.2 kg (152 lb 8.9 oz)   24 63.5 kg (140 lb)   24 69.9 kg (154 lb)   22 72.6 kg (160 lb)   10/29/21 78 kg " (171 lb 15.3 oz)     Weight Change(s) Since Admission: Suspect wt to fluctuate this admit d/t SAL  Wt Readings from Last 1 Encounters:   04/03/24 2052 69.2 kg (152 lb 8.9 oz)   04/03/24 1441 68 kg (150 lb)   Admit Weight: 68 kg (150 lb) (04/03/24 1441), Weight Method: Stated    Estimated Needs    Weight Used For Calorie Calculations: 69.2 kg (152 lb 8.9 oz)  Energy Calorie Requirements (kcal): 1465 kcals (1.3 SFxMSJ)  Energy Need Method: Labolt-St Jeor  Weight Used For Protein Calculations: 69.2 kg (152 lb 8.9 oz)  Protein Requirements: 55-69 g (0.8-1 g/kg)  Fluid Requirements (mL): 1465 mL (1 mL/kcal) or per MD    Enteral Nutrition     Patient not receiving enteral nutrition at this time.    Parenteral Nutrition     Patient not receiving parenteral nutrition support at this time.    Evaluation of Received Nutrient Intake    Calories: not meeting estimated needs  Protein: not meeting estimated needs    Patient Education     Not applicable.    Nutrition Diagnosis     PES: Inadequate oral intake related to inability to consume sufficient nutrients as evidenced by decreased appetite and increased lethargy at meals. (new)         Nutrition Interventions     Intervention(s): modified composition of meals/snacks and commercial beverage    Goal: Meet greater than 80% of nutritional needs by follow-up. (new)  Goal: Consume % of oral supplements by follow-up. (new)    Nutrition Goals & Monitoring     Dietitian will monitor: food and beverage intake and weight    Nutrition Risk/Follow-Up: moderate (follow-up in 3-5 days)   Please consult if re-assessment needed sooner.

## 2024-04-04 NOTE — PLAN OF CARE
Problem: Adult Inpatient Plan of Care  Goal: Plan of Care Review  Outcome: Ongoing, Progressing  Goal: Patient-Specific Goal (Individualized)  Outcome: Ongoing, Progressing  Goal: Absence of Hospital-Acquired Illness or Injury  Outcome: Ongoing, Progressing  Goal: Optimal Comfort and Wellbeing  Outcome: Ongoing, Progressing  Goal: Readiness for Transition of Care  Outcome: Ongoing, Progressing     Problem: Coping Ineffective  Goal: Effective Coping  Outcome: Ongoing, Progressing     Problem: Infection  Goal: Absence of Infection Signs and Symptoms  Outcome: Ongoing, Progressing     Problem: Impaired Wound Healing  Goal: Optimal Wound Healing  Outcome: Ongoing, Progressing     Problem: Fall Injury Risk  Goal: Absence of Fall and Fall-Related Injury  Outcome: Ongoing, Progressing

## 2024-04-04 NOTE — CONSULTS
Inpatient consult to Palliative Care  Consult performed by: Gunjan Harris FNP  Consult ordered by: Milla Blake DO      Patient Name: Ev Alberts   MRN: [id number]   Admission Date: 4/3/2024   Hospital Length of Stay: 0   Attending Provider: Ally Erazo MD   Consulting Provider: Gunjan BUITRAGO  Reason for Consult: Goals of Care  Primary Care Physician: Santi Walters MD     Principal Problem: <principal problem not specified>     Patient information was obtained from relative(s) and ER records.      Final diagnoses:  [R53.1] Weakness (Primary)  [R60.0] Peripheral edema  [N18.9] Chronic kidney disease, unspecified CKD stage     Assessment/Plan:     I reviewed the patient and family's understanding of the seriousness of the illness and its expected prognosis. We discussed the patient's goals of care and treatment preferences.  I clarified current code status. I identified the surrogate decision maker or health care POA. I answered all questions and we formulated a plan including recommendations for symptom management and how to best achieve goals of care.       Advance Care Planning     Date: 04/04/2024    [facility]  I engaged the family in a voluntary conversation about advance care planning and we specifically addressed what the goals of care would be moving forward, in light of the patient's change in clinical status, specifically current condition.  We did specifically address the patient's likely prognosis, which is fair .  We explored the patient's values and preferences for future care.  The family endorses that what is most important right now is to focus on improvement in condition but with limits to invasive therapies    Accordingly, we have decided that the best plan to meet the patient's goals includes continuing with treatment    Met with patient's --introduced service and discussed patient's history and current condition.  states that patient had recently been in the  hospital, after which she had come home but remained weak.  He states that one son lives with them and that all their children are involved in patient's care.      I discussed AD and code status to which  states that he and wife did not speak about those topics and would like to speak further with his children.  States that a daughter will be visiting soon.      Called back to room and daughter Candi present.  RN gave update and informed that patient has been somnolent since she arrived.  Discussed her heart failure, edema and diuresis.  Informed daughter that I discussed code status with their father to which she states that she and her sisters have been discussing this already since patient seems to have been declining since first hospitalization.  She expressed that she does not think patient would want resuscitation and does not think it would benefit her.  She informed that they will discuss further as a family.  I asked daughter if family had discussed patient's care outside of the hospital to which she states that patient's  has mentioned NH, but they would like to try to keep her home if possible.  Offered support and informed I would continue to follow.              History of Present Illness:     Patient is an 89-year-old female with PMH of dementia, HTN, sick sinus syndrome, thyroid disease, and unspecified glaucoma who presented to [facility] on 04/[facility] with primary complaint of fatigue.  Of note patient was discharged from this facility on 03/11/2024 where she received treatment for UTI, NSTEMI, and pneumonia recently discharged from LTAC.  Per family patient has remained somnolent since discharge.  Palliative care consulted to discuss goals of care and code status.       Active Ambulatory Problems     Diagnosis Date Noted    Debility 02/17/2024    Severe malnutrition 03/07/2024     Resolved Ambulatory Problems     Diagnosis Date Noted    No Resolved Ambulatory Problems     Past Medical History:    Diagnosis Date    Dementia     Hypertension     Sick sinus syndrome     Thyroid disease     Unspecified glaucoma         Past Surgical History:   Procedure Laterality Date    cataract surgery       SECTION      CHOLECYSTECTOMY      HYSTERECTOMY      INSERTION OF PACEMAKER      LEFT HEART CATHETERIZATION Left 3/4/2024    Procedure: Left heart cath;  Surgeon: Mark Raymundo Jr., MD;  Location: [facility] CATH LAB;  Service: Cardiology;  Laterality: Left;    NEPHRECTOMY          Review of patient's allergies indicates:   Allergen Reactions    Amlodipine-benazepril Edema     Other reaction(s): unknown    Corticosteroids (glucocorticoids) Edema and Swelling    Rofecoxib Anaphylaxis and Swelling    Sulfa (sulfonamide antibiotics) Edema    Atorvastatin      Other reaction(s): unknown    Ibuprofen      Other reaction(s): Allergy to sulfa drugs          Current Facility-Administered Medications:     acetaminophen tablet 650 mg, 650 mg, Oral, Q8H PRN, Milla Blake, DO    allopurinoL tablet 300 mg, 300 mg, Oral, Daily, Diana Blakea D, DO, 300 mg at 24    apixaban tablet 2.5 mg, 2.5 mg, Oral, BID, Milla Blake, DO, 2.5 mg at 24    aspirin EC tablet 81 mg, 81 mg, Oral, Daily, Diana Blakea D, DO, 81 mg at 24    atorvastatin tablet 10 mg, 10 mg, Oral, Nightly, Diana Blakea D, DO, 10 mg at 24 2200    brimonidine 0.15 % OPTH DROP ophthalmic solution 1 drop, 1 drop, Both Eyes, BID, Ally Erazo MD    carvediloL tablet 3.125 mg, 3.125 mg, Oral, BID, Milla Blake D, DO, 3.125 mg at 24    dextrose 10% bolus 125 mL 125 mL, 12.5 g, Intravenous, PRN, Diana Blakea D, DO    dextrose 10% bolus 250 mL 250 mL, 25 g, Intravenous, PRN, Milla Blake D, DO    furosemide injection 60 mg, 60 mg, Intravenous, Q12H, Milla Blake D, DO, 60 mg at 24    gabapentin capsule 200 mg, 200 mg, Oral, BID, Diana Blakea D, DO, 200 mg at 24     "glucagon (human recombinant) injection 1 mg, 1 mg, Intramuscular, PRN, Milla Blake, DO    glucose chewable tablet 16 g, 16 g, Oral, PRN, Milla Blake, DO    glucose chewable tablet 24 g, 24 g, Oral, PRN, Milla Blake, DO    latanoprost 0.005 % ophthalmic solution 1 drop, 1 drop, Both Eyes, QHS, Ally Erazo MD    [START ON 4/5/2024] levothyroxine tablet 25 mcg, 25 mcg, Oral, Before breakfast, Ally Erazo MD    naloxone 0.4 mg/mL injection 0.02 mg, 0.02 mg, Intravenous, PRN, Milla Blake, DO    pantoprazole EC tablet 40 mg, 40 mg, Oral, Daily, Ally Erazo MD, 40 mg at 04/04/24 0913    potassium chloride SA CR tablet 20 mEq, 20 mEq, Oral, Daily, Ally Erazo MD, 20 mEq at 04/04/24 0914    risperiDONE tablet 2 mg, 2 mg, Oral, QHS, Milla Blake, DO, 2 mg at 04/03/24 2200    rivastigmine tartrate capsule 1.5 mg, 1.5 mg, Oral, BID, Milla Blake, DO, 1.5 mg at 04/03/24 2242    sodium chloride 0.9% flush 10 mL, 10 mL, Intravenous, Q12H PRN, Milla Blake,     timolol maleate 0.5% ophthalmic solution 1 drop, 1 drop, Both Eyes, BID, Ally Erazo MD    traMADoL tablet 50 mg, 50 mg, Oral, Q8H PRN, Milla Blake, DO     acetaminophen, dextrose 10%, dextrose 10%, glucagon (human recombinant), glucose, glucose, naloxone, sodium chloride 0.9%, traMADoL     No family history on file.     Review of Systems   Unable to perform ROS: Dementia            Objective:   /71 (BP Location: Left arm, Patient Position: Lying)   Pulse 65   Temp 98.1 °F (36.7 °C) (Oral)   Resp 16   Ht 5' 5" (1.651 m)   Wt 69.2 kg (152 lb 8.9 oz)   SpO2 98%   Breastfeeding No   BMI 25.39 kg/m²      Physical Exam  Constitutional:       Appearance: She is ill-appearing.   HENT:      Head: Normocephalic.   Eyes:      Pupils: Pupils are equal, round, and reactive to light.   Cardiovascular:      Rate and Rhythm: Rhythm irregular.   Pulmonary:      Breath sounds: Rhonchi present.   Abdominal: "      Palpations: Abdomen is soft.   Skin:     General: Skin is warm.   Neurological:      Comments: To person and place           FAMILY CONTACTS:     Review of Symptoms  Review of Symptoms      Symptom Assessment (ESAS 0-10 Scale)  Pain:  0  Dyspnea:  0  Anxiety:  0  Nausea:  0  Depression:  0  Anorexia:  0  Fatigue:  0  Insomnia:  0  Restlessness:  0  Agitation:  0         Bowel Management Plan (BMP):  Yes      Psychosocial/Cultural:   See Palliative Psychosocial Note: Yes  Patient is  and has 5 living children, one .  4 live local and one daughter lives in [place]  **Primary  to Follow**  Palliative Care  Consult: No    Spiritual:  F - Milli and Belief:  Caodaism      Advance Care Planning   Advance Directives:     Decision Making:  Family answered questions  Goals of Care: The family endorses that what is most important right now is to focus on improvement in condition but with limits to invasive therapies    Accordingly, we have decided that the best plan to meet the patient's goals includes continuing with treatment          PAINAD: NA    Caregiver burden formerly assessed: Yes        > 50% of 70 min of encounter was spent in chart review, face to face discussion of goals of care, symptom assessment, coordination of care and emotional support.       Gunjan Harris FNP, [facility]  Palliative Medicine  [facility]

## 2024-04-04 NOTE — NURSING
Nurses Note -- 4 Eyes      4/3/2024   11:16 PM      Skin assessed during: Admit      [] No Altered Skin Integrity Present    []Prevention Measures Documented      [x] Yes- Altered Skin Integrity Present or Discovered   [] LDA Added if Not in Epic (Describe Wound)   [x] New Altered Skin Integrity was Present on Admit and Documented in LDA   [] Wound Image Taken    Wound Care Consulted? Yes    Attending Nurse:  Melissa LIEBERMAN    Second RN/Staff Member:  Rosy LIEBERMAN       Applied foam dressing

## 2024-04-05 LAB
ANION GAP SERPL CALC-SCNC: 7 MEQ/L
BUN SERPL-MCNC: 16.9 MG/DL (ref 9.8–20.1)
CALCIUM SERPL-MCNC: 9 MG/DL (ref 8.4–10.2)
CHLORIDE SERPL-SCNC: 98 MMOL/L (ref 98–107)
CO2 SERPL-SCNC: 32 MMOL/L (ref 23–31)
CREAT SERPL-MCNC: 1.55 MG/DL (ref 0.55–1.02)
CREAT/UREA NIT SERPL: 11
GFR SERPLBLD CREATININE-BSD FMLA CKD-EPI: 32 MLS/MIN/1.73/M2
GLUCOSE SERPL-MCNC: 96 MG/DL (ref 82–115)
MAGNESIUM SERPL-MCNC: 1.8 MG/DL (ref 1.6–2.6)
PHOSPHATE SERPL-MCNC: 2.6 MG/DL (ref 2.3–4.7)
POTASSIUM SERPL-SCNC: 3.6 MMOL/L (ref 3.5–5.1)
SODIUM SERPL-SCNC: 137 MMOL/L (ref 136–145)

## 2024-04-05 PROCEDURE — 97162 PT EVAL MOD COMPLEX 30 MIN: CPT

## 2024-04-05 PROCEDURE — 21400001 HC TELEMETRY ROOM

## 2024-04-05 PROCEDURE — 97530 THERAPEUTIC ACTIVITIES: CPT

## 2024-04-05 PROCEDURE — 84100 ASSAY OF PHOSPHORUS: CPT | Performed by: STUDENT IN AN ORGANIZED HEALTH CARE EDUCATION/TRAINING PROGRAM

## 2024-04-05 PROCEDURE — 25000003 PHARM REV CODE 250: Performed by: STUDENT IN AN ORGANIZED HEALTH CARE EDUCATION/TRAINING PROGRAM

## 2024-04-05 PROCEDURE — 25000003 PHARM REV CODE 250: Performed by: INTERNAL MEDICINE

## 2024-04-05 PROCEDURE — 83735 ASSAY OF MAGNESIUM: CPT | Performed by: STUDENT IN AN ORGANIZED HEALTH CARE EDUCATION/TRAINING PROGRAM

## 2024-04-05 PROCEDURE — 80048 BASIC METABOLIC PNL TOTAL CA: CPT | Performed by: STUDENT IN AN ORGANIZED HEALTH CARE EDUCATION/TRAINING PROGRAM

## 2024-04-05 PROCEDURE — 27000221 HC OXYGEN, UP TO 24 HOURS

## 2024-04-05 RX ORDER — FUROSEMIDE 40 MG/1
40 TABLET ORAL DAILY
Status: DISCONTINUED | OUTPATIENT
Start: 2024-04-05 | End: 2024-04-06

## 2024-04-05 RX ADMIN — TIMOLOL MALEATE 1 DROP: 5 SOLUTION OPHTHALMIC at 09:04

## 2024-04-05 RX ADMIN — FUROSEMIDE 40 MG: 40 TABLET ORAL at 09:04

## 2024-04-05 RX ADMIN — RISPERIDONE 2 MG: 1 TABLET ORAL at 08:04

## 2024-04-05 RX ADMIN — TRAMADOL HYDROCHLORIDE 50 MG: 50 TABLET, COATED ORAL at 12:04

## 2024-04-05 RX ADMIN — APIXABAN 2.5 MG: 2.5 TABLET, FILM COATED ORAL at 09:04

## 2024-04-05 RX ADMIN — LATANOPROST 1 DROP: 50 SOLUTION OPHTHALMIC at 08:04

## 2024-04-05 RX ADMIN — PANTOPRAZOLE SODIUM 40 MG: 40 TABLET, DELAYED RELEASE ORAL at 09:04

## 2024-04-05 RX ADMIN — BRIMONIDINE TARTRATE 1 DROP: 1.5 SOLUTION OPHTHALMIC at 09:04

## 2024-04-05 RX ADMIN — ACETAMINOPHEN 650 MG: 325 TABLET, FILM COATED ORAL at 08:04

## 2024-04-05 RX ADMIN — ASPIRIN 81 MG: 81 TABLET, COATED ORAL at 09:04

## 2024-04-05 RX ADMIN — ALLOPURINOL 300 MG: 300 TABLET ORAL at 09:04

## 2024-04-05 RX ADMIN — ATORVASTATIN CALCIUM 10 MG: 10 TABLET, FILM COATED ORAL at 08:04

## 2024-04-05 RX ADMIN — GABAPENTIN 200 MG: 100 CAPSULE ORAL at 09:04

## 2024-04-05 RX ADMIN — GABAPENTIN 200 MG: 100 CAPSULE ORAL at 08:04

## 2024-04-05 RX ADMIN — CARVEDILOL 3.12 MG: 3.12 TABLET, FILM COATED ORAL at 08:04

## 2024-04-05 RX ADMIN — RIVASTIGMINE TARTRATE 1.5 MG: 1.5 CAPSULE ORAL at 08:04

## 2024-04-05 RX ADMIN — CARVEDILOL 3.12 MG: 3.12 TABLET, FILM COATED ORAL at 09:04

## 2024-04-05 RX ADMIN — BRIMONIDINE TARTRATE 1 DROP: 1.5 SOLUTION OPHTHALMIC at 08:04

## 2024-04-05 RX ADMIN — RIVASTIGMINE TARTRATE 1.5 MG: 1.5 CAPSULE ORAL at 09:04

## 2024-04-05 RX ADMIN — ACETAMINOPHEN 650 MG: 325 TABLET, FILM COATED ORAL at 09:04

## 2024-04-05 RX ADMIN — APIXABAN 2.5 MG: 2.5 TABLET, FILM COATED ORAL at 08:04

## 2024-04-05 RX ADMIN — TIMOLOL MALEATE 1 DROP: 5 SOLUTION OPHTHALMIC at 08:04

## 2024-04-05 RX ADMIN — POTASSIUM CHLORIDE 20 MEQ: 1500 TABLET, EXTENDED RELEASE ORAL at 09:04

## 2024-04-05 NOTE — PLAN OF CARE
Problem: Physical Therapy  Goal: Physical Therapy Goal  Description: Goals to be met by: 24     Patient will increase functional independence with mobility by performin. Supine to sit with Stand-by Assistance  2. Sit to supine with Stand-by Assistance  3. Sit to stand transfer with Stand-by Assistance  4. Bed to chair transfer with Stand-by Assistance using Rolling Walker  5. Gait  x 200 feet with Stand-by Assistance using Rolling Walker.     Outcome: Ongoing, Progressing

## 2024-04-05 NOTE — PROGRESS NOTES
[facility]  Wound Care    Patient Name:  Ev Alberts   MRN:  [id number]  Date: 4/5/2024  Diagnosis: <principal problem not specified>    History:     Past Medical History:   Diagnosis Date    Dementia     Hypertension     Sick sinus syndrome     Thyroid disease     Unspecified glaucoma        Social History     Socioeconomic History    Marital status:    Tobacco Use    Smoking status: Never    Smokeless tobacco: Never   Substance and Sexual Activity    Alcohol use: Never    Drug use: Not Currently     Social Determinants of Health     Financial Resource Strain: Low Risk  (4/5/2024)    Overall Financial Resource Strain (CARDIA)     Difficulty of Paying Living Expenses: Not hard at all   Food Insecurity: No Food Insecurity (4/5/2024)    Hunger Vital Sign     Worried About Running Out of Food in the Last Year: Never true     Ran Out of Food in the Last Year: Never true   Transportation Needs: Unmet Transportation Needs (4/5/2024)    PRAPARE - Transportation     Lack of Transportation (Medical): No     Lack of Transportation (Non-Medical): Yes   Physical Activity: Inactive (2/26/2024)    Exercise Vital Sign     Days of Exercise per Week: 0 days     Minutes of Exercise per Session: 0 min   Stress: No Stress Concern Present (4/5/2024)    [employer] [place] [employer] - Occupational Stress Questionnaire     Feeling of Stress : Not at all   Social Connections: Moderately Integrated (2/26/2024)    Social Connection and Isolation Panel [NHANES]     Frequency of Communication with Friends and Family: More than three times a week     Frequency of Social Gatherings with Friends and Family: Once a week     Attends [employer] Services: More than 4 times per year     Active Member of Clubs or Organizations: No     Attends Club or Organization Meetings: Never     Marital Status:    Housing Stability: Low Risk  (4/5/2024)    Housing Stability Vital Sign     Unable to Pay for  Housing in the Last Year: No     Number of Places Lived in the Last Year: 1     Unstable Housing in the Last Year: No   Recent Concern: Housing Stability - High Risk (2/27/2024)    Housing Stability Vital Sign     Unable to Pay for Housing in the Last Year: No     Number of Places Lived in the Last Year: 1     Unstable Housing in the Last Year: Yes       Precautions:     Allergies as of 04/03/2024 - Reviewed 04/03/2024   Allergen Reaction Noted    Amlodipine-benazepril Edema 06/01/2022    Corticosteroids (glucocorticoids) Edema and Swelling 05/11/2011    Rofecoxib Anaphylaxis and Swelling 05/11/2011    Sulfa (sulfonamide antibiotics) Edema 06/01/2022    Atorvastatin  10/26/2018    Ibuprofen  06/01/2022       WOC Assessment Details/Treatment        04/05/24 0854   WOCN Assessment   Visit Date 04/05/24   Visit Time 0854   Consult Type New   [facility] Speciality Wound   Intervention assessed;chart review;orders   Teaching on-going   Skin Interventions   Device Skin Pressure Protection absorbent pad utilized/changed        Altered Skin Integrity 03/10/24 0705 Sacral spine   Date First Assessed/Time First Assessed: 03/10/24 0705   Altered Skin Integrity Present on Admission - Did Patient arrive to the hospital with altered skin?: suspected hospital acquired  Location: Sacral spine   Wound Image    Dressing Appearance Dry;Intact;Clean   Drainage Amount None   Drainage Characteristics/Odor No odor   Appearance Intact   Tissue loss description Not applicable   Periwound Area Scar tissue   Wound Edges   (Closed)   Care Cleansed with:;Sterile normal saline   Dressing Applied;Foam     Wound care consulted for sacrum. No family at bedside. Old scar tissue noted from suspected old pressure injury. No open areas or drainage present. Treatment recommendations put into place for protection: Sacrum: Cleanse with NS. Apply sacral foam dressing. Change daily and monitor. Keep areas clean and dry, no adult briefs while in bed. Nursing to  continue with turning every two hours, wedge, and floating heels.  Head of bed elevated, bed in lowest position, and call bell within reach. Will follow up.    04/05/2024

## 2024-04-05 NOTE — PT/OT/SLP PROGRESS
Occupational Therapy      Patient Name:  Ev Alberts   MRN:  [id number]    Attempted to see pt for OT eval. Pt declined 2/2 pain in LE. Reported she had notified RN and had received pain medicine. OT to follow up as appropriate.     4/5/2024

## 2024-04-05 NOTE — PLAN OF CARE
Problem: Adult Inpatient Plan of Care  Goal: Plan of Care Review  Outcome: Ongoing, Progressing  Flowsheets (Taken 4/5/2024 0303)  Plan of Care Reviewed With: patient  Goal: Absence of Hospital-Acquired Illness or Injury  Outcome: Ongoing, Progressing  Goal: Optimal Comfort and Wellbeing  Outcome: Ongoing, Progressing

## 2024-04-05 NOTE — PROGRESS NOTES
[facility] Hospital Medicine Progress Note        Chief Complaint: Inpatient Follow-up for fatigue , leg swelling     HPI:   The pt is a 88 yo female with a PMHx of SSS s/p PPM 2019, PAF on Eliquis, HFpEF 55-60%, CAD, PAD, KALEB, HLD, CKD, VHD- mild to mod TR  and Gout presented to the ED on 4/3/24 for evaluation of fatigue despite being in the LTAC  following an admission for NSTEMI, Pneumonia and UTI in this facility. Pt noted worsening ila lower ext edema despite home lasix therapy. Other than fatigue and weakness due to lower extremity edema patient has no other complaints. Patient lives at home with her  and typically does ADLs and I ADLs with no assistance however since last admission patient has significantly declined. She is unable to mobilize due to significant swelling in lower extremities bilaterally.  Denies keely chest pain, or SOB at rest. Vitals on arrival 129/59, 97.5, 70, 16, 98% on RA. Labs showed normal WBC, Hgb 9.3, Na 139, K 3.9, Cr 1.7, , Troponin 0.081>0.055, TSH 2.8, UA with WBC 6-10 /HPF does not reflect culture , CXR with cardiomegaly and mild congestive changes . Pt was admitted to  service and Cardiology was consulted to assist. Pt was started on IV Furosemide 60 mg q12h with good response. Continued on BB with plan to initiate other GDMT include ACEi or ARB as BP and renal function allows.     Interval Hx:   Pt feels better. Currently PT/OT is visiting pt for initial visit. Leg swelling appears better but has not resolved   Afebrile. BP is favorable , currently on RA  Labs showed CO2 32, Cr increased to 1.5   Will transition Lasix to oral 40 mg daily     Case was discussed with patient's nurse and  on the floor.    Objective/physical exam:  General: In no acute distress, afebrile  Chest: clear to auscultation   Heart: RRR, +S1, S2, no appreciable murmur  Abdomen: Soft, nontender, BS +  MSK: Warm, 1 to 2+ lower extremity edema  Rt>Lt, no clubbing or cyanosis  Neurologic: Alert and oriented x3, Cranial nerve II-XII intact, Move all ext spontaneously.         VITAL SIGNS: 24 HRS MIN & MAX LAST   Temp  Min: 97.6 °F (36.4 °C)  Max: 99.7 °F (37.6 °C) 98.4 °F (36.9 °C)   BP  Min: 83/50  Max: 126/57 122/67   Pulse  Min: 60  Max: 64  64   Resp  Min: 14  Max: 17 16   SpO2  Min: 94 %  Max: 100 % 96 %     I have reviewed the following labs:  Recent Labs   Lab 04/03/24  1523   WBC 6.99   RBC 3.11*   HGB 9.3*   HCT 29.6*   MCV 95.2*   MCH 29.9   MCHC 31.4*   RDW 19.3*      MPV 10.0     Recent Labs   Lab 04/03/24  1523 04/04/24  0440 04/05/24  0519    138 137   K 3.6 3.6 3.6   CO2 32* 31 32*   BUN 20.8* 17.5 16.9   CREATININE 1.70* 1.43* 1.55*   CALCIUM 9.5 9.7 9.0   MG  --  1.90 1.80   ALBUMIN 3.0*  --   --    ALKPHOS 68  --   --    ALT 10  --   --    AST 25  --   --    BILITOT 0.7  --   --      Microbiology Results (last 7 days)       Procedure Component Value Units Date/Time    Urine Culture High Risk [[id number]]  (Abnormal) Collected: 04/04/24 1847    Order Status: Completed Specimen: Urine, Clean Catch Updated: 04/05/24 0726     Urine Culture 10,000 - 25,000 colonies/ml Gram-negative Rods             See below for Radiology    Scheduled Med:   allopurinoL  300 mg Oral Daily    apixaban  2.5 mg Oral BID    aspirin  81 mg Oral Daily    atorvastatin  10 mg Oral Nightly    brimonidine 0.15 % OPTH DROP  1 drop Both Eyes BID    carvediloL  3.125 mg Oral BID    furosemide  40 mg Oral Daily    gabapentin  200 mg Oral BID    latanoprost  1 drop Both Eyes QHS    levothyroxine  25 mcg Oral Before breakfast    pantoprazole  40 mg Oral Daily    potassium chloride  20 mEq Oral Daily    risperiDONE  2 mg Oral QHS    rivastigmine tartrate  1.5 mg Oral BID    timolol maleate 0.5%  1 drop Both Eyes BID      Continuous Infusions:     PRN Meds:  acetaminophen, dextrose 10%, dextrose 10%, glucagon (human recombinant), glucose, glucose, naloxone, sodium  chloride 0.9%, traMADoL     Assessment/Plan:  Acute on chronic diastolic HF, LVEF 55-60%, GIII DD, POA   Normocytic anemia / Anemia of chromic disease   Chronic kidney disease - improved renal indices   Elevated troponin / NSTEMI type 2 die to HF and cardiac decompensation   Fatigue due to HF exacerbation   Brandon lower ext edema / Volume overload due to CHF exacerbation   PAF on Eliquis,   Metabolic alkalosis with diuretic therapy      Hx  of CAD, PAD, KALEB, HLD, CKD, VHD- mild to mod TR  and Gout     Plan-   Still some what volume overloaded with peripheral edema, however will transition Lasix to 40 mg oral daily due to evidence of contraction metabolic alkalosis and a bump in serum creatinine after initial improvement     Monitor hemodynamics   BP is favorable. However ARB/ACEi not added given presence of renal insufficiency   Continue ASA, Statin  , BB  Initiate other GDMT include ARB as BP and renal function allows   Iron profile suggestive of anemia of chronic disease   Home meds are reviewed and resumed as appropriate   TSH is at goal   PT/OT eval today           VTE prophylaxis: Eliquis     Patient condition:  Fair      Anticipated discharge and Disposition:     TBD             All diagnosis and differential diagnosis have been reviewed; assessment and plan has been documented; I have personally reviewed the labs and test results that are presently available; I have reviewed the patients medication list; I have reviewed the consulting providers response and recommendations. I have reviewed or attempted to review medical records based upon their availability    All of the patient's questions have been  addressed and answered. Patient's is agreeable to the above stated plan. I will continue to monitor closely and make adjustments to medical management as needed.  __________________________    Ally Erazo MD  Department of Hospital Medicine   [facility]   04/05/2024

## 2024-04-05 NOTE — PROGRESS NOTES
[facility] Hospital Medicine Progress Note        Chief Complaint: Inpatient Follow-up for fatigue , leg swelling     HPI:   The pt is a 88 yo female with a PMHx of SSS s/p PPM 2019, PAF on Eliquis, HFpEF 55-60%, CAD, PAD, KALEB, HLD, CKD, VHD- mild to mod TR  and Gout presented to the ED on 4/3/24 for evaluation of fatigue despite being in the LTAC  following an admission for NSTEMI, Pneumonia and UTI in this facility. Pt noted worsening ila lower ext edema despite home lasix therapy. Other than fatigue and weakness due to lower extremity edema patient has no other complaints. Patient lives at home with her  and typically does ADLs and I ADLs with no assistance however since last admission patient has significantly declined. She is unable to mobilize due to significant swelling in lower extremities bilaterally.  Denies keely chest pain, npo SOB at rest. Vitals on arrival 129/59, 97.5, 70, 16, 98% on RA. Labs showed normal WBC, Hgb 9.3, Na 139, K 3.9, Cr 1.7, , Troponin 0.081>0.055, TSH 2.8, UA with WBC 6-10 /HPF does not reflect culture , CXR with cardiomegaly and mild congestive changes . Pt was admitted to  service and Cardiology was consulted to assist. Pt was started on IV Furosemide 60 mg q12h with good response. Continued on BB with plan to initiate other GDMT include ACEi or ARB as BP and renal function allows.     Interval Hx:   Pt reports feeling better and leg swelling are decreasing since started on IV Lasix   BP noted to be soft, however MAP is favorable , On RA  Labs showed troponin trended down to 0.055    Case was discussed with patient's nurse and  on the floor.    Objective/physical exam:  General: In no acute distress, afebrile  Chest: few crackles at bases   Heart: RRR, +S1, S2, no appreciable murmur  Abdomen: Soft, nontender, BS +  MSK: Warm, 2+ lower extremity edema, no clubbing or cyanosis  Neurologic: Alert and oriented x3, Cranial  nerve II-XII intact, Move all ext spontaneously.     VITAL SIGNS: 24 HRS MIN & MAX LAST   Temp  Min: 97.6 °F (36.4 °C)  Max: 100 °F (37.8 °C) 97.6 °F (36.4 °C)   BP  Min: 83/50  Max: 123/71 114/69   Pulse  Min: 59  Max: 65  60   Resp  Min: 14  Max: 18 17   SpO2  Min: 94 %  Max: 100 % 100 %     I have reviewed the following labs:  Recent Labs   Lab 04/03/24  1523   WBC 6.99   RBC 3.11*   HGB 9.3*   HCT 29.6*   MCV 95.2*   MCH 29.9   MCHC 31.4*   RDW 19.3*      MPV 10.0     Recent Labs   Lab 03/30/24  0604 04/03/24  1523 04/04/24  0440    139 138   K 2.8* 3.6 3.6   CO2 37* 32* 31   BUN 13.4 20.8* 17.5   CREATININE 1.12* 1.70* 1.43*   CALCIUM 8.9 9.5 9.7   MG  --   --  1.90   ALBUMIN  --  3.0*  --    ALKPHOS  --  68  --    ALT  --  10  --    AST  --  25  --    BILITOT  --  0.7  --      Microbiology Results (last 7 days)       Procedure Component Value Units Date/Time    Urine Culture High Risk [[id number]] Collected: 04/04/24 1847    Order Status: Sent Specimen: Urine, Clean Catch Updated: 04/04/24 1847             See below for Radiology    Scheduled Med:   allopurinoL  300 mg Oral Daily    apixaban  2.5 mg Oral BID    aspirin  81 mg Oral Daily    atorvastatin  10 mg Oral Nightly    brimonidine 0.15 % OPTH DROP  1 drop Both Eyes BID    carvediloL  3.125 mg Oral BID    furosemide (LASIX) injection  60 mg Intravenous Q12H    gabapentin  200 mg Oral BID    latanoprost  1 drop Both Eyes QHS    [START ON 4/5/2024] levothyroxine  25 mcg Oral Before breakfast    pantoprazole  40 mg Oral Daily    potassium chloride  20 mEq Oral Daily    risperiDONE  2 mg Oral QHS    rivastigmine tartrate  1.5 mg Oral BID    timolol maleate 0.5%  1 drop Both Eyes BID      Continuous Infusions:     PRN Meds:  acetaminophen, dextrose 10%, dextrose 10%, glucagon (human recombinant), glucose, glucose, naloxone, sodium chloride 0.9%, traMADoL     Assessment/Plan:  Acute on chronic diastolic HF, LVEF 55-60%, GIII DD, POA   Normocytic  anemia / Anemia of chromic disease   Chronic kidney disease - improved renal indices   Elevated troponin / NSTEMI type 2 die to HF and cardiac decompensation   Fatigue due to HF exacerbation   Brandon lower ext edema / Volume overload due to CHF exacerbation   PAF on Eliquis,     Hx  of CAD, PAD, KALEB, HLD, CKD, VHD- mild to mod TR  and Gout    Plan-   Continue IV diuresis   Still volume overloaded on exam with peripheral edema   Monitor hemodynamics and adjust diuretics according to BP   Continue ASA, Statin  , BB  Initiate other GDMT include ARB as BP and renal function allows   Iron profile suggestive of anemia of chronic disease   Home meds are reviewed and resumed as appropriate   TSH is at goal   Consult PT/OT      Critical care note:  Critical care diagnosis: HF requiring IV lasix   Critical care interventions: Hands-on evaluation, review of labs/radiographs/records and discussion with patient and family if present  Critical care time spent: 35 minutes        VTE prophylaxis: Eliquis    Patient condition:  Fair     Anticipated discharge and Disposition:     TBD    All diagnosis and differential diagnosis have been reviewed; assessment and plan has been documented; I have personally reviewed the labs and test results that are presently available; I have reviewed the patients medication list; I have reviewed the consulting providers response and recommendations. I have reviewed or attempted to review medical records based upon their availability    All of the patient's questions have been  addressed and answered. Patient's is agreeable to the above stated plan. I will continue to monitor closely and make adjustments to medical management as needed.  _________________________________    Ally Erazo MD  Department of Hospital Medicine   [facility]   04/04/2024

## 2024-04-05 NOTE — PT/OT/SLP EVAL
Physical Therapy Evaluation    Patient Name:  Ev Alberts   MRN:  [id number]    Recommendations:     Discharge therapy intensity: Moderate Intensity Therapy   Discharge Equipment Recommendations: none   Barriers to discharge: Impaired mobility    Assessment:     Ev Alberts is a 89 y.o. female admitted with a medical diagnosis of acute on chronic HF, SSS, NSTEMI.  She presents with the following impairments/functional limitations: weakness, gait instability, impaired balance, impaired endurance, decreased safety awareness, impaired functional mobility. The pt tolerated session fair. She required encouragement to participate. She demos generalized weakness and requires min/mod A overall for mobility. The pt lives at home with her son and . She would benefit from mod intensity PT at this time, but could return home with 24-hr assistance if needed.     Rehab Prognosis: Good; patient would benefit from acute skilled PT services to address these deficits and reach maximum level of function.    Recent Surgery: * No surgery found *      Plan:     During this hospitalization, patient to be seen 5 x/week to address the identified rehab impairments via gait training, therapeutic exercises, therapeutic activities and progress toward the following goals:    Plan of Care Expires:  05/03/24    Subjective     Chief Complaint: pain  Patient/Family Comments/goals: return to PLOF  Pain/Comfort:  Location 1: back  Pain Addressed 1: Reposition, Distraction    Patients cultural, spiritual, Gnosticist conflicts given the current situation:      Living Environment:  Home with  and son, SL home, no steps to enter.   Prior to admission, patients level of function was independent.  Equipment used at home: walker, rolling.  DME owned (not currently used): rolling walker.  Upon discharge, patient will have assistance from spouse.    Objective:     Communicated with NSG prior to session.  Patient found supine with PureWick   upon PT entry to room.    General Precautions: Standard, fall  Orthopedic Precautions:N/A   Braces: N/A  Respiratory Status: Room air  Blood Pressure: NA      Exams:  RLE ROM: WFL  RLE Strength: WFL  LLE ROM: WFL  LLE Strength: WFL  Skin integrity: Visible skin intact      Functional Mobility:  Bed Mobility:     Scooting: minimum assistance  Supine to Sit: moderate assistance  Sit to Supine: moderate assistance  Transfers:     Sit to Stand:  minimum assistance with rolling walker  Gait: min A with RW x 12 feet, pt demos decreased step length with RLE      Patient provided with verbal education education regarding PT role/goals/POC, safety awareness, and discharge/DME recommendations.  Understanding was verbalized.     Patient left HOB elevated with all lines intact, call button in reach, wedge under L side, pressure relief boots, NSG notified, and  present.    GOALS:   Multidisciplinary Problems       Physical Therapy Goals          Problem: Physical Therapy    Goal Priority Disciplines Outcome Goal Variances Interventions   Physical Therapy Goal     PT, PT/OT Ongoing, Progressing     Description: Goals to be met by: 24     Patient will increase functional independence with mobility by performin. Supine to sit with Stand-by Assistance  2. Sit to supine with Stand-by Assistance  3. Sit to stand transfer with Stand-by Assistance  4. Bed to chair transfer with Stand-by Assistance using Rolling Walker  5. Gait  x 200 feet with Stand-by Assistance using Rolling Walker.                          History:     Past Medical History:   Diagnosis Date    Dementia     Hypertension     Sick sinus syndrome     Thyroid disease     Unspecified glaucoma        Past Surgical History:   Procedure Laterality Date    cataract surgery       SECTION      CHOLECYSTECTOMY      HYSTERECTOMY      INSERTION OF PACEMAKER      LEFT HEART CATHETERIZATION Left 3/4/2024    Procedure: Left heart cath;  Surgeon: Zackary  Mark SCHAFFER Jr., MD;  Location: [facility] CATH LAB;  Service: Cardiology;  Laterality: Left;    NEPHRECTOMY         Time Tracking:     PT Received On: 04/05/24  PT Start Time: 1028     PT Stop Time: 1052  PT Total Time (min): 24 min     Billable Minutes: Evaluation 15 and Therapeutic Activity 9      04/05/2024dianna

## 2024-04-06 LAB
ANION GAP SERPL CALC-SCNC: 8 MEQ/L
BACTERIA UR CULT: ABNORMAL
BUN SERPL-MCNC: 18.6 MG/DL (ref 9.8–20.1)
CALCIUM SERPL-MCNC: 9 MG/DL (ref 8.4–10.2)
CHLORIDE SERPL-SCNC: 99 MMOL/L (ref 98–107)
CO2 SERPL-SCNC: 31 MMOL/L (ref 23–31)
CREAT SERPL-MCNC: 1.47 MG/DL (ref 0.55–1.02)
CREAT/UREA NIT SERPL: 13
GFR SERPLBLD CREATININE-BSD FMLA CKD-EPI: 34 MLS/MIN/1.73/M2
GLUCOSE SERPL-MCNC: 96 MG/DL (ref 82–115)
MAGNESIUM SERPL-MCNC: 1.8 MG/DL (ref 1.6–2.6)
PHOSPHATE SERPL-MCNC: 3.3 MG/DL (ref 2.3–4.7)
POTASSIUM SERPL-SCNC: 4.5 MMOL/L (ref 3.5–5.1)
SODIUM SERPL-SCNC: 138 MMOL/L (ref 136–145)

## 2024-04-06 PROCEDURE — 94760 N-INVAS EAR/PLS OXIMETRY 1: CPT

## 2024-04-06 PROCEDURE — 27000221 HC OXYGEN, UP TO 24 HOURS

## 2024-04-06 PROCEDURE — 84100 ASSAY OF PHOSPHORUS: CPT | Performed by: STUDENT IN AN ORGANIZED HEALTH CARE EDUCATION/TRAINING PROGRAM

## 2024-04-06 PROCEDURE — 25000003 PHARM REV CODE 250: Performed by: INTERNAL MEDICINE

## 2024-04-06 PROCEDURE — 97166 OT EVAL MOD COMPLEX 45 MIN: CPT

## 2024-04-06 PROCEDURE — 80048 BASIC METABOLIC PNL TOTAL CA: CPT | Performed by: STUDENT IN AN ORGANIZED HEALTH CARE EDUCATION/TRAINING PROGRAM

## 2024-04-06 PROCEDURE — 83735 ASSAY OF MAGNESIUM: CPT | Performed by: STUDENT IN AN ORGANIZED HEALTH CARE EDUCATION/TRAINING PROGRAM

## 2024-04-06 PROCEDURE — 25000003 PHARM REV CODE 250: Performed by: STUDENT IN AN ORGANIZED HEALTH CARE EDUCATION/TRAINING PROGRAM

## 2024-04-06 PROCEDURE — 21400001 HC TELEMETRY ROOM

## 2024-04-06 RX ORDER — FUROSEMIDE 40 MG/1
40 TABLET ORAL 2 TIMES DAILY
Status: DISCONTINUED | OUTPATIENT
Start: 2024-04-06 | End: 2024-04-07

## 2024-04-06 RX ADMIN — BRIMONIDINE TARTRATE 1 DROP: 1.5 SOLUTION OPHTHALMIC at 08:04

## 2024-04-06 RX ADMIN — PANTOPRAZOLE SODIUM 40 MG: 40 TABLET, DELAYED RELEASE ORAL at 08:04

## 2024-04-06 RX ADMIN — CARVEDILOL 3.12 MG: 3.12 TABLET, FILM COATED ORAL at 08:04

## 2024-04-06 RX ADMIN — RIVASTIGMINE TARTRATE 1.5 MG: 1.5 CAPSULE ORAL at 09:04

## 2024-04-06 RX ADMIN — TIMOLOL MALEATE 1 DROP: 5 SOLUTION OPHTHALMIC at 08:04

## 2024-04-06 RX ADMIN — RIVASTIGMINE TARTRATE 1.5 MG: 1.5 CAPSULE ORAL at 08:04

## 2024-04-06 RX ADMIN — ATORVASTATIN CALCIUM 10 MG: 10 TABLET, FILM COATED ORAL at 08:04

## 2024-04-06 RX ADMIN — ASPIRIN 81 MG: 81 TABLET, COATED ORAL at 08:04

## 2024-04-06 RX ADMIN — BRIMONIDINE TARTRATE 1 DROP: 1.5 SOLUTION OPHTHALMIC at 09:04

## 2024-04-06 RX ADMIN — GABAPENTIN 200 MG: 100 CAPSULE ORAL at 08:04

## 2024-04-06 RX ADMIN — TRAMADOL HYDROCHLORIDE 50 MG: 50 TABLET, COATED ORAL at 09:04

## 2024-04-06 RX ADMIN — LEVOTHYROXINE SODIUM 25 MCG: 25 TABLET ORAL at 06:04

## 2024-04-06 RX ADMIN — FUROSEMIDE 40 MG: 40 TABLET ORAL at 08:04

## 2024-04-06 RX ADMIN — LOSARTAN POTASSIUM 12.5 MG: 25 TABLET, FILM COATED ORAL at 11:04

## 2024-04-06 RX ADMIN — APIXABAN 2.5 MG: 2.5 TABLET, FILM COATED ORAL at 08:04

## 2024-04-06 RX ADMIN — POTASSIUM CHLORIDE 20 MEQ: 1500 TABLET, EXTENDED RELEASE ORAL at 08:04

## 2024-04-06 RX ADMIN — TRAMADOL HYDROCHLORIDE 50 MG: 50 TABLET, COATED ORAL at 08:04

## 2024-04-06 RX ADMIN — LATANOPROST 1 DROP: 50 SOLUTION OPHTHALMIC at 09:04

## 2024-04-06 RX ADMIN — RISPERIDONE 2 MG: 1 TABLET ORAL at 08:04

## 2024-04-06 RX ADMIN — FUROSEMIDE 40 MG: 40 TABLET ORAL at 05:04

## 2024-04-06 RX ADMIN — ALLOPURINOL 300 MG: 300 TABLET ORAL at 08:04

## 2024-04-06 RX ADMIN — ACETAMINOPHEN 650 MG: 325 TABLET, FILM COATED ORAL at 03:04

## 2024-04-06 NOTE — PLAN OF CARE
Problem: Occupational Therapy  Goal: Occupational Therapy Goal  Description: LTG: caregiver(s) will demo ability to safely assist pt with ADL and ADL t/fs by discharge.    STG: to be met by 5/3    Pt will complete grooming standing at sink with LRAD with SBA.  Pt will complete UB dressing with SBA.  Pt will complete LB dressing with min A using LRAD.  Pt will complete toileting with SBA using LRAD.  Pt will complete functional mobility to/from toilet and toilet transfer with SBA using LRAD.   Outcome: Ongoing, Progressing

## 2024-04-06 NOTE — PT/OT/SLP EVAL
Occupational Therapy  Evaluation    Name: Ev Alberts  MRN: [id number]  Admitting Diagnosis: edema, fatigue  Recent Surgery: * No surgery found *      Recommendations:     Discharge therapy intensity: Moderate Intensity Therapy   Discharge Equipment Recommendations:  none  Barriers to discharge:  Decreased caregiver support    Assessment:     Ev Alberts is a 89 y.o. female with a medical diagnosis of weakness, fatigue, and BLE edema d/t volume overload associated with acute on chronic heart failure.  She presents with the following performance deficits affecting function: weakness, impaired endurance, impaired self care skills, impaired functional mobility, impaired balance, pain. Pt is wanting to go home, but daughter reports concerns with caring for her at this level. Rec moderate intensity therapy on d/c at this time, however pending length of stay pt may progress to the point of being able to d/c home with low intensity therapy and 24 hr care. Daughter is working on lining up 24 hr care at this time.    Rehab Prognosis: Good; patient would benefit from acute skilled OT services to address these deficits and reach maximum level of function.       Plan:     Patient to be seen 5 x/week to address the above listed problems via self-care/home management, therapeutic activities, therapeutic exercises, wheelchair management/training  Plan of Care Expires: 05/03/24  Plan of Care Reviewed with: patient, daughter    Subjective     Chief Complaint: weakness  Patient/Family Comments/goals: to get stronger    Occupational Profile:  Living Environment: pt lives with spouse and son in a single story home with no steps to enter and a tub/shower combo  Previous level of function: recent SNF stay where she d/c home walking with RW, but with progressive BLE edema became w/c bound and needed ongoing assist for ADLs  Roles and Routines: ADLs  Equipment Used at Home: walker, rolling, wheelchair, bath bench, bedside  commode  Assistance upon Discharge: care 6 hr per day 5 days per week (daughter trying to extend to more house and 7 days per week). Spouse is able to provide SPV but not physically assist much. Son who lives there works as a  at [employer] and all other kids work,     Pain/Comfort:  Pain Rating 1: 5/10  Location 1: sacral spine  Pain Addressed 1: Reposition, Nurse notified    Patients cultural, spiritual, Uatsdin conflicts given the current situation: no    Objective:     OT communicated with nurse after session.      Patient was found right sidelying with oxygen, PureWick upon OT entry to room.    General Precautions: Standard, fall  Orthopedic Precautions: N/A  Braces: N/A    Vital Signs: Supplemental 02: on 02 via NC, nurse in room titrating during sessio     Bed Mobility:    Patient completed Supine to Sit with moderate assistance    Functional Mobility/Transfers:  Patient completed Sit <> Stand Transfer with minimum assistance  with  rolling walker   Patient completed Bed <> Chair Transfer using Step Transfer technique with minimum assistance with rolling walker  Functional Mobility: min A 5 ft bed>chair    Activities of Daily Living:  Feeding:  set up assist (assist to cut food)  Lower Body Dressing: maximal assistance      AMPAC 6 Click ADL:  AMPAC Total Score: 15    Functional Cognition:  Affect: Cooperative  Orientation: oriented to Person, Place, and Situation  Command Following: Intact    Upper Extremity Function:  Right Upper Extremity:   WFL    Left Upper Extremity:  WFL    Balance:   Static sitting balance: WFL  Static standing balance:Impaired. fair    Therapeutic Positioning  Risk for acquired pressure injuries is significantly increased due to impaired mobility and altered skin already present.    OT interventions performed during the course of today's session:   Education was provided on benefits of and recommendations for therapeutic positioning  Therapeutic positioning was provided  at the conclusion of session :geomat placed in chair for sacral protection  Positioning recommendations were communicated to care team     Skin assessment: all bony prominences were assessed    Findings:  altered skin at sacrum    OT recommendations for therapeutic positioning throughout hospitalization:   Follow [facility] Pressure Injury Prevention Protocol  Geomat recommended for sacral protection while UIC    Patient Education:  Patient and daughter were provided with verbal education education regarding OT role/goals/POC, fall prevention, safety awareness, Discharge/DME recommendations, and pressure ulcer prevention.  Understanding was verbalized, however additional teaching warranted.     Patient left up in chair with geomat cushion and nurse notified.    GOALS:   Multidisciplinary Problems       Occupational Therapy Goals          Problem: Occupational Therapy    Goal Priority Disciplines Outcome Interventions   Occupational Therapy Goal     OT, PT/OT Ongoing, Progressing    Description: LTG: caregiver(s) will demo ability to safely assist pt with ADL and ADL t/fs by discharge.    STG: to be met by 5/3    Pt will complete grooming standing at sink with LRAD with SBA.  Pt will complete UB dressing with SBA.  Pt will complete LB dressing with min A using LRAD.  Pt will complete toileting with SBA using LRAD.  Pt will complete functional mobility to/from toilet and toilet transfer with SBA using LRAD.                        History:     Past Medical History:   Diagnosis Date    Dementia     Hypertension     Sick sinus syndrome     Thyroid disease     Unspecified glaucoma          Past Surgical History:   Procedure Laterality Date    cataract surgery       SECTION      CHOLECYSTECTOMY      HYSTERECTOMY      INSERTION OF PACEMAKER      LEFT HEART CATHETERIZATION Left 3/4/2024    Procedure: Left heart cath;  Surgeon: Mark Raymundo Jr., MD;  Location: [facility] CATH LAB;  Service: Cardiology;  Laterality: Left;     NEPHRECTOMY         Time Tracking:     OT Date of Treatment: 04/06/24  OT Start Time: 1154  OT Stop Time: 1220  OT Total Time (min): 26 min    Billable Minutes:Evaluation moderate    4/6/2024

## 2024-04-06 NOTE — PROGRESS NOTES
[facility]  Hospital Medicine Progress Note        Chief Complaint: Inpatient Follow-up for  fatigue, leg swelling     HPI:   The pt is a 88 yo female with a PMHx of SSS s/p PPM 2019, PAF on Eliquis, HFpEF 55-60%, non obstructive CAD with LHC on 3/4/24, PAD, KALEB, HLD, CKD III, VHD- mild to mod TR  and Gout returned to the ED on 4/3/24 for evaluation of fatigue and leg swelling. Pt was just discharged home form SNF after back to back two hospitalization in this facility  since 2/2024. She was discharged to SNF on 3/11/24 after 2 weeks hospitalization for E.coli sepsis due to UTI, Pneumonia and Type 2 NSTEMI.     Pt noted worsening ila lower ext edema despite home lasix therapy. Other than fatigue and weakness due to lower extremity edema patient has no complaints. Patient lives at home with her  and typically does ADLs and I ADLs with no assistance however since last admission patient has significantly declined. She is unable to mobilize due to significant swelling in lower extremities bilaterally.  Denies keely chest pain, or SOB at rest. Vitals on arrival 129/59, 97.5, 70, 16, 98% on RA. Labs showed normal WBC, Hgb 9.3, Na 139, K 3.9, Cr 1.7, , Troponin 0.081>0.055, TSH 2.8, UA with WBC 6-10 /HPF does not reflect culture , CXR with cardiomegaly and mild congestive changes . Pt was admitted to  service and Cardiology was consulted to assist. Pt was started on IV Furosemide 60 mg q12h with good response. Continued on BB with plan to initiate other GDMT include ACEi or ARB as BP and renal function allows.      Interval Hx:   Pt reports feels better however remains weak in general. Leg swelling improved but did not resolved.  BP and creatinine are  favorable . Losartan 12.5 mg added today.   PT/OT note is reviewed. Pt is mod assist for bed mobility, walked 12 feet with RW and min assist. PT suggested moderate intensity therapy.       Case was discussed with patient's nurse  and  on the floor.    Objective/physical exam:  General: In no acute distress, afebrile  Chest: Clear to auscultation bilaterally  Heart: RRR, +S1, S2, no appreciable murmur  Abdomen: Soft, nontender, BS +  MSK: Warm, 1 to 2+  lower extremity edema Rt >Lt   Neurologic: Alert and oriented to self, place and person, Moves all ext in the bed       VITAL SIGNS: 24 HRS MIN & MAX LAST   Temp  Min: 97.7 °F (36.5 °C)  Max: 98.8 °F (37.1 °C) 98.7 °F (37.1 °C)   BP  Min: 110/60  Max: 135/74 117/68   Pulse  Min: 60  Max: 61  60   Resp  Min: 11  Max: 16 16   SpO2  Min: 92 %  Max: 100 % (!) 92 %     I have reviewed the following labs:  Recent Labs   Lab 04/03/24  1523   WBC 6.99   RBC 3.11*   HGB 9.3*   HCT 29.6*   MCV 95.2*   MCH 29.9   MCHC 31.4*   RDW 19.3*      MPV 10.0     Recent Labs   Lab 04/03/24  1523 04/04/24  0440 04/05/24  0519 04/06/24  0322    138 137 138   K 3.6 3.6 3.6 4.5   CO2 32* 31 32* 31   BUN 20.8* 17.5 16.9 18.6   CREATININE 1.70* 1.43* 1.55* 1.47*   CALCIUM 9.5 9.7 9.0 9.0   MG  --  1.90 1.80 1.80   ALBUMIN 3.0*  --   --   --    ALKPHOS 68  --   --   --    ALT 10  --   --   --    AST 25  --   --   --    BILITOT 0.7  --   --   --      Microbiology Results (last 7 days)       Procedure Component Value Units Date/Time    Urine Culture High Risk [[id number]]  (Abnormal) Collected: 04/04/24 1429    Order Status: Completed Specimen: Urine, Clean Catch Updated: 04/06/24 1025     Urine Culture Multiple organisms present indicate probable contamination. Recommend recollection.      10,000 - 25,000 colonies/ml GAMMA STREPTOCOCCUS      Less than 10,000 colonies/ml Gram-positive coccus probable staph    Narrative:        Gram Negative Rods Isolated Two Different Species             See below for Radiology    Scheduled Med:   allopurinoL  300 mg Oral Daily    apixaban  2.5 mg Oral BID    aspirin  81 mg Oral Daily    atorvastatin  10 mg Oral Nightly    brimonidine 0.15 % OPTH DROP  1 drop Both  Eyes BID    carvediloL  3.125 mg Oral BID    furosemide  40 mg Oral BID    gabapentin  200 mg Oral BID    latanoprost  1 drop Both Eyes QHS    levothyroxine  25 mcg Oral Before breakfast    losartan  12.5 mg Oral Daily    pantoprazole  40 mg Oral Daily    potassium chloride  20 mEq Oral Daily    risperiDONE  2 mg Oral QHS    rivastigmine tartrate  1.5 mg Oral BID    timolol maleate 0.5%  1 drop Both Eyes BID      Continuous Infusions:     PRN Meds:  acetaminophen, dextrose 10%, dextrose 10%, glucagon (human recombinant), glucose, glucose, naloxone, sodium chloride 0.9%, traMADoL     Assessment/Plan:  Acute on chronic diastolic HF, LVEF 55-60%, GIII DD, POA   Normocytic anemia / Anemia of chromic disease   Chronic kidney disease - improved renal indices   Elevated troponin / NSTEMI type 2 due to HF and cardiac decompensation   Fatigue due to HF exacerbation   Brandon lower ext edema / Volume overload due to CHF exacerbation   PAF on Eliquis,   Metabolic alkalosis with diuretic therapy      Hx  of CAD, PAD, KALEB, HLD, CKD, VHD- mild to mod TR  and Gout     Plan-   Still some what volume overloaded with peripheral edema. Increase Lasix to 40 mg po bid.   Serum creatinine 1.4 today. BP is favorable   Add Losartan 12.5 mg daily    Continue ASA, Statin  , BB  Titrate up GDMT as tolerated   Iron profile suggestive of anemia of chronic disease   Home meds are reviewed and resumed as appropriate   TSH is at goal   Continue PT/OT service        VTE prophylaxis: Eliquis     Patient condition:  Fair      Anticipated discharge and Disposition:     TBD.. SNF vs home with home health        All diagnosis and differential diagnosis have been reviewed; assessment and plan has been documented; I have personally reviewed the labs and test results that are presently available; I have reviewed the patients medication list; I have reviewed the consulting providers response and recommendations. I have reviewed or attempted to review medical  records based upon their availability    All of the patient's questions have been  addressed and answered. Patient's is agreeable to the above stated plan. I will continue to monitor closely and make adjustments to medical management as needed.  _____________________    Ally Erazo MD  Department of Hospital Medicine   [facility]   04/06/2024

## 2024-04-06 NOTE — NURSING
Pt on 2L NC, titrated down. Pt periodically drops down to mid 80s on RA. Placed back on 1L for support.

## 2024-04-07 PROCEDURE — 25000003 PHARM REV CODE 250: Performed by: STUDENT IN AN ORGANIZED HEALTH CARE EDUCATION/TRAINING PROGRAM

## 2024-04-07 PROCEDURE — 27000221 HC OXYGEN, UP TO 24 HOURS

## 2024-04-07 PROCEDURE — 25000003 PHARM REV CODE 250: Performed by: INTERNAL MEDICINE

## 2024-04-07 PROCEDURE — 21400001 HC TELEMETRY ROOM

## 2024-04-07 PROCEDURE — 94760 N-INVAS EAR/PLS OXIMETRY 1: CPT

## 2024-04-07 RX ORDER — MICONAZOLE NITRATE 2 %
POWDER (GRAM) TOPICAL 2 TIMES DAILY
Status: DISCONTINUED | OUTPATIENT
Start: 2024-04-07 | End: 2024-04-09 | Stop reason: HOSPADM

## 2024-04-07 RX ORDER — FUROSEMIDE 40 MG/1
40 TABLET ORAL DAILY
Status: DISCONTINUED | OUTPATIENT
Start: 2024-04-08 | End: 2024-04-09 | Stop reason: HOSPADM

## 2024-04-07 RX ADMIN — ALLOPURINOL 300 MG: 300 TABLET ORAL at 08:04

## 2024-04-07 RX ADMIN — ATORVASTATIN CALCIUM 10 MG: 10 TABLET, FILM COATED ORAL at 08:04

## 2024-04-07 RX ADMIN — MICONAZOLE NITRATE: 20 POWDER TOPICAL at 08:04

## 2024-04-07 RX ADMIN — RISPERIDONE 2 MG: 1 TABLET ORAL at 08:04

## 2024-04-07 RX ADMIN — CARVEDILOL 3.12 MG: 3.12 TABLET, FILM COATED ORAL at 08:04

## 2024-04-07 RX ADMIN — GABAPENTIN 200 MG: 100 CAPSULE ORAL at 08:04

## 2024-04-07 RX ADMIN — ASPIRIN 81 MG: 81 TABLET, COATED ORAL at 08:04

## 2024-04-07 RX ADMIN — RIVASTIGMINE TARTRATE 1.5 MG: 1.5 CAPSULE ORAL at 08:04

## 2024-04-07 RX ADMIN — PANTOPRAZOLE SODIUM 40 MG: 40 TABLET, DELAYED RELEASE ORAL at 08:04

## 2024-04-07 RX ADMIN — LOSARTAN POTASSIUM 12.5 MG: 25 TABLET, FILM COATED ORAL at 08:04

## 2024-04-07 RX ADMIN — FUROSEMIDE 40 MG: 40 TABLET ORAL at 08:04

## 2024-04-07 RX ADMIN — LEVOTHYROXINE SODIUM 25 MCG: 25 TABLET ORAL at 05:04

## 2024-04-07 RX ADMIN — BRIMONIDINE TARTRATE 1 DROP: 1.5 SOLUTION OPHTHALMIC at 08:04

## 2024-04-07 RX ADMIN — TIMOLOL MALEATE 1 DROP: 5 SOLUTION OPHTHALMIC at 08:04

## 2024-04-07 RX ADMIN — LATANOPROST 1 DROP: 50 SOLUTION OPHTHALMIC at 08:04

## 2024-04-07 RX ADMIN — APIXABAN 2.5 MG: 2.5 TABLET, FILM COATED ORAL at 08:04

## 2024-04-07 RX ADMIN — POTASSIUM CHLORIDE 20 MEQ: 1500 TABLET, EXTENDED RELEASE ORAL at 08:04

## 2024-04-07 NOTE — PROGRESS NOTES
[facility] Hospital Medicine Progress Note        Chief Complaint: Inpatient Follow-up for  fatigue, leg swelling        HPI:   The pt is a 89 yo female with a PMHx of SSS s/p PPM 2019, PAF on Eliquis, HFpEF 55-60%, non obstructive CAD with LHC on 3/4/24, PAD, KALEB, HLD, CKD III, VHD- mild to mod TR  and Gout returned to the ED on 4/3/24 for evaluation of fatigue and leg swelling. Pt was just discharged home form SNF after back to back two hospitalization in this facility  since 2/2024. She was discharged to SNF on 3/11/24 after 2 weeks hospitalization for E.coli sepsis due to UTI, Pneumonia and Type 2 NSTEMI.      Pt noted worsening ila lower ext edema despite home lasix therapy. Other than fatigue and weakness due to lower extremity edema patient has no complaints. Patient lives at home with her  and typically does ADLs and I ADLs with no assistance however since last admission patient has significantly declined. She is unable to mobilize due to significant swelling in lower extremities bilaterally.  Denies keely chest pain, or SOB at rest. Vitals on arrival 129/59, 97.5, 70, 16, 98% on RA. Labs showed normal WBC, Hgb 9.3, Na 139, K 3.9, Cr 1.7, , Troponin 0.081>0.055, TSH 2.8, UA with WBC 6-10 /HPF does not reflect culture , CXR with cardiomegaly and mild congestive changes . Pt was admitted to  service and Cardiology was consulted to assist. Pt was started on IV Furosemide 60 mg q12h with good response. Continued on BB with plan to initiate other GDMT include ACEi or ARB as BP and renal function allows. Low dose ARB attempted but BP continues to be soft , therefore discontinued. Lasix transitioned to oral 40 mg daily once pt was near euvolemic. Continues to c/o fatigue and no energy. PT/OT consulted and moderate intensity therapy was suggested. Pt does not desire to go to SNF again. Palliative Medicine was consulted to discuss GOC and ACP and code status.        Interval Hx:   Pt again c/o fatigue and tiredness   Feels no energy to get up and walk   Leg swelling currently much better   Need further discussion with the family regarding GOC.     BP remains soft. Losartan discontinued.   Continues coreg at the lowest dose     Case was discussed with patient's nurse and  on the floor.    Objective/physical exam:  General: In no acute distress, afebrile  Chest: Clear to auscultation bilaterally  Heart: RRR, +S1, S2, no appreciable murmur  Abdomen: Soft, nontender, BS +  MSK: Warm, trace to 1+  lower extremity edema Rt >Lt   Neurologic: Alert and oriented to self, place and person, Moves all ext in the bed     VITAL SIGNS: 24 HRS MIN & MAX LAST   Temp  Min: 97 °F (36.1 °C)  Max: 98.7 °F (37.1 °C) 98.5 °F (36.9 °C)   BP  Min: 97/60  Max: 117/68 98/61   Pulse  Min: 59  Max: 60  60   Resp  Min: 16  Max: 18 18   SpO2  Min: 92 %  Max: 99 % 98 %     I have reviewed the following labs:  Recent Labs   Lab 04/03/24  1523   WBC 6.99   RBC 3.11*   HGB 9.3*   HCT 29.6*   MCV 95.2*   MCH 29.9   MCHC 31.4*   RDW 19.3*      MPV 10.0     Recent Labs   Lab 04/03/24  1523 04/04/24  0440 04/05/24  0519 04/06/24  0322    138 137 138   K 3.6 3.6 3.6 4.5   CO2 32* 31 32* 31   BUN 20.8* 17.5 16.9 18.6   CREATININE 1.70* 1.43* 1.55* 1.47*   CALCIUM 9.5 9.7 9.0 9.0   MG  --  1.90 1.80 1.80   ALBUMIN 3.0*  --   --   --    ALKPHOS 68  --   --   --    ALT 10  --   --   --    AST 25  --   --   --    BILITOT 0.7  --   --   --      Microbiology Results (last 7 days)       Procedure Component Value Units Date/Time    Urine Culture High Risk [[id number]]  (Abnormal) Collected: 04/04/24 7173    Order Status: Completed Specimen: Urine, Clean Catch Updated: 04/06/24 1025     Urine Culture Multiple organisms present indicate probable contamination. Recommend recollection.      10,000 - 25,000 colonies/ml GAMMA STREPTOCOCCUS      Less than 10,000 colonies/ml Gram-positive coccus  probable staph    Narrative:        Gram Negative Rods Isolated Two Different Species             See below for Radiology    Scheduled Med:   allopurinoL  300 mg Oral Daily    apixaban  2.5 mg Oral BID    aspirin  81 mg Oral Daily    atorvastatin  10 mg Oral Nightly    brimonidine 0.15 % OPTH DROP  1 drop Both Eyes BID    carvediloL  3.125 mg Oral BID    furosemide  40 mg Oral BID    gabapentin  200 mg Oral BID    latanoprost  1 drop Both Eyes QHS    levothyroxine  25 mcg Oral Before breakfast    pantoprazole  40 mg Oral Daily    potassium chloride  20 mEq Oral Daily    risperiDONE  2 mg Oral QHS    rivastigmine tartrate  1.5 mg Oral BID    timolol maleate 0.5%  1 drop Both Eyes BID      Continuous Infusions:     PRN Meds:  acetaminophen, dextrose 10%, dextrose 10%, glucagon (human recombinant), glucose, glucose, naloxone, sodium chloride 0.9%, traMADoL     Assessment/Plan:  Acute on chronic diastolic HF, LVEF 55-60%, GIII DD, POA - improved   Normocytic anemia / Anemia of chromic disease   Chronic kidney disease , stage III - stable   Elevated troponin / NSTEMI type 2 due to HF and cardiac decompensation   Brandon lower ext edema / Volume overload due to CHF exacerbation - Improved  PAF on Eliquis,   Metabolic alkalosis with diuretic therapy   Fatigue and Generalized weakness - unchanged       Hx  of CAD, PAD, KALEB, HLD, CKD, VHD- mild to mod TR  and Gout     Plan-   Lower ext swelling is now much better   Continue Lasix 40 mg po daily for maintenance   Attempted Losartan at 12.5 mg yesterday but discontinued due to soft BP  Continue ASA, Statin  , BB  BP is not allowing to optimize HF treatment   Iron profile suggestive of anemia of chronic disease. No signs of active or overt bleed  Continue Eliquis for stroke prevention with PAF   Home meds are reviewed and resumed as appropriate   TSH is at goal   PT/OT service is following pt  Pt continues to c/o fatigue and tiredness and exhibiting poor response to skilled PT  services and no significant improvement of functional status with recent SNF treatment.  Pt seems a candidate for home discharge with Palliative /Hospice service.  Need to further clarify GOC with the family           VTE prophylaxis: Eliquis     Patient condition:  Fair      Anticipated discharge and Disposition:     TBD.       All diagnosis and differential diagnosis have been reviewed; assessment and plan has been documented; I have personally reviewed the labs and test results that are presently available; I have reviewed the patients medication list; I have reviewed the consulting providers response and recommendations. I have reviewed or attempted to review medical records based upon their availability    All of the patient's questions have been  addressed and answered. Patient's is agreeable to the above stated plan. I will continue to monitor closely and make adjustments to medical management as needed.  _______________________________    Ally Erazo MD  Department of Hospital Medicine   [facility]   04/07/2024

## 2024-04-08 LAB
ANION GAP SERPL CALC-SCNC: 7 MEQ/L
BUN SERPL-MCNC: 29.8 MG/DL (ref 9.8–20.1)
CALCIUM SERPL-MCNC: 9.7 MG/DL (ref 8.4–10.2)
CHLORIDE SERPL-SCNC: 95 MMOL/L (ref 98–107)
CO2 SERPL-SCNC: 34 MMOL/L (ref 23–31)
CREAT SERPL-MCNC: 1.4 MG/DL (ref 0.55–1.02)
CREAT/UREA NIT SERPL: 21
GFR SERPLBLD CREATININE-BSD FMLA CKD-EPI: 36 MLS/MIN/1.73/M2
GLUCOSE SERPL-MCNC: 102 MG/DL (ref 82–115)
MAGNESIUM SERPL-MCNC: 2 MG/DL (ref 1.6–2.6)
PHOSPHATE SERPL-MCNC: 2.4 MG/DL (ref 2.3–4.7)
POTASSIUM SERPL-SCNC: 4.3 MMOL/L (ref 3.5–5.1)
SODIUM SERPL-SCNC: 136 MMOL/L (ref 136–145)

## 2024-04-08 PROCEDURE — 21400001 HC TELEMETRY ROOM

## 2024-04-08 PROCEDURE — 94760 N-INVAS EAR/PLS OXIMETRY 1: CPT

## 2024-04-08 PROCEDURE — 97116 GAIT TRAINING THERAPY: CPT

## 2024-04-08 PROCEDURE — 97530 THERAPEUTIC ACTIVITIES: CPT

## 2024-04-08 PROCEDURE — 25000003 PHARM REV CODE 250: Performed by: INTERNAL MEDICINE

## 2024-04-08 PROCEDURE — 27000221 HC OXYGEN, UP TO 24 HOURS

## 2024-04-08 PROCEDURE — 84100 ASSAY OF PHOSPHORUS: CPT | Performed by: INTERNAL MEDICINE

## 2024-04-08 PROCEDURE — 97535 SELF CARE MNGMENT TRAINING: CPT | Mod: CO

## 2024-04-08 PROCEDURE — 80048 BASIC METABOLIC PNL TOTAL CA: CPT | Performed by: INTERNAL MEDICINE

## 2024-04-08 PROCEDURE — 25000003 PHARM REV CODE 250: Performed by: STUDENT IN AN ORGANIZED HEALTH CARE EDUCATION/TRAINING PROGRAM

## 2024-04-08 PROCEDURE — 99233 SBSQ HOSP IP/OBS HIGH 50: CPT | Mod: ,,, | Performed by: NURSE PRACTITIONER

## 2024-04-08 PROCEDURE — 83735 ASSAY OF MAGNESIUM: CPT | Performed by: INTERNAL MEDICINE

## 2024-04-08 RX ADMIN — POTASSIUM CHLORIDE 20 MEQ: 1500 TABLET, EXTENDED RELEASE ORAL at 08:04

## 2024-04-08 RX ADMIN — GABAPENTIN 200 MG: 100 CAPSULE ORAL at 08:04

## 2024-04-08 RX ADMIN — ALLOPURINOL 300 MG: 300 TABLET ORAL at 08:04

## 2024-04-08 RX ADMIN — TIMOLOL MALEATE 1 DROP: 5 SOLUTION OPHTHALMIC at 08:04

## 2024-04-08 RX ADMIN — CARVEDILOL 3.12 MG: 3.12 TABLET, FILM COATED ORAL at 08:04

## 2024-04-08 RX ADMIN — BRIMONIDINE TARTRATE 1 DROP: 1.5 SOLUTION OPHTHALMIC at 08:04

## 2024-04-08 RX ADMIN — RIVASTIGMINE TARTRATE 1.5 MG: 1.5 CAPSULE ORAL at 08:04

## 2024-04-08 RX ADMIN — LATANOPROST 1 DROP: 50 SOLUTION OPHTHALMIC at 09:04

## 2024-04-08 RX ADMIN — TRAMADOL HYDROCHLORIDE 50 MG: 50 TABLET, COATED ORAL at 08:04

## 2024-04-08 RX ADMIN — APIXABAN 2.5 MG: 2.5 TABLET, FILM COATED ORAL at 08:04

## 2024-04-08 RX ADMIN — MICONAZOLE NITRATE: 20 POWDER TOPICAL at 08:04

## 2024-04-08 RX ADMIN — ACETAMINOPHEN 650 MG: 325 TABLET, FILM COATED ORAL at 09:04

## 2024-04-08 RX ADMIN — ATORVASTATIN CALCIUM 10 MG: 10 TABLET, FILM COATED ORAL at 08:04

## 2024-04-08 RX ADMIN — RISPERIDONE 2 MG: 1 TABLET ORAL at 08:04

## 2024-04-08 RX ADMIN — ASPIRIN 81 MG: 81 TABLET, COATED ORAL at 08:04

## 2024-04-08 RX ADMIN — LEVOTHYROXINE SODIUM 25 MCG: 25 TABLET ORAL at 05:04

## 2024-04-08 RX ADMIN — FUROSEMIDE 40 MG: 40 TABLET ORAL at 08:04

## 2024-04-08 RX ADMIN — PANTOPRAZOLE SODIUM 40 MG: 40 TABLET, DELAYED RELEASE ORAL at 08:04

## 2024-04-08 NOTE — PT/OT/SLP PROGRESS
Physical Therapy Treatment    Patient Name:  Ev Alberts   MRN:  [id number]    Recommendations:     Discharge therapy intensity: Moderate Intensity Therapy   Discharge Equipment Recommendations: none  Barriers to discharge: Impaired mobility    Assessment:     Ev Alberts is a 89 y.o. female admitted with a medical diagnosis of weakness, fatigue, and BLE edema d/t volume overload associated with acute on chronic heart failure.  She presents with the following impairments/functional limitations: weakness, gait instability, impaired balance, impaired endurance, decreased safety awareness, impaired functional mobility. The pt tolerated session well. She attempted to ambulate but was unable to take more than 2 steps today, so transferred to chair. The pt demonstrates increased weakness today, will continue to progress as able. The pt would benefit from Mod intensity upon dc.    Rehab Prognosis: Good; patient would benefit from acute skilled PT services to address these deficits and reach maximum level of function.    Recent Surgery: * No surgery found *      Plan:     During this hospitalization, patient to be seen 5 x/week to address the identified rehab impairments via gait training, therapeutic activities, therapeutic exercises and progress toward the following goals:    Plan of Care Expires:  05/08/24    Subjective     Chief Complaint: pain  Patient/Family Comments/goals: return to PLOF  Pain/Comfort:         Objective:     Communicated with NSG prior to session.  Patient found supine with oxygen, PureWick upon PT entry to room.     General Precautions: Standard, fall  Orthopedic Precautions: N/A  Braces: N/A  Respiratory Status: Room air  Blood Pressure: NA  Skin Integrity: Visible skin intact      Functional Mobility:  Bed Mobility:     Scooting: moderate assistance  Supine to Sit: moderate assistance  Transfers:     Sit to Stand:  moderate assistance with rolling walker  Gait: pt ambulates x 2 feet to bedside  chair      Education:  Patient provided with verbal education education regarding PT role/goals/POC, safety awareness, and discharge/DME recommendations.  Understanding was verbalized.     Patient left up in chair with all lines intact, call button in reach, and NSG notified    GOALS:   Multidisciplinary Problems       Physical Therapy Goals          Problem: Physical Therapy    Goal Priority Disciplines Outcome Goal Variances Interventions   Physical Therapy Goal     PT, PT/OT Ongoing, Progressing     Description: Goals to be met by: 24     Patient will increase functional independence with mobility by performin. Supine to sit with Stand-by Assistance  2. Sit to supine with Stand-by Assistance  3. Sit to stand transfer with Stand-by Assistance  4. Bed to chair transfer with Stand-by Assistance using Rolling Walker  5. Gait  x 200 feet with Stand-by Assistance using Rolling Walker.                          Time Tracking:     PT Received On: 24  PT Start Time: 0950     PT Stop Time: 1013  PT Total Time (min): 23 min     Billable Minutes: Therapeutic Activity 23    Treatment Type: Treatment  PT/PTA: PT     Number of PTA visits since last PT visit: 2024

## 2024-04-08 NOTE — PROGRESS NOTES
Inpatient Nutrition Assessment    Admit Date: 4/3/2024   Total duration of encounter: 5 days   Patient Age: 89 y.o.    Nutrition Recommendation/Prescription     - Continue Heart Healthy Diet as ordered  - Discontinue Boost Breeze BID   - Continue Boost Plus, change to TID - provides 360 kcals and 14 g Pro per serving  - Monitor PO intake, labs, and wt  - Pt may benefit from appetite stimulant as medically feasible.     Communication of Recommendations: reviewed with nurse, reviewed with patient, and reviewed with family    Nutrition Assessment     Malnutrition Assessment/Nutrition-Focused Physical Exam    Malnutrition Context: other (see comments) (does not meet criteria) (04/08/24 1406)  Malnutrition Level: other (see comments) (does not meet criteria) (04/08/24 1406)  Energy Intake (Malnutrition): other (see comments) (does not meet criteria) (04/08/24 1406)  Weight Loss (Malnutrition): other (see comments) (not able to determine at this time) (04/08/24 1406)  Subcutaneous Fat (Malnutrition): mild depletion (04/08/24 1406)  Orbital Region (Subcutaneous Fat Loss): mild depletion        Muscle Mass (Malnutrition): mild depletion (04/08/24 1406)  [place] Region (Muscle Loss): mild depletion  Clavicle Bone Region (Muscle Loss): mild depletion                    Fluid Accumulation (Malnutrition): mild (lower extremity edema (improved since admit) documented) (04/08/24 1412)        A minimum of two characteristics is recommended for diagnosis of either severe or non-severe malnutrition.    Chart Review    Reason Seen: malnutrition screening tool (MST) and follow-up    Malnutrition Screening Tool Results   Have you recently lost weight without trying?: Unsure  Have you been eating poorly because of a decreased appetite?: No   MST Score: 2   Diagnosis:  Weakness  Peripheral edema  CKD, unspecified stage  Acute on chronic diastolic HF, LVEF 55-60%, GIII DD, POA - improved   Metabolic alkalosis with diuretic therapy         Relevant Medical History:   Dementia, HTN, sick sinus syndrome, thyroid disease, glaucoma    Scheduled Medications:  allopurinoL, 300 mg, Daily  apixaban, 2.5 mg, BID  aspirin, 81 mg, Daily  atorvastatin, 10 mg, Nightly  brimonidine 0.15 % OPTH DROP, 1 drop, BID  carvediloL, 3.125 mg, BID  furosemide, 40 mg, Daily  gabapentin, 200 mg, BID  latanoprost, 1 drop, QHS  levothyroxine, 25 mcg, Before breakfast  miconazole NITRATE 2 %, , BID  pantoprazole, 40 mg, Daily  potassium chloride, 20 mEq, Daily  risperiDONE, 2 mg, QHS  rivastigmine tartrate, 1.5 mg, BID  timolol maleate 0.5%, 1 drop, BID    Continuous Infusions:   PRN Medications: acetaminophen, dextrose 10%, dextrose 10%, glucagon (human recombinant), glucose, glucose, naloxone, sodium chloride 0.9%, traMADoL    Calorie Containing IV Medications: no significant kcals from medications at this time    Recent Labs   Lab 04/03/24  1523 04/04/24  0440 04/05/24  0519 04/06/24  0322 04/08/24  0505    138 137 138 136   K 3.6 3.6 3.6 4.5 4.3   CALCIUM 9.5 9.7 9.0 9.0 9.7   PHOS  --  3.2 2.6 3.3 2.4   MG  --  1.90 1.80 1.80 2.00   CHLORIDE 99 98 98 99 95*   CO2 32* 31 32* 31 34*   BUN 20.8* 17.5 16.9 18.6 29.8*   CREATININE 1.70* 1.43* 1.55* 1.47* 1.40*   EGFRNORACEVR 29 36 32 34 36   GLUCOSE 95 87 96 96 102   BILITOT 0.7  --   --   --   --    ALKPHOS 68  --   --   --   --    ALT 10  --   --   --   --    AST 25  --   --   --   --    ALBUMIN 3.0*  --   --   --   --    WBC 6.99  --   --   --   --    HGB 9.3*  --   --   --   --    HCT 29.6*  --   --   --   --      Nutrition Orders:  Diet Heart Healthy      Appetite/Oral Intake: fair/50-75% of meals  Factors Affecting Nutritional Intake: constipation and decreased appetite  Food/Jew/Cultural Preferences: none reported  Food Allergies: none reported  Last Bowel Movement: 04/04/24  Wound(s):     Altered Skin Integrity 03/10/24 0705 Sacral spine-Tissue loss description: Not applicable No partial or full thickness  "pressure injuries documented    Comments    24: Pt asleep at time of rounds. Spoke with family at bedside who state pt has had a very poor appetite. She only ever eats a few bites at each meal, even at family gatherings. Pt's daughter has purchased nutrition supplements for her in the past but does not believe she drank them. Pt's weight has been fluctuating lately d/t fluid retention but daughter states she definitely appears to have lost some weight since 2023. Will re-attempt NFPE at follow-up.     24: Patient and  report appetite has improved from last RD visit. Oral intake now in fair range, about 50% of meals served per patient. RN reports patient drinking all of Boost Plus nutrition supplement daily. Patient asking to receive more Boost Plus supplements daily. Patient does not like Boost Breeze. Will change Boost Plus vanilla to TID and discontinue Boost Breeze. Patient denies any nausea/vomiting/diarrhea. Constipation noted, patient receiving meds to resolve.      Anthropometrics    Height: 5' 5" (165.1 cm), Height Method: Stated  Last Weight: 69.2 kg (152 lb 8.9 oz) (24 0554), Weight Method: Bed Scale  BMI (Calculated): 25.4  BMI Classification: overweight (BMI 25-29.9)     Ideal Body Weight (IBW), Female: 125 lb     % Ideal Body Weight, Female (lb): 122.05 %                    Usual Body Weight (UBW), k.7 kg  % Usual Body Weight: 95.38     Usual Weight Provided By: family/caregiver    Wt Readings from Last 5 Encounters:   24 69.2 kg (152 lb 8.9 oz)   24 63.5 kg (140 lb)   24 69.9 kg (154 lb)   22 72.6 kg (160 lb)   10/29/21 78 kg (171 lb 15.3 oz)     Weight Change(s) Since Admission: Suspect wt to fluctuate this admit d/t SAL  Wt Readings from Last 1 Encounters:   24 0554 69.2 kg (152 lb 8.9 oz)   24 69.2 kg (152 lb 8.9 oz)   24 1441 68 kg (150 lb)   Admit Weight: 68 kg (150 lb) (24 6911), Weight Method: Stated    Estimated " Needs    Weight Used For Calorie Calculations: 69.2 kg (152 lb 8.9 oz)  Energy Calorie Requirements (kcal): 1465 kcals (1.3 SFxMSJ)  Energy Need Method: [place]-St Jeor  Weight Used For Protein Calculations: 69.2 kg (152 lb 8.9 oz)  Protein Requirements: 55-69 g (0.8-1 g/kg)  Fluid Requirements (mL): 1465 mL (1 mL/kcal) or per MD    Enteral Nutrition     Patient not receiving enteral nutrition at this time.    Parenteral Nutrition     Patient not receiving parenteral nutrition support at this time.    Evaluation of Received Nutrient Intake    Calories: not meeting estimated needs  Protein: not meeting estimated needs    Patient Education     Not applicable.    Nutrition Diagnosis     PES: Inadequate oral intake related to inability to consume sufficient nutrients as evidenced by decreased appetite and increased lethargy at meals. (active)     Nutrition Interventions     Intervention(s): modified composition of meals/snacks and commercial beverage    Goal: Meet greater than 80% of nutritional needs by follow-up. (goal progressing)  Goal: Consume % of oral supplements by follow-up. (goal progressing)    Nutrition Goals & Monitoring     Dietitian will monitor: food and beverage intake and weight    Nutrition Risk/Follow-Up: moderate (follow-up in 3-5 days)   Please consult if re-assessment needed sooner.

## 2024-04-08 NOTE — PLAN OF CARE
04/08/24 1725   Discharge Assessment   Assessment Type Discharge Planning Assessment   Confirmed/corrected address, phone number and insurance Yes   Confirmed Demographics Correct on Facesheet   Source of Information patient   Does patient/caregiver understand observation status   (inpatient)   Communicated LUISITO with patient/caregiver Date not available/Unable to determine   Reason For Admission C P. peripheral edema   People in Home spouse   Do you expect to return to your current living situation? Yes  (home)   Do you have help at home or someone to help you manage your care at home? Yes   Who are your caregiver(s) and their phone number(s)? spouse and 6 hrs /day from [facility](sitters) 7 days a wk   Prior to hospitilization cognitive status: Unable to Assess   Current cognitive status: Alert/Oriented   Equipment Currently Used at Home walker, rolling   Readmission within 30 days? Yes   Patient currently being followed by outpatient case management? No   Do you currently have service(s) that help you manage your care at home? Yes   How Many hours does patient receive services 4   Name and Contact number of agency [facility]   Is the pt/caregiver preference to resume services with current agency Yes   Do you take prescription medications? Yes   Do you have prescription coverage? Yes   Coverage [facility]   Do you have any problems affording any of your prescribed medications? No   Who is going to help you get home at discharge? family   How do you get to doctors appointments? family or friend will provide   Are you on dialysis? No   Discharge Plan A Home with family;Home Health   Discharge Plan B Home with family;Home Health   DME Needed Upon Discharge  none   Discharge Plan discussed with: Patient   Transition of Care Barriers None   Social Connections   Are you , , , , never , or living with a partner?    OTHER   Name(s) of People in Home spouse     Spoke to  dgtr.  /nurse Mellissa who state 6 hrs/day with sitter service from states funded.  Active with [facility] provided dgtr. With several brochures in room of sitter service/lists to help out when  no one is available . . Dgtr will be coming here to speak with writer.  When I spoke to pt.  She states she is going home, she is not going to a facility. She is going home with her spouse

## 2024-04-08 NOTE — PT/OT/SLP PROGRESS
Occupational Therapy   Treatment    Name: Ev Alberts  MRN: [id number]  Admitting Diagnosis:  HF       Recommendations:     Recommended therapy intensity at discharge: Moderate Intensity Therapy   Discharge Equipment Recommendations:  none  Barriers to discharge:       Assessment:     Ev Alberts is a 89 y.o. female with a medical diagnosis of HF.  She presents with poor balance and an increased posterior lean, pt. Requiring Mod-min A during session during stand step t/f with increased cueing for step progression, recommending Mod intensity therapy, pt. Is a high fall risk . Performance deficits affecting function are weakness, impaired endurance, impaired self care skills, impaired functional mobility, impaired balance, pain.     Rehab Prognosis:  Good; patient would benefit from acute skilled OT services to address these deficits and reach maximum level of function.       Plan:     Patient to be seen 5 x/week to address the above listed problems via self-care/home management, therapeutic activities, therapeutic exercises, wheelchair management/training  Plan of Care Expires: 05/03/24  Plan of Care Reviewed with: patient    Subjective     Pain/Comfort:  Oriented x 4    Objective:     Communicated with: RN prior to session.  Patient found HOB elevated with   upon OT entry to room.    General Precautions: Standard, fall    Orthopedic Precautions:N/A  Braces: N/A  Respiratory Status: Nasal cannula, flow 2 L/min       Occupational Performance:   (Bed Mobility- Mod A)  (Sitting balance- Min A -CGA)  Pt. Performing grooming task (washing face) using R UE for excursion to face.   (Sit to stand- Mod A)  Pt. Requiring Min A during stand step t/f from EOB to BS chair using RW, strong posterior lean noted.   Pt. Repositioned in chair appropriately with all needs within reach.      Therapeutic Positioning    OT interventions performed during the course of today's session in an effort to prevent and/or reduce acquired  pressure injuries:   Therapeutic positioning was provided at the conclusion of session to offload all bony prominences for the prevention and/or reduction of pressure injuries      [facility] 6 Click ADL:      Patient Education:  Patient provided with verbal education education regarding fall prevention and safety awareness.  Additional teaching is warranted.      Patient left up in chair with all lines intact and call button in reach.    GOALS:   Multidisciplinary Problems       Occupational Therapy Goals          Problem: Occupational Therapy    Goal Priority Disciplines Outcome Interventions   Occupational Therapy Goal     OT, PT/OT Ongoing, Progressing    Description: LTG: caregiver(s) will demo ability to safely assist pt with ADL and ADL t/fs by discharge.    STG: to be met by 5/3    Pt will complete grooming standing at sink with LRAD with SBA.  Pt will complete UB dressing with SBA.  Pt will complete LB dressing with min A using LRAD.  Pt will complete toileting with SBA using LRAD.  Pt will complete functional mobility to/from toilet and toilet transfer with SBA using LRAD.                        Time Tracking:     OT Date of Treatment: 04/08/24  OT Start Time: 0950  OT Stop Time: 1013  OT Total Time (min): 23 min    Billable Minutes:Self Care/Home Management 2    OT/SARANYA: SARANYA     Number of SARANYA visits since last OT visit: 1    4/8/2024

## 2024-04-08 NOTE — PLAN OF CARE
Problem: Adult Inpatient Plan of Care  Goal: Plan of Care Review  Outcome: Ongoing, Progressing  Goal: Patient-Specific Goal (Individualized)  Outcome: Ongoing, Progressing  Goal: Absence of Hospital-Acquired Illness or Injury  Outcome: Ongoing, Progressing  Goal: Optimal Comfort and Wellbeing  Outcome: Ongoing, Progressing  Goal: Readiness for Transition of Care  Outcome: Ongoing, Progressing     Problem: Coping Ineffective  Goal: Effective Coping  Outcome: Ongoing, Progressing     Problem: Infection  Goal: Absence of Infection Signs and Symptoms  Outcome: Ongoing, Progressing     Problem: Impaired Wound Healing  Goal: Optimal Wound Healing  Outcome: Ongoing, Progressing     Problem: Fall Injury Risk  Goal: Absence of Fall and Fall-Related Injury  Outcome: Ongoing, Progressing      Cardiology Progress Note - Penny Higgins 82 y o  male MRN: [id number]    Unit/Bed#: -01 Encounter: [id number]      Assessment/Plan:  1  Acute on chronic HFrEF, patient appears pretty euvolemic  Continue with torsemide 20 b i d  Daily weights  Salt restriction  Strict I&Os  Continue aspirin, Lipitor, Toprol  Net -3 9 L      2  Ischemic cardiomyopathy with EF of 40%, seen on echo in April  Continue torsemide, aspirin, Lipitor, Toprol  No Ace/Arb due to CKD    3  CAD with history of PCI x6  Currently without anginal symptoms  Continue aspirin, Lipitor, Toprol    4  Chronic atrial fibrillation, heart rate stable in the 90s  Continue with metoprolol, 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    5  Hypertension stable 129/78, continue torsemide, Toprol    6  CKD 3, creatinine at baseline  Creatinine today 1 7  Continue to monitor      Subjective:   Patient seen and examined  No significant events overnight  My breathing is better  Denies chest pain  Objective:     Vitals: Blood pressure 129/78, pulse 95, temperature 98 3 °F (36 8 °C), temperature source Oral, resp  rate 17, height 6' 4" (1 93 m), weight 68 5 kg (151 lb 0 2 oz), SpO2 94 %  , Body mass index is 18 38 kg/m² ,   Orthostatic Blood Pressures    Flowsheet Row Most Recent Value   Blood Pressure 129/78 filed at 08/15/2022 6080   Patient Position - Orthostatic VS Lying filed at 08/15/2022 0630            Intake/Output Summary (Last 24 hours) at 8/15/2022 1055  Last data filed at 8/15/2022 0900  Gross per 24 hour   Intake 570 ml   Output 1300 ml   Net -730 ml         Physical Exam:    GEN: Penny Higgins appears well, alert and oriented x 3, pleasant and cooperative   HEENT: pupils equal, round, and reactive to light; extraocular muscles intact  NECK: supple, no carotid bruits   HEART:  Irregularly irregular, normal S1 and S2, no murmurs, clicks, gallops or rubs   LUNGS:  Decreased breath sounds bilaterally; no wheezes, rales, or rhonchi ABDOMEN: normal bowel sounds, soft, no tenderness, no distention  EXTREMITIES: peripheral pulses normal; no clubbing, cyanosis, or edema      Medications:      Current Facility-Administered Medications:     acetaminophen (TYLENOL) tablet 650 mg, 650 mg, Oral, Q6H PRN, Tess Torres MD    aspirin chewable tablet 81 mg, 81 mg, Oral, Daily, Tess Torres MD, 81 mg at 08/14/22 1058    atorvastatin (LIPITOR) tablet 80 mg, 80 mg, Oral, QPM, Tess Torres MD, 80 mg at 08/14/22 1715    calcium carbonate (TUMS) chewable tablet 1,000 mg, 1,000 mg, Oral, Daily PRN, Tess Torres MD, 1,000 mg at 08/15/22 0746    docusate sodium (COLACE) capsule 100 mg, 100 mg, Oral, BID, Tess Torres MD, 100 mg at 08/14/22 1715    metoprolol succinate (TOPROL-XL) 24 hr tablet 25 mg, 25 mg, Oral, Daily, Tess Torres MD, 25 mg at 08/14/22 1058    ondansetron (ZOFRAN) injection 4 mg, 4 mg, Intravenous, Q6H PRN, Tess Torres MD, 4 mg at 08/14/22 2242    pantoprazole (PROTONIX) EC tablet 40 mg, 40 mg, Oral, BID AC, Vince Patterson MD, 40 mg at 08/15/22 0746    predniSONE tablet 5 mg, 5 mg, Oral, Daily, PERFECTO Manuel, 5 mg at 08/14/22 1058    senna (SENOKOT) tablet 8 6 mg, 1 tablet, Oral, Daily, Tess Torres MD, 8 6 mg at 08/14/22 1058    tamsulosin (FLOMAX) capsule 0 4 mg, 0 4 mg, Oral, HS, Tess Torres MD, 0 4 mg at 08/14/22 2117    torsemide (DEMADEX) tablet 20 mg, 20 mg, Oral, BID (AM & Afternoon), PERFECTO Manuel, 20 mg at 08/14/22 1715     Labs & Results:    Results from last 7 days   Lab Units 08/11/22 2220 08/11/22 2007 08/11/22  1814   HS TNI 0HR ng/L  --   --  22   HS TNI 2HR ng/L  --  19  --    HS TNI 4HR ng/L 20  --   --    HSTNI D4 ng/L -2  --   --    NT-PRO BNP pg/mL  --   --  22,208*     Results from last 7 days   Lab Units 08/15/22  0507 08/14/22  0613 08/13/22  0551   WBC Thousand/uL 7 88 8 23 7 29   HEMOGLOBIN g/dL 9 9* 10 2* 9 6*   HEMATOCRIT % 31 9* 33 0* 31 1*   PLATELETS Thousands/uL 286 309 285         Results from last 7 days   Lab Units 08/15/22  0507 08/14/22  0613 08/13/22  0551 08/12/22  0553 08/11/22  1814   POTASSIUM mmol/L 4 0 3 2* 3 6   < > 5 7*   CHLORIDE mmol/L 103 101 101   < > 99   CO2 mmol/L 29 27 28   < > 24   BUN mg/dL 47* 50* 48*   < > 49*   CREATININE mg/dL 1 77* 1 64* 1 65*   < > 1 76*   CALCIUM mg/dL 8 5 8 5 8 7   < > 8 6   ALK PHOS U/L  --   --   --   --  107   ALT U/L  --   --   --   --  20   AST U/L  --   --   --   --  26    < > = values in this interval not displayed           Results from last 7 days   Lab Units 08/13/22  0551 08/12/22  0553 08/11/22  1814   MAGNESIUM mg/dL 2 0 2 0 2 0

## 2024-04-08 NOTE — PLAN OF CARE
Spoke to dgtr.  /nurse Mellissa who state 6 hrs/day with sitter service from states funded.  Active with [facility] provided dgtr. With several brochures in room of sitter service/lists to help out when  no one is available . . Dgtr will be coming here to speak with writer.  When I spoke to pt.  She states she is going home, she is not going to a facility. She is going home with her spouse

## 2024-04-08 NOTE — PROGRESS NOTES
[facility] Hospital Medicine Progress Note        Chief Complaint: Inpatient Follow-up for fatigue , leg weakness     HPI:   The pt is a 88 yo female with a PMHx of SSS s/p PPM 2019, PAF on Eliquis, HFpEF 55-60%, non obstructive CAD with LHC on 3/4/24, PAD, KALEB, HLD, CKD III, VHD- mild to mod TR  and Gout returned to the ED on 4/3/24 for evaluation of fatigue and leg swelling. Pt was just discharged home form SNF after back to back two hospitalization in this facility  since 2/2024. She was discharged to SNF on 3/11/24 after 2 weeks hospitalization for E.coli sepsis due to UTI, Pneumonia and Type 2 NSTEMI.      Pt noted worsening ila lower ext edema despite home lasix therapy. Other than fatigue and weakness due to lower extremity edema patient has no complaints. Patient lives at home with her  and typically does ADLs and I ADLs with no assistance however since last admission patient has significantly declined. She is unable to mobilize due to significant swelling in lower extremities bilaterally.  Denies keely chest pain, or SOB at rest. Vitals on arrival 129/59, 97.5, 70, 16, 98% on RA. Labs showed normal WBC, Hgb 9.3, Na 139, K 3.9, Cr 1.7, , Troponin 0.081>0.055, TSH 2.8, UA with WBC 6-10 /HPF does not reflect culture , CXR with cardiomegaly and mild congestive changes . Pt was admitted to  service and Cardiology was consulted to assist. Pt was started on IV Furosemide 60 mg q12h with good response. Continued on BB with plan to initiate other GDMT include ACEi or ARB as BP and renal function allows. Low dose ARB attempted but BP continues to be soft , therefore discontinued. Lasix transitioned to oral 40 mg daily once pt was near euvolemic. Continues to c/o fatigue and no energy. PT/OT consulted and moderate intensity therapy was suggested. Pt does not desire to go to SNF again. Palliative Medicine was consulted to discuss GOC and ACP and code status.     Interval  Hx:   Pt was seen today after PT session   Still c/o fatigue and tiredness   Denies chest pain or SOB at this time   On low dose O2 via NC 1 to 2L/min  BP remains soft   Creatinine is stable at 1.4       Case was discussed with patient's nurse and  on the floor.    Objective/physical exam:  General: In no acute distress, afebrile  Chest: Clear to auscultation bilaterally  Heart: RRR, +S1, S2, no appreciable murmur  Abdomen: Soft, nontender, BS +  MSK: Warm, trace to 1+  lower extremity edema Rt >Lt   Neurologic: Alert and oriented to self, place and person, Moves all ext in the bed     VITAL SIGNS: 24 HRS MIN & MAX LAST   Temp  Min: 98.2 °F (36.8 °C)  Max: 99.5 °F (37.5 °C) 98.2 °F (36.8 °C)   BP  Min: 101/53  Max: 121/57 (!) 108/56   Pulse  Min: 60  Max: 62  60   Resp  Min: 18  Max: 18 18   SpO2  Min: 97 %  Max: 99 % 97 %     I have reviewed the following labs:  Recent Labs   Lab 04/03/24  1523   WBC 6.99   RBC 3.11*   HGB 9.3*   HCT 29.6*   MCV 95.2*   MCH 29.9   MCHC 31.4*   RDW 19.3*      MPV 10.0     Recent Labs   Lab 04/03/24  1523 04/04/24  0440 04/05/24  0519 04/06/24  0322 04/08/24  0505      < > 137 138 136   K 3.6   < > 3.6 4.5 4.3   CO2 32*   < > 32* 31 34*   BUN 20.8*   < > 16.9 18.6 29.8*   CREATININE 1.70*   < > 1.55* 1.47* 1.40*   CALCIUM 9.5   < > 9.0 9.0 9.7   MG  --    < > 1.80 1.80 2.00   ALBUMIN 3.0*  --   --   --   --    ALKPHOS 68  --   --   --   --    ALT 10  --   --   --   --    AST 25  --   --   --   --    BILITOT 0.7  --   --   --   --     < > = values in this interval not displayed.     Microbiology Results (last 7 days)       Procedure Component Value Units Date/Time    Urine Culture High Risk [[id number]]  (Abnormal) Collected: 04/04/24 1847    Order Status: Completed Specimen: Urine, Clean Catch Updated: 04/06/24 1025     Urine Culture Multiple organisms present indicate probable contamination. Recommend recollection.      10,000 - 25,000 colonies/ml GAMMA  STREPTOCOCCUS      Less than 10,000 colonies/ml Gram-positive coccus probable staph    Narrative:        Gram Negative Rods Isolated Two Different Species             See below for Radiology    Scheduled Med:   allopurinoL  300 mg Oral Daily    apixaban  2.5 mg Oral BID    aspirin  81 mg Oral Daily    atorvastatin  10 mg Oral Nightly    brimonidine 0.15 % OPTH DROP  1 drop Both Eyes BID    carvediloL  3.125 mg Oral BID    furosemide  40 mg Oral Daily    gabapentin  200 mg Oral BID    latanoprost  1 drop Both Eyes QHS    levothyroxine  25 mcg Oral Before breakfast    miconazole NITRATE 2 %   Topical (Top) BID    pantoprazole  40 mg Oral Daily    potassium chloride  20 mEq Oral Daily    risperiDONE  2 mg Oral QHS    rivastigmine tartrate  1.5 mg Oral BID    timolol maleate 0.5%  1 drop Both Eyes BID      Continuous Infusions:     PRN Meds:  acetaminophen, dextrose 10%, dextrose 10%, glucagon (human recombinant), glucose, glucose, naloxone, sodium chloride 0.9%, traMADoL     Assessment/Plan:  Acute on chronic diastolic HF, LVEF 55-60%, GIII DD, POA - improved   Normocytic anemia / Anemia of chromic disease   Chronic kidney disease , stage III - stable   Elevated troponin / NSTEMI type 2 due to HF and cardiac decompensation   Brandon lower ext edema / Volume overload due to CHF exacerbation - Improved  PAF on Eliquis,   Metabolic alkalosis with diuretic therapy   Fatigue and Generalized weakness - unchanged       Hx  of CAD, PAD, KALEB, HLD, CKD, VHD- mild to mod TR  and Gout     Plan-   Lower ext swelling is now much better   Continue Lasix 40 mg po daily for maintenance   Attempted Losartan at 12.5 mg  but discontinued due to soft BP  Continue ASA, Statin  , BB  BP is not allowing to optimize HF treatment   Iron profile suggestive of anemia of chronic disease. No signs of active or overt bleed  Continue Eliquis for stroke prevention with PAF   Home meds are reviewed and resumed as appropriate   TSH is at goal   PT/OT  service is following pt  Pt continues to c/o fatigue and tiredness and exhibiting poor response to skilled PT services and no significant improvement of functional status with recent SNF treatment.  Pt seems a candidate for home discharge with Palliative /Hospice service.  Need to further clarify GOC with the family   Palliative Medicine will visit pt today   Case discussed with CM regarding disposition.    CM consulted to setup home oxygen      VTE prophylaxis: Eliquis     Patient condition:  Fair      Anticipated discharge and Disposition:     TBD.      All diagnosis and differential diagnosis have been reviewed; assessment and plan has been documented; I have personally reviewed the labs and test results that are presently available; I have reviewed the patients medication list; I have reviewed the consulting providers response and recommendations. I have reviewed or attempted to review medical records based upon their availability    All of the patient's questions have been  addressed and answered. Patient's is agreeable to the above stated plan. I will continue to monitor closely and make adjustments to medical management as needed.  _________________________    Ally Erazo MD  Department of Hospital Medicine   [facility]   04/08/2024

## 2024-04-08 NOTE — CONSULTS
Consults    Patient Name: Ev Alberts   MRN: [id number]   Admission Date: 4/3/2024   Hospital Length of Stay: 4   Attending Provider: Ally Erazo MD   Consulting Provider: Gunjan BUITRAGO  Reason for Consult: Goals of Care  Primary Care Physician:  Santi Walters MD     Principal Problem: <principal problem not specified>       Final diagnoses:  [R53.1] Weakness (Primary)  [R60.0] Peripheral edema  [N18.9] Chronic kidney disease, unspecified CKD stage      Assessment/Plan:     I reviewed the patient and family's understanding of the seriousness of the illness and its expected prognosis. We discussed the patient's goals of care and treatment preferences.        Advance Care Planning     Date: 04/08/2024    [facility]  I engaged the family in a voluntary conversation about advance care planning and we specifically addressed what the goals of care would be moving forward, in light of the patient's change in clinical status, specifically current condition .  We did specifically address the patient's likely prognosis, which is fair .  We explored the patient's values and preferences for future care.  The family endorses that what is most important right now is to focus on curative/life-prolongation (regardless of treatment burdens)    Accordingly, we have decided that the best plan to meet the patient's goals includes continuing with treatment    I did explain the role for hospice care at this stage of the patient's illness, including its ability to help the patient live with the best quality of life possible.  We will not be making a hospice referral.         Received update from CM and Dr. Erazo that family is considering taking patient home instead of seeking skilled services.      Called daughter Mellissa to discuss code status and medical plan.  She informed that family spoke with parents who expressed they would want full code status but would not want to remain on life support indefinitely.  I discussed the  likely poor outcomes that would result from resuscitation considering their age and debility and encouraged family to continue to discuss with patient.    Discussed plan to which daughter states that family is trying to obtain more help from the state for patient and her  and the family is also considering rehab.  I discussed that patient would not meet criteria for rehab and family may have to consider skilled services to which she informed that patient was given high salt diet at [facility], after which she became edematous.  Daughter states that she would want to ensure that patient would not receive high salt diet.      Discussed patient's multiple hospitalizations and high-risk for decompensation and decline.  Discussed the fragile nature of patient's condition.  Discuss options of continued treatment with palliative support or transitioned to hospice services.  Discuss hospice services at length.  Daughter states that family would like to pursue therapy in order for patient to become stronger and feel that patient would benefit from oxygen at home as well.  Offered support and encouraged to call for any questions.         Interval History:     Patient sleeping in bed with  present.  Palliative Care continuing to follow for assistance with goals of care.      Active Ambulatory Problems     Diagnosis Date Noted    Debility 2024    Severe malnutrition 2024     Resolved Ambulatory Problems     Diagnosis Date Noted    No Resolved Ambulatory Problems     Past Medical History:   Diagnosis Date    Dementia     Hypertension     Sick sinus syndrome     Thyroid disease     Unspecified glaucoma         Past Surgical History:   Procedure Laterality Date    cataract surgery       SECTION      CHOLECYSTECTOMY      HYSTERECTOMY      INSERTION OF PACEMAKER      LEFT HEART CATHETERIZATION Left 3/4/2024    Procedure: Left heart cath;  Surgeon: Mark Raymundo Jr., MD;  Location: [facility]  LAB;  Service: Cardiology;  Laterality: Left;    NEPHRECTOMY          Review of patient's allergies indicates:   Allergen Reactions    Amlodipine-benazepril Edema     Other reaction(s): unknown    Corticosteroids (glucocorticoids) Edema and Swelling    Rofecoxib Anaphylaxis and Swelling    Sulfa (sulfonamide antibiotics) Edema    Atorvastatin      Other reaction(s): unknown    Ibuprofen      Other reaction(s): Allergy to sulfa drugs          Current Facility-Administered Medications:     acetaminophen tablet 650 mg, 650 mg, Oral, Q8H PRN, Hayden, Milla D, DO, 650 mg at 04/08/24 0942    allopurinoL tablet 300 mg, 300 mg, Oral, Daily, Hayden, Milla D, DO, 300 mg at 04/08/24 0809    apixaban tablet 2.5 mg, 2.5 mg, Oral, BID, Diana Blakea D, DO, 2.5 mg at 04/08/24 0809    aspirin EC tablet 81 mg, 81 mg, Oral, Daily, Diana Blakea D, DO, 81 mg at 04/08/24 0809    atorvastatin tablet 10 mg, 10 mg, Oral, Nightly, Diana Blakea D, DO, 10 mg at 04/07/24 2046    brimonidine 0.15 % OPTH DROP ophthalmic solution 1 drop, 1 drop, Both Eyes, BID, Ally Erazo MD, 1 drop at 04/08/24 0809    carvediloL tablet 3.125 mg, 3.125 mg, Oral, BID, Hayden, Milla D, DO, 3.125 mg at 04/08/24 0809    dextrose 10% bolus 125 mL 125 mL, 12.5 g, Intravenous, PRN, Diana Blakea D, DO    dextrose 10% bolus 250 mL 250 mL, 25 g, Intravenous, PRN, Diana Blakea D, DO    furosemide tablet 40 mg, 40 mg, Oral, Daily, Ally Erazo MD, 40 mg at 04/08/24 0809    gabapentin capsule 200 mg, 200 mg, Oral, BID, An Blakerra D, DO, 200 mg at 04/08/24 0809    glucagon (human recombinant) injection 1 mg, 1 mg, Intramuscular, PRN, Diana Blakea D, DO    glucose chewable tablet 16 g, 16 g, Oral, PRN, Milla Blake, DO    glucose chewable tablet 24 g, 24 g, Oral, PRN, Milla Blake, DO    latanoprost 0.005 % ophthalmic solution 1 drop, 1 drop, Both Eyes, QHS, Ally Erazo MD, 1 drop at 04/07/24 2046    levothyroxine tablet 25  "mcg, 25 mcg, Oral, Before breakfast, Ally Erazo MD, 25 mcg at 04/08/24 0521    miconazole NITRATE 2 % top powder, , Topical (Top), BID, Ally Erazo MD, Given at 04/08/24 0810    naloxone 0.4 mg/mL injection 0.02 mg, 0.02 mg, Intravenous, PRN, Milla Blake, DO    pantoprazole EC tablet 40 mg, 40 mg, Oral, Daily, Ally Erazo MD, 40 mg at 04/08/24 0809    potassium chloride SA CR tablet 20 mEq, 20 mEq, Oral, Daily, Ally Erazo MD, 20 mEq at 04/08/24 0809    risperiDONE tablet 2 mg, 2 mg, Oral, QHS, Milla Blake, DO, 2 mg at 04/07/24 2046    rivastigmine tartrate capsule 1.5 mg, 1.5 mg, Oral, BID, Milla Blake, DO, 1.5 mg at 04/08/24 0809    sodium chloride 0.9% flush 10 mL, 10 mL, Intravenous, Q12H PRN, Milla Blake,     timolol maleate 0.5% ophthalmic solution 1 drop, 1 drop, Both Eyes, BID, Ally Erazo MD, 1 drop at 04/08/24 0809    traMADoL tablet 50 mg, 50 mg, Oral, Q8H PRN, Milla Blake, DO, 50 mg at 04/06/24 2056     acetaminophen, dextrose 10%, dextrose 10%, glucagon (human recombinant), glucose, glucose, naloxone, sodium chloride 0.9%, traMADoL     No family history on file.       Review of Systems   Unable to perform ROS: Dementia            Objective:   BP (!) 101/56   Pulse 60   Temp 98.4 °F (36.9 °C) (Oral)   Resp 18   Ht 5' 5" (1.651 m)   Wt 69.2 kg (152 lb 8.9 oz)   SpO2 97%   Breastfeeding No   BMI 25.39 kg/m²      Physical Exam   Constitutional: She appears ill.   HENT:   Head: Normocephalic.   Eyes: Pupils are equal, round, and reactive to light.   Cardiovascular: An irregular rhythm present. Pulmonary:      Effort: Pulmonary effort is normal.     Abdominal: Soft.   Neurological:   To person   Skin: Skin is warm.          Review of Symptoms  Review of Symptoms      Symptom Assessment (ESAS 0-10 Scale)  Pain:  0  Dyspnea:  0  Anxiety:  0  Nausea:  0  Depression:  0  Anorexia:  0  Fatigue:  0  Insomnia:  0  Restlessness:  0  Agitation:  0 "         Bowel Management Plan (BMP):  Yes      Psychosocial/Cultural:   See Palliative Psychosocial Note: Yes  **Primary  to Follow**  Palliative Care  Consult: No      Advance Care Planning   Advance Directives:   Goals of Care: What is most important right now is to focus on improvement in condition but with limits to invasive therapies. Accordingly, we have decided that the best plan to meet the patient's goals includes continuing with treatment.          PAINAD: NA    Caregiver burden formerly assessed: Yes      Admission on 04/03/2024   Component Date Value    Sodium Level 04/03/2024 139     Potassium Level 04/03/2024 3.6     Chloride 04/03/2024 99     Carbon Dioxide 04/03/2024 32 (H)     Glucose Level 04/03/2024 95     Blood Urea Nitrogen 04/03/2024 20.8 (H)     Creatinine 04/03/2024 1.70 (H)     Calcium Level Total 04/03/2024 9.5     Protein Total 04/03/2024 7.4     Albumin Level 04/03/2024 3.0 (L)     Globulin 04/03/2024 4.4 (H)     Albumin/Globulin Ratio 04/03/2024 0.7 (L)     Bilirubin Total 04/03/2024 0.7     Alkaline Phosphatase 04/03/2024 68     Alanine Aminotransferase 04/03/2024 10     Aspartate Aminotransfera* 04/03/2024 25     eGFR 04/03/2024 29     Color, UA 04/03/2024 Yellow     Appearance, UA 04/03/2024 Clear     Specific Gravity, UA 04/03/2024 1.019     pH, UA 04/03/2024 6.0     Protein, UA 04/03/2024 Negative     Glucose, UA 04/03/2024 Normal     Ketones, UA 04/03/2024 Negative     Blood, UA 04/03/2024 2+ (A)     Bilirubin, UA 04/03/2024 Negative     Urobilinogen, UA 04/03/2024 2.0 (A)     Nitrites, UA 04/03/2024 Negative     Leukocyte Esterase, UA 04/03/2024 75 (A)     WBC, UA 04/03/2024 6-10 (A)     Bacteria, UA 04/03/2024 Occasional (A)     Squamous Epithelial Cell* 04/03/2024 Trace     Mucous, UA 04/03/2024 Trace (A)     Hyaline Casts, UA 04/03/2024 3-5 (A)     RBC, UA 04/03/2024 0-5     Troponin-I 04/03/2024 0.081 (H)     QRS Duration 04/03/2024 184     OHS QTC  Calculation 04/03/2024 525     WBC 04/03/2024 6.99     RBC 04/03/2024 3.11 (L)     Hgb 04/03/2024 9.3 (L)     Hct 04/03/2024 29.6 (L)     MCV 04/03/2024 95.2 (H)     MCH 04/03/2024 29.9     MCHC 04/03/2024 31.4 (L)     RDW 04/03/2024 19.3 (H)     Platelet 04/03/2024 197     MPV 04/03/2024 10.0     Neut % 04/03/2024 61.6     Lymph % 04/03/2024 28.5     Mono % 04/03/2024 8.2     Eos % 04/03/2024 1.3     Basophil % 04/03/2024 0.1     Lymph # 04/03/2024 1.99     Neut # 04/03/2024 4.31     Mono # 04/03/2024 0.57     Eos # 04/03/2024 0.09     Baso # 04/03/2024 0.01     IG# 04/03/2024 0.02     IG% 04/03/2024 0.3     NRBC% 04/03/2024 0.0     Sodium Level 04/04/2024 138     Potassium Level 04/04/2024 3.6     Chloride 04/04/2024 98     Carbon Dioxide 04/04/2024 31     Glucose Level 04/04/2024 87     Blood Urea Nitrogen 04/04/2024 17.5     Creatinine 04/04/2024 1.43 (H)     BUN/Creatinine Ratio 04/04/2024 12     Calcium Level Total 04/04/2024 9.7     Anion Gap 04/04/2024 9.0     eGFR 04/04/2024 36     Magnesium Level 04/04/2024 1.90     Phosphorus Level 04/04/2024 3.2     Iron Binding Capacity Un* 04/04/2024 141     Iron Level 04/04/2024 31 (L)     Transferrin 04/04/2024 162     Iron Binding Capacity To* 04/04/2024 172 (L)     Iron Saturation 04/04/2024 18 (L)     Ferritin Level 04/04/2024 503.24 (H)     Vitamin B12 Level 04/04/2024 913 (H)     Folate Level 04/04/2024 11.0     Natriuretic Peptide 04/04/2024 487.7 (H)     Urine Culture 04/04/2024 Multiple organisms present indicate probable contamination. Recommend recollection.     Urine Culture 04/04/2024 10,000 - 25,000 colonies/ml GAMMA STREPTOCOCCUS (A)     Urine Culture 04/04/2024 Less than 10,000 colonies/ml Gram-positive coccus probable staph (A)     Troponin-I 04/04/2024 0.055 (H)     TSH 04/04/2024 2.864     Sodium Level 04/05/2024 137     Potassium Level 04/05/2024 3.6     Chloride 04/05/2024 98     Carbon Dioxide 04/05/2024 32 (H)     Glucose Level 04/05/2024 96      Blood Urea Nitrogen 04/05/2024 16.9     Creatinine 04/05/2024 1.55 (H)     BUN/Creatinine Ratio 04/05/2024 11     Calcium Level Total 04/05/2024 9.0     Anion Gap 04/05/2024 7.0     eGFR 04/05/2024 32     Magnesium Level 04/05/2024 1.80     Phosphorus Level 04/05/2024 2.6     Sodium Level 04/06/2024 138     Potassium Level 04/06/2024 4.5     Chloride 04/06/2024 99     Carbon Dioxide 04/06/2024 31     Glucose Level 04/06/2024 96     Blood Urea Nitrogen 04/06/2024 18.6     Creatinine 04/06/2024 1.47 (H)     BUN/Creatinine Ratio 04/06/2024 13     Calcium Level Total 04/06/2024 9.0     Anion Gap 04/06/2024 8.0     eGFR 04/06/2024 34     Magnesium Level 04/06/2024 1.80     Phosphorus Level 04/06/2024 3.3     Sodium Level 04/08/2024 136     Potassium Level 04/08/2024 4.3     Chloride 04/08/2024 95 (L)     Carbon Dioxide 04/08/2024 34 (H)     Glucose Level 04/08/2024 102     Blood Urea Nitrogen 04/08/2024 29.8 (H)     Creatinine 04/08/2024 1.40 (H)     BUN/Creatinine Ratio 04/08/2024 21     Calcium Level Total 04/08/2024 9.7     Anion Gap 04/08/2024 7.0     eGFR 04/08/2024 36     Magnesium Level 04/08/2024 2.00     Phosphorus Level 04/08/2024 2.4             > 50% of 35 min of encounter was spent in chart review, face to face discussion of goals of care, symptom assessment, coordination of care and emotional support.    Gunjan Harris FNP, [facility]  Palliative Medicine  [facility]

## 2024-04-09 VITALS
WEIGHT: 152.56 LBS | TEMPERATURE: 99 F | RESPIRATION RATE: 17 BRPM | DIASTOLIC BLOOD PRESSURE: 56 MMHG | SYSTOLIC BLOOD PRESSURE: 135 MMHG | BODY MASS INDEX: 25.42 KG/M2 | HEART RATE: 63 BPM | HEIGHT: 65 IN | OXYGEN SATURATION: 99 %

## 2024-04-09 PROBLEM — I50.33 ACUTE ON CHRONIC DIASTOLIC HEART FAILURE: Status: ACTIVE | Noted: 2024-04-09

## 2024-04-09 PROCEDURE — 99900035 HC TECH TIME PER 15 MIN (STAT)

## 2024-04-09 PROCEDURE — 25000003 PHARM REV CODE 250: Performed by: INTERNAL MEDICINE

## 2024-04-09 PROCEDURE — 94760 N-INVAS EAR/PLS OXIMETRY 1: CPT

## 2024-04-09 PROCEDURE — 97530 THERAPEUTIC ACTIVITIES: CPT | Mod: CO

## 2024-04-09 PROCEDURE — 25000003 PHARM REV CODE 250: Performed by: STUDENT IN AN ORGANIZED HEALTH CARE EDUCATION/TRAINING PROGRAM

## 2024-04-09 RX ORDER — PANTOPRAZOLE SODIUM 40 MG/1
40 TABLET, DELAYED RELEASE ORAL DAILY
Qty: 30 TABLET | Refills: 0 | Status: SHIPPED | OUTPATIENT
Start: 2024-04-10 | End: 2024-06-19

## 2024-04-09 RX ORDER — FUROSEMIDE 40 MG/1
40 TABLET ORAL DAILY
Qty: 30 TABLET | Refills: 0 | Status: SHIPPED | OUTPATIENT
Start: 2024-04-10 | End: 2024-06-27

## 2024-04-09 RX ORDER — MICONAZOLE NITRATE 2 %
POWDER (GRAM) TOPICAL 2 TIMES DAILY
Refills: 0 | COMMUNITY
Start: 2024-04-09

## 2024-04-09 RX ORDER — DOCUSATE SODIUM 100 MG/1
200 CAPSULE, LIQUID FILLED ORAL 2 TIMES DAILY
Status: DISCONTINUED | OUTPATIENT
Start: 2024-04-09 | End: 2024-04-09 | Stop reason: HOSPADM

## 2024-04-09 RX ORDER — GABAPENTIN 100 MG/1
200 CAPSULE ORAL 2 TIMES DAILY
Qty: 120 CAPSULE | Refills: 0 | Status: SHIPPED | OUTPATIENT
Start: 2024-04-09 | End: 2024-06-20

## 2024-04-09 RX ORDER — POLYETHYLENE GLYCOL 3350 17 G/17G
17 POWDER, FOR SOLUTION ORAL DAILY
Status: DISCONTINUED | OUTPATIENT
Start: 2024-04-09 | End: 2024-04-09 | Stop reason: HOSPADM

## 2024-04-09 RX ORDER — DOCUSATE SODIUM 100 MG/1
200 CAPSULE, LIQUID FILLED ORAL 2 TIMES DAILY PRN
Refills: 0 | COMMUNITY
Start: 2024-04-09

## 2024-04-09 RX ORDER — BISACODYL 10 MG/1
10 SUPPOSITORY RECTAL DAILY PRN
Status: DISCONTINUED | OUTPATIENT
Start: 2024-04-09 | End: 2024-04-09 | Stop reason: HOSPADM

## 2024-04-09 RX ORDER — POTASSIUM CHLORIDE 20 MEQ/1
20 TABLET, EXTENDED RELEASE ORAL DAILY
Qty: 30 TABLET | Refills: 0 | Status: SHIPPED | OUTPATIENT
Start: 2024-04-10 | End: 2024-05-10

## 2024-04-09 RX ADMIN — FUROSEMIDE 40 MG: 40 TABLET ORAL at 08:04

## 2024-04-09 RX ADMIN — ALLOPURINOL 300 MG: 300 TABLET ORAL at 08:04

## 2024-04-09 RX ADMIN — RIVASTIGMINE TARTRATE 1.5 MG: 1.5 CAPSULE ORAL at 08:04

## 2024-04-09 RX ADMIN — GABAPENTIN 200 MG: 100 CAPSULE ORAL at 08:04

## 2024-04-09 RX ADMIN — APIXABAN 2.5 MG: 2.5 TABLET, FILM COATED ORAL at 08:04

## 2024-04-09 RX ADMIN — BRIMONIDINE TARTRATE 1 DROP: 1.5 SOLUTION OPHTHALMIC at 08:04

## 2024-04-09 RX ADMIN — LEVOTHYROXINE SODIUM 25 MCG: 25 TABLET ORAL at 06:04

## 2024-04-09 RX ADMIN — POTASSIUM CHLORIDE 20 MEQ: 1500 TABLET, EXTENDED RELEASE ORAL at 08:04

## 2024-04-09 RX ADMIN — POLYETHYLENE GLYCOL 3350 17 G: 17 POWDER, FOR SOLUTION ORAL at 11:04

## 2024-04-09 RX ADMIN — TIMOLOL MALEATE 1 DROP: 5 SOLUTION OPHTHALMIC at 08:04

## 2024-04-09 RX ADMIN — CARVEDILOL 3.12 MG: 3.12 TABLET, FILM COATED ORAL at 08:04

## 2024-04-09 RX ADMIN — PANTOPRAZOLE SODIUM 40 MG: 40 TABLET, DELAYED RELEASE ORAL at 08:04

## 2024-04-09 RX ADMIN — MICONAZOLE NITRATE: 20 POWDER TOPICAL at 08:04

## 2024-04-09 RX ADMIN — DOCUSATE SODIUM 200 MG: 100 CAPSULE, LIQUID FILLED ORAL at 11:04

## 2024-04-09 RX ADMIN — ASPIRIN 81 MG: 81 TABLET, COATED ORAL at 08:04

## 2024-04-09 NOTE — PT/OT/SLP PROGRESS
Physical Therapy      Patient Name:  Ev Alberts   MRN:  [id number]    Pt discharging home today-waiting on home oxygen to be delivered. Will f/u if pt does not discharge.

## 2024-04-09 NOTE — PT/OT/SLP PROGRESS
Occupational Therapy   Treatment    Name: Ev Alberts  MRN: [id number]  Admitting Diagnosis:  Acute on chronic diastolic heart failure       Recommendations:     Recommended therapy intensity at discharge: Moderate Intensity Therapy   Discharge Equipment Recommendations:  none  Barriers to discharge:       Assessment:     Ev Alberts is a 89 y.o. female with a medical diagnosis of Acute on chronic diastolic heart failure. Performance deficits affecting function are weakness, impaired endurance, impaired self care skills, impaired functional mobility, gait instability, impaired balance, decreased safety awareness, decreased lower extremity function. Pt high fall risk and only able to ambulate few steps before fatiguing however declining placement and requesting to return home with family assist    Rehab Prognosis:  Good; patient would benefit from acute skilled OT services to address these deficits and reach maximum level of function.       Plan:     Patient to be seen 5 x/week to address the above listed problems via self-care/home management, therapeutic activities, therapeutic exercises  Plan of Care Expires: 05/03/24  Plan of Care Reviewed with: patient, family    Subjective     Pain/Comfort:  Pain Rating 1: 0/10    Objective:     Communicated with: RN prior to session.  Patient found left sidelying with oxygen, PureWick, telemetry, pulse ox (continuous) upon OT entry to room.    General Precautions: Standard, fall    Orthopedic Precautions:N/A  Braces: N/A  Respiratory Status: Room air     Occupational Performance:     Bed Mobility:    Patient completed Scooting/Bridging with minimum assistance  Patient completed Supine to Sit with minimum assistance     Functional Mobility/Transfers:  Patient completed Sit <> Stand Transfer with minimum assistance  with  rolling walker  x5 trials from EOB. Tolerated multiple trials to change soiled chux pads.   Functional Mobility: performed lateral steps along EOB and  forward/backwards steps with Min A and RW. Multiple rest breaks needed.     Activities of Daily Living:  LE dressing: Max A to doff underwear.     Therapeutic Positioning    OT interventions performed during the course of today's session in an effort to prevent and/or reduce acquired pressure injuries:   Therapeutic positioning was provided at the conclusion of session to offload all bony prominences for the prevention and/or reduction of pressure injuries    [facility] 6 Click ADL:      Patient Education:  Patient provided with verbal education education regarding OT role/goals/POC, fall prevention, and safety awareness.  Understanding was verbalized.      Patient left supine per turn schedule with all lines intact, call button in reach, pressure relief boots, and family present.    GOALS:   Multidisciplinary Problems       Occupational Therapy Goals          Problem: Occupational Therapy    Goal Priority Disciplines Outcome Interventions   Occupational Therapy Goal     OT, PT/OT Ongoing, Progressing    Description: LTG: caregiver(s) will demo ability to safely assist pt with ADL and ADL t/fs by discharge.    STG: to be met by 5/3    Pt will complete grooming standing at sink with LRAD with SBA.  Pt will complete UB dressing with SBA.  Pt will complete LB dressing with min A using LRAD.  Pt will complete toileting with SBA using LRAD.  Pt will complete functional mobility to/from toilet and toilet transfer with SBA using LRAD.                        Time Tracking:     OT Date of Treatment: 04/09/24  OT Start Time: 1055  OT Stop Time: 1118  OT Total Time (min): 23 min    Billable Minutes:Therapeutic Activity 23    OT/SARANYA: SARANYA     Number of SARANYA visits since last OT visit: 2    4/9/2024

## 2024-04-09 NOTE — PLAN OF CARE
Pt req o2 for home use. [facility] for [facility]. Referral sent o2 delivered.  Pt disch with [facility] currently on services.   Lucina notified them/samuel.

## 2024-04-09 NOTE — NURSING
Pt on 2L NC, titrated down slowly to RA. PT sat up in bed and sat dropped to mid 80s, placed back on 1L NC

## 2024-04-09 NOTE — PLAN OF CARE
I spoke to pt and her spouse early this am.  Pt does not qualify for inpt rehab, cannot perform 3 hrs/day.  Ipt wants to return home with spouse.  She will resume HH services with [facility] referral sent.  She will resume services/sitter provided by [facility].  Dgtr.Mellissa was called early this am .  No message unable to leave vm.  I spoke to pt's jonathan Lal and Melissa Clay.  Also spoke to son Ranjeet in the room,he  was given discharge instructions and provided update by MD.rec. oxygen and is discharged home

## 2024-04-10 ENCOUNTER — PATIENT OUTREACH (OUTPATIENT)
Dept: ADMINISTRATIVE | Facility: CLINIC | Age: 89
End: 2024-04-10
Payer: MEDICARE

## 2024-04-14 NOTE — DISCHARGE SUMMARY
[facility] Medicine Discharge Summary    Admit Date: 4/3/2024  Discharge Date and Time:  4/9/2024, 04:20 pm  Admitting Physician:  Team  Discharging Physician: Ally Erazo MD.  Primary Care Physician: Santi Walters MD  Consults: Cardiology    Discharge Diagnoses:  Acute on chronic diastolic HF, LVEF 55-60%, GIII DD, POA - improved   Normocytic anemia / Anemia of chromic disease   Chronic kidney disease , stage III - stable   Elevated troponin / NSTEMI type 2 due to HF and cardiac decompensation   Ila lower ext edema / Volume overload due to CHF exacerbation - Improved  PAF on Eliquis,   Metabolic alkalosis with diuretic therapy   Fatigue and Generalized weakness - unchanged       Hx  of CAD, PAD, KALEB, HLD, CKD, VHD- mild to mod TR  and Gout    Hospital Course:   The pt is a 89 yo female with a PMHx of SSS s/p PPM 2019, PAF on Eliquis, HFpEF 55-60%, non obstructive CAD with LHC on 3/4/24, PAD, KALEB, HLD, CKD III, VHD- mild to mod TR  and Gout returned to the ED on 4/3/24 for evaluation of fatigue and leg swelling. Pt was just discharged home form SNF after back to back two hospitalization in this facility  since 2/2024. She was discharged to SNF on 3/11/24 after 2 weeks hospitalization for E.coli sepsis due to UTI, Pneumonia and Type 2 NSTEMI.      Pt noted worsening ila lower ext edema despite home lasix therapy. Other than fatigue and weakness due to lower extremity edema patient has no complaints. Patient lives at home with her  and typically does ADLs and I ADLs with no assistance however since last admission patient has significantly declined. She is unable to mobilize due to significant swelling in lower extremities bilaterally.  Denies keely chest pain, or SOB at rest. Vitals on arrival 129/59, 97.5, 70, 16, 98% on RA. Labs showed normal WBC, Hgb 9.3, Na 139, K 3.9, Cr 1.7, , Troponin 0.081>0.055, TSH 2.8, UA with WBC 6-10 /HPF does not reflect  "culture , CXR with cardiomegaly and mild congestive changes . Pt was admitted to  service and Cardiology was consulted to assist. Pt was started on IV Furosemide 60 mg q12h with good response. Continued on BB with plan to initiate other GDMT include ACEi or ARB as BP and renal function allows. Low dose ARB attempted but BP continues to be soft , therefore discontinued. Lasix transitioned to oral 40 mg daily once pt was near euvolemic. Continues to c/o fatigue and no energy. PT/OT consulted and moderate intensity therapy was suggested. Pt does not desire to go to SNF again. Palliative Medicine was consulted to discuss GOC and ACP and code status. Pt remains full code. Pt's daughter asked for SNF placement, however Pt and her 94 yo  both wanted to go home with Home Health service as they have family support from children. Pt qualified for Home O2 and Home oxygen setup completed prior to discharged. Pt deemed stable for discharge on 4/9/24      Pt was seen and examined on the day of discharge  Vitals:  VITAL SIGNS: 24 HRS MIN & MAX LAST   No data recorded 99 °F (37.2 °C)   No data recorded (!) 135/56   No data recorded  63   No data recorded 17   No data recorded 99 %       Physical Exam:  General: In no acute distress, afebrile  Chest: Clear to auscultation bilaterally  Heart: RRR, +S1, S2, no appreciable murmur  Abdomen: Soft, nontender, BS +  MSK: Warm, trace to 1+  lower extremity edema Rt >Lt   Neurologic: Alert and oriented to self, place and person, Moves all ext in the bed       Procedures Performed: No admission procedures for hospital encounter.     Significant Diagnostic Studies: See Full reports for all details    No results for input(s): "WBC", "RBC", "HGB", "HCT", "MCV", "MCH", "MCHC", "RDW", "PLT", "MPV", "GRAN", "LYMPH", "MONO", "BASO", "NRBC" in the last 168 hours.    Recent Labs   Lab 04/08/24  0505      K 4.3   CO2 34*   BUN 29.8*   CREATININE 1.40*   CALCIUM 9.7   MG 2.00      "   Microbiology Results (last 7 days)       Procedure Component Value Units Date/Time    Urine Culture High Risk [[id number]]  (Abnormal) Collected: 04/04/24 1847    Order Status: Completed Specimen: Urine, Clean Catch Updated: 04/06/24 1025     Urine Culture Multiple organisms present indicate probable contamination. Recommend recollection.      10,000 - 25,000 colonies/ml GAMMA STREPTOCOCCUS      Less than 10,000 colonies/ml Gram-positive coccus probable staph    Narrative:        Gram Negative Rods Isolated Two Different Species             X-Ray Chest AP Portable  Narrative: EXAMINATION:  XR CHEST AP PORTABLE    CLINICAL HISTORY:  SOB;    TECHNIQUE:  One view    COMPARISON:  March 8, 2024.    FINDINGS:  Cardiopericardial silhouette enlarged appearance similar.  Cardiac device electrodes terminate within the right atrium and the right ventricle.  Lungs are remarkable for minimal to mild congestive changes.  Left lower lung lobe retrocardiac area is rather obscured.  No significant fluid within the pleural space.  No pneumothorax.  Impression: Cardiomegaly and mild congestive changes.    Electronically signed by: Theodore Prince  Date:    04/03/2024  Time:    16:15         Medication List        START taking these medications      docusate sodium 100 MG capsule  Commonly known as: COLACE  Take 2 capsules (200 mg total) by mouth 2 (two) times daily as needed for Constipation.     miconazole NITRATE 2 % 2 % top powder  Commonly known as: MICOTIN  Apply topically 2 (two) times daily.     pantoprazole 40 MG tablet  Commonly known as: PROTONIX  Take 1 tablet (40 mg total) by mouth once daily.  Replaces: omeprazole 20 MG capsule     potassium chloride SA 20 MEQ tablet  Commonly known as: K-DUR,KLOR-CON  Take 1 tablet (20 mEq total) by mouth once daily.            CHANGE how you take these medications      furosemide 40 MG tablet  Commonly known as: LASIX  Take 1 tablet (40 mg total) by mouth once daily.  What changed:    medication strength  when to take this     gabapentin 100 MG capsule  Commonly known as: NEURONTIN  Take 2 capsules (200 mg total) by mouth 2 (two) times daily.  What changed:   medication strength  how much to take  when to take this            CONTINUE taking these medications      allopurinoL 300 MG tablet  Commonly known as: ZYLOPRIM     aspirin 81 MG EC tablet  Commonly known as: ECOTRIN  Take 1 tablet (81 mg total) by mouth once daily.     atorvastatin 10 MG tablet  Commonly known as: LIPITOR     carvediloL 3.125 MG tablet  Commonly known as: COREG     cetirizine 10 MG tablet  Commonly known as: ZYRTEC     COMBIGAN 0.2-0.5 % Drop  Generic drug: brimonidine-timoloL     ELIQUIS 2.5 mg Tab  Generic drug: apixaban     INV albuterol 90 mcg inhalation     latanoprost 0.005 % ophthalmic solution     levothyroxine 25 MCG tablet  Commonly known as: SYNTHROID     mirabegron 25 mg Tb24 ER tablet  Commonly known as: MYRBETRIQ     risperiDONE 2 MG tablet  Commonly known as: RISPERDAL     rivastigmine tartrate 1.5 MG capsule  Commonly known as: EXELON     traMADoL 50 mg tablet  Commonly known as: ULTRAM     traZODone 100 MG tablet  Commonly known as: DESYREL            STOP taking these medications      acyclovir 200 MG capsule  Commonly known as: ZOVIRAX     omeprazole 20 MG capsule  Commonly known as: PRILOSEC  Replaced by: pantoprazole 40 MG tablet               Where to Get Your Medications        These medications were sent to [facility] [place] - [address] [address], [place] [address]      Phone: [phone]   furosemide 40 MG tablet  gabapentin 100 MG capsule  pantoprazole 40 MG tablet  potassium chloride SA 20 MEQ tablet       You can get these medications from any pharmacy    You don't need a prescription for these medications  docusate sodium 100 MG capsule  miconazole NITRATE 2 % 2 % top powder          Explained in detail to the patient about the discharge plan,  medications, and follow-up visits. Pt understands and agrees with the treatment plan  [place] [facility] [place] Condition: stable  Diet-    Medications Per DC med rec  Activities as tolerated   Follow-up Information       Santi Walters MD Follow up on 4/18/2024.    Specialty: Family Medicine  Why: Follow up appt on April 18th @ 10:15  Contact information:  [address]remy [address]  [facility]  [place] [address]  [phone]                           For further questions contact hospitalist office    Discharge time 33 minutes    For worsening symptoms, chest pain, shortness of breath, increased abdominal pain, high grade fever, stroke or stroke like symptoms, immediately go to the nearest Emergency Room or call 911 as soon as possible.      Ally Bryant M.D, on 4/9/2024, 04:20 pm

## 2024-05-08 DIAGNOSIS — D64.9 ANEMIA: Primary | ICD-10-CM

## 2024-05-16 NOTE — PROGRESS NOTES
Subjective:       Patient ID: Ev Alberts is a 89 y.o. female.    Chief Complaint: New Patient    Diagnosis: Anemia of Chronic Disease    Current Treatment: None    Treatment History: N/A    HPI:   88 yo F presents in May '24 for evaluation of Anemia. She had CBC in early May '24 with H/H of 8.8/28.8. WBC and platelets were normal. Iron studies in  most consistent with anemia of chronic disease with Ferritin in the 500's and normal iron studies. B12 913 and Folate 11.0. CMP notable for chronic CKD III. She was admitted to hospital for CHF exacerbation twice in the last several months prior to admission. She has a history of MGUS, diagnosed in , last M spike on record is 1.3g IgG Kappa. She has not had those labs checked in a long time per patient. Her daughter states she previously saw a hematologist in [place] for these exact things approximately 10 years ago.     She has no complaints today. She feels tired occasionally. Otherwise feels her usual self.   I discussed her CBC findings and how they differ from expected values. We discussed the possible etiology for her findings including both benign and malignant causes. I explained the role for initial evaluation with blood work and possibly secondary evaluation with a bone marrow biopsy. All questions were answered at the time of the visit. She is agreeable to proceed with the plan.        Past Medical History:   Diagnosis Date    Dementia     Hypertension     Sick sinus syndrome     Thyroid disease     Unspecified glaucoma       Past Surgical History:   Procedure Laterality Date    cataract surgery       SECTION      CHOLECYSTECTOMY      HYSTERECTOMY      INSERTION OF PACEMAKER      LEFT HEART CATHETERIZATION Left 3/4/2024    Procedure: Left heart cath;  Surgeon: Mark Raymundo Jr., MD;  Location: [facility] CATH LAB;  Service: Cardiology;  Laterality: Left;    NEPHRECTOMY       Social History     Socioeconomic History    Marital status:     Tobacco Use    Smoking status: Never    Smokeless tobacco: Never   Substance and Sexual Activity    Alcohol use: Never    Drug use: Not Currently     Social Determinants of Health     Financial Resource Strain: Low Risk  (4/5/2024)    Overall Financial Resource Strain (CARDIA)     Difficulty of Paying Living Expenses: Not hard at all   Food Insecurity: No Food Insecurity (4/5/2024)    Hunger Vital Sign     Worried About Running Out of Food in the Last Year: Never true     Ran Out of Food in the Last Year: Never true   Transportation Needs: Unmet Transportation Needs (4/5/2024)    PRAPARE - Transportation     Lack of Transportation (Medical): No     Lack of Transportation (Non-Medical): Yes   Physical Activity: Inactive (2/26/2024)    Exercise Vital Sign     Days of Exercise per Week: 0 days     Minutes of Exercise per Session: 0 min   Stress: No Stress Concern Present (4/5/2024)    [employer] [place] [employer] - Occupational Stress Questionnaire     Feeling of Stress : Not at all   Housing Stability: Low Risk  (4/5/2024)    Housing Stability Vital Sign     Unable to Pay for Housing in the Last Year: No     Number of Places Lived in the Last Year: 1     Unstable Housing in the Last Year: No   Recent Concern: Housing Stability - High Risk (2/27/2024)    Housing Stability Vital Sign     Unable to Pay for Housing in the Last Year: No     Number of Places Lived in the Last Year: 1     Unstable Housing in the Last Year: Yes      No family history on file.   Review of patient's allergies indicates:   Allergen Reactions    Amlodipine-benazepril Edema     Other reaction(s): unknown    Corticosteroids (glucocorticoids) Edema and Swelling    Rofecoxib Anaphylaxis and Swelling    Sulfa (sulfonamide antibiotics) Edema    Atorvastatin      Other reaction(s): unknown    Ibuprofen      Other reaction(s): Allergy to sulfa drugs      Review of Systems   Constitutional:  Negative for activity change, fever and unexpected  weight change.   HENT:  Negative for sore throat.    Eyes:  Negative for visual disturbance.   Respiratory:  Negative for cough and shortness of breath.    Cardiovascular:  Negative for chest pain.   Gastrointestinal:  Negative for abdominal pain, diarrhea, nausea and vomiting.   Endocrine: Negative for polyuria.   Genitourinary:  Negative for dysuria and hot flashes.   Integumentary:  Negative for rash.   Neurological:  Negative for weakness and headaches.   Hematological:  Negative for adenopathy.   Psychiatric/Behavioral:  Negative for confusion.          Objective:      Vitals:    05/20/24 1331   BP: 124/71   Pulse: 60   Resp: 16   Temp: 98.4 °F (36.9 °C)       Physical Exam  Constitutional:       General: She is not in acute distress.     Appearance: Normal appearance. She is not ill-appearing.      Comments: Frail elderly female in wheelchair   HENT:      Head: Normocephalic and atraumatic.      Nose: Nose normal.      Mouth/Throat:      Mouth: Mucous membranes are moist.      Pharynx: Oropharynx is clear.   Eyes:      Extraocular Movements: Extraocular movements intact.      Conjunctiva/sclera: Conjunctivae normal.      Pupils: Pupils are equal, round, and reactive to light.   Cardiovascular:      Rate and Rhythm: Normal rate and regular rhythm.      Pulses: Normal pulses.      Heart sounds: Normal heart sounds. No murmur heard.  Pulmonary:      Effort: Pulmonary effort is normal. No respiratory distress.      Breath sounds: Normal breath sounds.   Abdominal:      General: There is no distension.      Palpations: Abdomen is soft.      Tenderness: There is no abdominal tenderness.   Musculoskeletal:         General: Normal range of motion.      Cervical back: Normal range of motion and neck supple.      Right lower leg: No edema.      Left lower leg: No edema.   Lymphadenopathy:      Cervical: No cervical adenopathy.   Skin:     General: Skin is warm and dry.   Neurological:      General: No focal deficit  present.      Mental Status: She is alert and oriented to person, place, and time.         LABS AND IMAGING REVIEWED IN EPIC      Assessment:   Anemia of Chronic Disease - In the setting of multiple comorbidities including CKD III. No role for EPO if Hgb >9.     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 Will evaluate to ensure no progression to multiple myeloma        Plan:   - Labs today  - RTC 6 weeks for MD visit, labs same day    I spent a total of 55 minutes on the day of the visit.This includes face to face time and non-face to face time preparing to see the patient (eg, review of tests), obtaining and/or reviewing separately obtained history, documenting clinical information in the electronic or other health record, independently interpreting results and communicating results to the patient/family/caregiver, or care coordinator.        Elizabeth Kennedy LeJeune, MD  Hematology/Oncology   [facility]        Professional Services   I, Mellissa Ford LPN, acted solely as a scribe for and in the presence of Dr. Elizabeth Kennedy LeJeune, who performed these services.

## 2024-05-20 ENCOUNTER — OFFICE VISIT (OUTPATIENT)
Dept: HEMATOLOGY/ONCOLOGY | Facility: CLINIC | Age: 89
End: 2024-05-20
Payer: MEDICARE

## 2024-05-20 ENCOUNTER — TELEPHONE (OUTPATIENT)
Dept: HEMATOLOGY/ONCOLOGY | Facility: CLINIC | Age: 89
End: 2024-05-20

## 2024-05-20 VITALS
SYSTOLIC BLOOD PRESSURE: 124 MMHG | OXYGEN SATURATION: 99 % | BODY MASS INDEX: 22.91 KG/M2 | HEART RATE: 60 BPM | TEMPERATURE: 98 F | HEIGHT: 65 IN | WEIGHT: 137.5 LBS | DIASTOLIC BLOOD PRESSURE: 71 MMHG | RESPIRATION RATE: 16 BRPM

## 2024-05-20 DIAGNOSIS — D64.9 ANEMIA: ICD-10-CM

## 2024-05-20 DIAGNOSIS — D47.2 MGUS (MONOCLONAL GAMMOPATHY OF UNKNOWN SIGNIFICANCE): Primary | ICD-10-CM

## 2024-05-20 LAB
ALBUMIN SERPL-MCNC: 3.8 G/DL (ref 3.4–4.8)
ALBUMIN/GLOB SERPL: 0.8 RATIO (ref 1.1–2)
ALP SERPL-CCNC: 91 UNIT/L (ref 40–150)
ALT SERPL-CCNC: 11 UNIT/L (ref 0–55)
ANION GAP SERPL CALC-SCNC: 8 MEQ/L
AST SERPL-CCNC: 23 UNIT/L (ref 5–34)
BASOPHILS # BLD AUTO: 0.04 X10(3)/MCL
BASOPHILS NFR BLD AUTO: 0.5 %
BILIRUB SERPL-MCNC: 0.4 MG/DL
BUN SERPL-MCNC: 21.8 MG/DL (ref 9.8–20.1)
CALCIUM SERPL-MCNC: 10.1 MG/DL (ref 8.4–10.2)
CHLORIDE SERPL-SCNC: 102 MMOL/L (ref 98–107)
CO2 SERPL-SCNC: 27 MMOL/L (ref 23–31)
CREAT SERPL-MCNC: 1.49 MG/DL (ref 0.55–1.02)
CREAT/UREA NIT SERPL: 15
EOSINOPHIL # BLD AUTO: 0.15 X10(3)/MCL (ref 0–0.9)
EOSINOPHIL NFR BLD AUTO: 2 %
ERYTHROCYTE [DISTWIDTH] IN BLOOD BY AUTOMATED COUNT: 19.2 % (ref 11.5–17)
GFR SERPLBLD CREATININE-BSD FMLA CKD-EPI: 34 ML/MIN/1.73/M2
GLOBULIN SER-MCNC: 4.7 GM/DL (ref 2.4–3.5)
GLUCOSE SERPL-MCNC: 100 MG/DL (ref 82–115)
HCT VFR BLD AUTO: 29.3 % (ref 37–47)
HGB BLD-MCNC: 9.2 G/DL (ref 12–16)
IGA SERPL-MCNC: 44 MG/DL (ref 69–517)
IGG SERPL-MCNC: 3138 MG/DL (ref 522–1631)
IGM SERPL-MCNC: 10 MG/DL (ref 33–293)
IMM GRANULOCYTES # BLD AUTO: 0 X10(3)/MCL (ref 0–0.04)
IMM GRANULOCYTES NFR BLD AUTO: 0 %
LYMPHOCYTES # BLD AUTO: 3.23 X10(3)/MCL (ref 0.6–4.6)
LYMPHOCYTES NFR BLD AUTO: 44.1 %
MCH RBC QN AUTO: 31.3 PG (ref 27–31)
MCHC RBC AUTO-ENTMCNC: 31.4 G/DL (ref 33–36)
MCV RBC AUTO: 99.7 FL (ref 80–94)
MONOCYTES # BLD AUTO: 0.57 X10(3)/MCL (ref 0.1–1.3)
MONOCYTES NFR BLD AUTO: 7.8 %
NEUTROPHILS # BLD AUTO: 3.34 X10(3)/MCL (ref 2.1–9.2)
NEUTROPHILS NFR BLD AUTO: 45.6 %
PLATELET # BLD AUTO: 222 X10(3)/MCL (ref 130–400)
PMV BLD AUTO: 9.2 FL (ref 7.4–10.4)
POTASSIUM SERPL-SCNC: 3.9 MMOL/L (ref 3.5–5.1)
PROT SERPL-MCNC: 8.5 GM/DL (ref 5.8–7.6)
RBC # BLD AUTO: 2.94 X10(6)/MCL (ref 4.2–5.4)
RET# (OHS): 0.03 X10E6/UL (ref 0.02–0.08)
RETICULOCYTE COUNT AUTOMATED (OLG): 0.93 % (ref 1.1–2.1)
SODIUM SERPL-SCNC: 137 MMOL/L (ref 136–145)
TSH SERPL-ACNC: 1.22 UIU/ML (ref 0.35–4.94)
WBC # SPEC AUTO: 7.33 X10(3)/MCL (ref 4.5–11.5)

## 2024-05-20 PROCEDURE — 1126F AMNT PAIN NOTED NONE PRSNT: CPT | Mod: CPTII,S$GLB,, | Performed by: STUDENT IN AN ORGANIZED HEALTH CARE EDUCATION/TRAINING PROGRAM

## 2024-05-20 PROCEDURE — 1160F RVW MEDS BY RX/DR IN RCRD: CPT | Mod: CPTII,S$GLB,, | Performed by: STUDENT IN AN ORGANIZED HEALTH CARE EDUCATION/TRAINING PROGRAM

## 2024-05-20 PROCEDURE — 99999 PR PBB SHADOW E&M-EST. PATIENT-LVL V: CPT | Mod: PBBFAC,,, | Performed by: STUDENT IN AN ORGANIZED HEALTH CARE EDUCATION/TRAINING PROGRAM

## 2024-05-20 PROCEDURE — 36415 COLL VENOUS BLD VENIPUNCTURE: CPT | Performed by: STUDENT IN AN ORGANIZED HEALTH CARE EDUCATION/TRAINING PROGRAM

## 2024-05-20 PROCEDURE — 1159F MED LIST DOCD IN RCRD: CPT | Mod: CPTII,S$GLB,, | Performed by: STUDENT IN AN ORGANIZED HEALTH CARE EDUCATION/TRAINING PROGRAM

## 2024-05-20 PROCEDURE — 99204 OFFICE O/P NEW MOD 45 MIN: CPT | Mod: S$GLB,,, | Performed by: STUDENT IN AN ORGANIZED HEALTH CARE EDUCATION/TRAINING PROGRAM

## 2024-05-20 PROCEDURE — 80053 COMPREHEN METABOLIC PANEL: CPT | Performed by: STUDENT IN AN ORGANIZED HEALTH CARE EDUCATION/TRAINING PROGRAM

## 2024-05-20 PROCEDURE — 83521 IG LIGHT CHAINS FREE EACH: CPT | Performed by: STUDENT IN AN ORGANIZED HEALTH CARE EDUCATION/TRAINING PROGRAM

## 2024-05-20 PROCEDURE — 84443 ASSAY THYROID STIM HORMONE: CPT | Performed by: STUDENT IN AN ORGANIZED HEALTH CARE EDUCATION/TRAINING PROGRAM

## 2024-05-20 PROCEDURE — 82784 ASSAY IGA/IGD/IGG/IGM EACH: CPT | Performed by: STUDENT IN AN ORGANIZED HEALTH CARE EDUCATION/TRAINING PROGRAM

## 2024-05-20 PROCEDURE — 82668 ASSAY OF ERYTHROPOIETIN: CPT | Performed by: STUDENT IN AN ORGANIZED HEALTH CARE EDUCATION/TRAINING PROGRAM

## 2024-05-20 PROCEDURE — 85025 COMPLETE CBC W/AUTO DIFF WBC: CPT | Performed by: STUDENT IN AN ORGANIZED HEALTH CARE EDUCATION/TRAINING PROGRAM

## 2024-05-20 PROCEDURE — 85045 AUTOMATED RETICULOCYTE COUNT: CPT | Performed by: STUDENT IN AN ORGANIZED HEALTH CARE EDUCATION/TRAINING PROGRAM

## 2024-05-20 PROCEDURE — 84165 PROTEIN E-PHORESIS SERUM: CPT | Performed by: STUDENT IN AN ORGANIZED HEALTH CARE EDUCATION/TRAINING PROGRAM

## 2024-05-20 RX ORDER — POTASSIUM CHLORIDE 1.5 G/1.58G
20 POWDER, FOR SOLUTION ORAL ONCE
COMMUNITY

## 2024-05-21 LAB — PATH REV: NORMAL

## 2024-05-22 LAB
ALBUMIN % SPEP (OHS): 41.74
ALBUMIN SERPL-MCNC: 3.5 G/DL (ref 3.4–4.8)
ALBUMIN/GLOB SERPL: 0.7 RATIO (ref 1.1–2)
ALPHA 1 GLOB (OHS): 0.4 GM/DL
ALPHA 1 GLOB% (OHS): 4.82
ALPHA 2 GLOB % (OHS): 10.7
ALPHA 2 GLOB (OHS): 0.9 GM/DL
BETA GLOB (OHS): 0.85 GM/DL
BETA GLOB% (OHS): 10.17
EPO SERPL-ACNC: 26.5 MIU/ML (ref 2.6–18.5)
GAMMA GLOBULIN % (OHS): 32.57
GAMMA GLOBULIN (OHS): 2.74 GM/DL
GLOBULIN SER-MCNC: 4.9 GM/DL (ref 2.4–3.5)
KAPPA LC FREE SER-MCNC: 19.3 MG/DL (ref 0.33–1.94)
KAPPA LC FREE/LAMBDA FREE SER: 13.4 {RATIO} (ref 0.26–1.65)
LAMBDA LC FREE SERPL-MCNC: 1.44 MG/DL (ref 0.57–2.63)
M SPIKE % (OHS): 17.15
M SPIKE (OHS): 1.44 GM/DL
PROT SERPL-MCNC: 8.4 GM/DL (ref 5.8–7.6)

## 2024-06-20 ENCOUNTER — TELEPHONE (OUTPATIENT)
Dept: NEUROLOGY | Facility: CLINIC | Age: 89
End: 2024-06-20
Payer: MEDICARE

## 2024-06-21 ENCOUNTER — TELEPHONE (OUTPATIENT)
Dept: NEUROLOGY | Facility: CLINIC | Age: 89
End: 2024-06-21
Payer: MEDICARE

## 2024-07-22 DIAGNOSIS — G62.9 NEUROPATHY: Primary | ICD-10-CM

## 2024-07-26 ENCOUNTER — HOSPITAL ENCOUNTER (INPATIENT)
Facility: HOSPITAL | Age: 89
LOS: 5 days | Discharge: HOME HEALTH | DRG: 377 | End: 2024-08-01
Attending: EMERGENCY MEDICINE | Admitting: INTERNAL MEDICINE
Payer: MEDICARE

## 2024-07-26 DIAGNOSIS — I50.9 CONGESTIVE HEART FAILURE, UNSPECIFIED HF CHRONICITY, UNSPECIFIED HEART FAILURE TYPE: Primary | ICD-10-CM

## 2024-07-26 DIAGNOSIS — D64.9 SYMPTOMATIC ANEMIA: ICD-10-CM

## 2024-07-26 DIAGNOSIS — N17.9 ACUTE RENAL INJURY: ICD-10-CM

## 2024-07-26 DIAGNOSIS — S32.020A CLOSED WEDGE COMPRESSION FRACTURE OF L2 VERTEBRA, INITIAL ENCOUNTER: ICD-10-CM

## 2024-07-26 DIAGNOSIS — R07.9 CHEST PAIN: ICD-10-CM

## 2024-07-26 DIAGNOSIS — I51.89 HEART CHAMBER DYSFUNCTION: ICD-10-CM

## 2024-07-26 DIAGNOSIS — K92.1 GASTROINTESTINAL HEMORRHAGE WITH MELENA: ICD-10-CM

## 2024-07-26 DIAGNOSIS — M79.89 LEG SWELLING: ICD-10-CM

## 2024-07-26 LAB
ALBUMIN SERPL-MCNC: 3.3 G/DL (ref 3.4–4.8)
ALBUMIN/GLOB SERPL: 0.8 RATIO (ref 1.1–2)
ALP SERPL-CCNC: 113 UNIT/L (ref 40–150)
ALT SERPL-CCNC: 15 UNIT/L (ref 0–55)
ANION GAP SERPL CALC-SCNC: 5 MEQ/L
AST SERPL-CCNC: 27 UNIT/L (ref 5–34)
BACTERIA #/AREA URNS AUTO: ABNORMAL /HPF
BASOPHILS # BLD AUTO: 0.03 X10(3)/MCL
BASOPHILS NFR BLD AUTO: 0.4 %
BILIRUB SERPL-MCNC: 0.4 MG/DL
BILIRUB UR QL STRIP.AUTO: NEGATIVE
BNP BLD-MCNC: 537.9 PG/ML
BUN SERPL-MCNC: 38.1 MG/DL (ref 9.8–20.1)
CALCIUM SERPL-MCNC: 9.6 MG/DL (ref 8.4–10.2)
CHLORIDE SERPL-SCNC: 100 MMOL/L (ref 98–107)
CLARITY UR: ABNORMAL
CO2 SERPL-SCNC: 29 MMOL/L (ref 23–31)
COLOR UR AUTO: ABNORMAL
CREAT SERPL-MCNC: 1.77 MG/DL (ref 0.55–1.02)
CREAT/UREA NIT SERPL: 22
EOSINOPHIL # BLD AUTO: 0.13 X10(3)/MCL (ref 0–0.9)
EOSINOPHIL NFR BLD AUTO: 1.8 %
ERYTHROCYTE [DISTWIDTH] IN BLOOD BY AUTOMATED COUNT: 18.6 % (ref 11.5–17)
FLUAV AG UPPER RESP QL IA.RAPID: NOT DETECTED
FLUBV AG UPPER RESP QL IA.RAPID: NOT DETECTED
GFR SERPLBLD CREATININE-BSD FMLA CKD-EPI: 28 ML/MIN/1.73/M2
GLOBULIN SER-MCNC: 4.2 GM/DL (ref 2.4–3.5)
GLUCOSE SERPL-MCNC: 109 MG/DL (ref 82–115)
GLUCOSE UR QL STRIP: NEGATIVE
HCT VFR BLD AUTO: 24.9 % (ref 37–47)
HGB BLD-MCNC: 7.6 G/DL (ref 12–16)
HGB UR QL STRIP: NEGATIVE
IMM GRANULOCYTES # BLD AUTO: 0.01 X10(3)/MCL (ref 0–0.04)
IMM GRANULOCYTES NFR BLD AUTO: 0.1 %
KETONES UR QL STRIP: NEGATIVE
LEUKOCYTE ESTERASE UR QL STRIP: NEGATIVE
LYMPHOCYTES # BLD AUTO: 2.13 X10(3)/MCL (ref 0.6–4.6)
LYMPHOCYTES NFR BLD AUTO: 29.7 %
MCH RBC QN AUTO: 30.3 PG (ref 27–31)
MCHC RBC AUTO-ENTMCNC: 30.5 G/DL (ref 33–36)
MCV RBC AUTO: 99.2 FL (ref 80–94)
MONOCYTES # BLD AUTO: 0.85 X10(3)/MCL (ref 0.1–1.3)
MONOCYTES NFR BLD AUTO: 11.9 %
NEUTROPHILS # BLD AUTO: 4.01 X10(3)/MCL (ref 2.1–9.2)
NEUTROPHILS NFR BLD AUTO: 56.1 %
NITRITE UR QL STRIP: NEGATIVE
NRBC BLD AUTO-RTO: 0 %
PH UR STRIP: 6 [PH]
PLATELET # BLD AUTO: 144 X10(3)/MCL (ref 130–400)
PMV BLD AUTO: 9.9 FL (ref 7.4–10.4)
POTASSIUM SERPL-SCNC: 4.4 MMOL/L (ref 3.5–5.1)
PROT SERPL-MCNC: 7.5 GM/DL (ref 5.8–7.6)
PROT UR QL STRIP: NEGATIVE
RBC # BLD AUTO: 2.51 X10(6)/MCL (ref 4.2–5.4)
RBC #/AREA URNS AUTO: ABNORMAL /HPF
SARS-COV-2 RNA RESP QL NAA+PROBE: NOT DETECTED
SODIUM SERPL-SCNC: 134 MMOL/L (ref 136–145)
SP GR UR STRIP.AUTO: 1.01 (ref 1–1.03)
SQUAMOUS #/AREA URNS LPF: ABNORMAL /HPF
TROPONIN I SERPL-MCNC: 0.01 NG/ML (ref 0–0.04)
UROBILINOGEN UR STRIP-ACNC: 0.2
WBC # BLD AUTO: 7.16 X10(3)/MCL (ref 4.5–11.5)
WBC #/AREA URNS AUTO: ABNORMAL /HPF

## 2024-07-26 PROCEDURE — 80053 COMPREHEN METABOLIC PANEL: CPT | Performed by: PHYSICIAN ASSISTANT

## 2024-07-26 PROCEDURE — 96374 THER/PROPH/DIAG INJ IV PUSH: CPT

## 2024-07-26 PROCEDURE — 63600175 PHARM REV CODE 636 W HCPCS: Performed by: EMERGENCY MEDICINE

## 2024-07-26 PROCEDURE — 0240U COVID/FLU A&B PCR: CPT | Performed by: PHYSICIAN ASSISTANT

## 2024-07-26 PROCEDURE — 81001 URINALYSIS AUTO W/SCOPE: CPT | Performed by: PHYSICIAN ASSISTANT

## 2024-07-26 PROCEDURE — 84484 ASSAY OF TROPONIN QUANT: CPT | Performed by: PHYSICIAN ASSISTANT

## 2024-07-26 PROCEDURE — 85025 COMPLETE CBC W/AUTO DIFF WBC: CPT | Performed by: PHYSICIAN ASSISTANT

## 2024-07-26 PROCEDURE — 96375 TX/PRO/DX INJ NEW DRUG ADDON: CPT

## 2024-07-26 PROCEDURE — 83880 ASSAY OF NATRIURETIC PEPTIDE: CPT | Performed by: PHYSICIAN ASSISTANT

## 2024-07-26 PROCEDURE — 93005 ELECTROCARDIOGRAM TRACING: CPT

## 2024-07-26 PROCEDURE — 93010 ELECTROCARDIOGRAM REPORT: CPT | Mod: ,,, | Performed by: INTERNAL MEDICINE

## 2024-07-26 PROCEDURE — 99285 EMERGENCY DEPT VISIT HI MDM: CPT | Mod: 25

## 2024-07-26 RX ORDER — FUROSEMIDE 10 MG/ML
40 INJECTION INTRAMUSCULAR; INTRAVENOUS
Status: COMPLETED | OUTPATIENT
Start: 2024-07-26 | End: 2024-07-26

## 2024-07-26 RX ORDER — HYDROCODONE BITARTRATE AND ACETAMINOPHEN 500; 5 MG/1; MG/1
TABLET ORAL
Status: DISCONTINUED | OUTPATIENT
Start: 2024-07-27 | End: 2024-08-01 | Stop reason: HOSPADM

## 2024-07-26 RX ADMIN — FUROSEMIDE 40 MG: 10 INJECTION, SOLUTION INTRAMUSCULAR; INTRAVENOUS at 11:07

## 2024-07-27 ENCOUNTER — ANESTHESIA (OUTPATIENT)
Dept: SURGERY | Facility: HOSPITAL | Age: 89
End: 2024-07-27
Payer: MEDICARE

## 2024-07-27 ENCOUNTER — ANESTHESIA EVENT (OUTPATIENT)
Dept: SURGERY | Facility: HOSPITAL | Age: 89
End: 2024-07-27
Payer: MEDICARE

## 2024-07-27 LAB
ABO + RH BLD: NORMAL
ABO + RH BLD: NORMAL
ALBUMIN SERPL-MCNC: 3.2 G/DL (ref 3.4–4.8)
ALBUMIN/GLOB SERPL: 0.7 RATIO (ref 1.1–2)
ALP SERPL-CCNC: 100 UNIT/L (ref 40–150)
ALT SERPL-CCNC: 12 UNIT/L (ref 0–55)
ANION GAP SERPL CALC-SCNC: 7 MEQ/L
APICAL FOUR CHAMBER EJECTION FRACTION: 61 %
APICAL TWO CHAMBER EJECTION FRACTION: 42 %
AST SERPL-CCNC: 24 UNIT/L (ref 5–34)
AV INDEX (PROSTH): 0.56
AV MEAN GRADIENT: 4 MMHG
AV PEAK GRADIENT: 8 MMHG
AV VALVE AREA BY VELOCITY RATIO: 1.88 CM²
AV VALVE AREA: 1.94 CM²
AV VELOCITY RATIO: 0.54
BASOPHILS # BLD AUTO: 0.03 X10(3)/MCL
BASOPHILS NFR BLD AUTO: 0.4 %
BILIRUB SERPL-MCNC: 0.8 MG/DL
BLD PROD TYP BPU: NORMAL
BLD PROD TYP BPU: NORMAL
BLOOD UNIT EXPIRATION DATE: NORMAL
BLOOD UNIT EXPIRATION DATE: NORMAL
BLOOD UNIT TYPE CODE: 7300
BLOOD UNIT TYPE CODE: 7300
BSA FOR ECHO PROCEDURE: 1.75 M2
BUN SERPL-MCNC: 33.2 MG/DL (ref 9.8–20.1)
CALCIUM SERPL-MCNC: 10 MG/DL (ref 8.4–10.2)
CHLORIDE SERPL-SCNC: 101 MMOL/L (ref 98–107)
CO2 SERPL-SCNC: 30 MMOL/L (ref 23–31)
CREAT SERPL-MCNC: 1.54 MG/DL (ref 0.55–1.02)
CREAT/UREA NIT SERPL: 22
CROSSMATCH INTERPRETATION: NORMAL
CROSSMATCH INTERPRETATION: NORMAL
CV ECHO LV RWT: 0.51 CM
DISPENSE STATUS: NORMAL
DISPENSE STATUS: NORMAL
DOP CALC AO PEAK VEL: 1.4 M/S
DOP CALC AO VTI: 25 CM
DOP CALC LVOT AREA: 3.5 CM2
DOP CALC LVOT DIAMETER: 2.1 CM
DOP CALC LVOT PEAK VEL: 0.76 M/S
DOP CALC LVOT STROKE VOLUME: 48.47 CM3
DOP CALC MV VTI: 26.6 CM
DOP CALCLVOT PEAK VEL VTI: 14 CM
E WAVE DECELERATION TIME: 98 MSEC
E/A RATIO: 2.1
E/E' RATIO: 18.77 M/S
ECHO LV POSTERIOR WALL: 1.2 CM (ref 0.6–1.1)
EOSINOPHIL # BLD AUTO: 0.1 X10(3)/MCL (ref 0–0.9)
EOSINOPHIL NFR BLD AUTO: 1.3 %
ERYTHROCYTE [DISTWIDTH] IN BLOOD BY AUTOMATED COUNT: 18.4 % (ref 11.5–17)
FRACTIONAL SHORTENING: 32 % (ref 28–44)
GFR SERPLBLD CREATININE-BSD FMLA CKD-EPI: 33 ML/MIN/1.73/M2
GLOBULIN SER-MCNC: 4.4 GM/DL (ref 2.4–3.5)
GLUCOSE SERPL-MCNC: 115 MG/DL (ref 82–115)
GROUP & RH: NORMAL
HCT VFR BLD AUTO: 35 % (ref 37–47)
HGB BLD-MCNC: 11 G/DL (ref 12–16)
IMM GRANULOCYTES # BLD AUTO: 0.02 X10(3)/MCL (ref 0–0.04)
IMM GRANULOCYTES NFR BLD AUTO: 0.3 %
INDIRECT COOMBS: NORMAL
INTERVENTRICULAR SEPTUM: 1.2 CM (ref 0.6–1.1)
LEFT ATRIUM AREA SYSTOLIC (APICAL 2 CHAMBER): 27.7 CM2
LEFT ATRIUM AREA SYSTOLIC (APICAL 4 CHAMBER): 26.5 CM2
LEFT ATRIUM SIZE: 5.4 CM
LEFT ATRIUM VOLUME INDEX MOD: 46.8 ML/M2
LEFT ATRIUM VOLUME MOD: 80.9 CM3
LEFT INTERNAL DIMENSION IN SYSTOLE: 3.2 CM (ref 2.1–4)
LEFT VENTRICLE DIASTOLIC VOLUME INDEX: 59.17 ML/M2
LEFT VENTRICLE DIASTOLIC VOLUME: 102.36 ML
LEFT VENTRICLE END DIASTOLIC VOLUME APICAL 2 CHAMBER: 55.5 ML
LEFT VENTRICLE END DIASTOLIC VOLUME APICAL 4 CHAMBER: 43.4 ML
LEFT VENTRICLE END SYSTOLIC VOLUME APICAL 2 CHAMBER: 84.8 ML
LEFT VENTRICLE END SYSTOLIC VOLUME APICAL 4 CHAMBER: 77.3 ML
LEFT VENTRICLE MASS INDEX: 123 G/M2
LEFT VENTRICLE SYSTOLIC VOLUME INDEX: 23.7 ML/M2
LEFT VENTRICLE SYSTOLIC VOLUME: 40.96 ML
LEFT VENTRICULAR INTERNAL DIMENSION IN DIASTOLE: 4.7 CM (ref 3.5–6)
LEFT VENTRICULAR MASS: 212 G
LV LATERAL E/E' RATIO: 13.56 M/S
LV SEPTAL E/E' RATIO: 30.5 M/S
LVED V (TEICH): 102.36 ML
LVES V (TEICH): 40.96 ML
LVOT MG: 1 MMHG
LVOT MV: 0.46 CM/S
LYMPHOCYTES # BLD AUTO: 1.6 X10(3)/MCL (ref 0.6–4.6)
LYMPHOCYTES NFR BLD AUTO: 20.5 %
MCH RBC QN AUTO: 30.2 PG (ref 27–31)
MCHC RBC AUTO-ENTMCNC: 31.4 G/DL (ref 33–36)
MCV RBC AUTO: 96.2 FL (ref 80–94)
MONOCYTES # BLD AUTO: 0.77 X10(3)/MCL (ref 0.1–1.3)
MONOCYTES NFR BLD AUTO: 9.8 %
MV MEAN GRADIENT: 2 MMHG
MV PEAK A VEL: 0.58 M/S
MV PEAK E VEL: 1.22 M/S
MV PEAK GRADIENT: 6 MMHG
MV STENOSIS PRESSURE HALF TIME: 60 MS
MV VALVE AREA BY CONTINUITY EQUATION: 1.82 CM2
MV VALVE AREA P 1/2 METHOD: 3.67 CM2
NEUTROPHILS # BLD AUTO: 5.3 X10(3)/MCL (ref 2.1–9.2)
NEUTROPHILS NFR BLD AUTO: 67.7 %
NRBC BLD AUTO-RTO: 0 %
OHS CV RV/LV RATIO: 0.74 CM
OHS LV EJECTION FRACTION SIMPSONS BIPLANE MOD: 52 %
OHS QRS DURATION: 182 MS
OHS QTC CALCULATION: 509 MS
PISA TR MAX VEL: 3.4 M/S
PLATELET # BLD AUTO: 191 X10(3)/MCL (ref 130–400)
PMV BLD AUTO: 11.1 FL (ref 7.4–10.4)
POTASSIUM SERPL-SCNC: 3.9 MMOL/L (ref 3.5–5.1)
PROT SERPL-MCNC: 7.6 GM/DL (ref 5.8–7.6)
PV PEAK GRADIENT: 3 MMHG
PV PEAK VELOCITY: 0.9 M/S
RA PRESSURE ESTIMATED: 15 MMHG
RBC # BLD AUTO: 3.64 X10(6)/MCL (ref 4.2–5.4)
RIGHT VENTRICULAR END-DIASTOLIC DIMENSION: 3.5 CM
RV TB RVSP: 18 MMHG
SODIUM SERPL-SCNC: 138 MMOL/L (ref 136–145)
SPECIMEN OUTDATE: NORMAL
TDI LATERAL: 0.09 M/S
TDI SEPTAL: 0.04 M/S
TDI: 0.07 M/S
TR MAX PG: 46 MMHG
TRICUSPID ANNULAR PLANE SYSTOLIC EXCURSION: 1.74 CM
TROPONIN I SERPL-MCNC: 0.02 NG/ML (ref 0–0.04)
TROPONIN I SERPL-MCNC: 0.03 NG/ML (ref 0–0.04)
TROPONIN I SERPL-MCNC: 0.05 NG/ML (ref 0–0.04)
TV REST PULMONARY ARTERY PRESSURE: 61 MMHG
UNIT NUMBER: NORMAL
UNIT NUMBER: NORMAL
WBC # BLD AUTO: 7.82 X10(3)/MCL (ref 4.5–11.5)
Z-SCORE OF LEFT VENTRICULAR DIMENSION IN END DIASTOLE: -0.2
Z-SCORE OF LEFT VENTRICULAR DIMENSION IN END SYSTOLE: 0.6

## 2024-07-27 PROCEDURE — 25000003 PHARM REV CODE 250: Performed by: INTERNAL MEDICINE

## 2024-07-27 PROCEDURE — 80053 COMPREHEN METABOLIC PANEL: CPT | Performed by: INTERNAL MEDICINE

## 2024-07-27 PROCEDURE — 86923 COMPATIBILITY TEST ELECTRIC: CPT | Performed by: EMERGENCY MEDICINE

## 2024-07-27 PROCEDURE — 25000003 PHARM REV CODE 250

## 2024-07-27 PROCEDURE — 86850 RBC ANTIBODY SCREEN: CPT | Performed by: EMERGENCY MEDICINE

## 2024-07-27 PROCEDURE — 0DJ08ZZ INSPECTION OF UPPER INTESTINAL TRACT, VIA NATURAL OR ARTIFICIAL OPENING ENDOSCOPIC: ICD-10-PCS | Performed by: INTERNAL MEDICINE

## 2024-07-27 PROCEDURE — 43235 EGD DIAGNOSTIC BRUSH WASH: CPT | Performed by: INTERNAL MEDICINE

## 2024-07-27 PROCEDURE — 37000008 HC ANESTHESIA 1ST 15 MINUTES: Performed by: INTERNAL MEDICINE

## 2024-07-27 PROCEDURE — 21400001 HC TELEMETRY ROOM

## 2024-07-27 PROCEDURE — D9220A PRA ANESTHESIA: Mod: ANES,,, | Performed by: ANESTHESIOLOGY

## 2024-07-27 PROCEDURE — 63600175 PHARM REV CODE 636 W HCPCS

## 2024-07-27 PROCEDURE — 63600175 PHARM REV CODE 636 W HCPCS: Performed by: PHYSICIAN ASSISTANT

## 2024-07-27 PROCEDURE — 30233N1 TRANSFUSION OF NONAUTOLOGOUS RED BLOOD CELLS INTO PERIPHERAL VEIN, PERCUTANEOUS APPROACH: ICD-10-PCS | Performed by: EMERGENCY MEDICINE

## 2024-07-27 PROCEDURE — 27000221 HC OXYGEN, UP TO 24 HOURS

## 2024-07-27 PROCEDURE — D9220A PRA ANESTHESIA: Mod: CRNA,,,

## 2024-07-27 PROCEDURE — 84484 ASSAY OF TROPONIN QUANT: CPT | Performed by: NURSE PRACTITIONER

## 2024-07-27 PROCEDURE — 63600175 PHARM REV CODE 636 W HCPCS: Performed by: INTERNAL MEDICINE

## 2024-07-27 PROCEDURE — 36415 COLL VENOUS BLD VENIPUNCTURE: CPT | Performed by: INTERNAL MEDICINE

## 2024-07-27 PROCEDURE — 25000003 PHARM REV CODE 250: Performed by: NURSE PRACTITIONER

## 2024-07-27 PROCEDURE — 85025 COMPLETE CBC W/AUTO DIFF WBC: CPT | Performed by: INTERNAL MEDICINE

## 2024-07-27 PROCEDURE — P9016 RBC LEUKOCYTES REDUCED: HCPCS | Performed by: EMERGENCY MEDICINE

## 2024-07-27 PROCEDURE — 86901 BLOOD TYPING SEROLOGIC RH(D): CPT | Performed by: EMERGENCY MEDICINE

## 2024-07-27 PROCEDURE — 94760 N-INVAS EAR/PLS OXIMETRY 1: CPT

## 2024-07-27 RX ORDER — LATANOPROST 50 UG/ML
1 SOLUTION/ DROPS OPHTHALMIC NIGHTLY
Status: DISCONTINUED | OUTPATIENT
Start: 2024-07-27 | End: 2024-08-01 | Stop reason: HOSPADM

## 2024-07-27 RX ORDER — SUCRALFATE 1 G/1
1 TABLET ORAL 2 TIMES DAILY
Status: DISCONTINUED | OUTPATIENT
Start: 2024-07-27 | End: 2024-08-01 | Stop reason: HOSPADM

## 2024-07-27 RX ORDER — ACETAMINOPHEN 500 MG
1000 TABLET ORAL EVERY 6 HOURS PRN
Status: DISCONTINUED | OUTPATIENT
Start: 2024-07-27 | End: 2024-08-01 | Stop reason: HOSPADM

## 2024-07-27 RX ORDER — DOCUSATE SODIUM 100 MG/1
200 CAPSULE, LIQUID FILLED ORAL 2 TIMES DAILY PRN
Status: DISCONTINUED | OUTPATIENT
Start: 2024-07-27 | End: 2024-08-01 | Stop reason: HOSPADM

## 2024-07-27 RX ORDER — ALUMINUM HYDROXIDE, MAGNESIUM HYDROXIDE, AND SIMETHICONE 1200; 120; 1200 MG/30ML; MG/30ML; MG/30ML
30 SUSPENSION ORAL 4 TIMES DAILY PRN
Status: DISCONTINUED | OUTPATIENT
Start: 2024-07-27 | End: 2024-08-01 | Stop reason: HOSPADM

## 2024-07-27 RX ORDER — GLUCAGON 1 MG
1 KIT INJECTION
Status: DISCONTINUED | OUTPATIENT
Start: 2024-07-27 | End: 2024-08-01 | Stop reason: HOSPADM

## 2024-07-27 RX ORDER — GLYCOPYRROLATE 0.2 MG/ML
INJECTION INTRAMUSCULAR; INTRAVENOUS
Status: DISCONTINUED | OUTPATIENT
Start: 2024-07-27 | End: 2024-07-27

## 2024-07-27 RX ORDER — SODIUM CHLORIDE, SODIUM GLUCONATE, SODIUM ACETATE, POTASSIUM CHLORIDE AND MAGNESIUM CHLORIDE 30; 37; 368; 526; 502 MG/100ML; MG/100ML; MG/100ML; MG/100ML; MG/100ML
INJECTION, SOLUTION INTRAVENOUS CONTINUOUS
OUTPATIENT
Start: 2024-07-27 | End: 2024-08-26

## 2024-07-27 RX ORDER — ONDANSETRON HYDROCHLORIDE 2 MG/ML
4 INJECTION, SOLUTION INTRAVENOUS EVERY 4 HOURS PRN
Status: DISCONTINUED | OUTPATIENT
Start: 2024-07-27 | End: 2024-08-01 | Stop reason: HOSPADM

## 2024-07-27 RX ORDER — PROCHLORPERAZINE EDISYLATE 5 MG/ML
5 INJECTION INTRAMUSCULAR; INTRAVENOUS EVERY 6 HOURS PRN
Status: DISCONTINUED | OUTPATIENT
Start: 2024-07-27 | End: 2024-08-01 | Stop reason: HOSPADM

## 2024-07-27 RX ORDER — LIDOCAINE HYDROCHLORIDE 10 MG/ML
1 INJECTION, SOLUTION EPIDURAL; INFILTRATION; INTRACAUDAL; PERINEURAL ONCE
OUTPATIENT
Start: 2024-07-27 | End: 2024-07-27

## 2024-07-27 RX ORDER — ONDANSETRON 4 MG/1
8 TABLET, ORALLY DISINTEGRATING ORAL EVERY 6 HOURS PRN
OUTPATIENT
Start: 2024-07-27

## 2024-07-27 RX ORDER — RISPERIDONE 1 MG/1
2 TABLET ORAL NIGHTLY
Status: DISCONTINUED | OUTPATIENT
Start: 2024-07-27 | End: 2024-08-01 | Stop reason: HOSPADM

## 2024-07-27 RX ORDER — TALC
6 POWDER (GRAM) TOPICAL NIGHTLY PRN
Status: DISCONTINUED | OUTPATIENT
Start: 2024-07-27 | End: 2024-08-01 | Stop reason: HOSPADM

## 2024-07-27 RX ORDER — RIVASTIGMINE TARTRATE 1.5 MG/1
1.5 CAPSULE ORAL 2 TIMES DAILY
Status: DISCONTINUED | OUTPATIENT
Start: 2024-07-27 | End: 2024-08-01 | Stop reason: HOSPADM

## 2024-07-27 RX ORDER — MEPERIDINE HYDROCHLORIDE 25 MG/ML
12.5 INJECTION INTRAMUSCULAR; INTRAVENOUS; SUBCUTANEOUS EVERY 10 MIN PRN
OUTPATIENT
Start: 2024-07-27 | End: 2024-07-28

## 2024-07-27 RX ORDER — TRAZODONE HYDROCHLORIDE 100 MG/1
100 TABLET ORAL NIGHTLY
Status: DISCONTINUED | OUTPATIENT
Start: 2024-07-27 | End: 2024-08-01 | Stop reason: HOSPADM

## 2024-07-27 RX ORDER — NALOXONE HCL 0.4 MG/ML
0.02 VIAL (ML) INJECTION
Status: DISCONTINUED | OUTPATIENT
Start: 2024-07-27 | End: 2024-08-01 | Stop reason: HOSPADM

## 2024-07-27 RX ORDER — BISACODYL 10 MG/1
10 SUPPOSITORY RECTAL DAILY PRN
Status: DISCONTINUED | OUTPATIENT
Start: 2024-07-27 | End: 2024-08-01 | Stop reason: HOSPADM

## 2024-07-27 RX ORDER — IPRATROPIUM BROMIDE AND ALBUTEROL SULFATE 2.5; .5 MG/3ML; MG/3ML
3 SOLUTION RESPIRATORY (INHALATION)
OUTPATIENT
Start: 2024-07-27

## 2024-07-27 RX ORDER — IBUPROFEN 200 MG
24 TABLET ORAL
Status: DISCONTINUED | OUTPATIENT
Start: 2024-07-27 | End: 2024-08-01 | Stop reason: HOSPADM

## 2024-07-27 RX ORDER — PROPOFOL 10 MG/ML
VIAL (ML) INTRAVENOUS
Status: DISCONTINUED | OUTPATIENT
Start: 2024-07-27 | End: 2024-07-27

## 2024-07-27 RX ORDER — PROCHLORPERAZINE EDISYLATE 5 MG/ML
5 INJECTION INTRAMUSCULAR; INTRAVENOUS EVERY 30 MIN PRN
OUTPATIENT
Start: 2024-07-27

## 2024-07-27 RX ORDER — FUROSEMIDE 10 MG/ML
20 INJECTION INTRAMUSCULAR; INTRAVENOUS EVERY 12 HOURS
Status: DISCONTINUED | OUTPATIENT
Start: 2024-07-27 | End: 2024-08-01

## 2024-07-27 RX ORDER — AMOXICILLIN 250 MG
1 CAPSULE ORAL 2 TIMES DAILY PRN
Status: DISCONTINUED | OUTPATIENT
Start: 2024-07-27 | End: 2024-08-01 | Stop reason: HOSPADM

## 2024-07-27 RX ORDER — IBUPROFEN 200 MG
16 TABLET ORAL
Status: DISCONTINUED | OUTPATIENT
Start: 2024-07-27 | End: 2024-08-01 | Stop reason: HOSPADM

## 2024-07-27 RX ORDER — SODIUM CHLORIDE 0.9 % (FLUSH) 0.9 %
10 SYRINGE (ML) INJECTION
Status: DISCONTINUED | OUTPATIENT
Start: 2024-07-27 | End: 2024-08-01 | Stop reason: HOSPADM

## 2024-07-27 RX ORDER — LEVOTHYROXINE SODIUM 25 UG/1
25 TABLET ORAL
Status: DISCONTINUED | OUTPATIENT
Start: 2024-07-28 | End: 2024-08-01 | Stop reason: HOSPADM

## 2024-07-27 RX ORDER — ONDANSETRON HYDROCHLORIDE 2 MG/ML
4 INJECTION, SOLUTION INTRAVENOUS DAILY PRN
OUTPATIENT
Start: 2024-07-27

## 2024-07-27 RX ORDER — PANTOPRAZOLE SODIUM 40 MG/10ML
40 INJECTION, POWDER, LYOPHILIZED, FOR SOLUTION INTRAVENOUS 2 TIMES DAILY
Status: DISCONTINUED | OUTPATIENT
Start: 2024-07-27 | End: 2024-08-01 | Stop reason: HOSPADM

## 2024-07-27 RX ORDER — GLUCAGON 1 MG
1 KIT INJECTION
OUTPATIENT
Start: 2024-07-27

## 2024-07-27 RX ORDER — LIDOCAINE HYDROCHLORIDE 20 MG/ML
INJECTION INTRAVENOUS
Status: DISCONTINUED | OUTPATIENT
Start: 2024-07-27 | End: 2024-07-27

## 2024-07-27 RX ORDER — ACETAMINOPHEN 500 MG
1000 TABLET ORAL EVERY 6 HOURS PRN
Status: DISCONTINUED | OUTPATIENT
Start: 2024-07-27 | End: 2024-07-27

## 2024-07-27 RX ORDER — PANTOPRAZOLE SODIUM 40 MG/10ML
40 INJECTION, POWDER, LYOPHILIZED, FOR SOLUTION INTRAVENOUS DAILY
Status: DISCONTINUED | OUTPATIENT
Start: 2024-07-27 | End: 2024-07-27

## 2024-07-27 RX ADMIN — Medication 6 MG: at 02:07

## 2024-07-27 RX ADMIN — LATANOPROST 1 DROP: 50 SOLUTION/ DROPS OPHTHALMIC at 08:07

## 2024-07-27 RX ADMIN — SUCRALFATE 1 G: 1 TABLET ORAL at 08:07

## 2024-07-27 RX ADMIN — ACETAMINOPHEN 1000 MG: 500 TABLET ORAL at 08:07

## 2024-07-27 RX ADMIN — SODIUM CHLORIDE, SODIUM GLUCONATE, SODIUM ACETATE, POTASSIUM CHLORIDE AND MAGNESIUM CHLORIDE: 526; 502; 368; 37; 30 INJECTION, SOLUTION INTRAVENOUS at 12:07

## 2024-07-27 RX ADMIN — PANTOPRAZOLE SODIUM 40 MG: 40 INJECTION, POWDER, LYOPHILIZED, FOR SOLUTION INTRAVENOUS at 09:07

## 2024-07-27 RX ADMIN — GLYCOPYRROLATE 0.2 MG: 0.2 INJECTION INTRAMUSCULAR; INTRAVENOUS at 12:07

## 2024-07-27 RX ADMIN — LIDOCAINE HYDROCHLORIDE 80 MG: 20 INJECTION INTRAVENOUS at 12:07

## 2024-07-27 RX ADMIN — TRAZODONE HYDROCHLORIDE 100 MG: 100 TABLET ORAL at 08:07

## 2024-07-27 RX ADMIN — RIVASTIGMINE TARTRATE 1.5 MG: 1.5 CAPSULE ORAL at 08:07

## 2024-07-27 RX ADMIN — RISPERIDONE 2 MG: 1 TABLET, FILM COATED ORAL at 08:07

## 2024-07-27 RX ADMIN — PROPOFOL 70 MG: 10 INJECTION, EMULSION INTRAVENOUS at 12:07

## 2024-07-27 RX ADMIN — FUROSEMIDE 20 MG: 10 INJECTION, SOLUTION INTRAMUSCULAR; INTRAVENOUS at 08:07

## 2024-07-27 RX ADMIN — FUROSEMIDE 20 MG: 10 INJECTION, SOLUTION INTRAMUSCULAR; INTRAVENOUS at 09:07

## 2024-07-27 RX ADMIN — PANTOPRAZOLE SODIUM 40 MG: 40 INJECTION, POWDER, LYOPHILIZED, FOR SOLUTION INTRAVENOUS at 08:07

## 2024-07-27 NOTE — ANESTHESIA PREPROCEDURE EVALUATION
07/27/2024  Ev Alberts is a 89 y.o., female with history of MI with clean heart catheterization in March, paroxysmal AFib previously on Eliquis with grade 3 diastolic dysfunction, prior DVT sick sinus syndrome status post pacemaker, chronic respiratory failure on 2 L oxygen at home admitted earlier today complaining of chest pain with melena.  Further workup revealed moderate anemia requiring transfusion small right pleural effusion and mildly elevated troponins.  Patient presents today for EGD and notes improvement in symptoms since transfusion    Left heart catheterization March 2024   Nonobstructive CAD  LVEDP 24    Transthoracic echo February 2024   EF 55% with grade 3 diastolic dysfunction  Mild TR      Pre-op Assessment    I have reviewed the Patient Summary Reports.    I have reviewed the NPO Status.   I have reviewed the Medications.     Review of Systems  Anesthesia Hx:               Denies Personal Hx of Anesthesia complications.                    Social:  Non-Smoker       Hematology/Oncology:       -- Anemia:                                  Cardiovascular:     Hypertension    Dysrhythmias atrial fibrillation           Functional Capacity 2 METS      Congestive Heart Failure (CHF)   , LV Diastolic HF  Deep Venous Thrombosis (DVT)                      Physical Exam  General: Well nourished, Cooperative, Alert and Oriented    Airway:  Mallampati: II   Mouth Opening: Normal  TM Distance: Normal  Tongue: Normal  Neck ROM: Normal ROM    Dental:  Dentures    Chest/Lungs:  Clear to auscultation, Normal Respiratory Rate    Heart:  Rate: Normal  Rhythm: Regular Rhythm        Anesthesia Plan  Type of Anesthesia, risks & benefits discussed:    Anesthesia Type: Gen Natural Airway  Intra-op Monitoring Plan: Standard ASA Monitors  Induction:  IV  Informed Consent: Informed consent signed with the Patient  and all parties understand the risks and agree with anesthesia plan.  All questions answered. Patient consented to blood products? Yes  ASA Score: 3  Day of Surgery Review of History & Physical: H&P Update referred to the surgeon/provider.    Ready For Surgery From Anesthesia Perspective.     .

## 2024-07-27 NOTE — TRANSFER OF CARE
"Anesthesia Transfer of Care Note    Patient: Ev Alberts    Procedure(s) Performed: Procedure(s) (LRB):  EGD,WITH HEMORRHAGE CONTROL (Left)    Patient location: GI    Anesthesia Type: MAC    Transport from OR: Transported from OR on room air with adequate spontaneous ventilation    Post pain: adequate analgesia    Post assessment: no apparent anesthetic complications    Post vital signs: stable    Level of consciousness: awake    Nausea/Vomiting: no nausea/vomiting    Complications: none    Transfer of care protocol was followed    Last vitals: Visit Vitals  BP (!) 163/66   Pulse 69   Temp 37.1 °C (98.7 °F)   Resp 18   Ht 5' 5" (1.651 m)   Wt 66.6 kg (146 lb 13.2 oz)   SpO2 100%   BMI 24.43 kg/m²     "

## 2024-07-27 NOTE — ANESTHESIA POSTPROCEDURE EVALUATION
Anesthesia Post Evaluation    Patient: Ev Alberts    Procedure(s) Performed: Procedure(s) (LRB):  EGD,WITH HEMORRHAGE CONTROL (Left)    Final Anesthesia Type: general      Patient location during evaluation: floor  Patient participation: Yes- Able to Participate  Level of consciousness: awake and alert  Post-procedure vital signs: reviewed and stable  Pain management: adequate  Airway patency: patent    PONV status at discharge: No PONV  Anesthetic complications: no      Cardiovascular status: blood pressure returned to baseline  Respiratory status: spontaneous ventilation and room air  Hydration status: euvolemic  Follow-up not needed.              Vitals Value Taken Time   /59 07/27/24 1304   Temp 37 °C (98.6 °F) 07/27/24 1257   Pulse 64 07/27/24 1257   Resp 16 07/27/24 1257   SpO2 99 % 07/27/24 1304         No case tracking events are documented in the log.      Pain/Nitin Score: Nitin Score: 9 (7/27/2024  1:13 PM)

## 2024-07-28 LAB
ALBUMIN SERPL-MCNC: 3.1 G/DL (ref 3.4–4.8)
ALBUMIN/GLOB SERPL: 0.8 RATIO (ref 1.1–2)
ALP SERPL-CCNC: 97 UNIT/L (ref 40–150)
ALT SERPL-CCNC: 13 UNIT/L (ref 0–55)
ANION GAP SERPL CALC-SCNC: 9 MEQ/L
AST SERPL-CCNC: 31 UNIT/L (ref 5–34)
BASOPHILS # BLD AUTO: 0.02 X10(3)/MCL
BASOPHILS NFR BLD AUTO: 0.3 %
BILIRUB SERPL-MCNC: 0.9 MG/DL
BUN SERPL-MCNC: 32.2 MG/DL (ref 9.8–20.1)
CALCIUM SERPL-MCNC: 9.8 MG/DL (ref 8.4–10.2)
CHLORIDE SERPL-SCNC: 100 MMOL/L (ref 98–107)
CO2 SERPL-SCNC: 30 MMOL/L (ref 23–31)
CREAT SERPL-MCNC: 1.48 MG/DL (ref 0.55–1.02)
CREAT/UREA NIT SERPL: 22
EOSINOPHIL # BLD AUTO: 0.12 X10(3)/MCL (ref 0–0.9)
EOSINOPHIL NFR BLD AUTO: 1.9 %
ERYTHROCYTE [DISTWIDTH] IN BLOOD BY AUTOMATED COUNT: 18.4 % (ref 11.5–17)
GFR SERPLBLD CREATININE-BSD FMLA CKD-EPI: 34 ML/MIN/1.73/M2
GLOBULIN SER-MCNC: 4 GM/DL (ref 2.4–3.5)
GLUCOSE SERPL-MCNC: 95 MG/DL (ref 82–115)
HCT VFR BLD AUTO: 30.1 % (ref 37–47)
HGB BLD-MCNC: 9.7 G/DL (ref 12–16)
IMM GRANULOCYTES # BLD AUTO: 0.02 X10(3)/MCL (ref 0–0.04)
IMM GRANULOCYTES NFR BLD AUTO: 0.3 %
LYMPHOCYTES # BLD AUTO: 1.27 X10(3)/MCL (ref 0.6–4.6)
LYMPHOCYTES NFR BLD AUTO: 19.7 %
MCH RBC QN AUTO: 30.7 PG (ref 27–31)
MCHC RBC AUTO-ENTMCNC: 32.2 G/DL (ref 33–36)
MCV RBC AUTO: 95.3 FL (ref 80–94)
MONOCYTES # BLD AUTO: 0.72 X10(3)/MCL (ref 0.1–1.3)
MONOCYTES NFR BLD AUTO: 11.2 %
NEUTROPHILS # BLD AUTO: 4.29 X10(3)/MCL (ref 2.1–9.2)
NEUTROPHILS NFR BLD AUTO: 66.6 %
NRBC BLD AUTO-RTO: 0 %
PLATELET # BLD AUTO: 130 X10(3)/MCL (ref 130–400)
PMV BLD AUTO: 9.6 FL (ref 7.4–10.4)
POTASSIUM SERPL-SCNC: 4.1 MMOL/L (ref 3.5–5.1)
PROT SERPL-MCNC: 7.1 GM/DL (ref 5.8–7.6)
RBC # BLD AUTO: 3.16 X10(6)/MCL (ref 4.2–5.4)
SODIUM SERPL-SCNC: 139 MMOL/L (ref 136–145)
WBC # BLD AUTO: 6.44 X10(3)/MCL (ref 4.5–11.5)

## 2024-07-28 PROCEDURE — 85025 COMPLETE CBC W/AUTO DIFF WBC: CPT | Performed by: PHYSICIAN ASSISTANT

## 2024-07-28 PROCEDURE — 80053 COMPREHEN METABOLIC PANEL: CPT | Performed by: PHYSICIAN ASSISTANT

## 2024-07-28 PROCEDURE — 36415 COLL VENOUS BLD VENIPUNCTURE: CPT | Performed by: PHYSICIAN ASSISTANT

## 2024-07-28 PROCEDURE — 25000003 PHARM REV CODE 250: Performed by: INTERNAL MEDICINE

## 2024-07-28 PROCEDURE — 25000003 PHARM REV CODE 250

## 2024-07-28 PROCEDURE — 27000221 HC OXYGEN, UP TO 24 HOURS

## 2024-07-28 PROCEDURE — 63600175 PHARM REV CODE 636 W HCPCS: Performed by: INTERNAL MEDICINE

## 2024-07-28 PROCEDURE — 94760 N-INVAS EAR/PLS OXIMETRY 1: CPT

## 2024-07-28 PROCEDURE — 25000003 PHARM REV CODE 250: Performed by: NURSE PRACTITIONER

## 2024-07-28 PROCEDURE — 63600175 PHARM REV CODE 636 W HCPCS: Performed by: PHYSICIAN ASSISTANT

## 2024-07-28 PROCEDURE — 21400001 HC TELEMETRY ROOM

## 2024-07-28 RX ORDER — POLYETHYLENE GLYCOL 3350 17 G/17G
17 POWDER, FOR SOLUTION ORAL DAILY
Status: DISCONTINUED | OUTPATIENT
Start: 2024-07-28 | End: 2024-08-01 | Stop reason: HOSPADM

## 2024-07-28 RX ORDER — LUBIPROSTONE 24 UG/1
24 CAPSULE ORAL 2 TIMES DAILY WITH MEALS
Status: DISCONTINUED | OUTPATIENT
Start: 2024-07-28 | End: 2024-08-01 | Stop reason: HOSPADM

## 2024-07-28 RX ORDER — CETIRIZINE HYDROCHLORIDE 10 MG/1
10 TABLET ORAL DAILY
Status: DISCONTINUED | OUTPATIENT
Start: 2024-07-28 | End: 2024-08-01 | Stop reason: HOSPADM

## 2024-07-28 RX ADMIN — TRAZODONE HYDROCHLORIDE 100 MG: 100 TABLET ORAL at 08:07

## 2024-07-28 RX ADMIN — RISPERIDONE 2 MG: 1 TABLET, FILM COATED ORAL at 08:07

## 2024-07-28 RX ADMIN — SENNOSIDES AND DOCUSATE SODIUM 1 TABLET: 50; 8.6 TABLET ORAL at 08:07

## 2024-07-28 RX ADMIN — LUBIPROSTONE 24 MCG: 24 CAPSULE, GELATIN COATED ORAL at 11:07

## 2024-07-28 RX ADMIN — FUROSEMIDE 20 MG: 10 INJECTION, SOLUTION INTRAMUSCULAR; INTRAVENOUS at 08:07

## 2024-07-28 RX ADMIN — PANTOPRAZOLE SODIUM 40 MG: 40 INJECTION, POWDER, LYOPHILIZED, FOR SOLUTION INTRAVENOUS at 08:07

## 2024-07-28 RX ADMIN — ACETAMINOPHEN 1000 MG: 500 TABLET ORAL at 11:07

## 2024-07-28 RX ADMIN — POLYETHYLENE GLYCOL 3350 17 G: 17 POWDER, FOR SOLUTION ORAL at 09:07

## 2024-07-28 RX ADMIN — SUCRALFATE 1 G: 1 TABLET ORAL at 08:07

## 2024-07-28 RX ADMIN — LEVOTHYROXINE SODIUM 25 MCG: 25 TABLET ORAL at 05:07

## 2024-07-28 RX ADMIN — RIVASTIGMINE TARTRATE 1.5 MG: 1.5 CAPSULE ORAL at 08:07

## 2024-07-28 RX ADMIN — CETIRIZINE HYDROCHLORIDE 10 MG: 10 TABLET, FILM COATED ORAL at 02:07

## 2024-07-28 RX ADMIN — LATANOPROST 1 DROP: 50 SOLUTION/ DROPS OPHTHALMIC at 08:07

## 2024-07-29 LAB
ALBUMIN SERPL-MCNC: 2.9 G/DL (ref 3.4–4.8)
ALBUMIN/GLOB SERPL: 0.8 RATIO (ref 1.1–2)
ALP SERPL-CCNC: 95 UNIT/L (ref 40–150)
ALT SERPL-CCNC: 13 UNIT/L (ref 0–55)
ANION GAP SERPL CALC-SCNC: 5 MEQ/L
AST SERPL-CCNC: 20 UNIT/L (ref 5–34)
BASOPHILS # BLD AUTO: 0.02 X10(3)/MCL
BASOPHILS NFR BLD AUTO: 0.3 %
BILIRUB SERPL-MCNC: 0.9 MG/DL
BUN SERPL-MCNC: 29 MG/DL (ref 9.8–20.1)
CALCIUM SERPL-MCNC: 9.3 MG/DL (ref 8.4–10.2)
CHLORIDE SERPL-SCNC: 99 MMOL/L (ref 98–107)
CO2 SERPL-SCNC: 32 MMOL/L (ref 23–31)
CREAT SERPL-MCNC: 1.45 MG/DL (ref 0.55–1.02)
CREAT/UREA NIT SERPL: 20
EOSINOPHIL # BLD AUTO: 0.04 X10(3)/MCL (ref 0–0.9)
EOSINOPHIL NFR BLD AUTO: 0.6 %
ERYTHROCYTE [DISTWIDTH] IN BLOOD BY AUTOMATED COUNT: 17.8 % (ref 11.5–17)
GFR SERPLBLD CREATININE-BSD FMLA CKD-EPI: 35 ML/MIN/1.73/M2
GLOBULIN SER-MCNC: 3.7 GM/DL (ref 2.4–3.5)
GLUCOSE SERPL-MCNC: 105 MG/DL (ref 82–115)
HCT VFR BLD AUTO: 27.1 % (ref 37–47)
HGB BLD-MCNC: 8.7 G/DL (ref 12–16)
IMM GRANULOCYTES # BLD AUTO: 0.02 X10(3)/MCL (ref 0–0.04)
IMM GRANULOCYTES NFR BLD AUTO: 0.3 %
LYMPHOCYTES # BLD AUTO: 1.34 X10(3)/MCL (ref 0.6–4.6)
LYMPHOCYTES NFR BLD AUTO: 19.4 %
MCH RBC QN AUTO: 30.3 PG (ref 27–31)
MCHC RBC AUTO-ENTMCNC: 32.1 G/DL (ref 33–36)
MCV RBC AUTO: 94.4 FL (ref 80–94)
MONOCYTES # BLD AUTO: 1.14 X10(3)/MCL (ref 0.1–1.3)
MONOCYTES NFR BLD AUTO: 16.5 %
NEUTROPHILS # BLD AUTO: 4.36 X10(3)/MCL (ref 2.1–9.2)
NEUTROPHILS NFR BLD AUTO: 62.9 %
NRBC BLD AUTO-RTO: 0 %
PLATELET # BLD AUTO: 126 X10(3)/MCL (ref 130–400)
PMV BLD AUTO: 10.4 FL (ref 7.4–10.4)
POCT GLUCOSE: 129 MG/DL (ref 70–110)
POTASSIUM SERPL-SCNC: 3.6 MMOL/L (ref 3.5–5.1)
PROT SERPL-MCNC: 6.6 GM/DL (ref 5.8–7.6)
RBC # BLD AUTO: 2.87 X10(6)/MCL (ref 4.2–5.4)
SODIUM SERPL-SCNC: 136 MMOL/L (ref 136–145)
WBC # BLD AUTO: 6.92 X10(3)/MCL (ref 4.5–11.5)

## 2024-07-29 PROCEDURE — 25000003 PHARM REV CODE 250: Performed by: NURSE PRACTITIONER

## 2024-07-29 PROCEDURE — 63600175 PHARM REV CODE 636 W HCPCS: Performed by: INTERNAL MEDICINE

## 2024-07-29 PROCEDURE — 99223 1ST HOSP IP/OBS HIGH 75: CPT | Mod: FS,,, | Performed by: NEUROLOGICAL SURGERY

## 2024-07-29 PROCEDURE — 25000003 PHARM REV CODE 250: Performed by: INTERNAL MEDICINE

## 2024-07-29 PROCEDURE — 80053 COMPREHEN METABOLIC PANEL: CPT

## 2024-07-29 PROCEDURE — 25000003 PHARM REV CODE 250

## 2024-07-29 PROCEDURE — 21400001 HC TELEMETRY ROOM

## 2024-07-29 PROCEDURE — 63600175 PHARM REV CODE 636 W HCPCS: Performed by: PHYSICIAN ASSISTANT

## 2024-07-29 PROCEDURE — 85025 COMPLETE CBC W/AUTO DIFF WBC: CPT

## 2024-07-29 PROCEDURE — 36415 COLL VENOUS BLD VENIPUNCTURE: CPT

## 2024-07-29 PROCEDURE — 27000221 HC OXYGEN, UP TO 24 HOURS

## 2024-07-29 RX ADMIN — PANTOPRAZOLE SODIUM 40 MG: 40 INJECTION, POWDER, LYOPHILIZED, FOR SOLUTION INTRAVENOUS at 09:07

## 2024-07-29 RX ADMIN — SUCRALFATE 1 G: 1 TABLET ORAL at 09:07

## 2024-07-29 RX ADMIN — TRAZODONE HYDROCHLORIDE 100 MG: 100 TABLET ORAL at 09:07

## 2024-07-29 RX ADMIN — LEVOTHYROXINE SODIUM 25 MCG: 25 TABLET ORAL at 06:07

## 2024-07-29 RX ADMIN — LUBIPROSTONE 24 MCG: 24 CAPSULE, GELATIN COATED ORAL at 09:07

## 2024-07-29 RX ADMIN — RIVASTIGMINE TARTRATE 1.5 MG: 1.5 CAPSULE ORAL at 09:07

## 2024-07-29 RX ADMIN — POLYETHYLENE GLYCOL 3350 17 G: 17 POWDER, FOR SOLUTION ORAL at 09:07

## 2024-07-29 RX ADMIN — FUROSEMIDE 20 MG: 10 INJECTION, SOLUTION INTRAMUSCULAR; INTRAVENOUS at 09:07

## 2024-07-29 RX ADMIN — LUBIPROSTONE 24 MCG: 24 CAPSULE, GELATIN COATED ORAL at 04:07

## 2024-07-29 RX ADMIN — CETIRIZINE HYDROCHLORIDE 10 MG: 10 TABLET, FILM COATED ORAL at 09:07

## 2024-07-29 RX ADMIN — Medication 6 MG: at 09:07

## 2024-07-29 RX ADMIN — ACETAMINOPHEN 1000 MG: 500 TABLET ORAL at 09:07

## 2024-07-29 RX ADMIN — LATANOPROST 1 DROP: 50 SOLUTION/ DROPS OPHTHALMIC at 09:07

## 2024-07-29 RX ADMIN — RISPERIDONE 2 MG: 1 TABLET, FILM COATED ORAL at 09:07

## 2024-07-30 LAB
BASOPHILS # BLD AUTO: 0.02 X10(3)/MCL
BASOPHILS NFR BLD AUTO: 0.3 %
EOSINOPHIL # BLD AUTO: 0.08 X10(3)/MCL (ref 0–0.9)
EOSINOPHIL NFR BLD AUTO: 1.2 %
ERYTHROCYTE [DISTWIDTH] IN BLOOD BY AUTOMATED COUNT: 18.2 % (ref 11.5–17)
HCT VFR BLD AUTO: 30.3 % (ref 37–47)
HGB BLD-MCNC: 9.2 G/DL (ref 12–16)
IMM GRANULOCYTES # BLD AUTO: 0.02 X10(3)/MCL (ref 0–0.04)
IMM GRANULOCYTES NFR BLD AUTO: 0.3 %
LYMPHOCYTES # BLD AUTO: 1.16 X10(3)/MCL (ref 0.6–4.6)
LYMPHOCYTES NFR BLD AUTO: 17.6 %
MCH RBC QN AUTO: 30.2 PG (ref 27–31)
MCHC RBC AUTO-ENTMCNC: 30.4 G/DL (ref 33–36)
MCV RBC AUTO: 99.3 FL (ref 80–94)
MONOCYTES # BLD AUTO: 0.93 X10(3)/MCL (ref 0.1–1.3)
MONOCYTES NFR BLD AUTO: 14.1 %
NEUTROPHILS # BLD AUTO: 4.39 X10(3)/MCL (ref 2.1–9.2)
NEUTROPHILS NFR BLD AUTO: 66.5 %
NRBC BLD AUTO-RTO: 0 %
PLATELET # BLD AUTO: 123 X10(3)/MCL (ref 130–400)
PMV BLD AUTO: 9.6 FL (ref 7.4–10.4)
RBC # BLD AUTO: 3.05 X10(6)/MCL (ref 4.2–5.4)
WBC # BLD AUTO: 6.6 X10(3)/MCL (ref 4.5–11.5)

## 2024-07-30 PROCEDURE — 99900035 HC TECH TIME PER 15 MIN (STAT)

## 2024-07-30 PROCEDURE — 63600175 PHARM REV CODE 636 W HCPCS: Performed by: PHYSICIAN ASSISTANT

## 2024-07-30 PROCEDURE — 94760 N-INVAS EAR/PLS OXIMETRY 1: CPT

## 2024-07-30 PROCEDURE — 63600175 PHARM REV CODE 636 W HCPCS: Performed by: INTERNAL MEDICINE

## 2024-07-30 PROCEDURE — 27000221 HC OXYGEN, UP TO 24 HOURS

## 2024-07-30 PROCEDURE — 25000003 PHARM REV CODE 250: Performed by: NURSE PRACTITIONER

## 2024-07-30 PROCEDURE — 25000003 PHARM REV CODE 250: Performed by: INTERNAL MEDICINE

## 2024-07-30 PROCEDURE — 99900031 HC PATIENT EDUCATION (STAT)

## 2024-07-30 PROCEDURE — 36415 COLL VENOUS BLD VENIPUNCTURE: CPT | Performed by: INTERNAL MEDICINE

## 2024-07-30 PROCEDURE — 85025 COMPLETE CBC W/AUTO DIFF WBC: CPT | Performed by: INTERNAL MEDICINE

## 2024-07-30 PROCEDURE — 21400001 HC TELEMETRY ROOM

## 2024-07-30 PROCEDURE — 25000003 PHARM REV CODE 250

## 2024-07-30 PROCEDURE — 87507 IADNA-DNA/RNA PROBE TQ 12-25: CPT | Performed by: INTERNAL MEDICINE

## 2024-07-30 RX ORDER — DIAZEPAM 5 MG/1
5 TABLET ORAL ONCE
Status: DISCONTINUED | OUTPATIENT
Start: 2024-07-30 | End: 2024-08-01

## 2024-07-30 RX ORDER — POTASSIUM CHLORIDE 20 MEQ/1
40 TABLET, EXTENDED RELEASE ORAL ONCE
Status: COMPLETED | OUTPATIENT
Start: 2024-07-30 | End: 2024-07-30

## 2024-07-30 RX ADMIN — SUCRALFATE 1 G: 1 TABLET ORAL at 09:07

## 2024-07-30 RX ADMIN — CETIRIZINE HYDROCHLORIDE 10 MG: 10 TABLET, FILM COATED ORAL at 09:07

## 2024-07-30 RX ADMIN — TRAZODONE HYDROCHLORIDE 100 MG: 100 TABLET ORAL at 09:07

## 2024-07-30 RX ADMIN — PANTOPRAZOLE SODIUM 40 MG: 40 INJECTION, POWDER, LYOPHILIZED, FOR SOLUTION INTRAVENOUS at 09:07

## 2024-07-30 RX ADMIN — ACETAMINOPHEN 1000 MG: 500 TABLET ORAL at 04:07

## 2024-07-30 RX ADMIN — LUBIPROSTONE 24 MCG: 24 CAPSULE, GELATIN COATED ORAL at 09:07

## 2024-07-30 RX ADMIN — RIVASTIGMINE TARTRATE 1.5 MG: 1.5 CAPSULE ORAL at 09:07

## 2024-07-30 RX ADMIN — RISPERIDONE 2 MG: 1 TABLET, FILM COATED ORAL at 09:07

## 2024-07-30 RX ADMIN — LUBIPROSTONE 24 MCG: 24 CAPSULE, GELATIN COATED ORAL at 04:07

## 2024-07-30 RX ADMIN — FUROSEMIDE 20 MG: 10 INJECTION, SOLUTION INTRAMUSCULAR; INTRAVENOUS at 09:07

## 2024-07-30 RX ADMIN — POTASSIUM CHLORIDE 40 MEQ: 1500 TABLET, EXTENDED RELEASE ORAL at 12:07

## 2024-07-30 RX ADMIN — LEVOTHYROXINE SODIUM 25 MCG: 25 TABLET ORAL at 06:07

## 2024-07-31 LAB
ADV 40+41 DNA STL QL NAA+NON-PROBE: NOT DETECTED
ANION GAP SERPL CALC-SCNC: 5 MEQ/L
ASTRO TYP 1-8 RNA STL QL NAA+NON-PROBE: NOT DETECTED
BASOPHILS # BLD AUTO: 0.03 X10(3)/MCL
BASOPHILS NFR BLD AUTO: 0.5 %
BUN SERPL-MCNC: 26.5 MG/DL (ref 9.8–20.1)
C CAYETANENSIS DNA STL QL NAA+NON-PROBE: NOT DETECTED
C COLI+JEJ+UPSA DNA STL QL NAA+NON-PROBE: NOT DETECTED
CALCIUM SERPL-MCNC: 9.2 MG/DL (ref 8.4–10.2)
CHLORIDE SERPL-SCNC: 100 MMOL/L (ref 98–107)
CO2 SERPL-SCNC: 32 MMOL/L (ref 23–31)
CREAT SERPL-MCNC: 1.46 MG/DL (ref 0.55–1.02)
CREAT/UREA NIT SERPL: 18
CRYPTOSP DNA STL QL NAA+NON-PROBE: NOT DETECTED
E HISTOLYT DNA STL QL NAA+NON-PROBE: NOT DETECTED
EAEC PAA PLAS AGGR+AATA ST NAA+NON-PRB: NOT DETECTED
EC STX1+STX2 GENES STL QL NAA+NON-PROBE: NOT DETECTED
EOSINOPHIL # BLD AUTO: 0.14 X10(3)/MCL (ref 0–0.9)
EOSINOPHIL NFR BLD AUTO: 2.1 %
EPEC EAE GENE STL QL NAA+NON-PROBE: NOT DETECTED
ERYTHROCYTE [DISTWIDTH] IN BLOOD BY AUTOMATED COUNT: 17.8 % (ref 11.5–17)
ETEC LTA+ST1A+ST1B TOX ST NAA+NON-PROBE: NOT DETECTED
G LAMBLIA DNA STL QL NAA+NON-PROBE: NOT DETECTED
GFR SERPLBLD CREATININE-BSD FMLA CKD-EPI: 35 ML/MIN/1.73/M2
GLUCOSE SERPL-MCNC: 92 MG/DL (ref 82–115)
HCT VFR BLD AUTO: 26.6 % (ref 37–47)
HGB BLD-MCNC: 8.3 G/DL (ref 12–16)
IMM GRANULOCYTES # BLD AUTO: 0.01 X10(3)/MCL (ref 0–0.04)
IMM GRANULOCYTES NFR BLD AUTO: 0.2 %
LYMPHOCYTES # BLD AUTO: 1.27 X10(3)/MCL (ref 0.6–4.6)
LYMPHOCYTES NFR BLD AUTO: 19.2 %
MCH RBC QN AUTO: 30.3 PG (ref 27–31)
MCHC RBC AUTO-ENTMCNC: 31.2 G/DL (ref 33–36)
MCV RBC AUTO: 97.1 FL (ref 80–94)
MONOCYTES # BLD AUTO: 1.06 X10(3)/MCL (ref 0.1–1.3)
MONOCYTES NFR BLD AUTO: 16.1 %
NEUTROPHILS # BLD AUTO: 4.09 X10(3)/MCL (ref 2.1–9.2)
NEUTROPHILS NFR BLD AUTO: 61.9 %
NOROVIRUS GI+II RNA STL QL NAA+NON-PROBE: NOT DETECTED
NRBC BLD AUTO-RTO: 0 %
P SHIGELLOIDES DNA STL QL NAA+NON-PROBE: NOT DETECTED
PLATELET # BLD AUTO: 121 X10(3)/MCL (ref 130–400)
PMV BLD AUTO: 9.5 FL (ref 7.4–10.4)
POTASSIUM SERPL-SCNC: 4.1 MMOL/L (ref 3.5–5.1)
RBC # BLD AUTO: 2.74 X10(6)/MCL (ref 4.2–5.4)
RVA RNA STL QL NAA+NON-PROBE: NOT DETECTED
S ENT+BONG DNA STL QL NAA+NON-PROBE: NOT DETECTED
SAPO I+II+IV+V RNA STL QL NAA+NON-PROBE: NOT DETECTED
SHIGELLA SP+EIEC IPAH ST NAA+NON-PROBE: NOT DETECTED
SODIUM SERPL-SCNC: 137 MMOL/L (ref 136–145)
V CHOL+PARA+VUL DNA STL QL NAA+NON-PROBE: NOT DETECTED
V CHOLERAE DNA STL QL NAA+NON-PROBE: NOT DETECTED
WBC # BLD AUTO: 6.6 X10(3)/MCL (ref 4.5–11.5)
Y ENTEROCOL DNA STL QL NAA+NON-PROBE: NOT DETECTED

## 2024-07-31 PROCEDURE — 21400001 HC TELEMETRY ROOM

## 2024-07-31 PROCEDURE — 25000003 PHARM REV CODE 250: Performed by: INTERNAL MEDICINE

## 2024-07-31 PROCEDURE — 94760 N-INVAS EAR/PLS OXIMETRY 1: CPT

## 2024-07-31 PROCEDURE — 97162 PT EVAL MOD COMPLEX 30 MIN: CPT

## 2024-07-31 PROCEDURE — 25000003 PHARM REV CODE 250

## 2024-07-31 PROCEDURE — 97165 OT EVAL LOW COMPLEX 30 MIN: CPT

## 2024-07-31 PROCEDURE — 27000221 HC OXYGEN, UP TO 24 HOURS

## 2024-07-31 PROCEDURE — 36415 COLL VENOUS BLD VENIPUNCTURE: CPT | Performed by: INTERNAL MEDICINE

## 2024-07-31 PROCEDURE — 85025 COMPLETE CBC W/AUTO DIFF WBC: CPT | Performed by: INTERNAL MEDICINE

## 2024-07-31 PROCEDURE — 63600175 PHARM REV CODE 636 W HCPCS: Performed by: INTERNAL MEDICINE

## 2024-07-31 PROCEDURE — 25000003 PHARM REV CODE 250: Performed by: NURSE PRACTITIONER

## 2024-07-31 PROCEDURE — 63600175 PHARM REV CODE 636 W HCPCS: Performed by: PHYSICIAN ASSISTANT

## 2024-07-31 PROCEDURE — 80048 BASIC METABOLIC PNL TOTAL CA: CPT | Performed by: INTERNAL MEDICINE

## 2024-07-31 PROCEDURE — 97535 SELF CARE MNGMENT TRAINING: CPT

## 2024-07-31 RX ORDER — CARVEDILOL 3.12 MG/1
3.12 TABLET ORAL 2 TIMES DAILY
Status: DISCONTINUED | OUTPATIENT
Start: 2024-07-31 | End: 2024-08-01 | Stop reason: HOSPADM

## 2024-07-31 RX ADMIN — LUBIPROSTONE 24 MCG: 24 CAPSULE, GELATIN COATED ORAL at 09:07

## 2024-07-31 RX ADMIN — SUCRALFATE 1 G: 1 TABLET ORAL at 09:07

## 2024-07-31 RX ADMIN — CETIRIZINE HYDROCHLORIDE 10 MG: 10 TABLET, FILM COATED ORAL at 09:07

## 2024-07-31 RX ADMIN — FUROSEMIDE 20 MG: 10 INJECTION, SOLUTION INTRAMUSCULAR; INTRAVENOUS at 08:07

## 2024-07-31 RX ADMIN — FUROSEMIDE 20 MG: 10 INJECTION, SOLUTION INTRAMUSCULAR; INTRAVENOUS at 09:07

## 2024-07-31 RX ADMIN — CARVEDILOL 3.12 MG: 3.12 TABLET, FILM COATED ORAL at 08:07

## 2024-07-31 RX ADMIN — LATANOPROST 1 DROP: 50 SOLUTION/ DROPS OPHTHALMIC at 08:07

## 2024-07-31 RX ADMIN — ACETAMINOPHEN 1000 MG: 500 TABLET ORAL at 04:07

## 2024-07-31 RX ADMIN — PANTOPRAZOLE SODIUM 40 MG: 40 INJECTION, POWDER, LYOPHILIZED, FOR SOLUTION INTRAVENOUS at 09:07

## 2024-07-31 RX ADMIN — LEVOTHYROXINE SODIUM 25 MCG: 25 TABLET ORAL at 06:07

## 2024-07-31 RX ADMIN — RISPERIDONE 2 MG: 1 TABLET, FILM COATED ORAL at 08:07

## 2024-07-31 RX ADMIN — SUCRALFATE 1 G: 1 TABLET ORAL at 08:07

## 2024-07-31 RX ADMIN — RIVASTIGMINE TARTRATE 1.5 MG: 1.5 CAPSULE ORAL at 08:07

## 2024-07-31 RX ADMIN — LUBIPROSTONE 24 MCG: 24 CAPSULE, GELATIN COATED ORAL at 04:07

## 2024-07-31 RX ADMIN — PANTOPRAZOLE SODIUM 40 MG: 40 INJECTION, POWDER, LYOPHILIZED, FOR SOLUTION INTRAVENOUS at 08:07

## 2024-07-31 RX ADMIN — APIXABAN 2.5 MG: 2.5 TABLET, FILM COATED ORAL at 08:07

## 2024-07-31 RX ADMIN — TRAZODONE HYDROCHLORIDE 100 MG: 100 TABLET ORAL at 08:07

## 2024-07-31 RX ADMIN — RIVASTIGMINE TARTRATE 1.5 MG: 1.5 CAPSULE ORAL at 09:07

## 2024-07-31 RX ADMIN — APIXABAN 2.5 MG: 2.5 TABLET, FILM COATED ORAL at 12:07

## 2024-08-01 VITALS
BODY MASS INDEX: 23.82 KG/M2 | HEART RATE: 62 BPM | SYSTOLIC BLOOD PRESSURE: 157 MMHG | TEMPERATURE: 99 F | HEIGHT: 65 IN | WEIGHT: 143 LBS | RESPIRATION RATE: 18 BRPM | OXYGEN SATURATION: 100 % | DIASTOLIC BLOOD PRESSURE: 70 MMHG

## 2024-08-01 PROBLEM — I50.9 CONGESTIVE HEART FAILURE: Status: ACTIVE | Noted: 2024-08-01

## 2024-08-01 LAB
ANION GAP SERPL CALC-SCNC: 3 MEQ/L
BASOPHILS # BLD AUTO: 0.03 X10(3)/MCL
BASOPHILS NFR BLD AUTO: 0.4 %
BUN SERPL-MCNC: 26.8 MG/DL (ref 9.8–20.1)
CALCIUM SERPL-MCNC: 9.3 MG/DL (ref 8.4–10.2)
CHLORIDE SERPL-SCNC: 99 MMOL/L (ref 98–107)
CO2 SERPL-SCNC: 32 MMOL/L (ref 23–31)
CREAT SERPL-MCNC: 1.29 MG/DL (ref 0.55–1.02)
CREAT/UREA NIT SERPL: 21
EOSINOPHIL # BLD AUTO: 0.16 X10(3)/MCL (ref 0–0.9)
EOSINOPHIL NFR BLD AUTO: 2.2 %
ERYTHROCYTE [DISTWIDTH] IN BLOOD BY AUTOMATED COUNT: 17.3 % (ref 11.5–17)
GFR SERPLBLD CREATININE-BSD FMLA CKD-EPI: 40 ML/MIN/1.73/M2
GLUCOSE SERPL-MCNC: 105 MG/DL (ref 82–115)
HCT VFR BLD AUTO: 28.7 % (ref 37–47)
HGB BLD-MCNC: 8.9 G/DL (ref 12–16)
IMM GRANULOCYTES # BLD AUTO: 0.02 X10(3)/MCL (ref 0–0.04)
IMM GRANULOCYTES NFR BLD AUTO: 0.3 %
LYMPHOCYTES # BLD AUTO: 1.33 X10(3)/MCL (ref 0.6–4.6)
LYMPHOCYTES NFR BLD AUTO: 18.7 %
MCH RBC QN AUTO: 30.7 PG (ref 27–31)
MCHC RBC AUTO-ENTMCNC: 31 G/DL (ref 33–36)
MCV RBC AUTO: 99 FL (ref 80–94)
MONOCYTES # BLD AUTO: 1.18 X10(3)/MCL (ref 0.1–1.3)
MONOCYTES NFR BLD AUTO: 16.5 %
NEUTROPHILS # BLD AUTO: 4.41 X10(3)/MCL (ref 2.1–9.2)
NEUTROPHILS NFR BLD AUTO: 61.9 %
NRBC BLD AUTO-RTO: 0 %
PLATELET # BLD AUTO: 127 X10(3)/MCL (ref 130–400)
PLATELETS.RETICULATED NFR BLD AUTO: 1.8 % (ref 0.9–11.2)
PMV BLD AUTO: 11.1 FL (ref 7.4–10.4)
POTASSIUM SERPL-SCNC: 4.1 MMOL/L (ref 3.5–5.1)
RBC # BLD AUTO: 2.9 X10(6)/MCL (ref 4.2–5.4)
SODIUM SERPL-SCNC: 134 MMOL/L (ref 136–145)
WBC # BLD AUTO: 7.13 X10(3)/MCL (ref 4.5–11.5)

## 2024-08-01 PROCEDURE — 27000221 HC OXYGEN, UP TO 24 HOURS

## 2024-08-01 PROCEDURE — 25000003 PHARM REV CODE 250: Performed by: INTERNAL MEDICINE

## 2024-08-01 PROCEDURE — 63600175 PHARM REV CODE 636 W HCPCS: Performed by: INTERNAL MEDICINE

## 2024-08-01 PROCEDURE — 36415 COLL VENOUS BLD VENIPUNCTURE: CPT | Performed by: INTERNAL MEDICINE

## 2024-08-01 PROCEDURE — 63600175 PHARM REV CODE 636 W HCPCS: Performed by: PHYSICIAN ASSISTANT

## 2024-08-01 PROCEDURE — 94760 N-INVAS EAR/PLS OXIMETRY 1: CPT

## 2024-08-01 PROCEDURE — 80048 BASIC METABOLIC PNL TOTAL CA: CPT | Performed by: INTERNAL MEDICINE

## 2024-08-01 PROCEDURE — 25000003 PHARM REV CODE 250

## 2024-08-01 PROCEDURE — 85025 COMPLETE CBC W/AUTO DIFF WBC: CPT | Performed by: INTERNAL MEDICINE

## 2024-08-01 RX ORDER — PANTOPRAZOLE SODIUM 40 MG/1
40 TABLET, DELAYED RELEASE ORAL 2 TIMES DAILY
Qty: 60 TABLET | Refills: 1 | Status: SHIPPED | OUTPATIENT
Start: 2024-08-01 | End: 2024-09-30

## 2024-08-01 RX ORDER — SUCRALFATE 1 G/1
1 TABLET ORAL 2 TIMES DAILY
Qty: 28 TABLET | Refills: 0 | Status: SHIPPED | OUTPATIENT
Start: 2024-08-01 | End: 2024-08-15

## 2024-08-01 RX ORDER — POLYETHYLENE GLYCOL 3350 17 G/17G
17 POWDER, FOR SOLUTION ORAL DAILY
Qty: 30 EACH | Refills: 0 | Status: SHIPPED | OUTPATIENT
Start: 2024-08-02 | End: 2024-09-01

## 2024-08-01 RX ORDER — PANTOPRAZOLE SODIUM 40 MG/1
40 TABLET, DELAYED RELEASE ORAL DAILY
Qty: 90 TABLET | Refills: 3 | Status: SHIPPED | OUTPATIENT
Start: 2024-08-01 | End: 2024-08-01

## 2024-08-01 RX ORDER — FUROSEMIDE 40 MG/1
40 TABLET ORAL DAILY
Status: DISCONTINUED | OUTPATIENT
Start: 2024-08-01 | End: 2024-08-01 | Stop reason: HOSPADM

## 2024-08-01 RX ORDER — LUBIPROSTONE 24 UG/1
24 CAPSULE ORAL 2 TIMES DAILY WITH MEALS
Qty: 60 CAPSULE | Refills: 11 | Status: SHIPPED | OUTPATIENT
Start: 2024-08-01 | End: 2025-08-01

## 2024-08-01 RX ADMIN — APIXABAN 2.5 MG: 2.5 TABLET, FILM COATED ORAL at 09:08

## 2024-08-01 RX ADMIN — CETIRIZINE HYDROCHLORIDE 10 MG: 10 TABLET, FILM COATED ORAL at 09:08

## 2024-08-01 RX ADMIN — FUROSEMIDE 20 MG: 10 INJECTION, SOLUTION INTRAMUSCULAR; INTRAVENOUS at 09:08

## 2024-08-01 RX ADMIN — LEVOTHYROXINE SODIUM 25 MCG: 25 TABLET ORAL at 05:08

## 2024-08-01 RX ADMIN — SUCRALFATE 1 G: 1 TABLET ORAL at 09:08

## 2024-08-01 RX ADMIN — CARVEDILOL 3.12 MG: 3.12 TABLET, FILM COATED ORAL at 09:08

## 2024-08-01 RX ADMIN — RIVASTIGMINE TARTRATE 1.5 MG: 1.5 CAPSULE ORAL at 09:08

## 2024-08-01 RX ADMIN — PANTOPRAZOLE SODIUM 40 MG: 40 INJECTION, POWDER, LYOPHILIZED, FOR SOLUTION INTRAVENOUS at 09:08

## 2024-08-02 ENCOUNTER — PATIENT OUTREACH (OUTPATIENT)
Dept: ADMINISTRATIVE | Facility: CLINIC | Age: 89
End: 2024-08-02
Payer: MEDICARE

## 2024-08-02 NOTE — PROGRESS NOTES
C3 nurse spoke with Ev Alberts 's daughter, Mellissa for a TCC post hospital discharge follow up call. The patient has a scheduled HOS appointment with Santi Walters MD (Family Medicine) on 8/7/2024 @ 2:00 pm

## 2024-08-08 NOTE — PROGRESS NOTES
Subjective:       Patient ID: Ev Alberts is a 89 y.o. female.    Chief Complaint: Follow up    Diagnosis: Anemia of Chronic Disease    Current Treatment: None    Treatment History: N/A    HPI:   89 yo F presents in May '24 for evaluation of Anemia. She had CBC in early May '24 with H/H of 8.8/28.8. WBC and platelets were normal. Iron studies in April '24 most consistent with anemia of chronic disease with Ferritin in the 500's and normal iron studies. B12 913 and Folate 11.0. CMP notable for chronic CKD III. She was admitted to hospital for CHF exacerbation twice in the last several months prior to admission. She has a history of MGUS, diagnosed in 2002, last M spike on record is 1.3g IgG Kappa. She has not had those labs checked in a long time per patient. Her daughter states she previously saw a hematologist in [place] for these exact things approximately 10 years ago.     She has no complaints today. She feels tired occasionally. Otherwise feels her usual self.   I discussed her CBC findings and how they differ from expected values. We discussed the possible etiology for her findings including both benign and malignant causes. I explained the role for initial evaluation with blood work and possibly secondary evaluation with a bone marrow biopsy. All questions were answered at the time of the visit. She is agreeable to proceed with the plan.      Interval History:  Patient presents to clinic today for MD follow up appointment and labs to discuss results and plan of treatment.  She states she is doing well today.  Discussed labs in detail with patient and her daughter.  Denies any abnormal bleeding or bruising.  Her appetite and energy level are both stable.  Overall, she has no major complaints or concerns.      Past Medical History:   Diagnosis Date    Dementia     Hypertension     Sick sinus syndrome     Thyroid disease     Unspecified glaucoma       Past Surgical History:   Procedure Laterality Date     cataract surgery       SECTION      CHOLECYSTECTOMY      EGD, WITH HEMORRHAGE CONTROL Left 2024    Procedure: EGD,WITH HEMORRHAGE CONTROL;  Surgeon: Blake Adorno MD;  Location: [facility] OR;  Service: Gastroenterology;  Laterality: Left;    HYSTERECTOMY      INSERTION OF PACEMAKER      LEFT HEART CATHETERIZATION Left 3/4/2024    Procedure: Left heart cath;  Surgeon: Mark Raymundo Jr., MD;  Location: [facility] CATH LAB;  Service: Cardiology;  Laterality: Left;    NEPHRECTOMY       Social History     Socioeconomic History    Marital status:    Tobacco Use    Smoking status: Never    Smokeless tobacco: Never   Substance and Sexual Activity    Alcohol use: Never    Drug use: Not Currently     Social Determinants of Health     Financial Resource Strain: Low Risk  (2024)    Overall Financial Resource Strain (CARDIA)     Difficulty of Paying Living Expenses: Not hard at all   Food Insecurity: No Food Insecurity (2024)    Hunger Vital Sign     Worried About Running Out of Food in the Last Year: Never true     Ran Out of Food in the Last Year: Never true   Transportation Needs: Unmet Transportation Needs (2024)    PRAPARE - Transportation     Lack of Transportation (Medical): No     Lack of Transportation (Non-Medical): Yes   Physical Activity: Inactive (2024)    Exercise Vital Sign     Days of Exercise per Week: 0 days     Minutes of Exercise per Session: 0 min   Stress: No Stress Concern Present (2024)    [employer] [place] [employer] - Occupational Stress Questionnaire     Feeling of Stress : Not at all   Housing Stability: Low Risk  (2024)    Housing Stability Vital Sign     Unable to Pay for Housing in the Last Year: No     Number of Places Lived in the Last Year: 1     Unstable Housing in the Last Year: No   Recent Concern: Housing Stability - High Risk (2024)    Housing Stability Vital Sign     Unable to Pay for Housing in the Last Year: No     Number of  Places Lived in the Last Year: 1     Unstable Housing in the Last Year: Yes      No family history on file.   Review of patient's allergies indicates:   Allergen Reactions    Amlodipine-benazepril Edema     Other reaction(s): unknown    Corticosteroids (glucocorticoids) Edema and Swelling    Rofecoxib Anaphylaxis and Swelling    Sulfa (sulfonamide antibiotics) Edema    Atorvastatin      Other reaction(s): unknown    Ibuprofen      Other reaction(s): Allergy to sulfa drugs      Review of Systems   Constitutional:  Negative for activity change, fever and unexpected weight change.   HENT:  Negative for sore throat.    Eyes:  Negative for visual disturbance.   Respiratory:  Negative for cough and shortness of breath.    Cardiovascular:  Negative for chest pain.   Gastrointestinal:  Negative for abdominal pain, diarrhea, nausea and vomiting.   Endocrine: Negative for polyuria.   Genitourinary:  Negative for dysuria and hot flashes.   Integumentary:  Negative for rash.   Neurological:  Negative for weakness and headaches.   Hematological:  Negative for adenopathy.   Psychiatric/Behavioral:  Negative for confusion.          Objective:      Vitals:    08/12/24 1540   BP: 114/63   Pulse: 72   Resp: 18   Temp: 97.9 °F (36.6 °C)         Physical Exam  Constitutional:       General: She is not in acute distress.     Appearance: Normal appearance. She is not ill-appearing.      Comments: Frail elderly female in wheelchair   HENT:      Head: Normocephalic and atraumatic.      Nose: Nose normal.      Mouth/Throat:      Mouth: Mucous membranes are moist.      Pharynx: Oropharynx is clear.   Eyes:      Extraocular Movements: Extraocular movements intact.      Conjunctiva/sclera: Conjunctivae normal.      Pupils: Pupils are equal, round, and reactive to light.   Cardiovascular:      Rate and Rhythm: Normal rate and regular rhythm.      Pulses: Normal pulses.      Heart sounds: Normal heart sounds. No murmur heard.  Pulmonary:       Effort: Pulmonary effort is normal. No respiratory distress.      Breath sounds: Normal breath sounds.   Abdominal:      General: There is no distension.      Palpations: Abdomen is soft.      Tenderness: There is no abdominal tenderness.   Musculoskeletal:         General: Normal range of motion.      Cervical back: Normal range of motion and neck supple.      Right lower leg: No edema.      Left lower leg: No edema.   Lymphadenopathy:      Cervical: No cervical adenopathy.   Skin:     General: Skin is warm and dry.   Neurological:      General: No focal deficit present.      Mental Status: She is alert and oriented to person, place, and time.         LABS AND IMAGING REVIEWED IN EPIC      Assessment:   Anemia of Chronic Disease - In the setting of multiple comorbidities including CKD III. No role for EPO if Hgb >9.     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 Most recent M-spike stable at 1.44 IgG Kappa.       Plan:   - Discussed labs and reviewed in detail.   - No role for EPO at this time.   - No further workup for MM at this time. Repeat labs in 6-7 months.   - RTC 4 months for NP visit, labs same day    I spent a total of 35 minutes on the day of the visit.This includes face to face time and non-face to face time preparing to see the patient (eg, review of tests), obtaining and/or reviewing separately obtained history, documenting clinical information in the electronic or other health record, independently interpreting results and communicating results to the patient/family/caregiver, or care coordinator.      Elizabeth Kennedy LeJeune, MD  Hematology/Oncology   [facility]        Professional Services   I, Mellissa Ford LPN, acted solely as a scribe for and in the presence of Dr. Elizabeth Kennedy LeJeune, who performed these services.

## 2024-08-12 ENCOUNTER — LAB VISIT (OUTPATIENT)
Dept: LAB | Facility: HOSPITAL | Age: 89
End: 2024-08-12
Attending: STUDENT IN AN ORGANIZED HEALTH CARE EDUCATION/TRAINING PROGRAM
Payer: MEDICARE

## 2024-08-12 ENCOUNTER — OFFICE VISIT (OUTPATIENT)
Dept: HEMATOLOGY/ONCOLOGY | Facility: CLINIC | Age: 89
End: 2024-08-12
Payer: MEDICARE

## 2024-08-12 VITALS
OXYGEN SATURATION: 98 % | RESPIRATION RATE: 18 BRPM | DIASTOLIC BLOOD PRESSURE: 63 MMHG | WEIGHT: 152.69 LBS | HEIGHT: 65 IN | TEMPERATURE: 98 F | BODY MASS INDEX: 25.44 KG/M2 | HEART RATE: 72 BPM | SYSTOLIC BLOOD PRESSURE: 114 MMHG

## 2024-08-12 DIAGNOSIS — D64.9 ANEMIA: ICD-10-CM

## 2024-08-12 DIAGNOSIS — D47.2 MGUS (MONOCLONAL GAMMOPATHY OF UNKNOWN SIGNIFICANCE): ICD-10-CM

## 2024-08-12 DIAGNOSIS — D64.9 ANEMIA, UNSPECIFIED TYPE: Primary | ICD-10-CM

## 2024-08-12 LAB
ALBUMIN SERPL-MCNC: 3.5 G/DL (ref 3.4–4.8)
ALBUMIN/GLOB SERPL: 0.8 RATIO (ref 1.1–2)
ALP SERPL-CCNC: 118 UNIT/L (ref 40–150)
ALT SERPL-CCNC: 13 UNIT/L (ref 0–55)
ANION GAP SERPL CALC-SCNC: 9 MEQ/L
AST SERPL-CCNC: 23 UNIT/L (ref 5–34)
BASOPHILS # BLD AUTO: 0.03 X10(3)/MCL
BASOPHILS NFR BLD AUTO: 0.5 %
BILIRUB SERPL-MCNC: 0.6 MG/DL
BUN SERPL-MCNC: 33 MG/DL (ref 9.8–20.1)
CALCIUM SERPL-MCNC: 10.2 MG/DL (ref 8.4–10.2)
CHLORIDE SERPL-SCNC: 93 MMOL/L (ref 98–107)
CO2 SERPL-SCNC: 37 MMOL/L (ref 23–31)
CREAT SERPL-MCNC: 1.88 MG/DL (ref 0.55–1.02)
CREAT/UREA NIT SERPL: 18
EOSINOPHIL # BLD AUTO: 0.13 X10(3)/MCL (ref 0–0.9)
EOSINOPHIL NFR BLD AUTO: 2.3 %
ERYTHROCYTE [DISTWIDTH] IN BLOOD BY AUTOMATED COUNT: 17.5 % (ref 11.5–17)
GFR SERPLBLD CREATININE-BSD FMLA CKD-EPI: 26 ML/MIN/1.73/M2
GLOBULIN SER-MCNC: 4.2 GM/DL (ref 2.4–3.5)
GLUCOSE SERPL-MCNC: 94 MG/DL (ref 82–115)
HCT VFR BLD AUTO: 29.3 % (ref 37–47)
HGB BLD-MCNC: 9.2 G/DL (ref 12–16)
IMM GRANULOCYTES # BLD AUTO: 0.01 X10(3)/MCL (ref 0–0.04)
IMM GRANULOCYTES NFR BLD AUTO: 0.2 %
LYMPHOCYTES # BLD AUTO: 1.58 X10(3)/MCL (ref 0.6–4.6)
LYMPHOCYTES NFR BLD AUTO: 28.5 %
MCH RBC QN AUTO: 30.8 PG (ref 27–31)
MCHC RBC AUTO-ENTMCNC: 31.4 G/DL (ref 33–36)
MCV RBC AUTO: 98 FL (ref 80–94)
MONOCYTES # BLD AUTO: 0.76 X10(3)/MCL (ref 0.1–1.3)
MONOCYTES NFR BLD AUTO: 13.7 %
NEUTROPHILS # BLD AUTO: 3.03 X10(3)/MCL (ref 2.1–9.2)
NEUTROPHILS NFR BLD AUTO: 54.8 %
PLATELET # BLD AUTO: 136 X10(3)/MCL (ref 130–400)
PMV BLD AUTO: 10.1 FL (ref 7.4–10.4)
POTASSIUM SERPL-SCNC: 3.7 MMOL/L (ref 3.5–5.1)
PROT SERPL-MCNC: 7.7 GM/DL (ref 5.8–7.6)
RBC # BLD AUTO: 2.99 X10(6)/MCL (ref 4.2–5.4)
SODIUM SERPL-SCNC: 139 MMOL/L (ref 136–145)
WBC # BLD AUTO: 5.54 X10(3)/MCL (ref 4.5–11.5)

## 2024-08-12 PROCEDURE — 85025 COMPLETE CBC W/AUTO DIFF WBC: CPT

## 2024-08-12 PROCEDURE — 1159F MED LIST DOCD IN RCRD: CPT | Mod: CPTII,S$GLB,, | Performed by: STUDENT IN AN ORGANIZED HEALTH CARE EDUCATION/TRAINING PROGRAM

## 2024-08-12 PROCEDURE — 36415 COLL VENOUS BLD VENIPUNCTURE: CPT

## 2024-08-12 PROCEDURE — 80053 COMPREHEN METABOLIC PANEL: CPT

## 2024-08-12 PROCEDURE — 99214 OFFICE O/P EST MOD 30 MIN: CPT | Mod: S$GLB,,, | Performed by: STUDENT IN AN ORGANIZED HEALTH CARE EDUCATION/TRAINING PROGRAM

## 2024-08-12 PROCEDURE — 1160F RVW MEDS BY RX/DR IN RCRD: CPT | Mod: CPTII,S$GLB,, | Performed by: STUDENT IN AN ORGANIZED HEALTH CARE EDUCATION/TRAINING PROGRAM

## 2024-08-12 PROCEDURE — 99999 PR PBB SHADOW E&M-EST. PATIENT-LVL V: CPT | Mod: PBBFAC,,, | Performed by: STUDENT IN AN ORGANIZED HEALTH CARE EDUCATION/TRAINING PROGRAM

## 2024-08-12 PROCEDURE — 1126F AMNT PAIN NOTED NONE PRSNT: CPT | Mod: CPTII,S$GLB,, | Performed by: STUDENT IN AN ORGANIZED HEALTH CARE EDUCATION/TRAINING PROGRAM

## 2024-09-17 ENCOUNTER — OFFICE VISIT (OUTPATIENT)
Dept: NEUROLOGY | Facility: CLINIC | Age: 89
End: 2024-09-17
Payer: MEDICARE

## 2024-09-17 VITALS
HEIGHT: 65 IN | WEIGHT: 154 LBS | DIASTOLIC BLOOD PRESSURE: 70 MMHG | SYSTOLIC BLOOD PRESSURE: 130 MMHG | BODY MASS INDEX: 25.66 KG/M2

## 2024-09-17 DIAGNOSIS — K11.7 DROOLING: Primary | ICD-10-CM

## 2024-09-17 DIAGNOSIS — G62.9 NEUROPATHY: ICD-10-CM

## 2024-09-17 DIAGNOSIS — B02.29 POSTHERPETIC NEURALGIA: Primary | ICD-10-CM

## 2024-09-17 PROCEDURE — 3288F FALL RISK ASSESSMENT DOCD: CPT | Mod: CPTII,S$GLB,, | Performed by: PSYCHIATRY & NEUROLOGY

## 2024-09-17 PROCEDURE — 96372 THER/PROPH/DIAG INJ SC/IM: CPT | Mod: S$GLB,,, | Performed by: PSYCHIATRY & NEUROLOGY

## 2024-09-17 PROCEDURE — 1101F PT FALLS ASSESS-DOCD LE1/YR: CPT | Mod: CPTII,S$GLB,, | Performed by: PSYCHIATRY & NEUROLOGY

## 2024-09-17 PROCEDURE — 99205 OFFICE O/P NEW HI 60 MIN: CPT | Mod: 25,S$GLB,, | Performed by: PSYCHIATRY & NEUROLOGY

## 2024-09-17 PROCEDURE — 64450 NJX AA&/STRD OTHER PN/BRANCH: CPT | Mod: LT,S$GLB,, | Performed by: PSYCHIATRY & NEUROLOGY

## 2024-09-17 PROCEDURE — 1125F AMNT PAIN NOTED PAIN PRSNT: CPT | Mod: CPTII,S$GLB,, | Performed by: PSYCHIATRY & NEUROLOGY

## 2024-09-17 PROCEDURE — 1159F MED LIST DOCD IN RCRD: CPT | Mod: CPTII,S$GLB,, | Performed by: PSYCHIATRY & NEUROLOGY

## 2024-09-17 PROCEDURE — 99999 PR PBB SHADOW E&M-EST. PATIENT-LVL V: CPT | Mod: PBBFAC,,, | Performed by: PSYCHIATRY & NEUROLOGY

## 2024-09-17 RX ORDER — BUPIVACAINE HYDROCHLORIDE 5 MG/ML
0.5 INJECTION, SOLUTION PERINEURAL ONCE
Status: COMPLETED | OUTPATIENT
Start: 2024-09-17 | End: 2024-09-17

## 2024-09-17 RX ORDER — TRIAMCINOLONE ACETONIDE 40 MG/ML
20 INJECTION, SUSPENSION INTRA-ARTICULAR; INTRAMUSCULAR ONCE
Status: COMPLETED | OUTPATIENT
Start: 2024-09-17 | End: 2024-09-17

## 2024-09-17 RX ORDER — GLYCOPYRROLATE 1 MG/1
1 TABLET ORAL DAILY PRN
Qty: 30 TABLET | Refills: 1 | Status: SHIPPED | OUTPATIENT
Start: 2024-09-17

## 2024-09-17 RX ORDER — GLYCOPYRROLATE 1 MG/1
1 TABLET ORAL DAILY PRN
Qty: 30 TABLET | Refills: 1 | Status: SHIPPED | OUTPATIENT
Start: 2024-09-17 | End: 2024-09-17 | Stop reason: SDUPTHER

## 2024-09-17 RX ADMIN — BUPIVACAINE HYDROCHLORIDE 2.5 MG: 5 INJECTION, SOLUTION PERINEURAL at 03:09

## 2024-09-17 RX ADMIN — TRIAMCINOLONE ACETONIDE 20 MG: 40 INJECTION, SUSPENSION INTRA-ARTICULAR; INTRAMUSCULAR at 03:09

## 2024-09-17 NOTE — PROGRESS NOTES
Subjective     Patient ID: Ev Alberts is a 89 y.o. female.    Chief Complaint: Peripheral Neuropathy (New pt- neuropathy - referred by Stella Malik)    HPI  Shingles L V1 3 mos ago  Residual scalp itching/burning  On gabapentin for other reasons; kelly snot help  Failed topical lotions/steroids/OTC products; no help  C/o drooling; no new meds  Review of Systems  The remainder of the 14 system ROS is noncontributory or negative unless mentioned/reviewed above.       Objective     Physical Exam  Mental Status: Alert and oriented x3. Language is fluent with good comprehension.    Cranial Nerve: Pupils are equal, round, and reactive to light. Visual fields are intact to confrontation. Normal fundi. Ocular movements are intact. Face is symmetric at rest and with activation with intact sensation throughout. Hearing intact to finger rub bilaterally. Muscles of tongue and palate activate symmetrically. No dysarthria. Strength is full in sternocleidomastoid and trapezius bilaterally.    Motor: Muscle bulk and tone are normal. Strength is 5/5 in all four extremities both proximally and distally. Intact fine motor movements bilaterally. There is no pronator drift or satelliting on arm roll.    Sensory: Sensation is intact to light touch, pinprick, vibration, and proprioception throughout. Romberg is negative.    Reflexes: 2+ and symmetric at the biceps, triceps, brachioradialis, patella, and Achilles bilaterally. Plantar response is flexor bilaterally.    Coordination: No dysmetria on finger-nose-finger or heel-knee-shin. Normal rapid alternating movements. Fast finger tapping with normal amplitude and speed.    Gait: wc      PROCEDURE NOTE:  Left supraorbital and supratrochlear nerves blocked with 20 mg kenalog and 0.5 cc marcaine     Assessment and Plan     1. Postherpetic neuralgia    2. Neuropathy  -     Ambulatory referral/consult to Neurology    Other orders  -     glycopyrrolate (ROBINUL) 1 mg Tab; Take 1 tablet  (1 mg total) by mouth daily as needed (drooling).  Dispense: 30 tablet; Refill: 1  -     CYCLOBENZAPRINE 2% GABAPENTIN 6% LIDOCAINE 2% PRILOCAINE 2% TOPICAL CREAM; 1 pump TID as needed for scalp itching  Dispense: 80 g; Refill: 11        Robinul as needed  Blocks prn  Topical gabapentin compound       Follow up in about 4 weeks (around 10/15/2024).

## 2024-10-22 PROBLEM — K11.7 DROOLING: Status: ACTIVE | Noted: 2024-10-22

## 2024-10-22 PROBLEM — B02.29 POSTHERPETIC NEURALGIA: Status: ACTIVE | Noted: 2024-10-22

## 2024-10-22 NOTE — PROGRESS NOTES
Subjective     Patient ID: Ev Alberts is a 89 y.o. female.    Chief Complaint: Peripheral Neuropathy and bells palsy    HPI  L V1 shingles back in June  Residual scalp itching/burning  Gabapentin was not helping  Failed topical lotions/steroids/otc products    Had blocks done on 9/17/24; they helped a little  Cmpd cream was rx'd (GLP w/ cyclobenzaprine) & it helped a little bit  Had c/o drooling at her last visit also; robinul was rx'd & it fixed the issue      Review of Systems  A 14pt ROS was reviewed & is negative unless otherwise documented in the HPI       Objective     Physical Exam  GENERAL: NAD, calm, cooperative, appropriate  Awake/alert  Well groomed  RESP: CTAB  HEART: RRR  No LE edema  MENTAL STATUS: oriented, follow commands reliably  SPEECH/LANGUAGE: clear, fluent  CN:  Perrla, eomi, vff, gaze conjugate  No tactile or motor facial asymmetry  Tongue protrudes midline  Motor: no focal weakness  Cerebellar: no tremor or dysmetria  Sensory: normal to tactile stim/vibration  DTRs: normal +2, symmetric  Gait: in WC       Assessment and Plan     1. Postherpetic neuralgia    2. Drooling  -     glycopyrrolate (ROBINUL) 1 mg Tab; Take 1 tablet (1 mg total) by mouth 2 (two) times daily as needed (drooling).  Dispense: 30 tablet; Refill: 12      PLAN:  Can increase robinul to bid prn  Try TD 6A cream instead of the GLPC cream  Rtc in 2mo - can do blocks at that time Mei if needed    Cintia Blanco, TLCNP-BC           Follow up in about 2 months (around 12/23/2024) for post herpetic neuralgia, drooling.

## 2024-10-23 ENCOUNTER — OFFICE VISIT (OUTPATIENT)
Dept: NEUROLOGY | Facility: CLINIC | Age: 89
End: 2024-10-23
Payer: MEDICARE

## 2024-10-23 VITALS — WEIGHT: 152 LBS | HEIGHT: 65 IN | BODY MASS INDEX: 25.33 KG/M2

## 2024-10-23 DIAGNOSIS — B02.29 POSTHERPETIC NEURALGIA: Primary | ICD-10-CM

## 2024-10-23 DIAGNOSIS — K11.7 DROOLING: ICD-10-CM

## 2024-10-23 PROCEDURE — 1160F RVW MEDS BY RX/DR IN RCRD: CPT | Mod: CPTII,S$GLB,, | Performed by: NURSE PRACTITIONER

## 2024-10-23 PROCEDURE — 1125F AMNT PAIN NOTED PAIN PRSNT: CPT | Mod: CPTII,S$GLB,, | Performed by: NURSE PRACTITIONER

## 2024-10-23 PROCEDURE — 3288F FALL RISK ASSESSMENT DOCD: CPT | Mod: CPTII,S$GLB,, | Performed by: NURSE PRACTITIONER

## 2024-10-23 PROCEDURE — 99999 PR PBB SHADOW E&M-EST. PATIENT-LVL IV: CPT | Mod: PBBFAC,,, | Performed by: NURSE PRACTITIONER

## 2024-10-23 PROCEDURE — 1101F PT FALLS ASSESS-DOCD LE1/YR: CPT | Mod: CPTII,S$GLB,, | Performed by: NURSE PRACTITIONER

## 2024-10-23 PROCEDURE — 99214 OFFICE O/P EST MOD 30 MIN: CPT | Mod: S$GLB,,, | Performed by: NURSE PRACTITIONER

## 2024-10-23 PROCEDURE — 1159F MED LIST DOCD IN RCRD: CPT | Mod: CPTII,S$GLB,, | Performed by: NURSE PRACTITIONER

## 2024-10-23 RX ORDER — NITROGLYCERIN 0.4 MG/1
TABLET SUBLINGUAL
COMMUNITY

## 2024-10-23 RX ORDER — GLYCOPYRROLATE 1 MG/1
1 TABLET ORAL 2 TIMES DAILY PRN
Qty: 30 TABLET | Refills: 12 | Status: SHIPPED | OUTPATIENT
Start: 2024-10-23

## 2024-11-19 ENCOUNTER — HOSPITAL ENCOUNTER (EMERGENCY)
Facility: HOSPITAL | Age: 89
Discharge: HOME | End: 2024-11-19
Attending: STUDENT IN AN ORGANIZED HEALTH CARE EDUCATION/TRAINING PROGRAM
Payer: MEDICARE

## 2024-11-19 VITALS
OXYGEN SATURATION: 98 % | HEIGHT: 65 IN | DIASTOLIC BLOOD PRESSURE: 71 MMHG | WEIGHT: 160 LBS | RESPIRATION RATE: 16 BRPM | TEMPERATURE: 98 F | BODY MASS INDEX: 26.66 KG/M2 | HEART RATE: 60 BPM | SYSTOLIC BLOOD PRESSURE: 126 MMHG

## 2024-11-19 DIAGNOSIS — R06.02 SHORTNESS OF BREATH: ICD-10-CM

## 2024-11-19 DIAGNOSIS — I50.9 CONGESTIVE HEART FAILURE, UNSPECIFIED HF CHRONICITY, UNSPECIFIED HEART FAILURE TYPE: ICD-10-CM

## 2024-11-19 DIAGNOSIS — M79.89 LEG SWELLING: ICD-10-CM

## 2024-11-19 DIAGNOSIS — N39.0 URINARY TRACT INFECTION WITHOUT HEMATURIA, SITE UNSPECIFIED: Primary | ICD-10-CM

## 2024-11-19 LAB
ALBUMIN SERPL-MCNC: 3.6 G/DL (ref 3.4–4.8)
ALBUMIN/GLOB SERPL: 0.8 RATIO (ref 1.1–2)
ALP SERPL-CCNC: 130 UNIT/L (ref 40–150)
ALT SERPL-CCNC: 11 UNIT/L (ref 0–55)
ANION GAP SERPL CALC-SCNC: 11 MEQ/L
AST SERPL-CCNC: 31 UNIT/L (ref 5–34)
BACTERIA #/AREA URNS AUTO: ABNORMAL /HPF
BASOPHILS # BLD AUTO: 0.04 X10(3)/MCL
BASOPHILS NFR BLD AUTO: 0.6 %
BILIRUB SERPL-MCNC: 1 MG/DL
BILIRUB UR QL STRIP.AUTO: NEGATIVE
BNP BLD-MCNC: 496.9 PG/ML
BUN SERPL-MCNC: 27.1 MG/DL (ref 9.8–20.1)
CALCIUM SERPL-MCNC: 9.9 MG/DL (ref 8.4–10.2)
CHLORIDE SERPL-SCNC: 96 MMOL/L (ref 98–107)
CLARITY UR: ABNORMAL
CO2 SERPL-SCNC: 27 MMOL/L (ref 23–31)
COLOR UR AUTO: ABNORMAL
CREAT SERPL-MCNC: 1.72 MG/DL (ref 0.55–1.02)
CREAT/UREA NIT SERPL: 16
EOSINOPHIL # BLD AUTO: 0.13 X10(3)/MCL (ref 0–0.9)
EOSINOPHIL NFR BLD AUTO: 2 %
ERYTHROCYTE [DISTWIDTH] IN BLOOD BY AUTOMATED COUNT: 18.9 % (ref 11.5–17)
GFR SERPLBLD CREATININE-BSD FMLA CKD-EPI: 29 ML/MIN/1.73/M2
GLOBULIN SER-MCNC: 4.6 GM/DL (ref 2.4–3.5)
GLUCOSE SERPL-MCNC: 123 MG/DL (ref 82–115)
GLUCOSE UR QL STRIP: NORMAL
HCT VFR BLD AUTO: 28.4 % (ref 37–47)
HGB BLD-MCNC: 9.3 G/DL (ref 12–16)
HGB UR QL STRIP: ABNORMAL
IMM GRANULOCYTES # BLD AUTO: 0.02 X10(3)/MCL (ref 0–0.04)
IMM GRANULOCYTES NFR BLD AUTO: 0.3 %
KETONES UR QL STRIP: NEGATIVE
LEUKOCYTE ESTERASE UR QL STRIP: 75
LYMPHOCYTES # BLD AUTO: 1.81 X10(3)/MCL (ref 0.6–4.6)
LYMPHOCYTES NFR BLD AUTO: 27.3 %
MCH RBC QN AUTO: 31.4 PG (ref 27–31)
MCHC RBC AUTO-ENTMCNC: 32.7 G/DL (ref 33–36)
MCV RBC AUTO: 95.9 FL (ref 80–94)
MONOCYTES # BLD AUTO: 0.71 X10(3)/MCL (ref 0.1–1.3)
MONOCYTES NFR BLD AUTO: 10.7 %
NEUTROPHILS # BLD AUTO: 3.91 X10(3)/MCL (ref 2.1–9.2)
NEUTROPHILS NFR BLD AUTO: 59.1 %
NITRITE UR QL STRIP: NEGATIVE
NRBC BLD AUTO-RTO: 0 %
OHS QRS DURATION: 204 MS
OHS QTC CALCULATION: 526 MS
PH UR STRIP: 6.5 [PH]
PLATELET # BLD AUTO: 102 X10(3)/MCL (ref 130–400)
PLATELETS.RETICULATED NFR BLD AUTO: 2.3 % (ref 0.9–11.2)
PMV BLD AUTO: 11.3 FL (ref 7.4–10.4)
POTASSIUM SERPL-SCNC: 4 MMOL/L (ref 3.5–5.1)
PROT SERPL-MCNC: 8.2 GM/DL (ref 5.8–7.6)
PROT UR QL STRIP: NEGATIVE
RBC # BLD AUTO: 2.96 X10(6)/MCL (ref 4.2–5.4)
RBC #/AREA URNS AUTO: ABNORMAL /HPF
SODIUM SERPL-SCNC: 134 MMOL/L (ref 136–145)
SP GR UR STRIP.AUTO: 1.01 (ref 1–1.03)
SQUAMOUS #/AREA URNS LPF: ABNORMAL /HPF
TROPONIN I SERPL-MCNC: 0.04 NG/ML (ref 0–0.04)
UROBILINOGEN UR STRIP-ACNC: NORMAL
WBC # BLD AUTO: 6.62 X10(3)/MCL (ref 4.5–11.5)
WBC #/AREA URNS AUTO: ABNORMAL /HPF

## 2024-11-19 PROCEDURE — 63600175 PHARM REV CODE 636 W HCPCS: Performed by: STUDENT IN AN ORGANIZED HEALTH CARE EDUCATION/TRAINING PROGRAM

## 2024-11-19 PROCEDURE — 84484 ASSAY OF TROPONIN QUANT: CPT | Performed by: PHYSICIAN ASSISTANT

## 2024-11-19 PROCEDURE — 96374 THER/PROPH/DIAG INJ IV PUSH: CPT

## 2024-11-19 PROCEDURE — 85025 COMPLETE CBC W/AUTO DIFF WBC: CPT | Performed by: PHYSICIAN ASSISTANT

## 2024-11-19 PROCEDURE — 81001 URINALYSIS AUTO W/SCOPE: CPT | Performed by: PHYSICIAN ASSISTANT

## 2024-11-19 PROCEDURE — 93005 ELECTROCARDIOGRAM TRACING: CPT

## 2024-11-19 PROCEDURE — 93010 ELECTROCARDIOGRAM REPORT: CPT | Mod: ,,, | Performed by: INTERNAL MEDICINE

## 2024-11-19 PROCEDURE — 83880 ASSAY OF NATRIURETIC PEPTIDE: CPT | Performed by: PHYSICIAN ASSISTANT

## 2024-11-19 PROCEDURE — 80053 COMPREHEN METABOLIC PANEL: CPT | Performed by: PHYSICIAN ASSISTANT

## 2024-11-19 PROCEDURE — 87086 URINE CULTURE/COLONY COUNT: CPT | Performed by: PHYSICIAN ASSISTANT

## 2024-11-19 PROCEDURE — 96375 TX/PRO/DX INJ NEW DRUG ADDON: CPT

## 2024-11-19 PROCEDURE — 99285 EMERGENCY DEPT VISIT HI MDM: CPT | Mod: 25

## 2024-11-19 RX ORDER — FUROSEMIDE 10 MG/ML
40 INJECTION INTRAMUSCULAR; INTRAVENOUS
Status: COMPLETED | OUTPATIENT
Start: 2024-11-19 | End: 2024-11-19

## 2024-11-19 RX ORDER — CEFDINIR 300 MG/1
300 CAPSULE ORAL 2 TIMES DAILY
Qty: 20 CAPSULE | Refills: 0 | Status: SHIPPED | OUTPATIENT
Start: 2024-11-19 | End: 2024-11-29

## 2024-11-19 RX ORDER — CEFTRIAXONE 1 G/1
1 INJECTION, POWDER, FOR SOLUTION INTRAMUSCULAR; INTRAVENOUS
Status: COMPLETED | OUTPATIENT
Start: 2024-11-19 | End: 2024-11-19

## 2024-11-19 RX ADMIN — CEFTRIAXONE SODIUM 1 G: 1 INJECTION, POWDER, FOR SOLUTION INTRAMUSCULAR; INTRAVENOUS at 03:11

## 2024-11-19 RX ADMIN — FUROSEMIDE 40 MG: 10 INJECTION, SOLUTION INTRAMUSCULAR; INTRAVENOUS at 01:11

## 2024-11-19 NOTE — ED PROVIDER NOTES
Encounter Date: 2024    SCRIBE #1 NOTE: I, Jm Schneider, am scribing for, and in the presence of,  Olu Bernal IV, MD. I have scribed the following portions of the note - the EKG reading. Other sections scribed: HPI, ROS, PE.       History     Chief Complaint   Patient presents with    Shortness of Breath     C/o SOB since last night. Hx of CHF. Denies chest pain on arrival. Bilateral LE edema noted on arrival.      88 y/o female with a hx of CHF, dementia, HTN, and CKD presents to the ED for shortness of breath since last night. Pt states she recently had her diuretic dosage increased to 40 mg furosemide BID. She also notes she was recently dx with a UTI. She notes associated symptoms of leg swelling.    Nephrologist: Dr. Mateo Chávez MD  Cardiologist: Dr. Efra Vigil MD    The history is provided by the patient. No  was used.     Review of patient's allergies indicates:   Allergen Reactions    Amlodipine-benazepril Edema     Other reaction(s): unknown    Corticosteroids (glucocorticoids) Edema and Swelling    Rofecoxib Anaphylaxis and Swelling    Sulfa (sulfonamide antibiotics) Edema    Atorvastatin      Other reaction(s): unknown    Ibuprofen      Other reaction(s): Allergy to sulfa drugs     Past Medical History:   Diagnosis Date    Acute kidney injury superimposed on chronic kidney disease     Congestive heart failure     Dementia     Hypertension     Shingles of eyelid     Sick sinus syndrome     Thyroid disease     Unspecified glaucoma      Past Surgical History:   Procedure Laterality Date    cataract surgery       SECTION      CHOLECYSTECTOMY      EGD, WITH HEMORRHAGE CONTROL Left 2024    Procedure: EGD,WITH HEMORRHAGE CONTROL;  Surgeon: Blake Adorno MD;  Location: [facility];  Service: Gastroenterology;  Laterality: Left;    HYSTERECTOMY      INSERTION OF PACEMAKER      LEFT HEART CATHETERIZATION Left 3/4/2024    Procedure: Left heart cath;   Surgeon: Mark Raymundo Jr., MD;  Location: [facility] CATH LAB;  Service: Cardiology;  Laterality: Left;    NEPHRECTOMY       No family history on file.  Social History     Tobacco Use    Smoking status: Never    Smokeless tobacco: Never   Substance Use Topics    Alcohol use: Never    Drug use: Not Currently     Review of Systems   Constitutional:  Negative for chills and fever.   HENT:  Negative for congestion, rhinorrhea and sore throat.    Eyes:  Negative for visual disturbance.   Respiratory:  Positive for shortness of breath. Negative for cough.    Cardiovascular:  Positive for leg swelling. Negative for chest pain.   Gastrointestinal:  Negative for abdominal pain, nausea and vomiting.   Genitourinary:  Negative for dysuria, hematuria, vaginal bleeding and vaginal discharge.   Musculoskeletal:  Negative for joint swelling.   Skin:  Negative for rash.   Neurological:  Negative for weakness.   Psychiatric/Behavioral:  Negative for confusion.        Physical Exam     Initial Vitals [24 1034]   BP Pulse Resp Temp SpO2   (!) 148/69 74 18 98.1 °F (36.7 °C) 98 %      MAP       --         Physical Exam    Nursing note and vitals reviewed.  Constitutional: She is not diaphoretic. No distress.   HENT:   Head: Normocephalic and atraumatic.   Neck: Neck supple.   Normal range of motion.  Cardiovascular:  Normal rate and regular rhythm.           No murmur heard.  Pulmonary/Chest: Breath sounds normal. No respiratory distress.   Abdominal: Abdomen is soft. She exhibits no distension. There is no abdominal tenderness.   Musculoskeletal:      Cervical back: Normal range of motion and neck supple.      Right lower le+ Pitting Edema present.      Left lower le+ Pitting Edema present.     Neurological: She is alert and oriented to person, place, and time. She has normal strength. No cranial nerve deficit or sensory deficit.   Skin: Skin is warm. Capillary refill takes less than 2 seconds.   Psychiatric: She has a  normal mood and affect.         ED Course   Procedures  Labs Reviewed   B-TYPE NATRIURETIC PEPTIDE - Abnormal       Result Value    Natriuretic Peptide 496.9 (*)    COMPREHENSIVE METABOLIC PANEL - Abnormal    Sodium 134 (*)     Potassium 4.0      Chloride 96 (*)     CO2 27      Glucose 123 (*)     Blood Urea Nitrogen 27.1 (*)     Creatinine 1.72 (*)     Calcium 9.9      Protein Total 8.2 (*)     Albumin 3.6      Globulin 4.6 (*)     Albumin/Globulin Ratio 0.8 (*)     Bilirubin Total 1.0            ALT 11      AST 31      eGFR 29      Anion Gap 11.0      BUN/Creatinine Ratio 16     URINALYSIS, REFLEX TO URINE CULTURE - Abnormal    Color, UA Light-Yellow      Appearance, UA Turbid (*)     Specific Gravity, UA 1.007      pH, UA 6.5      Protein, UA Negative      Glucose, UA Normal      Ketones, UA Negative      Blood, UA Trace (*)     Bilirubin, UA Negative      Urobilinogen, UA Normal      Nitrites, UA Negative      Leukocyte Esterase, UA 75 (*)     RBC, UA 0-5      WBC, UA 11-20 (*)     Bacteria, UA Trace      Squamous Epithelial Cells, UA Moderate (*)    CBC WITH DIFFERENTIAL - Abnormal    WBC 6.62      RBC 2.96 (*)     Hgb 9.3 (*)     Hct 28.4 (*)     MCV 95.9 (*)     MCH 31.4 (*)     MCHC 32.7 (*)     RDW 18.9 (*)     Platelet 102 (*)     MPV 11.3 (*)     Neut % 59.1      Lymph % 27.3      Mono % 10.7      Eos % 2.0      Basophil % 0.6      Lymph # 1.81      Neut # 3.91      Mono # 0.71      Eos # 0.13      Baso # 0.04      IG# 0.02      IG% 0.3      NRBC% 0.0      IPF 2.3     TROPONIN I - Normal    Troponin-I 0.038     CULTURE, URINE   CBC W/ AUTO DIFFERENTIAL    Narrative:     The following orders were created for panel order CBC auto differential.  Procedure                               Abnormality         Status                     ---------                               -----------         ------                     CBC with Differential[[id number]]       Abnormal            Final result                  Please view results for these tests on the individual orders.     EKG Readings: (Independently Interpreted)   Initial Reading: No STEMI. Heart Rate: 74. Ectopy: No Ectopy. Conduction: Normal. ST Segments: Normal ST Segments. T Waves: Normal. Axis: Normal.   Done at 10:27. V-paced rhythm. No ischemic changes.      ECG Results              EKG 12-lead (Final result)        Collection Time Result Time QRS Duration OHS QTC Calculation    11/19/24 10:27:51 11/19/24 12:47:03 204 526                     Final result by Interface, Lab In [facility] (11/19/24 12:47:11)                   Narrative:    Test Reason : R06.02,    Vent. Rate :  74 BPM     Atrial Rate :  67 BPM     P-R Int :    ms          QRS Dur : 204 ms      QT Int : 474 ms       P-R-T Axes :    -71 118 degrees    QTcB Int : 526 ms    Ventricular-paced rhythm  Abnormal ECG  When compared with ECG of 26-Jul-2024 19:32,  Vent. rate has decreased by   6 bpm  Confirmed by Wiley Saavedra ([id number]) on 11/19/2024 12:47:02 PM    Referred By:            Confirmed By: Wiley Saavedra                                  Imaging Results              X-Ray Chest 1 View (Final result)  Result time 11/19/24 11:32:07      Final result by Theodore Prince MD (11/19/24 11:32:07)                   Impression:      Suspect small pleural effusions.      Electronically signed by: Theodore Prince  Date:    11/19/2024  Time:    11:32               Narrative:    EXAMINATION:  XR CHEST 1 VIEW    CLINICAL HISTORY:  Shortness of breath    TECHNIQUE:  One    COMPARISON:  July 26, 2024.    FINDINGS:  Cardiopericardial silhouette appearance similar.  Cardiac device electrodes terminate within the right atrium and the right ventricle.  Generalized lungs hypoventilatory changes without overt edema.  There is no dense consolidation.  Possible small pleural effusions.  No pneumothorax.                                       Medications   furosemide injection 40 mg (40 mg Intravenous Given 11/19/24 1347)    cefTRIAXone injection 1 g (1 g Intravenous Given 11/19/24 9585)     Medical Decision Making  89-year-old female with a history of CHF presenting with leg swelling shortness of breath also states she feels like she has a UTI  Exam as above mild volume overload well-appearing stable  Will obtain workup and reassess    Differential diagnosis includes but is not limited to:  Chf, chirag, electrolyte derangement, uti, pyelo     Problems Addressed:  Congestive heart failure, unspecified HF chronicity, unspecified heart failure type: acute illness or injury that poses a threat to life or bodily functions  Leg swelling: acute illness or injury that poses a threat to life or bodily functions  Shortness of breath: acute illness or injury that poses a threat to life or bodily functions  Urinary tract infection without hematuria, site unspecified: acute illness or injury that poses a threat to life or bodily functions    Amount and/or Complexity of Data Reviewed  Labs: ordered.  Radiology: ordered.  ECG/medicine tests: ordered and independent interpretation performed.     Details: EKG Readings: (Independently Interpreted)   Initial Reading: No STEMI. Heart Rate: 74. Ectopy: No Ectopy. Conduction: Normal. ST Segments: Normal ST Segments. T Waves: Normal. Axis: Normal.   Done at 10:27. V-paced rhythm. No ischemic changes.        Risk  Prescription drug management.            Scribe Attestation:   Scribe #1: I performed the above scribed service and the documentation accurately describes the services I performed. I attest to the accuracy of the note.    Attending Attestation:           Physician Attestation for Scribe:  Physician Attestation Statement for Scribe #1: I, Olu Bernal IV, MD, reviewed documentation, as scribed by Jm Schneider in my presence, and it is both accurate and complete.             ED Course as of 11/19/24 1728   Tue Nov 19, 2024   1134 89-year-old female with a history of CHF presenting with leg swelling  shortness of breath also states she feels like she has a UTI  Exam as above mild volume overload well-appearing stable  Will obtain workup and reassess [AC]   1541 Patient's kidney function has a baseline, she has some whites in her urine but no other obvious signs of infection however her daughter states that this is how her UTI start and she has been admitted with septic shock before has a solitary kidney, given all this I will treat with Rocephin and outpatient antibiotics.  She was diuresing well they will call nephrologist and cardiologist for further recommendations regarding adjusting her diuretics [AC]      ED Course User Index  [AC] Olu Bernal IV, MD                           Clinical Impression:  Final diagnoses:  [R06.02] Shortness of breath  [N39.0] Urinary tract infection without hematuria, site unspecified (Primary)  [M79.89] Leg swelling  [I50.9] Congestive heart failure, unspecified HF chronicity, unspecified heart failure type          ED Disposition Condition    Discharge Stable          ED Prescriptions       Medication Sig Dispense Start Date End Date Auth. Provider    cefdinir (OMNICEF) 300 MG capsule Take 1 capsule (300 mg total) by mouth 2 (two) times daily. for 10 days 20 capsule 11/19/2024 11/29/2024 Olu Bernal IV, MD          Follow-up Information       Follow up With Specialties Details Why Contact Info    [facility] - Emergency Dept Emergency Medicine Go to  If symptoms worsen Critical access hospital[address] [facility] [address] [phone]    Primary care physician  Schedule an appointment as soon as possible for a visit   Follow up with you primary care physician.   If you do not have a primary care physician call [phone] to schedule an appointment.    Santi Walters MD Family Medicine Schedule an appointment as soon as possible for a visit   [address] [facility] [address]  [phone]               Olu Bernal IV  MD  11/19/24 6170

## 2024-11-19 NOTE — DISCHARGE INSTRUCTIONS
Call your nephrologist and cardiologist for further recommendations regarding you Lasix    Thanks for letting use take care of you today! It is our goal to give you courteous care and to keep you comfortable and informed. If you have any questions before you leave I will be happy to try and answer them.     Advice after your visit:  Your visit in the emergency department is NOT definitive care - please follow-up with your primary care doctor and/or specialist within 1-2 days. If you do not have a primary care physician call [phone] to schedule an appointment. Please return if you have any worsening in your condition or if you have any other concerns.    Return to the emergency department if any worsening symptoms including fever, chest pain, difficulty breathing, weakness, numbness, tingling, nausea, vomiting, inability to eat, drink or take your medication, or any other new symptoms or concerns arise.      Please signup for MyChart as noted below in your paperwork to review all labwork, imaging results, and any other incidental findings from today's visit.     If you had radiology exams like an XRAY or CT in the emergency Department the interpreation on them may be preliminary - there may be less time sensitive findings on the reports please obtain these reports within 24 hours from the hospital or by using your out on your mobile phone to access records.  Bring these to your primary care doctor and/or specialist for further review of incidental findings.    Please review any LAB WORK from your visit today with your primary care physician.    If you were prescribed OPIATE PAIN MEDICATION - please understand of these medications can be addictive, you may fill less of the prescription was written for, you do not have to take the full prescription.  You may discard what you do not use.  Please seek help if you feel you are having problems with addiction.  Do not drive or operate heavy machinery if you are taking  sedating medications.  Do not mix these medications with alcohol.      If you had a SPLINT placed in the emergency department if you have severe pain numbness tingling or discoloration of year digits please remove the splint and return to the emergency department for further evaluation as this may represent a sign of compromise to the nerves or blood vessels due to swelling.    If you had SUTURES in the emergency department please have them removed in the prescribed time frame typically within 7-14 days.  You may shower but please do not bathe or swim.  Keep the wounds clean and dry and covered with a clean dressing.  Please return if he have any signs of infection like redness or drainage or pain at the suture site.    Please take the full course of  any ANTIBIOTICS you were prescribed - incomplete courses of antibiotics can cause resistance to antibiotics in the future which will make it difficult to treat any infections you may have.

## 2024-11-21 LAB — BACTERIA UR CULT: NORMAL

## 2024-12-09 ENCOUNTER — TELEPHONE (OUTPATIENT)
Dept: HEMATOLOGY/ONCOLOGY | Facility: CLINIC | Age: 89
End: 2024-12-09
Payer: MEDICARE

## 2024-12-09 ENCOUNTER — HOSPITAL ENCOUNTER (INPATIENT)
Facility: HOSPITAL | Age: 89
LOS: 15 days | Discharge: HOME HEALTH | DRG: 280 | End: 2024-12-24
Attending: EMERGENCY MEDICINE | Admitting: INTERNAL MEDICINE
Payer: MEDICARE

## 2024-12-09 DIAGNOSIS — R53.1 WEAKNESS: ICD-10-CM

## 2024-12-09 DIAGNOSIS — N28.9 ACUTE ON CHRONIC RENAL INSUFFICIENCY: Primary | ICD-10-CM

## 2024-12-09 DIAGNOSIS — N18.32 ANEMIA DUE TO STAGE 3B CHRONIC KIDNEY DISEASE: ICD-10-CM

## 2024-12-09 DIAGNOSIS — D63.1 ANEMIA DUE TO STAGE 3B CHRONIC KIDNEY DISEASE: ICD-10-CM

## 2024-12-09 DIAGNOSIS — I50.9 ACUTE CONGESTIVE HEART FAILURE, UNSPECIFIED HEART FAILURE TYPE: ICD-10-CM

## 2024-12-09 DIAGNOSIS — R33.8 ACUTE URINARY RETENTION: ICD-10-CM

## 2024-12-09 DIAGNOSIS — R79.89 TROPONIN I ABOVE REFERENCE RANGE: ICD-10-CM

## 2024-12-09 DIAGNOSIS — J96.11 CHRONIC RESPIRATORY FAILURE WITH HYPOXIA: ICD-10-CM

## 2024-12-09 DIAGNOSIS — N18.9 ACUTE ON CHRONIC RENAL INSUFFICIENCY: Primary | ICD-10-CM

## 2024-12-09 LAB
ALBUMIN SERPL-MCNC: 3.5 G/DL (ref 3.4–4.8)
ALBUMIN/GLOB SERPL: 0.7 RATIO (ref 1.1–2)
ALP SERPL-CCNC: 138 UNIT/L (ref 40–150)
ALT SERPL-CCNC: 11 UNIT/L (ref 0–55)
ANION GAP SERPL CALC-SCNC: 7 MEQ/L
AST SERPL-CCNC: 32 UNIT/L (ref 5–34)
BACTERIA #/AREA URNS AUTO: ABNORMAL /HPF
BASOPHILS # BLD AUTO: 0.03 X10(3)/MCL
BASOPHILS NFR BLD AUTO: 0.6 %
BILIRUB SERPL-MCNC: 1.4 MG/DL
BILIRUB UR QL STRIP.AUTO: NEGATIVE
BNP BLD-MCNC: 770.2 PG/ML
BUN SERPL-MCNC: 26.3 MG/DL (ref 9.8–20.1)
CALCIUM SERPL-MCNC: 9.5 MG/DL (ref 8.4–10.2)
CHLORIDE SERPL-SCNC: 97 MMOL/L (ref 98–107)
CLARITY UR: CLEAR
CO2 SERPL-SCNC: 32 MMOL/L (ref 23–31)
COLOR UR AUTO: COLORLESS
CREAT SERPL-MCNC: 1.91 MG/DL (ref 0.55–1.02)
CREAT/UREA NIT SERPL: 14
EOSINOPHIL # BLD AUTO: 0.06 X10(3)/MCL (ref 0–0.9)
EOSINOPHIL NFR BLD AUTO: 1.1 %
ERYTHROCYTE [DISTWIDTH] IN BLOOD BY AUTOMATED COUNT: 19.2 % (ref 11.5–17)
GFR SERPLBLD CREATININE-BSD FMLA CKD-EPI: 25 ML/MIN/1.73/M2
GLOBULIN SER-MCNC: 5 GM/DL (ref 2.4–3.5)
GLUCOSE SERPL-MCNC: 92 MG/DL (ref 82–115)
GLUCOSE UR QL STRIP: NORMAL
HCT VFR BLD AUTO: 29.1 % (ref 37–47)
HGB BLD-MCNC: 9.9 G/DL (ref 12–16)
HGB UR QL STRIP: NEGATIVE
IMM GRANULOCYTES # BLD AUTO: 0.02 X10(3)/MCL (ref 0–0.04)
IMM GRANULOCYTES NFR BLD AUTO: 0.4 %
KETONES UR QL STRIP: NEGATIVE
LEUKOCYTE ESTERASE UR QL STRIP: NEGATIVE
LYMPHOCYTES # BLD AUTO: 2.35 X10(3)/MCL (ref 0.6–4.6)
LYMPHOCYTES NFR BLD AUTO: 44.2 %
MAGNESIUM SERPL-MCNC: 2.6 MG/DL (ref 1.6–2.6)
MCH RBC QN AUTO: 32.4 PG (ref 27–31)
MCHC RBC AUTO-ENTMCNC: 34 G/DL (ref 33–36)
MCV RBC AUTO: 95.1 FL (ref 80–94)
MONOCYTES # BLD AUTO: 0.65 X10(3)/MCL (ref 0.1–1.3)
MONOCYTES NFR BLD AUTO: 12.2 %
NEUTROPHILS # BLD AUTO: 2.21 X10(3)/MCL (ref 2.1–9.2)
NEUTROPHILS NFR BLD AUTO: 41.5 %
NITRITE UR QL STRIP: NEGATIVE
NRBC BLD AUTO-RTO: 0 %
PH UR STRIP: 7.5 [PH]
PLATELET # BLD AUTO: 110 X10(3)/MCL (ref 130–400)
PLATELETS.RETICULATED NFR BLD AUTO: 2.7 % (ref 0.9–11.2)
PMV BLD AUTO: 11.8 FL (ref 7.4–10.4)
POTASSIUM SERPL-SCNC: 3.7 MMOL/L (ref 3.5–5.1)
PROT SERPL-MCNC: 8.5 GM/DL (ref 5.8–7.6)
PROT UR QL STRIP: NEGATIVE
RBC # BLD AUTO: 3.06 X10(6)/MCL (ref 4.2–5.4)
RBC #/AREA URNS AUTO: ABNORMAL /HPF
SODIUM SERPL-SCNC: 136 MMOL/L (ref 136–145)
SP GR UR STRIP.AUTO: 1 (ref 1–1.03)
SQUAMOUS #/AREA URNS LPF: ABNORMAL /HPF
TROPONIN I SERPL-MCNC: 0.04 NG/ML (ref 0–0.04)
TROPONIN I SERPL-MCNC: 0.06 NG/ML (ref 0–0.04)
TSH SERPL-ACNC: 1.66 UIU/ML (ref 0.35–4.94)
UROBILINOGEN UR STRIP-ACNC: NORMAL
WBC # BLD AUTO: 5.32 X10(3)/MCL (ref 4.5–11.5)
WBC #/AREA URNS AUTO: ABNORMAL /HPF

## 2024-12-09 PROCEDURE — 83735 ASSAY OF MAGNESIUM: CPT | Performed by: EMERGENCY MEDICINE

## 2024-12-09 PROCEDURE — 11000001 HC ACUTE MED/SURG PRIVATE ROOM

## 2024-12-09 PROCEDURE — 85025 COMPLETE CBC W/AUTO DIFF WBC: CPT | Performed by: EMERGENCY MEDICINE

## 2024-12-09 PROCEDURE — 84484 ASSAY OF TROPONIN QUANT: CPT | Performed by: PHYSICIAN ASSISTANT

## 2024-12-09 PROCEDURE — 99285 EMERGENCY DEPT VISIT HI MDM: CPT | Mod: 25

## 2024-12-09 PROCEDURE — 25000003 PHARM REV CODE 250: Performed by: INTERNAL MEDICINE

## 2024-12-09 PROCEDURE — 25000003 PHARM REV CODE 250: Performed by: EMERGENCY MEDICINE

## 2024-12-09 PROCEDURE — 21400001 HC TELEMETRY ROOM

## 2024-12-09 PROCEDURE — 96374 THER/PROPH/DIAG INJ IV PUSH: CPT

## 2024-12-09 PROCEDURE — 84484 ASSAY OF TROPONIN QUANT: CPT | Performed by: EMERGENCY MEDICINE

## 2024-12-09 PROCEDURE — 25000003 PHARM REV CODE 250: Performed by: PHYSICIAN ASSISTANT

## 2024-12-09 PROCEDURE — 84443 ASSAY THYROID STIM HORMONE: CPT | Performed by: EMERGENCY MEDICINE

## 2024-12-09 PROCEDURE — 51702 INSERT TEMP BLADDER CATH: CPT

## 2024-12-09 PROCEDURE — 81001 URINALYSIS AUTO W/SCOPE: CPT | Performed by: EMERGENCY MEDICINE

## 2024-12-09 PROCEDURE — 63600175 PHARM REV CODE 636 W HCPCS: Performed by: EMERGENCY MEDICINE

## 2024-12-09 PROCEDURE — 83880 ASSAY OF NATRIURETIC PEPTIDE: CPT | Performed by: EMERGENCY MEDICINE

## 2024-12-09 PROCEDURE — 80053 COMPREHEN METABOLIC PANEL: CPT | Performed by: EMERGENCY MEDICINE

## 2024-12-09 RX ORDER — ALPRAZOLAM 0.25 MG/1
0.25 TABLET ORAL
Status: DISCONTINUED | OUTPATIENT
Start: 2024-12-09 | End: 2024-12-09

## 2024-12-09 RX ORDER — FUROSEMIDE 10 MG/ML
60 INJECTION INTRAMUSCULAR; INTRAVENOUS
Status: COMPLETED | OUTPATIENT
Start: 2024-12-09 | End: 2024-12-09

## 2024-12-09 RX ORDER — ONDANSETRON HYDROCHLORIDE 2 MG/ML
4 INJECTION, SOLUTION INTRAVENOUS EVERY 8 HOURS PRN
Status: DISCONTINUED | OUTPATIENT
Start: 2024-12-09 | End: 2024-12-24 | Stop reason: HOSPADM

## 2024-12-09 RX ORDER — IBUPROFEN 200 MG
24 TABLET ORAL
Status: DISCONTINUED | OUTPATIENT
Start: 2024-12-09 | End: 2024-12-24 | Stop reason: HOSPADM

## 2024-12-09 RX ORDER — ASPIRIN 325 MG
325 TABLET, DELAYED RELEASE (ENTERIC COATED) ORAL
Status: COMPLETED | OUTPATIENT
Start: 2024-12-09 | End: 2024-12-09

## 2024-12-09 RX ORDER — GLUCAGON 1 MG
1 KIT INJECTION
Status: DISCONTINUED | OUTPATIENT
Start: 2024-12-09 | End: 2024-12-24 | Stop reason: HOSPADM

## 2024-12-09 RX ORDER — FUROSEMIDE 10 MG/ML
60 INJECTION INTRAMUSCULAR; INTRAVENOUS
Status: COMPLETED | OUTPATIENT
Start: 2024-12-10 | End: 2024-12-11

## 2024-12-09 RX ORDER — GABAPENTIN 100 MG/1
200 CAPSULE ORAL 2 TIMES DAILY
Status: DISCONTINUED | OUTPATIENT
Start: 2024-12-09 | End: 2024-12-10

## 2024-12-09 RX ORDER — PANTOPRAZOLE SODIUM 40 MG/1
40 TABLET, DELAYED RELEASE ORAL 2 TIMES DAILY
Status: DISCONTINUED | OUTPATIENT
Start: 2024-12-09 | End: 2024-12-24 | Stop reason: HOSPADM

## 2024-12-09 RX ORDER — ACETAMINOPHEN 325 MG/1
650 TABLET ORAL EVERY 4 HOURS PRN
Status: DISCONTINUED | OUTPATIENT
Start: 2024-12-09 | End: 2024-12-24 | Stop reason: HOSPADM

## 2024-12-09 RX ORDER — MUPIROCIN 20 MG/G
OINTMENT TOPICAL 2 TIMES DAILY
Status: COMPLETED | OUTPATIENT
Start: 2024-12-09 | End: 2024-12-14

## 2024-12-09 RX ORDER — LEVOTHYROXINE SODIUM 25 UG/1
25 TABLET ORAL
Status: DISCONTINUED | OUTPATIENT
Start: 2024-12-10 | End: 2024-12-24 | Stop reason: HOSPADM

## 2024-12-09 RX ORDER — ALPRAZOLAM 0.25 MG/1
0.25 TABLET ORAL 3 TIMES DAILY PRN
Status: DISCONTINUED | OUTPATIENT
Start: 2024-12-09 | End: 2024-12-24 | Stop reason: HOSPADM

## 2024-12-09 RX ORDER — CARVEDILOL 3.12 MG/1
3.12 TABLET ORAL 2 TIMES DAILY
Status: DISCONTINUED | OUTPATIENT
Start: 2024-12-09 | End: 2024-12-24 | Stop reason: HOSPADM

## 2024-12-09 RX ORDER — ACETAMINOPHEN 325 MG/1
650 TABLET ORAL EVERY 8 HOURS PRN
Status: DISCONTINUED | OUTPATIENT
Start: 2024-12-09 | End: 2024-12-24 | Stop reason: HOSPADM

## 2024-12-09 RX ORDER — FUROSEMIDE 10 MG/ML
60 INJECTION INTRAMUSCULAR; INTRAVENOUS
Status: DISCONTINUED | OUTPATIENT
Start: 2024-12-10 | End: 2024-12-09

## 2024-12-09 RX ORDER — HYDROXYZINE PAMOATE 25 MG/1
25 CAPSULE ORAL EVERY 8 HOURS PRN
Status: DISCONTINUED | OUTPATIENT
Start: 2024-12-09 | End: 2024-12-24 | Stop reason: HOSPADM

## 2024-12-09 RX ORDER — IBUPROFEN 200 MG
16 TABLET ORAL
Status: DISCONTINUED | OUTPATIENT
Start: 2024-12-09 | End: 2024-12-24 | Stop reason: HOSPADM

## 2024-12-09 RX ADMIN — GABAPENTIN 200 MG: 100 CAPSULE ORAL at 10:12

## 2024-12-09 RX ADMIN — MUPIROCIN: 20 OINTMENT TOPICAL at 10:12

## 2024-12-09 RX ADMIN — CARVEDILOL 3.12 MG: 3.12 TABLET, FILM COATED ORAL at 10:12

## 2024-12-09 RX ADMIN — PANTOPRAZOLE SODIUM 40 MG: 40 TABLET, DELAYED RELEASE ORAL at 10:12

## 2024-12-09 RX ADMIN — FUROSEMIDE 60 MG: 10 INJECTION, SOLUTION INTRAMUSCULAR; INTRAVENOUS at 02:12

## 2024-12-09 RX ADMIN — APIXABAN 2.5 MG: 2.5 TABLET, FILM COATED ORAL at 10:12

## 2024-12-09 RX ADMIN — ACETAMINOPHEN 325MG 650 MG: 325 TABLET ORAL at 03:12

## 2024-12-09 RX ADMIN — ASPIRIN 325 MG: 325 TABLET, COATED ORAL at 02:12

## 2024-12-09 RX ADMIN — ALPRAZOLAM 0.25 MG: 0.25 TABLET ORAL at 04:12

## 2024-12-09 NOTE — H&P
[facility] Hospital Medicine History & Physical Examination       Patient Name: Ev Alberts  MRN: [id number]  Patient Class: IP- Inpatient   Admission Date: 12/09/2024   Admitting Service: Hospital Medicine   Length of Stay: 0  Attending Physician: Dr. Dobbs  Primary Care Provider: Santi Walters MD  Face-to-Face encounter date: 12/09/2024  Code Status: Full code   Chief Complaint: Weakness (Pt endorses generalized weakness X 3 days. Denies other complaints. GCS 15 Hx CHF Arrives on 2 L (baseline))      Present at Bedside:  2 family members  Patient information was obtained from patient, patient's family, past medical records and ER records.      HISTORY OF PRESENT ILLNESS:   Ev Alberts is a 89 y.o. female with a past medical history of hypertension, PAF on Eliquis, DVT, chronic diastolic heart failure, sick sinus syndrome, CKD stage III with solitary kidney, chronic hypoxemic respiratory failure on 2 L home oxygen, hypothyroidism, and glaucoma who presented to [facility] on 12/9/2024 with c/o generalized weakness.  Patient reported worsening weakness over the past 3 weeks.  Patient also endorsed lower extremity swelling, abdominal distention, and difficulty urinating.  Patient also endorsed 5 lb weight gain.  Patient reported compliance with home Lasix- 80 mg q.a.m. and 40 mg in the afternoon.  Daughter reports she is followed by nephrologist Dr. Chávez.  Patient denied fever, rectal bleeding, melena, hematuria, dysuria, and chest pain.   Initial vital signs in ED were /82, pulse 82, respirations 12, temperature 36.8° C, and SpO2 99% on 2 L nasal cannula.  Labs revealed WBC 5.32, RBC 3.06, hemoglobin 9.9, hematocrit 29.1, MCV 95.1, platelets 110, sodium 97, CO2 32, BUN 26.3, creatinine 1.91, .2, and troponin 0.055.  Chest x-ray revealed right small pleural effusion and basilar atelectasis.  Patient was given IV Lasix 60 mg and aspirin 325 mg tablet in ED.  Grey catheter was placed in ED. Patient was admitted to hospital medicine service for further medical management.     PAST MEDICAL HISTORY:   Hypertension   PAF on Eliquis   DVT  Chronic diastolic heart failure   Sick sinus syndrome   CKD stage 3 with solitary kidney   Chronic hypoxemic respiratory failure on 2 L home oxygen  Hypothyroidism   Glaucoma    PAST SURGICAL HISTORY:     Past Surgical History:   Procedure Laterality Date    cataract surgery       SECTION      CHOLECYSTECTOMY      EGD, WITH HEMORRHAGE CONTROL Left 2024    Procedure: EGD,WITH HEMORRHAGE CONTROL;  Surgeon: Blake Adorno MD;  Location: [facility] OR;  Service: Gastroenterology;  Laterality: Left;    HYSTERECTOMY      INSERTION OF PACEMAKER      LEFT HEART CATHETERIZATION Left 3/4/2024    Procedure: Left heart cath;  Surgeon: Mark Raymundo Jr., MD;  Location: [facility] CATH LAB;  Service: Cardiology;  Laterality: Left;    NEPHRECTOMY         FAMILY HISTORY:   Reviewed and negative    SOCIAL HISTORY:   Denied alcohol, tobacco or illicit drug use.     Screening for Social Drivers for health:  Patient screened for food insecurity, housing instability, transportation needs, utility difficulties, and interpersonal safety (select all that apply as identified as concern)  []Housing or Food  []Transportation Needs  []Utility Difficulties  []Interpersonal safety  [x]None    ALLERGIES:   Amlodipine-benazepril, Corticosteroids (glucocorticoids), Rofecoxib, Sulfa (sulfonamide antibiotics), Atorvastatin, and Ibuprofen    HOME MEDICATIONS:     Prior to Admission medications    Medication Sig Start Date End Date Taking? Authorizing Provider   albuterol sulfate (INV ALBUTEROL) 90 mcg inhalation Inhale 90 mcg into the lungs as needed (SOB, Wheezing). Take one puff by mouth as directed by Physician.    Provider, Historical   allopurinoL (ZYLOPRIM) 300 MG tablet Take 300 mg by mouth once daily.    Provider, Historical   aspirin (ECOTRIN) 81 MG EC tablet  Take 1 tablet (81 mg total) by mouth once daily. 3/12/24 10/23/24  Paula Senior MD   atorvastatin (LIPITOR) 10 MG tablet Take 10 mg by mouth nightly. 5/11/22   Provider, Historical   brimonidine-timoloL (COMBIGAN) 0.2-0.5 % Drop Place 1 drop into both eyes 2 (two) times a day.    Provider, Historical   carvediloL (COREG) 3.125 MG tablet Take 3.125 mg by mouth 2 (two) times daily. 7/12/21   Provider, Historical   cetirizine (ZYRTEC) 10 MG tablet Take 10 mg by mouth once daily.    Provider, Historical   CYCLOBENZAPRINE 2% GABAPENTIN 6% LIDOCAINE 2% PRILOCAINE 2% TOPICAL CREAM 1 pump TID as needed for scalp itching 9/19/24   Natan Mei MD   docusate sodium (COLACE) 100 MG capsule Take 2 capsules (200 mg total) by mouth 2 (two) times daily as needed for Constipation. 4/9/24   Ally Erazo MD   ELIQUIS 2.5 mg Tab Take 2.5 mg by mouth 2 (two) times daily. 5/10/22   Provider, Historical   furosemide (LASIX) 40 MG tablet Take 1 tablet (40 mg total) by mouth once daily. 4/10/24 10/23/24  Ally Erazo MD   gabapentin (NEURONTIN) 100 MG capsule Take 2 capsules (200 mg total) by mouth 2 (two) times daily. 4/9/24 10/23/24  Ally Erazo MD   glycopyrrolate (ROBINUL) 1 mg Tab Take 1 tablet (1 mg total) by mouth 2 (two) times daily as needed (drooling). 10/23/24   Cintia Blanco NP   latanoprost 0.005 % ophthalmic solution Place 1 drop into both eyes every evening.    Provider, Historical   levothyroxine (SYNTHROID) 25 MCG tablet Take 25 mcg by mouth before breakfast.    Provider, Historical   lubiprostone (AMITIZA) 24 MCG Cap Take 1 capsule (24 mcg total) by mouth 2 (two) times daily with meals. 8/1/24 8/1/25  Negro Laura MD   miconazole NITRATE 2 % (MICOTIN) 2 % top powder Apply topically 2 (two) times daily.  Patient not taking: Reported on 10/23/2024 4/9/24   Ally Erazo MD   mirabegron (MYRBETRIQ) 25 mg Tb24 ER tablet Take 1 tablet by mouth every morning. 1/29/24   Provider, Tabby    nitroGLYCERIN (NITROSTAT) 0.4 MG SL tablet SMARTSI Tablet(s) Sublingual    Provider, Historical   pantoprazole (PROTONIX) 40 MG tablet Take 1 tablet (40 mg total) by mouth 2 (two) times daily. 8/1/24 10/23/24  Negro Laura MD   potassium chloride (KLOR-CON) 20 mEq Pack Take 20 mEq by mouth once.    Provider, Historical   risperiDONE (RISPERDAL) 2 MG tablet Take 2 mg by mouth every evening. 24   Provider, Historical   rivastigmine tartrate (EXELON) 1.5 MG capsule Take 1.5 mg by mouth 2 (two) times daily.    Provider, Historical   traMADoL (ULTRAM) 50 mg tablet Take 50 mg by mouth every 8 (eight) hours as needed for Pain.  Patient not taking: Reported on 10/23/2024    Provider, Historical   traZODone (DESYREL) 100 MG tablet Take 100 mg by mouth every evening.  Patient not taking: Reported on 10/23/2024    Provider, Historical     ________________________________________________________________________  INPATIENT LIST OF MEDICATIONS     Current Facility-Administered Medications:     acetaminophen tablet 650 mg, 650 mg, Oral, Q8H PRN, Chris Kennedy PA-C    acetaminophen tablet 650 mg, 650 mg, Oral, Q4H PRN, Chris Kennedy PA-C, 650 mg at 24 1501    dextrose 10% bolus 125 mL 125 mL, 12.5 g, Intravenous, PRNBrent Morgan A, PA-C    dextrose 10% bolus 250 mL 250 mL, 25 g, Intravenous, PRNBrent Morgan A, PA-C    glucagon (human recombinant) injection 1 mg, 1 mg, Intramuscular, PRN, Chris Kennedy PA-C    glucose chewable tablet 16 g, 16 g, Oral, PRN, Chris Kennedy PA-C    glucose chewable tablet 24 g, 24 g, Oral, PRN, Chris Kennedy PA-C    ondansetron injection 4 mg, 4 mg, Intravenous, Q8H PRN, Chris Kennedy, PA-C    Current Outpatient Medications:     albuterol sulfate (INV ALBUTEROL) 90 mcg inhalation, Inhale 90 mcg into the lungs as needed (SOB, Wheezing). Take one puff by mouth as directed by Physician., Disp: , Rfl:     allopurinoL (ZYLOPRIM) 300 MG tablet, Take 300 mg by mouth  once daily., Disp: , Rfl:     aspirin (ECOTRIN) 81 MG EC tablet, Take 1 tablet (81 mg total) by mouth once daily., Disp: 30 tablet, Rfl: 0    atorvastatin (LIPITOR) 10 MG tablet, Take 10 mg by mouth nightly., Disp: , Rfl:     brimonidine-timoloL (COMBIGAN) 0.2-0.5 % Drop, Place 1 drop into both eyes 2 (two) times a day., Disp: , Rfl:     carvediloL (COREG) 3.125 MG tablet, Take 3.125 mg by mouth 2 (two) times daily., Disp: , Rfl:     cetirizine (ZYRTEC) 10 MG tablet, Take 10 mg by mouth once daily., Disp: , Rfl:     CYCLOBENZAPRINE 2% GABAPENTIN 6% LIDOCAINE 2% PRILOCAINE 2% TOPICAL CREAM, 1 pump TID as needed for scalp itching, Disp: 80 g, Rfl: 11    docusate sodium (COLACE) 100 MG capsule, Take 2 capsules (200 mg total) by mouth 2 (two) times daily as needed for Constipation., Disp: , Rfl: 0    ELIQUIS 2.5 mg Tab, Take 2.5 mg by mouth 2 (two) times daily., Disp: , Rfl:     furosemide (LASIX) 40 MG tablet, Take 1 tablet (40 mg total) by mouth once daily., Disp: 30 tablet, Rfl: 0    gabapentin (NEURONTIN) 100 MG capsule, Take 2 capsules (200 mg total) by mouth 2 (two) times daily., Disp: 120 capsule, Rfl: 0    glycopyrrolate (ROBINUL) 1 mg Tab, Take 1 tablet (1 mg total) by mouth 2 (two) times daily as needed (drooling)., Disp: 30 tablet, Rfl: 12    latanoprost 0.005 % ophthalmic solution, Place 1 drop into both eyes every evening., Disp: , Rfl:     levothyroxine (SYNTHROID) 25 MCG tablet, Take 25 mcg by mouth before breakfast., Disp: , Rfl:     lubiprostone (AMITIZA) 24 MCG Cap, Take 1 capsule (24 mcg total) by mouth 2 (two) times daily with meals., Disp: 60 capsule, Rfl: 11    miconazole NITRATE 2 % (MICOTIN) 2 % top powder, Apply topically 2 (two) times daily. (Patient not taking: Reported on 10/23/2024), Disp: , Rfl: 0    mirabegron (MYRBETRIQ) 25 mg Tb24 ER tablet, Take 1 tablet by mouth every morning., Disp: , Rfl:     nitroGLYCERIN (NITROSTAT) 0.4 MG SL tablet, SMARTSI Tablet(s) Sublingual, Disp: , Rfl:      pantoprazole (PROTONIX) 40 MG tablet, Take 1 tablet (40 mg total) by mouth 2 (two) times daily., Disp: 60 tablet, Rfl: 1    potassium chloride (KLOR-CON) 20 mEq Pack, Take 20 mEq by mouth once., Disp: , Rfl:     risperiDONE (RISPERDAL) 2 MG tablet, Take 2 mg by mouth every evening., Disp: , Rfl:     rivastigmine tartrate (EXELON) 1.5 MG capsule, Take 1.5 mg by mouth 2 (two) times daily., Disp: , Rfl:     traMADoL (ULTRAM) 50 mg tablet, Take 50 mg by mouth every 8 (eight) hours as needed for Pain. (Patient not taking: Reported on 10/23/2024), Disp: , Rfl:     traZODone (DESYREL) 100 MG tablet, Take 100 mg by mouth every evening. (Patient not taking: Reported on 10/23/2024), Disp: , Rfl:     Scheduled Meds:  Continuous Infusions:  PRN Meds:.  Current Facility-Administered Medications:     acetaminophen, 650 mg, Oral, Q8H PRN    acetaminophen, 650 mg, Oral, Q4H PRN    dextrose 10%, 12.5 g, Intravenous, PRN    dextrose 10%, 25 g, Intravenous, PRN    glucagon (human recombinant), 1 mg, Intramuscular, PRN    glucose, 16 g, Oral, PRN    glucose, 24 g, Oral, PRN    ondansetron, 4 mg, Intravenous, Q8H PRN      REVIEW OF SYSTEMS:   Except as documented, all other systems reviewed and negative.    PHYSICAL EXAM:     VITAL SIGNS: 24 HRS MIN & MAX LAST   Temp  Min: 98.2 °F (36.8 °C)  Max: 98.2 °F (36.8 °C) 98.2 °F (36.8 °C)   BP  Min: 138/68  Max: 140/82 138/68   Pulse  Min: 82  Max: 83  83   Resp  Min: 12  Max: 16 16   SpO2  Min: 96 %  Max: 100 % 96 %       General appearance:  Elderly female in no apparent distress.  HENT: Atraumatic head.   Eyes: Normal extraocular movements.   Neck: Supple.   Lungs: Clear to auscultation bilaterally.   Heart: Regular rate and rhythm.  Abdomen: Soft, distended.  Grey catheter in place  Extremities:  3+ bilateral lower extremity edema  Skin: No Rash.   Neuro: Awake, alert, and oriented. Motor and sensory exams grossly intact.   Psych/mental status: Appropriate mood and affect. Responds  appropriately to questions.     LABS AND IMAGING:     Recent Labs   Lab 12/09/24  1250   WBC 5.32   RBC 3.06*   HGB 9.9*   HCT 29.1*   MCV 95.1*   MCH 32.4*   MCHC 34.0   RDW 19.2*   *   MPV 11.8*       Recent Labs   Lab 12/09/24  1250      K 3.7   CL 97*   CO2 32*   BUN 26.3*   CREATININE 1.91*   CALCIUM 9.5   MG 2.60   ALBUMIN 3.5   ALKPHOS 138   ALT 11   AST 32   BILITOT 1.4       Microbiology Results (last 7 days)       ** No results found for the last 168 hours. **             X-Ray Chest AP Portable  Narrative: EXAMINATION:  XR CHEST AP PORTABLE    CLINICAL HISTORY:  weakness;    TECHNIQUE:  One view    COMPARISON:  November 19, 2024.    FINDINGS:  Cardiopericardial silhouette enlarged appearance is similar.  Cardiac device electrodes terminate within the right atrium and the right ventricle.  There is small right pleural effusion and right basilar atelectasis without interval difference.  No pulmonary edema.  No pneumothorax.  Impression: Right small pleural effusion and basilar atelectasis, unchanged.    Electronically signed by: Theodore Prince  Date:    12/09/2024  Time:    13:02        ASSESSMENT & PLAN:   Assessment:   Acute on chronic CHF exacerbation  NSTEMI, likely type 2  Urinary retention  MICHEL on CKD  Normocytic anemia, stable  History of hypertension, PAF on Eliquis, DVT, chronic diastolic heart failure, sick sinus syndrome, CKD stage III with solitary kidney, chronic hypoxemic respiratory failure on 2 L home oxygen, hypothyroidism, and glaucoma    Plan:  IV Lasix 60 mg b.i.d.  Strict intake/output   Daily weights   Nephrology consulted, appreciate recommendations  Cardiac monitoring   Trend troponin q.6 x3   Continue Grey catheter  Close H&H monitoring  Continue supplemental oxygen, wean as tolerated  Resume home medications as deemed appropriate once medication reconciliation is updated  Labs in AM    VTE Prophylaxis:  Home Eliquis    Discharge Planning and Disposition: BROOKE MILLER,  Chris Kennedy PA-C have reviewed and discussed the case with Dr. Dobbs  Please see the attending MD's addendum for further assessment and plan.    Chris Kennedy PA-C  Department of Hospital Medicine   [facility]   12/09/2024    This note was created with the assistance of [employer] voice recognition software. There may be transcription errors as a result of using this technology, however minimal. Effort has been made to assure accuracy of transcription, but any obvious errors or omissions should be clarified with the author of the document.    _______________________________________________________________________________  MD Addendum:  I, Dr. Karina Dobbs M.D, assumed care of this patient today at 1500 hours  For the patient encounter, I performed the substantive portion of the visit, I reviewed the NP/PA documentation, treatment plan, and medical decision making.  I had face to face time with this patient     A. History:  Reviewed and noted in detail as noted above.  Patient's daughter and  were at bedside at the time of my visit.    B. Physical exam:  General: Elderly female, no acute distress   Respiratory: Clear to auscultation bilaterally   Cardiovascular: Regular rate and rhythm, tachycardia, systolic murmur , 3+ pitting edema all the way up to thighs and abdomen  Abdomen: Soft  Neuro: Nonfocal     C. Medical decision making:  Continue with aggressive diuresis, on Lasix 60 mg IV b.i.d.   Strict Is&Os with daily weights   Nephrology and Cardiology have both been consulted   Trend cardiac troponins, possibly type 2 and STEMI  Grey to gravity   Monitor labs closely, replete electrolytes in transfuse for hemoglobin less than 8   Home medications have been reviewed and resumed    All diagnosis and differential diagnosis have been reviewed; assessment and plan has been documented; I have personally reviewed the labs and test results that are presently available; I  have reviewed the patients medication list; I have reviewed the consulting providers response and recommendations. I have reviewed or attempted to review medical records based upon their availability.    All of the patient and family questions have been addressed and answered. Patient's is agreeable to the above stated plan. I will continue to monitor closely and make adjustments to medical management as needed.      12/09/2024

## 2024-12-09 NOTE — TELEPHONE ENCOUNTER
Patient's daughter wanted to let you know that she is currently in ER @ [facility].

## 2024-12-09 NOTE — Clinical Note
Diagnosis: Weakness [740809]   Admit to which facility:: [facility] [[id number]]   Reason for IP Medical Treatment  (Clinical interventions that can only be accomplished in the IP setting? ) :: chf   Plans for Post-Acute care--if anticipated (pick the single best option):: A. No post acute care anticipated at this time

## 2024-12-09 NOTE — ED PROVIDER NOTES
Encounter Date: 2024    SCRIBE #1 NOTE: I, Juanjose Santana, am scribing for, and in the presence of,  Columba Arias MD. I have scribed the following portions of the note - Other sections scribed: HPI, ROS, and PE.       History     Chief Complaint   Patient presents with    Weakness     Pt endorses generalized weakness X 3 days. Denies other complaints. GCS 15 Hx CHF Arrives on 2 L (baseline)     Ev Alberts is a 89 y.o. female patient with a PMHx of CHF, dementia, HTN, and thyroid disease who presents to the Emergency Department with her daughter for evaluation of generalized weakness which onset gradually 3 weeks ago. She also reports leg swelling and difficulty urinating. Daughter states she was put on 120 m Lasix but does not believe she is fully emptying. Daughter also says she has gained about 5 lbs since swelling began. 2L O2 at home . Patient denies any CP and decreased appetite.    Nephrologist: Mateo Chávez MD. Cardiologist: Efra Vigil MD.      The history is provided by the patient and a relative. No  was used.     Review of patient's allergies indicates:   Allergen Reactions    Amlodipine-benazepril Edema     Other reaction(s): unknown    Corticosteroids (glucocorticoids) Edema and Swelling    Rofecoxib Anaphylaxis and Swelling    Sulfa (sulfonamide antibiotics) Edema    Atorvastatin      Other reaction(s): unknown    Ibuprofen      Other reaction(s): Allergy to sulfa drugs     Past Medical History:   Diagnosis Date    Acute kidney injury superimposed on chronic kidney disease     Congestive heart failure     Dementia     Hypertension     Shingles of eyelid     Sick sinus syndrome     Thyroid disease     Unspecified glaucoma      Past Surgical History:   Procedure Laterality Date    cataract surgery       SECTION      CHOLECYSTECTOMY      EGD, WITH HEMORRHAGE CONTROL Left 2024    Procedure: EGD,WITH HEMORRHAGE CONTROL;  Surgeon: Tila  Blake BYRNES MD;  Location: [facility] OR;  Service: Gastroenterology;  Laterality: Left;    HYSTERECTOMY      INSERTION OF PACEMAKER      LEFT HEART CATHETERIZATION Left 3/4/2024    Procedure: Left heart cath;  Surgeon: Mark Raymundo Jr., MD;  Location: [facility] CATH LAB;  Service: Cardiology;  Laterality: Left;    NEPHRECTOMY       No family history on file.  Social History     Tobacco Use    Smoking status: Never    Smokeless tobacco: Never   Substance Use Topics    Alcohol use: Never    Drug use: Not Currently     Review of Systems   Constitutional:  Negative for appetite change, chills and fever.        (+) generalized weakness     Respiratory:  Negative for cough and shortness of breath.    Cardiovascular:  Positive for leg swelling. Negative for chest pain.   Gastrointestinal:  Negative for abdominal pain, nausea and vomiting.   Genitourinary:  Positive for difficulty urinating.   Musculoskeletal:  Negative for myalgias.       Physical Exam     Initial Vitals [12/09/24 1114]   BP Pulse Resp Temp SpO2   (!) 140/82 82 12 98.2 °F (36.8 °C) 99 %      MAP       --         Physical Exam    Nursing note and vitals reviewed.  Constitutional: She appears well-developed and well-nourished. She is not diaphoretic. No distress.   Generalized weakness   HENT:   Head: Normocephalic and atraumatic.   Nose: Nose normal. Mouth/Throat: Oropharynx is clear and moist.   Eyes: Conjunctivae and EOM are normal. Pupils are equal, round, and reactive to light.   Neck: Trachea normal. Neck supple.   Normal range of motion.  Cardiovascular:  Normal rate, regular rhythm, normal heart sounds and intact distal pulses.           No murmur heard.  Pulmonary/Chest: No respiratory distress. She has no wheezes. She has no rhonchi. She has rales. She exhibits no tenderness.   Basal crackles.    Abdominal: Abdomen is soft. Bowel sounds are normal. She exhibits no distension and no mass. There is no abdominal tenderness. There is no rebound and no  guarding.   Musculoskeletal:         General: Edema present. No tenderness. Normal range of motion.      Cervical back: Normal range of motion and neck supple.      Lumbar back: Normal. Normal range of motion.      Right lower leg: 3+ Edema present.      Left lower leg: 3+ Edema present.     Neurological: She is alert and oriented to person, place, and time. She has normal strength. No cranial nerve deficit or sensory deficit.   Skin: Skin is warm and dry. Capillary refill takes less than 2 seconds. No abscess noted. No erythema. No pallor.   Psychiatric: She has a normal mood and affect. Her behavior is normal. Judgment and thought content normal.         ED Course   Critical Care    Date/Time: 12/9/2024 2:45 PM    Performed by: Columba Arias MD  Authorized by: Columba Arias MD  Direct patient critical care time: 30 minutes  Total critical care time (exclusive of procedural time) : 30 minutes  Critical care was necessary to treat or prevent imminent or life-threatening deterioration of the following conditions: cardiac failure, respiratory failure and renal failure.  Critical care was time spent personally by me on the following activities: development of treatment plan with patient or surrogate, discussions with consultants, evaluation of patient's response to treatment, interpretation of cardiac output measurements, examination of patient, obtaining history from patient or surrogate, ordering and review of laboratory studies, ordering and review of radiographic studies, ordering and performing treatments and interventions, pulse oximetry, re-evaluation of patient's condition and review of old charts.        Labs Reviewed   CBC W/ AUTO DIFFERENTIAL    Narrative:     The following orders were created for panel order CBC auto differential.  Procedure                               Abnormality         Status                     ---------                               -----------         ------                      CBC with Differential[[id number]]                                                        Please view results for these tests on the individual orders.   COMPREHENSIVE METABOLIC PANEL   B-TYPE NATRIURETIC PEPTIDE   MAGNESIUM   TROPONIN I   URINALYSIS, REFLEX TO URINE CULTURE   TSH   CBC WITH DIFFERENTIAL          Imaging Results              X-Ray Chest AP Portable (In process)                   X-Rays:   Independently Interpreted Readings:   Chest X-Ray: Cardiomegaly present.  Increased vascular markings consistent with CHF are present. Right pleural effusion present.     Medications - No data to display  Medical Decision Making  The differential diagnosis includes, but is not limited to, renal failure, CHF, and urinary retention.,nstemi, anemia  Cbc, cmp, trop, bnp, mag, EKG, cxr ordered and reviewed  Acut citlaly chronic renal insufficiency, chronic anemia, elevted trop likelyd emand and elevated bnp consistent with chf exacerbation that has failed outpt treatment admit for iv diuresis    Problems Addressed:  Acute on chronic renal insufficiency: acute illness or injury that poses a threat to life or bodily functions  Acute urinary retention: acute illness or injury that poses a threat to life or bodily functions  Chronic respiratory failure with hypoxia: chronic illness or injury  Troponin I above reference range: acute illness or injury that poses a threat to life or bodily functions  Weakness: acute illness or injury that poses a threat to life or bodily functions    Amount and/or Complexity of Data Reviewed  External Data Reviewed: labs and notes.     Details: Visits for chf and uti  Labs: ordered. Decision-making details documented in ED Course.  Radiology: ordered and independent interpretation performed. Decision-making details documented in ED Course.  ECG/medicine tests: ordered and independent interpretation performed. Decision-making details documented in ED Course.    Risk  OTC drugs.  Prescription  drug management.  Decision regarding hospitalization.    Critical Care  Total time providing critical care: 30 minutes            Scribe Attestation:   Scribe #1: I performed the above scribed service and the documentation accurately describes the services I performed. I attest to the accuracy of the note.  Comments: Attending:   Physician Attestation Statement for Scribe #1: Columba MILLER MD, personally performed the services described in this documentation. All medical record entries made by the scribe were at my direction and in my presence.  I have reviewed the chart and agree that the record reflects my personal performance and is accurate and complete.        Attending Attestation:           Physician Attestation for Scribe:  Physician Attestation Statement for Scribe #1: Hugo MILLER Brooke R, MD, reviewed documentation, as scribed by Juanjose Santana in my presence, and it is both accurate and complete.                                    Clinical Impression:  Final diagnoses:  [R53.1] Weakness                 Columba Arias MD  12/09/24 0413

## 2024-12-10 LAB
ALBUMIN SERPL-MCNC: 2.9 G/DL (ref 3.4–4.8)
ALBUMIN/GLOB SERPL: 0.7 RATIO (ref 1.1–2)
ALP SERPL-CCNC: 107 UNIT/L (ref 40–150)
ALT SERPL-CCNC: 9 UNIT/L (ref 0–55)
ANION GAP SERPL CALC-SCNC: 7 MEQ/L
AST SERPL-CCNC: 25 UNIT/L (ref 5–34)
BASOPHILS # BLD AUTO: 0.04 X10(3)/MCL
BASOPHILS NFR BLD AUTO: 0.9 %
BILIRUB SERPL-MCNC: 1 MG/DL
BUN SERPL-MCNC: 24.9 MG/DL (ref 9.8–20.1)
CALCIUM SERPL-MCNC: 9 MG/DL (ref 8.4–10.2)
CHLORIDE SERPL-SCNC: 99 MMOL/L (ref 98–107)
CO2 SERPL-SCNC: 30 MMOL/L (ref 23–31)
CREAT SERPL-MCNC: 1.75 MG/DL (ref 0.55–1.02)
CREAT/UREA NIT SERPL: 14
EOSINOPHIL # BLD AUTO: 0.12 X10(3)/MCL (ref 0–0.9)
EOSINOPHIL NFR BLD AUTO: 2.7 %
ERYTHROCYTE [DISTWIDTH] IN BLOOD BY AUTOMATED COUNT: 18.9 % (ref 11.5–17)
FERRITIN SERPL-MCNC: 246.81 NG/ML (ref 4.63–204)
GFR SERPLBLD CREATININE-BSD FMLA CKD-EPI: 28 ML/MIN/1.73/M2
GLOBULIN SER-MCNC: 3.9 GM/DL (ref 2.4–3.5)
GLUCOSE SERPL-MCNC: 83 MG/DL (ref 82–115)
HCT VFR BLD AUTO: 24.4 % (ref 37–47)
HGB BLD-MCNC: 8.1 G/DL (ref 12–16)
IMM GRANULOCYTES # BLD AUTO: 0.01 X10(3)/MCL (ref 0–0.04)
IMM GRANULOCYTES NFR BLD AUTO: 0.2 %
IRON SATN MFR SERPL: 22 % (ref 20–50)
IRON SERPL-MCNC: 37 UG/DL (ref 50–170)
LYMPHOCYTES # BLD AUTO: 1.88 X10(3)/MCL (ref 0.6–4.6)
LYMPHOCYTES NFR BLD AUTO: 42 %
MCH RBC QN AUTO: 31.8 PG (ref 27–31)
MCHC RBC AUTO-ENTMCNC: 33.2 G/DL (ref 33–36)
MCV RBC AUTO: 95.7 FL (ref 80–94)
MONOCYTES # BLD AUTO: 0.56 X10(3)/MCL (ref 0.1–1.3)
MONOCYTES NFR BLD AUTO: 12.5 %
NEUTROPHILS # BLD AUTO: 1.87 X10(3)/MCL (ref 2.1–9.2)
NEUTROPHILS NFR BLD AUTO: 41.7 %
NRBC BLD AUTO-RTO: 0 %
PLATELET # BLD AUTO: 93 X10(3)/MCL (ref 130–400)
PMV BLD AUTO: 9.8 FL (ref 7.4–10.4)
POTASSIUM SERPL-SCNC: 3.4 MMOL/L (ref 3.5–5.1)
PROT SERPL-MCNC: 6.8 GM/DL (ref 5.8–7.6)
RBC # BLD AUTO: 2.55 X10(6)/MCL (ref 4.2–5.4)
SODIUM SERPL-SCNC: 136 MMOL/L (ref 136–145)
TIBC SERPL-MCNC: 128 UG/DL (ref 70–310)
TIBC SERPL-MCNC: 165 UG/DL (ref 250–450)
TRANSFERRIN SERPL-MCNC: 148 MG/DL
TROPONIN I SERPL-MCNC: 0.05 NG/ML (ref 0–0.04)
TROPONIN I SERPL-MCNC: 0.07 NG/ML (ref 0–0.04)
VIT B12 SERPL-MCNC: 1015 PG/ML (ref 213–816)
WBC # BLD AUTO: 4.48 X10(3)/MCL (ref 4.5–11.5)

## 2024-12-10 PROCEDURE — 27000221 HC OXYGEN, UP TO 24 HOURS

## 2024-12-10 PROCEDURE — 84484 ASSAY OF TROPONIN QUANT: CPT | Performed by: PHYSICIAN ASSISTANT

## 2024-12-10 PROCEDURE — 94760 N-INVAS EAR/PLS OXIMETRY 1: CPT

## 2024-12-10 PROCEDURE — 25000003 PHARM REV CODE 250: Performed by: PHYSICIAN ASSISTANT

## 2024-12-10 PROCEDURE — 82607 VITAMIN B-12: CPT | Performed by: INTERNAL MEDICINE

## 2024-12-10 PROCEDURE — 21400001 HC TELEMETRY ROOM

## 2024-12-10 PROCEDURE — 25000003 PHARM REV CODE 250: Performed by: INTERNAL MEDICINE

## 2024-12-10 PROCEDURE — 63600175 PHARM REV CODE 636 W HCPCS: Performed by: PHYSICIAN ASSISTANT

## 2024-12-10 PROCEDURE — 82728 ASSAY OF FERRITIN: CPT | Performed by: INTERNAL MEDICINE

## 2024-12-10 PROCEDURE — 11000001 HC ACUTE MED/SURG PRIVATE ROOM

## 2024-12-10 PROCEDURE — 80053 COMPREHEN METABOLIC PANEL: CPT | Performed by: PHYSICIAN ASSISTANT

## 2024-12-10 PROCEDURE — 36415 COLL VENOUS BLD VENIPUNCTURE: CPT | Performed by: PHYSICIAN ASSISTANT

## 2024-12-10 PROCEDURE — 63600175 PHARM REV CODE 636 W HCPCS: Mod: JZ,EC | Performed by: INTERNAL MEDICINE

## 2024-12-10 PROCEDURE — 83540 ASSAY OF IRON: CPT | Performed by: INTERNAL MEDICINE

## 2024-12-10 PROCEDURE — 85025 COMPLETE CBC W/AUTO DIFF WBC: CPT | Performed by: PHYSICIAN ASSISTANT

## 2024-12-10 RX ORDER — LATANOPROST 50 UG/ML
1 SOLUTION/ DROPS OPHTHALMIC NIGHTLY
Status: DISCONTINUED | OUTPATIENT
Start: 2024-12-10 | End: 2024-12-24 | Stop reason: HOSPADM

## 2024-12-10 RX ORDER — RIVASTIGMINE TARTRATE 1.5 MG/1
1.5 CAPSULE ORAL 2 TIMES DAILY
Status: DISCONTINUED | OUTPATIENT
Start: 2024-12-10 | End: 2024-12-24 | Stop reason: HOSPADM

## 2024-12-10 RX ORDER — GLYCOPYRROLATE 1 MG/1
1 TABLET ORAL 2 TIMES DAILY PRN
Status: DISCONTINUED | OUTPATIENT
Start: 2024-12-10 | End: 2024-12-24 | Stop reason: HOSPADM

## 2024-12-10 RX ORDER — POTASSIUM CHLORIDE 20 MEQ/1
40 TABLET, EXTENDED RELEASE ORAL ONCE
Status: COMPLETED | OUTPATIENT
Start: 2024-12-10 | End: 2024-12-10

## 2024-12-10 RX ORDER — LANOLIN ALCOHOL/MO/W.PET/CERES
1 CREAM (GRAM) TOPICAL 2 TIMES DAILY
Status: DISCONTINUED | OUTPATIENT
Start: 2024-12-10 | End: 2024-12-16

## 2024-12-10 RX ORDER — ALLOPURINOL 100 MG/1
200 TABLET ORAL DAILY
Status: DISCONTINUED | OUTPATIENT
Start: 2024-12-11 | End: 2024-12-24 | Stop reason: HOSPADM

## 2024-12-10 RX ORDER — OXYMETAZOLINE HCL 0.05 %
2 SPRAY, NON-AEROSOL (ML) NASAL 2 TIMES DAILY
Status: COMPLETED | OUTPATIENT
Start: 2024-12-10 | End: 2024-12-12

## 2024-12-10 RX ORDER — GABAPENTIN 100 MG/1
100 CAPSULE ORAL 2 TIMES DAILY
Status: DISCONTINUED | OUTPATIENT
Start: 2024-12-10 | End: 2024-12-24 | Stop reason: HOSPADM

## 2024-12-10 RX ORDER — ATORVASTATIN CALCIUM 10 MG/1
10 TABLET, FILM COATED ORAL NIGHTLY
Status: DISCONTINUED | OUTPATIENT
Start: 2024-12-10 | End: 2024-12-24 | Stop reason: HOSPADM

## 2024-12-10 RX ORDER — RISPERIDONE 1 MG/1
2 TABLET ORAL NIGHTLY
Status: DISCONTINUED | OUTPATIENT
Start: 2024-12-11 | End: 2024-12-11

## 2024-12-10 RX ADMIN — CARVEDILOL 3.12 MG: 3.12 TABLET, FILM COATED ORAL at 09:12

## 2024-12-10 RX ADMIN — ATORVASTATIN CALCIUM 10 MG: 10 TABLET, FILM COATED ORAL at 09:12

## 2024-12-10 RX ADMIN — MUPIROCIN: 20 OINTMENT TOPICAL at 09:12

## 2024-12-10 RX ADMIN — FERROUS SULFATE TAB 325 MG (65 MG ELEMENTAL FE) 1 EACH: 325 (65 FE) TAB at 09:12

## 2024-12-10 RX ADMIN — LEVOTHYROXINE SODIUM 25 MCG: 25 TABLET ORAL at 06:12

## 2024-12-10 RX ADMIN — PANTOPRAZOLE SODIUM 40 MG: 40 TABLET, DELAYED RELEASE ORAL at 09:12

## 2024-12-10 RX ADMIN — ACETAMINOPHEN 325MG 650 MG: 325 TABLET ORAL at 09:12

## 2024-12-10 RX ADMIN — POTASSIUM CHLORIDE 40 MEQ: 1500 TABLET, EXTENDED RELEASE ORAL at 11:12

## 2024-12-10 RX ADMIN — APIXABAN 2.5 MG: 2.5 TABLET, FILM COATED ORAL at 09:12

## 2024-12-10 RX ADMIN — GABAPENTIN 100 MG: 100 CAPSULE ORAL at 09:12

## 2024-12-10 RX ADMIN — LATANOPROST 1 DROP: 50 SOLUTION OPHTHALMIC at 09:12

## 2024-12-10 RX ADMIN — OXYMETAZOLINE HYDROCHLORIDE 2 SPRAY: 0.5 SPRAY NASAL at 06:12

## 2024-12-10 RX ADMIN — FUROSEMIDE 60 MG: 10 INJECTION, SOLUTION INTRAMUSCULAR; INTRAVENOUS at 08:12

## 2024-12-10 RX ADMIN — GABAPENTIN 200 MG: 100 CAPSULE ORAL at 09:12

## 2024-12-10 RX ADMIN — EPOETIN ALFA-EPBX 40000 UNITS: 40000 INJECTION, SOLUTION INTRAVENOUS; SUBCUTANEOUS at 06:12

## 2024-12-10 RX ADMIN — FUROSEMIDE 60 MG: 10 INJECTION, SOLUTION INTRAMUSCULAR; INTRAVENOUS at 09:12

## 2024-12-10 RX ADMIN — RIVASTIGMINE TARTRATE 1.5 MG: 1.5 CAPSULE ORAL at 09:12

## 2024-12-10 RX ADMIN — FERROUS SULFATE TAB 325 MG (65 MG ELEMENTAL FE) 1 EACH: 325 (65 FE) TAB at 11:12

## 2024-12-10 NOTE — CONSULTS
Nephrology  Consults  Chief Complaint   Patient presents with    Weakness     Pt endorses generalized weakness X 3 days. Denies other complaints. GCS 15 Hx CHF Arrives on 2 L (baseline)     HPI:  A 72-year-old female, consulted for MICHEL CKD and edema (known Dr. Chávez baseline creatinine 1.5).  Presented to emergency department on 2024 with weakness past 3 weeks with increased lower extremity edema, abdominal distention and difficulty urinating.  She takes Lasix 80 p.o. q.a.m. and 40 p.o. q.p.m..  ED workup showed creatinine 1.9, Grey catheter was placed for urinary retention and was given Lasix 60 IV x1 with significant diuresis.     Review of Systems   All other systems negative except for HPI      Past Medical History:   Diagnosis Date    Acute kidney injury superimposed on chronic kidney disease     Congestive heart failure     Dementia     Hypertension     Shingles of eyelid     Sick sinus syndrome     Thyroid disease     Unspecified glaucoma         Past Surgical History:   Procedure Laterality Date    cataract surgery       SECTION      CHOLECYSTECTOMY      EGD, WITH HEMORRHAGE CONTROL Left 2024    Procedure: EGD,WITH HEMORRHAGE CONTROL;  Surgeon: Blake Adorno MD;  Location: [facility] OR;  Service: Gastroenterology;  Laterality: Left;    HYSTERECTOMY      INSERTION OF PACEMAKER      LEFT HEART CATHETERIZATION Left 3/4/2024    Procedure: Left heart cath;  Surgeon: Mark Raymundo Jr., MD;  Location: [facility] CATH LAB;  Service: Cardiology;  Laterality: Left;    NEPHRECTOMY        No family history on file.     Social History     Socioeconomic History    Marital status:    Tobacco Use    Smoking status: Never    Smokeless tobacco: Never   Substance and Sexual Activity    Alcohol use: Never    Drug use: Not Currently     Social Drivers of Health     Financial Resource Strain: Low Risk  (2024)    Overall Financial Resource Strain (CARDIA)     Difficulty of Paying Living Expenses:  Not hard at all   Food Insecurity: No Food Insecurity (4/5/2024)    Hunger Vital Sign     Worried About Running Out of Food in the Last Year: Never true     Ran Out of Food in the Last Year: Never true   Transportation Needs: Unmet Transportation Needs (4/5/2024)    PRAPARE - Transportation     Lack of Transportation (Medical): No     Lack of Transportation (Non-Medical): Yes   Physical Activity: Inactive (2/26/2024)    Exercise Vital Sign     Days of Exercise per Week: 0 days     Minutes of Exercise per Session: 0 min   Stress: No Stress Concern Present (4/5/2024)    [employer] [place] [employer] - Occupational Stress Questionnaire     Feeling of Stress : Not at all   Housing Stability: Low Risk  (4/5/2024)    Housing Stability Vital Sign     Unable to Pay for Housing in the Last Year: No     Number of Places Lived in the Last Year: 1     Unstable Housing in the Last Year: No   Recent Concern: Housing Stability - High Risk (2/27/2024)    Housing Stability Vital Sign     Unable to Pay for Housing in the Last Year: No     Number of Places Lived in the Last Year: 1     Unstable Housing in the Last Year: Yes        Review of patient's allergies indicates:   Allergen Reactions    Amlodipine-benazepril Edema     Other reaction(s): unknown    Corticosteroids (glucocorticoids) Edema and Swelling    Rofecoxib Anaphylaxis and Swelling    Sulfa (sulfonamide antibiotics) Edema    Atorvastatin      Other reaction(s): unknown    Ibuprofen      Other reaction(s): Allergy to sulfa drugs     Current Outpatient Medications   Medication Instructions    allopurinoL (ZYLOPRIM) 300 mg, Daily    aspirin (ECOTRIN) 81 mg, Oral, Daily    atorvastatin (LIPITOR) 10 mg, Nightly    brimonidine-timoloL (COMBIGAN) 0.2-0.5 % Drop 1 drop, 2 times daily    carvediloL (COREG) 3.125 mg, 2 times daily    cetirizine (ZYRTEC) 10 mg, Daily    CYCLOBENZAPRINE 2% GABAPENTIN 6% LIDOCAINE 2% PRILOCAINE 2% TOPICAL CREAM 1 pump TID as needed for  scalp itching    docusate sodium (COLACE) 200 mg, Oral, 2 times daily PRN    ELIQUIS 2.5 mg, 2 times daily    furosemide (LASIX) 40 mg, Oral, Daily    gabapentin (NEURONTIN) 200 mg, Oral, 2 times daily    glycopyrrolate (ROBINUL) 1 mg, Oral, 2 times daily PRN    INV albuterol 90 mcg, As needed (PRN)    latanoprost 0.005 % ophthalmic solution 1 drop, Nightly    levothyroxine (SYNTHROID) 25 mcg, Before breakfast    lubiprostone (AMITIZA) 24 mcg, Oral, 2 times daily with meals    miconazole NITRATE 2 % (MICOTIN) 2 % top powder Topical (Top), 2 times daily    mirabegron (MYRBETRIQ) 50 mg, Every morning    nitroGLYCERIN (NITROSTAT) 0.4 MG SL tablet SMARTSI Tablet(s) Sublingual    pantoprazole (PROTONIX) 40 mg, Oral, 2 times daily    potassium chloride (KLOR-CON) 20 mEq Pack 20 mEq, Once    risperiDONE (RISPERDAL) 2 mg, Nightly    rivastigmine tartrate (EXELON) 1.5 mg, 2 times daily    traMADoL (ULTRAM) 50 mg, Every 8 hours PRN    traZODone (DESYREL) 100 mg, Nightly     Objective   VITAL SIGNS: 24 HR MIN & MAX LAST    Temp  Min: 97.8 °F (36.6 °C)  Max: 98.3 °F (36.8 °C)  98.3 °F (36.8 °C)        BP  Min: 100/55  Max: 149/88  (!) 117/59     Pulse  Min: 60  Max: 100  100     Resp  Min: 12  Max: 18  18    SpO2  Min: 96 %  Max: 100 %  100 %      Wt Readings from Last 3 Encounters:   12/10/24 68 kg (149 lb 14.6 oz)   24 72.6 kg (160 lb)   10/23/24 68.9 kg (152 lb)       Intake/Output Summary (Last 24 hours) at 12/10/2024 1002  Last data filed at 12/10/2024 0519  Gross per 24 hour   Intake --   Output 3650 ml   Net -3650 ml     General:  Alert and oriented, slow mentation, parkinsonian look, fine tremor  Psychiatric:  Cooperative, Appropriate mood & affect.    Eye:  Within normal limits, Normal conjunctiva.    HENT:  Normocephalic, Oral mucosa is moist.    Respiratory: Bilaterally CTA, un-labored.    Cardiovascular:  Normal rate, Regular rhythm, soft murmur, 3+ edema.    Gastrointestinal:  Soft, Normal bowel sounds.     Musculoskeletal:  Normal strength, No deformity.    Integumentary:  Warm, No rash.    Recent Labs     12/09/24  1250 12/10/24  0456    136   K 3.7 3.4*   CO2 32* 30   BUN 26.3* 24.9*   CREATININE 1.91* 1.75*   GLUCOSE 92 83   CALCIUM 9.5 9.0   MG 2.60  --    ALBUMIN 3.5 2.9*      Recent Labs     12/09/24  1250 12/10/24  0456   WBC 5.32 4.48*   HGB 9.9* 8.1*   * 93*     X-Ray Chest AP Portable   Final Result      Right small pleural effusion and basilar atelectasis, unchanged.         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  Continue Lasix IV 60 b.i.d. and replacing potassium accordingly and change back to PO once euvolemic.  Remove Grey and do voiding trial once no longer receiving aggressive diuresis.    Mildly iron deficient, start PO Fe BID  Check folate level  Give EPO 40 K x1 today (gets outpt EPO monthly)  Monitor WBC and Plts            This is a medical document written in a [id number] manner with standard medical terminology and conventions. It is a communication tool for medical professional. It is not intended for the lay public. Any inference of insensitivity or offence is solely a product with the reader's imagination. This is a document written devoid of emotion, as it is a medical document and it's only purpose is to convey information to medical professionals.

## 2024-12-10 NOTE — PLAN OF CARE
12/10/24 1103   Discharge Assessment   Assessment Type Discharge Planning Assessment   Confirmed/corrected address, phone number and insurance Yes   Confirmed Demographics Correct on Facesheet   Source of Information family   If unable to respond/provide information was family/caregiver contacted? Yes   Contact Name/Number Mellissa Alberts (Daughter)  [phone] (   When was your last doctors appointment?   (last month)   Communicated LUISITO with patient/caregiver Date not available/Unable to determine   Reason For Admission Weakness, resp failure, renal insufficiency   People in Home child(janice), adult;spouse   Do you expect to return to your current living situation? Yes   Do you have help at home or someone to help you manage your care at home? Yes   Who are your caregiver(s) and their phone number(s)? sitters   Prior to hospitilization cognitive status: Unable to Assess   Current cognitive status: Unable to Assess   Walking or Climbing Stairs Difficulty yes   Walking or Climbing Stairs ambulation difficulty, requires equipment   Mobility Management wc walker   Dressing/Bathing Difficulty yes   Dressing/Bathing bathing difficulty, assistance 1 person;dressing difficulty, assistance 1 person;bathing difficulty, requires equipment   Dressing/Bathing Management sc, staff   Equipment Currently Used at Home wheelchair;walker, rolling;bedside commode;shower chair;raised toilet;oxygen   Readmission within 30 days? No   Patient currently being followed by outpatient case management? No   Do you currently have service(s) that help you manage your care at home? Yes   Name and Contact number of agency Active Personal Care sitters   Is the pt/caregiver preference to resume services with current agency Yes   Do you take prescription medications? Yes   Do you have prescription coverage? Yes   Coverage [facility]   Who is going to help you get home at discharge? tbd   How do you get to doctors appointments? family or friend will  provide;agency   Are you on dialysis? No   Do you take coumadin? No   Discharge Plan A Home with family;Home Health  (sitters)   Discharge Plan B Home with family   DME Needed Upon Discharge  other (see comments)  (tbd)   Discharge Plan discussed with: Adult children   Transition of Care Barriers None   Physical Activity   On average, how many days per week do you engage in moderate to strenuous exercise (like a brisk walk)? 0 days   On average, how many minutes do you engage in exercise at this level? 0 min   Financial Resource Strain   How hard is it for you to pay for the very basics like food, housing, medical care, and heating? Not hard   Housing Stability   In the last 12 months, was there a time when you were not able to pay the mortgage or rent on time? N   At any time in the past 12 months, were you homeless or living in a shelter (including now)? N   Transportation Needs   Has the lack of transportation kept you from medical appointments, meetings, work or from getting things needed for daily living? No   Food Insecurity   Within the past 12 months, you worried that your food would run out before you got the money to buy more. Never true   Within the past 12 months, the food you bought just didn't last and you didn't have money to get more. Never true   Stress   Do you feel stress - tense, restless, nervous, or anxious, or unable to sleep at night because your mind is troubled all the time - these days? Pt Unable   Social Isolation   How often do you feel lonely or isolated from those around you?  Patient unable to answer   Utilities   In the past 12 months has the electric, gas, oil, or water company threatened to shut off services in your home? No   Health Literacy   How often do you need to have someone help you when you read instructions, pamphlets, or other written material from your doctor or pharmacy? Always   OTHER   Name(s) of People in Home [place] Alberts/adult son

## 2024-12-10 NOTE — PROGRESS NOTES
[facility] Hospital Medicine Progress Note        Chief Complaint: Inpatient Follow-up for weakness    HPI:   89 y.o. female with a PMHx  of hypertension, PAF on Eliquis, DVT, chronic diastolic heart failure LVEF 50-55%, GII DD, sick sinus syndrome, CKD stage III with solitary kidney, chronic hypoxemic respiratory failure on 2 L home oxygen, hypothyroidism, and glaucoma who presented to [facility] on 12/9/2024 with c/o generalized weakness.  Patient reported worsening weakness over the past 3 weeks associated with  lower extremity swelling, abdominal distention, and difficulty urinating.  Patient also endorsed 5 lb weight gain.  Patient reported compliance with home Lasix- 80 mg q.a.m. and 40 mg in the afternoon.  Daughter reports she is followed by nephrologist Dr. Chávez.  Patient denied fever, rectal bleeding, melena, hematuria, dysuria, and chest pain.     Initial vital signs in ED were /82, pulse 82, respirations 12, temperature 36.8° C, and SpO2 99% on 2 L nasal cannula.  Labs revealed WBC 5.32,  hemoglobin 9.9, platelets 110, sodium 97, CO2 32, BUN 26.3, creatinine 1.91 baseline 1.5, .2, and troponin 0.055.  Chest x-ray revealed right small pleural effusion and basilar atelectasis.  Patient was given IV Lasix 60 mg and aspirin 325 mg tablet in ED. Grey catheter was placed in ED. Patient was admitted to hospital medicine service for further medical management. Nephrology was consulted to assist.     Interval Hx:   Pt was resting comfortably. Chromically ill looking. Family at bedside. Reports still weak in general.   Afebrile. Hemodynamics are stable, O2 at baseline   Labs today WBC 4.4, Hgb down to 8.1 without evidence of overt bleed, Plt 93, K 3.4, Cr 1.7, Troponin 0.053    Case was discussed with patient's nurse and  on the floor.    Objective/physical exam:  General: In no acute distress, afebrile, on NC  Chest: Decreased breath sounds due to poor  inspiratory effort   Heart: RRR, +S1, S2, no appreciable murmur  Abdomen: Soft, nontender, BS +  - Grey in place   MSK: Warm, 1+ lower extremity edema, no clubbing or cyanosis  Neurologic: Awake , oriented  to self and person    VITAL SIGNS: 24 HRS MIN & MAX LAST   Temp  Min: 97.8 °F (36.6 °C)  Max: 98.3 °F (36.8 °C) 98.1 °F (36.7 °C)   BP  Min: 100/53  Max: 127/67 114/67   Pulse  Min: 60  Max: 100  60   Resp  Min: 13  Max: 18 16   SpO2  Min: 93 %  Max: 100 % (!) 93 %     I have reviewed the following labs:  Recent Labs   Lab 12/09/24  1250 12/10/24  0456   WBC 5.32 4.48*   RBC 3.06* 2.55*   HGB 9.9* 8.1*   HCT 29.1* 24.4*   MCV 95.1* 95.7*   MCH 32.4* 31.8*   MCHC 34.0 33.2   RDW 19.2* 18.9*   * 93*   MPV 11.8* 9.8     Recent Labs   Lab 12/09/24  1250 12/10/24  0456    136   K 3.7 3.4*   CL 97* 99   CO2 32* 30   BUN 26.3* 24.9*   CREATININE 1.91* 1.75*   CALCIUM 9.5 9.0   MG 2.60  --    ALBUMIN 3.5 2.9*   ALKPHOS 138 107   ALT 11 9   AST 32 25   BILITOT 1.4 1.0     Microbiology Results (last 7 days)       ** No results found for the last 168 hours. **             See below for Radiology    Assessment/Plan:  Acute on chronic diastolic HF, LVEF 50-55%, GIIDD, POA  Acute kidney injury on CKD IIIb- Cardiorenal and Urinary retention   Urinary retention , s/p Grey insertion, POA  New onset Pancytopenia with moderate thrombocytopenia, POA  Acute on chronic anemia - mild to mod without evidence of overt bleed   Hypokalemia , POA  Elevated Troponin/ NSTEMI type 2 in the setting of HF exacerbation, POA  Chronic hypoxic resp failure on home O2- stable     Hx- hypertension, PAF on Eliquis, DVT,, sick sinus syndrome,  solitary kidney, chronic hypoxemic respiratory failure on 2 L home oxygen, hypothyroidism, and glaucoma    Plan-   Continue IV Furosemide 60 mg bid  Monitor renal parameter , UOP and renal recovery   Continue Grey catheter foe now   Replete potassium   Monitor H&H and sings of active bleeding    Transfuse blood for Hgb 8 or under given underlying cardiac disease   Nephro has given EPO 40 K x1 today   Monitor blood cell counts   Home meds are reviewed and resumed as appropriate     Critical care note:  Critical care diagnosis: HF requiring IV lasix   Critical care interventions: Hands-on evaluation, review of labs/radiographs/records and discussion with patient and family if present  Critical care time spent: 35 minutes          VTE prophylaxis: Eliquis     Patient condition:  Fair    Anticipated discharge and Disposition:     TBD    All diagnosis and differential diagnosis have been reviewed; assessment and plan has been documented; I have personally reviewed the labs and test results that are presently available; I have reviewed the patients medication list; I have reviewed the consulting providers response and recommendations. I have reviewed or attempted to review medical records based upon their availability    All of the patient's questions have been  addressed and answered. Patient's is agreeable to the above stated plan. I will continue to monitor closely and make adjustments to medical management as needed.    Portions of this note dictated using EMR integrated voice recognition software, and may be subject to voice recognition errors not corrected at proofreading. Please contact writer for clarification if needed.   _____________________________________________________________________    Malnutrition Status:  Nutrition consulted. Most recent weight and BMI monitored-     Measurements:  Wt Readings from Last 1 Encounters:   12/10/24 68 kg (149 lb 14.6 oz)   Body mass index is 24.95 kg/m².    Patient has been screened and assessed by RD.    Malnutrition Type:  Context:    Level:      Malnutrition Characteristic Summary:       Interventions/Recommendations (treatment strategy):        Scheduled Med:   apixaban  2.5 mg Oral BID    carvediloL  3.125 mg Oral BID    epoetin scooter-epbx  40,000 Units  Subcutaneous Once    ferrous sulfate  1 tablet Oral BID    furosemide (LASIX) injection  60 mg Intravenous Q12H    gabapentin  200 mg Oral BID    levothyroxine  25 mcg Oral Before breakfast    mupirocin   Nasal BID    pantoprazole  40 mg Oral BID      Continuous Infusions:     PRN Meds:    Current Facility-Administered Medications:     acetaminophen, 650 mg, Oral, Q8H PRN    acetaminophen, 650 mg, Oral, Q4H PRN    ALPRAZolam, 0.25 mg, Oral, TID PRN    dextrose 10%, 12.5 g, Intravenous, PRN    dextrose 10%, 25 g, Intravenous, PRN    glucagon (human recombinant), 1 mg, Intramuscular, PRN    glucose, 16 g, Oral, PRN    glucose, 24 g, Oral, PRN    hydrOXYzine pamoate, 25 mg, Oral, Q8H PRN    ondansetron, 4 mg, Intravenous, Q8H PRN         Ally Erazo MD  Department of Hospital Medicine   [facility]   12/10/2024

## 2024-12-11 LAB
ALBUMIN SERPL-MCNC: 2.9 G/DL (ref 3.4–4.8)
BASOPHILS # BLD AUTO: 0.03 X10(3)/MCL
BASOPHILS NFR BLD AUTO: 0.6 %
BUN SERPL-MCNC: 27.5 MG/DL (ref 9.8–20.1)
CALCIUM SERPL-MCNC: 9 MG/DL (ref 8.4–10.2)
CHLORIDE SERPL-SCNC: 99 MMOL/L (ref 98–107)
CO2 SERPL-SCNC: 30 MMOL/L (ref 23–31)
CREAT SERPL-MCNC: 1.7 MG/DL (ref 0.55–1.02)
EOSINOPHIL # BLD AUTO: 0.1 X10(3)/MCL (ref 0–0.9)
EOSINOPHIL NFR BLD AUTO: 1.8 %
ERYTHROCYTE [DISTWIDTH] IN BLOOD BY AUTOMATED COUNT: 19.1 % (ref 11.5–17)
FOLATE SERPL-MCNC: 18.8 NG/ML (ref 7–31.4)
GFR SERPLBLD CREATININE-BSD FMLA CKD-EPI: 29 ML/MIN/1.73/M2
GLUCOSE SERPL-MCNC: 106 MG/DL (ref 82–115)
HCT VFR BLD AUTO: 26.1 % (ref 37–47)
HGB BLD-MCNC: 8.6 G/DL (ref 12–16)
IMM GRANULOCYTES # BLD AUTO: 0.01 X10(3)/MCL (ref 0–0.04)
IMM GRANULOCYTES NFR BLD AUTO: 0.2 %
LYMPHOCYTES # BLD AUTO: 2.17 X10(3)/MCL (ref 0.6–4.6)
LYMPHOCYTES NFR BLD AUTO: 39.8 %
MAGNESIUM SERPL-MCNC: 2.4 MG/DL (ref 1.6–2.6)
MCH RBC QN AUTO: 31.7 PG (ref 27–31)
MCHC RBC AUTO-ENTMCNC: 33 G/DL (ref 33–36)
MCV RBC AUTO: 96.3 FL (ref 80–94)
MONOCYTES # BLD AUTO: 0.68 X10(3)/MCL (ref 0.1–1.3)
MONOCYTES NFR BLD AUTO: 12.5 %
NEUTROPHILS # BLD AUTO: 2.46 X10(3)/MCL (ref 2.1–9.2)
NEUTROPHILS NFR BLD AUTO: 45.1 %
NRBC BLD AUTO-RTO: 0 %
PHOSPHATE SERPL-MCNC: 3.1 MG/DL (ref 2.3–4.7)
PLATELET # BLD AUTO: 83 X10(3)/MCL (ref 130–400)
PMV BLD AUTO: 10.2 FL (ref 7.4–10.4)
POTASSIUM SERPL-SCNC: 4.4 MMOL/L (ref 3.5–5.1)
RBC # BLD AUTO: 2.71 X10(6)/MCL (ref 4.2–5.4)
SODIUM SERPL-SCNC: 135 MMOL/L (ref 136–145)
WBC # BLD AUTO: 5.45 X10(3)/MCL (ref 4.5–11.5)

## 2024-12-11 PROCEDURE — 27000221 HC OXYGEN, UP TO 24 HOURS

## 2024-12-11 PROCEDURE — 25000003 PHARM REV CODE 250: Performed by: INTERNAL MEDICINE

## 2024-12-11 PROCEDURE — 97166 OT EVAL MOD COMPLEX 45 MIN: CPT

## 2024-12-11 PROCEDURE — 25000003 PHARM REV CODE 250: Performed by: PHYSICIAN ASSISTANT

## 2024-12-11 PROCEDURE — 94760 N-INVAS EAR/PLS OXIMETRY 1: CPT

## 2024-12-11 PROCEDURE — 99900035 HC TECH TIME PER 15 MIN (STAT)

## 2024-12-11 PROCEDURE — 80069 RENAL FUNCTION PANEL: CPT | Performed by: INTERNAL MEDICINE

## 2024-12-11 PROCEDURE — 83735 ASSAY OF MAGNESIUM: CPT | Performed by: INTERNAL MEDICINE

## 2024-12-11 PROCEDURE — 63600175 PHARM REV CODE 636 W HCPCS: Performed by: INTERNAL MEDICINE

## 2024-12-11 PROCEDURE — 99900031 HC PATIENT EDUCATION (STAT)

## 2024-12-11 PROCEDURE — 21400001 HC TELEMETRY ROOM

## 2024-12-11 PROCEDURE — 63600175 PHARM REV CODE 636 W HCPCS: Performed by: PHYSICIAN ASSISTANT

## 2024-12-11 PROCEDURE — 82746 ASSAY OF FOLIC ACID SERUM: CPT | Performed by: INTERNAL MEDICINE

## 2024-12-11 PROCEDURE — 36415 COLL VENOUS BLD VENIPUNCTURE: CPT | Performed by: INTERNAL MEDICINE

## 2024-12-11 PROCEDURE — 85025 COMPLETE CBC W/AUTO DIFF WBC: CPT | Performed by: INTERNAL MEDICINE

## 2024-12-11 RX ORDER — FUROSEMIDE 40 MG/1
40 TABLET ORAL 2 TIMES DAILY
Status: DISCONTINUED | OUTPATIENT
Start: 2024-12-12 | End: 2024-12-13

## 2024-12-11 RX ORDER — MICONAZOLE NITRATE 2 G/100G
POWDER TOPICAL 2 TIMES DAILY
Status: DISCONTINUED | OUTPATIENT
Start: 2024-12-11 | End: 2024-12-24 | Stop reason: HOSPADM

## 2024-12-11 RX ORDER — RISPERIDONE 1 MG/1
1 TABLET ORAL NIGHTLY
Status: DISCONTINUED | OUTPATIENT
Start: 2024-12-11 | End: 2024-12-24 | Stop reason: HOSPADM

## 2024-12-11 RX ADMIN — MICONAZOLE NITRATE: 20 POWDER TOPICAL at 08:12

## 2024-12-11 RX ADMIN — MUPIROCIN: 20 OINTMENT TOPICAL at 08:12

## 2024-12-11 RX ADMIN — MUPIROCIN: 20 OINTMENT TOPICAL at 09:12

## 2024-12-11 RX ADMIN — FERROUS SULFATE TAB 325 MG (65 MG ELEMENTAL FE) 1 EACH: 325 (65 FE) TAB at 08:12

## 2024-12-11 RX ADMIN — RIVASTIGMINE TARTRATE 1.5 MG: 1.5 CAPSULE ORAL at 08:12

## 2024-12-11 RX ADMIN — FUROSEMIDE 60 MG: 10 INJECTION, SOLUTION INTRAMUSCULAR; INTRAVENOUS at 09:12

## 2024-12-11 RX ADMIN — ATORVASTATIN CALCIUM 10 MG: 10 TABLET, FILM COATED ORAL at 08:12

## 2024-12-11 RX ADMIN — GABAPENTIN 100 MG: 100 CAPSULE ORAL at 09:12

## 2024-12-11 RX ADMIN — ALLOPURINOL 200 MG: 100 TABLET ORAL at 09:12

## 2024-12-11 RX ADMIN — CARVEDILOL 3.12 MG: 3.12 TABLET, FILM COATED ORAL at 08:12

## 2024-12-11 RX ADMIN — APIXABAN 2.5 MG: 2.5 TABLET, FILM COATED ORAL at 08:12

## 2024-12-11 RX ADMIN — PANTOPRAZOLE SODIUM 40 MG: 40 TABLET, DELAYED RELEASE ORAL at 08:12

## 2024-12-11 RX ADMIN — FERROUS SULFATE TAB 325 MG (65 MG ELEMENTAL FE) 1 EACH: 325 (65 FE) TAB at 09:12

## 2024-12-11 RX ADMIN — OXYMETAZOLINE HYDROCHLORIDE 2 SPRAY: 0.5 SPRAY NASAL at 08:12

## 2024-12-11 RX ADMIN — PANTOPRAZOLE SODIUM 40 MG: 40 TABLET, DELAYED RELEASE ORAL at 09:12

## 2024-12-11 RX ADMIN — FUROSEMIDE 60 MG: 10 INJECTION, SOLUTION INTRAMUSCULAR; INTRAVENOUS at 08:12

## 2024-12-11 RX ADMIN — MICONAZOLE NITRATE: 20 POWDER TOPICAL at 09:12

## 2024-12-11 RX ADMIN — LEVOTHYROXINE SODIUM 25 MCG: 25 TABLET ORAL at 06:12

## 2024-12-11 RX ADMIN — CARVEDILOL 3.12 MG: 3.12 TABLET, FILM COATED ORAL at 09:12

## 2024-12-11 RX ADMIN — APIXABAN 2.5 MG: 2.5 TABLET, FILM COATED ORAL at 09:12

## 2024-12-11 RX ADMIN — LATANOPROST 1 DROP: 50 SOLUTION OPHTHALMIC at 08:12

## 2024-12-11 RX ADMIN — RIVASTIGMINE TARTRATE 1.5 MG: 1.5 CAPSULE ORAL at 09:12

## 2024-12-11 RX ADMIN — OXYMETAZOLINE HYDROCHLORIDE 2 SPRAY: 0.5 SPRAY NASAL at 09:12

## 2024-12-11 RX ADMIN — GABAPENTIN 100 MG: 100 CAPSULE ORAL at 08:12

## 2024-12-11 RX ADMIN — RISPERIDONE 1 MG: 1 TABLET, FILM COATED ORAL at 08:12

## 2024-12-11 NOTE — PROGRESS NOTES
[facility]  Hospital Medicine Progress Note        Chief Complaint: Inpatient Follow-up for weakness     HPI:   89 y.o. female with a PMHx  of hypertension, PAF on Eliquis, DVT, chronic diastolic heart failure LVEF 50-55%, GII DD, sick sinus syndrome, CKD stage III ( baseline cr 1.5) with solitary kidney, chronic hypoxemic respiratory failure on 2 L home oxygen, hypothyroidism, and glaucoma who presented to [facility] on 12/9/2024 with c/o generalized weakness.  Patient reported worsening weakness over the past 3 weeks associated with  lower extremity swelling, abdominal distention, and difficulty urinating.  Patient also endorsed 5 lb weight gain.  Patient reported compliance with home Lasix- 80 mg q.a.m. and 40 mg in the afternoon.  Daughter reports she is followed by nephrologist Dr. Chávez who manages her diuretic. Patient denied fever, rectal bleeding, melena, hematuria, dysuria, and chest pain.      Initial vital signs in ED were /82, pulse 82, respirations 12, temperature 36.8° C, and SpO2 99% on 2 L nasal cannula.  Labs revealed WBC 5.32,  hemoglobin 9.9, platelets 110, sodium 97, CO2 32, BUN 26.3, creatinine 1.91 baseline 1.5, .2, and troponin 0.055.  Chest x-ray revealed right small pleural effusion and basilar atelectasis.  Patient was given IV Lasix 60 mg and aspirin 325 mg tablet in ED. Grey catheter was placed in ED with 1900 urine return. Patient was admitted to hospital medicine service for further medical management. Nephrology was consulted to assist.    Interval Hx:   Pt was seen at bedside with , daughter and son in the room.   On exam, still hypervolemic with 2+ peripheral edema.   Afebrile. HDS, O2 demand is stable at baseline  Labs with normal WBC, slight drop in hgb 8.6 from 9.9 on admit. No signs of overt bleed reported. Plt count 83. Cr is stable at 1.7 , BUN 27      Case was discussed with patient's nurse and  on the  floor.    Objective/physical exam:  General: In no acute distress, afebrile, On NC  Chest: Decreased breath sounds at bases   Heart: RRR, +S1, S2, no appreciable murmur  Abdomen: Soft, nontender, BS +  MSK: Warm, 2+ lower extremity edema, no clubbing or cyanosis  Neurologic: Alert and oriented x3    VITAL SIGNS: 24 HRS MIN & MAX LAST   Temp  Min: 97.3 °F (36.3 °C)  Max: 99.2 °F (37.3 °C) 98.1 °F (36.7 °C)   BP  Min: 119/56  Max: 132/67 128/63   Pulse  Min: 60  Max: 60  60   Resp  Min: 18  Max: 18 18   SpO2  Min: 98 %  Max: 100 % 99 %     I have reviewed the following labs:  Recent Labs   Lab 12/09/24  1250 12/10/24  0456 12/11/24  0352   WBC 5.32 4.48* 5.45   RBC 3.06* 2.55* 2.71*   HGB 9.9* 8.1* 8.6*   HCT 29.1* 24.4* 26.1*   MCV 95.1* 95.7* 96.3*   MCH 32.4* 31.8* 31.7*   MCHC 34.0 33.2 33.0   RDW 19.2* 18.9* 19.1*   * 93* 83*   MPV 11.8* 9.8 10.2     Recent Labs   Lab 12/09/24  1250 12/10/24  0456 12/11/24  0352    136 135*   K 3.7 3.4* 4.4   CL 97* 99 99   CO2 32* 30 30   BUN 26.3* 24.9* 27.5*   CREATININE 1.91* 1.75* 1.70*   CALCIUM 9.5 9.0 9.0   MG 2.60  --  2.40   ALBUMIN 3.5 2.9* 2.9*   ALKPHOS 138 107  --    ALT 11 9  --    AST 32 25  --    BILITOT 1.4 1.0  --      Microbiology Results (last 7 days)       ** No results found for the last 168 hours. **             See below for Radiology    Assessment/Plan:  Acute on chronic diastolic HF, LVEF 50-55%, GIIDD, POA  Acute kidney injury on CKD IIIb- Cardiorenal and Urinary retention   Urinary retention , s/p Grey insertion, POA  New onset Pancytopenia with moderate thrombocytopenia, POA- likely in the setting  acute illness   Acute on chronic anemia - mild to mod without evidence of overt bleed   Hypokalemia , POA  Elevated Troponin/ NSTEMI type 2 in the setting of HF exacerbation, POA  Chronic hypoxic resp failure on home O2- stable   Physical debility / Generalized weakness      Hx- hypertension, PAF on Eliquis, DVT,, sick sinus syndrome,   solitary kidney, chronic hypoxemic respiratory failure on 2 L home oxygen, hypothyroidism, and glaucoma     Plan-   Nephrology is guiding with diuretic regimen  Continue IV Furosemide 60 mg bid for additional day today. Transition to oral once near euvolemic.   Monitor renal parameter , UOP and renal recovery   Continue Grey catheter for now   Voiding trial once Lasix transition to oral   Urine culture neg for bacterial growth  Replete potassium   Monitor H&H and sings of active bleeding   Transfuse blood for Hgb 8 or under given underlying cardiac disease   Nephro has given EPO 40 K x1 on 12/10/24   Monitor blood cell counts   Home meds are reviewed and resumed as appropriate   PT/OT consulted today      Critical care note:  Critical care diagnosis: HF requiring IV lasix   Critical care interventions: Hands-on evaluation, review of labs/radiographs/records and discussion with patient and family if present  Critical care time spent: 35 minutes       VTE prophylaxis: Eliquis      Patient condition:  Fair     Anticipated discharge and Disposition:     TBD      All diagnosis and differential diagnosis have been reviewed; assessment and plan has been documented; I have personally reviewed the labs and test results that are presently available; I have reviewed the patients medication list; I have reviewed the consulting providers response and recommendations. I have reviewed or attempted to review medical records based upon their availability    All of the patient's questions have been  addressed and answered. Patient's is agreeable to the above stated plan. I will continue to monitor closely and make adjustments to medical management as needed.    Portions of this note dictated using EMR integrated voice recognition software, and may be subject to voice recognition errors not corrected at proofreading. Please contact writer for clarification if needed.    _____________________________________________________________________    Malnutrition Status:  Nutrition consulted. Most recent weight and BMI monitored-     Measurements:  Wt Readings from Last 1 Encounters:   12/10/24 68 kg (149 lb 14.6 oz)   Body mass index is 24.95 kg/m².    Patient has been screened and assessed by RD.    Malnutrition Type:  Context:    Level:      Malnutrition Characteristic Summary:       Interventions/Recommendations (treatment strategy):        Scheduled Med:   allopurinoL  200 mg Oral Daily    apixaban  2.5 mg Oral BID    atorvastatin  10 mg Oral QHS    carvediloL  3.125 mg Oral BID    ferrous sulfate  1 tablet Oral BID    furosemide (LASIX) injection  60 mg Intravenous Q12H    [START ON 12/12/2024] furosemide  40 mg Oral BID    gabapentin  100 mg Oral BID    latanoprost  1 drop Both Eyes QHS    levothyroxine  25 mcg Oral Before breakfast    miconazole NITRATE 2 %   Topical (Top) BID    mupirocin   Nasal BID    oxymetazoline  2 spray Each Nostril BID    pantoprazole  40 mg Oral BID    risperiDONE  2 mg Oral QHS    rivastigmine tartrate  1.5 mg Oral BID      Continuous Infusions:     PRN Meds:    Current Facility-Administered Medications:     acetaminophen, 650 mg, Oral, Q8H PRN    acetaminophen, 650 mg, Oral, Q4H PRN    ALPRAZolam, 0.25 mg, Oral, TID PRN    dextrose 10%, 12.5 g, Intravenous, PRN    dextrose 10%, 25 g, Intravenous, PRN    glucagon (human recombinant), 1 mg, Intramuscular, PRN    glucose, 16 g, Oral, PRN    glucose, 24 g, Oral, PRN    glycopyrrolate, 1 mg, Oral, BID PRN    hydrOXYzine pamoate, 25 mg, Oral, Q8H PRN    ondansetron, 4 mg, Intravenous, Q8H PRN         Ally Erazo MD  Department of Hospital Medicine   [facility]   12/11/2024

## 2024-12-11 NOTE — PT/OT/SLP EVAL
"Occupational Therapy  Evaluation    Name: Ev Alberts  MRN: [id number]  Admitting Diagnosis: generalized weakness, LE edema, difficulty urinating  Recent Surgery: * No surgery found *      Recommendations:     Discharge therapy intensity: Low Intensity Therapy (with sitters)   Discharge Equipment Recommendations:  none  Barriers to discharge:  none evident    Assessment:     Ev Alberts is a 89 y.o. female with a medical diagnosis of acute on chronic CHF exacerbation, NSTEMI, MICHEL on CKD, urinary retention, hx of glaucoma, DVT, chronic diastolic heart failure.  She presents with the following performance deficits affecting function: weakness, impaired endurance, impaired self care skills, impaired functional mobility, gait instability, impaired balance, decreased lower extremity function.     Rehab Prognosis: Good; patient would benefit from acute skilled OT services to address these deficits and reach maximum level of function.       Plan:     Patient to be seen 4 x/week to address the above listed problems via self-care/home management  Plan of Care Expires: 01/11/25  Plan of Care Reviewed with: patient, daughter    Subjective     Chief Complaint: "my IV is bleeding" "I need a bath"  Patient/Family Comments/goals: "go home with sitters"    Occupational Profile:  Living Environment: lives with , has sitters 10 hours/day during the week  Previous level of function: assist from sitters for ADLs, utilizes RW around the house or w/c if tremors are significant and sitters are concerned  Roles and Routines:   Equipment Used at Home:  (w/c, RW, shower chair, raised toilet)  Assistance upon Discharge: family and sitters    Pain/Comfort:  Pain Rating 1: 0/10    Patients cultural, spiritual, Sikhism conflicts given the current situation:      Objective:     OT communicated with RN prior to session.      Patient was found right sidelying with  (telemetry, BP monitoring, O2 monitoring, wedge) upon OT entry to " room.    General Precautions: Standard, fall  Orthopedic Precautions: N/A  Braces: N/A    Vital Signs: 128/63     Bed Mobility:    Patient completed Supine to Sit with maximal assistance  Patient completed Sit to Supine with maxx2    Functional Mobility/Transfers:  Patient completed Sit <> Stand Transfer with minimum assistance  with  rolling walker   Functional Mobility: min A to take steps EOB, low endurance.  Tremors not noted but daughter reports they become present with ambulating    Activities of Daily Living:  Grooming: stand by assistance sitting EOB, applying lipstick   Toileting: maximal assistance bedpan , BSC and RW ordered to begin trialing in upcoming days  Feeding: daughter reports she fed her lunch today, encouraged self-feeding for dinner    [facility] 6 Click ADL:  [facility] Total Score: 14    Functional Cognition:  Intact    Visual Perceptual Skills:  Hx of glaucoma    Upper Extremity Function:  Right Upper Extremity:   WFL    Left Upper Extremity:  WFL    Balance:   Intact    Therapeutic Positioning  Risk for acquired pressure injuries is significantly increased due to relative decrease in mobility d/t hospitalization  and impaired mobility.    OT interventions performed during the course of today's session:   Therapeutic positioning was provided at the conclusion of session to offload all bony prominences for the prevention and/or reduction of pressure injuries    Skin assessment: pt with bandage on sacrum (not assessed), heels with protective bandages as well (not assessed)       OT recommendations for therapeutic positioning throughout hospitalization:   Follow [facility] Pressure Injury Prevention Protocol  Geomat recommended for sacral protection while [facility]      Patient Education:  Patient and daughter/s were provided with verbal education education regarding OT role/goals/POC, post op precautions, fall prevention, and Discharge/DME recommendations.  Understanding was verbalized, however additional  teaching warranted.     Patient left HOB elevated with all lines intact, call button in reach, wedge under L side, pressure relief boots, and RN notified.    GOALS:   Multidisciplinary Problems       Occupational Therapy Goals          Problem: Occupational Therapy    Goal Priority Disciplines Outcome Interventions   Occupational Therapy Goal     OT, PT/OT Progressing    Description: Goals to be met by: 25     Patient will increase functional independence with ADLs by performing:    UE Dressing with Minimal Assistance.  Grooming while standing at sink with Minimal Assistance.  Toileting from BSC/toilet with Minimal Assistance for hygiene and clothing management.   Toilet transfer to bedside commode with Minimal Assistance.                         History:     Past Medical History:   Diagnosis Date    Acute kidney injury superimposed on chronic kidney disease     Congestive heart failure     Dementia     Hypertension     Shingles of eyelid     Sick sinus syndrome     Thyroid disease     Unspecified glaucoma          Past Surgical History:   Procedure Laterality Date    cataract surgery       SECTION      CHOLECYSTECTOMY      EGD, WITH HEMORRHAGE CONTROL Left 2024    Procedure: EGD,WITH HEMORRHAGE CONTROL;  Surgeon: Blake Adorno MD;  Location: [facility] OR;  Service: Gastroenterology;  Laterality: Left;    HYSTERECTOMY      INSERTION OF PACEMAKER      LEFT HEART CATHETERIZATION Left 3/4/2024    Procedure: Left heart cath;  Surgeon: Mark Raymundo Jr., MD;  Location: [facility] CATH LAB;  Service: Cardiology;  Laterality: Left;    NEPHRECTOMY         Time Tracking:     OT Date of Treatment:    OT Start Time: 1313  OT Stop Time: 1355  OT Total Time (min): 42 min    Billable Minutes:Evaluation MOD    2024

## 2024-12-11 NOTE — CONSULTS
[facility] - 9th Floor Medical Telemetry  Wound Care    Patient Name:  Ev Alberts   MRN:  [id number]  Date: 12/11/2024  Diagnosis: <principal problem not specified>    History:     Past Medical History:   Diagnosis Date    Acute kidney injury superimposed on chronic kidney disease     Congestive heart failure     Dementia     Hypertension     Shingles of eyelid     Sick sinus syndrome     Thyroid disease     Unspecified glaucoma        Social History     Socioeconomic History    Marital status:    Tobacco Use    Smoking status: Never    Smokeless tobacco: Never   Substance and Sexual Activity    Alcohol use: Never    Drug use: Not Currently     Social Drivers of Health     Financial Resource Strain: Low Risk  (12/10/2024)    Overall Financial Resource Strain (CARDIA)     Difficulty of Paying Living Expenses: Not hard at all   Food Insecurity: No Food Insecurity (12/10/2024)    Hunger Vital Sign     Worried About Running Out of Food in the Last Year: Never true     Ran Out of Food in the Last Year: Never true   Transportation Needs: No Transportation Needs (12/10/2024)    TRANSPORTATION NEEDS     Transportation : No   Physical Activity: Inactive (12/10/2024)    Exercise Vital Sign     Days of Exercise per Week: 0 days     Minutes of Exercise per Session: 0 min   Stress: Patient Unable To Answer (12/10/2024)    [employer] - Occupational Stress Questionnaire     Feeling of Stress : Patient unable to answer   Housing Stability: Low Risk  (12/10/2024)    Housing Stability Vital Sign     Unable to Pay for Housing in the Last Year: No     Homeless in the Last Year: No       Precautions:     Allergies as of 12/09/2024 - Reviewed 12/09/2024   Allergen Reaction Noted    Amlodipine-benazepril Edema 06/01/2022    Corticosteroids (glucocorticoids) Edema and Swelling 05/11/2011    Rofecoxib Anaphylaxis and Swelling 05/11/2011    Sulfa (sulfonamide antibiotics) Edema 06/01/2022     Atorvastatin  10/26/2018    Ibuprofen  06/01/2022       [facility] Assessment Details/Treatment      12/11/24 1017   WOCN Assessment   Visit Date 12/11/24   Visit Time 1017   Consult Type New   [facility] Speciality Wound   Intervention chart review;assessed;changed;applied;orders   Teaching on-going        Wound 12/10/24 0800 Pressure Injury Sacral spine #1   Date First Assessed/Time First Assessed: 12/10/24 0800   Present on Original Admission: Yes  Primary Wound Type: Pressure Injury  Location: Sacral spine  Wound Number: #1   Wound Image   (camera malfunction)   Pressure Injury Stage 1   Dressing Appearance Intact;Dry   Drainage Amount None   Appearance Pink;Intact;Dry   Tissue loss description Not applicable   Black (%), Wound Tissue Color 0 %   Red (%), Wound Tissue Color 100 %   Yellow (%), Wound Tissue Color 0 %   Periwound Area Intact;Dry   Wound Edges Approximated   Care Cleansed with:;Wound cleanser   Dressing Applied;Other (comment)  (zinc oxide, abd pad, tape)        Wound 12/10/24 0800 Moisture associated dermatitis Right anterior Groin #2   Date First Assessed/Time First Assessed: 12/10/24 0800   Primary Wound Type: Moisture associated dermatitis  Side: Right  Orientation: anterior  Location: Groin  Wound Number: #2   Wound Image   (camera malfunction)   Dressing Appearance Open to air   Drainage Amount None   Appearance Red;Pink;Moist   Tissue loss description Partial thickness   Black (%), Wound Tissue Color 0 %   Red (%), Wound Tissue Color 100 %   Yellow (%), Wound Tissue Color 0 %   Periwound Area Moist;Intact   Wound Edges Irregular   Care Cleansed with:;Other (see comments)  (remedy skin cleanser.)   Dressing Applied;Other (comment)  (exudry sheet. Miconazole ordered for BID)   [facility] for new consult to evaluate sacrum. Introduced myself to daughter, patient and spouse at bedside. Explained reason for visit. Daughter states patient lives at home. Pt is able to turn with some assistance. She did consent for  wound care nurse to evaluate sacral area. Pt turned onto side. Cleansed well with remedy skin cleanser. Dried well. Appearance of a previously healed injury. Daughter did report pt having one in the past. Presently skin is intact. Treatment recommendations include: applying Zinc oxide, abd pad, tape BID and prn . Skin assessment performed to abdominal fold and groin area. Noted open area to right abdominal fold similar to yeast type rash. Area is moist. Area cleansed well. Inserted dry sheets.Treatment recommendations : Miconazole powder BID to areas. Keep patient clean and dry. No adult diapers while in bed. Nursing to continue with present treatment recommendations. Pressure prevention measures to be continued. Will follow up.   12/11/2024

## 2024-12-11 NOTE — PROGRESS NOTES
Renal    Initial 12/10/24 HPI:  A 72-year-old female, consulted for MICHEL CKD and edema (known Dr. Chávez baseline creatinine 1.5).  Presented to emergency department on 12/09/2024 with weakness past 3 weeks with increased lower extremity edema, abdominal distention and difficulty urinating.  She takes Lasix 80 p.o. q.a.m. and 40 p.o. q.p.m..  ED workup showed creatinine 1.9, Grey catheter was placed for urinary retention and was given Lasix 60 IV x1 with significant diuresis.      Chief complaint:   Better    Interval History:  Tolerating Lasix 60 IV b.i.d..  No issues overnight.  Still has some orthopnea when lying flat.  Daughter reports 1900 was retained when Grey was placed in the ED. also reports that she saw Urology the week before, postvoid in the office was 250 cc and  Myrbetriq was discontinued at that time.    ROS:  all else Neg     allopurinoL  200 mg Oral Daily    apixaban  2.5 mg Oral BID    atorvastatin  10 mg Oral QHS    carvediloL  3.125 mg Oral BID    ferrous sulfate  1 tablet Oral BID    furosemide (LASIX) injection  60 mg Intravenous Q12H    gabapentin  100 mg Oral BID    latanoprost  1 drop Both Eyes QHS    levothyroxine  25 mcg Oral Before breakfast    miconazole NITRATE 2 %   Topical (Top) BID    mupirocin   Nasal BID    oxymetazoline  2 spray Each Nostril BID    pantoprazole  40 mg Oral BID    risperiDONE  2 mg Oral QHS    rivastigmine tartrate  1.5 mg Oral BID       VITAL SIGNS: 24 HR MIN & MAX LAST    Temp  Min: 97.3 °F (36.3 °C)  Max: 99.2 °F (37.3 °C)  98.1 °F (36.7 °C)        BP  Min: 114/67  Max: 132/67  128/63     Pulse  Min: 60  Max: 60  60     Resp  Min: 16  Max: 18  18    SpO2  Min: 93 %  Max: 100 %  99 %      Wt Readings from Last 3 Encounters:   12/10/24 68 kg (149 lb 14.6 oz)   11/19/24 72.6 kg (160 lb)   10/23/24 68.9 kg (152 lb)       Intake/Output Summary (Last 24 hours) at 12/11/2024 1411  Last data filed at 12/11/2024 0529  Gross per 24 hour   Intake 600 ml   Output  675 ml   Net -75 ml     A&O x 4, elderly  EOMI  (B) CTA, unlabored  Unlabored  Soft, NT, ND  Edema 2-3 +    Recent Labs     12/09/24  1250 12/10/24  0456 12/11/24  0352    136 135*   K 3.7 3.4* 4.4   CO2 32* 30 30   BUN 26.3* 24.9* 27.5*   CREATININE 1.91* 1.75* 1.70*   GLUCOSE 92 83 106   CALCIUM 9.5 9.0 9.0   MG 2.60  --  2.40   PHOS  --   --  3.1   ALBUMIN 3.5 2.9* 2.9*      Recent Labs     12/09/24  1250 12/10/24  0456 12/11/24  0352   WBC 5.32 4.48* 5.45   HGB 9.9* 8.1* 8.6*   HCT 29.1* 24.4* 26.1*   * 93* 83*       ASSESSMENT / PLAN    Urinary retention, fluid overload     CKD 3B-baseline creatinine 1.5-1.6  MICHEL, cardiorenal and urinary retention  Acute Urinary retention of unknown etiology  anemia of CKD - continue p.o. FE b.i.d., s/p EPO 40 K 12/10/2024  Thrombocytopenia  H/o GIB 7/2024  Fluid overload/edema  CHF (Grade II diastolic dysfunction )  HTN with SCEi allergy  Hypothyroidism   VHD - mod MR, mod TR, Pulm HTN  Afib  Solitary kidney, h/o (L) Ntx  Chronic Home O2 (2 L)     Renal function improving.  Change Lasix 60 IV BID to 40 PO BID (start tomorrow)  In the next day or 2 we will need to remove Grey and do voiding trial                This is a medical document written in a [id number] manner with standard medical terminology and conventions. It is a communication tool for medical professional. It is not intended for the lay public. Any inference of insensitivity or offence is solely a product with the reader's imagination. This is a document written devoid of emotion, as it is a medical document and it's only purpose is to convey information to medical professionals.

## 2024-12-11 NOTE — PLAN OF CARE
Problem: Infection  Goal: Absence of Infection Signs and Symptoms  Outcome: Progressing     Problem: Adult Inpatient Plan of Care  Goal: Plan of Care Review  Outcome: Progressing  Goal: Patient-Specific Goal (Individualized)  Outcome: Progressing  Goal: Absence of Hospital-Acquired Illness or Injury  Outcome: Progressing  Goal: Optimal Comfort and Wellbeing  Outcome: Progressing  Goal: Readiness for Transition of Care  Outcome: Progressing     Problem: Wound  Goal: Optimal Coping  Outcome: Progressing  Goal: Optimal Functional Ability  Outcome: Progressing  Goal: Absence of Infection Signs and Symptoms  Outcome: Progressing  Goal: Improved Oral Intake  Outcome: Progressing  Goal: Optimal Pain Control and Function  Outcome: Progressing  Goal: Skin Health and Integrity  Outcome: Progressing  Goal: Optimal Wound Healing  Outcome: Progressing     Problem: Skin Injury Risk Increased  Goal: Skin Health and Integrity  Outcome: Progressing     Problem: Fall Injury Risk  Goal: Absence of Fall and Fall-Related Injury  Outcome: Progressing

## 2024-12-11 NOTE — PLAN OF CARE
Problem: Occupational Therapy  Goal: Occupational Therapy Goal  Description: Goals to be met by: 1/11/25     Patient will increase functional independence with ADLs by performing:    UE Dressing with Minimal Assistance.  Grooming while standing at sink with Minimal Assistance.  Toileting from BSC/toilet with Minimal Assistance for hygiene and clothing management.   Toilet transfer to bedside commode with Minimal Assistance.    Outcome: Progressing

## 2024-12-12 LAB
ALBUMIN SERPL-MCNC: 3 G/DL (ref 3.4–4.8)
BASOPHILS # BLD AUTO: 0.04 X10(3)/MCL
BASOPHILS NFR BLD AUTO: 0.6 %
BNP BLD-MCNC: 991.3 PG/ML
BUN SERPL-MCNC: 29.5 MG/DL (ref 9.8–20.1)
CALCIUM SERPL-MCNC: 9.3 MG/DL (ref 8.4–10.2)
CHLORIDE SERPL-SCNC: 95 MMOL/L (ref 98–107)
CO2 SERPL-SCNC: 34 MMOL/L (ref 23–31)
CREAT SERPL-MCNC: 1.88 MG/DL (ref 0.55–1.02)
EOSINOPHIL # BLD AUTO: 0.15 X10(3)/MCL (ref 0–0.9)
EOSINOPHIL NFR BLD AUTO: 2.3 %
ERYTHROCYTE [DISTWIDTH] IN BLOOD BY AUTOMATED COUNT: 19 % (ref 11.5–17)
GFR SERPLBLD CREATININE-BSD FMLA CKD-EPI: 26 ML/MIN/1.73/M2
GLUCOSE SERPL-MCNC: 116 MG/DL (ref 82–115)
HCT VFR BLD AUTO: 25.9 % (ref 37–47)
HGB BLD-MCNC: 8.7 G/DL (ref 12–16)
IMM GRANULOCYTES # BLD AUTO: 0.02 X10(3)/MCL (ref 0–0.04)
IMM GRANULOCYTES NFR BLD AUTO: 0.3 %
LYMPHOCYTES # BLD AUTO: 2.48 X10(3)/MCL (ref 0.6–4.6)
LYMPHOCYTES NFR BLD AUTO: 37.4 %
MAGNESIUM SERPL-MCNC: 2.4 MG/DL (ref 1.6–2.6)
MCH RBC QN AUTO: 32 PG (ref 27–31)
MCHC RBC AUTO-ENTMCNC: 33.6 G/DL (ref 33–36)
MCV RBC AUTO: 95.2 FL (ref 80–94)
MONOCYTES # BLD AUTO: 0.83 X10(3)/MCL (ref 0.1–1.3)
MONOCYTES NFR BLD AUTO: 12.5 %
NEUTROPHILS # BLD AUTO: 3.11 X10(3)/MCL (ref 2.1–9.2)
NEUTROPHILS NFR BLD AUTO: 46.9 %
NRBC BLD AUTO-RTO: 0 %
PHOSPHATE SERPL-MCNC: 2.7 MG/DL (ref 2.3–4.7)
PLATELET # BLD AUTO: 107 X10(3)/MCL (ref 130–400)
PMV BLD AUTO: 10.4 FL (ref 7.4–10.4)
POTASSIUM SERPL-SCNC: 4.1 MMOL/L (ref 3.5–5.1)
RBC # BLD AUTO: 2.72 X10(6)/MCL (ref 4.2–5.4)
SODIUM SERPL-SCNC: 141 MMOL/L (ref 136–145)
WBC # BLD AUTO: 6.63 X10(3)/MCL (ref 4.5–11.5)

## 2024-12-12 PROCEDURE — 80069 RENAL FUNCTION PANEL: CPT | Performed by: INTERNAL MEDICINE

## 2024-12-12 PROCEDURE — 97162 PT EVAL MOD COMPLEX 30 MIN: CPT

## 2024-12-12 PROCEDURE — 25000003 PHARM REV CODE 250: Performed by: PHYSICIAN ASSISTANT

## 2024-12-12 PROCEDURE — 25000003 PHARM REV CODE 250: Performed by: INTERNAL MEDICINE

## 2024-12-12 PROCEDURE — 21400001 HC TELEMETRY ROOM

## 2024-12-12 PROCEDURE — 99900031 HC PATIENT EDUCATION (STAT)

## 2024-12-12 PROCEDURE — 27000221 HC OXYGEN, UP TO 24 HOURS

## 2024-12-12 PROCEDURE — 85025 COMPLETE CBC W/AUTO DIFF WBC: CPT | Performed by: INTERNAL MEDICINE

## 2024-12-12 PROCEDURE — 83735 ASSAY OF MAGNESIUM: CPT | Performed by: INTERNAL MEDICINE

## 2024-12-12 PROCEDURE — 97530 THERAPEUTIC ACTIVITIES: CPT

## 2024-12-12 PROCEDURE — 83880 ASSAY OF NATRIURETIC PEPTIDE: CPT | Performed by: INTERNAL MEDICINE

## 2024-12-12 PROCEDURE — 36415 COLL VENOUS BLD VENIPUNCTURE: CPT | Performed by: INTERNAL MEDICINE

## 2024-12-12 PROCEDURE — 99900035 HC TECH TIME PER 15 MIN (STAT)

## 2024-12-12 RX ADMIN — ALLOPURINOL 200 MG: 100 TABLET ORAL at 08:12

## 2024-12-12 RX ADMIN — FUROSEMIDE 40 MG: 40 TABLET ORAL at 05:12

## 2024-12-12 RX ADMIN — LEVOTHYROXINE SODIUM 25 MCG: 25 TABLET ORAL at 05:12

## 2024-12-12 RX ADMIN — FERROUS SULFATE TAB 325 MG (65 MG ELEMENTAL FE) 1 EACH: 325 (65 FE) TAB at 09:12

## 2024-12-12 RX ADMIN — CARVEDILOL 3.12 MG: 3.12 TABLET, FILM COATED ORAL at 08:12

## 2024-12-12 RX ADMIN — RISPERIDONE 1 MG: 1 TABLET, FILM COATED ORAL at 09:12

## 2024-12-12 RX ADMIN — GABAPENTIN 100 MG: 100 CAPSULE ORAL at 08:12

## 2024-12-12 RX ADMIN — GABAPENTIN 100 MG: 100 CAPSULE ORAL at 09:12

## 2024-12-12 RX ADMIN — MUPIROCIN: 20 OINTMENT TOPICAL at 09:12

## 2024-12-12 RX ADMIN — APIXABAN 2.5 MG: 2.5 TABLET, FILM COATED ORAL at 09:12

## 2024-12-12 RX ADMIN — PANTOPRAZOLE SODIUM 40 MG: 40 TABLET, DELAYED RELEASE ORAL at 09:12

## 2024-12-12 RX ADMIN — ATORVASTATIN CALCIUM 10 MG: 10 TABLET, FILM COATED ORAL at 09:12

## 2024-12-12 RX ADMIN — MICONAZOLE NITRATE: 20 POWDER TOPICAL at 08:12

## 2024-12-12 RX ADMIN — OXYMETAZOLINE HYDROCHLORIDE 2 SPRAY: 0.5 SPRAY NASAL at 08:12

## 2024-12-12 RX ADMIN — CARVEDILOL 3.12 MG: 3.12 TABLET, FILM COATED ORAL at 09:12

## 2024-12-12 RX ADMIN — APIXABAN 2.5 MG: 2.5 TABLET, FILM COATED ORAL at 08:12

## 2024-12-12 RX ADMIN — ALPRAZOLAM 0.25 MG: 0.25 TABLET ORAL at 09:12

## 2024-12-12 RX ADMIN — FUROSEMIDE 40 MG: 40 TABLET ORAL at 08:12

## 2024-12-12 RX ADMIN — PANTOPRAZOLE SODIUM 40 MG: 40 TABLET, DELAYED RELEASE ORAL at 08:12

## 2024-12-12 RX ADMIN — RIVASTIGMINE TARTRATE 1.5 MG: 1.5 CAPSULE ORAL at 09:12

## 2024-12-12 RX ADMIN — RIVASTIGMINE TARTRATE 1.5 MG: 1.5 CAPSULE ORAL at 08:12

## 2024-12-12 RX ADMIN — FERROUS SULFATE TAB 325 MG (65 MG ELEMENTAL FE) 1 EACH: 325 (65 FE) TAB at 08:12

## 2024-12-12 RX ADMIN — MUPIROCIN: 20 OINTMENT TOPICAL at 08:12

## 2024-12-12 NOTE — PROGRESS NOTES
Renal    Initial 12/10/24 HPI:  A 72-year-old female, consulted for MICHEL CKD and edema (known Dr. Chávez baseline creatinine 1.5).  Presented to emergency department on 12/09/2024 with weakness past 3 weeks with increased lower extremity edema, abdominal distention and difficulty urinating.  She takes Lasix 80 p.o. q.a.m. and 40 p.o. q.p.m..  ED workup showed creatinine 1.9, Grey catheter was placed for urinary retention and was given Lasix 60 IV x1 with significant diuresis.      Chief complaint:   Pretty good    Interval History:  On Lasix 60 IV b.i.d..  No issues overnight.  Still has some orthopnea when lying flat. Feeling better    ROS:  all else Neg     allopurinoL  200 mg Oral Daily    apixaban  2.5 mg Oral BID    atorvastatin  10 mg Oral QHS    carvediloL  3.125 mg Oral BID    ferrous sulfate  1 tablet Oral BID    furosemide  40 mg Oral BID    gabapentin  100 mg Oral BID    latanoprost  1 drop Both Eyes QHS    levothyroxine  25 mcg Oral Before breakfast    miconazole NITRATE 2 %   Topical (Top) BID    mupirocin   Nasal BID    pantoprazole  40 mg Oral BID    risperiDONE  1 mg Oral QHS    rivastigmine tartrate  1.5 mg Oral BID       VITAL SIGNS: 24 HR MIN & MAX LAST    Temp  Min: 98.1 °F (36.7 °C)  Max: 99.3 °F (37.4 °C)  99.3 °F (37.4 °C)        BP  Min: 119/56  Max: 146/76  (!) 146/76     Pulse  Min: 60  Max: 60  60     Resp  Min: 18  Max: 19  18    SpO2  Min: 99 %  Max: 100 %  99 %      Wt Readings from Last 3 Encounters:   12/10/24 68 kg (149 lb 14.6 oz)   11/19/24 72.6 kg (160 lb)   10/23/24 68.9 kg (152 lb)       Intake/Output Summary (Last 24 hours) at 12/12/2024 0846  Last data filed at 12/12/2024 0504  Gross per 24 hour   Intake 240 ml   Output 1100 ml   Net -860 ml     A&O x 4, elderly  EOMI  (B) CTA, unlabored  Unlabored  Soft, NT, ND  Edema 2-3 +    Recent Labs     12/10/24  0456 12/10/24  0456 12/11/24  0352 12/12/24  0343     --  135* 141   K 3.4*  --  4.4 4.1   CO2 30  --  30 34*    BUN 24.9*  --  27.5* 29.5*   CREATININE 1.75*  --  1.70* 1.88*   GLUCOSE 83  --  106 116*   CALCIUM 9.0  --  9.0 9.3   MG  --   --  2.40 2.40   PHOS  --    < > 3.1 2.7   ALBUMIN 2.9*  --  2.9* 3.0*    < > = values in this interval not displayed.      Recent Labs     12/10/24  0456 12/11/24  0352 12/12/24  0343   WBC 4.48* 5.45 6.63   HGB 8.1* 8.6* 8.7*   HCT 24.4* 26.1* 25.9*   PLT 93* 83* 107*       ASSESSMENT / PLAN    Urinary retention, fluid overload     CKD 3B-baseline creatinine 1.5-1.6  MICHEL, cardiorenal and urinary retention  Acute Urinary retention of unknown etiology  Fluid overload/edema  CHF (Grade II diastolic dysfunction)  VHD - mod MR, mod TR, Pulm HTN  Anemia of CKD - continue p.o. Fe b.i.d., s/p EPO 40 K 12/10/2024  Thrombocytopenia  H/o GIB 7/2024  HTN with ACEi allergy  Hypothyroidism, TSH 1.6  Afib, eliquis  Solitary kidney, h/o (L) Ntx  Chronic Home O2 (2 L)       Renal function a little worse, likely form aggressive diuresis, but still has signs or fluid overload.  Lasix 60 IV BID changed to 40 PO BID today and monitor renal fcn in AM.  Keep cook to see how she how much UOP she has with PO lasix.  Will need voiding trial at some point          This is a medical document written in a [id number] manner with standard medical terminology and conventions. It is a communication tool for medical professional. It is not intended for the lay public. Any inference of insensitivity or offence is solely a product with the reader's imagination. This is a document written devoid of emotion, as it is a medical document and it's only purpose is to convey information to medical professionals.

## 2024-12-12 NOTE — PT/OT/SLP PROGRESS
Occupational Therapy      Patient Name:  Ev Alberts   MRN:  [id number]    Patient not seen today secondary to pt just returning back to bed with RN and PT, requesting to rest for remainder of the day. Will follow-up as appropriate.    12/12/2024

## 2024-12-12 NOTE — PT/OT/SLP EVAL
Physical Therapy Evaluation    Patient Name:  Ev Alberts   MRN:  [id number]    Recommendations:     Discharge therapy intensity: Low Intensity Therapy (with sitters)   Discharge Equipment Recommendations: none   Barriers to discharge: Impaired mobility and Ongoing medical needs    Assessment:     Ev Alberts is a 89 y.o. female admitted with a medical diagnosis of acute on chronic CHF exacerbation, NSTEMI, MICHEL on CKD, urinary retention, hx of glaucoma, DVT, chronic diastolic heart failure.  She presents with the following impairments/functional limitations: weakness, impaired endurance, impaired self care skills, impaired functional mobility, gait instability, impaired balance, decreased lower extremity function.    Pt with good tolerance to PT eval. She is modA for functional mobility, including bed to chair transfer with RW. Pt is alert and oriented x 4, but requires verbal and tactile cues for mobility. Pt on 2L NC at home. At baseline, pt has sitters with her and her  during the day and assistance from her son at night. She ambulates with RW for short distances and uses w/c for longer distances. Recommending low intensity therapy upon d/c as pt is approaching baseline function and will have 24-hour care.     Rehab Prognosis: Good; patient would benefit from acute skilled PT services to address these deficits and reach maximum level of function.    Recent Surgery: * No surgery found *      Plan:     During this hospitalization, patient would benefit from acute PT services 5 x/week to address the identified rehab impairments via gait training, therapeutic activities, therapeutic exercises and progress toward the following goals:    Plan of Care Expires:  01/12/25    Subjective     Chief Complaint: none stated  Patient/Family Comments/goals: to get stronger  Pain/Comfort:  Pain Rating 1: 0/10    Patients cultural, spiritual, Islam conflicts given the current situation: no    Living Environment:  Pt  lives with  and son in [facility] with flat entrance.   Prior to admission, patients level of function was at least Claudette for all ADLs, at least CGA for ambulation with RW.  Equipment used at home: wheelchair, walker, rolling, shower chair, raised toilet.  DME owned (not currently used): none.  Upon discharge, patient will have assistance from sitters (8 hours/day) and son.    Objective:     Communicated with NSG prior to session.  Patient found HOB elevated with telemetry, oxygen, pulse ox (continuous), peripheral IV  upon PT entry to room.    General Precautions: Standard, fall  Orthopedic Precautions:N/A   Braces: N/A  Respiratory Status: Nasal cannula, flow 2 L/min; 95-99% SpO2      Exams:  Cognitive Exam:  Patient is oriented to Person, Place, Time, and Situation  RLE ROM: WFL  RLE Strength: 3+/5 grossly  LLE ROM: WFL  LLE Strength: 3+/5 grossly  Skin integrity: Visible skin intact      Functional Mobility:  Bed Mobility:     Supine to Sit: moderate assistance  Sit to Supine: maximal assistance and of 2 persons  Transfers:     Sit to Stand:  moderate assistance with rolling walker  Bed to/from Chair: moderate assistance with  rolling walker  using  Stand Pivot  Gait: Pt able to take 5 side steps EOB with modA and RW; limited clearance of LLE from floor, but improved with increased number of steps   Balance: SBA to CGA for static sitting balance; modA for static standing balance with RW      AM-PAC 6 CLICK MOBILITY  Total Score:15       Treatment & Education:  Patient, spouse were, and daughter/s were provided with verbal education education regarding PT role/goals/POC, fall prevention, safety awareness, and discharge/DME recommendations.  Understanding was verbalized.     Patient left up in chair with all lines intact, call button in reach, geomat cushion, bryanna pad in place, RN notified, and family present.    PT then aided nursing in returning pt to bed. Pt left HOB elevated with all lines intact, call button  in reach, pressure relief boots on, wedge on L side, and RN present.     GOALS:   Multidisciplinary Problems       Physical Therapy Goals          Problem: Physical Therapy    Goal Priority Disciplines Outcome Interventions   Physical Therapy Goal     PT, PT/OT Progressing    Description: Goals to be met by: 25     Patient will increase functional independence with mobility by performin. Supine to sit with Stand-by Assistance  2. Sit to supine with Stand-by Assistance  3. Sit to stand transfer with Stand-by Assistance  4. Bed to chair transfer with Stand-by Assistance using Rolling Walker  5. Gait  x 50 feet with Stand-by Assistance using Rolling Walker.                          History:     Past Medical History:   Diagnosis Date    Acute kidney injury superimposed on chronic kidney disease     Congestive heart failure     Dementia     Hypertension     Shingles of eyelid     Sick sinus syndrome     Thyroid disease     Unspecified glaucoma        Past Surgical History:   Procedure Laterality Date    cataract surgery       SECTION      CHOLECYSTECTOMY      EGD, WITH HEMORRHAGE CONTROL Left 2024    Procedure: EGD,WITH HEMORRHAGE CONTROL;  Surgeon: Blake Adorno MD;  Location: [facility] OR;  Service: Gastroenterology;  Laterality: Left;    HYSTERECTOMY      INSERTION OF PACEMAKER      LEFT HEART CATHETERIZATION Left 3/4/2024    Procedure: Left heart cath;  Surgeon: Mark Raymundo Jr., MD;  Location: [facility] CATH LAB;  Service: Cardiology;  Laterality: Left;    NEPHRECTOMY         Time Tracking:     PT Received On: 24  PT Start Time: 1144     PT Stop Time: 1210  PT Start Time: 1338   PT Stop Time: 1351  PT Total Time (min): 39 min     Billable Minutes: Evaluation mod and Therapeutic Activity 1       2024

## 2024-12-12 NOTE — PROGRESS NOTES
[facility]  Hospital Medicine Progress Note        Chief Complaint: Inpatient Follow-up for weakness     HPI:   89 y.o. female with a PMHx  of hypertension, PAF on Eliquis, DVT, chronic diastolic heart failure LVEF 50-55%, GII DD, sick sinus syndrome, CKD stage III ( baseline cr 1.5) with solitary kidney, chronic hypoxemic respiratory failure on 2 L home oxygen, hypothyroidism, and glaucoma who presented to [facility] on 12/9/2024 with c/o generalized weakness.  Patient reported worsening weakness over the past 3 weeks associated with  lower extremity swelling, abdominal distention, and difficulty urinating.  Patient also endorsed 5 lb weight gain.  Patient reported compliance with home Lasix- 80 mg q.a.m. and 40 mg in the afternoon.  Daughter reports she is followed by nephrologist Dr. Chávez who manages her diuretic. Patient denied fever, rectal bleeding, melena, hematuria, dysuria, and chest pain.      Initial vital signs in ED were /82, pulse 82, respirations 12, temperature 36.8° C, and SpO2 99% on 2 L nasal cannula.  Labs revealed WBC 5.32,  hemoglobin 9.9, platelets 110, sodium 97, CO2 32, BUN 26.3, creatinine 1.91 baseline 1.5, .2, and troponin 0.055.  Chest x-ray revealed right small pleural effusion and basilar atelectasis.  Patient was given IV Lasix 60 mg and aspirin 325 mg tablet in ED. Grey catheter was placed in ED with 1900 urine return. Patient was admitted to hospital medicine service for further medical management. Nephrology was consulted to assist.    Interval Hx:   Pt was seen and examined patient's  bedside patient not able to walk for the past 3 weeks because of heaviness of her legs     Case was discussed with patient's nurse and  on the floor.    Objective/physical exam:  General: In no acute distress, afebrile, On NC  Chest: Decreased breath sounds at bases   Heart: RRR, +S1, S2, no appreciable murmur  Abdomen: Soft,  nontender, BS +  MSK: Warm, 2+ lower extremity edema, no clubbing or cyanosis  Neurologic: Alert and oriented x3    VITAL SIGNS: 24 HRS MIN & MAX LAST   Temp  Min: 98.1 °F (36.7 °C)  Max: 99.3 °F (37.4 °C) 99.3 °F (37.4 °C)   BP  Min: 119/56  Max: 146/76 (!) 146/76   Pulse  Min: 60  Max: 60  60   Resp  Min: 18  Max: 19 18   SpO2  Min: 98 %  Max: 100 % 99 %     I have reviewed the following labs:  Recent Labs   Lab 12/10/24  0456 12/11/24  0352 12/12/24  0343   WBC 4.48* 5.45 6.63   RBC 2.55* 2.71* 2.72*   HGB 8.1* 8.6* 8.7*   HCT 24.4* 26.1* 25.9*   MCV 95.7* 96.3* 95.2*   MCH 31.8* 31.7* 32.0*   MCHC 33.2 33.0 33.6   RDW 18.9* 19.1* 19.0*   PLT 93* 83* 107*   MPV 9.8 10.2 10.4     Recent Labs   Lab 12/09/24  1250 12/10/24  0456 12/11/24  0352 12/12/24  0343    136 135* 141   K 3.7 3.4* 4.4 4.1   CL 97* 99 99 95*   CO2 32* 30 30 34*   BUN 26.3* 24.9* 27.5* 29.5*   CREATININE 1.91* 1.75* 1.70* 1.88*   CALCIUM 9.5 9.0 9.0 9.3   MG 2.60  --  2.40 2.40   ALBUMIN 3.5 2.9* 2.9* 3.0*   ALKPHOS 138 107  --   --    ALT 11 9  --   --    AST 32 25  --   --    BILITOT 1.4 1.0  --   --      Microbiology Results (last 7 days)       ** No results found for the last 168 hours. **             See below for Radiology    Assessment/Plan:  Acute on chronic diastolic HF, LVEF 50-55%, GIIDD, POA  Acute kidney injury on CKD IIIb- Cardiorenal and Urinary retention   Urinary retention , s/p Grey insertion, POA  New onset Pancytopenia with moderate thrombocytopenia, POA- likely in the setting  acute illness   Acute on chronic anemia - mild to mod without evidence of overt bleed   Hypokalemia , POA  Elevated Troponin/ NSTEMI type 2 in the setting of HF exacerbation, POA  Chronic hypoxic resp failure on home O2- stable   Physical debility / Generalized weakness more in bilateral lower extremities     Hx- hypertension, PAF on Eliquis, DVT,, sick sinus syndrome,  solitary kidney, chronic hypoxemic respiratory failure on 2 L home oxygen,  hypothyroidism, and glaucoma     Plan-   I will order CT of the L-spine without contrast as the previous CT had shown compression fracture  PT and OT has been consulted to   Nephrology's following they have switched her to p.o. Lasix they want to continue Grey catheter today and monitor renal function in a.m. to see how much is the urine output on p.o. Lasix and then after that we will start the voiding trial  Urine culture neg for bacterial growth  Replete potassium   Monitor H&H and sings of active bleeding   Transfuse blood for Hgb 8 or under given underlying cardiac disease   Nephro has given EPO 40 K x1 on 12/10/24   Creatinine of 1.88 will keep a close watch Nephrology's following   Repeat blood work in a.m.  Otherwise blood pressure stable on 2 L of nasal cannula            VTE prophylaxis: Eliquis      Patient condition:  Fair     Anticipated discharge and Disposition:     TBD      All diagnosis and differential diagnosis have been reviewed; assessment and plan has been documented; I have personally reviewed the labs and test results that are presently available; I have reviewed the patients medication list; I have reviewed the consulting providers response and recommendations. I have reviewed or attempted to review medical records based upon their availability    All of the patient's questions have been  addressed and answered. Patient's is agreeable to the above stated plan. I will continue to monitor closely and make adjustments to medical management as needed.    Portions of this note dictated using EMR integrated voice recognition software, and may be subject to voice recognition errors not corrected at proofreading. Please contact writer for clarification if needed.   _____________________________________________________________________    Malnutrition Status:  Nutrition consulted. Most recent weight and BMI monitored-     Measurements:  Wt Readings from Last 1 Encounters:   12/10/24 68 kg (149 lb  14.6 oz)   Body mass index is 24.95 kg/m².    Patient has been screened and assessed by RD.    Malnutrition Type:  Context:    Level:      Malnutrition Characteristic Summary:       Interventions/Recommendations (treatment strategy):        Scheduled Med:   allopurinoL  200 mg Oral Daily    apixaban  2.5 mg Oral BID    atorvastatin  10 mg Oral QHS    carvediloL  3.125 mg Oral BID    ferrous sulfate  1 tablet Oral BID    furosemide  40 mg Oral BID    gabapentin  100 mg Oral BID    latanoprost  1 drop Both Eyes QHS    levothyroxine  25 mcg Oral Before breakfast    miconazole NITRATE 2 %   Topical (Top) BID    mupirocin   Nasal BID    oxymetazoline  2 spray Each Nostril BID    pantoprazole  40 mg Oral BID    risperiDONE  1 mg Oral QHS    rivastigmine tartrate  1.5 mg Oral BID      Continuous Infusions:     PRN Meds:    Current Facility-Administered Medications:     acetaminophen, 650 mg, Oral, Q8H PRN    acetaminophen, 650 mg, Oral, Q4H PRN    ALPRAZolam, 0.25 mg, Oral, TID PRN    dextrose 10%, 12.5 g, Intravenous, PRN    dextrose 10%, 25 g, Intravenous, PRN    glucagon (human recombinant), 1 mg, Intramuscular, PRN    glucose, 16 g, Oral, PRN    glucose, 24 g, Oral, PRN    glycopyrrolate, 1 mg, Oral, BID PRN    hydrOXYzine pamoate, 25 mg, Oral, Q8H PRN    ondansetron, 4 mg, Intravenous, Q8H PRN         Paula Senior MD  Department of Hospital Medicine   [facility]   12/12/2024

## 2024-12-12 NOTE — PLAN OF CARE
Problem: Physical Therapy  Goal: Physical Therapy Goal  Description: Goals to be met by: 25     Patient will increase functional independence with mobility by performin. Supine to sit with Stand-by Assistance  2. Sit to supine with Stand-by Assistance  3. Sit to stand transfer with Stand-by Assistance  4. Bed to chair transfer with Stand-by Assistance using Rolling Walker  5. Gait  x 50 feet with Stand-by Assistance using Rolling Walker.     Outcome: Progressing

## 2024-12-13 LAB
ALBUMIN SERPL-MCNC: 2.9 G/DL (ref 3.4–4.8)
BASOPHILS # BLD AUTO: 0.03 X10(3)/MCL
BASOPHILS NFR BLD AUTO: 0.5 %
BUN SERPL-MCNC: 33.2 MG/DL (ref 9.8–20.1)
CALCIUM SERPL-MCNC: 8.6 MG/DL (ref 8.4–10.2)
CHLORIDE SERPL-SCNC: 98 MMOL/L (ref 98–107)
CO2 SERPL-SCNC: 34 MMOL/L (ref 23–31)
CREAT SERPL-MCNC: 1.92 MG/DL (ref 0.55–1.02)
EOSINOPHIL # BLD AUTO: 0.16 X10(3)/MCL (ref 0–0.9)
EOSINOPHIL NFR BLD AUTO: 2.4 %
ERYTHROCYTE [DISTWIDTH] IN BLOOD BY AUTOMATED COUNT: 19.1 % (ref 11.5–17)
GFR SERPLBLD CREATININE-BSD FMLA CKD-EPI: 25 ML/MIN/1.73/M2
GLUCOSE SERPL-MCNC: 103 MG/DL (ref 82–115)
HCT VFR BLD AUTO: 24.8 % (ref 37–47)
HGB BLD-MCNC: 8.2 G/DL (ref 12–16)
IMM GRANULOCYTES # BLD AUTO: 0.02 X10(3)/MCL (ref 0–0.04)
IMM GRANULOCYTES NFR BLD AUTO: 0.3 %
LYMPHOCYTES # BLD AUTO: 2.35 X10(3)/MCL (ref 0.6–4.6)
LYMPHOCYTES NFR BLD AUTO: 35.3 %
MAGNESIUM SERPL-MCNC: 2.5 MG/DL (ref 1.6–2.6)
MCH RBC QN AUTO: 31.9 PG (ref 27–31)
MCHC RBC AUTO-ENTMCNC: 33.1 G/DL (ref 33–36)
MCV RBC AUTO: 96.5 FL (ref 80–94)
MONOCYTES # BLD AUTO: 0.74 X10(3)/MCL (ref 0.1–1.3)
MONOCYTES NFR BLD AUTO: 11.1 %
NEUTROPHILS # BLD AUTO: 3.35 X10(3)/MCL (ref 2.1–9.2)
NEUTROPHILS NFR BLD AUTO: 50.4 %
NRBC BLD AUTO-RTO: 0 %
PHOSPHATE SERPL-MCNC: 2.7 MG/DL (ref 2.3–4.7)
PLATELET # BLD AUTO: 97 X10(3)/MCL (ref 130–400)
PLATELETS.RETICULATED NFR BLD AUTO: 2.7 % (ref 0.9–11.2)
PMV BLD AUTO: 12.4 FL (ref 7.4–10.4)
POTASSIUM SERPL-SCNC: 4.1 MMOL/L (ref 3.5–5.1)
RBC # BLD AUTO: 2.57 X10(6)/MCL (ref 4.2–5.4)
SODIUM SERPL-SCNC: 135 MMOL/L (ref 136–145)
WBC # BLD AUTO: 6.65 X10(3)/MCL (ref 4.5–11.5)

## 2024-12-13 PROCEDURE — 99900035 HC TECH TIME PER 15 MIN (STAT)

## 2024-12-13 PROCEDURE — 63600175 PHARM REV CODE 636 W HCPCS: Performed by: INTERNAL MEDICINE

## 2024-12-13 PROCEDURE — 25000003 PHARM REV CODE 250: Performed by: INTERNAL MEDICINE

## 2024-12-13 PROCEDURE — 85025 COMPLETE CBC W/AUTO DIFF WBC: CPT | Performed by: INTERNAL MEDICINE

## 2024-12-13 PROCEDURE — 36415 COLL VENOUS BLD VENIPUNCTURE: CPT | Performed by: INTERNAL MEDICINE

## 2024-12-13 PROCEDURE — 94799 UNLISTED PULMONARY SVC/PX: CPT

## 2024-12-13 PROCEDURE — 97530 THERAPEUTIC ACTIVITIES: CPT

## 2024-12-13 PROCEDURE — 21400001 HC TELEMETRY ROOM

## 2024-12-13 PROCEDURE — 83735 ASSAY OF MAGNESIUM: CPT | Performed by: INTERNAL MEDICINE

## 2024-12-13 PROCEDURE — 25000003 PHARM REV CODE 250: Performed by: PHYSICIAN ASSISTANT

## 2024-12-13 PROCEDURE — 80069 RENAL FUNCTION PANEL: CPT | Performed by: INTERNAL MEDICINE

## 2024-12-13 RX ORDER — FUROSEMIDE 10 MG/ML
80 INJECTION INTRAMUSCULAR; INTRAVENOUS EVERY 6 HOURS
Status: COMPLETED | OUTPATIENT
Start: 2024-12-13 | End: 2024-12-13

## 2024-12-13 RX ADMIN — ATORVASTATIN CALCIUM 10 MG: 10 TABLET, FILM COATED ORAL at 10:12

## 2024-12-13 RX ADMIN — GABAPENTIN 100 MG: 100 CAPSULE ORAL at 10:12

## 2024-12-13 RX ADMIN — RIVASTIGMINE TARTRATE 1.5 MG: 1.5 CAPSULE ORAL at 10:12

## 2024-12-13 RX ADMIN — FUROSEMIDE 80 MG: 10 INJECTION, SOLUTION INTRAMUSCULAR; INTRAVENOUS at 10:12

## 2024-12-13 RX ADMIN — LEVOTHYROXINE SODIUM 25 MCG: 25 TABLET ORAL at 06:12

## 2024-12-13 RX ADMIN — MICONAZOLE NITRATE: 20 POWDER TOPICAL at 10:12

## 2024-12-13 RX ADMIN — MUPIROCIN: 20 OINTMENT TOPICAL at 10:12

## 2024-12-13 RX ADMIN — FUROSEMIDE 40 MG: 40 TABLET ORAL at 10:12

## 2024-12-13 RX ADMIN — CARVEDILOL 3.12 MG: 3.12 TABLET, FILM COATED ORAL at 10:12

## 2024-12-13 RX ADMIN — APIXABAN 2.5 MG: 2.5 TABLET, FILM COATED ORAL at 10:12

## 2024-12-13 RX ADMIN — RISPERIDONE 1 MG: 1 TABLET, FILM COATED ORAL at 10:12

## 2024-12-13 RX ADMIN — FUROSEMIDE 80 MG: 10 INJECTION, SOLUTION INTRAMUSCULAR; INTRAVENOUS at 03:12

## 2024-12-13 RX ADMIN — FERROUS SULFATE TAB 325 MG (65 MG ELEMENTAL FE) 1 EACH: 325 (65 FE) TAB at 10:12

## 2024-12-13 RX ADMIN — LATANOPROST 1 DROP: 50 SOLUTION OPHTHALMIC at 10:12

## 2024-12-13 RX ADMIN — PANTOPRAZOLE SODIUM 40 MG: 40 TABLET, DELAYED RELEASE ORAL at 10:12

## 2024-12-13 RX ADMIN — ALLOPURINOL 200 MG: 100 TABLET ORAL at 10:12

## 2024-12-13 NOTE — PT/OT/SLP PROGRESS
"Physical Therapy Treatment    Patient Name:  Ev Alberts   MRN:  [id number]    Recommendations:     Discharge therapy intensity: Low Intensity Therapy (with sitters)   Discharge Equipment Recommendations: none  Barriers to discharge: Impaired mobility and Ongoing medical needs    Assessment:     Ev Alberts is a 89 y.o. female admitted with a medical diagnosis of acute on chronic CHF exacerbation, NSTEMI, MICHEL on CKD, urinary retention, hx of glaucoma, DVT, chronic diastolic heart failure.  She presents with the following impairments/functional limitations: weakness, impaired endurance, impaired self care skills, impaired functional mobility, gait instability, impaired balance, decreased lower extremity function.    Pt continues to demo decreased endurance and tolerance to activity. Able to t/f to chair but declined further mobility despite max encouragement.     Rehab Prognosis: Good; patient would benefit from acute skilled PT services to address these deficits and reach maximum level of function.    Recent Surgery: * No surgery found *      Plan:     During this hospitalization, patient would benefit from acute PT services 5 x/week to address the identified rehab impairments via gait training, therapeutic activities, therapeutic exercises and progress toward the following goals:    Plan of Care Expires:  01/12/25    Subjective     Chief Complaint: "I don't feel great"  Patient/Family Comments/goals: none stated  Pain/Comfort:  Pain Rating 1: 0/10      Objective:     Communicated with NSG prior to session.  Patient found HOB elevated with telemetry, oxygen, pulse ox (continuous), peripheral IV, pressure relief boots upon PT entry to room.     General Precautions: Standard, fall  Orthopedic Precautions: N/A  Braces: N/A  Respiratory Status: Nasal cannula, flow 2 L/min; % SpO2      Functional Mobility:  Bed Mobility:     Rolling Left:  moderate assistance  Supine to Sit: moderate assistance  Transfers:   "   Sit to Stand:  moderate assistance with rolling walker x 1 from EOB and x 1 from BS chair   Bed to Chair: moderate assistance with  rolling walker  using  Stand Pivot  Gait: Able to take sidesteps to readjust positioning in chair with RW and Claudette; pt declined further mobility despite encouragement   Balance: CGA for static sitting balance       Education:  Patient provided with verbal education education regarding PT role/goals/POC, fall prevention, safety awareness, and discharge/DME recommendations.  Understanding was verbalized.     Patient left up in chair with all lines intact, call button in reach, geomat cushion, bryanna pad in place, and RN notified    GOALS:   Multidisciplinary Problems       Physical Therapy Goals          Problem: Physical Therapy    Goal Priority Disciplines Outcome Interventions   Physical Therapy Goal     PT, PT/OT Progressing    Description: Goals to be met by: 25     Patient will increase functional independence with mobility by performin. Supine to sit with Stand-by Assistance  2. Sit to supine with Stand-by Assistance  3. Sit to stand transfer with Stand-by Assistance  4. Bed to chair transfer with Stand-by Assistance using Rolling Walker  5. Gait  x 50 feet with Stand-by Assistance using Rolling Walker.                          Time Tracking:     PT Received On: 24  PT Start Time: 1153     PT Stop Time: 1216  PT Total Time (min): 23 min     Billable Minutes: Therapeutic Activity 2    Treatment Type: Treatment  PT/PTA: PT     Number of PTA visits since last PT visit: 2024

## 2024-12-13 NOTE — PROGRESS NOTES
[facility]  Hospital Medicine Progress Note        Chief Complaint: Inpatient Follow-up for weakness     HPI:   89 y.o. female with a PMHx  of hypertension, PAF on Eliquis, DVT, chronic diastolic heart failure LVEF 50-55%, GII DD, sick sinus syndrome, CKD stage III ( baseline cr 1.5) with solitary kidney, chronic hypoxemic respiratory failure on 2 L home oxygen, hypothyroidism, and glaucoma who presented to [facility] on 12/9/2024 with c/o generalized weakness.  Patient reported worsening weakness over the past 3 weeks associated with  lower extremity swelling, abdominal distention, and difficulty urinating.  Patient also endorsed 5 lb weight gain.  Patient reported compliance with home Lasix- 80 mg q.a.m. and 40 mg in the afternoon.  Daughter reports she is followed by nephrologist Dr. Chávez who manages her diuretic. Patient denied fever, rectal bleeding, melena, hematuria, dysuria, and chest pain.      Initial vital signs in ED were /82, pulse 82, respirations 12, temperature 36.8° C, and SpO2 99% on 2 L nasal cannula.  Labs revealed WBC 5.32,  hemoglobin 9.9, platelets 110, sodium 97, CO2 32, BUN 26.3, creatinine 1.91 baseline 1.5, .2, and troponin 0.055.  Chest x-ray revealed right small pleural effusion and basilar atelectasis.  Patient was given IV Lasix 60 mg and aspirin 325 mg tablet in ED. Grey catheter was placed in ED with 1900 urine return. Patient was admitted to hospital medicine service for further medical management. Nephrology was consulted to assist.    Interval Hx:   Pt was seen and examined patient's  bedside discussed the CT scan results with patient and patient's  patient is refusing any kind of intervention compression fracture appears stable    Case was discussed with patient's nurse and  on the floor.    Objective/physical exam:  General: In no acute distress, afebrile, On NC  Chest: Decreased breath sounds at bases    Heart: RRR, +S1, S2, no appreciable murmur  Abdomen: Soft, nontender, BS +  MSK: Warm, 2+ lower extremity edema, no clubbing or cyanosis  Neurologic: Alert and oriented x3    VITAL SIGNS: 24 HRS MIN & MAX LAST   Temp  Min: 97.6 °F (36.4 °C)  Max: 99.1 °F (37.3 °C) 99.1 °F (37.3 °C)   BP  Min: 105/53  Max: 139/86 (!) 105/53   Pulse  Min: 59  Max: 68  68   Resp  Min: 16  Max: 18 18   SpO2  Min: 98 %  Max: 100 % 100 %     I have reviewed the following labs:  Recent Labs   Lab 12/11/24  0352 12/12/24  0343 12/13/24  0615   WBC 5.45 6.63 6.65   RBC 2.71* 2.72* 2.57*   HGB 8.6* 8.7* 8.2*   HCT 26.1* 25.9* 24.8*   MCV 96.3* 95.2* 96.5*   MCH 31.7* 32.0* 31.9*   MCHC 33.0 33.6 33.1   RDW 19.1* 19.0* 19.1*   PLT 83* 107* 97*   MPV 10.2 10.4 12.4*     Recent Labs   Lab 12/09/24  1250 12/10/24  0456 12/11/24  0352 12/12/24  0343 12/13/24  0615    136 135* 141 135*   K 3.7 3.4* 4.4 4.1 4.1   CL 97* 99 99 95* 98   CO2 32* 30 30 34* 34*   BUN 26.3* 24.9* 27.5* 29.5* 33.2*   CREATININE 1.91* 1.75* 1.70* 1.88* 1.92*   CALCIUM 9.5 9.0 9.0 9.3 8.6   MG 2.60  --  2.40 2.40 2.50   ALBUMIN 3.5 2.9* 2.9* 3.0* 2.9*   ALKPHOS 138 107  --   --   --    ALT 11 9  --   --   --    AST 32 25  --   --   --    BILITOT 1.4 1.0  --   --   --      Microbiology Results (last 7 days)       ** No results found for the last 168 hours. **             See below for Radiology    Assessment/Plan:  Acute on chronic diastolic HF, LVEF 50-55%, GIIDD, POA  Acute kidney injury on CKD IIIb- Cardiorenal and Urinary retention   Urinary retention , s/p Grey insertion, POA  Stable chronic L2 inferior endplate compression deformity with minimal loss of height.  Multilevel degenerative changes of the lumbar spine.  New onset Pancytopenia with moderate thrombocytopenia, POA- likely in the setting  acute illness   Acute on chronic anemia - mild to mod without evidence of overt bleed   Hypokalemia , POA  Elevated Troponin/ NSTEMI type 2 in the setting of HF  exacerbation, POA  Chronic hypoxic resp failure on home O2- stable   Physical debility / Generalized weakness more in bilateral lower extremities     Hx- hypertension, PAF on Eliquis, DVT,, sick sinus syndrome,  solitary kidney, chronic hypoxemic respiratory failure on 2 L home oxygen, hypothyroidism, and glaucoma     Plan-   Creatinine is trending up this morning it is 1.92 still has Grey catheter Nephrology's following follow up on their recommendations for now continue with p.o. Lasix  Maybe voiding trial today  We had ordered CT of the L-spine that shows stable compression fracture and patient is denying any back pain   Discussed the findings with the patient and her  they are refusing any kind of surgery and I agree considering age  We had consulted PT and OT they are recommending low intensity with sitters  Urine culture neg for bacterial growth  Replete potassium   Monitor H&H and sings of active bleeding   Transfuse blood for Hgb 8 or under given underlying cardiac disease   Nephro has given EPO 40 K x1 on 12/10/24   Creatinine of 1.9 will keep a close watch Nephrology's following   2D echo showed EF of 50-55% grade 1 diastolic dysfunction septal motion consistent with pacing that was done in July of 2024 patient denies any chest pain I will have the patient follow up outpatient with Cardiology  Repeat blood work in a.m.  Otherwise blood pressure stable on 2 L of nasal cannula            VTE prophylaxis: Eliquis      Patient condition:  Fair     Anticipated discharge and Disposition:     TBD      All diagnosis and differential diagnosis have been reviewed; assessment and plan has been documented; I have personally reviewed the labs and test results that are presently available; I have reviewed the patients medication list; I have reviewed the consulting providers response and recommendations. I have reviewed or attempted to review medical records based upon their availability    All of the patient's  questions have been  addressed and answered. Patient's is agreeable to the above stated plan. I will continue to monitor closely and make adjustments to medical management as needed.    Portions of this note dictated using EMR integrated voice recognition software, and may be subject to voice recognition errors not corrected at proofreading. Please contact writer for clarification if needed.   _____________________________________________________________________    Malnutrition Status:  Nutrition consulted. Most recent weight and BMI monitored-     Measurements:  Wt Readings from Last 1 Encounters:   12/10/24 68 kg (149 lb 14.6 oz)   Body mass index is 24.95 kg/m².    Patient has been screened and assessed by RD.    Malnutrition Type:  Context:    Level:      Malnutrition Characteristic Summary:       Interventions/Recommendations (treatment strategy):        Scheduled Med:   allopurinoL  200 mg Oral Daily    apixaban  2.5 mg Oral BID    atorvastatin  10 mg Oral QHS    carvediloL  3.125 mg Oral BID    ferrous sulfate  1 tablet Oral BID    furosemide  40 mg Oral BID    gabapentin  100 mg Oral BID    latanoprost  1 drop Both Eyes QHS    levothyroxine  25 mcg Oral Before breakfast    miconazole NITRATE 2 %   Topical (Top) BID    mupirocin   Nasal BID    pantoprazole  40 mg Oral BID    risperiDONE  1 mg Oral QHS    rivastigmine tartrate  1.5 mg Oral BID      Continuous Infusions:     PRN Meds:    Current Facility-Administered Medications:     acetaminophen, 650 mg, Oral, Q8H PRN    acetaminophen, 650 mg, Oral, Q4H PRN    ALPRAZolam, 0.25 mg, Oral, TID PRN    dextrose 10%, 12.5 g, Intravenous, PRN    dextrose 10%, 25 g, Intravenous, PRN    glucagon (human recombinant), 1 mg, Intramuscular, PRN    glucose, 16 g, Oral, PRN    glucose, 24 g, Oral, PRN    glycopyrrolate, 1 mg, Oral, BID PRN    hydrOXYzine pamoate, 25 mg, Oral, Q8H PRN    ondansetron, 4 mg, Intravenous, Q8H PRN         Paula Senior MD  Department of  [facility]   [facility]   12/13/2024

## 2024-12-13 NOTE — PROGRESS NOTES
Renal    Initial 12/10/24 HPI:  A 72-year-old female, consulted for MICHEL CKD and edema (known Dr. Chávez baseline creatinine 1.5).  Presented to emergency department on 12/09/2024 with weakness past 3 weeks with increased lower extremity edema, abdominal distention and difficulty urinating.  She takes Lasix 80 p.o. q.a.m. and 40 p.o. q.p.m..  ED workup showed creatinine 1.9, Grey catheter was placed for urinary retention and was given Lasix 60 IV x1 with significant diuresis.      Chief complaint:   OK    Interval History:  Lasix 60 IV BID changed to 40 PO BID yesterday and had 1.1 L UOP which was the same UOP as lasix 60 IV BID. CT of the L-spine that shows stable L2 compression fracture. More sleepy    ROS:  all else Neg     allopurinoL  200 mg Oral Daily    apixaban  2.5 mg Oral BID    atorvastatin  10 mg Oral QHS    carvediloL  3.125 mg Oral BID    ferrous sulfate  1 tablet Oral BID    furosemide  40 mg Oral BID    gabapentin  100 mg Oral BID    latanoprost  1 drop Both Eyes QHS    levothyroxine  25 mcg Oral Before breakfast    miconazole NITRATE 2 %   Topical (Top) BID    mupirocin   Nasal BID    pantoprazole  40 mg Oral BID    risperiDONE  1 mg Oral QHS    rivastigmine tartrate  1.5 mg Oral BID       VITAL SIGNS: 24 HR MIN & MAX LAST    Temp  Min: 97.6 °F (36.4 °C)  Max: 99.1 °F (37.3 °C)  98.6 °F (37 °C)        BP  Min: 101/57  Max: 124/72  (!) 101/57     Pulse  Min: 59  Max: 70  70     Resp  Min: 18  Max: 18  18    SpO2  Min: 99 %  Max: 100 %  99 %      Wt Readings from Last 3 Encounters:   12/10/24 68 kg (149 lb 14.6 oz)   11/19/24 72.6 kg (160 lb)   10/23/24 68.9 kg (152 lb)       Intake/Output Summary (Last 24 hours) at 12/13/2024 1303  Last data filed at 12/13/2024 0931  Gross per 24 hour   Intake 480 ml   Output 1450 ml   Net -970 ml     A&O x 4, NAD  EOMI  Rales  Unlabored  Soft, NT, ND  Edema 2-3 +    Recent Labs     12/11/24  0352 12/12/24  0343 12/13/24  0615   * 141 135*   K 4.4 4.1 4.1    CO2 30 34* 34*   BUN 27.5* 29.5* 33.2*   CREATININE 1.70* 1.88* 1.92*   GLUCOSE 106 116* 103   CALCIUM 9.0 9.3 8.6   MG 2.40 2.40 2.50   PHOS 3.1 2.7 2.7   ALBUMIN 2.9* 3.0* 2.9*      Recent Labs     12/11/24  0352 12/12/24  0343 12/13/24  0615   WBC 5.45 6.63 6.65   HGB 8.6* 8.7* 8.2*   HCT 26.1* 25.9* 24.8*   PLT 83* 107* 97*       ASSESSMENT / PLAN    Urinary retention, fluid overload     CKD 3B-baseline creatinine 1.5-1.6  MICHEL, cardiorenal and urinary retention  Acute Urinary retention of unknown etiology  Fluid overload/edema  CHF (Grade II diastolic dysfunction)  VHD - mod MR, mod TR, Pulm HTN  Anemia of CKD - continue p.o. Fe b.i.d., s/p EPO 40 K 12/10/2024  Thrombocytopenia  H/o GIB 7/2024  HTN with ACEi allergy  Hypothyroidism, TSH 1.6  Afib, eliquis  Solitary kidney, h/o (L) Ntx  Chronic Home O2 (2 L)     Renal fcn worse  More rales on exam  Still with a lot of BLE edema  Will intensify diuresis with Lasix 80 IV x 2 today  Keep Grey  Monitor UOP and labs in AM          This is a medical document written in a [id number] manner with standard medical terminology and conventions. It is a communication tool for medical professional. It is not intended for the lay public. Any inference of insensitivity or offence is solely a product with the reader's imagination. This is a document written devoid of emotion, as it is a medical document and it's only purpose is to convey information to medical professionals.

## 2024-12-14 LAB
ALBUMIN SERPL-MCNC: 2.8 G/DL (ref 3.4–4.8)
BASE EXCESS BLD CALC-SCNC: 12.9 MMOL/L
BASOPHILS # BLD AUTO: 0.04 X10(3)/MCL
BASOPHILS NFR BLD AUTO: 0.6 %
BLOOD GAS SAMPLE TYPE (OHS): ABNORMAL
BUN SERPL-MCNC: 35.7 MG/DL (ref 9.8–20.1)
CA-I BLD-SCNC: 1.15 MMOL/L (ref 1.12–1.23)
CALCIUM SERPL-MCNC: 9.5 MG/DL (ref 8.4–10.2)
CHLORIDE SERPL-SCNC: 97 MMOL/L (ref 98–107)
CO2 BLDA-SCNC: 40.2 MMOL/L
CO2 SERPL-SCNC: 33 MMOL/L (ref 23–31)
CREAT SERPL-MCNC: 1.67 MG/DL (ref 0.55–1.02)
DRAWN BY BLOOD GAS (OHS): ABNORMAL
EOSINOPHIL # BLD AUTO: 0.16 X10(3)/MCL (ref 0–0.9)
EOSINOPHIL NFR BLD AUTO: 2.6 %
ERYTHROCYTE [DISTWIDTH] IN BLOOD BY AUTOMATED COUNT: 19 % (ref 11.5–17)
GFR SERPLBLD CREATININE-BSD FMLA CKD-EPI: 30 ML/MIN/1.73/M2
GLUCOSE SERPL-MCNC: 111 MG/DL (ref 82–115)
HCO3 BLDA-SCNC: 38.6 MMOL/L (ref 22–26)
HCT VFR BLD AUTO: 26.5 % (ref 37–47)
HGB BLD-MCNC: 8.5 G/DL (ref 12–16)
IMM GRANULOCYTES # BLD AUTO: 0.01 X10(3)/MCL (ref 0–0.04)
IMM GRANULOCYTES NFR BLD AUTO: 0.2 %
INHALED O2 CONCENTRATION: 28 %
LYMPHOCYTES # BLD AUTO: 1.9 X10(3)/MCL (ref 0.6–4.6)
LYMPHOCYTES NFR BLD AUTO: 30.5 %
MAGNESIUM SERPL-MCNC: 2.5 MG/DL (ref 1.6–2.6)
MCH RBC QN AUTO: 31.8 PG (ref 27–31)
MCHC RBC AUTO-ENTMCNC: 32.1 G/DL (ref 33–36)
MCV RBC AUTO: 99.3 FL (ref 80–94)
MONOCYTES # BLD AUTO: 0.75 X10(3)/MCL (ref 0.1–1.3)
MONOCYTES NFR BLD AUTO: 12 %
NEUTROPHILS # BLD AUTO: 3.37 X10(3)/MCL (ref 2.1–9.2)
NEUTROPHILS NFR BLD AUTO: 54.1 %
NRBC BLD AUTO-RTO: 0 %
OXYGEN DEVICE BLOOD GAS (OHS): ABNORMAL
PCO2 BLDA: 53 MMHG (ref 35–45)
PH BLDA: 7.47 [PH] (ref 7.35–7.45)
PHOSPHATE SERPL-MCNC: 2.5 MG/DL (ref 2.3–4.7)
PLATELET # BLD AUTO: 106 X10(3)/MCL (ref 130–400)
PLATELETS.RETICULATED NFR BLD AUTO: 3.8 % (ref 0.9–11.2)
PMV BLD AUTO: 11 FL (ref 7.4–10.4)
PO2 BLDA: 151 MMHG (ref 80–100)
POTASSIUM BLOOD GAS (OHS): 4.1 MMOL/L (ref 3.5–5)
POTASSIUM SERPL-SCNC: 4.1 MMOL/L (ref 3.5–5.1)
RBC # BLD AUTO: 2.67 X10(6)/MCL (ref 4.2–5.4)
SAMPLE SITE BLOOD GAS (OHS): ABNORMAL
SAO2 % BLDA: 99 %
SODIUM BLOOD GAS (OHS): 135 MMOL/L (ref 137–145)
SODIUM SERPL-SCNC: 134 MMOL/L (ref 136–145)
WBC # BLD AUTO: 6.23 X10(3)/MCL (ref 4.5–11.5)

## 2024-12-14 PROCEDURE — 99900031 HC PATIENT EDUCATION (STAT)

## 2024-12-14 PROCEDURE — 36600 WITHDRAWAL OF ARTERIAL BLOOD: CPT

## 2024-12-14 PROCEDURE — 83735 ASSAY OF MAGNESIUM: CPT | Performed by: INTERNAL MEDICINE

## 2024-12-14 PROCEDURE — 25000003 PHARM REV CODE 250: Performed by: INTERNAL MEDICINE

## 2024-12-14 PROCEDURE — 25000003 PHARM REV CODE 250: Performed by: PHYSICIAN ASSISTANT

## 2024-12-14 PROCEDURE — 80069 RENAL FUNCTION PANEL: CPT | Performed by: INTERNAL MEDICINE

## 2024-12-14 PROCEDURE — 99900035 HC TECH TIME PER 15 MIN (STAT)

## 2024-12-14 PROCEDURE — 85025 COMPLETE CBC W/AUTO DIFF WBC: CPT | Performed by: INTERNAL MEDICINE

## 2024-12-14 PROCEDURE — 21400001 HC TELEMETRY ROOM

## 2024-12-14 PROCEDURE — 27000221 HC OXYGEN, UP TO 24 HOURS

## 2024-12-14 PROCEDURE — 82803 BLOOD GASES ANY COMBINATION: CPT

## 2024-12-14 PROCEDURE — 36415 COLL VENOUS BLD VENIPUNCTURE: CPT | Performed by: INTERNAL MEDICINE

## 2024-12-14 PROCEDURE — 94760 N-INVAS EAR/PLS OXIMETRY 1: CPT | Mod: XB

## 2024-12-14 RX ORDER — FUROSEMIDE 20 MG/1
20 TABLET ORAL DAILY
Status: DISCONTINUED | OUTPATIENT
Start: 2024-12-14 | End: 2024-12-16

## 2024-12-14 RX ORDER — SODIUM,POTASSIUM PHOSPHATES 280-250MG
1 POWDER IN PACKET (EA) ORAL 2 TIMES DAILY
Status: DISCONTINUED | OUTPATIENT
Start: 2024-12-14 | End: 2024-12-15

## 2024-12-14 RX ADMIN — POTASSIUM & SODIUM PHOSPHATES POWDER PACK 280-160-250 MG 1 PACKET: 280-160-250 PACK at 08:12

## 2024-12-14 RX ADMIN — RISPERIDONE 1 MG: 1 TABLET, FILM COATED ORAL at 08:12

## 2024-12-14 RX ADMIN — MICONAZOLE NITRATE: 20 POWDER TOPICAL at 08:12

## 2024-12-14 RX ADMIN — MUPIROCIN: 20 OINTMENT TOPICAL at 08:12

## 2024-12-14 RX ADMIN — ALPRAZOLAM 0.25 MG: 0.25 TABLET ORAL at 09:12

## 2024-12-14 RX ADMIN — LATANOPROST 1 DROP: 50 SOLUTION OPHTHALMIC at 08:12

## 2024-12-14 RX ADMIN — FUROSEMIDE 20 MG: 20 TABLET ORAL at 12:12

## 2024-12-14 RX ADMIN — CARVEDILOL 3.12 MG: 3.12 TABLET, FILM COATED ORAL at 08:12

## 2024-12-14 RX ADMIN — ACETAMINOPHEN 325MG 650 MG: 325 TABLET ORAL at 09:12

## 2024-12-14 RX ADMIN — APIXABAN 2.5 MG: 2.5 TABLET, FILM COATED ORAL at 08:12

## 2024-12-14 RX ADMIN — FERROUS SULFATE TAB 325 MG (65 MG ELEMENTAL FE) 1 EACH: 325 (65 FE) TAB at 08:12

## 2024-12-14 RX ADMIN — ATORVASTATIN CALCIUM 10 MG: 10 TABLET, FILM COATED ORAL at 08:12

## 2024-12-14 RX ADMIN — RIVASTIGMINE TARTRATE 1.5 MG: 1.5 CAPSULE ORAL at 08:12

## 2024-12-14 RX ADMIN — POTASSIUM & SODIUM PHOSPHATES POWDER PACK 280-160-250 MG 1 PACKET: 280-160-250 PACK at 12:12

## 2024-12-14 RX ADMIN — LEVOTHYROXINE SODIUM 25 MCG: 25 TABLET ORAL at 05:12

## 2024-12-14 RX ADMIN — GABAPENTIN 100 MG: 100 CAPSULE ORAL at 08:12

## 2024-12-14 RX ADMIN — PANTOPRAZOLE SODIUM 40 MG: 40 TABLET, DELAYED RELEASE ORAL at 08:12

## 2024-12-14 RX ADMIN — ALLOPURINOL 200 MG: 100 TABLET ORAL at 08:12

## 2024-12-14 NOTE — PROGRESS NOTES
[facility]  Hospital Medicine Progress Note        Chief Complaint: Inpatient Follow-up for weakness     HPI:   89 y.o. female with a PMHx  of hypertension, PAF on Eliquis, DVT, chronic diastolic heart failure LVEF 50-55%, GII DD, sick sinus syndrome, CKD stage III ( baseline cr 1.5) with solitary kidney, chronic hypoxemic respiratory failure on 2 L home oxygen, hypothyroidism, and glaucoma who presented to [facility] on 12/9/2024 with c/o generalized weakness.  Patient reported worsening weakness over the past 3 weeks associated with  lower extremity swelling, abdominal distention, and difficulty urinating.  Patient also endorsed 5 lb weight gain.  Patient reported compliance with home Lasix- 80 mg q.a.m. and 40 mg in the afternoon.  Daughter reports she is followed by nephrologist Dr. Chávez who manages her diuretic. Patient denied fever, rectal bleeding, melena, hematuria, dysuria, and chest pain.      Initial vital signs in ED were /82, pulse 82, respirations 12, temperature 36.8° C, and SpO2 99% on 2 L nasal cannula.  Labs revealed WBC 5.32,  hemoglobin 9.9, platelets 110, sodium 97, CO2 32, BUN 26.3, creatinine 1.91 baseline 1.5, .2, and troponin 0.055.  Chest x-ray revealed right small pleural effusion and basilar atelectasis.  Patient was given IV Lasix 60 mg and aspirin 325 mg tablet in ED. Grey catheter was placed in ED with 1900 urine return. Patient was admitted to hospital medicine service for further medical management. Nephrology was consulted to assist.  iscussed the CT scan results with patient and patient's  patient is refusing any kind of intervention compression fracture appears stable    Interval Hx:   Pt was seen and examined patient's  bedside appeared slightly sleepy at the time of my visit so I had ordered ABGs that looked fairly okay but later she was more alert and awake  Case was discussed with patient's nurse and  on  the floor.    Objective/physical exam:  General: In no acute distress, afebrile, On NC  Chest: Decreased breath sounds at bases   Heart: RRR, +S1, S2, no appreciable murmur  Abdomen: Soft, nontender, BS +  MSK: Warm, 2+ lower extremity edema, no clubbing or cyanosis  Neurologic: Alert and oriented x3    VITAL SIGNS: 24 HRS MIN & MAX LAST   Temp  Min: 97.8 °F (36.6 °C)  Max: 98.8 °F (37.1 °C) 97.8 °F (36.6 °C)   BP  Min: 97/52  Max: 123/71 120/84   Pulse  Min: 59  Max: 70  63   Resp  Min: 18  Max: 20 18   SpO2  Min: 97 %  Max: 100 % 98 %     I have reviewed the following labs:  Recent Labs   Lab 12/12/24 0343 12/13/24  0615 12/14/24  0436   WBC 6.63 6.65 6.23   RBC 2.72* 2.57* 2.67*   HGB 8.7* 8.2* 8.5*   HCT 25.9* 24.8* 26.5*   MCV 95.2* 96.5* 99.3*   MCH 32.0* 31.9* 31.8*   MCHC 33.6 33.1 32.1*   RDW 19.0* 19.1* 19.0*   * 97* 106*   MPV 10.4 12.4* 11.0*     Recent Labs   Lab 12/09/24  1250 12/10/24  0456 12/11/24  0352 12/12/24  0343 12/13/24  0615 12/14/24  0436 12/14/24  0852    136   < > 141 135* 134*  --    K 3.7 3.4*   < > 4.1 4.1 4.1  --    CL 97* 99   < > 95* 98 97*  --    CO2 32* 30   < > 34* 34* 33*  --    BUN 26.3* 24.9*   < > 29.5* 33.2* 35.7*  --    CREATININE 1.91* 1.75*   < > 1.88* 1.92* 1.67*  --    CALCIUM 9.5 9.0   < > 9.3 8.6 9.5  --    PH  --   --   --   --   --   --  7.470*   MG 2.60  --    < > 2.40 2.50 2.50  --    ALBUMIN 3.5 2.9*   < > 3.0* 2.9* 2.8*  --    ALKPHOS 138 107  --   --   --   --   --    ALT 11 9  --   --   --   --   --    AST 32 25  --   --   --   --   --    BILITOT 1.4 1.0  --   --   --   --   --     < > = values in this interval not displayed.     Microbiology Results (last 7 days)       ** No results found for the last 168 hours. **             See below for Radiology    Assessment/Plan:  Acute on chronic diastolic HF, LVEF 50-55%, GIIDD, POA  Acute kidney injury on CKD IIIb- Cardiorenal and Urinary retention   Urinary retention , s/p Grey insertion, POA  Stable  chronic L2 inferior endplate compression deformity with minimal loss of height.  Multilevel degenerative changes of the lumbar spine.  New onset Pancytopenia with moderate thrombocytopenia, POA- likely in the setting  acute illness   Acute on chronic anemia - mild to mod without evidence of overt bleed   Hypokalemia , POA  Elevated Troponin/ NSTEMI type 2 in the setting of HF exacerbation, POA  Chronic hypoxic resp failure on home O2- stable   Physical debility / Generalized weakness more in bilateral lower extremities     Hx- hypertension, PAF on Eliquis, DVT,, sick sinus syndrome,  solitary kidney, chronic hypoxemic respiratory failure on 2 L home oxygen, hypothyroidism, and glaucoma     Plan-   Patient was given 2 doses of IV Lasix yesterday creatinine is improving continue with [facility]'s following  ABGs looks stable slight increase in pCO2 but patient is compensated  We had ordered CT of the L-spine that shows stable compression fracture and patient is denying any back pain   Discussed the findings with the patient and her  they are refusing any kind of surgery and I agree considering age  We had consulted PT and OT they are recommending low intensity with sitters  Urine culture neg for bacterial growth  Replete potassium   Monitor H&H and sings of active bleeding   Transfuse blood for Hgb 8 or under given underlying cardiac disease   Nephro has given EPO 40 K x1 on 12/10/24   2D echo showed EF of 50-55% grade 1 diastolic dysfunction septal motion consistent with pacing that was done in July of 2024 patient denies any chest pain I will have the patient follow up outpatient with Cardiology  Repeat blood work in a.m.  Otherwise blood pressure stable on 2 L of nasal cannula            VTE prophylaxis: Eliquis      Patient condition:  Fair     Anticipated discharge and Disposition:     TBD      All diagnosis and differential diagnosis have been reviewed; assessment and plan has been documented; I  have personally reviewed the labs and test results that are presently available; I have reviewed the patients medication list; I have reviewed the consulting providers response and recommendations. I have reviewed or attempted to review medical records based upon their availability    All of the patient's questions have been  addressed and answered. Patient's is agreeable to the above stated plan. I will continue to monitor closely and make adjustments to medical management as needed.    Portions of this note dictated using EMR integrated voice recognition software, and may be subject to voice recognition errors not corrected at proofreading. Please contact writer for clarification if needed.   _____________________________________________________________________    Malnutrition Status:  Nutrition consulted. Most recent weight and BMI monitored-     Measurements:  Wt Readings from Last 1 Encounters:   12/10/24 68 kg (149 lb 14.6 oz)   Body mass index is 24.95 kg/m².    Patient has been screened and assessed by RD.    Malnutrition Type:  Context:    Level:      Malnutrition Characteristic Summary:       Interventions/Recommendations (treatment strategy):        Scheduled Med:   allopurinoL  200 mg Oral Daily    apixaban  2.5 mg Oral BID    atorvastatin  10 mg Oral QHS    carvediloL  3.125 mg Oral BID    ferrous sulfate  1 tablet Oral BID    gabapentin  100 mg Oral BID    latanoprost  1 drop Both Eyes QHS    levothyroxine  25 mcg Oral Before breakfast    miconazole NITRATE 2 %   Topical (Top) BID    pantoprazole  40 mg Oral BID    risperiDONE  1 mg Oral QHS    rivastigmine tartrate  1.5 mg Oral BID      Continuous Infusions:     PRN Meds:    Current Facility-Administered Medications:     acetaminophen, 650 mg, Oral, Q8H PRN    acetaminophen, 650 mg, Oral, Q4H PRN    ALPRAZolam, 0.25 mg, Oral, TID PRN    dextrose 10%, 12.5 g, Intravenous, PRN    dextrose 10%, 25 g, Intravenous, PRN    glucagon (human recombinant), 1  mg, Intramuscular, PRN    glucose, 16 g, Oral, PRN    glucose, 24 g, Oral, PRN    glycopyrrolate, 1 mg, Oral, BID PRN    hydrOXYzine pamoate, 25 mg, Oral, Q8H PRN    ondansetron, 4 mg, Intravenous, Q8H PRN         Paula Senior MD  Department of Hospital Medicine   [facility]   12/14/2024

## 2024-12-14 NOTE — PROGRESS NOTES
Progress Note  Nephrology    Admit Date: 12/9/2024   LOS: 5 days     SUBJECTIVE:     Follow-up For:  MICHEL / Cardiorenal / CKD 3 b    Interval History:  73 Y/O female with history of CKD 3b , CHF , A Fib , H/O GIB , CHF , admitted to hospital for SOB and edema and swelling  and urinary Retention  , cook was placed in with good urine output  had MICHEL / Cardiorenal but improving  creatinine was 1.9 on admission but today is 1.67  Lethargic today but denies any comp[laint of any problem     Review of Systems:  Denies SOB , no Chest pain  Constitutional: No fever or chills  Respiratory: No cough or shortness of breath  Cardiovascular: No chest pain or palpitations  Gastrointestinal: No nausea or vomiting  Neurological: No confusion or weakness    OBJECTIVE:     Vital Signs Range (Last 24H):  Vitals:    12/14/24 1045   BP: 118/76   Pulse: 68   Resp: 18   Temp: 97.9 °F (36.6 °C)       Temp:  [97.8 °F (36.6 °C)-98.8 °F (37.1 °C)]   Pulse:  [59-68]   Resp:  [18-20]   BP: ()/(52-84)   SpO2:  [97 %-100 %]     I & O (Last 24H):  Intake/Output Summary (Last 24 hours) at 12/14/2024 1134  Last data filed at 12/14/2024 0430  Gross per 24 hour   Intake 120 ml   Output 700 ml   Net -580 ml       Physical Exam:  Alert , oriented , very lethargic   Head   normal   Neck   supple   Chest   symmetric   Lungs   clear   Heart   RRR by auscultation   Abdomen   soft , non tender  Ext   2+ edema     Laboratory Data:    Recent Labs   Lab 12/14/24  0436   *   K 4.1   CL 97*   CO2 33*   BUN 35.7*   CREATININE 1.67*   GLUCOSE 111   CALCIUM 9.5   PHOS 2.5     Lab Results   Component Value Date    CALCIUM 9.5 12/14/2024    CAION 1.15 12/14/2024    PHOS 2.5 12/14/2024     Lab Results   Component Value Date    IRON 37 (L) 12/10/2024    TIBC 165 (L) 12/10/2024    FERRITIN 246.81 (H) 12/10/2024       Medications:  Medication list was reviewed and changes noted under Assessment/Plan.    Diagnostic Results:    Reviewed most recent  CT/US/Echo/MRI    ASSESSMENT/PLAN:   1   CKD 3 b on baseline  2   MICHEL / Cardiorenal   3   Acute urinary retention   4   Fluid overload  5   CHF  diastolic dysfunction  6   VHD ,  mod MR , Mod TR , Pulmonary HTN  7   HTN  8   hypothyroidism   9   A Fib , on Eliquis  10   Solitary kidney   11   thrombocytopenia     Plan   labs in AM             Will add neutraphos x bid x 2 days             Very lethargic , may need to decrease Respiradone             Lasix 20 mg po daily

## 2024-12-14 NOTE — PT/OT/SLP PROGRESS
Occupational Therapy      Patient Name:  Ev Alberts   MRN:  [id number]    [id number]    CHIU consults nurse, who states patient is very lethargic today. CNA also states patient is minimally responding, not following commands, and has noticeably declined in past 24 hours.     Upon entry, patient asleep in bed. CHIU uses verbal and tactile cues to arouse patient, but she has difficulty remaining alert. She briefly answers yes/no questions, but is resistive when CHIU attempts to have her wash her face.     MD enters to consult patient, but she is minimally interactive.    Will follow-up as schedule allows.    12/14/2024

## 2024-12-15 LAB
ALBUMIN SERPL-MCNC: 2.7 G/DL (ref 3.4–4.8)
BASOPHILS # BLD AUTO: 0.03 X10(3)/MCL
BASOPHILS NFR BLD AUTO: 0.5 %
BUN SERPL-MCNC: 41.6 MG/DL (ref 9.8–20.1)
CALCIUM SERPL-MCNC: 9 MG/DL (ref 8.4–10.2)
CHLORIDE SERPL-SCNC: 98 MMOL/L (ref 98–107)
CO2 SERPL-SCNC: 32 MMOL/L (ref 23–31)
CREAT SERPL-MCNC: 1.98 MG/DL (ref 0.55–1.02)
EOSINOPHIL # BLD AUTO: 0.19 X10(3)/MCL (ref 0–0.9)
EOSINOPHIL NFR BLD AUTO: 3.3 %
ERYTHROCYTE [DISTWIDTH] IN BLOOD BY AUTOMATED COUNT: 18.9 % (ref 11.5–17)
GFR SERPLBLD CREATININE-BSD FMLA CKD-EPI: 24 ML/MIN/1.73/M2
GLUCOSE SERPL-MCNC: 106 MG/DL (ref 82–115)
HCT VFR BLD AUTO: 24.2 % (ref 37–47)
HGB BLD-MCNC: 8 G/DL (ref 12–16)
IMM GRANULOCYTES # BLD AUTO: 0.01 X10(3)/MCL (ref 0–0.04)
IMM GRANULOCYTES NFR BLD AUTO: 0.2 %
LYMPHOCYTES # BLD AUTO: 2.12 X10(3)/MCL (ref 0.6–4.6)
LYMPHOCYTES NFR BLD AUTO: 37.1 %
MCH RBC QN AUTO: 32.3 PG (ref 27–31)
MCHC RBC AUTO-ENTMCNC: 33.1 G/DL (ref 33–36)
MCV RBC AUTO: 97.6 FL (ref 80–94)
MONOCYTES # BLD AUTO: 0.68 X10(3)/MCL (ref 0.1–1.3)
MONOCYTES NFR BLD AUTO: 11.9 %
NEUTROPHILS # BLD AUTO: 2.69 X10(3)/MCL (ref 2.1–9.2)
NEUTROPHILS NFR BLD AUTO: 47 %
NRBC BLD AUTO-RTO: 0 %
PHOSPHATE SERPL-MCNC: 3.7 MG/DL (ref 2.3–4.7)
PLATELET # BLD AUTO: 126 X10(3)/MCL (ref 130–400)
PMV BLD AUTO: 11.5 FL (ref 7.4–10.4)
POTASSIUM SERPL-SCNC: 4.4 MMOL/L (ref 3.5–5.1)
RBC # BLD AUTO: 2.48 X10(6)/MCL (ref 4.2–5.4)
SODIUM SERPL-SCNC: 135 MMOL/L (ref 136–145)
WBC # BLD AUTO: 5.72 X10(3)/MCL (ref 4.5–11.5)

## 2024-12-15 PROCEDURE — 36415 COLL VENOUS BLD VENIPUNCTURE: CPT | Performed by: INTERNAL MEDICINE

## 2024-12-15 PROCEDURE — 21400001 HC TELEMETRY ROOM

## 2024-12-15 PROCEDURE — 85025 COMPLETE CBC W/AUTO DIFF WBC: CPT | Performed by: INTERNAL MEDICINE

## 2024-12-15 PROCEDURE — 25000003 PHARM REV CODE 250: Performed by: PHYSICIAN ASSISTANT

## 2024-12-15 PROCEDURE — 25000003 PHARM REV CODE 250: Performed by: INTERNAL MEDICINE

## 2024-12-15 PROCEDURE — 97530 THERAPEUTIC ACTIVITIES: CPT | Mod: CO

## 2024-12-15 PROCEDURE — 80069 RENAL FUNCTION PANEL: CPT | Performed by: INTERNAL MEDICINE

## 2024-12-15 RX ADMIN — RIVASTIGMINE TARTRATE 1.5 MG: 1.5 CAPSULE ORAL at 10:12

## 2024-12-15 RX ADMIN — POTASSIUM & SODIUM PHOSPHATES POWDER PACK 280-160-250 MG 1 PACKET: 280-160-250 PACK at 09:12

## 2024-12-15 RX ADMIN — RIVASTIGMINE TARTRATE 1.5 MG: 1.5 CAPSULE ORAL at 09:12

## 2024-12-15 RX ADMIN — CARVEDILOL 3.12 MG: 3.12 TABLET, FILM COATED ORAL at 10:12

## 2024-12-15 RX ADMIN — MICONAZOLE NITRATE: 20 POWDER TOPICAL at 10:12

## 2024-12-15 RX ADMIN — MICONAZOLE NITRATE: 20 POWDER TOPICAL at 09:12

## 2024-12-15 RX ADMIN — PANTOPRAZOLE SODIUM 40 MG: 40 TABLET, DELAYED RELEASE ORAL at 09:12

## 2024-12-15 RX ADMIN — APIXABAN 2.5 MG: 2.5 TABLET, FILM COATED ORAL at 10:12

## 2024-12-15 RX ADMIN — ALLOPURINOL 200 MG: 100 TABLET ORAL at 09:12

## 2024-12-15 RX ADMIN — FUROSEMIDE 20 MG: 20 TABLET ORAL at 09:12

## 2024-12-15 RX ADMIN — LATANOPROST 1 DROP: 50 SOLUTION OPHTHALMIC at 10:12

## 2024-12-15 RX ADMIN — CARVEDILOL 3.12 MG: 3.12 TABLET, FILM COATED ORAL at 09:12

## 2024-12-15 RX ADMIN — FERROUS SULFATE TAB 325 MG (65 MG ELEMENTAL FE) 1 EACH: 325 (65 FE) TAB at 10:12

## 2024-12-15 RX ADMIN — GABAPENTIN 100 MG: 100 CAPSULE ORAL at 09:12

## 2024-12-15 RX ADMIN — RISPERIDONE 1 MG: 1 TABLET, FILM COATED ORAL at 10:12

## 2024-12-15 RX ADMIN — PANTOPRAZOLE SODIUM 40 MG: 40 TABLET, DELAYED RELEASE ORAL at 10:12

## 2024-12-15 RX ADMIN — ATORVASTATIN CALCIUM 10 MG: 10 TABLET, FILM COATED ORAL at 10:12

## 2024-12-15 RX ADMIN — GABAPENTIN 100 MG: 100 CAPSULE ORAL at 10:12

## 2024-12-15 RX ADMIN — APIXABAN 2.5 MG: 2.5 TABLET, FILM COATED ORAL at 09:12

## 2024-12-15 RX ADMIN — FERROUS SULFATE TAB 325 MG (65 MG ELEMENTAL FE) 1 EACH: 325 (65 FE) TAB at 09:12

## 2024-12-15 NOTE — PLAN OF CARE
Problem: Infection  Goal: Absence of Infection Signs and Symptoms  12/15/2024 0150 by Shama Soares RN  Outcome: Progressing  12/15/2024 0150 by Shama Soares RN  Outcome: Progressing     Problem: Adult Inpatient Plan of Care  Goal: Plan of Care Review  12/15/2024 0150 by Shama Soares RN  Outcome: Progressing  12/15/2024 0150 by Shama Soares RN  Outcome: Progressing  Goal: Patient-Specific Goal (Individualized)  12/15/2024 0150 by Shama Soares RN  Outcome: Progressing  12/15/2024 0150 by Shama Soares RN  Outcome: Progressing  Goal: Absence of Hospital-Acquired Illness or Injury  12/15/2024 0150 by Shama Soares RN  Outcome: Progressing  12/15/2024 0150 by Shama Soares RN  Outcome: Progressing  Goal: Optimal Comfort and Wellbeing  12/15/2024 0150 by Shama Soares RN  Outcome: Progressing  12/15/2024 0150 by Shama Soares RN  Outcome: Progressing  Goal: Readiness for Transition of Care  12/15/2024 0150 by Shama Soares RN  Outcome: Progressing  12/15/2024 0150 by Shama Soares RN  Outcome: Progressing     Problem: Wound  Goal: Optimal Coping  12/15/2024 0150 by Shama Soares RN  Outcome: Progressing  12/15/2024 0150 by Shama Soares RN  Outcome: Progressing  Goal: Optimal Functional Ability  12/15/2024 0150 by Shama Soares RN  Outcome: Progressing  12/15/2024 0150 by Shama Soares RN  Outcome: Progressing  Goal: Absence of Infection Signs and Symptoms  12/15/2024 0150 by Shama Soares RN  Outcome: Progressing  12/15/2024 0150 by Shama Soares RN  Outcome: Progressing  Goal: Improved Oral Intake  12/15/2024 0150 by Shama Soares RN  Outcome: Progressing  12/15/2024 0150 by Shama Soares RN  Outcome: Progressing  Goal: Optimal Pain Control and Function  12/15/2024 0150 by Shama Soares RN  Outcome: Progressing  12/15/2024 0150 by Shama Soares RN  Outcome: Progressing  Goal: Skin Health and Integrity  12/15/2024 0150 by Shama Soares RN  Outcome:  Progressing  12/15/2024 0150 by Shama Soares RN  Outcome: Progressing  Goal: Optimal Wound Healing  12/15/2024 0150 by Shama Soares RN  Outcome: Progressing  12/15/2024 0150 by Shama Soares RN  Outcome: Progressing     Problem: Skin Injury Risk Increased  Goal: Skin Health and Integrity  12/15/2024 0150 by Shama Soares RN  Outcome: Progressing  12/15/2024 0150 by Shama Soares RN  Outcome: Progressing     Problem: Fall Injury Risk  Goal: Absence of Fall and Fall-Related Injury  12/15/2024 0150 by Shama Soares RN  Outcome: Progressing  12/15/2024 0150 by Shama Soares RN  Outcome: Progressing

## 2024-12-15 NOTE — PROGRESS NOTES
Progress Note  Nephrology    Admit Date: 12/9/2024   LOS: 6 days     SUBJECTIVE:     Follow-up For:  MICHEL / Cardiorenal / CKD3b    Interval History:  73 Y/O female with history of CKD 3b , CHF , A Fib , H/O GIB , admitted to hospital for SOB and urinary Retention as well as edema and swelling   Grey cath was placed in with good urine output    Had MICHEL / Cardiorenal and improving   Yesterday was very lethargic but today is much better , denies any complaint of SOB and edema is better although still edema at lower ext bilaterally     Review of Systems:  Constitutional: No fever or chills  Respiratory: No cough or shortness of breath  Cardiovascular: No chest pain or palpitations  Gastrointestinal: No nausea or vomiting  Neurological: No confusion or weakness    OBJECTIVE:     Vital Signs Range (Last 24H):  Vitals:    12/15/24 1045   BP: 98/64   Pulse: 65   Resp: 18   Temp: 98.1 °F (36.7 °C)       Temp:  [97.9 °F (36.6 °C)-99 °F (37.2 °C)]   Pulse:  [59-65]   Resp:  [18-20]   BP: ()/(55-74)   SpO2:  [97 %-100 %]     I & O (Last 24H):  Intake/Output Summary (Last 24 hours) at 12/15/2024 1102  Last data filed at 12/14/2024 2317  Gross per 24 hour   Intake 180 ml   Output 801 ml   Net -621 ml       Physical Exam:  Alert , oriented , in NAD  Head   normal   Neck    supple   Chest   symmetric  Lungs   clear   Heart   RRR with 3/6 EDDIE , pansystolic  Abdomen   soft , non tender   Ext  1+ edema     Laboratory Data:    Recent Labs   Lab 12/15/24  0529   *   K 4.4   CL 98   CO2 32*   BUN 41.6*   CREATININE 1.98*   GLUCOSE 106   CALCIUM 9.0   PHOS 3.7     Lab Results   Component Value Date    CALCIUM 9.0 12/15/2024    CAION 1.15 12/14/2024    PHOS 3.7 12/15/2024     Lab Results   Component Value Date    IRON 37 (L) 12/10/2024    TIBC 165 (L) 12/10/2024    FERRITIN 246.81 (H) 12/10/2024       Medications:  Medication list was reviewed and changes noted under Assessment/Plan.    Diagnostic Results:    Reviewed most  recent CT/US/Echo/MRI    ASSESSMENT/PLAN:   1   CKD 3 b   2   MICHEL / Cardiorenal   3   Acute Urinary retention   4   Fluid overload  5   CHF , diastolic dysfunction   6   VHD , Mod MR , Mod TR , pulmonary HTN  7   HTN  8   Hypothyroidism   9   A Fib   10  solitary Kidney   11  Thrombocytopenia      Plan    will continue with lasix 20 mg po daily for now              Lab test in AM             Will DC neutraphos

## 2024-12-15 NOTE — PT/OT/SLP PROGRESS
Occupational Therapy   Treatment    Name: Ev Alberts  MRN: [id number]  Admitting Diagnosis:  <principal problem not specified>       Recommendations:     Recommended therapy intensity at discharge: Low Intensity Therapy   Discharge Equipment Recommendations:  none  Barriers to discharge:       Assessment:     Ev Alberts is a 89 y.o. female with a medical diagnosis of <principal problem not specified>.  She presents with good participation. Performance deficits affecting function are weakness, impaired endurance, impaired self care skills, impaired functional mobility, gait instability, impaired balance, decreased lower extremity function.     Rehab Prognosis:  Good; patient would benefit from acute skilled OT services to address these deficits and reach maximum level of function.       Plan:     Patient to be seen 4 x/week to address the above listed problems via self-care/home management  Plan of Care Expires: 01/11/25  Plan of Care Reviewed with: patient, spouse, daughter    Subjective     Pain/Comfort:  Pain Rating 1: 0/10    Objective:     Communicated with: nurse prior to session.  Patient found HOB elevated with telemetry, oxygen, pulse ox (continuous), peripheral IV upon OT entry to room.    General Precautions: Standard, fall    Orthopedic Precautions:N/A  Braces: N/A  Respiratory Status: Nasal cannula, flow 2 L/min     Occupational Performance:     Bed Mobility:    Patient completed Rolling/Turning to Right with moderate assistance  Patient completed Scooting in sitting with maximal assistance  Patient completed Supine to Sit with Mod/Max A persons and 2     Functional Mobility/Transfers:  Patient completed Sit <> Stand Transfer with moderate assistance and of 2 persons  with  rolling walker   Patient completed Bed <> Chair Transfer using Step Transfer technique with minimum assistance with rolling walker, extended time required    Therapeutic Positioning    OT interventions performed during the  course of today's session in an effort to prevent and/or reduce acquired pressure injuries:   Therapeutic positioning was provided at the conclusion of session to offload all bony prominences for the prevention and/or reduction of pressure injuries    [facility] 6 Click ADL: 14    Patient Education:  Patient, spouse were, and daughter/s were provided with verbal education and demonstrations education regarding fall prevention and safety awareness.  Understanding was verbalized, however additional teaching warranted.      Patient left up in chair with all lines intact, call button in reach, bryanna pad in place, and family present.    GOALS:   Multidisciplinary Problems       Occupational Therapy Goals          Problem: Occupational Therapy    Goal Priority Disciplines Outcome Interventions   Occupational Therapy Goal     OT, PT/OT Progressing    Description: Goals to be met by: 1/11/25     Patient will increase functional independence with ADLs by performing:    UE Dressing with Minimal Assistance.  Grooming while standing at sink with Minimal Assistance.  Toileting from BSC/toilet with Minimal Assistance for hygiene and clothing management.   Toilet transfer to bedside commode with Minimal Assistance.                         Time Tracking:     OT Date of Treatment: 12/15/24  OT Start Time: 1131  OT Stop Time: 1150  OT Total Time (min): 19 min    Billable Minutes:Therapeutic Activity 19    OT/SARANYA: SARANYA     Number of SARANYA visits since last OT visit: 1    12/15/2024

## 2024-12-15 NOTE — CONSULTS
Reason for Urology Consult     Urinary retention, hematuria    HPI     Ev Alberts is a 89 y.o. female with extensive past medical history admitted for generalized weakness.  Reported abdominal distention and difficulty urinating.  Has been titrating Lasix with nephrologist.  A Cook catheter was placed in the emergency department with 1900 cc return.  I was consulted for GH and cook recs.     The patient denies any prior episodes of urinary retention.  She denies a history of recurrent UTIs.  She reports a history of left nephrectomy a proximally 50 years ago following what sounds like an episode of pyelonephritis.  She denies prior episodes of gross hematuria.  She is a nonsmoker.  She is getting Eliquis.   Creatinine appears to be between 1.4 and 1.9.  She has had 800 cc of urine in 24 hours Her  and son are at the bedside.  Her son is a  here at the hospital.      Past Medical History:   Diagnosis Date    Acute kidney injury superimposed on chronic kidney disease     Congestive heart failure     Dementia     Hypertension     Shingles of eyelid     Sick sinus syndrome     Thyroid disease     Unspecified glaucoma      Past Surgical History:   Procedure Laterality Date    cataract surgery       SECTION      CHOLECYSTECTOMY      EGD, WITH HEMORRHAGE CONTROL Left 2024    Procedure: EGD,WITH HEMORRHAGE CONTROL;  Surgeon: Blake Adorno MD;  Location: [facility] OR;  Service: Gastroenterology;  Laterality: Left;    HYSTERECTOMY      INSERTION OF PACEMAKER      LEFT HEART CATHETERIZATION Left 3/4/2024    Procedure: Left heart cath;  Surgeon: Mark Raymundo Jr., MD;  Location: [facility] CATH LAB;  Service: Cardiology;  Laterality: Left;    NEPHRECTOMY       No family history on file.  Social History     Tobacco Use    Smoking status: Never    Smokeless tobacco: Never   Substance Use Topics    Alcohol use: Never       Review of patient's allergies indicates:   Allergen  Reactions    Amlodipine-benazepril Edema     Other reaction(s): unknown    Corticosteroids (glucocorticoids) Edema and Swelling    Rofecoxib Anaphylaxis and Swelling    Sulfa (sulfonamide antibiotics) Edema    Atorvastatin      Other reaction(s): unknown    Ibuprofen      Other reaction(s): Allergy to sulfa drugs         Current Facility-Administered Medications:     acetaminophen tablet 650 mg, 650 mg, Oral, Q8H PRN, Chris Kennedy PA-C    acetaminophen tablet 650 mg, 650 mg, Oral, Q4H PRN, Chris Kennedy PA-C, 650 mg at 12/14/24 2104    allopurinoL tablet 200 mg, 200 mg, Oral, Daily, Ally Erazo MD, 200 mg at 12/15/24 0925    ALPRAZolam tablet 0.25 mg, 0.25 mg, Oral, TID PRN, Karina Dobbs MD, 0.25 mg at 12/14/24 2104    apixaban tablet 2.5 mg, 2.5 mg, Oral, BID, Chris Kennedy PA-C, 2.5 mg at 12/15/24 0925    atorvastatin tablet 10 mg, 10 mg, Oral, QHS, Ally Erazo MD, 10 mg at 12/14/24 2024    carvediloL tablet 3.125 mg, 3.125 mg, Oral, BID, Chris Kennedy PA-C, 3.125 mg at 12/15/24 0925    dextrose 10% bolus 125 mL 125 mL, 12.5 g, Intravenous, PRN, Chris Kennedy PA-C    dextrose 10% bolus 250 mL 250 mL, 25 g, Intravenous, PRN, Chris Kennedy PA-C    ferrous sulfate tablet 1 each, 1 tablet, Oral, BID, Sudhir Ramirez MD, 1 each at 12/15/24 0925    furosemide tablet 20 mg, 20 mg, Oral, Daily, Imer Rothman MD, 20 mg at 12/15/24 0925    gabapentin capsule 100 mg, 100 mg, Oral, BID, Ally Erazo MD, 100 mg at 12/15/24 0925    glucagon (human recombinant) injection 1 mg, 1 mg, Intramuscular, PRN, Chris Kennedy PA-C    glucose chewable tablet 16 g, 16 g, Oral, PRN, Chris Kennedy PA-C    glucose chewable tablet 24 g, 24 g, Oral, PRN, Chris Kennedy PA-C    glycopyrrolate tablet 1 mg, 1 mg, Oral, BID PRN, Ally Erazo MD    hydrOXYzine pamoate capsule 25 mg, 25 mg, Oral, Q8H PRN, Chris Kennedy PA-C    latanoprost 0.005 % ophthalmic solution 1  drop, 1 drop, Both Eyes, QHS, Ally Erazo MD, 1 drop at 12/14/24 2024    levothyroxine tablet 25 mcg, 25 mcg, Oral, Before breakfast, Chris Kennedy PA-C, 25 mcg at 12/14/24 0549    miconazole NITRATE 2 % top powder, , Topical (Top), BID, Ally Erazo MD, Given at 12/15/24 0926    ondansetron injection 4 mg, 4 mg, Intravenous, Q8H PRN, Chris Kennedy PA-C    pantoprazole EC tablet 40 mg, 40 mg, Oral, BID, Chris Kennedy PA-C, 40 mg at 12/15/24 0925    risperiDONE tablet 1 mg, 1 mg, Oral, QHS, Ally Erazo MD, 1 mg at 12/14/24 2023    rivastigmine tartrate capsule 1.5 mg, 1.5 mg, Oral, BID, Ally Erazo MD, 1.5 mg at 12/15/24 0925    Physical exam     Vitals:    12/15/24 0307 12/15/24 0559 12/15/24 0715 12/15/24 1045   BP: (!) 96/55  98/74 98/64   Pulse: 60  63 65   Resp:   18 18   Temp: 99 °F (37.2 °C)  98.7 °F (37.1 °C) 98.1 °F (36.7 °C)   TempSrc: Axillary  Axillary Axillary   SpO2: 99%  98% 97%   Weight:  70.3 kg (154 lb 15.7 oz)     Height:           NAD, appear frail  Drowsy  Skin warm and dry  NCAT  Respirations unlabored  Abdomen soft, nontender, nondistended  Grey catheter draining luciana urine to bag      Lab     Lab Results   Component Value Date    WBC 5.72 12/15/2024    HGB 8.0 (L) 12/15/2024    HCT 24.2 (L) 12/15/2024     (L) 12/15/2024    ALT 9 12/10/2024    AST 25 12/10/2024     (L) 12/15/2024    K 4.4 12/15/2024    CL 98 12/15/2024    BUN 41.6 (H) 12/15/2024    CO2 32 (H) 12/15/2024    TSH 1.660 12/09/2024    INR 0.9 09/05/2019    HGBA1C 5.3 02/25/2024       Lab Results   Component Value Date    PHUR 7.5 12/09/2024    LEUKOCYTESUR Negative 12/09/2024    NITRITE Negative 12/09/2024    RBCUA 0-5 12/09/2024    WBCUA 0-5 12/09/2024         Assessment     89-year-old female with urinary retention, gross hematuria    Plan     Urinalysis from December 9th without signs of infection.  No blood.  I recommend repeating a urine culture.  I suspect that her gross  hematuria may be resultant from her episode of urinary retention and Grey catheter placement as well as Eliquis use.  Grey catheter should remain for 10-14 days given the 1900 cc output upon placement.  She can follow up in the office for void trial.  We can discuss further hematuria evaluation at that time.  Creatinine appears to be between 1.4 and 1.9.    AF

## 2024-12-15 NOTE — PROGRESS NOTES
[facility]  Hospital Medicine Progress Note        Chief Complaint: Inpatient Follow-up for weakness     HPI:   89 y.o. female with a PMHx  of hypertension, PAF on Eliquis, DVT, chronic diastolic heart failure LVEF 50-55%, GII DD, sick sinus syndrome, CKD stage III ( baseline cr 1.5) with solitary kidney, chronic hypoxemic respiratory failure on 2 L home oxygen, hypothyroidism, and glaucoma who presented to [facility] on 12/9/2024 with c/o generalized weakness.  Patient reported worsening weakness over the past 3 weeks associated with  lower extremity swelling, abdominal distention, and difficulty urinating.  Patient also endorsed 5 lb weight gain.  Patient reported compliance with home Lasix- 80 mg q.a.m. and 40 mg in the afternoon.  Daughter reports she is followed by nephrologist Dr. Chávez who manages her diuretic. Patient denied fever, rectal bleeding, melena, hematuria, dysuria, and chest pain.      Initial vital signs in ED were /82, pulse 82, respirations 12, temperature 36.8° C, and SpO2 99% on 2 L nasal cannula.  Labs revealed WBC 5.32,  hemoglobin 9.9, platelets 110, sodium 97, CO2 32, BUN 26.3, creatinine 1.91 baseline 1.5, .2, and troponin 0.055.  Chest x-ray revealed right small pleural effusion and basilar atelectasis.  Patient was given IV Lasix 60 mg and aspirin 325 mg tablet in ED. Grey catheter was placed in ED with 1900 urine return. Patient was admitted to hospital medicine service for further medical management. Nephrology was consulted to assist.  iscussed the CT scan results with patient and patient's  patient is refusing any kind of intervention compression fracture appears stable    Interval Hx:   Pt was seen and examined this morning was sleepy woke up to verbal stimuli   Case was discussed with patient's nurse and  on the floor.    Objective/physical exam:  General: In no acute distress, afebrile, On NC  Chest: Decreased  breath sounds at bases   Heart: RRR, +S1, S2, no appreciable murmur  Abdomen: Soft, nontender, BS +  MSK: Warm, 2+ lower extremity edema, no clubbing or cyanosis  Neurologic: Alert and oriented x3    VITAL SIGNS: 24 HRS MIN & MAX LAST   Temp  Min: 97.9 °F (36.6 °C)  Max: 99 °F (37.2 °C) 98.7 °F (37.1 °C)   BP  Min: 96/55  Max: 118/76 98/74   Pulse  Min: 59  Max: 68  63   Resp  Min: 18  Max: 20 18   SpO2  Min: 97 %  Max: 100 % 98 %     I have reviewed the following labs:  Recent Labs   Lab 12/13/24  0615 12/14/24  0436 12/15/24  0529   WBC 6.65 6.23 5.72   RBC 2.57* 2.67* 2.48*   HGB 8.2* 8.5* 8.0*   HCT 24.8* 26.5* 24.2*   MCV 96.5* 99.3* 97.6*   MCH 31.9* 31.8* 32.3*   MCHC 33.1 32.1* 33.1   RDW 19.1* 19.0* 18.9*   PLT 97* 106* 126*   MPV 12.4* 11.0* 11.5*     Recent Labs   Lab 12/09/24  1250 12/10/24  0456 12/11/24  0352 12/12/24  0343 12/13/24  0615 12/14/24  0436 12/14/24  0852 12/15/24  0529    136   < > 141 135* 134*  --  135*   K 3.7 3.4*   < > 4.1 4.1 4.1  --  4.4   CL 97* 99   < > 95* 98 97*  --  98   CO2 32* 30   < > 34* 34* 33*  --  32*   BUN 26.3* 24.9*   < > 29.5* 33.2* 35.7*  --  41.6*   CREATININE 1.91* 1.75*   < > 1.88* 1.92* 1.67*  --  1.98*   CALCIUM 9.5 9.0   < > 9.3 8.6 9.5  --  9.0   PH  --   --   --   --   --   --  7.470*  --    MG 2.60  --    < > 2.40 2.50 2.50  --   --    ALBUMIN 3.5 2.9*   < > 3.0* 2.9* 2.8*  --  2.7*   ALKPHOS 138 107  --   --   --   --   --   --    ALT 11 9  --   --   --   --   --   --    AST 32 25  --   --   --   --   --   --    BILITOT 1.4 1.0  --   --   --   --   --   --     < > = values in this interval not displayed.     Microbiology Results (last 7 days)       ** No results found for the last 168 hours. **             See below for Radiology    Assessment/Plan:  Acute on chronic diastolic HF, LVEF 50-55%, GIIDD, POA  Acute kidney injury on CKD IIIb- Cardiorenal and Urinary retention   Urinary retention , s/p Grey insertion, POA  Stable chronic L2 inferior  endplate compression deformity with minimal loss of height.  Multilevel degenerative changes of the lumbar spine.  New onset Pancytopenia with moderate thrombocytopenia, POA- likely in the setting  acute illness   Acute on chronic anemia - mild to mod without evidence of overt bleed   Hypokalemia , POA  Elevated Troponin/ NSTEMI type 2 in the setting of HF exacerbation, POA  Chronic hypoxic resp failure on home O2- stable   Physical debility / Generalized weakness more in bilateral lower extremities     Hx- hypertension, PAF on Eliquis, DVT,, sick sinus syndrome,  solitary kidney, chronic hypoxemic respiratory failure on 2 L home oxygen, hypothyroidism, and glaucoma     Plan-   Initially was sleepy early in the morning but later patient woke up ate her breakfast and was alert awake oriented  Creatinine trending up this morning it is 1.98  Nephrology's had switched the Lasix to 20 mg p.o. daily  We had ordered CT of the L-spine that shows stable compression fracture and patient is denying any back pain   Discussed the findings with the patient and her  they are refusing any kind of surgery and I agree considering age  We had consulted PT and OT they are recommending low intensity with sitters  Urine culture neg for bacterial growth  Replete potassium   Monitor H&H and sings of active bleeding   Transfuse blood for Hgb 8 or under given underlying cardiac disease   2D echo showed EF of 50-55% grade 1 diastolic dysfunction septal motion consistent with pacing that was done in July of 2024 patient denies any chest pain I will have the patient follow up outpatient with Cardiology  Repeat blood work in a.m.  Otherwise blood pressure stable on 2 L of nasal cannula      Creatinine is still trending up this morning it is 1.98 will follow up on Nephrology's recommendation patient continues to be on p.o. Lasix   Hemoglobin of 8            VTE prophylaxis: Eliquis      Patient condition:  Fair     Anticipated discharge  and Disposition:     TBD      All diagnosis and differential diagnosis have been reviewed; assessment and plan has been documented; I have personally reviewed the labs and test results that are presently available; I have reviewed the patients medication list; I have reviewed the consulting providers response and recommendations. I have reviewed or attempted to review medical records based upon their availability    All of the patient's questions have been  addressed and answered. Patient's is agreeable to the above stated plan. I will continue to monitor closely and make adjustments to medical management as needed.    Portions of this note dictated using EMR integrated voice recognition software, and may be subject to voice recognition errors not corrected at proofreading. Please contact writer for clarification if needed.   _____________________________________________________________________    Malnutrition Status:  Nutrition consulted. Most recent weight and BMI monitored-     Measurements:  Wt Readings from Last 1 Encounters:   12/15/24 70.3 kg (154 lb 15.7 oz)   Body mass index is 25.79 kg/m².    Patient has been screened and assessed by RD.    Malnutrition Type:  Context:    Level:      Malnutrition Characteristic Summary:       Interventions/Recommendations (treatment strategy):        Scheduled Med:   allopurinoL  200 mg Oral Daily    apixaban  2.5 mg Oral BID    atorvastatin  10 mg Oral QHS    carvediloL  3.125 mg Oral BID    ferrous sulfate  1 tablet Oral BID    furosemide  20 mg Oral Daily    gabapentin  100 mg Oral BID    latanoprost  1 drop Both Eyes QHS    levothyroxine  25 mcg Oral Before breakfast    miconazole NITRATE 2 %   Topical (Top) BID    pantoprazole  40 mg Oral BID    potassium, sodium phosphates  1 packet Oral BID    risperiDONE  1 mg Oral QHS    rivastigmine tartrate  1.5 mg Oral BID      Continuous Infusions:     PRN Meds:    Current Facility-Administered Medications:     acetaminophen,  650 mg, Oral, Q8H PRN    acetaminophen, 650 mg, Oral, Q4H PRN    ALPRAZolam, 0.25 mg, Oral, TID PRN    dextrose 10%, 12.5 g, Intravenous, PRN    dextrose 10%, 25 g, Intravenous, PRN    glucagon (human recombinant), 1 mg, Intramuscular, PRN    glucose, 16 g, Oral, PRN    glucose, 24 g, Oral, PRN    glycopyrrolate, 1 mg, Oral, BID PRN    hydrOXYzine pamoate, 25 mg, Oral, Q8H PRN    ondansetron, 4 mg, Intravenous, Q8H PRN         Paula Senior MD  Department of Hospital Medicine   [facility]   12/15/2024

## 2024-12-16 LAB
ALBUMIN SERPL-MCNC: 2.9 G/DL (ref 3.4–4.8)
BASOPHILS # BLD AUTO: 0.02 X10(3)/MCL
BASOPHILS NFR BLD AUTO: 0.3 %
BUN SERPL-MCNC: 46.1 MG/DL (ref 9.8–20.1)
CALCIUM SERPL-MCNC: 9.5 MG/DL (ref 8.4–10.2)
CHLORIDE SERPL-SCNC: 97 MMOL/L (ref 98–107)
CO2 SERPL-SCNC: 34 MMOL/L (ref 23–31)
CREAT SERPL-MCNC: 1.73 MG/DL (ref 0.55–1.02)
EOSINOPHIL # BLD AUTO: 0.17 X10(3)/MCL (ref 0–0.9)
EOSINOPHIL NFR BLD AUTO: 2.7 %
ERYTHROCYTE [DISTWIDTH] IN BLOOD BY AUTOMATED COUNT: 19 % (ref 11.5–17)
GFR SERPLBLD CREATININE-BSD FMLA CKD-EPI: 28 ML/MIN/1.73/M2
GLUCOSE SERPL-MCNC: 111 MG/DL (ref 82–115)
HCT VFR BLD AUTO: 25.9 % (ref 37–47)
HGB BLD-MCNC: 8.6 G/DL (ref 12–16)
IMM GRANULOCYTES # BLD AUTO: 0.01 X10(3)/MCL (ref 0–0.04)
IMM GRANULOCYTES NFR BLD AUTO: 0.2 %
LYMPHOCYTES # BLD AUTO: 2.35 X10(3)/MCL (ref 0.6–4.6)
LYMPHOCYTES NFR BLD AUTO: 37.8 %
MCH RBC QN AUTO: 32.1 PG (ref 27–31)
MCHC RBC AUTO-ENTMCNC: 33.2 G/DL (ref 33–36)
MCV RBC AUTO: 96.6 FL (ref 80–94)
MONOCYTES # BLD AUTO: 0.82 X10(3)/MCL (ref 0.1–1.3)
MONOCYTES NFR BLD AUTO: 13.2 %
NEUTROPHILS # BLD AUTO: 2.85 X10(3)/MCL (ref 2.1–9.2)
NEUTROPHILS NFR BLD AUTO: 45.8 %
NRBC BLD AUTO-RTO: 0 %
PHOSPHATE SERPL-MCNC: 3.3 MG/DL (ref 2.3–4.7)
PLATELET # BLD AUTO: 117 X10(3)/MCL (ref 130–400)
PMV BLD AUTO: 9.8 FL (ref 7.4–10.4)
POTASSIUM SERPL-SCNC: 4.7 MMOL/L (ref 3.5–5.1)
RBC # BLD AUTO: 2.68 X10(6)/MCL (ref 4.2–5.4)
SODIUM SERPL-SCNC: 136 MMOL/L (ref 136–145)
WBC # BLD AUTO: 6.22 X10(3)/MCL (ref 4.5–11.5)

## 2024-12-16 PROCEDURE — 25000003 PHARM REV CODE 250: Performed by: INTERNAL MEDICINE

## 2024-12-16 PROCEDURE — 85025 COMPLETE CBC W/AUTO DIFF WBC: CPT | Performed by: INTERNAL MEDICINE

## 2024-12-16 PROCEDURE — 25000003 PHARM REV CODE 250: Performed by: PHYSICIAN ASSISTANT

## 2024-12-16 PROCEDURE — 36415 COLL VENOUS BLD VENIPUNCTURE: CPT | Performed by: INTERNAL MEDICINE

## 2024-12-16 PROCEDURE — 80069 RENAL FUNCTION PANEL: CPT | Performed by: INTERNAL MEDICINE

## 2024-12-16 PROCEDURE — 63600175 PHARM REV CODE 636 W HCPCS: Performed by: INTERNAL MEDICINE

## 2024-12-16 PROCEDURE — 27000221 HC OXYGEN, UP TO 24 HOURS

## 2024-12-16 PROCEDURE — 21400001 HC TELEMETRY ROOM

## 2024-12-16 RX ORDER — FUROSEMIDE 10 MG/ML
60 INJECTION INTRAMUSCULAR; INTRAVENOUS 2 TIMES DAILY WITH MEALS
Status: DISCONTINUED | OUTPATIENT
Start: 2024-12-16 | End: 2024-12-17

## 2024-12-16 RX ADMIN — ALLOPURINOL 200 MG: 100 TABLET ORAL at 10:12

## 2024-12-16 RX ADMIN — ATORVASTATIN CALCIUM 10 MG: 10 TABLET, FILM COATED ORAL at 09:12

## 2024-12-16 RX ADMIN — CARVEDILOL 3.12 MG: 3.12 TABLET, FILM COATED ORAL at 09:12

## 2024-12-16 RX ADMIN — RISPERIDONE 1 MG: 1 TABLET, FILM COATED ORAL at 09:12

## 2024-12-16 RX ADMIN — CARVEDILOL 3.12 MG: 3.12 TABLET, FILM COATED ORAL at 10:12

## 2024-12-16 RX ADMIN — RIVASTIGMINE TARTRATE 1.5 MG: 1.5 CAPSULE ORAL at 10:12

## 2024-12-16 RX ADMIN — APIXABAN 2.5 MG: 2.5 TABLET, FILM COATED ORAL at 10:12

## 2024-12-16 RX ADMIN — PANTOPRAZOLE SODIUM 40 MG: 40 TABLET, DELAYED RELEASE ORAL at 09:12

## 2024-12-16 RX ADMIN — GABAPENTIN 100 MG: 100 CAPSULE ORAL at 10:12

## 2024-12-16 RX ADMIN — LEVOTHYROXINE SODIUM 25 MCG: 25 TABLET ORAL at 07:12

## 2024-12-16 RX ADMIN — PANTOPRAZOLE SODIUM 40 MG: 40 TABLET, DELAYED RELEASE ORAL at 10:12

## 2024-12-16 RX ADMIN — FUROSEMIDE 60 MG: 10 INJECTION, SOLUTION INTRAMUSCULAR; INTRAVENOUS at 05:12

## 2024-12-16 RX ADMIN — RIVASTIGMINE TARTRATE 1.5 MG: 1.5 CAPSULE ORAL at 09:12

## 2024-12-16 RX ADMIN — APIXABAN 2.5 MG: 2.5 TABLET, FILM COATED ORAL at 09:12

## 2024-12-16 RX ADMIN — GABAPENTIN 100 MG: 100 CAPSULE ORAL at 09:12

## 2024-12-16 RX ADMIN — ALPRAZOLAM 0.25 MG: 0.25 TABLET ORAL at 09:12

## 2024-12-16 RX ADMIN — FUROSEMIDE 60 MG: 10 INJECTION, SOLUTION INTRAMUSCULAR; INTRAVENOUS at 10:12

## 2024-12-16 NOTE — PT/OT/SLP PROGRESS
Prior to attempted session ,I was warned that pt was pretty groggy today. When I entered room pt was groggy but alert. I asked her several times if we could get up to sit at EOB and to attempt transfers . She declined each time. I told her I would be back to check on her and she stated she was not getting up today. Will try again tomorrow

## 2024-12-16 NOTE — PROGRESS NOTES
Chief complaint: fatigue    Interval History:  Pt very fatigued though ate well for bkfst.  No SOB on NC though B leg swelling persists    Review of Systems   Constitutional:  Positive for fatigue.   HENT: Negative.     Respiratory: Negative.     Cardiovascular:  Positive for leg swelling.   Gastrointestinal: Negative.    Genitourinary: Negative.    Musculoskeletal: Negative.    Neurological:  Positive for weakness.   Psychiatric/Behavioral: Negative.        all else Neg     allopurinoL  200 mg Oral Daily    apixaban  2.5 mg Oral BID    atorvastatin  10 mg Oral QHS    carvediloL  3.125 mg Oral BID    ferrous sulfate  1 tablet Oral BID    furosemide  20 mg Oral Daily    gabapentin  100 mg Oral BID    latanoprost  1 drop Both Eyes QHS    levothyroxine  25 mcg Oral Before breakfast    miconazole NITRATE 2 %   Topical (Top) BID    pantoprazole  40 mg Oral BID    risperiDONE  1 mg Oral QHS    rivastigmine tartrate  1.5 mg Oral BID       Objective     VITAL SIGNS: 24 HR MIN & MAX LAST    Temp  Min: 97.4 °F (36.3 °C)  Max: 99 °F (37.2 °C)  97.4 °F (36.3 °C)    BP  Min: 98/64  Max: 119/64  (!) 110/57     Pulse  Min: 59  Max: 77  60     Resp  Min: 14  Max: 18  18    SpO2  Min: 97 %  Max: 100 %  100 %      Wt Readings from Last 3 Encounters:   12/16/24 74.9 kg (165 lb 2 oz)   11/19/24 72.6 kg (160 lb)   10/23/24 68.9 kg (152 lb)       Intake/Output Summary (Last 24 hours) at 12/16/2024 0907  Last data filed at 12/16/2024 0400  Gross per 24 hour   Intake 350 ml   Output 1151 ml   Net -801 ml       Physical Exam  Constitutional:       General: She is not in acute distress.  Cardiovascular:      Rate and Rhythm: Normal rate.   Pulmonary:      Effort: Pulmonary effort is normal. No respiratory distress.   Abdominal:      General: Bowel sounds are normal.      Palpations: Abdomen is soft.      Tenderness: There is no abdominal tenderness.   Genitourinary:     Comments: Grey alethea urine  Musculoskeletal:         General:  Swelling present.      Cervical back: Normal range of motion.   Neurological:      Mental Status: She is alert and oriented to person, place, and time.      Motor: Weakness present.   Psychiatric:         Mood and Affect: Mood normal.          Recent Labs     12/14/24 0436 12/15/24  0529 12/16/24  0339   * 135* 136   K 4.1 4.4 4.7   CO2 33* 32* 34*   BUN 35.7* 41.6* 46.1*   CREATININE 1.67* 1.98* 1.73*   GLUCOSE 111 106 111   CALCIUM 9.5 9.0 9.5   MG 2.50  --   --    PHOS 2.5 3.7 3.3   ALBUMIN 2.8* 2.7* 2.9*      Recent Labs     12/14/24  0436 12/15/24  0529 12/16/24  0339   WBC 6.23 5.72 6.22   HGB 8.5* 8.0* 8.6*   HCT 26.5* 24.2* 25.9*   * 126* 117*         Assessment & Plan     MICHEL / CKD 3B (baseline creatinine 1.5-1.6), solitary kidney - cardiorenal + urine retention, GFR improving with diuresis  Acute Urinary retention - continue cook, f/u urology  Rinaldi CHF/Fluid overload/edema - wt up, B leg swelling persists, change lasix back to 60 mg IV bid, monitor response  VHD - mod MR, mod TR, Pulm HTN  Anemia of CKD - Hb up 8.6 d/c p.o. Fe (h/o constipation), s/p EPO 40 K 12/10/2024  Thrombocytopenia - stable  H/o GIB 7/2024  HTN with ACEi allergy - normotensive low-dose coreg  Hypothyroidism, TSH 1.6  Afib, eliquis  Chronic Home O2 (2 L)

## 2024-12-16 NOTE — PROGRESS NOTES
[facility]  Hospital Medicine Progress Note        Chief Complaint: Inpatient Follow-up for weakness     HPI:   89 y.o. female with a PMHx  of hypertension, PAF on Eliquis, DVT, chronic diastolic heart failure LVEF 50-55%, GII DD, sick sinus syndrome, CKD stage III ( baseline cr 1.5) with solitary kidney, chronic hypoxemic respiratory failure on 2 L home oxygen, hypothyroidism, and glaucoma who presented to [facility] on 12/9/2024 with c/o generalized weakness.  Patient reported worsening weakness over the past 3 weeks associated with  lower extremity swelling, abdominal distention, and difficulty urinating.  Patient also endorsed 5 lb weight gain.  Patient reported compliance with home Lasix- 80 mg q.a.m. and 40 mg in the afternoon.  Daughter reports she is followed by nephrologist Dr. Chávez who manages her diuretic. Patient denied fever, rectal bleeding, melena, hematuria, dysuria, and chest pain.      Initial vital signs in ED were /82, pulse 82, respirations 12, temperature 36.8° C, and SpO2 99% on 2 L nasal cannula.  Labs revealed WBC 5.32,  hemoglobin 9.9, platelets 110, sodium 97, CO2 32, BUN 26.3, creatinine 1.91 baseline 1.5, .2, and troponin 0.055.  Chest x-ray revealed right small pleural effusion and basilar atelectasis.  Patient was given IV Lasix 60 mg and aspirin 325 mg tablet in ED. Grey catheter was placed in ED with 1900 urine return. Patient was admitted to hospital medicine service for further medical management. Nephrology was consulted to assist.  iscussed the CT scan results with patient and patient's  patient is refusing any kind of intervention compression fracture appears stable    Interval Hx:   Pt was seen and examined this morning was sleepy woke up to verbal stimuli patient's significant other bedside   Case was discussed with patient's nurse and  on the floor.    Objective/physical exam:  General: In no acute distress,  afebrile, On NC  Chest: Decreased breath sounds at bases   Heart: RRR, +S1, S2, no appreciable murmur  Abdomen: Soft, nontender, BS +  MSK: Warm, 2+ lower extremity edema, no clubbing or cyanosis  Neurologic:  Sleepy but did wake up to verbal stimuli  VITAL SIGNS: 24 HRS MIN & MAX LAST   Temp  Min: 97.4 °F (36.3 °C)  Max: 99 °F (37.2 °C) 97.4 °F (36.3 °C)   BP  Min: 110/57  Max: 119/64 (!) 110/57   Pulse  Min: 59  Max: 77  60   Resp  Min: 14  Max: 18 18   SpO2  Min: 98 %  Max: 100 % 100 %     I have reviewed the following labs:  Recent Labs   Lab 12/14/24  0436 12/15/24  0529 12/16/24  0339   WBC 6.23 5.72 6.22   RBC 2.67* 2.48* 2.68*   HGB 8.5* 8.0* 8.6*   HCT 26.5* 24.2* 25.9*   MCV 99.3* 97.6* 96.6*   MCH 31.8* 32.3* 32.1*   MCHC 32.1* 33.1 33.2   RDW 19.0* 18.9* 19.0*   * 126* 117*   MPV 11.0* 11.5* 9.8     Recent Labs   Lab 12/09/24  1250 12/10/24  0456 12/11/24  0352 12/12/24  0343 12/13/24  0615 12/14/24  0436 12/14/24  0852 12/15/24  0529 12/16/24  0339    136   < > 141 135* 134*  --  135* 136   K 3.7 3.4*   < > 4.1 4.1 4.1  --  4.4 4.7   CL 97* 99   < > 95* 98 97*  --  98 97*   CO2 32* 30   < > 34* 34* 33*  --  32* 34*   BUN 26.3* 24.9*   < > 29.5* 33.2* 35.7*  --  41.6* 46.1*   CREATININE 1.91* 1.75*   < > 1.88* 1.92* 1.67*  --  1.98* 1.73*   CALCIUM 9.5 9.0   < > 9.3 8.6 9.5  --  9.0 9.5   PH  --   --   --   --   --   --  7.470*  --   --    MG 2.60  --    < > 2.40 2.50 2.50  --   --   --    ALBUMIN 3.5 2.9*   < > 3.0* 2.9* 2.8*  --  2.7* 2.9*   ALKPHOS 138 107  --   --   --   --   --   --   --    ALT 11 9  --   --   --   --   --   --   --    AST 32 25  --   --   --   --   --   --   --    BILITOT 1.4 1.0  --   --   --   --   --   --   --     < > = values in this interval not displayed.     Microbiology Results (last 7 days)       ** No results found for the last 168 hours. **             See below for Radiology    Assessment/Plan:  Acute on chronic diastolic HF, LVEF 50-55%, GIIDD, POA  Acute  kidney injury on CKD IIIb- Cardiorenal and Urinary retention   Urinary retention , s/p Grey insertion, POA  Stable chronic L2 inferior endplate compression deformity with minimal loss of height.  Multilevel degenerative changes of the lumbar spine.  New onset Pancytopenia with moderate thrombocytopenia, POA- likely in the setting  acute illness   Acute on chronic anemia - mild to mod without evidence of overt bleed   Hypokalemia , POA  Elevated Troponin/ NSTEMI type 2 in the setting of HF exacerbation, POA  Chronic hypoxic resp failure on home O2- stable   Physical debility / Generalized weakness more in bilateral lower extremities     Hx- hypertension, PAF on Eliquis, DVT,, sick sinus syndrome,  solitary kidney, chronic hypoxemic respiratory failure on 2 L home oxygen, hypothyroidism, and glaucoma     Plan-   Urine has cleared up we will resume Eliquis if okay with urology  Nephrology's has switched her back to Lasix 60 IV b.i.d. because patient still has lower extremity swelling   Creatinine slightly better today this morning it is 1.73  We had ordered CT of the L-spine that shows stable compression fracture and patient is denying any back pain   Discussed the findings with the patient and her  they are refusing any kind of surgery and I agree considering age  We had consulted PT and OT they are recommending low intensity with sitters  Urine culture neg for bacterial growth  Transfuse blood for Hgb 8 or under given underlying cardiac disease   2D echo showed EF of 50-55% grade 1 diastolic dysfunction septal motion consistent with pacing that was done in July of 2024 patient denies any chest pain I will have the patient follow up outpatient with Cardiology  Repeat blood work in a.m.  Otherwise blood pressure stable on 2 L of nasal cannula      Will follow up on Nephrology's recommendations          VTE prophylaxis: Eliquis      Patient condition:  Fair     Anticipated discharge and Disposition:      TBD      All diagnosis and differential diagnosis have been reviewed; assessment and plan has been documented; I have personally reviewed the labs and test results that are presently available; I have reviewed the patients medication list; I have reviewed the consulting providers response and recommendations. I have reviewed or attempted to review medical records based upon their availability    All of the patient's questions have been  addressed and answered. Patient's is agreeable to the above stated plan. I will continue to monitor closely and make adjustments to medical management as needed.    Portions of this note dictated using EMR integrated voice recognition software, and may be subject to voice recognition errors not corrected at proofreading. Please contact writer for clarification if needed.   _____________________________________________________________________    Malnutrition Status:  Nutrition consulted. Most recent weight and BMI monitored-     Measurements:  Wt Readings from Last 1 Encounters:   12/16/24 74.9 kg (165 lb 2 oz)   Body mass index is 27.48 kg/m².    Patient has been screened and assessed by RD.    Malnutrition Type:  Context:    Level:      Malnutrition Characteristic Summary:       Interventions/Recommendations (treatment strategy):        Scheduled Med:   allopurinoL  200 mg Oral Daily    apixaban  2.5 mg Oral BID    atorvastatin  10 mg Oral QHS    carvediloL  3.125 mg Oral BID    furosemide (LASIX) injection  60 mg Intravenous BID WM    gabapentin  100 mg Oral BID    latanoprost  1 drop Both Eyes QHS    levothyroxine  25 mcg Oral Before breakfast    miconazole NITRATE 2 %   Topical (Top) BID    pantoprazole  40 mg Oral BID    risperiDONE  1 mg Oral QHS    rivastigmine tartrate  1.5 mg Oral BID      Continuous Infusions:     PRN Meds:    Current Facility-Administered Medications:     acetaminophen, 650 mg, Oral, Q8H PRN    acetaminophen, 650 mg, Oral, Q4H PRN    ALPRAZolam, 0.25 mg,  Oral, TID PRN    dextrose 10%, 12.5 g, Intravenous, PRN    dextrose 10%, 25 g, Intravenous, PRN    glucagon (human recombinant), 1 mg, Intramuscular, PRN    glucose, 16 g, Oral, PRN    glucose, 24 g, Oral, PRN    glycopyrrolate, 1 mg, Oral, BID PRN    hydrOXYzine pamoate, 25 mg, Oral, Q8H PRN    ondansetron, 4 mg, Intravenous, Q8H PRN         Paula Senior MD  Department of Hospital Medicine   [facility]   12/16/2024

## 2024-12-16 NOTE — PROGRESS NOTES
[facility] - 9th Floor Medical Telemetry  Wound Care    Patient Name:  Ev Alberts   MRN:  [id number]  Date: 12/16/2024  Diagnosis: <principal problem not specified>    History:     Past Medical History:   Diagnosis Date    Acute kidney injury superimposed on chronic kidney disease     Congestive heart failure     Dementia     Hypertension     Shingles of eyelid     Sick sinus syndrome     Thyroid disease     Unspecified glaucoma        Social History     Socioeconomic History    Marital status:    Tobacco Use    Smoking status: Never    Smokeless tobacco: Never   Substance and Sexual Activity    Alcohol use: Never    Drug use: Not Currently     Social Drivers of Health     Financial Resource Strain: Low Risk  (12/10/2024)    Overall Financial Resource Strain (CARDIA)     Difficulty of Paying Living Expenses: Not hard at all   Food Insecurity: No Food Insecurity (12/10/2024)    Hunger Vital Sign     Worried About Running Out of Food in the Last Year: Never true     Ran Out of Food in the Last Year: Never true   Transportation Needs: No Transportation Needs (12/10/2024)    TRANSPORTATION NEEDS     Transportation : No   Physical Activity: Inactive (12/10/2024)    Exercise Vital Sign     Days of Exercise per Week: 0 days     Minutes of Exercise per Session: 0 min   Stress: Patient Unable To Answer (12/10/2024)    [employer] - Occupational Stress Questionnaire     Feeling of Stress : Patient unable to answer   Housing Stability: Low Risk  (12/10/2024)    Housing Stability Vital Sign     Unable to Pay for Housing in the Last Year: No     Homeless in the Last Year: No       Precautions:     Allergies as of 12/09/2024 - Reviewed 12/09/2024   Allergen Reaction Noted    Amlodipine-benazepril Edema 06/01/2022    Corticosteroids (glucocorticoids) Edema and Swelling 05/11/2011    Rofecoxib Anaphylaxis and Swelling 05/11/2011    Sulfa (sulfonamide antibiotics) Edema 06/01/2022     Atorvastatin  10/26/2018    Ibuprofen  06/01/2022       [facility] Assessment Details/Treatment      12/16/24 0934   WO Assessment   Visit Date 12/16/24   Visit Time 0934   Consult Type Follow Up   [facility] Wound   Intervention chart review;assessed;applied   Teaching on-going        Wound 12/10/24 0800 Pressure Injury Sacral spine #1   Date First Assessed/Time First Assessed: 12/10/24 0800   Present on Original Admission: Yes  Primary Wound Type: Pressure Injury  Location: Sacral spine  Wound Number: #1   Wound Image    Dressing Appearance Dry;Intact   Drainage Amount None   Appearance Pink;Moist   Tissue loss description Partial thickness   Black (%), Wound Tissue Color 0 %   Red (%), Wound Tissue Color 100 %   Yellow (%), Wound Tissue Color 0 %   Periwound Area Dry;Intact   Care Cleansed with:;Soap and water   Dressing Applied;Other (comment)  (zinc oxide, abd pad, tape)        Wound 12/10/24 0800 Moisture associated dermatitis Right anterior Groin #2   Date First Assessed/Time First Assessed: 12/10/24 0800   Primary Wound Type: Moisture associated dermatitis  Side: Right  Orientation: anterior  Location: Groin  Wound Number: #2   Wound Image    Dressing Appearance Open to air   Drainage Amount None   Appearance Red;Maroon;Moist   Tissue loss description Partial thickness   Black (%), Wound Tissue Color 0 %   Red (%), Wound Tissue Color 100 %   Yellow (%), Wound Tissue Color 0 %   Periwound Area Dry;Intact   Care Cleansed with:;Soap and water   Dressing Other (comment)  (miconazole powder, exudry sheets)     [facility] follow up for sacrum and abdominal folds. Daughter and spouse at bedside . Explained to patient reason for follow up visit. Pt agreed to wound care evaluation. Assessed abdominal folds ; moisture on right side of abdomen with appearance of rash. Treatment recommendations to be continued. Left side of abdomen dry and intact. Pt turned onto back for assessment with assistance x 1. Cleansed area well.  Patted dry. Photos obtained. Improving with present treatment orders. Plan of care reviewed with nurseAmrita. Pressure prevention measures to be continued. Keep patient clean and dry. Will follow up.   12/16/2024

## 2024-12-16 NOTE — PROGRESS NOTES
UROLOGY  PROGRESS  NOTE    Ev Alberts 1935  [id number]  12/16/2024    Patient resting in bed  Hematuria improved    VSS, afebrile   750 mL urine output overnight  WBC 6.22   H&H 8.6/25.9  BUN and creatinine 46.1/1.73    Intake/Output:  No intake/output data recorded.  I/O last 3 completed shifts:  In: 350 [P.O.:350]  Out: 1551 [Urine:1550; Stool:1]     Exam:    NAD  Card: RRR  Resp: unlabored  Abd: soft, NTND  : yellow urine draining to  bag    Recent Results (from the past 24 hours)   Renal Function Panel    Collection Time: 12/16/24  3:39 AM   Result Value Ref Range    Sodium 136 136 - 145 mmol/L    Potassium 4.7 3.5 - 5.1 mmol/L    Chloride 97 (L) 98 - 107 mmol/L    CO2 34 (H) 23 - 31 mmol/L    Glucose 111 82 - 115 mg/dL    Blood Urea Nitrogen 46.1 (H) 9.8 - 20.1 mg/dL    Creatinine 1.73 (H) 0.55 - 1.02 mg/dL    Calcium 9.5 8.4 - 10.2 mg/dL    Albumin 2.9 (L) 3.4 - 4.8 g/dL    Phosphorus Level 3.3 2.3 - 4.7 mg/dL    eGFR 28 mL/min/1.73/m2   CBC with Differential    Collection Time: 12/16/24  3:39 AM   Result Value Ref Range    WBC 6.22 4.50 - 11.50 x10(3)/mcL    RBC 2.68 (L) 4.20 - 5.40 x10(6)/mcL    Hgb 8.6 (L) 12.0 - 16.0 g/dL    Hct 25.9 (L) 37.0 - 47.0 %    MCV 96.6 (H) 80.0 - 94.0 fL    MCH 32.1 (H) 27.0 - 31.0 pg    MCHC 33.2 33.0 - 36.0 g/dL    RDW 19.0 (H) 11.5 - 17.0 %    Platelet 117 (L) 130 - 400 x10(3)/mcL    MPV 9.8 7.4 - 10.4 fL    Neut % 45.8 %    Lymph % 37.8 %    Mono % 13.2 %    Eos % 2.7 %    Basophil % 0.3 %    Lymph # 2.35 0.6 - 4.6 x10(3)/mcL    Neut # 2.85 2.1 - 9.2 x10(3)/mcL    Mono # 0.82 0.1 - 1.3 x10(3)/mcL    Eos # 0.17 0 - 0.9 x10(3)/mcL    Baso # 0.02 <=0.2 x10(3)/mcL    IG# 0.01 0 - 0.04 x10(3)/mcL    IG% 0.2 %    NRBC% 0.0 %       Assessment:  Gross hematuria, urinary retention on Eliquis, has cook    Plan:  -outpatient f/u for void trial/hematuria workup in 10-14 days  -No additional recs from Urology standpoint at this time  -Please call as needed with any  issues.        Gisselle Turk, NP

## 2024-12-17 LAB
ALBUMIN SERPL-MCNC: 2.8 G/DL (ref 3.4–4.8)
BUN SERPL-MCNC: 43.5 MG/DL (ref 9.8–20.1)
CALCIUM SERPL-MCNC: 9.1 MG/DL (ref 8.4–10.2)
CHLORIDE SERPL-SCNC: 97 MMOL/L (ref 98–107)
CO2 SERPL-SCNC: 32 MMOL/L (ref 23–31)
CREAT SERPL-MCNC: 1.72 MG/DL (ref 0.55–1.02)
GFR SERPLBLD CREATININE-BSD FMLA CKD-EPI: 29 ML/MIN/1.73/M2
GLUCOSE SERPL-MCNC: 95 MG/DL (ref 82–115)
PHOSPHATE SERPL-MCNC: 3.3 MG/DL (ref 2.3–4.7)
POTASSIUM SERPL-SCNC: 3.9 MMOL/L (ref 3.5–5.1)
SODIUM SERPL-SCNC: 135 MMOL/L (ref 136–145)

## 2024-12-17 PROCEDURE — 25000003 PHARM REV CODE 250: Performed by: INTERNAL MEDICINE

## 2024-12-17 PROCEDURE — 97116 GAIT TRAINING THERAPY: CPT | Mod: CQ

## 2024-12-17 PROCEDURE — 21400001 HC TELEMETRY ROOM

## 2024-12-17 PROCEDURE — 99900035 HC TECH TIME PER 15 MIN (STAT)

## 2024-12-17 PROCEDURE — 25000003 PHARM REV CODE 250: Performed by: PHYSICIAN ASSISTANT

## 2024-12-17 PROCEDURE — 97530 THERAPEUTIC ACTIVITIES: CPT | Mod: CQ

## 2024-12-17 PROCEDURE — 94760 N-INVAS EAR/PLS OXIMETRY 1: CPT

## 2024-12-17 PROCEDURE — 63600175 PHARM REV CODE 636 W HCPCS: Performed by: INTERNAL MEDICINE

## 2024-12-17 PROCEDURE — 27000221 HC OXYGEN, UP TO 24 HOURS

## 2024-12-17 PROCEDURE — 80069 RENAL FUNCTION PANEL: CPT | Performed by: INTERNAL MEDICINE

## 2024-12-17 PROCEDURE — 36415 COLL VENOUS BLD VENIPUNCTURE: CPT | Performed by: INTERNAL MEDICINE

## 2024-12-17 RX ORDER — POTASSIUM CHLORIDE 20 MEQ/1
40 TABLET, EXTENDED RELEASE ORAL ONCE
Status: COMPLETED | OUTPATIENT
Start: 2024-12-17 | End: 2024-12-17

## 2024-12-17 RX ORDER — FUROSEMIDE 10 MG/ML
60 INJECTION INTRAMUSCULAR; INTRAVENOUS 3 TIMES DAILY
Status: DISCONTINUED | OUTPATIENT
Start: 2024-12-17 | End: 2024-12-20

## 2024-12-17 RX ADMIN — GABAPENTIN 100 MG: 100 CAPSULE ORAL at 10:12

## 2024-12-17 RX ADMIN — PANTOPRAZOLE SODIUM 40 MG: 40 TABLET, DELAYED RELEASE ORAL at 08:12

## 2024-12-17 RX ADMIN — RIVASTIGMINE TARTRATE 1.5 MG: 1.5 CAPSULE ORAL at 10:12

## 2024-12-17 RX ADMIN — FUROSEMIDE 60 MG: 10 INJECTION, SOLUTION INTRAMUSCULAR; INTRAVENOUS at 08:12

## 2024-12-17 RX ADMIN — ALPRAZOLAM 0.25 MG: 0.25 TABLET ORAL at 08:12

## 2024-12-17 RX ADMIN — ATORVASTATIN CALCIUM 10 MG: 10 TABLET, FILM COATED ORAL at 08:12

## 2024-12-17 RX ADMIN — FUROSEMIDE 60 MG: 10 INJECTION, SOLUTION INTRAMUSCULAR; INTRAVENOUS at 06:12

## 2024-12-17 RX ADMIN — RISPERIDONE 1 MG: 1 TABLET, FILM COATED ORAL at 08:12

## 2024-12-17 RX ADMIN — ALLOPURINOL 200 MG: 100 TABLET ORAL at 10:12

## 2024-12-17 RX ADMIN — APIXABAN 2.5 MG: 2.5 TABLET, FILM COATED ORAL at 10:12

## 2024-12-17 RX ADMIN — PANTOPRAZOLE SODIUM 40 MG: 40 TABLET, DELAYED RELEASE ORAL at 10:12

## 2024-12-17 RX ADMIN — CARVEDILOL 3.12 MG: 3.12 TABLET, FILM COATED ORAL at 08:12

## 2024-12-17 RX ADMIN — POTASSIUM CHLORIDE 40 MEQ: 1500 TABLET, EXTENDED RELEASE ORAL at 10:12

## 2024-12-17 RX ADMIN — MICONAZOLE NITRATE: 20 POWDER TOPICAL at 10:12

## 2024-12-17 RX ADMIN — FUROSEMIDE 60 MG: 10 INJECTION, SOLUTION INTRAMUSCULAR; INTRAVENOUS at 03:12

## 2024-12-17 RX ADMIN — CARVEDILOL 3.12 MG: 3.12 TABLET, FILM COATED ORAL at 10:12

## 2024-12-17 RX ADMIN — GABAPENTIN 100 MG: 100 CAPSULE ORAL at 08:12

## 2024-12-17 RX ADMIN — LEVOTHYROXINE SODIUM 25 MCG: 25 TABLET ORAL at 06:12

## 2024-12-17 RX ADMIN — APIXABAN 2.5 MG: 2.5 TABLET, FILM COATED ORAL at 08:12

## 2024-12-17 RX ADMIN — RIVASTIGMINE TARTRATE 1.5 MG: 1.5 CAPSULE ORAL at 08:12

## 2024-12-17 NOTE — PROGRESS NOTES
H&P reviewed. The patient was examined and there are no changes to the H&P.    R/B/O explained to mom and dad MMS       Inpatient Nutrition Evaluation    Admit Date: 12/9/2024   Total duration of encounter: 8 days    Nutrition Recommendation/Prescription     Diet Low Sodium, 2gm ordered  Add Boost Plus BID (provides 360 kcal and 14 g protein per container)  Encouraged adequate PO intake  Monitor appetite/PO intake, weight, and labs    Nutrition Assessment     Chart Review    Reason Seen: continuous nutrition monitoring, malnutrition screening tool (MST), and follow-up CHF    Malnutrition Screening Tool Results   Have you recently lost weight without trying?: Yes: 2-13 lbs  Have you been eating poorly because of a decreased appetite?: Yes   MST Score: 2     Diagnosis:  Acute on chronic CHF exacerbation  NSTEMI, likely type 2  Urinary retention  MICHEL on CKD  Normocytic anemia, stable  History of hypertension, PAF on Eliquis, DVT, chronic diastolic heart failure, sick sinus syndrome, CKD stage III with solitary kidney, chronic hypoxemic respiratory failure on 2 L home oxygen, hypothyroidism, and glaucoma    Relevant Medical History:   Hypertension   PAF on Eliquis   DVT  Chronic diastolic heart failure   Sick sinus syndrome   CKD stage 3 with solitary kidney   Chronic hypoxemic respiratory failure on 2 L home oxygen  Hypothyroidism   Glaucoma    Nutrition-Related Medications:   Scheduled Meds:   allopurinoL  200 mg Oral Daily    apixaban  2.5 mg Oral BID    atorvastatin  10 mg Oral QHS    carvediloL  3.125 mg Oral BID    furosemide (LASIX) injection  60 mg Intravenous TID    gabapentin  100 mg Oral BID    latanoprost  1 drop Both Eyes QHS    levothyroxine  25 mcg Oral Before breakfast    miconazole NITRATE 2 %   Topical (Top) BID    pantoprazole  40 mg Oral BID    risperiDONE  1 mg Oral QHS    rivastigmine tartrate  1.5 mg Oral BID     Continuous Infusions:  PRN Meds:.  Current Facility-Administered Medications:     acetaminophen, 650 mg, Oral, Q8H PRN    acetaminophen, 650 mg, Oral, Q4H PRN    ALPRAZolam, 0.25 mg, Oral, TID PRN     dextrose 10%, 12.5 g, Intravenous, PRN    dextrose 10%, 25 g, Intravenous, PRN    glucagon (human recombinant), 1 mg, Intramuscular, PRN    glucose, 16 g, Oral, PRN    glucose, 24 g, Oral, PRN    glycopyrrolate, 1 mg, Oral, BID PRN    hydrOXYzine pamoate, 25 mg, Oral, Q8H PRN    ondansetron, 4 mg, Intravenous, Q8H PRN      Nutrition-Related Labs:  Recent Labs   Lab 12/11/24  0352 12/12/24  0343 12/13/24  0615 12/14/24  0436 12/15/24  0529 12/16/24  0339 12/17/24  0713   * 141 135* 134* 135* 136 135*   K 4.4 4.1 4.1 4.1 4.4 4.7 3.9   CALCIUM 9.0 9.3 8.6 9.5 9.0 9.5 9.1   PHOS 3.1 2.7 2.7 2.5 3.7 3.3 3.3   MG 2.40 2.40 2.50 2.50  --   --   --    CL 99 95* 98 97* 98 97* 97*   CO2 30 34* 34* 33* 32* 34* 32*   BUN 27.5* 29.5* 33.2* 35.7* 41.6* 46.1* 43.5*   CREATININE 1.70* 1.88* 1.92* 1.67* 1.98* 1.73* 1.72*   EGFRNORACEVR 29 26 25 30 24 28 29   GLUCOSE 106 116* 103 111 106 111 95   ALBUMIN 2.9* 3.0* 2.9* 2.8* 2.7* 2.9* 2.8*   WBC 5.45 6.63 6.65 6.23 5.72 6.22  --    HGB 8.6* 8.7* 8.2* 8.5* 8.0* 8.6*  --    HCT 26.1* 25.9* 24.8* 26.5* 24.2* 25.9*  --          Diet Order: Diet Low Sodium, 2gm  Oral Supplement Order: none  Appetite/Oral Intake: fair/50-75% of meals  Factors Affecting Nutritional Intake: decreased appetite  Food/Buddhism/Cultural Preferences: none reported  Food Allergies: none reported       Wound(s):      Wound 12/10/24 0800 Pressure Injury Sacral spine #1-Tissue loss description: Partial thickness       Wound 12/10/24 0800 Moisture associated dermatitis Right anterior Groin #2-Tissue loss description: Partial thickness none noted    Comments    12/10/2024: Pt appears well developed for age. Pt's  reports a good appetite/PO intake prior to admit. Presumed per nursing, poor PO intake for breakfast, pt sleepy. The  denies N/V/D/C and chewing/swallowing difficulties. Pt wears dentures, the  denies problems. Per EMR, pt weighed 64.9 kg on 8/1/2024. Will add ONS to promote wound  "healing. Last BM documented as 2024. Will monitor.    2024: Pt has 50-75% PO intake documented. No reported N/V. Last BM documented as 2024. ONS ordered. Will monitor.    Anthropometrics    Height: 5' 5" (165.1 cm) Height Method: Stated  Last Weight: 70.5 kg (155 lb 6.8 oz) (24 0552) Weight Method: Bed Scale  BMI (Calculated): 25.9  BMI Classification: overweight (BMI 25-29.9)     Ideal Body Weight (IBW), Female: 125 lb     % Ideal Body Weight, Female (lb): 120 %                    Usual Body Weight (UBW), k.9 kg  % Usual Body Weight: 105     Usual Weight Provided By: EMR weight history    Wt Readings from Last 5 Encounters:   24 70.5 kg (155 lb 6.8 oz)   24 72.6 kg (160 lb)   10/23/24 68.9 kg (152 lb)   24 69.9 kg (154 lb)   24 69.3 kg (152 lb 11.2 oz)     Weight Change(s) Since Admission:  Admit Weight: 68 kg (150 lb) (24 1114)  12/10/2024: 68 kg  2024: 70.5 kg    Patient Education    Not applicable.    Monitoring & Evaluation     Dietitian will monitor food and beverage intake, weight, electrolyte/renal panel, glucose/endocrine profile, and gastrointestinal profile.  Nutrition Risk/Follow-Up: low (follow-up in 5-7 days)  Patients assigned 'low nutrition risk' status do not qualify for a full nutritional assessment but will be monitored and re-evaluated in a 5-7 day time period. Please consult if re-evaluation needed sooner.    "

## 2024-12-17 NOTE — PROGRESS NOTES
Chief complaint: none    Interval History:  Pt lethargic though responds to questions, denies SOB, notes good appetite    Review of Systems   Constitutional:  Positive for fatigue.   HENT: Negative.     Respiratory: Negative.     Cardiovascular:  Positive for leg swelling.   Gastrointestinal: Negative.    Genitourinary: Negative.    Musculoskeletal: Negative.    Neurological:  Positive for weakness.   Psychiatric/Behavioral: Negative.        all else Neg     allopurinoL  200 mg Oral Daily    apixaban  2.5 mg Oral BID    atorvastatin  10 mg Oral QHS    carvediloL  3.125 mg Oral BID    furosemide (LASIX) injection  60 mg Intravenous BID WM    gabapentin  100 mg Oral BID    latanoprost  1 drop Both Eyes QHS    levothyroxine  25 mcg Oral Before breakfast    miconazole NITRATE 2 %   Topical (Top) BID    pantoprazole  40 mg Oral BID    risperiDONE  1 mg Oral QHS    rivastigmine tartrate  1.5 mg Oral BID       Objective     VITAL SIGNS: 24 HR MIN & MAX LAST    Temp  Min: 97.4 °F (36.3 °C)  Max: 98.4 °F (36.9 °C)  97.8 °F (36.6 °C)    BP  Min: 97/58  Max: 117/65  111/65     Pulse  Min: 59  Max: 80  60     Resp  Min: 18  Max: 18  18    SpO2  Min: 98 %  Max: 100 %  100 %      Wt Readings from Last 3 Encounters:   12/17/24 70.5 kg (155 lb 6.8 oz)   11/19/24 72.6 kg (160 lb)   10/23/24 68.9 kg (152 lb)       Intake/Output Summary (Last 24 hours) at 12/17/2024 0823  Last data filed at 12/17/2024 0552  Gross per 24 hour   Intake 900 ml   Output 1550 ml   Net -650 ml       Physical Exam  Constitutional:       General: She is not in acute distress.     Appearance: She is ill-appearing.   Cardiovascular:      Rate and Rhythm: Normal rate.   Pulmonary:      Effort: Pulmonary effort is normal. No respiratory distress.   Abdominal:      General: Bowel sounds are normal.      Palpations: Abdomen is soft.      Tenderness: There is no abdominal tenderness.   Musculoskeletal:         General: Swelling present.      Cervical back:  Normal range of motion.   Neurological:      Motor: Weakness present.          Recent Labs     12/15/24  0529 12/16/24  0339 12/17/24  0713   * 136 135*   K 4.4 4.7 3.9   CO2 32* 34* 32*   BUN 41.6* 46.1* 43.5*   CREATININE 1.98* 1.73* 1.72*   GLUCOSE 106 111 95   CALCIUM 9.0 9.5 9.1   PHOS 3.7 3.3 3.3   ALBUMIN 2.7* 2.9* 2.8*      Recent Labs     12/15/24  0529 12/16/24  0339   WBC 5.72 6.22   HGB 8.0* 8.6*   HCT 24.2* 25.9*   * 117*         Assessment & Plan     MICHEL / CKD 3B (baseline creatinine 1.5-1.6), solitary kidney - cardiorenal + urine retention, GFR stable tolerating diuresis  Acute Urinary retention - continue cook, f/u urology  Rinaldi CHF/Fluid overload/edema - B leg swelling persists, will increase lasix 60 mg IV tid, monitor response  VHD - mod MR, mod TR, Pulm HTN  Anemia of CKD - Hb up 8.6 p.o. Fe held (h/o constipation), s/p EPO 40 K 12/10/2024  Thrombocytopenia - stable  H/o GIB 7/2024  HTN with ACEi allergy - normotensive low-dose coreg  Hypothyroidism, TSH 1.6  Afib, eliquis  Chronic Home O2 (2 L)

## 2024-12-17 NOTE — PT/OT/SLP PROGRESS
Physical Therapy Treatment    Patient Name:  Ev Alberts   MRN:  [id number]    Recommendations:     Discharge therapy intensity: Low Intensity Therapy   Discharge Equipment Recommendations: none  Barriers to discharge: Impaired mobility and Ongoing medical needs    Assessment:     Ev Alberts is a 89 y.o. female admitted with a medical diagnosis of acute on chronic CHF exacerbation, NSTEMI, MICHEL on CKD, urinary retention, hx of glaucoma, DVT, chronic diastolic heart failure.  She presents with the following impairments/functional limitations: weakness, impaired endurance, impaired self care skills, impaired functional mobility, gait instability, impaired balance, decreased lower extremity function.    Rehab Prognosis: Good; patient would benefit from acute skilled PT services to address these deficits and reach maximum level of function.    Recent Surgery: * No surgery found *      Plan:     During this hospitalization, patient would benefit from acute PT services 5 x/week to address the identified rehab impairments via gait training, therapeutic activities, therapeutic exercises and progress toward the following goals:    Plan of Care Expires:  01/12/25    Subjective     Chief Complaint: none stated  Patient/Family Comments/goals: none stated  Pain/Comfort:         Objective:     Communicated with NSG prior to session.  Patient found HOB elevated with pulse ox (continuous), telemetry, cook catheter, peripheral IV, oxygen, pressure relief boots, pressure relief boots upon PT entry to room.     General Precautions: Standard, fall  Orthopedic Precautions: N/A  Braces: N/A  Respiratory Status: Nasal cannula, flow 2 L/min  SpO2: 100%    Functional Mobility:  Bed Mobility:     Supine to Sit: moderate assistance  Transfers:     Sit to Stand:  moderate assistance with rolling walker x 1 from EOB and x 1 from BS chair   Bed to Chair: moderate assistance with  rolling walker  using  Stand Pivot  Gait: ~6' with RW,  ModA  Assistance needed for weight shifting  Slow sally, step to gait pattern   Balance: CGA for static sitting balance     Education:  Patient provided with verbal education education regarding PT role/goals/POC, fall prevention, safety awareness, and discharge/DME recommendations.  Understanding was verbalized.     Patient left up in chair with all lines intact, call button in reach, geomat cushion, bryanna pad in place, and RN notified    GOALS:   Multidisciplinary Problems       Physical Therapy Goals          Problem: Physical Therapy    Goal Priority Disciplines Outcome Interventions   Physical Therapy Goal     PT, PT/OT Progressing    Description: Goals to be met by: 25     Patient will increase functional independence with mobility by performin. Supine to sit with Stand-by Assistance  2. Sit to supine with Stand-by Assistance  3. Sit to stand transfer with Stand-by Assistance  4. Bed to chair transfer with Stand-by Assistance using Rolling Walker  5. Gait  x 50 feet with Stand-by Assistance using Rolling Walker.                          Time Tracking:     PT Received On: 24  PT Start Time: 1114     PT Stop Time: 1137  PT Total Time (min): 23 min     Billable Minutes: Gait Training 10 and Therapeutic Activity 13    Treatment Type: Treatment  PT/PTA: PTA     Number of PTA visits since last PT visit: 2     2024

## 2024-12-17 NOTE — PROGRESS NOTES
[facility]  Hospital Medicine Progress Note        Chief Complaint: Inpatient Follow-up for weakness     HPI:   89 y.o. female with a PMHx  of hypertension, PAF on Eliquis, DVT, chronic diastolic heart failure LVEF 50-55%, GII DD, sick sinus syndrome, CKD stage III ( baseline cr 1.5) with solitary kidney, chronic hypoxemic respiratory failure on 2 L home oxygen, hypothyroidism, and glaucoma who presented to [facility] on 12/9/2024 with c/o generalized weakness.  Patient reported worsening weakness over the past 3 weeks associated with  lower extremity swelling, abdominal distention, and difficulty urinating.  Patient also endorsed 5 lb weight gain.  Patient reported compliance with home Lasix- 80 mg q.a.m. and 40 mg in the afternoon.  Daughter reports she is followed by nephrologist Dr. Chávez who manages her diuretic. Patient denied fever, rectal bleeding, melena, hematuria, dysuria, and chest pain.      Initial vital signs in ED were /82, pulse 82, respirations 12, temperature 36.8° C, and SpO2 99% on 2 L nasal cannula.  Labs revealed WBC 5.32,  hemoglobin 9.9, platelets 110, sodium 97, CO2 32, BUN 26.3, creatinine 1.91 baseline 1.5, .2, and troponin 0.055.  Chest x-ray revealed right small pleural effusion and basilar atelectasis.  Patient was given IV Lasix 60 mg and aspirin 325 mg tablet in ED. Grey catheter was placed in ED with 1900 urine return. Patient was admitted to hospital medicine service for further medical management. Nephrology was consulted to assist.  iscussed the CT scan results with patient and patient's  patient is refusing any kind of intervention compression fracture appears stable    Interval Hx:   Pt was seen and examined this morning appears tired on nasal cannula      Objective/physical exam:  General: In no acute distress, afebrile, On NC  Chest: Decreased breath sounds at bases   Heart: RRR, +S1, S2, no appreciable murmur  Abdomen:  Soft, nontender, BS +  MSK: Warm, 2+ lower extremity edema, no clubbing or cyanosis  Neurologic:  Sleepy but did wake up to verbal stimuli  VITAL SIGNS: 24 HRS MIN & MAX LAST   Temp  Min: 97.4 °F (36.3 °C)  Max: 98.4 °F (36.9 °C) 97.8 °F (36.6 °C)   BP  Min: 97/58  Max: 117/65 111/65   Pulse  Min: 59  Max: 80  60   Resp  Min: 18  Max: 18 18   SpO2  Min: 98 %  Max: 100 % 100 %     I have reviewed the following labs:  Recent Labs   Lab 12/14/24  0436 12/15/24  0529 12/16/24  0339   WBC 6.23 5.72 6.22   RBC 2.67* 2.48* 2.68*   HGB 8.5* 8.0* 8.6*   HCT 26.5* 24.2* 25.9*   MCV 99.3* 97.6* 96.6*   MCH 31.8* 32.3* 32.1*   MCHC 32.1* 33.1 33.2   RDW 19.0* 18.9* 19.0*   * 126* 117*   MPV 11.0* 11.5* 9.8     Recent Labs   Lab 12/12/24  0343 12/13/24  0615 12/14/24  0436 12/14/24  0852 12/15/24  0529 12/16/24  0339 12/17/24  0713    135* 134*  --  135* 136 135*   K 4.1 4.1 4.1  --  4.4 4.7 3.9   CL 95* 98 97*  --  98 97* 97*   CO2 34* 34* 33*  --  32* 34* 32*   BUN 29.5* 33.2* 35.7*  --  41.6* 46.1* 43.5*   CREATININE 1.88* 1.92* 1.67*  --  1.98* 1.73* 1.72*   CALCIUM 9.3 8.6 9.5  --  9.0 9.5 9.1   PH  --   --   --  7.470*  --   --   --    MG 2.40 2.50 2.50  --   --   --   --    ALBUMIN 3.0* 2.9* 2.8*  --  2.7* 2.9* 2.8*     Microbiology Results (last 7 days)       ** No results found for the last 168 hours. **             See below for Radiology    Assessment/Plan:  Acute on chronic diastolic HF, LVEF 50-55%, GIIDD, POA  Acute kidney injury on CKD IIIb- Cardiorenal and Urinary retention   Urinary retention , s/p Grey insertion, POA  Stable chronic L2 inferior endplate compression deformity with minimal loss of height.  Multilevel degenerative changes of the lumbar spine.  New onset Pancytopenia with moderate thrombocytopenia, POA- likely in the setting  acute illness   Acute on chronic anemia - mild to mod without evidence of overt bleed   Hypokalemia , POA  Elevated Troponin/ NSTEMI type 2 in the setting of HF  exacerbation, POA  Chronic hypoxic resp failure on home O2- stable   Physical debility / Generalized weakness more in bilateral lower extremities     Hx- hypertension, PAF on Eliquis, DVT,, sick sinus syndrome,  solitary kidney, chronic hypoxemic respiratory failure on 2 L home oxygen, hypothyroidism, and glaucoma     Plan-   Nephrology's has increase the dose of Lasix to 60 IV t.i.d.  Urine has cleared up we will resume Eliquis if okay with urology  We had ordered CT of the L-spine that shows stable compression fracture and patient is denying any back pain   Discussed the findings with the patient and her  they are refusing any kind of surgery and I agree considering age  We had consulted PT and OT they are recommending low intensity with sitters  Urine culture neg for bacterial growth  Transfuse blood for Hgb 8 or under given underlying cardiac disease   2D echo showed EF of 50-55% grade 1 diastolic dysfunction septal motion consistent with pacing that was done in July of 2024 patient denies any chest pain I will have the patient follow up outpatient with Cardiology  Repeat blood work in a.m.  Otherwise blood pressure stable on 2 L of nasal cannula          Will follow up on Nephrology's recommendations          VTE prophylaxis: Eliquis      Patient condition:  Fair     Anticipated discharge and Disposition:     TBD      All diagnosis and differential diagnosis have been reviewed; assessment and plan has been documented; I have personally reviewed the labs and test results that are presently available; I have reviewed the patients medication list; I have reviewed the consulting providers response and recommendations. I have reviewed or attempted to review medical records based upon their availability    All of the patient's questions have been  addressed and answered. Patient's is agreeable to the above stated plan. I will continue to monitor closely and make adjustments to medical management as  needed.    Portions of this note dictated using EMR integrated voice recognition software, and may be subject to voice recognition errors not corrected at proofreading. Please contact writer for clarification if needed.   _____________________________________________________________________    Malnutrition Status:  Nutrition consulted. Most recent weight and BMI monitored-     Measurements:  Wt Readings from Last 1 Encounters:   12/17/24 70.5 kg (155 lb 6.8 oz)   Body mass index is 25.86 kg/m².    Patient has been screened and assessed by RD.    Malnutrition Type:  Context:    Level:      Malnutrition Characteristic Summary:       Interventions/Recommendations (treatment strategy):        Scheduled Med:   allopurinoL  200 mg Oral Daily    apixaban  2.5 mg Oral BID    atorvastatin  10 mg Oral QHS    carvediloL  3.125 mg Oral BID    furosemide (LASIX) injection  60 mg Intravenous TID    gabapentin  100 mg Oral BID    latanoprost  1 drop Both Eyes QHS    levothyroxine  25 mcg Oral Before breakfast    miconazole NITRATE 2 %   Topical (Top) BID    pantoprazole  40 mg Oral BID    potassium chloride  40 mEq Oral Once    risperiDONE  1 mg Oral QHS    rivastigmine tartrate  1.5 mg Oral BID      Continuous Infusions:     PRN Meds:    Current Facility-Administered Medications:     acetaminophen, 650 mg, Oral, Q8H PRN    acetaminophen, 650 mg, Oral, Q4H PRN    ALPRAZolam, 0.25 mg, Oral, TID PRN    dextrose 10%, 12.5 g, Intravenous, PRN    dextrose 10%, 25 g, Intravenous, PRN    glucagon (human recombinant), 1 mg, Intramuscular, PRN    glucose, 16 g, Oral, PRN    glucose, 24 g, Oral, PRN    glycopyrrolate, 1 mg, Oral, BID PRN    hydrOXYzine pamoate, 25 mg, Oral, Q8H PRN    ondansetron, 4 mg, Intravenous, Q8H PRN         Paula Senior MD  Department of Hospital Medicine   [facility]   12/17/2024

## 2024-12-18 LAB
ALBUMIN SERPL-MCNC: 2.8 G/DL (ref 3.4–4.8)
BASOPHILS # BLD AUTO: 0.03 X10(3)/MCL
BASOPHILS NFR BLD AUTO: 0.5 %
BUN SERPL-MCNC: 42.6 MG/DL (ref 9.8–20.1)
CALCIUM SERPL-MCNC: 9.3 MG/DL (ref 8.4–10.2)
CHLORIDE SERPL-SCNC: 97 MMOL/L (ref 98–107)
CO2 SERPL-SCNC: 33 MMOL/L (ref 23–31)
CREAT SERPL-MCNC: 1.63 MG/DL (ref 0.55–1.02)
EOSINOPHIL # BLD AUTO: 0.16 X10(3)/MCL (ref 0–0.9)
EOSINOPHIL NFR BLD AUTO: 2.7 %
ERYTHROCYTE [DISTWIDTH] IN BLOOD BY AUTOMATED COUNT: 19.1 % (ref 11.5–17)
GFR SERPLBLD CREATININE-BSD FMLA CKD-EPI: 30 ML/MIN/1.73/M2
GLUCOSE SERPL-MCNC: 104 MG/DL (ref 82–115)
HCT VFR BLD AUTO: 25.6 % (ref 37–47)
HGB BLD-MCNC: 8.5 G/DL (ref 12–16)
IMM GRANULOCYTES # BLD AUTO: 0.01 X10(3)/MCL (ref 0–0.04)
IMM GRANULOCYTES NFR BLD AUTO: 0.2 %
LYMPHOCYTES # BLD AUTO: 2.21 X10(3)/MCL (ref 0.6–4.6)
LYMPHOCYTES NFR BLD AUTO: 37.3 %
MCH RBC QN AUTO: 31.6 PG (ref 27–31)
MCHC RBC AUTO-ENTMCNC: 33.2 G/DL (ref 33–36)
MCV RBC AUTO: 95.2 FL (ref 80–94)
MONOCYTES # BLD AUTO: 0.76 X10(3)/MCL (ref 0.1–1.3)
MONOCYTES NFR BLD AUTO: 12.8 %
NEUTROPHILS # BLD AUTO: 2.76 X10(3)/MCL (ref 2.1–9.2)
NEUTROPHILS NFR BLD AUTO: 46.5 %
NRBC BLD AUTO-RTO: 0 %
PHOSPHATE SERPL-MCNC: 3.2 MG/DL (ref 2.3–4.7)
PLATELET # BLD AUTO: 135 X10(3)/MCL (ref 130–400)
PMV BLD AUTO: 10.6 FL (ref 7.4–10.4)
POTASSIUM SERPL-SCNC: 4 MMOL/L (ref 3.5–5.1)
RBC # BLD AUTO: 2.69 X10(6)/MCL (ref 4.2–5.4)
SODIUM SERPL-SCNC: 136 MMOL/L (ref 136–145)
WBC # BLD AUTO: 5.93 X10(3)/MCL (ref 4.5–11.5)

## 2024-12-18 PROCEDURE — 97530 THERAPEUTIC ACTIVITIES: CPT

## 2024-12-18 PROCEDURE — 25000003 PHARM REV CODE 250: Performed by: INTERNAL MEDICINE

## 2024-12-18 PROCEDURE — 63600175 PHARM REV CODE 636 W HCPCS: Performed by: INTERNAL MEDICINE

## 2024-12-18 PROCEDURE — 25000003 PHARM REV CODE 250: Performed by: PHYSICIAN ASSISTANT

## 2024-12-18 PROCEDURE — 85025 COMPLETE CBC W/AUTO DIFF WBC: CPT | Performed by: INTERNAL MEDICINE

## 2024-12-18 PROCEDURE — 97116 GAIT TRAINING THERAPY: CPT

## 2024-12-18 PROCEDURE — 97530 THERAPEUTIC ACTIVITIES: CPT | Mod: CO

## 2024-12-18 PROCEDURE — 21400001 HC TELEMETRY ROOM

## 2024-12-18 PROCEDURE — 80069 RENAL FUNCTION PANEL: CPT | Performed by: INTERNAL MEDICINE

## 2024-12-18 PROCEDURE — 27000221 HC OXYGEN, UP TO 24 HOURS

## 2024-12-18 PROCEDURE — 36415 COLL VENOUS BLD VENIPUNCTURE: CPT | Performed by: INTERNAL MEDICINE

## 2024-12-18 PROCEDURE — 97535 SELF CARE MNGMENT TRAINING: CPT | Mod: CO

## 2024-12-18 RX ORDER — ACETAZOLAMIDE 250 MG/1
500 TABLET ORAL 2 TIMES DAILY
Status: COMPLETED | OUTPATIENT
Start: 2024-12-18 | End: 2024-12-18

## 2024-12-18 RX ORDER — TALC
6 POWDER (GRAM) TOPICAL NIGHTLY
Status: DISCONTINUED | OUTPATIENT
Start: 2024-12-18 | End: 2024-12-24 | Stop reason: HOSPADM

## 2024-12-18 RX ORDER — POTASSIUM CHLORIDE 20 MEQ/1
40 TABLET, EXTENDED RELEASE ORAL ONCE
Status: COMPLETED | OUTPATIENT
Start: 2024-12-18 | End: 2024-12-18

## 2024-12-18 RX ADMIN — RISPERIDONE 1 MG: 1 TABLET, FILM COATED ORAL at 08:12

## 2024-12-18 RX ADMIN — FUROSEMIDE 60 MG: 10 INJECTION, SOLUTION INTRAMUSCULAR; INTRAVENOUS at 08:12

## 2024-12-18 RX ADMIN — POTASSIUM CHLORIDE 40 MEQ: 1500 TABLET, EXTENDED RELEASE ORAL at 08:12

## 2024-12-18 RX ADMIN — MICONAZOLE NITRATE: 20 POWDER TOPICAL at 08:12

## 2024-12-18 RX ADMIN — CARVEDILOL 3.12 MG: 3.12 TABLET, FILM COATED ORAL at 08:12

## 2024-12-18 RX ADMIN — GABAPENTIN 100 MG: 100 CAPSULE ORAL at 08:12

## 2024-12-18 RX ADMIN — FUROSEMIDE 60 MG: 10 INJECTION, SOLUTION INTRAMUSCULAR; INTRAVENOUS at 04:12

## 2024-12-18 RX ADMIN — RIVASTIGMINE TARTRATE 1.5 MG: 1.5 CAPSULE ORAL at 09:12

## 2024-12-18 RX ADMIN — LEVOTHYROXINE SODIUM 25 MCG: 25 TABLET ORAL at 06:12

## 2024-12-18 RX ADMIN — ACETAZOLAMIDE 500 MG: 250 TABLET ORAL at 08:12

## 2024-12-18 RX ADMIN — ATORVASTATIN CALCIUM 10 MG: 10 TABLET, FILM COATED ORAL at 08:12

## 2024-12-18 RX ADMIN — APIXABAN 2.5 MG: 2.5 TABLET, FILM COATED ORAL at 08:12

## 2024-12-18 RX ADMIN — LATANOPROST 1 DROP: 50 SOLUTION OPHTHALMIC at 09:12

## 2024-12-18 RX ADMIN — Medication 6 MG: at 08:12

## 2024-12-18 RX ADMIN — PANTOPRAZOLE SODIUM 40 MG: 40 TABLET, DELAYED RELEASE ORAL at 04:12

## 2024-12-18 RX ADMIN — PANTOPRAZOLE SODIUM 40 MG: 40 TABLET, DELAYED RELEASE ORAL at 08:12

## 2024-12-18 RX ADMIN — ACETAZOLAMIDE 500 MG: 250 TABLET ORAL at 09:12

## 2024-12-18 RX ADMIN — ALLOPURINOL 200 MG: 100 TABLET ORAL at 08:12

## 2024-12-18 RX ADMIN — RIVASTIGMINE TARTRATE 1.5 MG: 1.5 CAPSULE ORAL at 08:12

## 2024-12-18 NOTE — PROGRESS NOTES
Chief complaint: none    Interval History:  Pt remains lethargic this morning, no acute c/o.  She diuresed better yesterday    Review of Systems   Constitutional:  Positive for fatigue.   HENT: Negative.     Respiratory: Negative.     Cardiovascular:  Positive for leg swelling.   Gastrointestinal: Negative.    Genitourinary: Negative.    Musculoskeletal: Negative.    Neurological:  Positive for weakness.      all else Neg     allopurinoL  200 mg Oral Daily    apixaban  2.5 mg Oral BID    atorvastatin  10 mg Oral QHS    carvediloL  3.125 mg Oral BID    furosemide (LASIX) injection  60 mg Intravenous TID    gabapentin  100 mg Oral BID    latanoprost  1 drop Both Eyes QHS    levothyroxine  25 mcg Oral Before breakfast    miconazole NITRATE 2 %   Topical (Top) BID    pantoprazole  40 mg Oral BID    risperiDONE  1 mg Oral QHS    rivastigmine tartrate  1.5 mg Oral BID       Objective     VITAL SIGNS: 24 HR MIN & MAX LAST    Temp  Min: 97.5 °F (36.4 °C)  Max: 98.6 °F (37 °C)  97.5 °F (36.4 °C)    BP  Min: 105/65  Max: 131/77  112/70     Pulse  Min: 59  Max: 60  60     Resp  Min: 15  Max: 20  18    SpO2  Min: 99 %  Max: 100 %  99 %      Wt Readings from Last 3 Encounters:   12/17/24 70.5 kg (155 lb 6.8 oz)   11/19/24 72.6 kg (160 lb)   10/23/24 68.9 kg (152 lb)       Intake/Output Summary (Last 24 hours) at 12/18/2024 0715  Last data filed at 12/18/2024 0649  Gross per 24 hour   Intake 1200 ml   Output 1975 ml   Net -775 ml       Physical Exam  Constitutional:       General: She is not in acute distress.  Cardiovascular:      Rate and Rhythm: Normal rate.   Pulmonary:      Effort: Pulmonary effort is normal. No respiratory distress.   Abdominal:      General: Bowel sounds are normal.      Palpations: Abdomen is soft.      Tenderness: There is no abdominal tenderness.   Genitourinary:     Comments: cook  Musculoskeletal:         General: Swelling present.   Neurological:      Motor: Weakness present.          Recent  Labs     12/16/24  0339 12/17/24  0713 12/18/24  0353    135* 136   K 4.7 3.9 4.0   CO2 34* 32* 33*   BUN 46.1* 43.5* 42.6*   CREATININE 1.73* 1.72* 1.63*   GLUCOSE 111 95 104   CALCIUM 9.5 9.1 9.3   PHOS 3.3 3.3 3.2   ALBUMIN 2.9* 2.8* 2.8*      Recent Labs     12/16/24  0339 12/18/24  0353   WBC 6.22 5.93   HGB 8.6* 8.5*   HCT 25.9* 25.6*   * 135         Assessment & Plan     MICHEL / CKD 3B (baseline creatinine 1.5-1.6), solitary kidney - cardiorenal + urine retention, GFR improved back to former baseline with diuresis  Acute Urinary retention - continue cook, f/u urology  Rinaldi CHF/Fluid overload/edema - diuresis improved p increasing lasix to 60 mg IV tid, continue at least 1 more day adding diamox x 2 doses, Kcl 40 po, plan on d/c home with lasix 80 po bid, strict low Na+ diet needed  VHD - mod MR, mod TR, Pulm HTN  Anemia of CKD - Hb stable 8.5, p.o. Fe held (h/o constipation), s/p EPO 40 K 12/10/2024  Thrombocytopenia - improved with diuresis  H/o GIB 7/2024  HTN with ACEi allergy - normotensive low-dose coreg  Hypothyroidism, TSH 1.6  Afib, eliquis  Chronic Home O2 (2 L)

## 2024-12-18 NOTE — PROGRESS NOTES
[facility]  Hospital Medicine Progress Note        Chief Complaint: Inpatient Follow-up for weakness     HPI:   89 y.o. female with a PMHx  of hypertension, PAF on Eliquis, DVT, chronic diastolic heart failure LVEF 50-55%, GII DD, sick sinus syndrome, CKD stage III ( baseline cr 1.5) with solitary kidney, chronic hypoxemic respiratory failure on 2 L home oxygen, hypothyroidism, and glaucoma who presented to [facility] on 12/9/2024 with c/o generalized weakness.  Patient reported worsening weakness over the past 3 weeks associated with  lower extremity swelling, abdominal distention, and difficulty urinating.  Patient also endorsed 5 lb weight gain.  Patient reported compliance with home Lasix- 80 mg q.a.m. and 40 mg in the afternoon.  Daughter reports she is followed by nephrologist Dr. Chávez who manages her diuretic. Patient denied fever, rectal bleeding, melena, hematuria, dysuria, and chest pain.      Initial vital signs in ED were /82, pulse 82, respirations 12, temperature 36.8° C, and SpO2 99% on 2 L nasal cannula.  Labs revealed WBC 5.32,  hemoglobin 9.9, platelets 110, sodium 97, CO2 32, BUN 26.3, creatinine 1.91 baseline 1.5, .2, and troponin 0.055.  Chest x-ray revealed right small pleural effusion and basilar atelectasis.  Patient was given IV Lasix 60 mg and aspirin 325 mg tablet in ED. Grey catheter was placed in ED with 1900 urine return. Patient was admitted to hospital medicine service for further medical management. Nephrology was consulted to assist.  iscussed the CT scan results with patient and patient's  patient is refusing any kind of intervention compression fracture appears stable    Interval Hx:   Pt was seen and examined this morning appears tired on nasal cannula      Objective/physical exam:  General: In no acute distress, afebrile, On NC  Chest: Decreased breath sounds at bases   Heart: RRR, +S1, S2, no appreciable murmur  Abdomen:  Soft, nontender, BS +  MSK: Warm, 2+ lower extremity edema, no clubbing or cyanosis  Neurologic:  Sleepy but did wake up to verbal stimuli  VITAL SIGNS: 24 HRS MIN & MAX LAST   Temp  Min: 97.5 °F (36.4 °C)  Max: 98.6 °F (37 °C) 98.1 °F (36.7 °C)   BP  Min: 105/65  Max: 131/77 112/74   Pulse  Min: 59  Max: 63  63   Resp  Min: 15  Max: 20 18   SpO2  Min: 98 %  Max: 100 % 98 %     I have reviewed the following labs:  Recent Labs   Lab 12/15/24  0529 12/16/24  0339 12/18/24  0353   WBC 5.72 6.22 5.93   RBC 2.48* 2.68* 2.69*   HGB 8.0* 8.6* 8.5*   HCT 24.2* 25.9* 25.6*   MCV 97.6* 96.6* 95.2*   MCH 32.3* 32.1* 31.6*   MCHC 33.1 33.2 33.2   RDW 18.9* 19.0* 19.1*   * 117* 135   MPV 11.5* 9.8 10.6*     Recent Labs   Lab 12/12/24  0343 12/13/24  0615 12/14/24  0436 12/14/24  0852 12/15/24  0529 12/16/24  0339 12/17/24  0713 12/18/24  0353    135* 134*  --    < > 136 135* 136   K 4.1 4.1 4.1  --    < > 4.7 3.9 4.0   CL 95* 98 97*  --    < > 97* 97* 97*   CO2 34* 34* 33*  --    < > 34* 32* 33*   BUN 29.5* 33.2* 35.7*  --    < > 46.1* 43.5* 42.6*   CREATININE 1.88* 1.92* 1.67*  --    < > 1.73* 1.72* 1.63*   CALCIUM 9.3 8.6 9.5  --    < > 9.5 9.1 9.3   PH  --   --   --  7.470*  --   --   --   --    MG 2.40 2.50 2.50  --   --   --   --   --    ALBUMIN 3.0* 2.9* 2.8*  --    < > 2.9* 2.8* 2.8*    < > = values in this interval not displayed.     Microbiology Results (last 7 days)       ** No results found for the last 168 hours. **             See below for Radiology    Assessment/Plan:  Acute on chronic diastolic HF, LVEF 50-55%, GIIDD, POA  Acute kidney injury on CKD IIIb- Cardiorenal and Urinary retention   Urinary retention , s/p Grey insertion, POA  Stable chronic L2 inferior endplate compression deformity with minimal loss of height.  Multilevel degenerative changes of the lumbar spine.  New onset Pancytopenia with moderate thrombocytopenia, POA- likely in the setting  acute illness   Acute on chronic anemia -  mild to mod without evidence of overt bleed   Hypokalemia , POA  Elevated Troponin/ NSTEMI type 2 in the setting of HF exacerbation, POA  Chronic hypoxic resp failure on home O2- stable   Physical debility / Generalized weakness more in bilateral lower extremities     Hx- hypertension, PAF on Eliquis, DVT,, sick sinus syndrome,  solitary kidney, chronic hypoxemic respiratory failure on 2 L home oxygen, hypothyroidism, and glaucoma     Plan-   Nephrology's planning to give IV Lasix for 1 more day and they have added Diamox and p.o. potassium  On Lasix 60 IV t.i.d. it for 5  Urine has cleared up we will resume Eliquis if okay with urology  We had ordered CT of the L-spine that shows stable compression fracture and patient is denying any back pain   Discussed the findings with the patient and her  they are refusing any kind of surgery and I agree considering age  We had consulted PT and OT they are recommending low intensity with sitters  Urine culture neg for bacterial growth  Transfuse blood for Hgb 8 or under given underlying cardiac disease   2D echo showed EF of 50-55% grade 1 diastolic dysfunction septal motion consistent with pacing that was done in July of 2024 patient denies any chest pain I will have the patient follow up outpatient with Cardiology  Repeat blood work in a.m.  Otherwise blood pressure stable on 2 L of nasal cannula  Patient is working with PT and OT and they are recommending low intense    Hemoglobin of 8.5    Potential discharge tomorrow with home health and PT once they advised Nephrology's         VTE prophylaxis: Eliquis      Patient condition:  Fair     Anticipated discharge and Disposition:     TBD      All diagnosis and differential diagnosis have been reviewed; assessment and plan has been documented; I have personally reviewed the labs and test results that are presently available; I have reviewed the patients medication list; I have reviewed the consulting providers response  and recommendations. I have reviewed or attempted to review medical records based upon their availability    All of the patient's questions have been  addressed and answered. Patient's is agreeable to the above stated plan. I will continue to monitor closely and make adjustments to medical management as needed.    Portions of this note dictated using EMR integrated voice recognition software, and may be subject to voice recognition errors not corrected at proofreading. Please contact writer for clarification if needed.   _____________________________________________________________________    Malnutrition Status:  Nutrition consulted. Most recent weight and BMI monitored-     Measurements:  Wt Readings from Last 1 Encounters:   12/17/24 70.5 kg (155 lb 6.8 oz)   Body mass index is 25.86 kg/m².    Patient has been screened and assessed by RD.    Malnutrition Type:  Context:    Level:      Malnutrition Characteristic Summary:       Interventions/Recommendations (treatment strategy):        Scheduled Med:   acetaZOLAMIDE  500 mg Oral BID    allopurinoL  200 mg Oral Daily    apixaban  2.5 mg Oral BID    atorvastatin  10 mg Oral QHS    carvediloL  3.125 mg Oral BID    furosemide (LASIX) injection  60 mg Intravenous TID    gabapentin  100 mg Oral BID    latanoprost  1 drop Both Eyes QHS    levothyroxine  25 mcg Oral Before breakfast    miconazole NITRATE 2 %   Topical (Top) BID    pantoprazole  40 mg Oral BID    risperiDONE  1 mg Oral QHS    rivastigmine tartrate  1.5 mg Oral BID      Continuous Infusions:     PRN Meds:    Current Facility-Administered Medications:     acetaminophen, 650 mg, Oral, Q8H PRN    acetaminophen, 650 mg, Oral, Q4H PRN    ALPRAZolam, 0.25 mg, Oral, TID PRN    dextrose 10%, 12.5 g, Intravenous, PRN    dextrose 10%, 25 g, Intravenous, PRN    glucagon (human recombinant), 1 mg, Intramuscular, PRN    glucose, 16 g, Oral, PRN    glucose, 24 g, Oral, PRN    glycopyrrolate, 1 mg, Oral, BID PRN     hydrOXYzine pamoate, 25 mg, Oral, Q8H PRN    ondansetron, 4 mg, Intravenous, Q8H PRN         Paula Senior MD  Department of Hospital Medicine   [facility]   12/18/2024

## 2024-12-18 NOTE — PT/OT/SLP PROGRESS
Occupational Therapy   Treatment    Name: Ev Alberts  MRN: [id number]  Admitting Diagnosis:  <principal problem not specified>       Recommendations:     Recommended therapy intensity at discharge: Moderate Intensity Therapy   Discharge Equipment Recommendations:  none  Barriers to discharge:       Assessment:     Ev Alberts is a 89 y.o. female with a medical diagnosis of <principal problem not specified>.  She presents with difficulty following directions today and increased lethargy. Performance deficits affecting function are weakness, impaired endurance, impaired self care skills, impaired functional mobility, gait instability, impaired balance, decreased lower extremity function.     Rehab Prognosis:  Good; patient would benefit from acute skilled OT services to address these deficits and reach maximum level of function.       Plan:     Patient to be seen 4 x/week to address the above listed problems via self-care/home management  Plan of Care Expires: 01/11/25  Plan of Care Reviewed with: patient    Subjective     Pain/Comfort:  Pain Rating 1: 0/10    Objective:     Communicated with: nurse prior to and following session.  Patient found up in chair with pulse ox (continuous), telemetry, peripheral IV, cook catheter, oxygen upon OT entry to room.    General Precautions: Standard, fall    Orthopedic Precautions:N/A  Braces: N/A  Respiratory Status: Nasal cannula, flow 2 L/min     Occupational Performance:     Bed Mobility:    Patient completed Sit to Supine with maximal assistance and 2 persons   Patient completed rolling side to side supine in bed with Max A    Functional Mobility/Transfers:  Patient completed Sit <> Stand Transfer with maximal assistance and of 2 persons  with  rolling walker   Patient completed Bed <> Chair Transfer using Step Transfer technique with maximal assistance and of 2 persons with rolling walker  Functional Mobility: weight shift provided by therapists along with tactile cues  in order for patient to take steps    Activities of Daily Living:  G with hand over hand assist to wash face, sitting up in bedside chair    Therapeutic Positioning    OT interventions performed during the course of today's session in an effort to prevent and/or reduce acquired pressure injuries:   Therapeutic positioning was provided at the conclusion of session to offload all bony prominences for the prevention and/or reduction of pressure injuries    [facility] 6 Click ADL: 14    Patient Education:  Patient and family were provided with verbal education and demonstrations education regarding fall prevention and safety awareness.  Additional teaching is warranted.      Patient left HOB elevated with all lines intact, call button in reach, and family present.    GOALS:   Multidisciplinary Problems       Occupational Therapy Goals          Problem: Occupational Therapy    Goal Priority Disciplines Outcome Interventions   Occupational Therapy Goal     OT, PT/OT Progressing    Description: Goals to be met by: 1/11/25     Patient will increase functional independence with ADLs by performing:    UE Dressing with Minimal Assistance.  Grooming while standing at sink with Minimal Assistance.  Toileting from BSC/toilet with Minimal Assistance for hygiene and clothing management.   Toilet transfer to bedside commode with Minimal Assistance.                         Time Tracking:     OT Date of Treatment: 12/18/24  OT Start Time: 1528  OT Stop Time: 1551  OT Total Time (min): 23 min    Billable Minutes:Self Care/Home Management 10  Therapeutic Activity 13    OT/SARANYA: SARANYA     Number of SARANYA visits since last OT visit: 2    12/18/2024

## 2024-12-18 NOTE — PT/OT/SLP PROGRESS
Physical Therapy Treatment    Patient Name:  Ev Alberts   MRN:  [id number]    Recommendations:     Discharge therapy intensity: Low Intensity Therapy (with sitters)   Discharge Equipment Recommendations: none  Barriers to discharge: Impaired mobility and Ongoing medical needs    Assessment:     Ev Alberts is a 89 y.o. female admitted with a medical diagnosis of  acute on chronic CHF exacerbation, NSTEMI, MICHEL on CKD, urinary retention, hx of glaucoma, DVT, chronic diastolic heart failure.  She presents with the following impairments/functional limitations: weakness, impaired endurance, impaired self care skills, impaired functional mobility, gait instability, impaired balance, decreased lower extremity function.    Pt presents with increased lethargy and decreased verbal response this date. Pt requires max encouragement for participation, but able to ambulate 10 feet with RW and Claudette. Pt needs VC for motor planning.     Rehab Prognosis: Good; patient would benefit from acute skilled PT services to address these deficits and reach maximum level of function.    Recent Surgery: * No surgery found *      Plan:     During this hospitalization, patient would benefit from acute PT services 5 x/week to address the identified rehab impairments via gait training, therapeutic activities, therapeutic exercises and progress toward the following goals:    Plan of Care Expires:  01/12/25    Subjective     Chief Complaint: tired  Patient/Family Comments/goals: none stated  Pain/Comfort:  Pain Rating 1: 0/10      Objective:     Communicated with NSG prior to session.  Patient found HOB elevated with telemetry, pulse ox (continuous), peripheral IV, cook catheter, oxygen, pressure relief boots upon PT entry to room.     General Precautions: Standard, fall  Orthopedic Precautions: N/A  Braces: N/A  Respiratory Status: Nasal cannula, flow 2 L/min; SpO2 %  Skin Integrity: Visible skin intact      Functional Mobility:  Bed  Mobility:     Supine to Sit: moderate assistance and of 2 persons  Transfers:     Sit to Stand:  minimum assistance and moderate assistance with rolling walker  Bed to Chair: minimum assistance with  rolling walker  using  Step Transfer  Gait: 10 feet with RW and Claudette; pt requires VC for step sequencing and Claudette for weightshift  Balance: modA to CGA for static sitting balance     Education:  Patient and spouse were provided with verbal education education regarding PT role/goals/POC, fall prevention, safety awareness, and discharge/DME recommendations.  Additional teaching is warranted.     Patient left up in chair with all lines intact, call button in reach, geomat cushion, bryanna pad in place, and spouse present    GOALS:   Multidisciplinary Problems       Physical Therapy Goals          Problem: Physical Therapy    Goal Priority Disciplines Outcome Interventions   Physical Therapy Goal     PT, PT/OT Progressing    Description: Goals to be met by: 25     Patient will increase functional independence with mobility by performin. Supine to sit with Stand-by Assistance  2. Sit to supine with Stand-by Assistance  3. Sit to stand transfer with Stand-by Assistance  4. Bed to chair transfer with Stand-by Assistance using Rolling Walker  5. Gait  x 50 feet with Stand-by Assistance using Rolling Walker.                          Time Tracking:     PT Received On: 24  PT Start Time: 1026     PT Stop Time: 1049  PT Total Time (min): 23 min     Billable Minutes: Gait Training 1 and Therapeutic Activity 1    Treatment Type: Treatment  PT/PTA: PT     Number of PTA visits since last PT visit: 3     2024

## 2024-12-19 LAB
ALBUMIN SERPL-MCNC: 2.6 G/DL (ref 3.4–4.8)
BUN SERPL-MCNC: 40.8 MG/DL (ref 9.8–20.1)
CALCIUM SERPL-MCNC: 8.9 MG/DL (ref 8.4–10.2)
CHLORIDE SERPL-SCNC: 98 MMOL/L (ref 98–107)
CO2 SERPL-SCNC: 30 MMOL/L (ref 23–31)
CREAT SERPL-MCNC: 1.48 MG/DL (ref 0.55–1.02)
GFR SERPLBLD CREATININE-BSD FMLA CKD-EPI: 34 ML/MIN/1.73/M2
GLUCOSE SERPL-MCNC: 90 MG/DL (ref 82–115)
PHOSPHATE SERPL-MCNC: 3.6 MG/DL (ref 2.3–4.7)
POTASSIUM SERPL-SCNC: 4.7 MMOL/L (ref 3.5–5.1)
SODIUM SERPL-SCNC: 133 MMOL/L (ref 136–145)

## 2024-12-19 PROCEDURE — 21400001 HC TELEMETRY ROOM

## 2024-12-19 PROCEDURE — 25000003 PHARM REV CODE 250: Performed by: INTERNAL MEDICINE

## 2024-12-19 PROCEDURE — 36415 COLL VENOUS BLD VENIPUNCTURE: CPT | Performed by: INTERNAL MEDICINE

## 2024-12-19 PROCEDURE — 25000003 PHARM REV CODE 250: Performed by: PHYSICIAN ASSISTANT

## 2024-12-19 PROCEDURE — 80069 RENAL FUNCTION PANEL: CPT | Performed by: INTERNAL MEDICINE

## 2024-12-19 PROCEDURE — 63600175 PHARM REV CODE 636 W HCPCS: Performed by: INTERNAL MEDICINE

## 2024-12-19 PROCEDURE — 27000221 HC OXYGEN, UP TO 24 HOURS

## 2024-12-19 RX ADMIN — FUROSEMIDE 60 MG: 10 INJECTION, SOLUTION INTRAMUSCULAR; INTRAVENOUS at 09:12

## 2024-12-19 RX ADMIN — ALLOPURINOL 200 MG: 100 TABLET ORAL at 08:12

## 2024-12-19 RX ADMIN — GABAPENTIN 100 MG: 100 CAPSULE ORAL at 08:12

## 2024-12-19 RX ADMIN — GABAPENTIN 100 MG: 100 CAPSULE ORAL at 09:12

## 2024-12-19 RX ADMIN — LEVOTHYROXINE SODIUM 25 MCG: 25 TABLET ORAL at 05:12

## 2024-12-19 RX ADMIN — MICONAZOLE NITRATE: 20 POWDER TOPICAL at 08:12

## 2024-12-19 RX ADMIN — PANTOPRAZOLE SODIUM 40 MG: 40 TABLET, DELAYED RELEASE ORAL at 05:12

## 2024-12-19 RX ADMIN — ATORVASTATIN CALCIUM 10 MG: 10 TABLET, FILM COATED ORAL at 09:12

## 2024-12-19 RX ADMIN — FUROSEMIDE 60 MG: 10 INJECTION, SOLUTION INTRAMUSCULAR; INTRAVENOUS at 03:12

## 2024-12-19 RX ADMIN — RIVASTIGMINE TARTRATE 1.5 MG: 1.5 CAPSULE ORAL at 09:12

## 2024-12-19 RX ADMIN — APIXABAN 2.5 MG: 2.5 TABLET, FILM COATED ORAL at 09:12

## 2024-12-19 RX ADMIN — PANTOPRAZOLE SODIUM 40 MG: 40 TABLET, DELAYED RELEASE ORAL at 03:12

## 2024-12-19 RX ADMIN — CARVEDILOL 3.12 MG: 3.12 TABLET, FILM COATED ORAL at 09:12

## 2024-12-19 RX ADMIN — Medication 6 MG: at 09:12

## 2024-12-19 RX ADMIN — MICONAZOLE NITRATE: 20 POWDER TOPICAL at 09:12

## 2024-12-19 RX ADMIN — CARVEDILOL 3.12 MG: 3.12 TABLET, FILM COATED ORAL at 08:12

## 2024-12-19 RX ADMIN — LATANOPROST 1 DROP: 50 SOLUTION OPHTHALMIC at 09:12

## 2024-12-19 RX ADMIN — RISPERIDONE 1 MG: 1 TABLET, FILM COATED ORAL at 09:12

## 2024-12-19 RX ADMIN — APIXABAN 2.5 MG: 2.5 TABLET, FILM COATED ORAL at 08:12

## 2024-12-19 RX ADMIN — GLYCOPYRROLATE 1 MG: 1 TABLET ORAL at 09:12

## 2024-12-19 NOTE — PLAN OF CARE
Problem: Infection  Goal: Absence of Infection Signs and Symptoms  Outcome: Not Progressing     Problem: Adult Inpatient Plan of Care  Goal: Plan of Care Review  Outcome: Not Progressing  Goal: Patient-Specific Goal (Individualized)  Outcome: Not Progressing  Goal: Absence of Hospital-Acquired Illness or Injury  Outcome: Not Progressing  Goal: Optimal Comfort and Wellbeing  Outcome: Not Progressing  Goal: Readiness for Transition of Care  Outcome: Not Progressing     Problem: Wound  Goal: Optimal Coping  Outcome: Not Progressing  Goal: Optimal Functional Ability  Outcome: Not Progressing  Goal: Absence of Infection Signs and Symptoms  Outcome: Not Progressing  Goal: Improved Oral Intake  Outcome: Not Progressing  Goal: Optimal Pain Control and Function  Outcome: Not Progressing  Goal: Skin Health and Integrity  Outcome: Not Progressing  Goal: Optimal Wound Healing  Outcome: Not Progressing     Problem: Skin Injury Risk Increased  Goal: Skin Health and Integrity  Outcome: Not Progressing     Problem: Fall Injury Risk  Goal: Absence of Fall and Fall-Related Injury  Outcome: Not Progressing

## 2024-12-19 NOTE — PROGRESS NOTES
[facility]  Hospital Medicine Progress Note        Chief Complaint: Inpatient Follow-up for weakness     HPI:   89 y.o. female with a PMHx  of hypertension, PAF on Eliquis, DVT, chronic diastolic heart failure LVEF 50-55%, GII DD, sick sinus syndrome, CKD stage III ( baseline cr 1.5) with solitary kidney, chronic hypoxemic respiratory failure on 2 L home oxygen, hypothyroidism, and glaucoma who presented to [facility] on 12/9/2024 with c/o generalized weakness.  Patient reported worsening weakness over the past 3 weeks associated with  lower extremity swelling, abdominal distention, and difficulty urinating.  Patient also endorsed 5 lb weight gain.  Patient reported compliance with home Lasix- 80 mg q.a.m. and 40 mg in the afternoon.  Daughter reports she is followed by nephrologist Dr. Chávez who manages her diuretic. Patient denied fever, rectal bleeding, melena, hematuria, dysuria, and chest pain.      Initial vital signs in ED were /82, pulse 82, respirations 12, temperature 36.8° C, and SpO2 99% on 2 L nasal cannula.  Labs revealed WBC 5.32,  hemoglobin 9.9, platelets 110, sodium 97, CO2 32, BUN 26.3, creatinine 1.91 baseline 1.5, .2, and troponin 0.055.  Chest x-ray revealed right small pleural effusion and basilar atelectasis.  Patient was given IV Lasix 60 mg and aspirin 325 mg tablet in ED. Grey catheter was placed in ED with 1900 urine return. Patient was admitted to hospital medicine service for further medical management. Nephrology was consulted to assist.  iscussed the CT scan results with patient and patient's  patient is refusing any kind of intervention compression fracture appears stable    Interval Hx:   Pt was seen and examined this morning appears tired on nasal cannula    December 19, 2024-the patient is bed ridden, frail, no fever, no shortness for breath, no nausea    Objective/physical exam:  General: In no acute distress, afebrile, On  NC  Chest: Decreased breath sounds at bases   Heart: RRR, +S1, S2, no appreciable murmur  Abdomen: Soft, nontender, BS +  MSK: Warm, 2+ lower extremity edema, no clubbing or cyanosis  Neurologic:  Sleepy but did wake up to verbal stimuli  VITAL SIGNS: 24 HRS MIN & MAX LAST   Temp  Min: 97.5 °F (36.4 °C)  Max: 98.4 °F (36.9 °C) 98.1 °F (36.7 °C)   BP  Min: 100/63  Max: 129/88 101/78   Pulse  Min: 59  Max: 63  63   Resp  Min: 18  Max: 20 18   SpO2  Min: 98 %  Max: 100 % 99 %     I have reviewed the following labs:  Recent Labs   Lab 12/15/24  0529 12/16/24  0339 12/18/24  0353   WBC 5.72 6.22 5.93   RBC 2.48* 2.68* 2.69*   HGB 8.0* 8.6* 8.5*   HCT 24.2* 25.9* 25.6*   MCV 97.6* 96.6* 95.2*   MCH 32.3* 32.1* 31.6*   MCHC 33.1 33.2 33.2   RDW 18.9* 19.0* 19.1*   * 117* 135   MPV 11.5* 9.8 10.6*     Recent Labs   Lab 12/13/24  0615 12/14/24  0436 12/14/24  0852 12/15/24  0529 12/17/24  0713 12/18/24  0353 12/19/24  0416   * 134*  --    < > 135* 136 133*   K 4.1 4.1  --    < > 3.9 4.0 4.7   CL 98 97*  --    < > 97* 97* 98   CO2 34* 33*  --    < > 32* 33* 30   BUN 33.2* 35.7*  --    < > 43.5* 42.6* 40.8*   CREATININE 1.92* 1.67*  --    < > 1.72* 1.63* 1.48*   CALCIUM 8.6 9.5  --    < > 9.1 9.3 8.9   PH  --   --  7.470*  --   --   --   --    MG 2.50 2.50  --   --   --   --   --    ALBUMIN 2.9* 2.8*  --    < > 2.8* 2.8* 2.6*    < > = values in this interval not displayed.     Microbiology Results (last 7 days)       ** No results found for the last 168 hours. **             See below for Radiology    Assessment/Plan:  Acute on chronic diastolic HF, LVEF 50-55%, GIIDD, POA  Acute kidney injury on CKD IIIb- Cardiorenal and Urinary retention   Urinary retention , s/p Grey insertion, POA  Stable chronic L2 inferior endplate compression deformity with minimal loss of height.  Multilevel degenerative changes of the lumbar spine.  New onset Pancytopenia with moderate thrombocytopenia, POA- likely in the setting  acute  illness   Acute on chronic anemia - mild to mod without evidence of overt bleed   Hypokalemia , POA  Elevated Troponin/ NSTEMI type 2 in the setting of HF exacerbation, POA  Chronic hypoxic resp failure on home O2- stable   Physical debility / Generalized weakness more in bilateral lower extremities     Hx- hypertension, PAF on Eliquis, DVT,, sick sinus syndrome,  solitary kidney, chronic hypoxemic respiratory failure on 2 L home oxygen, hypothyroidism, and glaucoma     Plan-     Urine has cleared up we will resume Eliquis if okay with urology  We had ordered CT of the L-spine that shows stable compression fracture and patient is denying any back pain   Discussed the findings with the patient and her  they are refusing any kind of surgery and I agree considering age  We had consulted PT and OT they are recommending low intensity with sitters  Urine culture neg for bacterial growth  Transfuse blood for Hgb 8 or under given underlying cardiac disease   2D echo showed EF of 50-55% grade 1 diastolic dysfunction septal motion consistent with pacing that was done in July of 2024 patient denies any chest pain I will have the patient follow up outpatient with Cardiology  Repeat blood work in a.m.  Otherwise blood pressure stable on 2 L of nasal cannula  Patient is working with PT and OT and they are recommending low intense    Consult  for meeting with the family to discuss end of life care         VTE prophylaxis: Eliquis      Patient condition:  Fair     Anticipated discharge and Disposition:     TBD      All diagnosis and differential diagnosis have been reviewed; assessment and plan has been documented; I have personally reviewed the labs and test results that are presently available; I have reviewed the patients medication list; I have reviewed the consulting providers response and recommendations. I have reviewed or attempted to review medical records based upon their availability    All of the  patient's questions have been  addressed and answered. Patient's is agreeable to the above stated plan. I will continue to monitor closely and make adjustments to medical management as needed.    Portions of this note dictated using EMR integrated voice recognition software, and may be subject to voice recognition errors not corrected at proofreading. Please contact writer for clarification if needed.   _____________________________________________________________________    Malnutrition Status:  Nutrition consulted. Most recent weight and BMI monitored-     Measurements:  Wt Readings from Last 1 Encounters:   12/17/24 70.5 kg (155 lb 6.8 oz)   Body mass index is 25.86 kg/m².    Patient has been screened and assessed by RD.    Malnutrition Type:  Context:    Level:      Malnutrition Characteristic Summary:       Interventions/Recommendations (treatment strategy):        Scheduled Med:   allopurinoL  200 mg Oral Daily    apixaban  2.5 mg Oral BID    atorvastatin  10 mg Oral QHS    carvediloL  3.125 mg Oral BID    furosemide (LASIX) injection  60 mg Intravenous TID    gabapentin  100 mg Oral BID    latanoprost  1 drop Both Eyes QHS    levothyroxine  25 mcg Oral Before breakfast    melatonin  6 mg Oral Nightly    miconazole NITRATE 2 %   Topical (Top) BID    pantoprazole  40 mg Oral BID    risperiDONE  1 mg Oral QHS    rivastigmine tartrate  1.5 mg Oral BID      Continuous Infusions:     PRN Meds:    Current Facility-Administered Medications:     acetaminophen, 650 mg, Oral, Q8H PRN    acetaminophen, 650 mg, Oral, Q4H PRN    ALPRAZolam, 0.25 mg, Oral, TID PRN    dextrose 10%, 12.5 g, Intravenous, PRN    dextrose 10%, 25 g, Intravenous, PRN    glucagon (human recombinant), 1 mg, Intramuscular, PRN    glucose, 16 g, Oral, PRN    glucose, 24 g, Oral, PRN    glycopyrrolate, 1 mg, Oral, BID PRN    hydrOXYzine pamoate, 25 mg, Oral, Q8H PRN    ondansetron, 4 mg, Intravenous, Q8H PRN         Mireya Soria MD  Department of  [facility] Medicine   [facility]   12/19/2024

## 2024-12-19 NOTE — PROGRESS NOTES
Chief complaint: none    Interval History:  Pt more alert, verbal today, denies SOB or pain, answers appropriately.  Daughter notes she is tolerating shakes, will try to get her more solid food at lunch    Review of Systems   Constitutional:  Positive for fatigue.   HENT: Negative.     Respiratory: Negative.     Cardiovascular:  Positive for leg swelling (improved).   Gastrointestinal: Negative.    Genitourinary: Negative.    Musculoskeletal: Negative.    Neurological:  Positive for weakness.      all else Neg     allopurinoL  200 mg Oral Daily    apixaban  2.5 mg Oral BID    atorvastatin  10 mg Oral QHS    carvediloL  3.125 mg Oral BID    furosemide (LASIX) injection  60 mg Intravenous TID    gabapentin  100 mg Oral BID    latanoprost  1 drop Both Eyes QHS    levothyroxine  25 mcg Oral Before breakfast    melatonin  6 mg Oral Nightly    miconazole NITRATE 2 %   Topical (Top) BID    pantoprazole  40 mg Oral BID    risperiDONE  1 mg Oral QHS    rivastigmine tartrate  1.5 mg Oral BID       Objective     VITAL SIGNS: 24 HR MIN & MAX LAST    Temp  Min: 97.5 °F (36.4 °C)  Max: 98.4 °F (36.9 °C)  97.7 °F (36.5 °C)    BP  Min: 100/63  Max: 129/88  105/64     Pulse  Min: 59  Max: 87  60     Resp  Min: 17  Max: 20  17    SpO2  Min: 99 %  Max: 100 %  100 %      Wt Readings from Last 3 Encounters:   12/17/24 70.5 kg (155 lb 6.8 oz)   11/19/24 72.6 kg (160 lb)   10/23/24 68.9 kg (152 lb)       Intake/Output Summary (Last 24 hours) at 12/19/2024 1220  Last data filed at 12/19/2024 0533  Gross per 24 hour   Intake 250 ml   Output 1700 ml   Net -1450 ml       Physical Exam  Constitutional:       General: She is not in acute distress.     Appearance: She is ill-appearing.   Cardiovascular:      Rate and Rhythm: Normal rate.   Pulmonary:      Effort: Pulmonary effort is normal. No respiratory distress.   Abdominal:      General: Bowel sounds are normal. There is no distension.      Palpations: Abdomen is soft.   Genitourinary:      Comments: Grey good Uout  Musculoskeletal:         General: Swelling (improved) present.      Cervical back: Normal range of motion.   Neurological:      Mental Status: She is alert.      Motor: Weakness present.   Psychiatric:         Mood and Affect: Mood normal.          Recent Labs     12/17/24  0713 12/18/24  0353 12/19/24  0416   * 136 133*   K 3.9 4.0 4.7   CO2 32* 33* 30   BUN 43.5* 42.6* 40.8*   CREATININE 1.72* 1.63* 1.48*   GLUCOSE 95 104 90   CALCIUM 9.1 9.3 8.9   PHOS 3.3 3.2 3.6   ALBUMIN 2.8* 2.8* 2.6*      Recent Labs     12/18/24  0353   WBC 5.93   HGB 8.5*   HCT 25.6*            Assessment & Plan     MICHEL / CKD 3B (baseline creatinine 1.5-1.6), solitary kidney - cardiorenal + urine retention, GFR improved back to former baseline with diuresis  Acute Urinary retention - continue joyce, f/u urology  Rinaldi CHF/Fluid overload/edema - diuresis improved p increasing lasix to 60 mg IV tid, continue current lasix dosing today, plan on d/c home with HH with lasix 80 po bid, strict low Na+ diet needed  VHD - mod MR, mod TR, Pulm HTN  Anemia of CKD - Hb stable 8.5, p.o. Fe held (h/o constipation), s/p EPO 40 K 12/10/2024  Thrombocytopenia - improved with diuresis  H/o GIB 7/2024  HTN with ACEi allergy - normotensive low-dose coreg  Hypothyroidism, TSH 1.6  Afib, eliquis  Chronic Home O2 (2 L)

## 2024-12-19 NOTE — PT/OT/SLP PROGRESS
Physical Therapy      Patient Name:  Ev Alberts   MRN:  [id number]    Patient not seen today secondary to  politely declining 2/2 to not feeling well. Pt appears lethargic and barely able to open her eyes. Will follow-up as schedule allows.    12/19/2024

## 2024-12-20 LAB
ALLENS TEST BLOOD GAS (OHS): YES
ANION GAP SERPL CALC-SCNC: 9 MEQ/L
BASE EXCESS BLD CALC-SCNC: 13.9 MMOL/L
BASOPHILS # BLD AUTO: 0.03 X10(3)/MCL
BASOPHILS NFR BLD AUTO: 0.5 %
BLOOD GAS SAMPLE TYPE (OHS): ABNORMAL
BUN SERPL-MCNC: 47.8 MG/DL (ref 9.8–20.1)
CA-I BLD-SCNC: 1.27 MMOL/L (ref 1.12–1.23)
CALCIUM SERPL-MCNC: 9.1 MG/DL (ref 8.4–10.2)
CHLORIDE SERPL-SCNC: 98 MMOL/L (ref 98–107)
CO2 BLDA-SCNC: 42.1 MMOL/L
CO2 SERPL-SCNC: 32 MMOL/L (ref 23–31)
CREAT SERPL-MCNC: 1.82 MG/DL (ref 0.55–1.02)
CREAT/UREA NIT SERPL: 26
DRAWN BY BLOOD GAS (OHS): ABNORMAL
EOSINOPHIL # BLD AUTO: 0.14 X10(3)/MCL (ref 0–0.9)
EOSINOPHIL NFR BLD AUTO: 2.2 %
ERYTHROCYTE [DISTWIDTH] IN BLOOD BY AUTOMATED COUNT: 19.7 % (ref 11.5–17)
GFR SERPLBLD CREATININE-BSD FMLA CKD-EPI: 27 ML/MIN/1.73/M2
GLUCOSE SERPL-MCNC: 103 MG/DL (ref 82–115)
HCO3 BLDA-SCNC: 40.3 MMOL/L (ref 22–26)
HCT VFR BLD AUTO: 26.3 % (ref 37–47)
HGB BLD-MCNC: 8.6 G/DL (ref 12–16)
IMM GRANULOCYTES # BLD AUTO: 0.01 X10(3)/MCL (ref 0–0.04)
IMM GRANULOCYTES NFR BLD AUTO: 0.2 %
LYMPHOCYTES # BLD AUTO: 2.18 X10(3)/MCL (ref 0.6–4.6)
LYMPHOCYTES NFR BLD AUTO: 34.8 %
MCH RBC QN AUTO: 31.9 PG (ref 27–31)
MCHC RBC AUTO-ENTMCNC: 32.7 G/DL (ref 33–36)
MCV RBC AUTO: 97.4 FL (ref 80–94)
MONOCYTES # BLD AUTO: 0.74 X10(3)/MCL (ref 0.1–1.3)
MONOCYTES NFR BLD AUTO: 11.8 %
NEUTROPHILS # BLD AUTO: 3.17 X10(3)/MCL (ref 2.1–9.2)
NEUTROPHILS NFR BLD AUTO: 50.5 %
NRBC BLD AUTO-RTO: 0 %
PCO2 BLDA: 58 MMHG (ref 35–45)
PH BLDA: 7.45 [PH] (ref 7.35–7.45)
PLATELET # BLD AUTO: 125 X10(3)/MCL (ref 130–400)
PMV BLD AUTO: 10.4 FL (ref 7.4–10.4)
PO2 BLDA: 93 MMHG (ref 80–100)
POTASSIUM BLOOD GAS (OHS): 3.1 MMOL/L (ref 3.5–5)
POTASSIUM SERPL-SCNC: 3.2 MMOL/L (ref 3.5–5.1)
RBC # BLD AUTO: 2.7 X10(6)/MCL (ref 4.2–5.4)
SAMPLE SITE BLOOD GAS (OHS): ABNORMAL
SAO2 % BLDA: 98 %
SODIUM BLOOD GAS (OHS): 136 MMOL/L (ref 137–145)
SODIUM SERPL-SCNC: 139 MMOL/L (ref 136–145)
WBC # BLD AUTO: 6.27 X10(3)/MCL (ref 4.5–11.5)

## 2024-12-20 PROCEDURE — 99900035 HC TECH TIME PER 15 MIN (STAT)

## 2024-12-20 PROCEDURE — 85025 COMPLETE CBC W/AUTO DIFF WBC: CPT | Performed by: INTERNAL MEDICINE

## 2024-12-20 PROCEDURE — 36600 WITHDRAWAL OF ARTERIAL BLOOD: CPT

## 2024-12-20 PROCEDURE — 80048 BASIC METABOLIC PNL TOTAL CA: CPT | Performed by: INTERNAL MEDICINE

## 2024-12-20 PROCEDURE — 27000221 HC OXYGEN, UP TO 24 HOURS

## 2024-12-20 PROCEDURE — 21400001 HC TELEMETRY ROOM

## 2024-12-20 PROCEDURE — 25000003 PHARM REV CODE 250: Performed by: PHYSICIAN ASSISTANT

## 2024-12-20 PROCEDURE — 82803 BLOOD GASES ANY COMBINATION: CPT

## 2024-12-20 PROCEDURE — 36415 COLL VENOUS BLD VENIPUNCTURE: CPT | Performed by: INTERNAL MEDICINE

## 2024-12-20 PROCEDURE — 25000003 PHARM REV CODE 250: Performed by: INTERNAL MEDICINE

## 2024-12-20 RX ORDER — SODIUM CHLORIDE 9 MG/ML
INJECTION, SOLUTION INTRAVENOUS CONTINUOUS
Status: ACTIVE | OUTPATIENT
Start: 2024-12-20 | End: 2024-12-20

## 2024-12-20 RX ORDER — POTASSIUM CHLORIDE 20 MEQ/1
20 TABLET, EXTENDED RELEASE ORAL ONCE
Status: DISCONTINUED | OUTPATIENT
Start: 2024-12-20 | End: 2024-12-20

## 2024-12-20 RX ADMIN — SODIUM CHLORIDE: 9 INJECTION, SOLUTION INTRAVENOUS at 01:12

## 2024-12-20 RX ADMIN — LEVOTHYROXINE SODIUM 25 MCG: 25 TABLET ORAL at 07:12

## 2024-12-20 RX ADMIN — PANTOPRAZOLE SODIUM 40 MG: 40 TABLET, DELAYED RELEASE ORAL at 07:12

## 2024-12-20 NOTE — PLAN OF CARE
Rounded on pt, her dgt Mellissa is present at bedside. Per dgt pt is not eating much, not being dc today d/t dehydration. Discussed dc plan. Mellissa states if pt goes home they would like hh with [facility] d/t pt had service with them in the recent past. Mellissa did state she will be talking with family this this evening and poss considering snf. PAS completed and I messaged April SSC of PAS.   Referral sent to [facility] via [employer].

## 2024-12-20 NOTE — PROGRESS NOTES
[facility]  Hospital Medicine Progress Note      Chief Complaint: weakness        HPI: (personally reviewed by me and is documented from initial H&P)     Ev Alberts is a 89 y.o. female who  has a past medical history of Acute kidney injury superimposed on chronic kidney disease, Congestive heart failure, Dementia, Hypertension, Shingles of eyelid, Sick sinus syndrome, Thyroid disease, and Unspecified glaucoma..    presented to [facility] on 12/9/2024 with c/o generalized weakness.  Patient reported worsening weakness over the past 3 weeks associated with  lower extremity swelling, abdominal distention, and difficulty urinating.  Patient also endorsed 5 lb weight gain.  Patient reported compliance with home Lasix- 80 mg q.a.m. and 40 mg in the afternoon.  Daughter reports she is followed by nephrologist Dr. Chávez who manages her diuretic. Patient denied fever, rectal bleeding, melena, hematuria, dysuria, and chest pain.      Initial vital signs in ED were /82, pulse 82, respirations 12, temperature 36.8° C, and SpO2 99% on 2 L nasal cannula.  Labs revealed WBC 5.32,  hemoglobin 9.9, platelets 110, sodium 97, CO2 32, BUN 26.3, creatinine 1.91 baseline 1.5, .2, and troponin 0.055.  Chest x-ray revealed right small pleural effusion and basilar atelectasis.  Patient was given IV Lasix 60 mg and aspirin 325 mg tablet in ED. Grey catheter was placed in ED with 1900 urine return. Patient was admitted to hospital medicine service for further medical management. Nephrology was consulted to assist.      Interval History:        12/20/2024 patient  is at bedside but is a fairly poor historian but he is able to tell me that their son also lives with them and is able to assist.    Nurse tells me that daughter visited and was able to provide a few more details; patient has a nurse that comes by 10hrs/day and she has home health services.     Objective Assessment:  Physical  Exam      Vital signs have been personally reviewed by me   General: Appears comfortable, no acute distress.  Awake and alert but no verbal response appreciated.     Integumentary: Warm, dry, intact.  Musculoskeletal: Purposeful movement noted.     Respiratory: No accessory muscle use. Breath sounds are equal.  Cardiovascular: Regular rate.       VITAL SIGNS: 24 HRS MIN & MAX LAST   Temp  Min: 98 °F (36.7 °C)  Max: 98.7 °F (37.1 °C) 98.5 °F (36.9 °C)   BP  Min: 111/71  Max: 130/65 118/74   Pulse  Min: 60  Max: 67  67   Resp  Min: 18  Max: 20 18   SpO2  Min: 94 %  Max: 100 % 99 %     CT Lumbar Spine Without Contrast  Narrative: EXAMINATION:  CT LUMBAR SPINE WITHOUT CONTRAST    CLINICAL HISTORY:  Lower extremity weakness had compression fracture before;    TECHNIQUE:  Noncontrast CT images of the lumbar spine. Axial, coronal, and sagittal reformatted images were obtained. Dose length product is 543 mGycm. Automatic exposure control, adjustment of mA/kV or iterative reconstruction technique was used to limit radiation dose.    COMPARISON:  Lumbar spine dated 07/30/2024    FINDINGS:  There are 4 non-rib-bearing lumbar type vertebra is with partial sacralization of L5.  Alignment is stable.  There is a stable chronic L2 inferior endplate compression deformity with minimal loss of height.  The remaining vertebral body heights are preserved.  There is no acute fracture identified.  There are multilevel degenerative changes with disc height loss, marginal osteophyte formation and facet arthropathy.  There is multilevel degenerative canal narrowing, likely moderate to severe narrowing at L2-L3 and L3-L4.  There is moderate neural foraminal narrowing bilaterally at L2-L4.  There is no paraspinal hematoma.  Ascites is noted.  Impression: 1. No acute fracture identified.  2. Stable chronic L2 inferior endplate compression deformity with minimal loss of height.  3. Multilevel degenerative changes of the lumbar  spine.    Electronically signed by: Jesika Clay  Date:    12/12/2024  Time:    13:31    Recent Labs   Lab 12/16/24  0339 12/18/24  0353 12/20/24  0652   WBC 6.22 5.93 6.27   RBC 2.68* 2.69* 2.70*   HGB 8.6* 8.5* 8.6*   HCT 25.9* 25.6* 26.3*   MCV 96.6* 95.2* 97.4*   MCH 32.1* 31.6* 31.9*   MCHC 33.2 33.2 32.7*   RDW 19.0* 19.1* 19.7*   * 135 125*   MPV 9.8 10.6* 10.4       Recent Labs   Lab 12/14/24  0436 12/14/24  0852 12/15/24  0529 12/17/24  0713 12/18/24  0353 12/19/24  0416 12/20/24  0652   *  --    < > 135* 136 133* 139   K 4.1  --    < > 3.9 4.0 4.7 3.2*   CL 97*  --    < > 97* 97* 98 98   CO2 33*  --    < > 32* 33* 30 32*   BUN 35.7*  --    < > 43.5* 42.6* 40.8* 47.8*   CREATININE 1.67*  --    < > 1.72* 1.63* 1.48* 1.82*   CALCIUM 9.5  --    < > 9.1 9.3 8.9 9.1   PH  --  7.470*  --   --   --   --   --    MG 2.50  --   --   --   --   --   --    ALBUMIN 2.8*  --    < > 2.8* 2.8* 2.6*  --     < > = values in this interval not displayed.     Microbiology Results (last 7 days)       ** No results found for the last 168 hours. **               Medications for Hospital Course     Scheduled Med:   allopurinoL  200 mg Oral Daily    apixaban  2.5 mg Oral BID    atorvastatin  10 mg Oral QHS    carvediloL  3.125 mg Oral BID    gabapentin  100 mg Oral BID    latanoprost  1 drop Both Eyes QHS    levothyroxine  25 mcg Oral Before breakfast    melatonin  6 mg Oral Nightly    miconazole NITRATE 2 %   Topical (Top) BID    pantoprazole  40 mg Oral BID    potassium chloride  20 mEq Oral Once    risperiDONE  1 mg Oral QHS    rivastigmine tartrate  1.5 mg Oral BID      Continuous Infusions:   0.9% NaCl   Intravenous Continuous            PRN Meds:    Current Facility-Administered Medications:     acetaminophen, 650 mg, Oral, Q8H PRN    acetaminophen, 650 mg, Oral, Q4H PRN    ALPRAZolam, 0.25 mg, Oral, TID PRN    dextrose 10%, 12.5 g, Intravenous, PRN    dextrose 10%, 25 g, Intravenous, PRN    glucagon (human  recombinant), 1 mg, Intramuscular, PRN    glucose, 16 g, Oral, PRN    glucose, 24 g, Oral, PRN    glycopyrrolate, 1 mg, Oral, BID PRN    hydrOXYzine pamoate, 25 mg, Oral, Q8H PRN    ondansetron, 4 mg, Intravenous, Q8H PRN     Assessment and Plan          Chronic medical issues:  has a past medical history of Acute kidney injury superimposed on chronic kidney disease, Congestive heart failure, Dementia, Hypertension, Shingles of eyelid, Sick sinus syndrome, Thyroid disease, and Unspecified glaucoma..      __________________________________________________________________________________________________________________________________  Acute on chronic diastolic HF, LVEF 50-55%, GIIDD, POA  Acute kidney injury on CKD IIIb- Cardiorenal and Urinary retention   Urinary retention , s/p Grey insertion, POA  Stable chronic L2 inferior endplate compression deformity with minimal loss of height.  Multilevel degenerative changes of the lumbar spine.  New onset Pancytopenia with moderate thrombocytopenia, POA- likely in the setting  acute illness   Acute on chronic anemia - mild to mod without evidence of overt bleed   Hypokalemia , POA  Elevated Troponin/ NSTEMI type 2 in the setting of HF exacerbation, POA  Chronic hypoxic resp failure on home O2- stable   Physical debility / Generalized weakness more in bilateral lower extremities    Continue supportive care  Continue checking vital signs q4hrs.  Reviewed and restarted appropriate home medications.     DVT prophylaxis initiated   Nurse notified to page me if any changes occur     Consults: nephrologist   I have personally reviewed the specialist documentation and/or have spoken to the specialist with regard to the care of this patient; recommendations are noted above.     Anticipated discharge and Disposition when medically stable: TBD     Therapy: PT/OT/Speech--as indicated     Nutrition: Dietary nutrition supplements BID; Boost Plus Nutritional Drink - Any flavor starting  at 12/10 1434  Diet Low Sodium, 2gm: Low Sodium, 2gm starting at 12/09 1535     _______________________________________________________________________________________________________________________________      I have spent 40 minutes on the day of the visit; time spent includes face to face time and non-face to face time preparing to see the patient (eg, review of tests), independently reviewing and interpreting medical records, both past and current; documenting clinical information in the electronic or other health record, and communicating results to the patient/family/caregiver and care coordinator and nursing team.      All diagnosis and differential diagnosis have been reviewed,  interpreted and communicated appropriately to care team. assessment and plan has been documented; I have personally reviewed the labs and test results that are presently available and pertinent to this hospital course; I have reviewed medical records based upon their availability.    All of the patient's questions have been  addressed and answered. Patient's is agreeable to the above stated plan.   I will continue to monitor closely and make adjustments to medical management as needed.    Milla Blake,      12/20/2024     This note was created with the assistance of Dragon voice recognition software. There may be transcription errors as a result of using this technology however minimal. Effort has been made to assure accuracy of transcription but any obvious errors or omissions should be clarified with the author of the document.

## 2024-12-20 NOTE — PROGRESS NOTES
Chief complaint: N/A    Interval History:  Pt more lethargic this morning, didn't eat bkfst though denies any c/o    Review of Systems   Unable to perform ROS: Acuity of condition         allopurinoL  200 mg Oral Daily    apixaban  2.5 mg Oral BID    atorvastatin  10 mg Oral QHS    carvediloL  3.125 mg Oral BID    gabapentin  100 mg Oral BID    latanoprost  1 drop Both Eyes QHS    levothyroxine  25 mcg Oral Before breakfast    melatonin  6 mg Oral Nightly    miconazole NITRATE 2 %   Topical (Top) BID    pantoprazole  40 mg Oral BID    risperiDONE  1 mg Oral QHS    rivastigmine tartrate  1.5 mg Oral BID       Objective     VITAL SIGNS: 24 HR MIN & MAX LAST    Temp  Min: 97.7 °F (36.5 °C)  Max: 98.7 °F (37.1 °C)  98.7 °F (37.1 °C)    BP  Min: 105/64  Max: 130/65  128/64     Pulse  Min: 60  Max: 63  63     Resp  Min: 17  Max: 20  18    SpO2  Min: 94 %  Max: 100 %  98 %      Wt Readings from Last 3 Encounters:   12/17/24 70.5 kg (155 lb 6.8 oz)   11/19/24 72.6 kg (160 lb)   10/23/24 68.9 kg (152 lb)       Intake/Output Summary (Last 24 hours) at 12/20/2024 1033  Last data filed at 12/20/2024 0624  Gross per 24 hour   Intake 180 ml   Output 1000 ml   Net -820 ml       Physical Exam  Constitutional:       General: She is not in acute distress.  Cardiovascular:      Rate and Rhythm: Normal rate.   Pulmonary:      Effort: Pulmonary effort is normal. No respiratory distress.      Breath sounds: Normal breath sounds.   Abdominal:      General: Bowel sounds are normal. There is no distension.      Palpations: Abdomen is soft.      Tenderness: There is no abdominal tenderness.   Genitourinary:     Comments: cook  Musculoskeletal:         General: Swelling (improved) present.   Neurological:      Mental Status: She is disoriented.      Motor: Weakness present.          Recent Labs     12/18/24  0353 12/19/24  0416 12/20/24  0652    133* 139   K 4.0 4.7 3.2*   CO2 33* 30 32*   BUN 42.6* 40.8* 47.8*   CREATININE 1.63*  1.48* 1.82*   GLUCOSE 104 90 103   CALCIUM 9.3 8.9 9.1   PHOS 3.2 3.6  --    ALBUMIN 2.8* 2.6*  --       Recent Labs     12/18/24  0353 12/20/24  0652   WBC 5.93 6.27   HGB 8.5* 8.6*   HCT 25.6* 26.3*    125*         Assessment & Plan     MICHEL / CKD 3B (baseline creatinine 1.5-1.6), solitary kidney - cardiorenal + urine retention, GFR had improved back to former baseline though now becoming azotemic with diuresis, will d/c IV lasix for now, give 500 cc IV NS over 5 hrs  Acute Urinary retention - continue cook, f/u urology  Rinaldi CHF/Fluid overload/edema - improved p increasing lasix to 60 mg IV tid, will d/c IV lasix as above with careful IVF challenge, plan on d/c home with HH with lasix 80 po bid once appetite back to normal, strict low Na+ diet   VHD - mod MR, mod TR, Pulm HTN  Anemia of CKD - Hb stable 8.6, p.o. Fe held (h/o constipation), s/p EPO 40 K 12/10/2024  Hypokalemia - Kcl 20 po  Thrombocytopenia - improved with diuresis  H/o GIB 7/2024  HTN with ACEi allergy - normotensive low-dose coreg  Hypothyroidism, TSH 1.6  Afib, eliquis  Chronic Home O2 (2 L)

## 2024-12-20 NOTE — PT/OT/SLP PROGRESS
Physical Therapy      Patient Name:  Ev Alberts   MRN:  [id number]    Patient not seen today secondary to Other (Comment) (pt very lethargic this AM, unable to keep eyes open for any length of time.  Checked on pt PM, sleeping and nurse reports,pt has been sleeping most of day.). Will follow-up tomorrow or Sunday.

## 2024-12-20 NOTE — PLAN OF CARE
12/20/24 1329   Medicare Message   Important Message from Medicare regarding Discharge Appeal Rights Given to patient/caregiver;Explained to patient/caregiver     IMM given to Mellissa (brooke) who voiced understanding of IMM.

## 2024-12-21 LAB
ALBUMIN SERPL-MCNC: 2.9 G/DL (ref 3.4–4.8)
BUN SERPL-MCNC: 47.4 MG/DL (ref 9.8–20.1)
CALCIUM SERPL-MCNC: 9.3 MG/DL (ref 8.4–10.2)
CHLORIDE SERPL-SCNC: 99 MMOL/L (ref 98–107)
CO2 SERPL-SCNC: 28 MMOL/L (ref 23–31)
CREAT SERPL-MCNC: 1.76 MG/DL (ref 0.55–1.02)
GFR SERPLBLD CREATININE-BSD FMLA CKD-EPI: 28 ML/MIN/1.73/M2
GLUCOSE SERPL-MCNC: 85 MG/DL (ref 82–115)
HCT VFR BLD AUTO: 27.5 % (ref 37–47)
HGB BLD-MCNC: 9.3 G/DL (ref 12–16)
LACTATE SERPL-SCNC: 1.5 MMOL/L (ref 0.5–2.2)
PHOSPHATE SERPL-MCNC: 3.5 MG/DL (ref 2.3–4.7)
POTASSIUM SERPL-SCNC: 3.2 MMOL/L (ref 3.5–5.1)
SODIUM SERPL-SCNC: 138 MMOL/L (ref 136–145)

## 2024-12-21 PROCEDURE — 21400001 HC TELEMETRY ROOM

## 2024-12-21 PROCEDURE — 85018 HEMOGLOBIN: CPT | Performed by: STUDENT IN AN ORGANIZED HEALTH CARE EDUCATION/TRAINING PROGRAM

## 2024-12-21 PROCEDURE — 25000003 PHARM REV CODE 250: Performed by: PHYSICIAN ASSISTANT

## 2024-12-21 PROCEDURE — 99900031 HC PATIENT EDUCATION (STAT)

## 2024-12-21 PROCEDURE — 27000221 HC OXYGEN, UP TO 24 HOURS

## 2024-12-21 PROCEDURE — 80069 RENAL FUNCTION PANEL: CPT | Performed by: INTERNAL MEDICINE

## 2024-12-21 PROCEDURE — 25000003 PHARM REV CODE 250: Performed by: INTERNAL MEDICINE

## 2024-12-21 PROCEDURE — 36415 COLL VENOUS BLD VENIPUNCTURE: CPT | Performed by: INTERNAL MEDICINE

## 2024-12-21 PROCEDURE — 36415 COLL VENOUS BLD VENIPUNCTURE: CPT | Performed by: STUDENT IN AN ORGANIZED HEALTH CARE EDUCATION/TRAINING PROGRAM

## 2024-12-21 PROCEDURE — 83605 ASSAY OF LACTIC ACID: CPT | Performed by: STUDENT IN AN ORGANIZED HEALTH CARE EDUCATION/TRAINING PROGRAM

## 2024-12-21 PROCEDURE — 99900035 HC TECH TIME PER 15 MIN (STAT)

## 2024-12-21 RX ORDER — POTASSIUM CHLORIDE 20 MEQ/1
40 TABLET, EXTENDED RELEASE ORAL ONCE
Status: COMPLETED | OUTPATIENT
Start: 2024-12-21 | End: 2024-12-21

## 2024-12-21 RX ADMIN — RIVASTIGMINE TARTRATE 1.5 MG: 1.5 CAPSULE ORAL at 09:12

## 2024-12-21 RX ADMIN — RIVASTIGMINE TARTRATE 1.5 MG: 1.5 CAPSULE ORAL at 08:12

## 2024-12-21 RX ADMIN — APIXABAN 2.5 MG: 2.5 TABLET, FILM COATED ORAL at 09:12

## 2024-12-21 RX ADMIN — LATANOPROST 1 DROP: 50 SOLUTION OPHTHALMIC at 09:12

## 2024-12-21 RX ADMIN — GABAPENTIN 100 MG: 100 CAPSULE ORAL at 09:12

## 2024-12-21 RX ADMIN — MICONAZOLE NITRATE: 20 POWDER TOPICAL at 08:12

## 2024-12-21 RX ADMIN — POTASSIUM CHLORIDE 40 MEQ: 1500 TABLET, EXTENDED RELEASE ORAL at 10:12

## 2024-12-21 RX ADMIN — MICONAZOLE NITRATE: 20 POWDER TOPICAL at 09:12

## 2024-12-21 RX ADMIN — PANTOPRAZOLE SODIUM 40 MG: 40 TABLET, DELAYED RELEASE ORAL at 04:12

## 2024-12-21 RX ADMIN — ATORVASTATIN CALCIUM 10 MG: 10 TABLET, FILM COATED ORAL at 09:12

## 2024-12-21 RX ADMIN — CARVEDILOL 3.12 MG: 3.12 TABLET, FILM COATED ORAL at 08:12

## 2024-12-21 RX ADMIN — RISPERIDONE 1 MG: 1 TABLET, FILM COATED ORAL at 09:12

## 2024-12-21 RX ADMIN — CARVEDILOL 3.12 MG: 3.12 TABLET, FILM COATED ORAL at 09:12

## 2024-12-21 RX ADMIN — APIXABAN 2.5 MG: 2.5 TABLET, FILM COATED ORAL at 08:12

## 2024-12-21 RX ADMIN — GABAPENTIN 100 MG: 100 CAPSULE ORAL at 08:12

## 2024-12-21 RX ADMIN — PANTOPRAZOLE SODIUM 40 MG: 40 TABLET, DELAYED RELEASE ORAL at 08:12

## 2024-12-21 RX ADMIN — ALLOPURINOL 200 MG: 100 TABLET ORAL at 08:12

## 2024-12-21 NOTE — PROGRESS NOTES
Chief complaint: none    Interval History:  Pt more alert today, ate well for bkfst, denies any SOB or pain    Review of Systems   Constitutional:  Positive for fatigue.   HENT: Negative.     Respiratory: Negative.     Cardiovascular: Negative.    Gastrointestinal: Negative.    Genitourinary: Negative.    Neurological:  Positive for weakness.      all else Neg     allopurinoL  200 mg Oral Daily    apixaban  2.5 mg Oral BID    atorvastatin  10 mg Oral QHS    carvediloL  3.125 mg Oral BID    gabapentin  100 mg Oral BID    latanoprost  1 drop Both Eyes QHS    levothyroxine  25 mcg Oral Before breakfast    melatonin  6 mg Oral Nightly    miconazole NITRATE 2 %   Topical (Top) BID    pantoprazole  40 mg Oral BID    potassium bicarbonate  20 mEq Oral Once    risperiDONE  1 mg Oral QHS    rivastigmine tartrate  1.5 mg Oral BID       Objective     VITAL SIGNS: 24 HR MIN & MAX LAST    Temp  Min: 97.6 °F (36.4 °C)  Max: 98.5 °F (36.9 °C)  98.5 °F (36.9 °C)    BP  Min: 93/51  Max: 128/64  (!) 125/57     Pulse  Min: 59  Max: 70  66     Resp  Min: 17  Max: 20  17    SpO2  Min: 98 %  Max: 100 %  100 %      Wt Readings from Last 3 Encounters:   12/17/24 70.5 kg (155 lb 6.8 oz)   11/19/24 72.6 kg (160 lb)   10/23/24 68.9 kg (152 lb)       Intake/Output Summary (Last 24 hours) at 12/21/2024 0917  Last data filed at 12/21/2024 0304  Gross per 24 hour   Intake 80 ml   Output 1150 ml   Net -1070 ml       Physical Exam  Constitutional:       General: She is not in acute distress.  Cardiovascular:      Rate and Rhythm: Normal rate.   Pulmonary:      Effort: Pulmonary effort is normal.      Breath sounds: Normal breath sounds.   Abdominal:      General: Bowel sounds are normal.      Palpations: Abdomen is soft.      Tenderness: There is no abdominal tenderness.   Musculoskeletal:         General: Swelling (improved) present.      Cervical back: Normal range of motion.   Neurological:      Mental Status: She is alert.      Motor:  Weakness present.   Psychiatric:         Mood and Affect: Mood normal.          Recent Labs     12/19/24  0416 12/20/24  0652 12/21/24  0410   * 139 138   K 4.7 3.2* 3.2*   CO2 30 32* 28   BUN 40.8* 47.8* 47.4*   CREATININE 1.48* 1.82* 1.76*   GLUCOSE 90 103 85   CALCIUM 8.9 9.1 9.3   PHOS 3.6  --  3.5   ALBUMIN 2.6*  --  2.9*      Recent Labs     12/20/24  0652   WBC 6.27   HGB 8.6*   HCT 26.3*   *         Assessment & Plan     MICHEL / CKD 3B (baseline creatinine 1.5-1.6), solitary kidney - cardiorenal + urine retention, GFR had improved back to former baseline though was becoming azotemic with diuresis, lasix held yesterday and she was given 500 cc IV NS.  GFR now stable, hold lasix for now  Acute Urinary retention - continue cook, f/u urology  Rinaldi CHF/Fluid overload/edema - improved p diuresis, plan on d/c home with HH with lasix 80 po qd / bid prn swelling once appetite back to normal, with strict low Na+ diet   VHD - mod MR, mod TR, Pulm HTN  Anemia of CKD - Hb stable 8.6, p.o. Fe held (h/o constipation), s/p EPO 40 K 12/10/2024  Hypokalemia - will give additional Kcl 40 po  Thrombocytopenia - improved with diuresis  H/o GIB 7/2024  HTN with ACEi allergy - normotensive low-dose coreg  Hypothyroidism, TSH 1.6  Afib, eliquis  Chronic Home O2 (2 L)    Will sign off, she will f/u outpt, please call for questions / concerns

## 2024-12-21 NOTE — PROGRESS NOTES
[facility]  Hospital Medicine Progress Note      Chief Complaint: weakness        HPI: (personally reviewed by me and is documented from initial H&P)     Ev Alberts is a 89 y.o. female who  has a past medical history of Acute kidney injury superimposed on chronic kidney disease, Congestive heart failure, Dementia, Hypertension, Shingles of eyelid, Sick sinus syndrome, Thyroid disease, and Unspecified glaucoma..    presented to [facility] on 12/9/2024 with c/o generalized weakness.  Patient reported worsening weakness over the past 3 weeks associated with  lower extremity swelling, abdominal distention, and difficulty urinating.  Patient also endorsed 5 lb weight gain.  Patient reported compliance with home Lasix- 80 mg q.a.m. and 40 mg in the afternoon.  Daughter reports she is followed by nephrologist Dr. Chávez who manages her diuretic. Patient denied fever, rectal bleeding, melena, hematuria, dysuria, and chest pain.      Initial vital signs in ED were /82, pulse 82, respirations 12, temperature 36.8° C, and SpO2 99% on 2 L nasal cannula.  Labs revealed WBC 5.32,  hemoglobin 9.9, platelets 110, sodium 97, CO2 32, BUN 26.3, creatinine 1.91 baseline 1.5, .2, and troponin 0.055.  Chest x-ray revealed right small pleural effusion and basilar atelectasis.  Patient was given IV Lasix 60 mg and aspirin 325 mg tablet in ED. Grey catheter was placed in ED with 1900 urine return. Patient was admitted to hospital medicine service for further medical management. Nephrology was consulted to assist.  Nurse tells me that daughter visited and was able to provide a few more details; patient has a nurse that comes by 10hrs/day, 7days a week and she has home health services.     Interval History:         12/21/2024 less lethargic but still confused.     Objective Assessment:  Physical Exam      Vital signs have been personally reviewed by me   General: Appears comfortable, no acute  distress.  Awake and alert but no verbal response appreciated.     Integumentary: Warm, dry, intact.  Musculoskeletal: Purposeful movement noted.     Respiratory: No accessory muscle use. Breath sounds are equal.  Cardiovascular: Regular rate.       VITAL SIGNS: 24 HRS MIN & MAX LAST   Temp  Min: 97.6 °F (36.4 °C)  Max: 98.5 °F (36.9 °C) 98.5 °F (36.9 °C)   BP  Min: 93/51  Max: 125/57 (!) 125/57   Pulse  Min: 59  Max: 70  60   Resp  Min: 17  Max: 20 18   SpO2  Min: 98 %  Max: 100 % 100 %     CT Lumbar Spine Without Contrast  Narrative: EXAMINATION:  CT LUMBAR SPINE WITHOUT CONTRAST    CLINICAL HISTORY:  Lower extremity weakness had compression fracture before;    TECHNIQUE:  Noncontrast CT images of the lumbar spine. Axial, coronal, and sagittal reformatted images were obtained. Dose length product is 543 mGycm. Automatic exposure control, adjustment of mA/kV or iterative reconstruction technique was used to limit radiation dose.    COMPARISON:  Lumbar spine dated 07/30/2024    FINDINGS:  There are 4 non-rib-bearing lumbar type vertebra is with partial sacralization of L5.  Alignment is stable.  There is a stable chronic L2 inferior endplate compression deformity with minimal loss of height.  The remaining vertebral body heights are preserved.  There is no acute fracture identified.  There are multilevel degenerative changes with disc height loss, marginal osteophyte formation and facet arthropathy.  There is multilevel degenerative canal narrowing, likely moderate to severe narrowing at L2-L3 and L3-L4.  There is moderate neural foraminal narrowing bilaterally at L2-L4.  There is no paraspinal hematoma.  Ascites is noted.  Impression: 1. No acute fracture identified.  2. Stable chronic L2 inferior endplate compression deformity with minimal loss of height.  3. Multilevel degenerative changes of the lumbar spine.    Electronically signed by: Jesika Clay  Date:    12/12/2024  Time:    13:31    Recent Labs   Lab  12/16/24  0339 12/18/24  0353 12/20/24  0652   WBC 6.22 5.93 6.27   RBC 2.68* 2.69* 2.70*   HGB 8.6* 8.5* 8.6*   HCT 25.9* 25.6* 26.3*   MCV 96.6* 95.2* 97.4*   MCH 32.1* 31.6* 31.9*   MCHC 33.2 33.2 32.7*   RDW 19.0* 19.1* 19.7*   * 135 125*   MPV 9.8 10.6* 10.4       Recent Labs   Lab 12/18/24  0353 12/19/24  0416 12/20/24  0652 12/20/24  1633 12/21/24  0410    133* 139  --  138   K 4.0 4.7 3.2*  --  3.2*   CL 97* 98 98  --  99   CO2 33* 30 32*  --  28   BUN 42.6* 40.8* 47.8*  --  47.4*   CREATININE 1.63* 1.48* 1.82*  --  1.76*   CALCIUM 9.3 8.9 9.1  --  9.3   PH  --   --   --  7.450  --    ALBUMIN 2.8* 2.6*  --   --  2.9*     Microbiology Results (last 7 days)       ** No results found for the last 168 hours. **               Medications for Hospital Course     Scheduled Med:   allopurinoL  200 mg Oral Daily    apixaban  2.5 mg Oral BID    atorvastatin  10 mg Oral QHS    carvediloL  3.125 mg Oral BID    gabapentin  100 mg Oral BID    latanoprost  1 drop Both Eyes QHS    levothyroxine  25 mcg Oral Before breakfast    melatonin  6 mg Oral Nightly    miconazole NITRATE 2 %   Topical (Top) BID    pantoprazole  40 mg Oral BID    potassium chloride  40 mEq Oral Once    risperiDONE  1 mg Oral QHS    rivastigmine tartrate  1.5 mg Oral BID        PRN Meds:    Current Facility-Administered Medications:     acetaminophen, 650 mg, Oral, Q8H PRN    acetaminophen, 650 mg, Oral, Q4H PRN    ALPRAZolam, 0.25 mg, Oral, TID PRN    dextrose 10%, 12.5 g, Intravenous, PRN    dextrose 10%, 25 g, Intravenous, PRN    glucagon (human recombinant), 1 mg, Intramuscular, PRN    glucose, 16 g, Oral, PRN    glucose, 24 g, Oral, PRN    glycopyrrolate, 1 mg, Oral, BID PRN    hydrOXYzine pamoate, 25 mg, Oral, Q8H PRN    ondansetron, 4 mg, Intravenous, Q8H PRN     Assessment and Plan        Chronic medical issues:  has a past medical history of Acute kidney injury superimposed on chronic kidney disease, Congestive heart failure,  Dementia, Hypertension, Shingles of eyelid, Sick sinus syndrome, Thyroid disease, and Unspecified glaucoma..  __________________________________________________________________________________________________________________________________  Acute on chronic diastolic HF, LVEF 50-55%, GIIDD, POA  Acute kidney injury on CKD IIIb- Cardiorenal and Urinary retention   Urinary retention , s/p Grey insertion, POA  Stable chronic L2 inferior endplate compression deformity with minimal loss of height.  Multilevel degenerative changes of the lumbar spine.  New onset Pancytopenia with moderate thrombocytopenia, POA- likely in the setting  acute illness   Acute on chronic anemia - mild to mod without evidence of overt bleed   Hypokalemia , POA  Elevated Troponin/ NSTEMI type 2 in the setting of HF exacerbation, POA  Chronic hypoxic resp failure on home O2- stable   Physical debility / Generalized weakness more in bilateral lower extremities     Order CT head to evaluate confusion/mental status changes.   Continue supportive care  Continue checking vital signs q4hrs.  Reviewed and restarted appropriate home medications.     DVT prophylaxis initiated   Nurse notified to page me if any changes occur     Consults: nephrologist   I have personally reviewed the specialist documentation and/or have spoken to the specialist with regard to the care of this patient; recommendations are noted above.     Anticipated discharge and Disposition when medically stable: TBD     Therapy: PT/OT/Speech--as indicated     Nutrition: Dietary nutrition supplements BID; Boost Plus Nutritional Drink - Any flavor starting at 12/10 1434  Diet Low Sodium, 2gm: Low Sodium, 2gm starting at 12/09 1535     _______________________________________________________________________________________________________________________________      I have spent 40 minutes on the day of the visit; time spent includes face to face time and non-face to face time preparing  to see the patient (eg, review of tests), independently reviewing and interpreting medical records, both past and current; documenting clinical information in the electronic or other health record, and communicating results to the patient/family/caregiver and care coordinator and nursing team.      All diagnosis and differential diagnosis have been reviewed,  interpreted and communicated appropriately to care team. assessment and plan has been documented; I have personally reviewed the labs and test results that are presently available and pertinent to this hospital course; I have reviewed medical records based upon their availability.    All of the patient's questions have been  addressed and answered. Patient's is agreeable to the above stated plan.   I will continue to monitor closely and make adjustments to medical management as needed.    Milla Blake, DO     12/21/2024     This note was created with the assistance of Dragon voice recognition software. There may be transcription errors as a result of using this technology however minimal. Effort has been made to assure accuracy of transcription but any obvious errors or omissions should be clarified with the author of the document.

## 2024-12-22 LAB
COLOR STL: NORMAL
CONSISTENCY STL: NORMAL
HEMOCCULT SP1 STL QL: NEGATIVE

## 2024-12-22 PROCEDURE — 99900035 HC TECH TIME PER 15 MIN (STAT)

## 2024-12-22 PROCEDURE — 21400001 HC TELEMETRY ROOM

## 2024-12-22 PROCEDURE — 99900031 HC PATIENT EDUCATION (STAT)

## 2024-12-22 PROCEDURE — 27000221 HC OXYGEN, UP TO 24 HOURS

## 2024-12-22 PROCEDURE — 25000003 PHARM REV CODE 250: Performed by: PHYSICIAN ASSISTANT

## 2024-12-22 PROCEDURE — 82272 OCCULT BLD FECES 1-3 TESTS: CPT | Performed by: STUDENT IN AN ORGANIZED HEALTH CARE EDUCATION/TRAINING PROGRAM

## 2024-12-22 PROCEDURE — 25000003 PHARM REV CODE 250: Performed by: INTERNAL MEDICINE

## 2024-12-22 RX ADMIN — MICONAZOLE NITRATE: 20 POWDER TOPICAL at 08:12

## 2024-12-22 RX ADMIN — GABAPENTIN 100 MG: 100 CAPSULE ORAL at 08:12

## 2024-12-22 RX ADMIN — LEVOTHYROXINE SODIUM 25 MCG: 25 TABLET ORAL at 05:12

## 2024-12-22 RX ADMIN — CARVEDILOL 3.12 MG: 3.12 TABLET, FILM COATED ORAL at 08:12

## 2024-12-22 RX ADMIN — ACETAMINOPHEN 325MG 650 MG: 325 TABLET ORAL at 11:12

## 2024-12-22 RX ADMIN — PANTOPRAZOLE SODIUM 40 MG: 40 TABLET, DELAYED RELEASE ORAL at 05:12

## 2024-12-22 RX ADMIN — RIVASTIGMINE TARTRATE 1.5 MG: 1.5 CAPSULE ORAL at 08:12

## 2024-12-22 RX ADMIN — APIXABAN 2.5 MG: 2.5 TABLET, FILM COATED ORAL at 08:12

## 2024-12-22 RX ADMIN — ALLOPURINOL 200 MG: 100 TABLET ORAL at 08:12

## 2024-12-22 RX ADMIN — MICONAZOLE NITRATE: 20 POWDER TOPICAL at 09:12

## 2024-12-22 RX ADMIN — ATORVASTATIN CALCIUM 10 MG: 10 TABLET, FILM COATED ORAL at 08:12

## 2024-12-22 RX ADMIN — RISPERIDONE 1 MG: 1 TABLET, FILM COATED ORAL at 08:12

## 2024-12-22 RX ADMIN — LATANOPROST 1 DROP: 50 SOLUTION OPHTHALMIC at 08:12

## 2024-12-22 NOTE — PROGRESS NOTES
[facility]  Hospital Medicine Progress Note      Chief Complaint: weakness        HPI: (personally reviewed by me and is documented from initial H&P)     Ev Alberts is a 89 y.o. female who  has a past medical history of Acute kidney injury superimposed on chronic kidney disease, Congestive heart failure, Dementia, Hypertension, Shingles of eyelid, Sick sinus syndrome, Thyroid disease, and Unspecified glaucoma..    presented to [facility] on 12/9/2024 with c/o generalized weakness.  Patient reported worsening weakness over the past 3 weeks associated with  lower extremity swelling, abdominal distention, and difficulty urinating.  Patient also endorsed 5 lb weight gain.  Patient reported compliance with home Lasix- 80 mg q.a.m. and 40 mg in the afternoon.  Daughter reports she is followed by nephrologist Dr. Chávez who manages her diuretic. Patient denied fever, rectal bleeding, melena, hematuria, dysuria, and chest pain.      Initial vital signs in ED were /82, pulse 82, respirations 12, temperature 36.8° C, and SpO2 99% on 2 L nasal cannula.  Labs revealed WBC 5.32,  hemoglobin 9.9, platelets 110, sodium 97, CO2 32, BUN 26.3, creatinine 1.91 baseline 1.5, .2, and troponin 0.055.  Chest x-ray revealed right small pleural effusion and basilar atelectasis.  Patient was given IV Lasix 60 mg and aspirin 325 mg tablet in ED. Grey catheter was placed in ED with 1900 urine return. Patient was admitted to hospital medicine service for further medical management. Nephrology was consulted to assist.  Nurse tells me that daughter visited and was able to provide a few more details; patient has a nurse that comes by 10hrs/day, 7days a week and she has home health services.     Interval History:         12/22/2024  Daughter present at bedside; patient has been gradually declining 2wks prior to admission.   Since hospitalization she has continued to decline without known cause. ABG  "normal. CT head wnl.  Patient remains lethargic but able to wake up and eat.  The family is not interested in hospice but tells me they are interested in "rehab" for her to get stronger.    Objective Assessment:  Physical Exam      Vital signs have been personally reviewed by me   General: Appears comfortable, no acute distress.  Awake and alert but verbally responsive      Integumentary: Warm, dry, intact.  Musculoskeletal: Purposeful movement noted.     Respiratory: No accessory muscle use. Breath sounds are equal.  Cardiovascular: Regular rate.       VITAL SIGNS: 24 HRS MIN & MAX LAST   Temp  Min: 97.9 °F (36.6 °C)  Max: 99.4 °F (37.4 °C) 98.6 °F (37 °C)   BP  Min: 115/66  Max: 153/72 115/66   Pulse  Min: 60  Max: 86  60   Resp  Min: 18  Max: 20 18   SpO2  Min: 96 %  Max: 100 % 97 %     CT Head Without Contrast  Narrative: EXAMINATION:  CT HEAD WITHOUT CONTRAST    CLINICAL HISTORY:  Mental status change, unknown cause;    TECHNIQUE:  CT imaging of the head performed from the skull base to the vertex without intravenous contrast.  DLP 1812 mGycm. Automatic exposure control, adjustment of mA/kV or iterative reconstruction technique was used to reduce radiation.    COMPARISON:  27 February 2024    FINDINGS:  There is no acute cortical infarct, hemorrhage or mass lesion.  No new parenchymal attenuation abnormality.  Ventricular size is stable.  There are vascular calcifications.    Visualized paranasal sinuses and mastoid air cells are clear.  Impression: No acute intracranial findings or significant interval change compared to February 2024.    Electronically signed by: Michel Gonzalez  Date:    12/21/2024  Time:    16:35    Recent Labs   Lab 12/16/24  0339 12/18/24  0353 12/20/24  0652 12/21/24  1908   WBC 6.22 5.93 6.27  --    RBC 2.68* 2.69* 2.70*  --    HGB 8.6* 8.5* 8.6* 9.3*   HCT 25.9* 25.6* 26.3* 27.5*   MCV 96.6* 95.2* 97.4*  --    MCH 32.1* 31.6* 31.9*  --    MCHC 33.2 33.2 32.7*  --    RDW 19.0* 19.1* 19.7*  " --    * 135 125*  --    MPV 9.8 10.6* 10.4  --        Recent Labs   Lab 12/18/24  0353 12/19/24  0416 12/20/24  0652 12/20/24  1633 12/21/24  0410    133* 139  --  138   K 4.0 4.7 3.2*  --  3.2*   CL 97* 98 98  --  99   CO2 33* 30 32*  --  28   BUN 42.6* 40.8* 47.8*  --  47.4*   CREATININE 1.63* 1.48* 1.82*  --  1.76*   CALCIUM 9.3 8.9 9.1  --  9.3   PH  --   --   --  7.450  --    ALBUMIN 2.8* 2.6*  --   --  2.9*     Microbiology Results (last 7 days)       ** No results found for the last 168 hours. **               Medications for Hospital Course     Scheduled Med:   allopurinoL  200 mg Oral Daily    apixaban  2.5 mg Oral BID    atorvastatin  10 mg Oral QHS    carvediloL  3.125 mg Oral BID    gabapentin  100 mg Oral BID    latanoprost  1 drop Both Eyes QHS    levothyroxine  25 mcg Oral Before breakfast    melatonin  6 mg Oral Nightly    miconazole NITRATE 2 %   Topical (Top) BID    pantoprazole  40 mg Oral BID    risperiDONE  1 mg Oral QHS    rivastigmine tartrate  1.5 mg Oral BID        PRN Meds:    Current Facility-Administered Medications:     acetaminophen, 650 mg, Oral, Q8H PRN    acetaminophen, 650 mg, Oral, Q4H PRN    ALPRAZolam, 0.25 mg, Oral, TID PRN    dextrose 10%, 12.5 g, Intravenous, PRN    dextrose 10%, 25 g, Intravenous, PRN    glucagon (human recombinant), 1 mg, Intramuscular, PRN    glucose, 16 g, Oral, PRN    glucose, 24 g, Oral, PRN    glycopyrrolate, 1 mg, Oral, BID PRN    hydrOXYzine pamoate, 25 mg, Oral, Q8H PRN    ondansetron, 4 mg, Intravenous, Q8H PRN     Assessment and Plan        Chronic medical issues:  has a past medical history of Acute kidney injury superimposed on chronic kidney disease, Congestive heart failure, Dementia, Hypertension, Shingles of eyelid, Sick sinus syndrome, Thyroid disease, and Unspecified glaucoma..  __________________________________________________________________________________________________________________________________  Acute on chronic  diastolic HF, LVEF 50-55%, GIIDD, POA  Acute kidney injury on CKD IIIb- Cardiorenal and Urinary retention   Urinary retention , s/p Grey insertion, POA  Stable chronic L2 inferior endplate compression deformity with minimal loss of height.  Multilevel degenerative changes of the lumbar spine.    Chronic Anemia   Chronic hypoxic resp failure on home O2- stable   Physical debility / Generalized weakness more in bilateral lower extremities     Lethargy unchanged; felt to be secondary to infection but she has completed antibiotic therapy  CT head negative   Pt/ot consulted.  CM consulted for SNF.  Check renal function in the a.m    Consider bladder trial prior to discharge   Continue supportive care  Continue checking vital signs q4hrs.  Reviewed and restarted appropriate home medications.     DVT prophylaxis initiated   Nurse notified to page me if any changes occur     Consults: nephrologist   I have personally reviewed the specialist documentation and/or have spoken to the specialist with regard to the care of this patient; recommendations are noted above.     Anticipated discharge and Disposition when medically stable: TBD     Therapy: PT/OT/Speech--as indicated     Nutrition: Dietary nutrition supplements BID; Boost Plus Nutritional Drink - Any flavor starting at 12/10 1434  Diet Low Sodium, 2gm: Low Sodium, 2gm starting at 12/09 1535     _______________________________________________________________________________________________________________________________      I have spent 40 minutes on the day of the visit; time spent includes face to face time and non-face to face time preparing to see the patient (eg, review of tests), independently reviewing and interpreting medical records, both past and current; documenting clinical information in the electronic or other health record, and communicating results to the patient/family/caregiver and care coordinator and nursing team.      All diagnosis and differential  diagnosis have been reviewed,  interpreted and communicated appropriately to care team. assessment and plan has been documented; I have personally reviewed the labs and test results that are presently available and pertinent to this hospital course; I have reviewed medical records based upon their availability.    All of the patient's questions have been  addressed and answered. Patient's is agreeable to the above stated plan.   I will continue to monitor closely and make adjustments to medical management as needed.    Milla Blake, DO     12/22/2024     This note was created with the assistance of Dragon voice recognition software. There may be transcription errors as a result of using this technology however minimal. Effort has been made to assure accuracy of transcription but any obvious errors or omissions should be clarified with the author of the document.

## 2024-12-23 PROCEDURE — 25000003 PHARM REV CODE 250: Performed by: INTERNAL MEDICINE

## 2024-12-23 PROCEDURE — 94760 N-INVAS EAR/PLS OXIMETRY 1: CPT

## 2024-12-23 PROCEDURE — 97535 SELF CARE MNGMENT TRAINING: CPT | Mod: CO

## 2024-12-23 PROCEDURE — 25000003 PHARM REV CODE 250: Performed by: PHYSICIAN ASSISTANT

## 2024-12-23 PROCEDURE — 97530 THERAPEUTIC ACTIVITIES: CPT | Mod: CQ

## 2024-12-23 PROCEDURE — 27000221 HC OXYGEN, UP TO 24 HOURS

## 2024-12-23 PROCEDURE — 99900035 HC TECH TIME PER 15 MIN (STAT)

## 2024-12-23 PROCEDURE — 21400001 HC TELEMETRY ROOM

## 2024-12-23 RX ORDER — FUROSEMIDE 80 MG/1
80 TABLET ORAL DAILY PRN
Qty: 30 TABLET | Refills: 0 | Status: ON HOLD | OUTPATIENT
Start: 2024-12-23 | End: 2025-01-22

## 2024-12-23 RX ADMIN — RIVASTIGMINE TARTRATE 1.5 MG: 1.5 CAPSULE ORAL at 09:12

## 2024-12-23 RX ADMIN — RIVASTIGMINE TARTRATE 1.5 MG: 1.5 CAPSULE ORAL at 08:12

## 2024-12-23 RX ADMIN — CARVEDILOL 3.12 MG: 3.12 TABLET, FILM COATED ORAL at 08:12

## 2024-12-23 RX ADMIN — APIXABAN 2.5 MG: 2.5 TABLET, FILM COATED ORAL at 08:12

## 2024-12-23 RX ADMIN — MICONAZOLE NITRATE: 20 POWDER TOPICAL at 09:12

## 2024-12-23 RX ADMIN — ALLOPURINOL 200 MG: 100 TABLET ORAL at 09:12

## 2024-12-23 RX ADMIN — CARVEDILOL 3.12 MG: 3.12 TABLET, FILM COATED ORAL at 09:12

## 2024-12-23 RX ADMIN — PANTOPRAZOLE SODIUM 40 MG: 40 TABLET, DELAYED RELEASE ORAL at 05:12

## 2024-12-23 RX ADMIN — ATORVASTATIN CALCIUM 10 MG: 10 TABLET, FILM COATED ORAL at 08:12

## 2024-12-23 RX ADMIN — Medication 6 MG: at 08:12

## 2024-12-23 RX ADMIN — APIXABAN 2.5 MG: 2.5 TABLET, FILM COATED ORAL at 09:12

## 2024-12-23 RX ADMIN — GABAPENTIN 100 MG: 100 CAPSULE ORAL at 08:12

## 2024-12-23 RX ADMIN — GABAPENTIN 100 MG: 100 CAPSULE ORAL at 09:12

## 2024-12-23 RX ADMIN — RISPERIDONE 1 MG: 1 TABLET, FILM COATED ORAL at 08:12

## 2024-12-23 RX ADMIN — LEVOTHYROXINE SODIUM 25 MCG: 25 TABLET ORAL at 05:12

## 2024-12-23 NOTE — PT/OT/SLP PROGRESS
Occupational Therapy   Treatment    Name: Ev Alberts  MRN: [id number]  Admitting Diagnosis:  Weakness       Recommendations:     Recommended therapy intensity at discharge: Moderate Intensity Therapy   Discharge Equipment Recommendations:  none  Barriers to discharge:       Assessment:     Ev Alberts is a 89 y.o. female with a medical diagnosis of Weakness.  She presents with participation with Max encouragement. Performance deficits affecting function are weakness, impaired endurance, impaired self care skills, impaired functional mobility, gait instability, impaired balance, decreased lower extremity function.     Rehab Prognosis:  Fair; patient would benefit from acute skilled OT services to address these deficits and reach maximum level of function.       Plan:     Patient to be seen 4 x/week to address the above listed problems via self-care/home management  Plan of Care Expires: 01/11/25  Plan of Care Reviewed with: patient, spouse    Subjective     Pain/Comfort:  Pain Rating 1: 0/10    Objective:     Communicated with: nurse prior to and following session.  Patient found HOB elevated with telemetry, pulse ox (continuous), peripheral IV, oxygen, cook catheter upon OT entry to room.    General Precautions: Standard, fall    Orthopedic Precautions:N/A  Braces: N/A  Respiratory Status: Nasal cannula, flow 2 L/min     Occupational Performance:     Bed Mobility:    Patient completed Supine to Sit with total assistance and 2 persons  Patient completed Sit to Supine with maximal assistance and 2 persons     Activities of Daily Living:  Attempted face washing ax patient refused    Therapeutic Activities:  Maintain sitting edge of bed with Max A initially progressing to CGA     Therapeutic Exercise:  BUE reaching in sitting position from EOB    Therapeutic Positioning    OT interventions performed during the course of today's session in an effort to prevent and/or reduce acquired pressure injuries:   Therapeutic  positioning was provided at the conclusion of session to offload all bony prominences for the prevention and/or reduction of pressure injuries    [facility] 6 Click ADL: 14    Patient Education:  Patient and spouse were provided with verbal education and demonstrations education regarding fall prevention and safety awareness.  Additional teaching is warranted.      Patient left HOB elevated with all lines intact, call button in reach, and wedge under L side.    GOALS:   Multidisciplinary Problems       Occupational Therapy Goals          Problem: Occupational Therapy    Goal Priority Disciplines Outcome Interventions   Occupational Therapy Goal     OT, PT/OT Progressing    Description: Goals to be met by: 1/11/25     Patient will increase functional independence with ADLs by performing:    UE Dressing with Minimal Assistance.  Grooming while standing at sink with Minimal Assistance.  Toileting from BSC/toilet with Minimal Assistance for hygiene and clothing management.   Toilet transfer to bedside commode with Minimal Assistance.                         Time Tracking:     OT Date of Treatment: 12/23/24  OT Start Time: 1416  OT Stop Time: 1435  OT Total Time (min): 19 min    Billable Minutes:Self Care/Home Management 19    OT/SARANYA: SARANYA     Number of SARANYA visits since last OT visit: 3    12/23/2024

## 2024-12-23 NOTE — PT/OT/SLP PROGRESS
Physical Therapy Treatment    Patient Name:  Ev Alberts   MRN:  [id number]    Recommendations:     Discharge therapy intensity: Moderate Intensity Therapy   Discharge Equipment Recommendations: none  Barriers to discharge: Impaired mobility and Ongoing medical needs    Assessment:     Ev Alberts is a 89 y.o. female admitted with a medical diagnosis of acute on chronic CHF exacerbation, NSTEMI, MICHEL on CKD, urinary retention, hx of glaucoma, DVT, chronic diastolic heart failure.  She presents with the following impairments/functional limitations: weakness, impaired endurance, impaired self care skills, impaired functional mobility, gait instability, impaired balance, decreased lower extremity function.    Rehab Prognosis: Good; patient would benefit from acute skilled PT services to address these deficits and reach maximum level of function.    Recent Surgery: * No surgery found *      Plan:     During this hospitalization, patient would benefit from acute PT services 5 x/week to address the identified rehab impairments via gait training, therapeutic activities, therapeutic exercises and progress toward the following goals:    Plan of Care Expires:  01/12/25    Subjective     Chief Complaint: tired  Patient/Family Comments/goals: none stated  Pain/Comfort:         Objective:     Communicated with nursing prior to session.  Patient found HOB elevated with pulse ox (continuous), telemetry, peripheral IV, oxygen, cook catheter upon PT entry to room.     General Precautions: Standard, fall  Orthopedic Precautions: N/A  Braces: N/A  Respiratory Status: Room air  Blood Pressure: NT    Functional Mobility:  Bed Mobility:     Rolling Right: maximal assistance  Supine to Sit: total assistance and of 2 persons  Sit to Supine: maximal assistance and of 2 persons  Balance: Sitting: Max progressing to CGA    Therapeutic Activities/Exercises:  Patient required max encouragment to participate and once sitting EOB she declined  ADLs and t/fs. After ~5 mins patient requesting to get B2B.     Education:  Patient provided with verbal education education regarding PT role/goals/POC, fall prevention, and safety awareness.  Understanding was verbalized, however additional teaching warranted.     Patient left right sidelying with all lines intact, call button in reach, wedge under L side, nurse notified, and spouse present    GOALS:   Multidisciplinary Problems       Physical Therapy Goals          Problem: Physical Therapy    Goal Priority Disciplines Outcome Interventions   Physical Therapy Goal     PT, PT/OT Progressing    Description: Goals to be met by: 25     Patient will increase functional independence with mobility by performin. Supine to sit with Stand-by Assistance  2. Sit to supine with Stand-by Assistance  3. Sit to stand transfer with Stand-by Assistance  4. Bed to chair transfer with Stand-by Assistance using Rolling Walker  5. Gait  x 50 feet with Stand-by Assistance using Rolling Walker.                          Time Tracking:     PT Received On: 24  PT Start Time: 1416     PT Stop Time: 1435  PT Total Time (min): 19 min     Billable Minutes: Therapeutic Activity 19    Treatment Type: Treatment  PT/PTA: PTA     Number of PTA visits since last PT visit: 4     2024

## 2024-12-23 NOTE — PROGRESS NOTES
[facility] - 9th Floor Medical Telemetry  Wound Care    Patient Name:  Ev Alberts   MRN:  [id number]  Date: 12/23/2024  Diagnosis: Weakness    History:     Past Medical History:   Diagnosis Date    Acute kidney injury superimposed on chronic kidney disease     Congestive heart failure     Dementia     Hypertension     Shingles of eyelid     Sick sinus syndrome     Thyroid disease     Unspecified glaucoma        Social History     Socioeconomic History    Marital status:    Tobacco Use    Smoking status: Never    Smokeless tobacco: Never   Substance and Sexual Activity    Alcohol use: Never    Drug use: Not Currently     Social Drivers of Health     Financial Resource Strain: Low Risk  (12/10/2024)    Overall Financial Resource Strain (CARDIA)     Difficulty of Paying Living Expenses: Not hard at all   Food Insecurity: No Food Insecurity (12/10/2024)    Hunger Vital Sign     Worried About Running Out of Food in the Last Year: Never true     Ran Out of Food in the Last Year: Never true   Transportation Needs: No Transportation Needs (12/10/2024)    TRANSPORTATION NEEDS     Transportation : No   Physical Activity: Inactive (12/10/2024)    Exercise Vital Sign     Days of Exercise per Week: 0 days     Minutes of Exercise per Session: 0 min   Stress: Patient Unable To Answer (12/10/2024)    [employer] - Occupational Stress Questionnaire     Feeling of Stress : Patient unable to answer   Housing Stability: Low Risk  (12/10/2024)    Housing Stability Vital Sign     Unable to Pay for Housing in the Last Year: No     Homeless in the Last Year: No       Precautions:     Allergies as of 12/09/2024 - Reviewed 12/09/2024   Allergen Reaction Noted    Amlodipine-benazepril Edema 06/01/2022    Corticosteroids (glucocorticoids) Edema and Swelling 05/11/2011    Rofecoxib Anaphylaxis and Swelling 05/11/2011    Sulfa (sulfonamide antibiotics) Edema 06/01/2022    Atorvastatin   10/26/2018    Ibuprofen  06/01/2022       [facility] Assessment Details/Treatment     Follow up visit regarding affected area over sacrum, area cleansed and assessed and dressing reinforced, noting vulnerable and resolving area over sacrum. Patient is currently at rest on a low air loss bed with pressure injury prevention measures in place.        12/23/24 0900        Wound 12/10/24 0800 Pressure Injury Sacral spine #1   Date First Assessed/Time First Assessed: 12/10/24 0800   Present on Original Admission: Yes  Primary Wound Type: Pressure Injury  Location: Sacral spine  Wound Number: #1   Wound Image    Pressure Injury Stage 2   Drainage Amount None   Appearance Intact   Tissue loss description Not applicable   Periwound Area Intact   Wound Edges Undefined  (resolving areas over sacrum)   Care Soap and water;Skin Barrier   Dressing Reinforced;Foam   Off Loading Other (see comments)  (turning with foam wedge, and low air loss bed in use.)   Dressing Change Due 12/24/24         Recommendations made to primary team for local wound care measures and pressure injury measures  .      12/23/2024

## 2024-12-23 NOTE — PROGRESS NOTES
"[facility] Hospital Medicine Progress Note        Chief Complaint: Inpatient Follow-up     HPI:   89 y.o. female with a PMHx  of hypertension, PAF on Eliquis, DVT, chronic diastolic heart failure LVEF 50-55%, GII DD, sick sinus syndrome, CKD stage III ( baseline cr 1.5) with solitary kidney, chronic hypoxemic respiratory failure on 2 L home oxygen, hypothyroidism, and glaucoma who presented to [facility] on 12/9/2024 with c/o generalized weakness.  Patient reported worsening weakness over the past 3 weeks associated with  lower extremity swelling, abdominal distention, and difficulty urinating.  Patient also endorsed 5 lb weight gain.  Patient reported compliance with home Lasix- 80 mg q.a.m. and 40 mg in the afternoon.  Daughter reports she is followed by nephrologist Dr. Chávez who manages her diuretic. Patient denied fever, rectal bleeding, melena, hematuria, dysuria, and chest pain.      Initial vital signs in ED were /82, pulse 82, respirations 12, temperature 36.8° C, and SpO2 99% on 2 L nasal cannula.  Labs revealed WBC 5.32,  hemoglobin 9.9, platelets 110, sodium 97, CO2 32, BUN 26.3, creatinine 1.91 baseline 1.5, .2, and troponin 0.055.  Chest x-ray revealed right small pleural effusion and basilar atelectasis.  Patient was given IV Lasix 60 mg and aspirin 325 mg tablet in ED. Grey catheter was placed in ED with 1900 urine return. Patient was admitted to hospital medicine service for further medical management. Nephrology was consulted to assist.  "Discussed the CT scan results with patient and patient's  patient is refusing any kind of intervention compression fracture appears stable"      Interval Hx:   No acute events reported overnight, family member present  Patient alert awake, lying on bed comfortably, voiced no new complaints, appears generally weak, denies SOB, pain        Hemodynamically stable on 2 L nasal cannula       Objective/physical " exam:  General: In no acute distress, afebrile  Chest:  Unlabored breathing on nasal cannula, Clear to auscultation anterio  Heart:  +S1, S2, no appreciable murmur  Abdomen: Soft, nontender  MSK: Warm, pedal edema  Neurologic: Alert and answering questions  VITAL SIGNS: 24 HRS MIN & MAX LAST   Temp  Min: 97.6 °F (36.4 °C)  Max: 98.4 °F (36.9 °C) 97.9 °F (36.6 °C)   BP  Min: 109/63  Max: 143/75 125/67   Pulse  Min: 59  Max: 80  60   Resp  Min: 18  Max: 20 18   SpO2  Min: 97 %  Max: 100 % 100 %     I have reviewed the following labs:  Recent Labs   Lab 12/18/24  0353 12/20/24  0652 12/21/24  1908   WBC 5.93 6.27  --    RBC 2.69* 2.70*  --    HGB 8.5* 8.6* 9.3*   HCT 25.6* 26.3* 27.5*   MCV 95.2* 97.4*  --    MCH 31.6* 31.9*  --    MCHC 33.2 32.7*  --    RDW 19.1* 19.7*  --     125*  --    MPV 10.6* 10.4  --      Recent Labs   Lab 12/18/24  0353 12/19/24  0416 12/20/24  0652 12/20/24  1633 12/21/24  0410    133* 139  --  138   K 4.0 4.7 3.2*  --  3.2*   CL 97* 98 98  --  99   CO2 33* 30 32*  --  28   BUN 42.6* 40.8* 47.8*  --  47.4*   CREATININE 1.63* 1.48* 1.82*  --  1.76*   CALCIUM 9.3 8.9 9.1  --  9.3   PH  --   --   --  7.450  --    ALBUMIN 2.8* 2.6*  --   --  2.9*     Microbiology Results (last 7 days)       ** No results found for the last 168 hours. **             See below for Radiology    Assessment/Plan:  Acute on chronic diastolic heart failure  MICHEL on CKD   Anemia  Thrombocytopenia   Stable chronic L2 inferior endplate compression deformity with minimal loss of height   AFib on Eliquis   Chronic home O2 2 L   Elevated Troponin/ NSTEMI type 2 in the setting of HF exacerbation, POA   Physical debility / Generalized weakness    Acute urinary retention requiring Grey      Nephrology signed off, input noted, diuresis p.r.n. with Lasix 80 p.o. q.d./b.i.d.   Mobilize/PT/OT  Increase p.o. intake  Grey care, urology signed off recommended to continue Grey at discharge, outpatient void trial hematuria  workup in 10-14 days  Continue Eliquis  Continue with current care and monitoring   Case was discussed with patient's nurse and  on the floor.  Labs in a.m.    VTE prophylaxis:  Eliquis      Anticipated discharge and Disposition:   TBD/?home with home health/sitter          Portions of this note dictated using EMR integrated voice recognition software, and may be subject to voice recognition errors not corrected at proofreading. Please contact writer for clarification if needed.   _____________________________________________________________________    Malnutrition Status:  Nutrition consulted. Most recent weight and BMI monitored-     Measurements:  Wt Readings from Last 1 Encounters:   12/22/24 71.7 kg (158 lb 1.1 oz)   Body mass index is 26.3 kg/m².    Patient has been screened and assessed by RD.    Malnutrition Type:  Context:    Level:      Malnutrition Characteristic Summary:       Interventions/Recommendations (treatment strategy):        Scheduled Med:   allopurinoL  200 mg Oral Daily    apixaban  2.5 mg Oral BID    atorvastatin  10 mg Oral QHS    carvediloL  3.125 mg Oral BID    gabapentin  100 mg Oral BID    latanoprost  1 drop Both Eyes QHS    levothyroxine  25 mcg Oral Before breakfast    melatonin  6 mg Oral Nightly    miconazole NITRATE 2 %   Topical (Top) BID    pantoprazole  40 mg Oral BID    risperiDONE  1 mg Oral QHS    rivastigmine tartrate  1.5 mg Oral BID      Continuous Infusions:     PRN Meds:    Current Facility-Administered Medications:     acetaminophen, 650 mg, Oral, Q8H PRN    acetaminophen, 650 mg, Oral, Q4H PRN    ALPRAZolam, 0.25 mg, Oral, TID PRN    dextrose 10%, 12.5 g, Intravenous, PRN    dextrose 10%, 25 g, Intravenous, PRN    glucagon (human recombinant), 1 mg, Intramuscular, PRN    glucose, 16 g, Oral, PRN    glucose, 24 g, Oral, PRN    glycopyrrolate, 1 mg, Oral, BID PRN    hydrOXYzine pamoate, 25 mg, Oral, Q8H PRN    ondansetron, 4 mg, Intravenous, Q8H PRN      Radiology:  I have personally reviewed the following imaging and agree with the radiologist.     CT Head Without Contrast  Narrative: EXAMINATION:  CT HEAD WITHOUT CONTRAST    CLINICAL HISTORY:  Mental status change, unknown cause;    TECHNIQUE:  CT imaging of the head performed from the skull base to the vertex without intravenous contrast.  DLP 1812 mGycm. Automatic exposure control, adjustment of mA/kV or iterative reconstruction technique was used to reduce radiation.    COMPARISON:  27 February 2024    FINDINGS:  There is no acute cortical infarct, hemorrhage or mass lesion.  No new parenchymal attenuation abnormality.  Ventricular size is stable.  There are vascular calcifications.    Visualized paranasal sinuses and mastoid air cells are clear.  Impression: No acute intracranial findings or significant interval change compared to February 2024.    Electronically signed by: Michel Gonzalez  Date:    12/21/2024  Time:    16:35      Janelle Wheeler MD  Department of Hospital Medicine   [facility]   12/23/2024

## 2024-12-24 VITALS
WEIGHT: 158.06 LBS | DIASTOLIC BLOOD PRESSURE: 89 MMHG | TEMPERATURE: 98 F | HEART RATE: 60 BPM | BODY MASS INDEX: 26.33 KG/M2 | HEIGHT: 65 IN | RESPIRATION RATE: 18 BRPM | OXYGEN SATURATION: 97 % | SYSTOLIC BLOOD PRESSURE: 142 MMHG

## 2024-12-24 LAB
ANION GAP SERPL CALC-SCNC: 6 MEQ/L
BASOPHILS # BLD AUTO: 0.03 X10(3)/MCL
BASOPHILS NFR BLD AUTO: 0.5 %
BUN SERPL-MCNC: 36.8 MG/DL (ref 9.8–20.1)
CALCIUM SERPL-MCNC: 8.8 MG/DL (ref 8.4–10.2)
CHLORIDE SERPL-SCNC: 97 MMOL/L (ref 98–107)
CO2 SERPL-SCNC: 28 MMOL/L (ref 23–31)
CREAT SERPL-MCNC: 1.23 MG/DL (ref 0.55–1.02)
CREAT/UREA NIT SERPL: 30
EOSINOPHIL # BLD AUTO: 0.14 X10(3)/MCL (ref 0–0.9)
EOSINOPHIL NFR BLD AUTO: 2.3 %
ERYTHROCYTE [DISTWIDTH] IN BLOOD BY AUTOMATED COUNT: 19.8 % (ref 11.5–17)
GFR SERPLBLD CREATININE-BSD FMLA CKD-EPI: 43 ML/MIN/1.73/M2
GLUCOSE SERPL-MCNC: 100 MG/DL (ref 82–115)
HCT VFR BLD AUTO: 25.7 % (ref 37–47)
HGB BLD-MCNC: 8.5 G/DL (ref 12–16)
IMM GRANULOCYTES # BLD AUTO: 0.02 X10(3)/MCL (ref 0–0.04)
IMM GRANULOCYTES NFR BLD AUTO: 0.3 %
LYMPHOCYTES # BLD AUTO: 2.26 X10(3)/MCL (ref 0.6–4.6)
LYMPHOCYTES NFR BLD AUTO: 36.9 %
MCH RBC QN AUTO: 31.6 PG (ref 27–31)
MCHC RBC AUTO-ENTMCNC: 33.1 G/DL (ref 33–36)
MCV RBC AUTO: 95.5 FL (ref 80–94)
MONOCYTES # BLD AUTO: 0.66 X10(3)/MCL (ref 0.1–1.3)
MONOCYTES NFR BLD AUTO: 10.8 %
NEUTROPHILS # BLD AUTO: 3.01 X10(3)/MCL (ref 2.1–9.2)
NEUTROPHILS NFR BLD AUTO: 49.2 %
NRBC BLD AUTO-RTO: 0 %
PLATELET # BLD AUTO: 139 X10(3)/MCL (ref 130–400)
PMV BLD AUTO: 11.9 FL (ref 7.4–10.4)
POTASSIUM SERPL-SCNC: 3.4 MMOL/L (ref 3.5–5.1)
RBC # BLD AUTO: 2.69 X10(6)/MCL (ref 4.2–5.4)
SODIUM SERPL-SCNC: 131 MMOL/L (ref 136–145)
WBC # BLD AUTO: 6.12 X10(3)/MCL (ref 4.5–11.5)

## 2024-12-24 PROCEDURE — 85025 COMPLETE CBC W/AUTO DIFF WBC: CPT | Performed by: INTERNAL MEDICINE

## 2024-12-24 PROCEDURE — 25000003 PHARM REV CODE 250: Performed by: PHYSICIAN ASSISTANT

## 2024-12-24 PROCEDURE — 25000003 PHARM REV CODE 250: Performed by: INTERNAL MEDICINE

## 2024-12-24 PROCEDURE — 36415 COLL VENOUS BLD VENIPUNCTURE: CPT | Performed by: INTERNAL MEDICINE

## 2024-12-24 PROCEDURE — 80048 BASIC METABOLIC PNL TOTAL CA: CPT | Performed by: INTERNAL MEDICINE

## 2024-12-24 RX ADMIN — RIVASTIGMINE TARTRATE 1.5 MG: 1.5 CAPSULE ORAL at 08:12

## 2024-12-24 RX ADMIN — CARVEDILOL 3.12 MG: 3.12 TABLET, FILM COATED ORAL at 08:12

## 2024-12-24 RX ADMIN — LEVOTHYROXINE SODIUM 25 MCG: 25 TABLET ORAL at 05:12

## 2024-12-24 RX ADMIN — APIXABAN 2.5 MG: 2.5 TABLET, FILM COATED ORAL at 08:12

## 2024-12-24 RX ADMIN — GABAPENTIN 100 MG: 100 CAPSULE ORAL at 08:12

## 2024-12-24 RX ADMIN — PANTOPRAZOLE SODIUM 40 MG: 40 TABLET, DELAYED RELEASE ORAL at 05:12

## 2024-12-24 RX ADMIN — MICONAZOLE NITRATE: 20 POWDER TOPICAL at 08:12

## 2024-12-24 RX ADMIN — ALLOPURINOL 200 MG: 100 TABLET ORAL at 08:12

## 2024-12-24 NOTE — PLAN OF CARE
12/24/24 1112   Final Note   Assessment Type Final Discharge Note   Anticipated Discharge Disposition Home-[facility]   Hospital Resources/Appts/Education Provided Appointments scheduled and added to AVS   Post-Acute Status   Discharge Delays None known at this time

## 2024-12-24 NOTE — NURSING
Pt AAOx4, VSS, educated on discharge instructions, new medications, follow up appointments, and signs and symptoms to return to the ED with. All questions answered. Pt and family verbalized understanding. Peripheral IV discontinued and gauze/tape applied to site with no signs of bleeding or swelling noted. Telemetry monitor discontinued and returned to box. Grey to remain in place with follow up with urology in 2 weeks. Pt wheeled down via wheelchair to private vehicle with family. [facility] and private sitters to resume at home upon discharge.

## 2024-12-24 NOTE — DISCHARGE SUMMARY
[facility] Hospital Medicine Discharge Summary    Admit Date: 12/9/2024  Discharge Date and Time: 12/24/20241:07 PM  Admitting Physician:  Team  Discharging Physician: Janelle Wheeler MD.  Primary Care Physician: Santi Walters MD  Consults: {consultation: 11752}    Discharge Diagnoses:  ***    Hospital Course:   ***  Pt was seen and examined on the day of discharge  Vitals:  VITAL SIGNS: 24 HRS MIN & MAX LAST   Temp  Min: 97.8 °F (36.6 °C)  Max: 98.2 °F (36.8 °C) 98.1 °F (36.7 °C)   BP  Min: 125/67  Max: 149/75 (!) 142/89   Pulse  Min: 60  Max: 78  60   Resp  Min: 18  Max: 20 18   SpO2  Min: 96 %  Max: 100 % 97 %       Physical Exam:  ***    Procedures Performed: No admission procedures for hospital encounter.     Significant Diagnostic Studies: See Full reports for all details    Recent Labs   Lab 12/18/24  0353 12/20/24  0652 12/21/24  1908 12/24/24  0512   WBC 5.93 6.27  --  6.12   RBC 2.69* 2.70*  --  2.69*   HGB 8.5* 8.6* 9.3* 8.5*   HCT 25.6* 26.3* 27.5* 25.7*   MCV 95.2* 97.4*  --  95.5*   MCH 31.6* 31.9*  --  31.6*   MCHC 33.2 32.7*  --  33.1   RDW 19.1* 19.7*  --  19.8*    125*  --  139   MPV 10.6* 10.4  --  11.9*       Recent Labs   Lab 12/18/24  0353 12/19/24  0416 12/20/24  0652 12/20/24  1633 12/21/24  0410 12/24/24  0512    133* 139  --  138 131*   K 4.0 4.7 3.2*  --  3.2* 3.4*   CL 97* 98 98  --  99 97*   CO2 33* 30 32*  --  28 28   BUN 42.6* 40.8* 47.8*  --  47.4* 36.8*   CREATININE 1.63* 1.48* 1.82*  --  1.76* 1.23*   CALCIUM 9.3 8.9 9.1  --  9.3 8.8   PH  --   --   --  7.450  --   --    ALBUMIN 2.8* 2.6*  --   --  2.9*  --         Microbiology Results (last 7 days)       ** No results found for the last 168 hours. **             CT Head Without Contrast  Narrative: EXAMINATION:  CT HEAD WITHOUT CONTRAST    CLINICAL HISTORY:  Mental status change, unknown cause;    TECHNIQUE:  CT imaging of the head performed from the skull base to the vertex  without intravenous contrast.  DLP 1812 mGycm. Automatic exposure control, adjustment of mA/kV or iterative reconstruction technique was used to reduce radiation.    COMPARISON:  27 February 2024    FINDINGS:  There is no acute cortical infarct, hemorrhage or mass lesion.  No new parenchymal attenuation abnormality.  Ventricular size is stable.  There are vascular calcifications.    Visualized paranasal sinuses and mastoid air cells are clear.  Impression: No acute intracranial findings or significant interval change compared to February 2024.    Electronically signed by: Michel Gonzalez  Date:    12/21/2024  Time:    16:35         Medication List        CHANGE how you take these medications      furosemide 80 MG tablet  Commonly known as: LASIX  Take 1 tablet (80 mg total) by mouth daily as needed (swelling).  What changed:   medication strength  how much to take  when to take this  reasons to take this            CONTINUE taking these medications      allopurinoL 300 MG tablet  Commonly known as: ZYLOPRIM     aspirin 81 MG EC tablet  Commonly known as: ECOTRIN  Take 1 tablet (81 mg total) by mouth once daily.     atorvastatin 10 MG tablet  Commonly known as: LIPITOR     carvediloL 3.125 MG tablet  Commonly known as: COREG     cetirizine 10 MG tablet  Commonly known as: ZYRTEC     COMBIGAN 0.2-0.5 % Drop  Generic drug: brimonidine-timoloL     CYCLOBENZAPRINE 2% GABAPENTIN 6% LIDOCAINE 2% PRILOCAINE 2% TOPICAL CREAM  1 pump TID as needed for scalp itching     docusate sodium 100 MG capsule  Commonly known as: COLACE  Take 2 capsules (200 mg total) by mouth 2 (two) times daily as needed for Constipation.     ELIQUIS 2.5 mg Tab  Generic drug: apixaban     gabapentin 100 MG capsule  Commonly known as: NEURONTIN  Take 2 capsules (200 mg total) by mouth 2 (two) times daily.     glycopyrrolate 1 mg Tab  Commonly known as: ROBINUL  Take 1 tablet (1 mg total) by mouth 2 (two) times daily as needed (drooling).     INV albuterol  90 mcg inhalation     latanoprost 0.005 % ophthalmic solution     levothyroxine 25 MCG tablet  Commonly known as: SYNTHROID     lubiprostone 24 MCG Cap  Commonly known as: AMITIZA  Take 1 capsule (24 mcg total) by mouth 2 (two) times daily with meals.     mirabegron 25 mg Tb24 ER tablet  Commonly known as: MYRBETRIQ     nitroGLYCERIN 0.4 MG SL tablet  Commonly known as: NITROSTAT     pantoprazole 40 MG tablet  Commonly known as: PROTONIX  Take 1 tablet (40 mg total) by mouth 2 (two) times daily.     potassium chloride 20 mEq Pack  Commonly known as: KLOR-CON     risperiDONE 2 MG tablet  Commonly known as: RISPERDAL     rivastigmine tartrate 1.5 MG capsule  Commonly known as: EXELON            ASK your doctor about these medications      miconazole NITRATE 2 % 2 % top powder  Commonly known as: MICOTIN  Apply topically 2 (two) times daily.     traMADoL 50 mg tablet  Commonly known as: ULTRAM     traZODone 100 MG tablet  Commonly known as: DESYREL               Where to Get Your Medications        These medications were sent to [facility] [address] [place] [id number]      Phone: [phone]   furosemide 80 MG tablet          Explained in detail to the patient about the discharge plan, medications, and follow-up visits. Pt understands and agrees with the treatment plan  [place] [facility] [place] Condition: stable  Diet-    Medications Per [place] med rec  Activities as tolerated   Follow-up Information       Santi Walters MD Follow up on 12/30/2024.    Specialty: Family Medicine  Why: @175  Contact information:  [address]  [facility] [address]  [phone]               Loretta Jolley MD Follow up on 1/2/2025.    Specialty: Urology  Why: @978  Contact information:  [address] [facility] [address]  [phone]               [facility] Follow up.    Specialty: Home Health  Services  Why: They will call you  Contact information:  [address] [facility] [address]  [phone]               Mateo Chávez MD Follow up in 2 week(s).    Specialty: Nephrology  Contact information:  [place] [address]  [phone]                           For further questions contact hospitalist office    Discharge time 33 minutes    For worsening symptoms, chest pain, shortness of breath, increased abdominal pain, high grade fever, stroke or stroke like symptoms, immediately go to the nearest Emergency Room or call 911 as soon as possible.      Janelle Márquez M.D, on 12/24/2024. at 1:07 PM.

## 2024-12-26 ENCOUNTER — HOSPITAL ENCOUNTER (INPATIENT)
Facility: HOSPITAL | Age: 89
LOS: 34 days | Discharge: NURSING, REHAB OR LONG-TERM CARE | DRG: 280 | End: 2025-01-29
Attending: EMERGENCY MEDICINE | Admitting: INTERNAL MEDICINE
Payer: MEDICARE

## 2024-12-26 DIAGNOSIS — I50.9 CHF (CONGESTIVE HEART FAILURE): ICD-10-CM

## 2024-12-26 DIAGNOSIS — I50.33 ACUTE ON CHRONIC DIASTOLIC HEART FAILURE: ICD-10-CM

## 2024-12-26 DIAGNOSIS — N30.00 ACUTE CYSTITIS WITHOUT HEMATURIA: ICD-10-CM

## 2024-12-26 DIAGNOSIS — I50.9 ACUTE ON CHRONIC CONGESTIVE HEART FAILURE: ICD-10-CM

## 2024-12-26 DIAGNOSIS — E43 SEVERE MALNUTRITION: ICD-10-CM

## 2024-12-26 DIAGNOSIS — K11.7 DROOLING: ICD-10-CM

## 2024-12-26 DIAGNOSIS — D47.2 MGUS (MONOCLONAL GAMMOPATHY OF UNKNOWN SIGNIFICANCE): ICD-10-CM

## 2024-12-26 DIAGNOSIS — R53.1 WEAKNESS: ICD-10-CM

## 2024-12-26 DIAGNOSIS — I50.9 ACUTE ON CHRONIC CONGESTIVE HEART FAILURE, UNSPECIFIED HEART FAILURE TYPE: Primary | ICD-10-CM

## 2024-12-26 DIAGNOSIS — M79.89 LEG SWELLING: ICD-10-CM

## 2024-12-26 DIAGNOSIS — R53.83 FATIGUE: ICD-10-CM

## 2024-12-26 DIAGNOSIS — R07.9 CHEST PAIN: ICD-10-CM

## 2024-12-26 DIAGNOSIS — B02.29 POSTHERPETIC NEURALGIA: ICD-10-CM

## 2024-12-26 DIAGNOSIS — D64.9 CHRONIC ANEMIA: ICD-10-CM

## 2024-12-26 LAB
ALBUMIN SERPL-MCNC: 3 G/DL (ref 3.4–4.8)
ALBUMIN/GLOB SERPL: 0.7 RATIO (ref 1.1–2)
ALP SERPL-CCNC: 133 UNIT/L (ref 40–150)
ALT SERPL-CCNC: 12 UNIT/L (ref 0–55)
ANION GAP SERPL CALC-SCNC: 6 MEQ/L
AST SERPL-CCNC: 27 UNIT/L (ref 5–34)
B PERT.PT PRMT NPH QL NAA+NON-PROBE: NOT DETECTED
BACTERIA #/AREA URNS AUTO: ABNORMAL /HPF
BASOPHILS # BLD AUTO: 0.05 X10(3)/MCL
BASOPHILS NFR BLD AUTO: 0.7 %
BILIRUB SERPL-MCNC: 1.3 MG/DL
BILIRUB UR QL STRIP.AUTO: NEGATIVE
BNP BLD-MCNC: 523.3 PG/ML
BUN SERPL-MCNC: 30.3 MG/DL (ref 9.8–20.1)
C PNEUM DNA NPH QL NAA+NON-PROBE: NOT DETECTED
CALCIUM SERPL-MCNC: 9.7 MG/DL (ref 8.4–10.2)
CHLORIDE SERPL-SCNC: 98 MMOL/L (ref 98–107)
CLARITY UR: CLEAR
CO2 SERPL-SCNC: 29 MMOL/L (ref 23–31)
COLOR UR AUTO: ABNORMAL
CREAT SERPL-MCNC: 1.26 MG/DL (ref 0.55–1.02)
CREAT/UREA NIT SERPL: 24
EOSINOPHIL # BLD AUTO: 0.13 X10(3)/MCL (ref 0–0.9)
EOSINOPHIL NFR BLD AUTO: 1.8 %
ERYTHROCYTE [DISTWIDTH] IN BLOOD BY AUTOMATED COUNT: 19.5 % (ref 11.5–17)
FERRITIN SERPL-MCNC: 266.47 NG/ML (ref 4.63–204)
FLUAV AG UPPER RESP QL IA.RAPID: NOT DETECTED
FLUBV AG UPPER RESP QL IA.RAPID: NOT DETECTED
FOLATE SERPL-MCNC: 17.6 NG/ML (ref 7–31.4)
GFR SERPLBLD CREATININE-BSD FMLA CKD-EPI: 41 ML/MIN/1.73/M2
GLOBULIN SER-MCNC: 4.3 GM/DL (ref 2.4–3.5)
GLUCOSE SERPL-MCNC: 93 MG/DL (ref 82–115)
GLUCOSE UR QL STRIP: NORMAL
HADV DNA NPH QL NAA+NON-PROBE: NOT DETECTED
HCOV 229E RNA NPH QL NAA+NON-PROBE: NOT DETECTED
HCOV HKU1 RNA NPH QL NAA+NON-PROBE: NOT DETECTED
HCOV NL63 RNA NPH QL NAA+NON-PROBE: NOT DETECTED
HCOV OC43 RNA NPH QL NAA+NON-PROBE: NOT DETECTED
HCT VFR BLD AUTO: 26.8 % (ref 37–47)
HGB BLD-MCNC: 8.7 G/DL (ref 12–16)
HGB UR QL STRIP: ABNORMAL
HMPV RNA NPH QL NAA+NON-PROBE: NOT DETECTED
HPIV1 RNA NPH QL NAA+NON-PROBE: NOT DETECTED
HPIV2 RNA NPH QL NAA+NON-PROBE: NOT DETECTED
HPIV3 RNA NPH QL NAA+NON-PROBE: NOT DETECTED
HPIV4 RNA NPH QL NAA+NON-PROBE: NOT DETECTED
IMM GRANULOCYTES # BLD AUTO: 0.02 X10(3)/MCL (ref 0–0.04)
IMM GRANULOCYTES NFR BLD AUTO: 0.3 %
KETONES UR QL STRIP: NEGATIVE
LACTATE SERPL-SCNC: 1.1 MMOL/L (ref 0.5–2.2)
LEUKOCYTE ESTERASE UR QL STRIP: 500
LYMPHOCYTES # BLD AUTO: 1.84 X10(3)/MCL (ref 0.6–4.6)
LYMPHOCYTES NFR BLD AUTO: 25 %
M PNEUMO DNA NPH QL NAA+NON-PROBE: NOT DETECTED
MAGNESIUM SERPL-MCNC: 2.4 MG/DL (ref 1.6–2.6)
MCH RBC QN AUTO: 32 PG (ref 27–31)
MCHC RBC AUTO-ENTMCNC: 32.5 G/DL (ref 33–36)
MCV RBC AUTO: 98.5 FL (ref 80–94)
MONOCYTES # BLD AUTO: 0.79 X10(3)/MCL (ref 0.1–1.3)
MONOCYTES NFR BLD AUTO: 10.7 %
MUCOUS THREADS URNS QL MICRO: ABNORMAL /LPF
NEUTROPHILS # BLD AUTO: 4.53 X10(3)/MCL (ref 2.1–9.2)
NEUTROPHILS NFR BLD AUTO: 61.5 %
NITRITE UR QL STRIP: NEGATIVE
NRBC BLD AUTO-RTO: 0 %
OHS QRS DURATION: 198 MS
OHS QTC CALCULATION: 491 MS
PH UR STRIP: 6.5 [PH]
PLATELET # BLD AUTO: 147 X10(3)/MCL (ref 130–400)
PMV BLD AUTO: 10.6 FL (ref 7.4–10.4)
POTASSIUM SERPL-SCNC: 3.4 MMOL/L (ref 3.5–5.1)
PROT SERPL-MCNC: 7.3 GM/DL (ref 5.8–7.6)
PROT UR QL STRIP: NEGATIVE
RBC # BLD AUTO: 2.72 X10(6)/MCL (ref 4.2–5.4)
RBC #/AREA URNS AUTO: ABNORMAL /HPF
RSV A 5' UTR RNA NPH QL NAA+PROBE: NOT DETECTED
RSV RNA NPH QL NAA+NON-PROBE: NOT DETECTED
RV+EV RNA NPH QL NAA+NON-PROBE: NOT DETECTED
SARS-COV-2 RNA RESP QL NAA+PROBE: NOT DETECTED
SODIUM SERPL-SCNC: 133 MMOL/L (ref 136–145)
SP GR UR STRIP.AUTO: 1.01 (ref 1–1.03)
SQUAMOUS #/AREA URNS LPF: ABNORMAL /HPF
T3FREE SERPL-MCNC: 2.08 PG/ML (ref 1.58–3.91)
T4 FREE SERPL-MCNC: 1.12 NG/DL (ref 0.7–1.48)
TROPONIN I SERPL-MCNC: 0.03 NG/ML (ref 0–0.04)
TROPONIN I SERPL-MCNC: 0.05 NG/ML (ref 0–0.04)
TROPONIN I SERPL-MCNC: 0.05 NG/ML (ref 0–0.04)
TSH SERPL-ACNC: 3.33 UIU/ML (ref 0.35–4.94)
UROBILINOGEN UR STRIP-ACNC: 2
VIT B12 SERPL-MCNC: 1105 PG/ML (ref 213–816)
WBC # BLD AUTO: 7.36 X10(3)/MCL (ref 4.5–11.5)
WBC #/AREA URNS AUTO: ABNORMAL /HPF

## 2024-12-26 PROCEDURE — 82746 ASSAY OF FOLIC ACID SERUM: CPT

## 2024-12-26 PROCEDURE — 84484 ASSAY OF TROPONIN QUANT: CPT | Performed by: PHYSICIAN ASSISTANT

## 2024-12-26 PROCEDURE — 63600175 PHARM REV CODE 636 W HCPCS: Performed by: PHYSICIAN ASSISTANT

## 2024-12-26 PROCEDURE — 87040 BLOOD CULTURE FOR BACTERIA: CPT | Performed by: PHYSICIAN ASSISTANT

## 2024-12-26 PROCEDURE — 84439 ASSAY OF FREE THYROXINE: CPT

## 2024-12-26 PROCEDURE — 87086 URINE CULTURE/COLONY COUNT: CPT | Performed by: PHYSICIAN ASSISTANT

## 2024-12-26 PROCEDURE — 85025 COMPLETE CBC W/AUTO DIFF WBC: CPT | Performed by: PHYSICIAN ASSISTANT

## 2024-12-26 PROCEDURE — 96374 THER/PROPH/DIAG INJ IV PUSH: CPT

## 2024-12-26 PROCEDURE — 84443 ASSAY THYROID STIM HORMONE: CPT

## 2024-12-26 PROCEDURE — 80053 COMPREHEN METABOLIC PANEL: CPT | Performed by: PHYSICIAN ASSISTANT

## 2024-12-26 PROCEDURE — 83880 ASSAY OF NATRIURETIC PEPTIDE: CPT | Performed by: PHYSICIAN ASSISTANT

## 2024-12-26 PROCEDURE — 82607 VITAMIN B-12: CPT

## 2024-12-26 PROCEDURE — 96375 TX/PRO/DX INJ NEW DRUG ADDON: CPT

## 2024-12-26 PROCEDURE — 0241U COVID/RSV/FLU A&B PCR: CPT | Performed by: PHYSICIAN ASSISTANT

## 2024-12-26 PROCEDURE — 99285 EMERGENCY DEPT VISIT HI MDM: CPT | Mod: 25

## 2024-12-26 PROCEDURE — 63600175 PHARM REV CODE 636 W HCPCS: Performed by: EMERGENCY MEDICINE

## 2024-12-26 PROCEDURE — 87486 CHLMYD PNEUM DNA AMP PROBE: CPT

## 2024-12-26 PROCEDURE — 81001 URINALYSIS AUTO W/SCOPE: CPT | Performed by: PHYSICIAN ASSISTANT

## 2024-12-26 PROCEDURE — 83605 ASSAY OF LACTIC ACID: CPT | Performed by: PHYSICIAN ASSISTANT

## 2024-12-26 PROCEDURE — 11000001 HC ACUTE MED/SURG PRIVATE ROOM

## 2024-12-26 PROCEDURE — 93005 ELECTROCARDIOGRAM TRACING: CPT

## 2024-12-26 PROCEDURE — 84481 FREE ASSAY (FT-3): CPT

## 2024-12-26 PROCEDURE — 93010 ELECTROCARDIOGRAM REPORT: CPT | Mod: ,,, | Performed by: INTERNAL MEDICINE

## 2024-12-26 PROCEDURE — 82728 ASSAY OF FERRITIN: CPT

## 2024-12-26 PROCEDURE — 84484 ASSAY OF TROPONIN QUANT: CPT

## 2024-12-26 PROCEDURE — 83735 ASSAY OF MAGNESIUM: CPT | Performed by: PHYSICIAN ASSISTANT

## 2024-12-26 RX ORDER — FUROSEMIDE 10 MG/ML
40 INJECTION INTRAMUSCULAR; INTRAVENOUS DAILY
Status: DISCONTINUED | OUTPATIENT
Start: 2024-12-27 | End: 2024-12-27

## 2024-12-26 RX ORDER — PANTOPRAZOLE SODIUM 40 MG/1
40 TABLET, DELAYED RELEASE ORAL 2 TIMES DAILY
Status: DISCONTINUED | OUTPATIENT
Start: 2024-12-27 | End: 2025-01-19

## 2024-12-26 RX ORDER — CEFTRIAXONE 1 G/1
1 INJECTION, POWDER, FOR SOLUTION INTRAMUSCULAR; INTRAVENOUS
Status: DISCONTINUED | OUTPATIENT
Start: 2024-12-27 | End: 2024-12-27

## 2024-12-26 RX ORDER — ONDANSETRON HYDROCHLORIDE 2 MG/ML
4 INJECTION, SOLUTION INTRAVENOUS EVERY 8 HOURS PRN
Status: DISCONTINUED | OUTPATIENT
Start: 2024-12-26 | End: 2025-01-29 | Stop reason: HOSPADM

## 2024-12-26 RX ORDER — LATANOPROST 50 UG/ML
1 SOLUTION/ DROPS OPHTHALMIC NIGHTLY
Status: DISCONTINUED | OUTPATIENT
Start: 2024-12-27 | End: 2025-01-29 | Stop reason: HOSPADM

## 2024-12-26 RX ORDER — ACETAMINOPHEN 500 MG
1000 TABLET ORAL EVERY 6 HOURS PRN
Status: DISCONTINUED | OUTPATIENT
Start: 2024-12-26 | End: 2024-12-27

## 2024-12-26 RX ORDER — CARVEDILOL 3.12 MG/1
3.12 TABLET ORAL 2 TIMES DAILY
Status: DISCONTINUED | OUTPATIENT
Start: 2024-12-27 | End: 2024-12-30

## 2024-12-26 RX ORDER — TALC
6 POWDER (GRAM) TOPICAL NIGHTLY PRN
Status: DISCONTINUED | OUTPATIENT
Start: 2024-12-26 | End: 2025-01-03

## 2024-12-26 RX ORDER — ALBUTEROL SULFATE 90 UG/1
1 INHALANT RESPIRATORY (INHALATION)
Status: DISCONTINUED | OUTPATIENT
Start: 2024-12-26 | End: 2025-01-29 | Stop reason: HOSPADM

## 2024-12-26 RX ORDER — MORPHINE SULFATE 4 MG/ML
2 INJECTION, SOLUTION INTRAMUSCULAR; INTRAVENOUS
Status: COMPLETED | OUTPATIENT
Start: 2024-12-26 | End: 2024-12-26

## 2024-12-26 RX ORDER — LEVOTHYROXINE SODIUM 25 UG/1
25 TABLET ORAL
Status: DISCONTINUED | OUTPATIENT
Start: 2024-12-27 | End: 2025-01-29 | Stop reason: HOSPADM

## 2024-12-26 RX ORDER — POLYETHYLENE GLYCOL 3350 17 G/17G
17 POWDER, FOR SOLUTION ORAL 2 TIMES DAILY PRN
Status: DISCONTINUED | OUTPATIENT
Start: 2024-12-26 | End: 2025-01-29 | Stop reason: HOSPADM

## 2024-12-26 RX ORDER — FUROSEMIDE 10 MG/ML
40 INJECTION INTRAMUSCULAR; INTRAVENOUS
Status: COMPLETED | OUTPATIENT
Start: 2024-12-26 | End: 2024-12-26

## 2024-12-26 RX ORDER — SODIUM CHLORIDE 0.9 % (FLUSH) 0.9 %
10 SYRINGE (ML) INJECTION
Status: DISCONTINUED | OUTPATIENT
Start: 2024-12-26 | End: 2025-01-29 | Stop reason: HOSPADM

## 2024-12-26 RX ORDER — ALLOPURINOL 300 MG/1
300 TABLET ORAL DAILY
Status: DISCONTINUED | OUTPATIENT
Start: 2024-12-27 | End: 2025-01-29 | Stop reason: HOSPADM

## 2024-12-26 RX ORDER — BISACODYL 10 MG/1
10 SUPPOSITORY RECTAL DAILY PRN
Status: DISCONTINUED | OUTPATIENT
Start: 2024-12-26 | End: 2025-01-29 | Stop reason: HOSPADM

## 2024-12-26 RX ORDER — ACETAMINOPHEN 325 MG/1
650 TABLET ORAL EVERY 4 HOURS PRN
Status: DISCONTINUED | OUTPATIENT
Start: 2024-12-26 | End: 2024-12-26

## 2024-12-26 RX ORDER — ACETAMINOPHEN 325 MG/1
650 TABLET ORAL EVERY 8 HOURS PRN
Status: DISCONTINUED | OUTPATIENT
Start: 2024-12-26 | End: 2024-12-26

## 2024-12-26 RX ORDER — ONDANSETRON HYDROCHLORIDE 2 MG/ML
4 INJECTION, SOLUTION INTRAVENOUS
Status: COMPLETED | OUTPATIENT
Start: 2024-12-26 | End: 2024-12-26

## 2024-12-26 RX ORDER — CEFTRIAXONE 1 G/1
1 INJECTION, POWDER, FOR SOLUTION INTRAMUSCULAR; INTRAVENOUS
Status: COMPLETED | OUTPATIENT
Start: 2024-12-26 | End: 2024-12-26

## 2024-12-26 RX ORDER — ALUMINUM HYDROXIDE, MAGNESIUM HYDROXIDE, AND SIMETHICONE 1200; 120; 1200 MG/30ML; MG/30ML; MG/30ML
30 SUSPENSION ORAL 4 TIMES DAILY PRN
Status: DISCONTINUED | OUTPATIENT
Start: 2024-12-26 | End: 2025-01-29 | Stop reason: HOSPADM

## 2024-12-26 RX ORDER — ACETAMINOPHEN 10 MG/ML
1000 INJECTION, SOLUTION INTRAVENOUS ONCE
Status: COMPLETED | OUTPATIENT
Start: 2024-12-26 | End: 2024-12-26

## 2024-12-26 RX ADMIN — ONDANSETRON 4 MG: 2 INJECTION INTRAMUSCULAR; INTRAVENOUS at 06:12

## 2024-12-26 RX ADMIN — FUROSEMIDE 40 MG: 10 INJECTION, SOLUTION INTRAMUSCULAR; INTRAVENOUS at 04:12

## 2024-12-26 RX ADMIN — ACETAMINOPHEN 1000 MG: 10 INJECTION, SOLUTION INTRAVENOUS at 04:12

## 2024-12-26 RX ADMIN — CEFTRIAXONE SODIUM 1 G: 1 INJECTION, POWDER, FOR SOLUTION INTRAMUSCULAR; INTRAVENOUS at 05:12

## 2024-12-26 RX ADMIN — MORPHINE SULFATE 2 MG: 4 INJECTION, SOLUTION INTRAMUSCULAR; INTRAVENOUS at 06:12

## 2024-12-26 NOTE — ED PROVIDER NOTES
Encounter Date: 2024       History     Chief Complaint   Patient presents with    Fatigue     Family reporting pt is more lethargic than usual. On arrival, pt c/o bilateral LE edema that began this morning. Denies chest pain or sob. Pt GCS 14 on arrival.      See [facility] for details.      The history is provided by the patient, the EMS personnel and a relative (son, Abdi). No  was used.     Review of patient's allergies indicates:   Allergen Reactions    Amlodipine-benazepril Edema     Other reaction(s): unknown    Corticosteroids (glucocorticoids) Edema and Swelling    Rofecoxib Anaphylaxis and Swelling    Sulfa (sulfonamide antibiotics) Edema    Atorvastatin      Other reaction(s): unknown    Ibuprofen      Other reaction(s): Allergy to sulfa drugs     Past Medical History:   Diagnosis Date    Acute kidney injury superimposed on chronic kidney disease     Congestive heart failure     Dementia     Hypertension     Shingles of eyelid     Sick sinus syndrome     Thyroid disease     Unspecified glaucoma      Past Surgical History:   Procedure Laterality Date    cataract surgery       SECTION      CHOLECYSTECTOMY      EGD, WITH HEMORRHAGE CONTROL Left 2024    Procedure: EGD,WITH HEMORRHAGE CONTROL;  Surgeon: Blake Adorno MD;  Location: [facility] OR;  Service: Gastroenterology;  Laterality: Left;    HYSTERECTOMY      INSERTION OF PACEMAKER      LEFT HEART CATHETERIZATION Left 3/4/2024    Procedure: Left heart cath;  Surgeon: Mark Raymundo Jr., MD;  Location: [facility] CATH LAB;  Service: Cardiology;  Laterality: Left;    NEPHRECTOMY       No family history on file.  Social History     Tobacco Use    Smoking status: Never    Smokeless tobacco: Never   Substance Use Topics    Alcohol use: Never    Drug use: Not Currently     Review of Systems   Constitutional:  Positive for fatigue.   HENT: Negative.     Eyes: Negative.    Respiratory: Negative.     Cardiovascular: Negative.     Gastrointestinal: Negative.    Genitourinary: Negative.    Musculoskeletal: Negative.    Neurological: Negative.        Physical Exam     Initial Vitals [12/26/24 1501]   BP Pulse Resp Temp SpO2   (!) 136/58 90 18 98.1 °F (36.7 °C) 98 %      MAP       --         Physical Exam    Nursing note and vitals reviewed.  Constitutional: She appears well-developed and well-nourished. No distress.   HENT:   Head: Normocephalic and atraumatic.   Eyes: Conjunctivae, EOM and lids are normal. Pupils are equal, round, and reactive to light.   Neck: Neck supple.   Normal range of motion.  Cardiovascular:  Normal rate and regular rhythm.     Exam reveals gallop.       Murmur heard.  Systolic murmur is present.  Pitting edema bilaterally up to thigh   Pulmonary/Chest: Effort normal and breath sounds normal.   Abdominal: Abdomen is soft and flat. Bowel sounds are normal. She exhibits no distension and no mass. There is no abdominal tenderness.   No leaking around catheter.  Urine appears cloudy.    No right CVA tenderness.  No left CVA tenderness. There is no rebound, no guarding, no tenderness at McBurney's point and negative Hyatt's sign. negative psoas sign and negative Rovsing's sign  Musculoskeletal:      Cervical back: Normal range of motion and neck supple.     Neurological: She is alert and oriented to person, place, and time. She has normal strength. She is not disoriented. No cranial nerve deficit or sensory deficit. GCS eye subscore is 4. GCS verbal subscore is 5. GCS motor subscore is 6.   Skin: Skin is warm and intact.   Psychiatric: She has a normal mood and affect. Her speech is normal and behavior is normal. Judgment and thought content normal. Cognition and memory are normal.         ED Course   Procedures  Labs Reviewed   COMPREHENSIVE METABOLIC PANEL - Abnormal       Result Value    Sodium 133 (*)     Potassium 3.4 (*)     Chloride 98      CO2 29      Glucose 93      Blood Urea Nitrogen 30.3 (*)     Creatinine  1.26 (*)     Calcium 9.7      Protein Total 7.3      Albumin 3.0 (*)     Globulin 4.3 (*)     Albumin/Globulin Ratio 0.7 (*)     Bilirubin Total 1.3            ALT 12      AST 27      eGFR 41      Anion Gap 6.0      BUN/Creatinine Ratio 24     B-TYPE NATRIURETIC PEPTIDE - Abnormal    Natriuretic Peptide 523.3 (*)    TROPONIN I - Abnormal    Troponin-I 0.048 (*)    URINALYSIS, REFLEX TO URINE CULTURE - Abnormal    Color, UA Light-Yellow      Appearance, UA Clear      Specific Gravity, UA 1.009      pH, UA 6.5      Protein, UA Negative      Glucose, UA Normal      Ketones, UA Negative      Blood, UA 2+ (*)     Bilirubin, UA Negative      Urobilinogen, UA 2.0 (*)     Nitrites, UA Negative      Leukocyte Esterase,  (*)     RBC, UA 21-50 (*)     WBC, UA 51-99 (*)     Bacteria, UA Few (*)     Squamous Epithelial Cells, UA Trace      Mucous, UA Trace (*)    CBC WITH DIFFERENTIAL - Abnormal    WBC 7.36      RBC 2.72 (*)     Hgb 8.7 (*)     Hct 26.8 (*)     MCV 98.5 (*)     MCH 32.0 (*)     MCHC 32.5 (*)     RDW 19.5 (*)     Platelet 147      MPV 10.6 (*)     Neut % 61.5      Lymph % 25.0      Mono % 10.7      Eos % 1.8      Basophil % 0.7      Lymph # 1.84      Neut # 4.53      Mono # 0.79      Eos # 0.13      Baso # 0.05      IG# 0.02      IG% 0.3      NRBC% 0.0     TROPONIN I - Normal    Troponin-I 0.034     MAGNESIUM - Normal    Magnesium Level 2.40     COVID/RSV/FLU A&B PCR - Normal    Influenza A PCR Not Detected      Influenza B PCR Not Detected      Respiratory Syncytial Virus PCR Not Detected      SARS-CoV-2 PCR Not Detected      Narrative:     The Xpert Xpress SARS-CoV-2/FLU/RSV plus is a rapid, multiplexed real-time PCR test intended for the simultaneous qualitative detection and differentiation of SARS-CoV-2, Influenza A, Influenza B, and respiratory syncytial virus (RSV) viral RNA in either nasopharyngeal swab or nasal swab specimens.         CULTURE, URINE   BLOOD CULTURE [facility]   BLOOD CULTURE [facility]    CBC W/ AUTO DIFFERENTIAL    Narrative:     The following orders were created for panel order CBC auto differential.  Procedure                               Abnormality         Status                     ---------                               -----------         ------                     CBC with Differential[[id number]]       Abnormal            Final result                 Please view results for these tests on the individual orders.   LACTIC ACID, PLASMA     EKG Readings: (Independently Interpreted)   Rhythm: Paced Rhythm. Heart Rate: 62. Ectopy: No Ectopy.       Imaging Results              X-Ray Chest AP Portable (Final result)  Result time 12/26/24 15:31:47      Final result by Angélica Diehl MD (12/26/24 15:31:47)                   Impression:      Enlarged cardiac silhouette without overt edema.      Electronically signed by: Angélica Diehl  Date:    12/26/2024  Time:    15:31               Narrative:    EXAMINATION:  XR CHEST AP PORTABLE    CLINICAL HISTORY:  Other fatigue    TECHNIQUE:  Single frontal view of the chest was performed.    COMPARISON:  12/09/2024    FINDINGS:  LINES AND TUBES: Dual lead cardiac pacer device is in place via left subclavian approach with leads overlying the right atrium and right ventricle.  EKG/telemetry leads overlie the chest.    MEDIASTINUM AND SHAWNA: Cardiac silhouette is enlarged.    LUNGS: No overt alveolar edema.    PLEURA:Possible small right pleural effusion.No pneumothorax.    BONES: No acute osseous abnormality.                                       Medications   acetaminophen 1,000 mg/100 mL (10 mg/mL) injection 1,000 mg (0 mg Intravenous Stopped 12/26/24 1634)   furosemide injection 40 mg (40 mg Intravenous Given 12/26/24 1635)   cefTRIAXone injection 1 g (1 g Intravenous Given 12/26/24 1704)     Medical Decision Making  89yoAAF w/hx of CHF, HTN, and CKD presents to the emergency room with increasing fatigue over the last week with increased leg  "swelling.  Patient's son, Ranjeet, at bedside says she has not been ambulating as much as usual.  She usually ambulates with some assistance but has not been able to ambulate for the last few days.  He states she has more tired than usual.  At her baseline she has a GCS 14.  He says she is usually pretty quick witted and she has still been quick witted the last few days when she is lucid.  No vomiting or diarrhea.  States he noticed some cloudiness in her urine.     Problems Addressed:  Fatigue: acute illness or injury with systemic symptoms     Details: Differential diagnosis included but not limited to:  Fatigue, COVID, flu, urinary tract infection, pneumonia    Likely urinary tract infection with possible sepsis and CHF exacerbation.  Patient diuresed given antibiotics in the emergency department.  She will be admitted to Hospital Medicine at this time.  Family agreeable to plan.    Leg swelling: acute illness or injury with systemic symptoms    Amount and/or Complexity of Data Reviewed  Labs: ordered. Decision-making details documented in ED Course.  Radiology: ordered.    Risk  Prescription drug management.  Decision regarding hospitalization.               ED Course as of 12/26/24 1706   Thu Dec 26, 2024   1608 Troponin I(!): 0.048 [ST]   1610 Leukocyte Esterase, UA(!): 500 [ST]   1610 Bacteria, UA(!): Few [ST]   1610 BNP(!): 523.3 [ST]   1651 Her son also mentions that her legs "look swollen like when she had a blood clot". No obvious erythema and patient is on blood thinners, Eliquis 2.5mg daily, but with history and decreased mobility, u/s lower extremities ordered. [ST]      ED Course User Index  [ST] Ashley Nance PA                           Clinical Impression:  Final diagnoses:  [R53.83] Fatigue  [M79.89] Leg swelling  [I50.9] Acute on chronic congestive heart failure, unspecified heart failure type (Primary)          ED Disposition Condition    Admit            This note was typed partially using " voice recognition software.  Please be reminded that not all corrections/addendums to grammar may have been made prior to closing of this chart.       Ashley Nance PA  12/26/24 1708       Ashley Nance PA  12/26/24 1701

## 2024-12-26 NOTE — Clinical Note
Diagnosis: Acute on chronic congestive heart failure, unspecified heart failure type [9456921]   Future Attending Provider: MARY PARDO [[id number]]   Admit to which facility:: [facility] [[id number]]   Reason for IP Medical Treatment  (Clinical interventions that can only be accomplished in the IP setting? ) :: IV abx   Plans for Post-Acute care--if anticipated (pick the single best option):: C. Discharge home with home health services   Special Needs:: Fall Risk [15]

## 2024-12-27 ENCOUNTER — PATIENT OUTREACH (OUTPATIENT)
Dept: ADMINISTRATIVE | Facility: CLINIC | Age: 89
End: 2024-12-27
Payer: MEDICARE

## 2024-12-27 LAB
ABO + RH BLD: NORMAL
ALBUMIN SERPL-MCNC: 2.8 G/DL (ref 3.4–4.8)
ALBUMIN/GLOB SERPL: 0.7 RATIO (ref 1.1–2)
ALP SERPL-CCNC: 119 UNIT/L (ref 40–150)
ALT SERPL-CCNC: 12 UNIT/L (ref 0–55)
ANION GAP SERPL CALC-SCNC: 8 MEQ/L
APICAL FOUR CHAMBER EJECTION FRACTION: 44 %
APICAL TWO CHAMBER EJECTION FRACTION: 64 %
AST SERPL-CCNC: 31 UNIT/L (ref 5–34)
AV INDEX (PROSTH): 0.55
AV MEAN GRADIENT: 7 MMHG
AV PEAK GRADIENT: 14.4 MMHG
AV VALVE AREA BY VELOCITY RATIO: 1.3 CM²
AV VALVE AREA: 1.4 CM²
AV VELOCITY RATIO: 0.53
BASOPHILS # BLD AUTO: 0.03 X10(3)/MCL
BASOPHILS NFR BLD AUTO: 0.4 %
BILIRUB SERPL-MCNC: 1 MG/DL
BLD PROD TYP BPU: NORMAL
BLOOD UNIT EXPIRATION DATE: NORMAL
BLOOD UNIT TYPE CODE: 7300
BSA FOR ECHO PROCEDURE: 1.82 M2
BUN SERPL-MCNC: 29.3 MG/DL (ref 9.8–20.1)
CALCIUM SERPL-MCNC: 9.2 MG/DL (ref 8.4–10.2)
CHLORIDE SERPL-SCNC: 97 MMOL/L (ref 98–107)
CO2 SERPL-SCNC: 28 MMOL/L (ref 23–31)
CREAT SERPL-MCNC: 1.31 MG/DL (ref 0.55–1.02)
CREAT/UREA NIT SERPL: 22
CROSSMATCH INTERPRETATION: NORMAL
CV ECHO LV RWT: 0.68 CM
DISPENSE STATUS: NORMAL
DOP CALC AO PEAK VEL: 1.9 M/S
DOP CALC AO VTI: 35.5 CM
DOP CALC LVOT AREA: 2.5 CM2
DOP CALC LVOT DIAMETER: 1.8 CM
DOP CALC LVOT PEAK VEL: 1 M/S
DOP CALC LVOT STROKE VOLUME: 49.3 CM3
DOP CALC MV VTI: 39.3 CM
DOP CALCLVOT PEAK VEL VTI: 19.4 CM
E WAVE DECELERATION TIME: 357 MSEC
E/A RATIO: 5.9
E/E' RATIO: 14.59 M/S
ECHO LV POSTERIOR WALL: 1.5 CM (ref 0.6–1.1)
EOSINOPHIL # BLD AUTO: 0.12 X10(3)/MCL (ref 0–0.9)
EOSINOPHIL NFR BLD AUTO: 1.8 %
ERYTHROCYTE [DISTWIDTH] IN BLOOD BY AUTOMATED COUNT: 19.7 % (ref 11.5–17)
FRACTIONAL SHORTENING: 29.5 % (ref 28–44)
GFR SERPLBLD CREATININE-BSD FMLA CKD-EPI: 40 ML/MIN/1.73/M2
GLOBULIN SER-MCNC: 4.2 GM/DL (ref 2.4–3.5)
GLUCOSE SERPL-MCNC: 97 MG/DL (ref 82–115)
GROUP & RH: NORMAL
HCT VFR BLD AUTO: 22.9 % (ref 37–47)
HGB BLD-MCNC: 7.4 G/DL (ref 12–16)
IMM GRANULOCYTES # BLD AUTO: 0.02 X10(3)/MCL (ref 0–0.04)
IMM GRANULOCYTES NFR BLD AUTO: 0.3 %
INDIRECT COOMBS: NORMAL
INTERVENTRICULAR SEPTUM: 0.9 CM (ref 0.6–1.1)
IRON SATN MFR SERPL: 13 % (ref 20–50)
IRON SERPL-MCNC: 25 UG/DL (ref 50–170)
IVC DIAMETER: 2.8 CM
LEFT ATRIUM AREA SYSTOLIC (APICAL 2 CHAMBER): 29.6 CM2
LEFT ATRIUM AREA SYSTOLIC (APICAL 4 CHAMBER): 26.8 CM2
LEFT ATRIUM SIZE: 4.9 CM
LEFT ATRIUM VOLUME INDEX MOD: 51.5 ML/M2
LEFT ATRIUM VOLUME MOD: 92.1 ML
LEFT INTERNAL DIMENSION IN SYSTOLE: 3.1 CM (ref 2.1–4)
LEFT VENTRICLE DIASTOLIC VOLUME INDEX: 48.99 ML/M2
LEFT VENTRICLE DIASTOLIC VOLUME: 87.7 ML
LEFT VENTRICLE END DIASTOLIC VOLUME APICAL 2 CHAMBER: 59.9 ML
LEFT VENTRICLE END DIASTOLIC VOLUME APICAL 4 CHAMBER: 24.7 ML
LEFT VENTRICLE END SYSTOLIC VOLUME APICAL 2 CHAMBER: 103 ML
LEFT VENTRICLE END SYSTOLIC VOLUME APICAL 4 CHAMBER: 83.1 ML
LEFT VENTRICLE MASS INDEX: 106.9 G/M2
LEFT VENTRICLE SYSTOLIC VOLUME INDEX: 21.2 ML/M2
LEFT VENTRICLE SYSTOLIC VOLUME: 37.9 ML
LEFT VENTRICULAR INTERNAL DIMENSION IN DIASTOLE: 4.4 CM (ref 3.5–6)
LEFT VENTRICULAR MASS: 191.3 G
LV LATERAL E/E' RATIO: 10.33 M/S
LV SEPTAL E/E' RATIO: 24.8 M/S
LVED V (TEICH): 87.7 ML
LVES V (TEICH): 37.9 ML
LVOT MG: 2 MMHG
LVOT MV: 0.65 CM/S
LYMPHOCYTES # BLD AUTO: 1.21 X10(3)/MCL (ref 0.6–4.6)
LYMPHOCYTES NFR BLD AUTO: 18.1 %
MAGNESIUM SERPL-MCNC: 2.3 MG/DL (ref 1.6–2.6)
MCH RBC QN AUTO: 32.3 PG (ref 27–31)
MCHC RBC AUTO-ENTMCNC: 32.3 G/DL (ref 33–36)
MCV RBC AUTO: 100 FL (ref 80–94)
MONOCYTES # BLD AUTO: 0.73 X10(3)/MCL (ref 0.1–1.3)
MONOCYTES NFR BLD AUTO: 10.9 %
MV MEAN GRADIENT: 2 MMHG
MV PEAK A VEL: 0.21 M/S
MV PEAK E VEL: 1.24 M/S
MV PEAK GRADIENT: 7 MMHG
MV VALVE AREA BY CONTINUITY EQUATION: 1.26 CM2
NEUTROPHILS # BLD AUTO: 4.56 X10(3)/MCL (ref 2.1–9.2)
NEUTROPHILS NFR BLD AUTO: 68.5 %
NRBC BLD AUTO-RTO: 0 %
OHS CV RV/LV RATIO: 0.93 CM
OHS LV EJECTION FRACTION SIMPSONS BIPLANE MOD: 52 %
PISA TR MAX VEL: 2.83 M/S
PLATELET # BLD AUTO: 129 X10(3)/MCL (ref 130–400)
PMV BLD AUTO: 11 FL (ref 7.4–10.4)
POCT GLUCOSE: 107 MG/DL (ref 70–110)
POTASSIUM SERPL-SCNC: 4 MMOL/L (ref 3.5–5.1)
PROT SERPL-MCNC: 7 GM/DL (ref 5.8–7.6)
PV PEAK GRADIENT: 5 MMHG
PV PEAK VELOCITY: 1.15 M/S
RA MAJOR: 7.5 CM
RA PRESSURE ESTIMATED: 15 MMHG
RA WIDTH: 4.6 CM
RBC # BLD AUTO: 2.29 X10(6)/MCL (ref 4.2–5.4)
RIGHT VENTRICLE DIASTOLIC BASEL DIMENSION: 4.1 CM
RIGHT VENTRICLE DIASTOLIC LENGTH: 7.7 CM
RIGHT VENTRICULAR END-DIASTOLIC DIMENSION: 4.1 CM
RIGHT VENTRICULAR LENGTH IN DIASTOLE (APICAL 4-CHAMBER VIEW): 7.7 CM
RV TB RVSP: 18 MMHG
SODIUM SERPL-SCNC: 133 MMOL/L (ref 136–145)
SPECIMEN OUTDATE: NORMAL
TDI LATERAL: 0.12 M/S
TDI SEPTAL: 0.05 M/S
TDI: 0.09 M/S
TIBC SERPL-MCNC: 163 UG/DL (ref 70–310)
TIBC SERPL-MCNC: 188 UG/DL (ref 250–450)
TR MAX PG: 32 MMHG
TRANSFERRIN SERPL-MCNC: 169 MG/DL
TRICUSPID ANNULAR PLANE SYSTOLIC EXCURSION: 1.19 CM
TROPONIN I SERPL-MCNC: 0.05 NG/ML (ref 0–0.04)
TV REST PULMONARY ARTERY PRESSURE: 47 MMHG
UNIT NUMBER: NORMAL
WBC # BLD AUTO: 6.67 X10(3)/MCL (ref 4.5–11.5)
Z-SCORE OF LEFT VENTRICULAR DIMENSION IN END DIASTOLE: -1.17
Z-SCORE OF LEFT VENTRICULAR DIMENSION IN END SYSTOLE: 0.11

## 2024-12-27 PROCEDURE — 86900 BLOOD TYPING SEROLOGIC ABO: CPT | Performed by: INTERNAL MEDICINE

## 2024-12-27 PROCEDURE — 97162 PT EVAL MOD COMPLEX 30 MIN: CPT

## 2024-12-27 PROCEDURE — 36415 COLL VENOUS BLD VENIPUNCTURE: CPT | Performed by: INTERNAL MEDICINE

## 2024-12-27 PROCEDURE — 84484 ASSAY OF TROPONIN QUANT: CPT | Performed by: STUDENT IN AN ORGANIZED HEALTH CARE EDUCATION/TRAINING PROGRAM

## 2024-12-27 PROCEDURE — 11000001 HC ACUTE MED/SURG PRIVATE ROOM

## 2024-12-27 PROCEDURE — 83735 ASSAY OF MAGNESIUM: CPT | Performed by: STUDENT IN AN ORGANIZED HEALTH CARE EDUCATION/TRAINING PROGRAM

## 2024-12-27 PROCEDURE — 36430 TRANSFUSION BLD/BLD COMPNT: CPT

## 2024-12-27 PROCEDURE — 86923 COMPATIBILITY TEST ELECTRIC: CPT | Performed by: INTERNAL MEDICINE

## 2024-12-27 PROCEDURE — P9016 RBC LEUKOCYTES REDUCED: HCPCS | Performed by: INTERNAL MEDICINE

## 2024-12-27 PROCEDURE — 21400001 HC TELEMETRY ROOM

## 2024-12-27 PROCEDURE — 80053 COMPREHEN METABOLIC PANEL: CPT | Performed by: STUDENT IN AN ORGANIZED HEALTH CARE EDUCATION/TRAINING PROGRAM

## 2024-12-27 PROCEDURE — 25000003 PHARM REV CODE 250

## 2024-12-27 PROCEDURE — 36415 COLL VENOUS BLD VENIPUNCTURE: CPT | Performed by: NURSE PRACTITIONER

## 2024-12-27 PROCEDURE — 30233N1 TRANSFUSION OF NONAUTOLOGOUS RED BLOOD CELLS INTO PERIPHERAL VEIN, PERCUTANEOUS APPROACH: ICD-10-PCS | Performed by: INTERNAL MEDICINE

## 2024-12-27 PROCEDURE — 83550 IRON BINDING TEST: CPT | Performed by: STUDENT IN AN ORGANIZED HEALTH CARE EDUCATION/TRAINING PROGRAM

## 2024-12-27 PROCEDURE — 85025 COMPLETE CBC W/AUTO DIFF WBC: CPT | Performed by: NURSE PRACTITIONER

## 2024-12-27 PROCEDURE — 25000003 PHARM REV CODE 250: Performed by: INTERNAL MEDICINE

## 2024-12-27 PROCEDURE — 63600175 PHARM REV CODE 636 W HCPCS: Performed by: STUDENT IN AN ORGANIZED HEALTH CARE EDUCATION/TRAINING PROGRAM

## 2024-12-27 PROCEDURE — 25000003 PHARM REV CODE 250: Performed by: STUDENT IN AN ORGANIZED HEALTH CARE EDUCATION/TRAINING PROGRAM

## 2024-12-27 PROCEDURE — 63600175 PHARM REV CODE 636 W HCPCS: Performed by: INTERNAL MEDICINE

## 2024-12-27 PROCEDURE — 97166 OT EVAL MOD COMPLEX 45 MIN: CPT

## 2024-12-27 RX ORDER — RISPERIDONE 1 MG/1
2 TABLET ORAL NIGHTLY
Status: DISCONTINUED | OUTPATIENT
Start: 2024-12-27 | End: 2024-12-30

## 2024-12-27 RX ORDER — LUBIPROSTONE 24 UG/1
24 CAPSULE ORAL 2 TIMES DAILY WITH MEALS
Status: DISCONTINUED | OUTPATIENT
Start: 2024-12-27 | End: 2025-01-05

## 2024-12-27 RX ORDER — HYDROCODONE BITARTRATE AND ACETAMINOPHEN 500; 5 MG/1; MG/1
TABLET ORAL
Status: DISCONTINUED | OUTPATIENT
Start: 2024-12-27 | End: 2024-12-29

## 2024-12-27 RX ORDER — GLYCOPYRROLATE 1 MG/1
1 TABLET ORAL 2 TIMES DAILY PRN
Status: DISCONTINUED | OUTPATIENT
Start: 2024-12-27 | End: 2025-01-29 | Stop reason: HOSPADM

## 2024-12-27 RX ORDER — RIVASTIGMINE TARTRATE 1.5 MG/1
1.5 CAPSULE ORAL 2 TIMES DAILY
Status: DISCONTINUED | OUTPATIENT
Start: 2024-12-27 | End: 2025-01-02

## 2024-12-27 RX ORDER — FUROSEMIDE 10 MG/ML
40 INJECTION INTRAMUSCULAR; INTRAVENOUS EVERY 12 HOURS
Status: DISCONTINUED | OUTPATIENT
Start: 2024-12-27 | End: 2024-12-28

## 2024-12-27 RX ORDER — POTASSIUM CHLORIDE 20 MEQ/1
40 TABLET, EXTENDED RELEASE ORAL 2 TIMES DAILY
Status: COMPLETED | OUTPATIENT
Start: 2024-12-27 | End: 2024-12-27

## 2024-12-27 RX ORDER — TRAMADOL HYDROCHLORIDE 50 MG/1
50 TABLET ORAL EVERY 6 HOURS PRN
Status: DISCONTINUED | OUTPATIENT
Start: 2024-12-27 | End: 2024-12-29

## 2024-12-27 RX ORDER — CEFTRIAXONE 1 G/1
1 INJECTION, POWDER, FOR SOLUTION INTRAMUSCULAR; INTRAVENOUS
Status: DISCONTINUED | OUTPATIENT
Start: 2024-12-27 | End: 2024-12-29

## 2024-12-27 RX ORDER — NITROGLYCERIN 0.4 MG/1
0.4 TABLET SUBLINGUAL EVERY 5 MIN PRN
Status: DISCONTINUED | OUTPATIENT
Start: 2024-12-27 | End: 2025-01-29 | Stop reason: HOSPADM

## 2024-12-27 RX ORDER — CEFTRIAXONE 1 G/1
1 INJECTION, POWDER, FOR SOLUTION INTRAMUSCULAR; INTRAVENOUS
Status: DISCONTINUED | OUTPATIENT
Start: 2024-12-27 | End: 2024-12-27

## 2024-12-27 RX ORDER — ACETAMINOPHEN 325 MG/1
650 TABLET ORAL EVERY 6 HOURS PRN
Status: DISCONTINUED | OUTPATIENT
Start: 2024-12-27 | End: 2024-12-29

## 2024-12-27 RX ORDER — LANOLIN ALCOHOL/MO/W.PET/CERES
1 CREAM (GRAM) TOPICAL 2 TIMES DAILY
Status: DISCONTINUED | OUTPATIENT
Start: 2024-12-27 | End: 2025-01-29 | Stop reason: HOSPADM

## 2024-12-27 RX ORDER — GABAPENTIN 100 MG/1
200 CAPSULE ORAL 2 TIMES DAILY
Status: DISCONTINUED | OUTPATIENT
Start: 2024-12-27 | End: 2024-12-30

## 2024-12-27 RX ORDER — MUPIROCIN 20 MG/G
OINTMENT TOPICAL 2 TIMES DAILY
Status: DISPENSED | OUTPATIENT
Start: 2024-12-27 | End: 2025-01-01

## 2024-12-27 RX ORDER — CETIRIZINE HYDROCHLORIDE 10 MG/1
10 TABLET ORAL DAILY
Status: DISCONTINUED | OUTPATIENT
Start: 2024-12-27 | End: 2025-01-03

## 2024-12-27 RX ADMIN — GABAPENTIN 200 MG: 100 CAPSULE ORAL at 09:12

## 2024-12-27 RX ADMIN — APIXABAN 2.5 MG: 2.5 TABLET, FILM COATED ORAL at 12:12

## 2024-12-27 RX ADMIN — CARVEDILOL 3.12 MG: 3.12 TABLET, FILM COATED ORAL at 09:12

## 2024-12-27 RX ADMIN — RISPERIDONE 2 MG: 1 TABLET, FILM COATED ORAL at 09:12

## 2024-12-27 RX ADMIN — POTASSIUM CHLORIDE 40 MEQ: 1500 TABLET, EXTENDED RELEASE ORAL at 09:12

## 2024-12-27 RX ADMIN — CARVEDILOL 3.12 MG: 3.12 TABLET, FILM COATED ORAL at 12:12

## 2024-12-27 RX ADMIN — RIVASTIGMINE TARTRATE 1.5 MG: 1.5 CAPSULE ORAL at 09:12

## 2024-12-27 RX ADMIN — LATANOPROST 1 DROP: 50 SOLUTION OPHTHALMIC at 12:12

## 2024-12-27 RX ADMIN — RISPERIDONE 2 MG: 1 TABLET, FILM COATED ORAL at 05:12

## 2024-12-27 RX ADMIN — CEFTRIAXONE SODIUM 1 G: 1 INJECTION, POWDER, FOR SOLUTION INTRAMUSCULAR; INTRAVENOUS at 01:12

## 2024-12-27 RX ADMIN — FUROSEMIDE 40 MG: 10 INJECTION, SOLUTION INTRAMUSCULAR; INTRAVENOUS at 10:12

## 2024-12-27 RX ADMIN — FUROSEMIDE 40 MG: 10 INJECTION, SOLUTION INTRAMUSCULAR; INTRAVENOUS at 09:12

## 2024-12-27 RX ADMIN — PANTOPRAZOLE SODIUM 40 MG: 40 TABLET, DELAYED RELEASE ORAL at 09:12

## 2024-12-27 RX ADMIN — POTASSIUM BICARBONATE 20 MEQ: 391 TABLET, EFFERVESCENT ORAL at 09:12

## 2024-12-27 RX ADMIN — PANTOPRAZOLE SODIUM 40 MG: 40 TABLET, DELAYED RELEASE ORAL at 12:12

## 2024-12-27 RX ADMIN — APIXABAN 2.5 MG: 2.5 TABLET, FILM COATED ORAL at 09:12

## 2024-12-27 RX ADMIN — CETIRIZINE HYDROCHLORIDE 10 MG: 10 TABLET, FILM COATED ORAL at 09:12

## 2024-12-27 RX ADMIN — FERROUS SULFATE TAB 325 MG (65 MG ELEMENTAL FE) 1 EACH: 325 (65 FE) TAB at 01:12

## 2024-12-27 RX ADMIN — FERROUS SULFATE TAB 325 MG (65 MG ELEMENTAL FE) 1 EACH: 325 (65 FE) TAB at 09:12

## 2024-12-27 RX ADMIN — ALLOPURINOL 300 MG: 300 TABLET ORAL at 09:12

## 2024-12-27 RX ADMIN — LEVOTHYROXINE SODIUM 25 MCG: 25 TABLET ORAL at 05:12

## 2024-12-27 RX ADMIN — MUPIROCIN: 20 OINTMENT TOPICAL at 01:12

## 2024-12-27 RX ADMIN — MUPIROCIN: 20 OINTMENT TOPICAL at 09:12

## 2024-12-27 NOTE — PT/OT/SLP EVAL
Physical Therapy Evaluation    Patient Name:  Ev Alberts   MRN:  [id number]    Recommendations:     Discharge therapy intensity: Moderate Intensity Therapy   Discharge Equipment Recommendations: to be determined by next level of care   Barriers to discharge: Impaired mobility and Ongoing medical needs    Assessment:     Ev Alberts is a 89 y.o. female admitted with a medical diagnosis of acute HF exacerbation, NSTEMI, catheter associated UTI, PAF, acute anemia, hx of dementia.  She presents with the following impairments/functional limitations: weakness, impaired endurance, impaired self care skills, impaired functional mobility, impaired balance, impaired cognition, decreased upper extremity function, decreased lower extremity function . Pt lethargic with difficulty maintaining arousal, oriented x1. Required 2 person maxA for bed mobility and maxA for sitting balance. Unable to stand or ambulate today due to alertness. Per daughter, pt with recent hospital admission and since then has had a decline in overall mobility. At baseline pt able to pivot to chair and ambulate short distances in the house with assistance. Has 24/7 assist from family and sitters. Recommend moderate intensity therapy at this time due to decline from baseline, will update recs if participation improves.    Rehab Prognosis: Good; patient would benefit from acute skilled PT services to address these deficits and reach maximum level of function.    Recent Surgery: * No surgery found *      Plan:     During this hospitalization, patient would benefit from acute PT services 5 x/week to address the identified rehab impairments via gait training, therapeutic activities, therapeutic exercises, neuromuscular re-education and progress toward the following goals:    Plan of Care Expires:  01/27/25    Subjective     Chief Complaint: unable to state  Patient/Family Comments/goals: unable to state  Pain/Comfort:  Pain Rating 1:  (c/o pain with passive  movement of feet)    Patients cultural, spiritual, Caodaism conflicts given the current situation: no    Living Environment:  Pt lives with her spouse and son in a [place] without steps. Has daytime caregivers 52 hrs/wk.  Prior to admission, patients level of function was required assist for mobility and ADLs.  Equipment used at home: walker, rolling, wheelchair, shower chair.  DME owned (not currently used): none.  Upon discharge, patient will have assistance from family and sitters.    Objective:     Communicated with RN prior to session.  Patient found HOB elevated with telemetry, pulse ox (continuous), cook catheter  upon PT entry to room.    General Precautions: Standard, fall  Orthopedic Precautions:    Braces:    Respiratory Status: Room air  Blood Pressure: NT      Exams:  Cognitive Exam:  Patient is oriented to Person  RLE Strength: not following commands for MMT, ROM WFL  LLE Strength: not following commands for MMT, ROM WFL  Skin integrity:  known wounds sacrum, R heel      Functional Mobility:  Bed Mobility:     Rolling Left:  maximal assistance  Rolling Right: maximal assistance  Supine to Sit: maximal assistance and of 2 persons  Sit to Supine: maximal assistance and of 2 persons  Balance: sitting EOB = maxA due to posterior lean      AM-PAC 6 CLICK MOBILITY  Total Score:8       Treatment & Education:  Pt sat EOB ~8 mins with maxA, followed commands to squeeze hands. Not following commands for BLE. Eyes closed majority of time sitting up.  Upon rolling to remove chucks observed urine on pad in presence of cook catheter, RN made aware.    Patient and family were provided with verbal education education regarding PT role/goals/POC, fall prevention, safety awareness, discharge/DME recommendations, and pressure ulcer prevention.  Understanding was verbalized, however additional teaching warranted.     Patient left HOB elevated with all lines intact, call button in reach, wedge under R side, RN notified,  daughter present, and heels floated .    GOALS:   Multidisciplinary Problems       Physical Therapy Goals          Problem: Physical Therapy    Goal Priority Disciplines Outcome Interventions   Physical Therapy Goal     PT, PT/OT Progressing    Description: Goals to be met by: 24     Patient will increase functional independence with mobility by performin. Supine to sit with MInimal Assistance  2. Sit to stand transfer with Minimal Assistance  3. Bed to chair transfer with Minimal Assistance using Rolling Walker vs. Squat pivot  4. Gait  x 25 feet with Minimal Assistance using Rolling Walker.   5. Sitting at edge of bed x10 minutes with Stand-by Assistance                         History:     Past Medical History:   Diagnosis Date    Acute kidney injury superimposed on chronic kidney disease     Congestive heart failure     Dementia     Hypertension     Shingles of eyelid     Sick sinus syndrome     Thyroid disease     Unspecified glaucoma        Past Surgical History:   Procedure Laterality Date    cataract surgery       SECTION      CHOLECYSTECTOMY      EGD, WITH HEMORRHAGE CONTROL Left 2024    Procedure: EGD,WITH HEMORRHAGE CONTROL;  Surgeon: Blake Adorno MD;  Location: [facility] OR;  Service: Gastroenterology;  Laterality: Left;    HYSTERECTOMY      INSERTION OF PACEMAKER      LEFT HEART CATHETERIZATION Left 3/4/2024    Procedure: Left heart cath;  Surgeon: Mark Raymundo Jr., MD;  Location: [facility] CATH LAB;  Service: Cardiology;  Laterality: Left;    NEPHRECTOMY         Time Tracking:     PT Received On: 24  PT Start Time: 903     PT Stop Time: 923  PT Total Time (min): 20 min     Billable Minutes: Evaluation MOD      2024

## 2024-12-27 NOTE — NURSING
MD asked to have patient's pacemaker interrogated. Ru from [employer] stated he will send report over.

## 2024-12-27 NOTE — CONSULTS
Ev Alberts 1935  [id number]  2024    CONSULTING PHYSICIAN: Dr. Chaney    REASON FOR CONSULTATION:     urinary retention- cook in place     HPI:  The patient is an 89-year-old female with multiple comorbidities including heart failure, CKD, atrial fibrillation on Eliquis who was recently admitted at the beginning of the month and was found to have urinary retention.  Cook catheter was placed on 12/15/2024 with 1900 cc urine obtained.  She was noted to have gross hematuria.  She has a history of left nephrectomy about 50 years ago suspected secondary to pyelonephritis.  She was discharged on Tuesday with Cook catheter in place.  Plan was for outpatient follow up for void trial and hematuria workup.  She was noted to have altered mental status/lethargy by home health and returned to the emergency room yesterday for further evaluation.  She was admitted for heart failure exacerbation, NSTEMI and UTI.  She has been hemodynamically stable and afebrile since arrival.  Lab work this morning reveals WBC 6.67, H&H 7.4/22.9, BUN and creatinine 29.3/1.31.  Urine culture with Gram-negative rods.  Blood cultures x2 are pending.  She is on Rocephin.    The patient is resting in bed.  Family at bedside.  She lives at home with her  and son.  Her daughter reports she was alert and interactive yesterday however has been sleepy since receiving some morphine.  She has had some leakage from around her catheter.    Past Medical History:   Diagnosis Date    Acute kidney injury superimposed on chronic kidney disease     Congestive heart failure     Dementia     Hypertension     Shingles of eyelid     Sick sinus syndrome     Thyroid disease     Unspecified glaucoma      Past Surgical History:   Procedure Laterality Date    cataract surgery       SECTION      CHOLECYSTECTOMY      EGD, WITH HEMORRHAGE CONTROL Left 2024    Procedure: EGD,WITH HEMORRHAGE CONTROL;  Surgeon: Blake Adorno MD;  Location:  [facility] OR;  Service: Gastroenterology;  Laterality: Left;    HYSTERECTOMY      INSERTION OF PACEMAKER      LEFT HEART CATHETERIZATION Left 3/4/2024    Procedure: Left heart cath;  Surgeon: Mark Raymundo Jr., MD;  Location: [facility] CATH LAB;  Service: Cardiology;  Laterality: Left;    NEPHRECTOMY       No family history on file.  Social History     Tobacco Use    Smoking status: Never    Smokeless tobacco: Never   Substance Use Topics    Alcohol use: Never    Drug use: Not Currently     Current Facility-Administered Medications   Medication Dose Route Frequency Provider Last Rate Last Admin    0.9%  NaCl infusion (for blood administration)   Intravenous Q24H PRN Shad Pacheco MD        acetaminophen tablet 650 mg  650 mg Oral Q6H PRN Shad Pacheco MD        albuterol inhaler 1 puff  1 puff Inhalation PRN Aziza Snyder FNP        allopurinoL tablet 300 mg  300 mg Oral Daily Aziza Snyder FNP   300 mg at 12/27/24 0931    aluminum-magnesium hydroxide-simethicone 200-200-20 mg/5 mL suspension 30 mL  30 mL Oral QID PRN Reyes, Thairy G, DO        apixaban tablet 2.5 mg  2.5 mg Oral BID Aziza Snyder FNP   2.5 mg at 12/27/24 0930    bisacodyL suppository 10 mg  10 mg Rectal Daily PRN Reyes, Thairy G, DO        carvediloL tablet 3.125 mg  3.125 mg Oral BID Reyes, Thairy G, DO   3.125 mg at 12/27/24 0930    cefTRIAXone injection 1 g  1 g Intravenous Q24H Shad Pacheco MD        cetirizine tablet 10 mg  10 mg Oral Daily Reyes, Thairy G, DO   10 mg at 12/27/24 0929    ferrous sulfate tablet 1 each  1 tablet Oral BID Shad Pacheco MD        furosemide injection 40 mg  40 mg Intravenous Q12H Reyes, Thairy G, DO   40 mg at 12/27/24 0930    gabapentin capsule 200 mg  200 mg Oral BID Reyes, Thairy G, DO   200 mg at 12/27/24 0930    glycopyrrolate tablet 1 mg  1 mg Oral BID PRN Reyes, Thairy G, DO        latanoprost 0.005 % ophthalmic solution 1 drop  1 drop Both Eyes QHS Aziza Snyder FNP   1 drop at 12/27/24 0024     "levothyroxine tablet 25 mcg  25 mcg Oral Before breakfast Aziza Snyder FNP   25 mcg at 12/27/24 0510    lubiprostone capsule 24 mcg  24 mcg Oral BID WM Reyes, Thairy G, DO        melatonin tablet 6 mg  6 mg Oral Nightly PRN Reyes, Thairy G, DO        mupirocin 2 % ointment   Nasal BID Kamaljit Chaney MD        nitroGLYCERIN SL tablet 0.4 mg  0.4 mg Sublingual Q5 Min PRN Reyes, Thairy G, DO        ondansetron injection 4 mg  4 mg Intravenous Q8H PRN Reyes, Thairy G, DO        pantoprazole EC tablet 40 mg  40 mg Oral BID Aziza Snyder FNP   40 mg at 12/27/24 0929    polyethylene glycol packet 17 g  17 g Oral BID PRN Reyes, Thairy G, DO        potassium bicarbonate disintegrating tablet 20 mEq  20 mEq Oral Daily Aziza Snyder FNP   20 mEq at 12/27/24 0929    potassium chloride SA CR tablet 40 mEq  40 mEq Oral BID Reyes, Thairy G, DO   40 mEq at 12/27/24 0929    risperiDONE tablet 2 mg  2 mg Oral QHS Reyes, Thairy G, DO   2 mg at 12/27/24 0510    rivastigmine tartrate capsule 1.5 mg  1.5 mg Oral BID Reyes, Thairy G, DO        sodium chloride 0.9% flush 10 mL  10 mL Intravenous PRN Reyes, Thairy G, DO        traMADoL tablet 50 mg  50 mg Oral Q6H PRN Shad Pacheco MD         Review of patient's allergies indicates:   Allergen Reactions    Amlodipine-benazepril Edema     Other reaction(s): unknown    Corticosteroids (glucocorticoids) Edema and Swelling    Rofecoxib Anaphylaxis and Swelling    Sulfa (sulfonamide antibiotics) Edema    Atorvastatin      Other reaction(s): unknown    Ibuprofen      Other reaction(s): Allergy to sulfa drugs       ROS: 12 point review of systems negative other than the HPI    PHYSICAL EXAM:  Vitals:    12/27/24 0429 12/27/24 0441 12/27/24 0751 12/27/24 0930   BP:  125/71 (!) 129/59 (!) 129/59   Pulse:  62 64 64   Resp:   20    Temp:  97.8 °F (36.6 °C) 97.8 °F (36.6 °C)    TempSrc:  Oral Axillary    SpO2:  (!) 93% (!) 94%    Weight: 72 kg (158 lb 11.7 oz)      Height: 5' 5" " (1.651 m)        No intake or output data in the 24 hours ending 12/27/24 1134    GEN: NAD  HEENT: normocephalic, atraumatic, PERRL  CV: RRR  RESP: Even and unlabored  ABD:  Soft, NT ND  :  Huber urine draining to  bag  NEURO: sleepy    LABS:  Recent Results (from the past 24 hours)   COVID/RSV/FLU A&B PCR    Collection Time: 12/26/24  3:21 PM   Result Value Ref Range    Influenza A PCR Not Detected Not Detected    Influenza B PCR Not Detected Not Detected    Respiratory Syncytial Virus PCR Not Detected Not Detected    SARS-CoV-2 PCR Not Detected Not Detected, Negative   Respiratory Panel    Collection Time: 12/26/24  3:21 PM   Result Value Ref Range    Adenovirus Not Detected Not Detected    Coronavirus 229E Not Detected Not Detected    Coronavirus HKU1 Not Detected Not Detected    Coronavirus NL63 Not Detected Not Detected    Coronavirus OC43 PCR, Common Cold Virus Not Detected Not Detected    Human Metapneumovirus Not Detected Not Detected    Parainfluenza Virus 1 Not Detected Not Detected    Parainfluenza Virus 2 Not Detected Not Detected    Parainfluenza Virus 3 Not Detected Not Detected    Parainfluenza Virus 4 Not Detected Not Detected    Bordetella pertussis (ptxP) Not Detected Not Detected    Chlamydia pneumoniae Not Detected Not Detected    Mycoplasma pneumoniae Not Detected Not Detected    Human Rhinovirus/Enterovirus Not Detected Not Detected    Bordetella parapertussis ([id number]) Not Detected Not Detected   Comprehensive metabolic panel    Collection Time: 12/26/24  3:24 PM   Result Value Ref Range    Sodium 133 (L) 136 - 145 mmol/L    Potassium 3.4 (L) 3.5 - 5.1 mmol/L    Chloride 98 98 - 107 mmol/L    CO2 29 23 - 31 mmol/L    Glucose 93 82 - 115 mg/dL    Blood Urea Nitrogen 30.3 (H) 9.8 - 20.1 mg/dL    Creatinine 1.26 (H) 0.55 - 1.02 mg/dL    Calcium 9.7 8.4 - 10.2 mg/dL    Protein Total 7.3 5.8 - 7.6 gm/dL    Albumin 3.0 (L) 3.4 - 4.8 g/dL    Globulin 4.3 (H) 2.4 - 3.5 gm/dL    Albumin/Globulin  Ratio 0.7 (L) 1.1 - 2.0 ratio    Bilirubin Total 1.3 <=1.5 mg/dL     40 - 150 unit/L    ALT 12 0 - 55 unit/L    AST 27 5 - 34 unit/L    eGFR 41 mL/min/1.73/m2    Anion Gap 6.0 mEq/L    BUN/Creatinine Ratio 24    Brain natriuretic peptide    Collection Time: 12/26/24  3:24 PM   Result Value Ref Range    Natriuretic Peptide 523.3 (H) <=100.0 pg/mL   Troponin I    Collection Time: 12/26/24  3:24 PM   Result Value Ref Range    Troponin-I 0.048 (H) 0.000 - 0.045 ng/mL   Troponin I    Collection Time: 12/26/24  3:24 PM   Result Value Ref Range    Troponin-I 0.034 0.000 - 0.045 ng/mL   Magnesium    Collection Time: 12/26/24  3:24 PM   Result Value Ref Range    Magnesium Level 2.40 1.60 - 2.60 mg/dL   CBC with Differential    Collection Time: 12/26/24  3:24 PM   Result Value Ref Range    WBC 7.36 4.50 - 11.50 x10(3)/mcL    RBC 2.72 (L) 4.20 - 5.40 x10(6)/mcL    Hgb 8.7 (L) 12.0 - 16.0 g/dL    Hct 26.8 (L) 37.0 - 47.0 %    MCV 98.5 (H) 80.0 - 94.0 fL    MCH 32.0 (H) 27.0 - 31.0 pg    MCHC 32.5 (L) 33.0 - 36.0 g/dL    RDW 19.5 (H) 11.5 - 17.0 %    Platelet 147 130 - 400 x10(3)/mcL    MPV 10.6 (H) 7.4 - 10.4 fL    Neut % 61.5 %    Lymph % 25.0 %    Mono % 10.7 %    Eos % 1.8 %    Basophil % 0.7 %    Lymph # 1.84 0.6 - 4.6 x10(3)/mcL    Neut # 4.53 2.1 - 9.2 x10(3)/mcL    Mono # 0.79 0.1 - 1.3 x10(3)/mcL    Eos # 0.13 0 - 0.9 x10(3)/mcL    Baso # 0.05 <=0.2 x10(3)/mcL    IG# 0.02 0 - 0.04 x10(3)/mcL    IG% 0.3 %    NRBC% 0.0 %   Urinalysis, Reflex to Urine Culture    Collection Time: 12/26/24  3:39 PM    Specimen: Urine   Result Value Ref Range    Color, UA Light-Yellow Yellow, Light-Yellow, Colorless, Straw, Dark-Yellow    Appearance, UA Clear Clear    Specific Gravity, UA 1.009 1.005 - 1.030    pH, UA 6.5 5.0 - 8.5    Protein, UA Negative Negative    Glucose, UA Normal Negative, Normal    Ketones, UA Negative Negative    Blood, UA 2+ (A) Negative    Bilirubin, UA Negative Negative    Urobilinogen, UA 2.0 (A) 0.2, 1.0,  Normal    Nitrites, UA Negative Negative    Leukocyte Esterase,  (A) Negative    RBC, UA 21-50 (A) None Seen, 0-2, 3-5, 0-5 /HPF    WBC, UA 51-99 (A) None Seen, 0-2, 3-5, 0-5 /HPF    Bacteria, UA Few (A) None Seen, Trace /HPF    Squamous Epithelial Cells, UA Trace None Seen, Trace, Rare /HPF    Mucous, UA Trace (A) None Seen /LPF   Urine culture    Collection Time: 12/26/24  3:39 PM    Specimen: Urine   Result Value Ref Range    Urine Culture >/= 100,000 colonies/ml Gram-negative Rods (A)    EKG 12-lead    Collection Time: 12/26/24  4:33 PM   Result Value Ref Range    QRS Duration 198 ms    OHS QTC Calculation 491 ms   Lactic acid, plasma    Collection Time: 12/26/24  4:56 PM   Result Value Ref Range    Lactic Acid Level 1.1 0.5 - 2.2 mmol/L   TSH    Collection Time: 12/26/24  9:14 PM   Result Value Ref Range    TSH 3.335 0.350 - 4.940 uIU/mL   T3, Free (OLG)    Collection Time: 12/26/24  9:14 PM   Result Value Ref Range    T3 Free 2.08 1.58 - 3.91 pg/mL   T4, Free    Collection Time: 12/26/24  9:14 PM   Result Value Ref Range    Thyroxine Free 1.12 0.70 - 1.48 ng/dL   Troponin I    Collection Time: 12/26/24  9:14 PM   Result Value Ref Range    Troponin-I 0.051 (H) 0.000 - 0.045 ng/mL   Folate    Collection Time: 12/26/24  9:14 PM   Result Value Ref Range    Folate Level 17.6 7.0 - 31.4 ng/mL   Vitamin B12    Collection Time: 12/26/24  9:14 PM   Result Value Ref Range    Vitamin B12 1,105 (H) 213 - 816 pg/mL   Ferritin    Collection Time: 12/26/24  9:14 PM   Result Value Ref Range    Ferritin Level 266.47 (H) 4.63 - 204.00 ng/mL   Comprehensive Metabolic Panel    Collection Time: 12/27/24  5:58 AM   Result Value Ref Range    Sodium 133 (L) 136 - 145 mmol/L    Potassium 4.0 3.5 - 5.1 mmol/L    Chloride 97 (L) 98 - 107 mmol/L    CO2 28 23 - 31 mmol/L    Glucose 97 82 - 115 mg/dL    Blood Urea Nitrogen 29.3 (H) 9.8 - 20.1 mg/dL    Creatinine 1.31 (H) 0.55 - 1.02 mg/dL    Calcium 9.2 8.4 - 10.2 mg/dL    Protein  Total 7.0 5.8 - 7.6 gm/dL    Albumin 2.8 (L) 3.4 - 4.8 g/dL    Globulin 4.2 (H) 2.4 - 3.5 gm/dL    Albumin/Globulin Ratio 0.7 (L) 1.1 - 2.0 ratio    Bilirubin Total 1.0 <=1.5 mg/dL     40 - 150 unit/L    ALT 12 0 - 55 unit/L    AST 31 5 - 34 unit/L    eGFR 40 mL/min/1.73/m2    Anion Gap 8.0 mEq/L    BUN/Creatinine Ratio 22    Magnesium    Collection Time: 12/27/24  5:58 AM   Result Value Ref Range    Magnesium Level 2.30 1.60 - 2.60 mg/dL   Troponin I    Collection Time: 12/27/24  5:58 AM   Result Value Ref Range    Troponin-I 0.047 (H) 0.000 - 0.045 ng/mL   Iron and TIBC    Collection Time: 12/27/24  5:58 AM   Result Value Ref Range    Iron Binding Capacity Unsaturated 163 70 - 310 ug/dL    Iron Level 25 (L) 50 - 170 ug/dL    Transferrin 169 mg/dL    Iron Binding Capacity Total 188 (L) 250 - 450 ug/dL    Iron Saturation 13 (L) 20 - 50 %   CBC with Differential    Collection Time: 12/27/24  6:03 AM   Result Value Ref Range    WBC 6.67 4.50 - 11.50 x10(3)/mcL    RBC 2.29 (L) 4.20 - 5.40 x10(6)/mcL    Hgb 7.4 (L) 12.0 - 16.0 g/dL    Hct 22.9 (L) 37.0 - 47.0 %    .0 (H) 80.0 - 94.0 fL    MCH 32.3 (H) 27.0 - 31.0 pg    MCHC 32.3 (L) 33.0 - 36.0 g/dL    RDW 19.7 (H) 11.5 - 17.0 %    Platelet 129 (L) 130 - 400 x10(3)/mcL    MPV 11.0 (H) 7.4 - 10.4 fL    Neut % 68.5 %    Lymph % 18.1 %    Mono % 10.9 %    Eos % 1.8 %    Basophil % 0.4 %    Lymph # 1.21 0.6 - 4.6 x10(3)/mcL    Neut # 4.56 2.1 - 9.2 x10(3)/mcL    Mono # 0.73 0.1 - 1.3 x10(3)/mcL    Eos # 0.12 0 - 0.9 x10(3)/mcL    Baso # 0.03 <=0.2 x10(3)/mcL    IG# 0.02 0 - 0.04 x10(3)/mcL    IG% 0.3 %    NRBC% 0.0 %   Prepare RBC 1 Unit    Collection Time: 12/27/24  9:02 AM   Result Value Ref Range    UNIT NUMBER [id number]     UNIT ABO/RH B POS     DISPENSE STATUS Selected     Unit Expiration [id number]     Product Code O8799N57     Unit Blood Type Code 7300     CROSSMATCH INTERPRETATION Compatible    Type & Screen    Collection Time: 12/27/24  9:02 AM    Result Value Ref Range    Group & Rh B POS     Indirect Roberto GEL NEG     Specimen Outdate 12/30/2024 23:59        IMAGING:  Imaging Results              X-Ray Chest AP Portable (Final result)  Result time 12/26/24 15:31:47      Final result by Angélica Diehl MD (12/26/24 15:31:47)                   Impression:      Enlarged cardiac silhouette without overt edema.      Electronically signed by: Angélica Diehl  Date:    12/26/2024  Time:    15:31               Narrative:    EXAMINATION:  XR CHEST AP PORTABLE    CLINICAL HISTORY:  Other fatigue    TECHNIQUE:  Single frontal view of the chest was performed.    COMPARISON:  12/09/2024    FINDINGS:  LINES AND TUBES: Dual lead cardiac pacer device is in place via left subclavian approach with leads overlying the right atrium and right ventricle.  EKG/telemetry leads overlie the chest.    MEDIASTINUM AND SHAWNA: Cardiac silhouette is enlarged.    LUNGS: No overt alveolar edema.    PLEURA:Possible small right pleural effusion.No pneumothorax.    BONES: No acute osseous abnormality.                                      ASSESSMENT:  Recent admission with urinary retention, Grey catheter placed 12/15 with 1900 mL urine obtained, noted to have gross hematuria after Grey placement which has resolved.  Returned to the emergency room yesterday with altered mental status admitted for CHF exacerbation, UTI with indwelling Grey in place  -UC with SALLYR, BC pending. On rocephin      PLAN:  -levsin PRN for bladder spasms  -continue antibiotics, tailor according to final C&S results.  Recommend continuing indwelling Grey until she has completed treatment for her UTI, then okay for void trial.  -she will need follow up in the office for hematuria workup after discharge.  -Discussed with patient's family      Gisselle Turk NP

## 2024-12-27 NOTE — PLAN OF CARE
12/27/24 1459   Discharge Assessment   Confirmed/corrected address, phone number and insurance Yes   Confirmed Demographics Correct on Facesheet   Source of Information family  (Mellissa Alberts (Daughter)  [phone] (Mobile))   If unable to respond/provide information was family/caregiver contacted? Yes   Contact Name/Number Mellissa Alberts (Daughter)  [phone] (Mobile)   Communicated LUISITO with patient/caregiver Date not available/Unable to determine   Reason For Admission Chief Complaint:   Fatigue (Family reporting pt is more lethargic than usual. On arrival, pt c/o bilateral LE edema that began this morning. Denies chest pain or sob. Pt GCS 14 on arrival.   People in Home spouse;child(janice), adult   Facility Arrived From: Private residence ([place], [place]   Do you expect to return to your current living situation? Yes   Do you have help at home or someone to help you manage your care at home? Yes   Who are your caregiver(s) and their phone number(s)? : Dayron Alberts and Son: Ranjeet Alberts: [phone]; Daughter: Mellissa Alberts: [phone]   Prior to hospitilization cognitive status: Not Oriented to Person;Not Oriented to Place;Not Oriented to Time   Current cognitive status: Not Oriented to Person;Not Oriented to Place;Not Oriented to Time   Walking or Climbing Stairs Difficulty yes   Walking or Climbing Stairs transferring difficulty, dependent   Mobility Management The patient has the following DME: 1. Rolling Walker 2. Wheelchair 3. Straight Cane PRN mobility concerns. The patient has been bedbound for the past few weeks.   Dressing/Bathing Difficulty yes   Dressing/Bathing bathing difficulty, requires equipment   Dressing/Bathing Management The patient receives sitter services through: [employer] ([place]); she has sitters 10 hours/day (Monday-Friday & Weekends: 6 hours/day).   Home Accessibility wheelchair accessible  (There is a ramp built onto the patient's private residence to assist  with the ease of entry via wheelchair.)   Home Layout Able to live on 1st floor   Equipment Currently Used at Home bedside commode;walker, rolling;wheelchair;cane, straight;oxygen;shower chair;grab bar  (O2 Equipment/Supplies: [employer].)   Readmission within 30 days? Yes   Patient currently being followed by outpatient case management? No   Do you currently have service(s) that help you manage your care at home? No   Do you take prescription medications? Yes   Do you have prescription coverage? Yes   Coverage Payor: [facility] DUAL COMPLETE HMO SNP -   Do you have any problems affording any of your prescribed medications? No   Is the patient taking medications as prescribed? yes   Who is going to help you get home at discharge? : Dayron Alberts and Son: Ranjeet Alberts: [phone]; Daughter: Mellissa Alberts: [phone]   How do you get to doctors appointments? family or friend will provide   Are you on dialysis? No   Do you take coumadin? No   Discharge Plan A Skilled Nursing Facility   Discharge Plan B Skilled Nursing Facility   DME Needed Upon Discharge  none   Discharge Plan discussed with: Adult children;Spouse/sig other   Name(s) and Number(s) : Dayron Alberts and Daughter: Mellissa Alberts: [phone]   Transition of Care Barriers None   Financial Resource Strain   How hard is it for you to pay for the very basics like food, housing, medical care, and heating? Not very   Housing Stability   In the last 12 months, was there a time when you were not able to pay the mortgage or rent on time? N   At any time in the past 12 months, were you homeless or living in a shelter (including now)? N   Transportation Needs   Has the lack of transportation kept you from medical appointments, meetings, work or from getting things needed for daily living? No   Food Insecurity   Within the past 12 months, you worried that your food would run out before you got the money to buy more. Never true    Within the past 12 months, the food you bought just didn't last and you didn't have money to get more. Never true   Stress   Do you feel stress - tense, restless, nervous, or anxious, or unable to sleep at night because your mind is troubled all the time - these days? Pt Unable   Social Isolation   How often do you feel lonely or isolated from those around you?  Patient unable to answer   Alcohol Use   Q1: How often do you have a drink containing alcohol? Never   Q2: How many drinks containing alcohol do you have on a typical day when you are drinking? None   Q3: How often do you have six or more drinks on one occasion? Never   Utilities   In the past 12 months has the electric, gas, oil, or water company threatened to shut off services in your home? No   Health Literacy   How often do you need to have someone help you when you read instructions, pamphlets, or other written material from your doctor or pharmacy? Never   OTHER   Name(s) of People in Home : Dayron Alberts and Son: Ranjeet Alberts: [phone]; Daughter: Mellissa Alberts: [phone] is actively involved in her Mom's healthcare needs.     : Dayron Alberts  Daughter: Mellissa Alberts: [phone]  Son: Ranjeet Alberts: [phone]  Pharmacy: [facility] ([place])   Home O2: [place]  Private Sitters: Active Care Sitters: Monday-Friday: 10 hours/day; Weekends: 6 hours/day.

## 2024-12-27 NOTE — PROGRESS NOTES
[facility] OLGH 9W MEDICAL TELEMETRY Hospital Medicine Progress Note      Patient Name: Ev Alberts  MRN: [id number]  Admission Date: 12/26/2024   Length of Stay: 1  Attending Physician: Shad Pacheco MD  Primary Care Provider: Santi Walters MD  Face-to-Face encounter date: 12/27/2024    Code Status: Full Code        Chief Complaint:   Fatigue (Family reporting pt is more lethargic than usual. On arrival, pt c/o bilateral LE edema that began this morning. Denies chest pain or sob. Pt GCS 14 on arrival. )        HPI:   Ev Alberts is an 89 y/o F with a PMH of diastolic heart failure LVEF 50-55%, HTN, chronic anemia, paroxysmal A-fib on eliquis, and CKD stage III followed by Dr. Chávez , chronic respiratory failure on 2 L NC at home, dementia and glaucoma presented to the ED with complains of bilateral leg swelling, generalized weakness and deconditioning post hospital discharge. Patient explains that she is bed bound and lives at home with her son and  who take care of her. Patient was recently admitted at this facility for worsening generalized weakness, bilateral leg swelling and urinary retention and discharged home with an indwelling cook, follow up appointment with urology and private sitters and home care with [facility] . Patient denies black or blood in her tool, denies chest pain, denies shortness of breath, denies known fever.     Initial ED vitals: Temp 98.1, HR 90, Resp 18, /58, O2 Sat 98% on RA. ED work up revealed WBC 7.39, H&H 8.7 and 26.8, , K 3.4, BUN 30.3, Creatinine 1.26, .3, Troponin 0.048, Lactic acid 1.1, TSH 3.335 Influenza, COVID, and RSV swab negative, Respiratory panel negative, UA positive for blood, leukocyte esterase, RBC, WBC, and bacteria, X-ray chest read enlarged cardiac silhouette without overt edema, EKG read ventricular paced rhythm at 62 bmp. Cardiology was consulted in the ED and patient was admitted to  Hospital Medicine.    Overview/Hospital Course:  No notes on file       Interval Hx:   The pt is a poor historian. Case d/w her daughter at the bedside. The pt was ambulatory with a walker prior to her admission 2 weeks ago. She has basically been bed bound since then with persistent weakness. Treatment plan d/w her daughter and all questions answered.    Review of Systems   Unable to perform ROS: Mental acuity       Objective/physical exam:  General: In no acute distress, afebrile, frail appearing  Chest: Clear to auscultation bilaterally  Heart: RRR, +S1, S2, +murmur, 2+ edema b/l LE  Abdomen: Soft, nontender, BS +, +cook  MSK: Warm, no clubbing or cyanosis  Neurologic: Drowsy but arousable  Psych/mental status: Unable to assess.    VITAL SIGNS: 24 HRS MIN & MAX LAST   Temp  Min: 97.7 °F (36.5 °C)  Max: 98.1 °F (36.7 °C) 97.8 °F (36.6 °C)   BP  Min: 111/61  Max: 136/70 (!) 129/59   Pulse  Min: 62  Max: 90  64   Resp  Min: 14  Max: 20 20   SpO2  Min: 91 %  Max: 100 % (!) 94 %       Recent Labs   Lab 12/24/24  0512 12/26/24  1524 12/27/24  0603   WBC 6.12 7.36 6.67   RBC 2.69* 2.72* 2.29*   HGB 8.5* 8.7* 7.4*   HCT 25.7* 26.8* 22.9*   MCV 95.5* 98.5* 100.0*   MCH 31.6* 32.0* 32.3*   MCHC 33.1 32.5* 32.3*   RDW 19.8* 19.5* 19.7*    147 129*   MPV 11.9* 10.6* 11.0*       Recent Labs   Lab 12/20/24  1633 12/21/24  0410 12/21/24  0410 12/24/24  0512 12/26/24  1524 12/27/24  0558   NA  --  138   < > 131* 133* 133*   K  --  3.2*   < > 3.4* 3.4* 4.0   CL  --  99   < > 97* 98 97*   CO2  --  28   < > 28 29 28   BUN  --  47.4*   < > 36.8* 30.3* 29.3*   CREATININE  --  1.76*   < > 1.23* 1.26* 1.31*   CALCIUM  --  9.3   < > 8.8 9.7 9.2   PH 7.450  --   --   --   --   --    MG  --   --   --   --  2.40 2.30   ALBUMIN  --  2.9*  --   --  3.0* 2.8*   ALKPHOS  --   --   --   --  133 119   ALT  --   --   --   --  12 12   AST  --   --   --   --  27 31   BILITOT  --   --   --   --  1.3 1.0    < > = values in this interval not  displayed.        Microbiology Results (last 7 days)       Procedure Component Value Units Date/Time    Urine culture [[id number]]  (Abnormal) Collected: 12/26/24 1539    Order Status: Completed Specimen: Urine Updated: 12/27/24 0735     Urine Culture >/= 100,000 colonies/ml Gram-negative Rods    Blood Culture #2 **CANNOT BE ORDERED STAT** [[id number]] Collected: 12/26/24 1656    Order Status: Resulted Specimen: Blood Updated: 12/26/24 1808    Blood Culture #1 **CANNOT BE ORDERED STAT** [[id number]] Collected: 12/26/24 1656    Order Status: Resulted Specimen: Blood Updated: 12/26/24 1808             Radiology:  X-Ray Chest AP Portable  Narrative: EXAMINATION:  XR CHEST AP PORTABLE    CLINICAL HISTORY:  Other fatigue    TECHNIQUE:  Single frontal view of the chest was performed.    COMPARISON:  12/09/2024    FINDINGS:  LINES AND TUBES: Dual lead cardiac pacer device is in place via left subclavian approach with leads overlying the right atrium and right ventricle.  EKG/telemetry leads overlie the chest.    MEDIASTINUM AND SHAWNA: Cardiac silhouette is enlarged.    LUNGS: No overt alveolar edema.    PLEURA:Possible small right pleural effusion.No pneumothorax.    BONES: No acute osseous abnormality.  Impression: Enlarged cardiac silhouette without overt edema.    Electronically signed by: Angélica Diehl  Date:    12/26/2024  Time:    15:31      Scheduled Med:   allopurinoL  300 mg Oral Daily    apixaban  2.5 mg Oral BID    carvediloL  3.125 mg Oral BID    cefTRIAXone (Rocephin) IV (PEDS and ADULTS)  1 g Intravenous Q24H    cetirizine  10 mg Oral Daily    furosemide (LASIX) injection  40 mg Intravenous Q12H    gabapentin  200 mg Oral BID    latanoprost  1 drop Both Eyes QHS    levothyroxine  25 mcg Oral Before breakfast    lubiprostone  24 mcg Oral BID WM    pantoprazole  40 mg Oral BID    potassium bicarbonate  20 mEq Oral Daily    potassium chloride  40 mEq Oral BID    risperiDONE  2 mg Oral QHS    rivastigmine  tartrate  1.5 mg Oral BID            Nutrition Status:      Assessment/Plan:      Acute HFpEF Exacerbation  -last echo on 07/20/2024 showed preserved ejection fraction of 50-55%, moderate regurgitation of the tricuspid valve and pulmonary hypertension  -repeat echo  -cont lasix     NSTEMI  -Type II, in setting of acute CHF exacerbation  -asymptomatic   -case d/w cards, no intervention planned     Catheter associated urinary infection  Hx of urinary retention on last admit  -patient is nontoxic, empiric treatment with ceftriaxone for 5 days  -follow culture and broaden as necessary  -consider voiding trial     Paroxysmal A-fib  -on  reduced dose eliquis at baseline however she does not meet 2/3 criteria, only age criteria as her creatinine greater than 1.5 and weight is greater than 60 kilos  -perhaps on a reduced dose secondary to chronic anemia ? or history of falls?  Keep for now but patient may benefit from being discharged with 5 mg b.i.d.    UTI GNR -POA  Cont rocephin    Acute anemia, iron def  Transfuse 1 unit prbc     History of: HTN, chronic anemia, CKD stage III followed by Dr. Chávez , chronic respiratory failure on 2 L NC at home, dementia and glaucoma         All diagnosis and differential diagnosis have been reviewed; assessment and plan has been documented; I have personally reviewed the labs and test results that are presently available; I have reviewed the patients medication list; I have reviewed the consulting providers response and recommendations. I have reviewed or attempted to review medical records based upon their availability      _____________________________________________________________________            Shad Pacheco MD   12/27/2024

## 2024-12-27 NOTE — PLAN OF CARE
Problem: Occupational Therapy  Goal: Occupational Therapy Goal  Description: Goals to be met by: 1/27/25     Patient will increase functional independence with ADLs by performing:    Feeding with Set-up Assistance.  UE Dressing with Minimal Assistance.  LE Dressing with Minimal Assistance.  Grooming while seated at sink with Stand-by Assistance.  Toileting from bedside commode with Minimal Assistance for hygiene and clothing management.   Toilet transfer to bedside commode with Minimal Assistance.  Outcome: Progressing

## 2024-12-27 NOTE — H&P
[facility] Hospital Medicine - H&P Note    Patient Name: Ev Alberts  : 1935  MRN: [id number]  PCP: Santi Walters MD  Admitting Physician:   Admission Class: IP- Inpatient   Code status: FULL    Allergies   Amlodipine-benazepril, Corticosteroids (glucocorticoids), Rofecoxib, Sulfa (sulfonamide antibiotics), Atorvastatin, and Ibuprofen    Chief Complaint     Generalized Weakness     History of Present Illness     Ev Alberts is an 88 y/o F with a PMH of diastolic heart failure LVEF 50-55%, HTN, chronic anemia, paroxysmal A-fib on eliquis, and CKD stage III followed by Dr. Chávez , chronic respiratory failure on 2 L NC at home, dementia and glaucoma presented to the ED with complains of bilateral leg swelling, generalized weakness and deconditioning post hospital discharge. Patient explains that she is bed bound and lives at home with her son and  who take care of her. Patient was recently admitted at this facility for worsening generalized weakness, bilateral leg swelling and urinary retention and discharged home with an indwelling cook, follow up appointment with urology and private sitters and home care with [facility]. Patient denies black or blood in her tool, denies chest pain, denies shortness of breath, denies known fever.    Initial ED vitals: Temp 98.1, HR 90, Resp 18, /58, O2 Sat 98% on RA. ED work up revealed WBC 7.39, H&H 8.7 and 26.8, , K 3.4, BUN 30.3, Creatinine 1.26, .3, Troponin 0.048, Lactic acid 1.1, TSH 3.335 Influenza, COVID, and RSV swab negative, Respiratory panel negative, UA positive for blood, leukocyte esterase, RBC, WBC, and bacteria, X-ray chest read enlarged cardiac silhouette without overt edema, EKG read ventricular paced rhythm at 62 bmp. Cardiology was consulted in the ED and patient was admitted to Hospital Medicine.  ROS   Except as documented, all other systems reviewed and negative     Past Medical  History     Past Medical History:   Diagnosis Date    Acute kidney injury superimposed on chronic kidney disease     Congestive heart failure     Dementia     Hypertension     Shingles of eyelid     Sick sinus syndrome     Thyroid disease     Unspecified glaucoma        Past Surgical History     Past Surgical History:   Procedure Laterality Date    cataract surgery       SECTION      CHOLECYSTECTOMY      EGD, WITH HEMORRHAGE CONTROL Left 2024    Procedure: EGD,WITH HEMORRHAGE CONTROL;  Surgeon: Blake Adorno MD;  Location: [facility] OR;  Service: Gastroenterology;  Laterality: Left;    HYSTERECTOMY      INSERTION OF PACEMAKER      LEFT HEART CATHETERIZATION Left 3/4/2024    Procedure: Left heart cath;  Surgeon: Mark Raymundo Jr., MD;  Location: [facility] CATH LAB;  Service: Cardiology;  Laterality: Left;    NEPHRECTOMY         Social History   Denies alcohol, tobacco or illicit drug use.     Screening for Social Drivers for health:  Patient screened for food insecurity, housing instability, transportation needs, utility difficulties, and interpersonal safety (select all that apply as identified as concern)  []Housing or Food  []Transportation Needs  []Utility Difficulties  []Interpersonal safety  [x]None        Family History   Reviewed and negative    Home Medications     Prior to Admission medications    Medication Sig Start Date End Date Taking? Authorizing Provider   albuterol sulfate (INV ALBUTEROL) 90 mcg inhalation Inhale 90 mcg into the lungs as needed (SOB, Wheezing). Take one puff by mouth as directed by Physician.    Provider, Historical   allopurinoL (ZYLOPRIM) 300 MG tablet Take 300 mg by mouth once daily.    Provider, Historical   aspirin (ECOTRIN) 81 MG EC tablet Take 1 tablet (81 mg total) by mouth once daily. 3/12/24 10/23/24  Paula Senior MD   atorvastatin (LIPITOR) 10 MG tablet Take 10 mg by mouth nightly. 22   Provider, Historical   brimonidine-timoloL (COMBIGAN) 0.2-0.5 %  Drop Place 1 drop into both eyes 2 (two) times a day.    Provider, Historical   carvediloL (COREG) 3.125 MG tablet Take 3.125 mg by mouth 2 (two) times daily. 21   Provider, Historical   cetirizine (ZYRTEC) 10 MG tablet Take 10 mg by mouth once daily.    Provider, Historical   CYCLOBENZAPRINE 2% GABAPENTIN 6% LIDOCAINE 2% PRILOCAINE 2% TOPICAL CREAM 1 pump TID as needed for scalp itching 24   Natan Mei MD   docusate sodium (COLACE) 100 MG capsule Take 2 capsules (200 mg total) by mouth 2 (two) times daily as needed for Constipation. 24   Ally Erazo MD   ELIQUIS 2.5 mg Tab Take 2.5 mg by mouth 2 (two) times daily. 5/10/22   Provider, Historical   furosemide (LASIX) 80 MG tablet Take 1 tablet (80 mg total) by mouth daily as needed (swelling). 24  Janelle Wheeler MD   gabapentin (NEURONTIN) 100 MG capsule Take 2 capsules (200 mg total) by mouth 2 (two) times daily. 24  Ally Erazo MD   glycopyrrolate (ROBINUL) 1 mg Tab Take 1 tablet (1 mg total) by mouth 2 (two) times daily as needed (drooling). 10/23/24   Cintia Blanco NP   latanoprost 0.005 % ophthalmic solution Place 1 drop into both eyes every evening.    Provider, Historical   levothyroxine (SYNTHROID) 25 MCG tablet Take 25 mcg by mouth before breakfast.    Provider, Historical   lubiprostone (AMITIZA) 24 MCG Cap Take 1 capsule (24 mcg total) by mouth 2 (two) times daily with meals. 24  Negro Laura MD   miconazole NITRATE 2 % (MICOTIN) 2 % top powder Apply topically 2 (two) times daily.  Patient not taking: Reported on 10/23/2024 4/9/24   Ally Erazo MD   mirabegron (MYRBETRIQ) 25 mg Tb24 ER tablet Take 50 mg by mouth every morning. 24   Provider, Historical   nitroGLYCERIN (NITROSTAT) 0.4 MG SL tablet SMARTSI Tablet(s) Sublingual    Provider, Historical   pantoprazole (PROTONIX) 40 MG tablet Take 1 tablet (40 mg total) by mouth 2 (two) times daily. 24  [place],  "Negro MENDEZ MD   potassium chloride (KLOR-CON) 20 mEq Pack Take 20 mEq by mouth once.    Provider, Historical   risperiDONE (RISPERDAL) 2 MG tablet Take 2 mg by mouth every evening. 1/29/24   Provider, Historical   rivastigmine tartrate (EXELON) 1.5 MG capsule Take 1.5 mg by mouth 2 (two) times daily.    Provider, Historical   traMADoL (ULTRAM) 50 mg tablet Take 50 mg by mouth every 8 (eight) hours as needed for Pain.  Patient not taking: Reported on 10/23/2024    Provider, Historical   traZODone (DESYREL) 100 MG tablet Take 100 mg by mouth every evening.  Patient not taking: Reported on 10/23/2024    Provider, Historical        Physical Exam   Vital Signs  Temp:  [98.1 °F (36.7 °C)]   Pulse:  [70-90]   Resp:  [14-20]   BP: (118-136)/(50-73)   SpO2:  [98 %-100 %]    General: Appears comfortable, delayed responses   HEENT: NC/AT  Neck:  No JVD  Chest: Crackles in Right lung noted   CVS: Regular rhythm. Normal S1/S2, murmur noted.   Abdomen: rounded, normoactive BS, soft and non-tender.  MSK: No obvious deformity or joint swelling  Skin: Warm and dry  Neuro: AAOx3, no focal neurological deficit  Psych: Cooperative    Labs     Recent Labs     12/24/24  0512 12/26/24  1524   WBC 6.12 7.36   RBC 2.69* 2.72*   HGB 8.5* 8.7*   HCT 25.7* 26.8*   MCV 95.5* 98.5*   MCH 31.6* 32.0*   MCHC 33.1 32.5*   RDW 19.8* 19.5*    147     No results for input(s): "PROTIME", "INR", "PTT", "D-DIMER", "FERRITIN", "IRON", "TRANS", "TIBC", "LABIRON", "[id number]", "FOLATE", "LDH", "HAPTOGLOBIN", "RETICCNTAUTO", "RETABS", "PERIPSMEAREV" in the last 72 hours.   Recent Labs     12/24/24  0512 12/26/24  1524   * 133*   K 3.4* 3.4*   CO2 28 29   BUN 36.8* 30.3*   CREATININE 1.23* 1.26*   EGFRNORACEVR 43 41   GLUCOSE 100 93   CALCIUM 8.8 9.7   MG  --  2.40   ALBUMIN  --  3.0*   GLOBULIN  --  4.3*   ALKPHOS  --  133   ALT  --  12   AST  --  27   BILITOT  --  1.3   BNP  --  523.3*     No results for input(s): "LACTIC" in the last 72 " hours.  Recent Labs     12/26/24  1524   TROPONINI 0.048*  0.034        Microbiology Results (last 7 days)       Procedure Component Value Units Date/Time    Blood Culture #2 **CANNOT BE ORDERED STAT** [[id number]] Collected: 12/26/24 1656    Order Status: Resulted Specimen: Blood Updated: 12/26/24 1808    Blood Culture #1 **CANNOT BE ORDERED STAT** [[id number]] Collected: 12/26/24 1656    Order Status: Resulted Specimen: Blood Updated: 12/26/24 1808    Urine culture [[id number]] Collected: 12/26/24 1539    Order Status: Sent Specimen: Urine Updated: 12/26/24 1549           Imaging   X-Ray Chest AP Portable  Narrative: EXAMINATION:  XR CHEST AP PORTABLE    CLINICAL HISTORY:  Other fatigue    TECHNIQUE:  Single frontal view of the chest was performed.    COMPARISON:  12/09/2024    FINDINGS:  LINES AND TUBES: Dual lead cardiac pacer device is in place via left subclavian approach with leads overlying the right atrium and right ventricle.  EKG/telemetry leads overlie the chest.    MEDIASTINUM AND SHAWNA: Cardiac silhouette is enlarged.    LUNGS: No overt alveolar edema.    PLEURA:Possible small right pleural effusion.No pneumothorax.    BONES: No acute osseous abnormality.  Impression: Enlarged cardiac silhouette without overt edema.    Electronically signed by: Angélica Diehl  Date:    12/26/2024  Time:    15:31    Assessment & Plan   Chronic Anemia   Acute on Chronic CHF  Generalized weakness   Urinary Retention   UTI      Plan:  -Consult cardiology  -Consult urology   -Pacemaker interrogation pending   -Rocephin 1 g IV q 24 hrs for 5 days   -Lasix 40 mg IV BID  -Echo pending  -Trend troponin until peak   -VTE Prophylaxis: SCDs, resume daily eliquis 2.5 mg BID  -Resume home meds as appropriate   -Cardiac diet   -OT to eval and treat  -PT to eval and treat      Aziza MILLER FNP-C have discussed this patients case with Dr. Reyes who agrees with the diagnosis and treatment plan.

## 2024-12-27 NOTE — PT/OT/SLP EVAL
Occupational Therapy  Evaluation    Name: Ev Alberts  MRN: [id number]  Admitting Diagnosis: fatigue/lethargy  Recent Surgery: * No surgery found *      Recommendations:     Discharge therapy intensity: Moderate Intensity Therapy   Discharge Equipment Recommendations:  to be determined by next level of care  Barriers to discharge:  Other (Comment) (impaired functional mobility from baseline)    Assessment:     Ev Alberts is a 89 y.o. female with a medical diagnosis of acute HF exacerbation, NSTEMI, catheter associated UTI, PAF, acute anemia, hx of dementia.  She presents with the following performance deficits affecting function: weakness, impaired endurance, impaired cognition, impaired self care skills, impaired functional mobility, gait instability, impaired balance, decreased safety awareness, pain, decreased lower extremity function, decreased upper extremity function.     Pt with poor tolerance to OT evaluation this date, noted with significant lethargy and difficulty maintaining arousal throughout. Pt's daughter stating pt has been sleeping since given pain medication last night. Pt oriented x1 and agreeable to mobility to EOB with encouragement. Pt required max A of 2 people for bed mobility, total A for LB ADLs and Max A for washing face seated EOB. Unable to stand or transfer this date due to lethargy. Pt's daughter reports prior to pts hospitalization 2 weeks ago, pt was able to pivot to w/c and ambulate short distances with RW. Pt has assist 24/7 from family and sitters, however since returning home from recent hospitalization pt has had decline in mobility and unable to get out of bed without assist. Recommending moderate intensity therapy upon d/c at this time, will update accordingly.     Rehab Prognosis: Good; patient would benefit from acute skilled OT services to address these deficits and reach maximum level of function.       Plan:     Patient to be seen 4 x/week to address the above listed  problems via self-care/home management, therapeutic activities  Plan of Care Expires: 01/27/25  Plan of Care Reviewed with: patient, daughter    Subjective     Chief Complaint: none  Patient/Family Comments/goals: none stated    Occupational Profile:  Living Environment: lives with spouse and son in [facility], ramp entrance; has shower chair but completes sponge baths with sitters  Previous level of function: assist for ADLs and mobility using RW  Roles and Routines: mother, wife  Equipment Used at Home: wheelchair, walker, rolling, shower chair  Assistance upon Discharge: family and caregivers, although pt's daughter reports pt needs to be able to transfer and assist in transfers    Pain/Comfort:  Pain Rating 1: other (see comments) (pain in feet with movement)    Patients cultural, spiritual, Orthodoxy conflicts given the current situation: no    Objective:     OT communicated with RN prior to session.      Patient was found HOB elevated with pulse ox (continuous), telemetry, cook catheter, peripheral IV upon OT entry to room.    General Precautions: Standard, fall  Orthopedic Precautions: N/A  Braces: N/A    Vital Signs: Sp02: 95-96%  Respiratory Status: on room air    Bed Mobility:    Patient completed Rolling/Turning to Left with  maximal assistance  Patient completed Rolling/Turning to Right with maximal assistance  Patient completed Supine to Sit with maximal assistance x2  Patient completed Sit to Supine with maximal assistance x2    Functional Mobility/Transfers:  Max A sitting balance EOB    Activities of Daily Living:  Grooming: maximal assistance pt attempting to bring wash cloth to face for grooming, unable to fully complete excursion. Max A [place] A   Lower Body Dressing: total assistance for socks  Toileting: total assistance cook catheter    AMPAC 6 Click ADL:  AMPAC Total Score: 12    Functional Cognition:  Affect: Lethargic  Orientation: oriented to Person  Command Following: pt squeezed hands on  command; did not follow other commands due to lethargy  Safety Awareness: Impaired.    Insight into Deficits: Impaired.      Visual Perceptual Skills:  Unable to assess as pt closing eyes throughout session    Upper Extremity Function:  Right Upper Extremity:   Not following commands for MMT, ROM WFL, pt noted with guarding past 90 deg. Shoulder flexion  Pt attempting to bring hands to face, 3-/5 at elbow  Squeezed hands     Left Upper Extremity:  Not following commands for MMT, ROM WFL, pt noted with guarding past 90 deg. Shoulder flexion  Pt attempting to bring hands to face, 3-/5 at elbow  Squeezed hands     Balance:   Static sitting balance: max A    Therapeutic Positioning  Risk for acquired pressure injuries is significantly increased due to impaired mobility, inability to communicate toileting needs, decreased level of consciousness , and altered skin already present.    OT interventions performed during the course of today's session:   Education was provided on benefits of and recommendations for therapeutic positioning  Therapeutic positioning was provided at the conclusion of session to offload all bony prominences for the prevention and/or reduction of pressure injuries    Skin assessment: all bony prominences were assessed    Findings: known area of altered skin integrity at sacrum and R heel (dressings in place)    OT recommendations for therapeutic positioning throughout hospitalization:   Follow [facility] Pressure Injury Prevention Protocol  Geomat recommended for sacral protection while [facility]  [employer]    Patient Education:  Patient and family were provided with verbal education education regarding OT role/goals/POC, fall prevention, safety awareness, Discharge/DME recommendations, and pressure ulcer prevention.  Additional teaching is warranted.     Patient left left sidelying with all lines intact, call button in reach, pillow under R side, bilateral heels floated with pillow, RN notified, and  family present. PUP kit ordered.     GOALS:   Multidisciplinary Problems       Occupational Therapy Goals          Problem: Occupational Therapy    Goal Priority Disciplines Outcome Interventions   Occupational Therapy Goal     OT, PT/OT Progressing    Description: Goals to be met by: 25     Patient will increase functional independence with ADLs by performing:    Feeding with Set-up Assistance.  UE Dressing with Minimal Assistance.  LE Dressing with Minimal Assistance.  Grooming while seated at sink with Stand-by Assistance.  Toileting from bedside commode with Minimal Assistance for hygiene and clothing management.   Toilet transfer to bedside commode with Minimal Assistance.                       History:     Past Medical History:   Diagnosis Date    Acute kidney injury superimposed on chronic kidney disease     Congestive heart failure     Dementia     Hypertension     Shingles of eyelid     Sick sinus syndrome     Thyroid disease     Unspecified glaucoma          Past Surgical History:   Procedure Laterality Date    cataract surgery       SECTION      CHOLECYSTECTOMY      EGD, WITH HEMORRHAGE CONTROL Left 2024    Procedure: EGD,WITH HEMORRHAGE CONTROL;  Surgeon: Blake Adorno MD;  Location: [facility] OR;  Service: Gastroenterology;  Laterality: Left;    HYSTERECTOMY      INSERTION OF PACEMAKER      LEFT HEART CATHETERIZATION Left 3/4/2024    Procedure: Left heart cath;  Surgeon: Mark Raymundo Jr., MD;  Location: [facility] CATH LAB;  Service: Cardiology;  Laterality: Left;    NEPHRECTOMY         Time Tracking:     OT Date of Treatment: 24  OT Start Time: 903  OT Stop Time: 922  OT Total Time (min): 19 min    Billable Minutes:Evaluation mod complexity    2024

## 2024-12-27 NOTE — CONSULTS
Inpatient consult to Cardiology  Consult performed by: Sabi Montejo NP  Consult ordered by: Reyes, Thairy G, DO  Reason for consult: NSTEMI                    [facility] *    Cardiology  Consult Note    Patient Name: Ev Alberts  MRN: [id number]  Admission Date: 12/26/2024  Hospital Length of Stay: 1 days  Code Status: Full Code   Attending Provider: Kamaljit Chaney MD   Consulting Provider: Sabi Montejo NP  Primary Care Physician: Santi Walters MD  Principal Problem:Acute on chronic congestive heart failure    Patient information was obtained from patient, past medical records, and ER records.     Subjective:     Reason for Consult: NSTEMI    HPI:  Ms. Alberts is a 88 y/o female with a history of SSS, AVB II, Bradycardia, CHF, PAF on Eliquis, PAD, Left Carotid Atherosclerosis, HTN, CKD IV, VHD, YARI, who was briefly known to [facility] (currently follows with Dr. Vigil). She presented to the ER with complaints of lethargy and BLE edema. The patient is bedbound and lives at home, her  is her care taker. She currently denies SOB, CP, or nausea. VS on admit: HR 90, /58, RR 18, Temp 98.1F, SpO2 98% on RA. Lab work: WBC 6.67, H/H 7.4/22.9, , Na 133, K 4.0, BUN/Cr 29.3/1.31, Ca 9.2, Mag 2.30, .3, Trop: 0.051 --> 0.047. [facility] was consulted for NSTEMI        PMH: SSS, AVB II, Bradycardia, CHF, PAF on Eliquis, PAD, Left Carotid Atherosclerosis, HTN, CKD IV, VHD, YARI, GERD, FERNANDEZ, Right Knee Osteoarthritis, GI Bleed, Dementia, Glaucoma, Hypothyroidism     PSH: Cataract Surgery, Cholecystectomy, Hysterectomy, PPM, Left, Nephrectomy, LHC, Hernia Repair, Oral Surgery, Bilateral Foot Surgery  Family History: Non-Contributory   Social History: Denies smoking, illicit drug use, and alcohol use.      Previous Cardiac Diagnostics:   LHC (3.4.24):   There is no obstructive coronary artery disease.  LVEDP is elevated at 24 mmHg.    ECHO (2.26.24):  Left Ventricle: The left ventricle is  normal in size. Mildly increased wall thickness. There is normal systolic function with a visually estimated ejection fraction of 55 - 60%. Grade III diastolic dysfunction. Right Ventricle: Mild right ventricular enlargement. Wall thickness is normal. Right ventricle wall motion  is normal. Systolic function is mildly reduced. TAPSE is 1.46 cm. Left Atrium: Left atrium is moderately dilated. Aortic Valve: There is mild aortic valve sclerosis. Tricuspid Valve: There is mild regurgitation.     PPM Insertion (9.19.19): Medtronic      ECHO (9.15.19):   Normal Left Ventricle cavity size. Normal wall thickness. Normal ejection fraction, 55-65%. The right ventricle cavity is mildly dilated. Left atrium is mildly dilated. Normal right atrium. Normal central venous pressure. Mild MR. Mild to Moderate TR. Moderate pulmonary HTN. No pericardial effusion.       Carotid US (7.26.12):  The study quality is good. Less than or equal to 50% left common carotid artery stenosis.Bilateral normal velocity measurements of the internal carotid and external caortid arteries are noted, with no plaque visualized.Antegrade right vertebral artery flow. Antegrade left vertebral artery flow.     KARI (1.18.12):  The resting right ankle brachial index is 1.01 consistent with no significant right peripheral vascular disease.The resting left ankle brachial index is 0.94 consistent with borderline left peripheral vascular disease.The study quality is good.      Lower Ext Arterial US (1.18.12):  The study quality is good. Biphasic waveforms and diffuse plaque seen in both the bilateral infra-popliteal arteries.    Review of patient's allergies indicates:   Allergen Reactions    Amlodipine-benazepril Edema     Other reaction(s): unknown    Corticosteroids (glucocorticoids) Edema and Swelling    Rofecoxib Anaphylaxis and Swelling    Sulfa (sulfonamide antibiotics) Edema    Atorvastatin      Other reaction(s): unknown    Ibuprofen      Other reaction(s):  Allergy to sulfa drugs     No current facility-administered medications on file prior to encounter.     Current Outpatient Medications on File Prior to Encounter   Medication Sig    albuterol sulfate (INV ALBUTEROL) 90 mcg inhalation Inhale 90 mcg into the lungs as needed (SOB, Wheezing). Take one puff by mouth as directed by Physician.    allopurinoL (ZYLOPRIM) 300 MG tablet Take 300 mg by mouth once daily.    aspirin (ECOTRIN) 81 MG EC tablet Take 1 tablet (81 mg total) by mouth once daily.    atorvastatin (LIPITOR) 10 MG tablet Take 10 mg by mouth nightly.    brimonidine-timoloL (COMBIGAN) 0.2-0.5 % Drop Place 1 drop into both eyes 2 (two) times a day.    carvediloL (COREG) 3.125 MG tablet Take 3.125 mg by mouth 2 (two) times daily.    cetirizine (ZYRTEC) 10 MG tablet Take 10 mg by mouth once daily.    CYCLOBENZAPRINE 2% GABAPENTIN 6% LIDOCAINE 2% PRILOCAINE 2% TOPICAL CREAM 1 pump TID as needed for scalp itching    docusate sodium (COLACE) 100 MG capsule Take 2 capsules (200 mg total) by mouth 2 (two) times daily as needed for Constipation.    ELIQUIS 2.5 mg Tab Take 2.5 mg by mouth 2 (two) times daily.    furosemide (LASIX) 80 MG tablet Take 1 tablet (80 mg total) by mouth daily as needed (swelling).    gabapentin (NEURONTIN) 100 MG capsule Take 2 capsules (200 mg total) by mouth 2 (two) times daily.    glycopyrrolate (ROBINUL) 1 mg Tab Take 1 tablet (1 mg total) by mouth 2 (two) times daily as needed (drooling).    latanoprost 0.005 % ophthalmic solution Place 1 drop into both eyes every evening.    levothyroxine (SYNTHROID) 25 MCG tablet Take 25 mcg by mouth before breakfast.    lubiprostone (AMITIZA) 24 MCG Cap Take 1 capsule (24 mcg total) by mouth 2 (two) times daily with meals.    mirabegron (MYRBETRIQ) 25 mg Tb24 ER tablet Take 50 mg by mouth every morning.    nitroGLYCERIN (NITROSTAT) 0.4 MG SL tablet SMARTSI Tablet(s) Sublingual    pantoprazole (PROTONIX) 40 MG tablet Take 1 tablet (40 mg total) by  mouth 2 (two) times daily.    potassium chloride (KLOR-CON) 20 mEq Pack Take 20 mEq by mouth once.    risperiDONE (RISPERDAL) 2 MG tablet Take 2 mg by mouth every evening.    rivastigmine tartrate (EXELON) 1.5 MG capsule Take 1.5 mg by mouth 2 (two) times daily.    [DISCONTINUED] miconazole NITRATE 2 % (MICOTIN) 2 % top powder Apply topically 2 (two) times daily. (Patient not taking: Reported on 10/23/2024)    [DISCONTINUED] traMADoL (ULTRAM) 50 mg tablet Take 50 mg by mouth every 8 (eight) hours as needed for Pain. (Patient not taking: Reported on 10/23/2024)    [DISCONTINUED] traZODone (DESYREL) 100 MG tablet Take 100 mg by mouth every evening. (Patient not taking: Reported on 10/23/2024)     Family History    None       Tobacco Use    Smoking status: Never    Smokeless tobacco: Never   Substance and Sexual Activity    Alcohol use: Never    Drug use: Not Currently    Sexual activity: Not on file       Review of Systems   Reason unable to perform ROS: Casinet somnolent.   Constitutional:  Positive for fatigue.     Objective:     Vital Signs (Most Recent):  Temp: 97.8 °F (36.6 °C) (12/27/24 0751)  Pulse: 64 (12/27/24 0930)  Resp: 20 (12/27/24 0751)  BP: (!) 129/59 (12/27/24 0930)  SpO2: (!) 94 % (12/27/24 0751) Vital Signs (24h Range):  Temp:  [97.7 °F (36.5 °C)-98.1 °F (36.7 °C)] 97.8 °F (36.6 °C)  Pulse:  [62-90] 64  Resp:  [14-20] 20  SpO2:  [91 %-100 %] 94 %  BP: (111-136)/(50-74) 129/59   Weight: 72 kg (158 lb 11.7 oz)  Body mass index is 26.41 kg/m².  SpO2: (!) 94 %     No intake or output data in the 24 hours ending 12/27/24 0950  Lines/Drains/Airways       Drain  Duration                  Urethral Catheter 12/24/24 Non-latex 3 days              Peripheral Intravenous Line  Duration                  Peripheral IV - Single Lumen 12/26/24 1540 18 G Left Antecubital <1 day                  Significant Labs:   Chemistries:   Recent Labs   Lab 12/21/24  0410 12/24/24  0512 12/26/24  1524 12/26/24  8960  "12/27/24  0558    131* 133*  --  133*   K 3.2* 3.4* 3.4*  --  4.0   CL 99 97* 98  --  97*   CO2 28 28 29  --  28   BUN 47.4* 36.8* 30.3*  --  29.3*   CREATININE 1.76* 1.23* 1.26*  --  1.31*   CALCIUM 9.3 8.8 9.7  --  9.2   BILITOT  --   --  1.3  --  1.0   ALKPHOS  --   --  133  --  119   ALT  --   --  12  --  12   AST  --   --  27  --  31   GLUCOSE 85 100 93  --  97   MG  --   --  2.40  --  2.30   PHOS 3.5  --   --   --   --    TROPONINI  --   --  0.048*  0.034 0.051* 0.047*        CBC/Anemia Labs: Coags:    Recent Labs   Lab 12/24/24  0512 12/26/24  1524 12/26/24  2114 12/27/24  0558 12/27/24  0603   WBC 6.12 7.36  --   --  6.67   HGB 8.5* 8.7*  --   --  7.4*   HCT 25.7* 26.8*  --   --  22.9*    147  --   --  129*   MCV 95.5* 98.5*  --   --  100.0*   RDW 19.8* 19.5*  --   --  19.7*   IRON  --   --   --  25*  --    FERRITIN  --   --  266.47*  --   --    FOLATE  --   --  17.6  --   --    [id number]  --   --  1,105*  --   --     No results for input(s): "PT", "INR", "APTT" in the last 168 hours.     Significant Imaging:  Imaging Results              X-Ray Chest AP Portable (Final result)  Result time 12/26/24 15:31:47      Final result by Angélica Diehl MD (12/26/24 15:31:47)                   Impression:      Enlarged cardiac silhouette without overt edema.      Electronically signed by: Angélica Diehl  Date:    12/26/2024  Time:    15:31               Narrative:    EXAMINATION:  XR CHEST AP PORTABLE    CLINICAL HISTORY:  Other fatigue    TECHNIQUE:  Single frontal view of the chest was performed.    COMPARISON:  12/09/2024    FINDINGS:  LINES AND TUBES: Dual lead cardiac pacer device is in place via left subclavian approach with leads overlying the right atrium and right ventricle.  EKG/telemetry leads overlie the chest.    MEDIASTINUM AND SHAWNA: Cardiac silhouette is enlarged.    LUNGS: No overt alveolar edema.    PLEURA:Possible small right pleural effusion.No pneumothorax.    BONES: No acute " osseous abnormality.                                    EKG:       Telemetry:  Afib    Physical Exam  Vitals and nursing note reviewed.   Constitutional:       Appearance: She is ill-appearing.      Comments: + somnolent   Cardiovascular:      Rate and Rhythm: Normal rate. Rhythm irregular.      Pulses: Normal pulses.      Heart sounds: Normal heart sounds. No murmur heard.  Pulmonary:      Effort: Pulmonary effort is normal.   Abdominal:      General: Abdomen is flat.   Skin:     General: Skin is warm.   Neurological:      Mental Status: She is oriented to person, place, and time.   Psychiatric:         Mood and Affect: Mood normal.         Behavior: Behavior normal.         Judgment: Judgment normal.       Home Medications:   No current facility-administered medications on file prior to encounter.     Current Outpatient Medications on File Prior to Encounter   Medication Sig Dispense Refill    albuterol sulfate (INV ALBUTEROL) 90 mcg inhalation Inhale 90 mcg into the lungs as needed (SOB, Wheezing). Take one puff by mouth as directed by Physician.      allopurinoL (ZYLOPRIM) 300 MG tablet Take 300 mg by mouth once daily.      aspirin (ECOTRIN) 81 MG EC tablet Take 1 tablet (81 mg total) by mouth once daily. 30 tablet 0    atorvastatin (LIPITOR) 10 MG tablet Take 10 mg by mouth nightly.      brimonidine-timoloL (COMBIGAN) 0.2-0.5 % Drop Place 1 drop into both eyes 2 (two) times a day.      carvediloL (COREG) 3.125 MG tablet Take 3.125 mg by mouth 2 (two) times daily.      cetirizine (ZYRTEC) 10 MG tablet Take 10 mg by mouth once daily.      CYCLOBENZAPRINE 2% GABAPENTIN 6% LIDOCAINE 2% PRILOCAINE 2% TOPICAL CREAM 1 pump TID as needed for scalp itching 80 g 11    docusate sodium (COLACE) 100 MG capsule Take 2 capsules (200 mg total) by mouth 2 (two) times daily as needed for Constipation.  0    ELIQUIS 2.5 mg Tab Take 2.5 mg by mouth 2 (two) times daily.      furosemide (LASIX) 80 MG tablet Take 1 tablet (80 mg  total) by mouth daily as needed (swelling). 30 tablet 0    gabapentin (NEURONTIN) 100 MG capsule Take 2 capsules (200 mg total) by mouth 2 (two) times daily. 120 capsule 0    glycopyrrolate (ROBINUL) 1 mg Tab Take 1 tablet (1 mg total) by mouth 2 (two) times daily as needed (drooling). 30 tablet 12    latanoprost 0.005 % ophthalmic solution Place 1 drop into both eyes every evening.      levothyroxine (SYNTHROID) 25 MCG tablet Take 25 mcg by mouth before breakfast.      lubiprostone (AMITIZA) 24 MCG Cap Take 1 capsule (24 mcg total) by mouth 2 (two) times daily with meals. 60 capsule 11    mirabegron (MYRBETRIQ) 25 mg Tb24 ER tablet Take 50 mg by mouth every morning.      nitroGLYCERIN (NITROSTAT) 0.4 MG SL tablet SMARTSI Tablet(s) Sublingual      pantoprazole (PROTONIX) 40 MG tablet Take 1 tablet (40 mg total) by mouth 2 (two) times daily. 60 tablet 1    potassium chloride (KLOR-CON) 20 mEq Pack Take 20 mEq by mouth once.      risperiDONE (RISPERDAL) 2 MG tablet Take 2 mg by mouth every evening.      rivastigmine tartrate (EXELON) 1.5 MG capsule Take 1.5 mg by mouth 2 (two) times daily.      [DISCONTINUED] miconazole NITRATE 2 % (MICOTIN) 2 % top powder Apply topically 2 (two) times daily. (Patient not taking: Reported on 10/23/2024)  0    [DISCONTINUED] traMADoL (ULTRAM) 50 mg tablet Take 50 mg by mouth every 8 (eight) hours as needed for Pain. (Patient not taking: Reported on 10/23/2024)      [DISCONTINUED] traZODone (DESYREL) 100 MG tablet Take 100 mg by mouth every evening. (Patient not taking: Reported on 10/23/2024)       Current Schedule Inpatient Medications:   allopurinoL  300 mg Oral Daily    apixaban  2.5 mg Oral BID    carvediloL  3.125 mg Oral BID    cefTRIAXone (Rocephin) IV (PEDS and ADULTS)  1 g Intravenous Q24H    cetirizine  10 mg Oral Daily    furosemide (LASIX) injection  40 mg Intravenous Q12H    gabapentin  200 mg Oral BID    latanoprost  1 drop Both Eyes QHS    levothyroxine  25 mcg Oral  Before breakfast    lubiprostone  24 mcg Oral BID WM    pantoprazole  40 mg Oral BID    potassium bicarbonate  20 mEq Oral Daily    potassium chloride  40 mEq Oral BID    risperiDONE  2 mg Oral QHS    rivastigmine tartrate  1.5 mg Oral BID     Continuous Infusions:    Assessment:   NSTEMI Type II s/t acute CHF   - Trop 0.051 --> 0.047   - He denies SOB/CP   - EF 55-60%  Acute on chronic diastolic heart failure    - ECHO (7.20.24): showed preserved ejection fraction of 50-55%, moderate regurgitation of the tricuspid valve and pulmonary hypertension  UTO  PAF ~ currently paced rhythm    - [id number] Score 5 (7.2% Stroke risk Per Year)  HTN  SSS s/p PPM    - [employer]tr[employer]   PAD  CVD  HLD  MICHEL on CKD Stage IV    - Followed by Dr. Chávez  D    - Mild MR    - Mold to Moderate TR  YARI  GERD  Iron Deficiency Anemia   Thrombocytopenia   Right Knee Osteoarthritis  Dementia  Glaucoma  Hypothyroidism  History of GI Bleed       Plan:   EKG reviewed  Repeat ECHO pending  No need for acute ischemic evaluation ~ Normal coronaries in 3.2024  On Eliquis 2.5 mg (unsure why decreased dosing as age is her only criteria)  Cont. IV Lasix 40mg IV BID  Cont. Carvedilol 3.125mg  Obtain accurate I&Os and daily weights   AM labs: CBC, CMP, Mag           Thank you for your consult.     Sabi Montejo NP  Cardiology  [facility]      ....................................................................................................................................................................    I agree with the findings of the complexity of problems addressed and take responsibility for the plan's risks and complications. I approved the plan documented by Sabi Gustabo, NP.      Assessment:   NSTEMI Type II s/t acute CHF   - Trop 0.051 --> 0.047   - He denies SOB/CP   - EF 55-60%  Acute on chronic diastolic heart failure    - ECHO (7.20.24): showed preserved ejection fraction of 50-55%, moderate regurgitation of the  tricuspid valve and pulmonary hypertension  UTO  PAF ~ currently paced rhythm    - [id number] Score 5 (7.2% Stroke risk Per Year)  HTN  SSS s/p PPM    - [employer]   PAD  CVD  HLD  MICHEL on CKD Stage IV    - Followed by Dr. Chávez  VHD    - Mild MR    - Mold to Moderate TR  YARI  GERD  Iron Deficiency Anemia   Thrombocytopenia   Right Knee Osteoarthritis  Dementia  Glaucoma  Hypothyroidism  History of GI Bleed       Plan:   EKG reviewed  Repeat ECHO pending  No need for acute ischemic evaluation ~ Normal coronaries in 3.2024  On Eliquis 2.5 mg (unsure why decreased dosing as age is her only criteria)  Cont. IV Lasix 40mg IV BID  Cont. Carvedilol 3.125mg  Obtain accurate I&Os and daily weights   AM labs: CBC, CMP, Mag         Lenard Briseno MD, FACC, FACP,  FAHA, [facility]  Interventional Cardiologist  [facility]

## 2024-12-27 NOTE — PLAN OF CARE
Problem: Physical Therapy  Goal: Physical Therapy Goal  Description: Goals to be met by: 24     Patient will increase functional independence with mobility by performin. Supine to sit with MInimal Assistance  2. Sit to stand transfer with Minimal Assistance  3. Bed to chair transfer with Minimal Assistance using Rolling Walker vs. Squat pivot  4. Gait  x 25 feet with Minimal Assistance using Rolling Walker.   5. Sitting at edge of bed x10 minutes with Stand-by Assistance    Outcome: Progressing

## 2024-12-28 LAB
ABO + RH BLD: NORMAL
ALBUMIN SERPL-MCNC: 2.6 G/DL (ref 3.4–4.8)
ALBUMIN/GLOB SERPL: 0.7 RATIO (ref 1.1–2)
ALP SERPL-CCNC: 116 UNIT/L (ref 40–150)
ALT SERPL-CCNC: 9 UNIT/L (ref 0–55)
ANION GAP SERPL CALC-SCNC: 6 MEQ/L
AST SERPL-CCNC: 27 UNIT/L (ref 5–34)
BASOPHILS # BLD AUTO: 0.04 X10(3)/MCL
BASOPHILS NFR BLD AUTO: 0.4 %
BILIRUB SERPL-MCNC: 1.6 MG/DL
BLD PROD TYP BPU: NORMAL
BLOOD UNIT EXPIRATION DATE: NORMAL
BLOOD UNIT TYPE CODE: 7300
BUN SERPL-MCNC: 35.8 MG/DL (ref 9.8–20.1)
CALCIUM SERPL-MCNC: 8.9 MG/DL (ref 8.4–10.2)
CHLORIDE SERPL-SCNC: 98 MMOL/L (ref 98–107)
CO2 SERPL-SCNC: 28 MMOL/L (ref 23–31)
CREAT SERPL-MCNC: 1.54 MG/DL (ref 0.55–1.02)
CREAT/UREA NIT SERPL: 23
CROSSMATCH INTERPRETATION: NORMAL
DISPENSE STATUS: NORMAL
EOSINOPHIL # BLD AUTO: 0.09 X10(3)/MCL (ref 0–0.9)
EOSINOPHIL NFR BLD AUTO: 1 %
ERYTHROCYTE [DISTWIDTH] IN BLOOD BY AUTOMATED COUNT: 19.6 % (ref 11.5–17)
GFR SERPLBLD CREATININE-BSD FMLA CKD-EPI: 33 ML/MIN/1.73/M2
GLOBULIN SER-MCNC: 3.5 GM/DL (ref 2.4–3.5)
GLUCOSE SERPL-MCNC: 96 MG/DL (ref 82–115)
HCT VFR BLD AUTO: 23.3 % (ref 37–47)
HGB BLD-MCNC: 7.7 G/DL (ref 12–16)
IMM GRANULOCYTES # BLD AUTO: 0.04 X10(3)/MCL (ref 0–0.04)
IMM GRANULOCYTES NFR BLD AUTO: 0.4 %
LYMPHOCYTES # BLD AUTO: 2.04 X10(3)/MCL (ref 0.6–4.6)
LYMPHOCYTES NFR BLD AUTO: 22.7 %
MAGNESIUM SERPL-MCNC: 2.3 MG/DL (ref 1.6–2.6)
MCH RBC QN AUTO: 31.6 PG (ref 27–31)
MCHC RBC AUTO-ENTMCNC: 33 G/DL (ref 33–36)
MCV RBC AUTO: 95.5 FL (ref 80–94)
MONOCYTES # BLD AUTO: 1.17 X10(3)/MCL (ref 0.1–1.3)
MONOCYTES NFR BLD AUTO: 13 %
NEUTROPHILS # BLD AUTO: 5.59 X10(3)/MCL (ref 2.1–9.2)
NEUTROPHILS NFR BLD AUTO: 62.5 %
NRBC BLD AUTO-RTO: 0 %
PLATELET # BLD AUTO: 127 X10(3)/MCL (ref 130–400)
PMV BLD AUTO: 10.9 FL (ref 7.4–10.4)
POTASSIUM SERPL-SCNC: 4.9 MMOL/L (ref 3.5–5.1)
PROT SERPL-MCNC: 6.1 GM/DL (ref 5.8–7.6)
RBC # BLD AUTO: 2.44 X10(6)/MCL (ref 4.2–5.4)
SODIUM SERPL-SCNC: 132 MMOL/L (ref 136–145)
UNIT NUMBER: NORMAL
WBC # BLD AUTO: 8.97 X10(3)/MCL (ref 4.5–11.5)

## 2024-12-28 PROCEDURE — 36415 COLL VENOUS BLD VENIPUNCTURE: CPT | Performed by: NURSE PRACTITIONER

## 2024-12-28 PROCEDURE — 21400001 HC TELEMETRY ROOM

## 2024-12-28 PROCEDURE — 83735 ASSAY OF MAGNESIUM: CPT | Performed by: INTERNAL MEDICINE

## 2024-12-28 PROCEDURE — 11000001 HC ACUTE MED/SURG PRIVATE ROOM

## 2024-12-28 PROCEDURE — 36430 TRANSFUSION BLD/BLD COMPNT: CPT

## 2024-12-28 PROCEDURE — 25000003 PHARM REV CODE 250: Performed by: STUDENT IN AN ORGANIZED HEALTH CARE EDUCATION/TRAINING PROGRAM

## 2024-12-28 PROCEDURE — P9040 RBC LEUKOREDUCED IRRADIATED: HCPCS | Performed by: INTERNAL MEDICINE

## 2024-12-28 PROCEDURE — 25000003 PHARM REV CODE 250: Performed by: INTERNAL MEDICINE

## 2024-12-28 PROCEDURE — 85025 COMPLETE CBC W/AUTO DIFF WBC: CPT | Performed by: NURSE PRACTITIONER

## 2024-12-28 PROCEDURE — 25000003 PHARM REV CODE 250

## 2024-12-28 PROCEDURE — 63600175 PHARM REV CODE 636 W HCPCS: Performed by: INTERNAL MEDICINE

## 2024-12-28 PROCEDURE — 63600175 PHARM REV CODE 636 W HCPCS: Performed by: STUDENT IN AN ORGANIZED HEALTH CARE EDUCATION/TRAINING PROGRAM

## 2024-12-28 PROCEDURE — 80053 COMPREHEN METABOLIC PANEL: CPT | Performed by: NURSE PRACTITIONER

## 2024-12-28 PROCEDURE — 86923 COMPATIBILITY TEST ELECTRIC: CPT | Performed by: INTERNAL MEDICINE

## 2024-12-28 RX ORDER — HYOSCYAMINE SULFATE 0.12 MG/1
0.12 TABLET SUBLINGUAL 3 TIMES DAILY
Status: DISCONTINUED | OUTPATIENT
Start: 2024-12-28 | End: 2025-01-03

## 2024-12-28 RX ORDER — FUROSEMIDE 10 MG/ML
40 INJECTION INTRAMUSCULAR; INTRAVENOUS 3 TIMES DAILY
Status: DISCONTINUED | OUTPATIENT
Start: 2024-12-28 | End: 2024-12-29

## 2024-12-28 RX ORDER — HYDROCODONE BITARTRATE AND ACETAMINOPHEN 500; 5 MG/1; MG/1
TABLET ORAL
Status: DISCONTINUED | OUTPATIENT
Start: 2024-12-28 | End: 2024-12-29

## 2024-12-28 RX ADMIN — APIXABAN 2.5 MG: 2.5 TABLET, FILM COATED ORAL at 09:12

## 2024-12-28 RX ADMIN — CETIRIZINE HYDROCHLORIDE 10 MG: 10 TABLET, FILM COATED ORAL at 09:12

## 2024-12-28 RX ADMIN — HYOSCYAMINE SULFATE 0.12 MG: 0.12 TABLET SUBLINGUAL at 03:12

## 2024-12-28 RX ADMIN — LUBIPROSTONE 24 MCG: 24 CAPSULE, GELATIN COATED ORAL at 09:12

## 2024-12-28 RX ADMIN — FUROSEMIDE 40 MG: 10 INJECTION, SOLUTION INTRAMUSCULAR; INTRAVENOUS at 09:12

## 2024-12-28 RX ADMIN — FUROSEMIDE 40 MG: 10 INJECTION, SOLUTION INTRAMUSCULAR; INTRAVENOUS at 03:12

## 2024-12-28 RX ADMIN — LATANOPROST 1 DROP: 50 SOLUTION OPHTHALMIC at 11:12

## 2024-12-28 RX ADMIN — MUPIROCIN: 20 OINTMENT TOPICAL at 09:12

## 2024-12-28 RX ADMIN — PANTOPRAZOLE SODIUM 40 MG: 40 TABLET, DELAYED RELEASE ORAL at 09:12

## 2024-12-28 RX ADMIN — RIVASTIGMINE TARTRATE 1.5 MG: 1.5 CAPSULE ORAL at 09:12

## 2024-12-28 RX ADMIN — ALLOPURINOL 300 MG: 300 TABLET ORAL at 09:12

## 2024-12-28 RX ADMIN — FERROUS SULFATE TAB 325 MG (65 MG ELEMENTAL FE) 1 EACH: 325 (65 FE) TAB at 09:12

## 2024-12-28 RX ADMIN — GABAPENTIN 200 MG: 100 CAPSULE ORAL at 09:12

## 2024-12-28 RX ADMIN — LEVOTHYROXINE SODIUM 25 MCG: 25 TABLET ORAL at 06:12

## 2024-12-28 RX ADMIN — CARVEDILOL 3.12 MG: 3.12 TABLET, FILM COATED ORAL at 09:12

## 2024-12-28 RX ADMIN — CEFTRIAXONE SODIUM 1 G: 1 INJECTION, POWDER, FOR SOLUTION INTRAMUSCULAR; INTRAVENOUS at 10:12

## 2024-12-28 RX ADMIN — RISPERIDONE 2 MG: 1 TABLET, FILM COATED ORAL at 09:12

## 2024-12-28 RX ADMIN — POTASSIUM BICARBONATE 20 MEQ: 391 TABLET, EFFERVESCENT ORAL at 09:12

## 2024-12-28 RX ADMIN — TRAMADOL HYDROCHLORIDE 50 MG: 50 TABLET, COATED ORAL at 01:12

## 2024-12-28 RX ADMIN — LUBIPROSTONE 24 MCG: 24 CAPSULE, GELATIN COATED ORAL at 05:12

## 2024-12-28 RX ADMIN — HYOSCYAMINE SULFATE 0.12 MG: 0.12 TABLET SUBLINGUAL at 09:12

## 2024-12-28 NOTE — CONSULTS
Consult note  Nephrology    Admit Date: 12/26/2024   LOS: 2 days     SUBJECTIVE:     CC:  CKD 3 b    HPI:  89 Y/O female with history of CHF diastolic dysfunction , HTN , Chronic Anemia , PAF , on eliquis , CKD 3b , Chronic respiratory failure on chronic O2 supplement at home admitted to hospital again for edema of lower extrimities as well as weakness   Patients creatinine on admission was 1.3 and today is 1.5   Also patient has urinary retention and has chronic cook cath   Patient was very anemic and today received 1 unit of PRBC and also had iron deficiency anemia and placed on Iron supplement , her BNP was at 523     PMH:  1   CKD 3b   2   HTN  3   Chronic Anemia   4   Urinary retention and chronic cook   5   chronic respiratory failure , on O2 supplement  6   Diastolic dysfunction  , EF 55%  7   PAF    Allergy:  Amlodipine  Corticosteroids  Rofecoxib  Sulfa   Atorvastatin   Ibuprofen     Social history:  Negative for smoking and drinking    Family History:       negative    Review of Systems:  Poor historian , but apperantly admitted for edema and swelling as well as weakness  Constitutional: No fever or chills  Respiratory: No cough or shortness of breath  Cardiovascular: No chest pain or palpitations  Gastrointestinal: No nausea or vomiting  Neurological: No confusion or weakness  Has cook cath in  with clear urine     OBJECTIVE:     Vital Signs Range (Last 24H):  Vitals:    12/28/24 1214   BP: 111/62   Pulse: 66   Resp:    Temp: 97.9 °F (36.6 °C)       Temp:  [97.9 °F (36.6 °C)-100.8 °F (38.2 °C)]   Pulse:  [54-66]   Resp:  [16]   BP: (102-128)/(46-68)   SpO2:  [93 %-95 %]     I & O (Last 24H):  Intake/Output Summary (Last 24 hours) at 12/28/2024 1309  Last data filed at 12/27/2024 1400  Gross per 24 hour   Intake --   Output 300 ml   Net -300 ml       Physical Exam:  Alert , oriented , in NAD, lethargic  Head   normal   Neck   supple   Chest   symmetric , no retractuion   Lungs   clear to auscultation    Heart   RRR  Abdomen   soft , but seems to be tender and painful to touch   Ext 2+ edema       Laboratory Data:    Recent Labs   Lab 12/28/24  0608   *   K 4.9   CL 98   CO2 28   BUN 35.8*   CREATININE 1.54*   GLUCOSE 96   CALCIUM 8.9     Lab Results   Component Value Date    CALCIUM 8.9 12/28/2024    CAION 1.27 (H) 12/20/2024    PHOS 3.5 12/21/2024     Lab Results   Component Value Date    IRON 25 (L) 12/27/2024    TIBC 188 (L) 12/27/2024    FERRITIN 266.47 (H) 12/26/2024       Medications:  Medication list was reviewed and changes noted under Assessment/Plan.    Diagnostic Results:    Reviewed most recent CT/US/Echo/MRI    ASSESSMENT/PLAN:   1   CKD 3b  2   cardiorenal   3   Diastolic dysfunction   4   Anemia of Iron deficiency   5   HTN  6   urinary retention with chronic cook   7   UTI , G- rods   8   on chronic O 2    Plan   increase lasix to 40 mg IVP TID            Labs in AM including BNP

## 2024-12-28 NOTE — PROGRESS NOTES
Inpatient Nutrition Evaluation    Admit Date: 12/26/2024   Total duration of encounter: 2 days    Nutrition Recommendation/Prescription     Continue oral diet as tolerated; Diet Heart Healthy Standard Tray     Nutrition Assessment     Chart Review    Reason Seen: continuous nutrition monitoring    Malnutrition Screening Tool Results   Have you recently lost weight without trying?: No  Have you been eating poorly because of a decreased appetite?: No   MST Score: 0     Diagnosis:  Acute HFpEF Exacerbation   NSTEMI   Catheter associated urinary infection  Paroxysmal A-fib   UTI GNR -POA   Acute anemia, iron def     Relevant Medical History:  diastolic heart failure LVEF 50-55%, HTN, chronic anemia, paroxysmal A-fib on eliquis, and CKD stage III     Nutrition-Related Medications: Scheduled Medications:  allopurinoL, 300 mg, Daily  apixaban, 2.5 mg, BID  carvediloL, 3.125 mg, BID  cefTRIAXone (Rocephin) IV (PEDS and ADULTS), 1 g, Q24H  cetirizine, 10 mg, Daily  ferrous sulfate, 1 tablet, BID  furosemide (LASIX) injection, 40 mg, Q12H  gabapentin, 200 mg, BID  hyoscyamine, 0.125 mg, TID  latanoprost, 1 drop, QHS  levothyroxine, 25 mcg, Before breakfast  lubiprostone, 24 mcg, BID WM  mupirocin, , BID  pantoprazole, 40 mg, BID  potassium bicarbonate, 20 mEq, Daily  risperiDONE, 2 mg, QHS  rivastigmine tartrate, 1.5 mg, BID    Continuous Infusions:   PRN Medications:    allopurinoL tablet 300 mg    apixaban tablet 2.5 mg    carvediloL tablet 3.125 mg    cefTRIAXone injection 1 g    cetirizine tablet 10 mg    ferrous sulfate tablet 1 each    furosemide injection 40 mg    gabapentin capsule 200 mg    hyoscyamine SL tablet 0.125 mg    latanoprost 0.005 % ophthalmic solution 1 drop    levothyroxine tablet 25 mcg    lubiprostone capsule 24 mcg    mupirocin 2 % ointment    pantoprazole EC tablet 40 mg    potassium bicarbonate disintegrating tablet 20 mEq    risperiDONE tablet 2 mg    rivastigmine tartrate capsule 1.5 mg     "    Nutrition-Related Labs:  Recent Labs   Lab 12/21/24  1908 12/24/24  0512 12/26/24  1524 12/27/24  0558 12/27/24  0603 12/28/24  0608   NA  --  131* 133* 133*  --  132*   K  --  3.4* 3.4* 4.0  --  4.9   CALCIUM  --  8.8 9.7 9.2  --  8.9   MG  --   --  2.40 2.30  --  2.30   CL  --  97* 98 97*  --  98   CO2  --  28 29 28  --  28   BUN  --  36.8* 30.3* 29.3*  --  35.8*   CREATININE  --  1.23* 1.26* 1.31*  --  1.54*   EGFRNORACEVR  --  43 41 40  --  33   GLUCOSE  --  100 93 97  --  96   BILITOT  --   --  1.3 1.0  --  1.6*   ALKPHOS  --   --  133 119  --  116   ALT  --   --  12 12  --  9   AST  --   --  27 31  --  27   ALBUMIN  --   --  3.0* 2.8*  --  2.6*   WBC  --  6.12 7.36  --  6.67 8.97   HGB 9.3* 8.5* 8.7*  --  7.4* 7.7*   HCT 27.5* 25.7* 26.8*  --  22.9* 23.3*        Diet Order: Diet Heart Healthy Standard Tray  Oral Supplement Order: none  Appetite/Oral Intake: good/50-75% of meals  Factors Affecting Nutritional Intake: none identified  Food/Presybeterian/Cultural Preferences: none reported  Food Allergies: no known food allergies       Wound(s):      Altered Skin Integrity 03/10/24 0705 Sacral spine-Tissue loss description: Partial thickness     Comments    12/28/24 Pt tolerating oral diet, unable to provide much hx, ate breakfast this morning; no unintentional weight loss reported prior to admit.    Anthropometrics    Height: 5' 5" (165.1 cm) Height Method: Stated  Last Weight: 72 kg (158 lb 11.7 oz) (12/27/24 1524) Weight Method: Bed Scale  BMI (Calculated): 26.4  BMI Classification: overweight (BMI 25-29.9)     Ideal Body Weight (IBW), Female: 125 lb     % Ideal Body Weight, Female (lb): 126.98 %                             Usual Weight Provided By: EMR weight history    Wt Readings from Last 5 Encounters:   12/27/24 72 kg (158 lb 11.7 oz)   12/22/24 71.7 kg (158 lb 1.1 oz)   11/19/24 72.6 kg (160 lb)   10/23/24 68.9 kg (152 lb)   09/17/24 69.9 kg (154 lb)     Weight Change(s) Since Admission:  Admit Weight: " 72.6 kg (160 lb) (12/26/24 1501)      Patient Education    Not applicable.    Monitoring & Evaluation     Dietitian will monitor food and beverage intake and weight change.  Nutrition Risk/Follow-Up: low (follow-up in 5-7 days)  Patients assigned 'low nutrition risk' status do not qualify for a full nutritional assessment but will be monitored and re-evaluated in a 5-7 day time period. Please consult if re-evaluation needed sooner.

## 2024-12-28 NOTE — CONSULTS
[facility] [address]  Wound Care    Patient Name:  Ev Alberts   MRN:  [id number]  Date: 12/28/2024  Diagnosis: Acute on chronic congestive heart failure    History:     Past Medical History:   Diagnosis Date    Acute kidney injury superimposed on chronic kidney disease     Congestive heart failure     Dementia     Hypertension     Shingles of eyelid     Sick sinus syndrome     Thyroid disease     Unspecified glaucoma        Social History     Socioeconomic History    Marital status:    Tobacco Use    Smoking status: Never    Smokeless tobacco: Never   Substance and Sexual Activity    Alcohol use: Never    Drug use: Not Currently     Social Drivers of Health     Financial Resource Strain: Low Risk  (12/27/2024)    Overall Financial Resource Strain (CARDIA)     Difficulty of Paying Living Expenses: Not very hard   Food Insecurity: No Food Insecurity (12/27/2024)    Hunger Vital Sign     Worried About Running Out of Food in the Last Year: Never true     Ran Out of Food in the Last Year: Never true   Transportation Needs: No Transportation Needs (12/27/2024)    TRANSPORTATION NEEDS     Transportation : No   Physical Activity: Inactive (12/10/2024)    Exercise Vital Sign     Days of Exercise per Week: 0 days     Minutes of Exercise per Session: 0 min   Stress: Patient Unable To Answer (12/27/2024)    [employer] [place] [employer] - Occupational Stress Questionnaire     Feeling of Stress : Patient unable to answer   Housing Stability: Low Risk  (12/27/2024)    Housing Stability Vital Sign     Unable to Pay for Housing in the Last Year: No     Homeless in the Last Year: No       Precautions:     Allergies as of 12/26/2024 - Reviewed 12/26/2024   Allergen Reaction Noted    Amlodipine-benazepril Edema 06/01/2022    Corticosteroids (glucocorticoids) Edema and Swelling 05/11/2011    Rofecoxib Anaphylaxis and Swelling 05/11/2011    Sulfa (sulfonamide antibiotics) Edema  06/01/2022    Atorvastatin  10/26/2018    Ibuprofen  06/01/2022       W   12/28/24 0927   WO Assessment   Visit Date 12/28/24   Visit Time 0927   Consult Type New   [facility] Speciality Wound   Intervention chart review;assessed;applied;orders   Teaching on-going        Altered Skin Integrity 03/10/24 0705 Sacral spine   Date First Assessed/Time First Assessed: 03/10/24 0705   Altered Skin Integrity Present on Admission - Did Patient arrive to the hospital with altered skin?: suspected hospital acquired  Location: Sacral spine  Is this injury device related?: No  Woun...   Wound Image    Dressing Appearance Clean;Intact   Drainage Amount None   Appearance Pink;Red;Moist   Tissue loss description Partial thickness   Black (%), Wound Tissue Color 0 %   Red (%), Wound Tissue Color 100 %   Yellow (%), Wound Tissue Color 0 %   Periwound Area Dry;Other (see comments)  (discoloration to periwound)   Wound Length (cm) 1.9 cm   Wound Width (cm) 3 cm   Wound Depth (cm) 0.1 cm   Wound Volume (cm^3) 0.57 cm^3   Wound Surface Area (cm^2) 5.7 cm^2   Care Cleansed with:;Other (see comments)  (remedy skin cleanser,)   Dressing Applied;Other (comment)  (zinc oxide, abd pad, tape)   OC Assessment Details/Treatment   [facility] new consult for multiple wounds. Introduced myself and wound care team to patient and family at bedside. Explained reason for visit. Pt gave consent for evaluation and treatment. Skin assessment performed. Removed dressing to sacrum. Cleansed with remedy skin cleanser, measurements obtained, photos taken for EMR. Treatment recommendations: discontinue foam dressing. Apply zinc oxide, abd pad, tape BID and prn soilage. No adult briefs while in bed. Immerse AMARILIS mattress, pup pack ordered. Continue with pressure prevention measures. Nursing to continue with treatment recommendations. Plan of care reviewed with nurse. Will follow up.   12/28/2024

## 2024-12-28 NOTE — PROGRESS NOTES
[facility] MEDICAL TELEMETRY Hospital Medicine Progress Note      Patient Name: Ev Alberts  MRN: [id number]  Admission Date: 12/26/2024   Length of Stay: 2  Attending Physician: Shad Pacheco MD  Primary Care Provider: Santi Walters MD  Face-to-Face encounter date: 12/28/2024    Code Status: Full Code        Chief Complaint:   Fatigue (Family reporting pt is more lethargic than usual. On arrival, pt c/o bilateral LE edema that began this morning. Denies chest pain or sob. Pt GCS 14 on arrival. )        HPI:   Ev Alberts is an 88 y/o F with a PMH of diastolic heart failure LVEF 50-55%, HTN, chronic anemia, paroxysmal A-fib on eliquis, and CKD stage III followed by Dr. Chávez , chronic respiratory failure on 2 L NC at home, dementia and glaucoma presented to the ED with complains of bilateral leg swelling, generalized weakness and deconditioning post hospital discharge. Patient explains that she is bed bound and lives at home with her son and  who take care of her. Patient was recently admitted at this facility for worsening generalized weakness, bilateral leg swelling and urinary retention and discharged home with an indwelling cook, follow up appointment with urology and private sitters and home care with [facility] . Patient denies black or blood in her tool, denies chest pain, denies shortness of breath, denies known fever.     Initial ED vitals: Temp 98.1, HR 90, Resp 18, /58, O2 Sat 98% on RA. ED work up revealed WBC 7.39, H&H 8.7 and 26.8, , K 3.4, BUN 30.3, Creatinine 1.26, .3, Troponin 0.048, Lactic acid 1.1, TSH 3.335 Influenza, COVID, and RSV swab negative, Respiratory panel negative, UA positive for blood, leukocyte esterase, RBC, WBC, and bacteria, X-ray chest read enlarged cardiac silhouette without overt edema, EKG read ventricular paced rhythm at 62 bmp. Cardiology was consulted in the ED and patient was admitted to  Hospital Medicine.    Overview/Hospital Course:  No notes on file       Interval Hx:   The pt is a poor historian however she is more alert and interactive than yesterday. She interacted and answered some questions although her responses were inconsistent.  Case d/w her daughters at the bedside and over the phone. They were updated on current results and agreed with planned blood transfusion. She ate breakfast today.    Review of Systems   Unable to perform ROS: Mental acuity       Objective/physical exam:  General: In no acute distress, afebrile, frail appearing  Chest: Clear to auscultation bilaterally  Heart: RRR, +S1, S2, +murmur, 1-2+ edema b/l LE  Abdomen: Soft, nontender, BS +, +cook  MSK: Warm, no clubbing or cyanosis  Neurologic: Awake and interactive but drowsy at times  Psych/mental status: Unable to assess.    VITAL SIGNS: 24 HRS MIN & MAX LAST   Temp  Min: 97.9 °F (36.6 °C)  Max: 100.8 °F (38.2 °C) 100 °F (37.8 °C)   BP  Min: 102/62  Max: 128/57 (!) (P) 107/45   Pulse  Min: 54  Max: 62  (!) (P) 54   Resp  Min: 16  Max: 20 16   SpO2  Min: 93 %  Max: 95 % (!) 93 %       Recent Labs   Lab 12/26/24  1524 12/27/24  0603 12/28/24  0608   WBC 7.36 6.67 8.97   RBC 2.72* 2.29* 2.44*   HGB 8.7* 7.4* 7.7*   HCT 26.8* 22.9* 23.3*   MCV 98.5* 100.0* 95.5*   MCH 32.0* 32.3* 31.6*   MCHC 32.5* 32.3* 33.0   RDW 19.5* 19.7* 19.6*    129* 127*   MPV 10.6* 11.0* 10.9*       Recent Labs   Lab 12/26/24  1524 12/27/24  0558 12/28/24  0608   * 133* 132*   K 3.4* 4.0 4.9   CL 98 97* 98   CO2 29 28 28   BUN 30.3* 29.3* 35.8*   CREATININE 1.26* 1.31* 1.54*   CALCIUM 9.7 9.2 8.9   MG 2.40 2.30 2.30   ALBUMIN 3.0* 2.8* 2.6*   ALKPHOS 133 119 116   ALT 12 12 9   AST 27 31 27   BILITOT 1.3 1.0 1.6*        Microbiology Results (last 7 days)       Procedure Component Value Units Date/Time    Urine culture [[id number]]  (Abnormal) Collected: 12/26/24 1539    Order Status: Completed Specimen: Urine Updated: 12/28/24 0836      Urine Culture >/= 100,000 colonies/ml Gram-negative Rods      50,000-75,000 colonies/ml Gram-negative Rods    Blood Culture #1 **CANNOT BE ORDERED STAT** [[id number]]  (Normal) Collected: 12/26/24 1656    Order Status: Completed Specimen: Blood Updated: 12/27/24 1901     Blood Culture No Growth At 24 Hours    Blood Culture #2 **CANNOT BE ORDERED STAT** [[id number]]  (Normal) Collected: 12/26/24 1656    Order Status: Completed Specimen: Blood Updated: 12/27/24 1901     Blood Culture No Growth At 24 Hours             Radiology:  CV Ultrasound doppler venous legs bilat  Negative for deep and superficial vein thrombosis in bilateral lower   extremities.  Echo    Left Ventricle: The left ventricle is normal in size. Moderately   increased wall thickness. Normal wall motion. Septal flattening in systole   consistent with right ventricular pressure overload. There is normal   systolic function with a visually estimated ejection fraction of 55 - 60%.   There is normal diastolic function.    Right Ventricle: Moderate right ventricular enlargement. Systolic   function is moderately reduced. TAPSE is 1.19 cm. Pacemaker lead present   in the ventricle.    Left Atrium: Left atrium is moderately dilated.    Right Atrium: Right atrium is severely dilated. Lead present in the   right atrium.    Mitral Valve: There is mild regurgitation.    Tricuspid Valve: There is severe regurgitation. There is moderate   pulmonary hypertension.    Pulmonic Valve: There is mild regurgitation.    Pulmonary Artery: There is moderate pulmonary hypertension. The   estimated pulmonary artery systolic pressure is 47 mmHg.    IVC/SVC: Elevated venous pressure at 15 mmHg.      Scheduled Med:   allopurinoL  300 mg Oral Daily    apixaban  2.5 mg Oral BID    carvediloL  3.125 mg Oral BID    cefTRIAXone (Rocephin) IV (PEDS and ADULTS)  1 g Intravenous Q24H    cetirizine  10 mg Oral Daily    ferrous sulfate  1 tablet Oral BID    furosemide (LASIX)  injection  40 mg Intravenous Q12H    gabapentin  200 mg Oral BID    latanoprost  1 drop Both Eyes QHS    levothyroxine  25 mcg Oral Before breakfast    lubiprostone  24 mcg Oral BID WM    mupirocin   Nasal BID    pantoprazole  40 mg Oral BID    potassium bicarbonate  20 mEq Oral Daily    risperiDONE  2 mg Oral QHS    rivastigmine tartrate  1.5 mg Oral BID            Nutrition Status:      Assessment/Plan:      Acute HFpEF Exacerbation  -repeat echo reviewed, EF 55%, Severe TR, mod pulm htn  -cont lasix     NSTEMI, Hx SSS s/p PPM  -Type II, in setting of acute CHF exacerbation  -asymptomatic   -case d/w cards yesterday, no intervention planned  -f/u [employer] PPM interrogation report if available     Catheter associated urinary infection  Hx of urinary retention on last admit  -patient is nontoxic, empiric treatment with ceftriaxone for 5 days  -follow culture and broaden as necessary  -consider voiding trial     Paroxysmal A-fib  -on  reduced dose eliquis at baseline however she does not meet 2/3 criteria, only age criteria as her creatinine greater than 1.5 and weight is greater than 60 kilos  -perhaps on a reduced dose secondary to chronic anemia ? or history of falls?  Keep for now but patient may benefit from being discharged with 5 mg b.i.d.    UTI GNR -POA  Cont rocephin  F/u final cx results    Hx Urinary retention  Urology eval noted  Levsin prn bladder spasms  Cont cook until uti tx completed then voiding trial  F/u in office for hematuria w/u    Acute anemia, iron def  Transfuse 1 unit prbc, 1 unit given yesterday with minimal change    CKD 3  Renal consult per family request    Cont therapy as tolerated     History of: HTN, chronic anemia , chronic respiratory failure on 2 L NC at home, dementia and glaucoma         All diagnosis and differential diagnosis have been reviewed; assessment and plan has been documented; I have personally reviewed the labs and test results that are presently available; I have  reviewed the patients medication list; I have reviewed the consulting providers response and recommendations. I have reviewed or attempted to review medical records based upon their availability      _____________________________________________________________________            Shad Pacheco MD   12/28/2024

## 2024-12-28 NOTE — PROGRESS NOTES
[facility] *    Cardiology  Progress Note    Patient Name: Ev Alberts  MRN: [id number]  Admission Date: 12/26/2024  Hospital Length of Stay: 2 days  Code Status: Full Code   Attending Physician: Kamaljit Chaney MD   Primary Care Physician: Santi Walters MD  Expected Discharge Date:   Principal Problem:Acute on chronic congestive heart failure    Subjective:     Reason for Consult: NSTEMI     HPI:  Ms. Alberts is a 88 y/o female with a history of SSS, AVB II, Bradycardia, CHF, PAF on Eliquis, PAD, Left Carotid Atherosclerosis, HTN, CKD IV, VHD, YARI, who was briefly known to [facility] (currently follows with Dr. Vigil). She presented to the ER with complaints of lethargy and BLE edema. The patient is bedbound and lives at home, her  is her care taker. She currently denies SOB, CP, or nausea. VS on admit: HR 90, /58, RR 18, Temp 98.1F, SpO2 98% on RA. Lab work: WBC 6.67, H/H 7.4/22.9, , Na 133, K 4.0, BUN/Cr 29.3/1.31, Ca 9.2, Mag 2.30, .3, Trop: 0.051 --> 0.047. [facility] was consulted for NSTEMI    Hospital Course:   12.28.24: NAD, VSS. She is seen resting comfortable in bed. She was only netb negative 300ml over the lastb 24 hours     Previous Cardiac Diagnostics:     ECHO (12.27.24):  Left Ventricle: The left ventricle is normal in size. Moderately increased wall thickness. Normal wall motion. Septal flattening in systole consistent with right ventricular pressure overload. There is normal systolic function with a visually estimated ejection fraction of 55 - 60%. There is normal diastolic function.  Right Ventricle: Moderate right ventricular enlargement. Systolic function is moderately reduced. TAPSE is 1.19 cm. Pacemaker lead present in the ventricle.  Left Atrium: Left atrium is moderately dilated.  Right Atrium: Right atrium is severely dilated. Lead present in the right atrium.  Mitral Valve: There is mild regurgitation.  Tricuspid Valve: There is severe  regurgitation. There is moderate pulmonary hypertension.  Pulmonic Valve: There is mild regurgitation.  Pulmonary Artery: There is moderate pulmonary hypertension. The estimated pulmonary artery systolic pressure is 47 mmHg.  IVC/SVC: Elevated venous pressure at 15 mmHg.    LHC (3.4.24):   There is no obstructive coronary artery disease.  LVEDP is elevated at 24 mmHg.     ECHO (2.26.24):  Left Ventricle: The left ventricle is normal in size. Mildly increased wall thickness. There is normal systolic function with a visually estimated ejection fraction of 55 - 60%. Grade III diastolic dysfunction. Right Ventricle: Mild right ventricular enlargement. Wall thickness is normal. Right ventricle wall motion  is normal. Systolic function is mildly reduced. TAPSE is 1.46 cm. Left Atrium: Left atrium is moderately dilated. Aortic Valve: There is mild aortic valve sclerosis. Tricuspid Valve: There is mild regurgitation.     PPM Insertion (9.19.19): Advaxis      ECHO (9.15.19):   Normal Left Ventricle cavity size. Normal wall thickness. Normal ejection fraction, 55-65%. The right ventricle cavity is mildly dilated. Left atrium is mildly dilated. Normal right atrium. Normal central venous pressure. Mild MR. Mild to Moderate TR. Moderate pulmonary HTN. No pericardial effusion.       Carotid US (7.26.12):  The study quality is good. Less than or equal to 50% left common carotid artery stenosis.Bilateral normal velocity measurements of the internal carotid and external caortid arteries are noted, with no plaque visualized.Antegrade right vertebral artery flow. Antegrade left vertebral artery flow.     KARI (1.18.12):  The resting right ankle brachial index is 1.01 consistent with no significant right peripheral vascular disease.The resting left ankle brachial index is 0.94 consistent with borderline left peripheral vascular disease.The study quality is good.      Lower Ext Arterial US (1.18.12):  The study quality is good.  Biphasic waveforms and diffuse plaque seen in both the bilateral infra-popliteal arteries.    Review of Systems   Reason unable to perform ROS: Jane.     Objective:     Vital Signs (Most Recent):  Temp: 97.4 °F (36.3 °C) (12/28/24 1429)  Pulse: (!) 55 (12/28/24 1429)  Resp: 18 (12/28/24 1348)  BP: (!) 110/50 (12/28/24 1429)  SpO2: (!) 94 % (12/28/24 1143) Vital Signs (24h Range):  Temp:  [97.4 °F (36.3 °C)-100.8 °F (38.2 °C)] 97.4 °F (36.3 °C)  Pulse:  [54-66] 55  Resp:  [16-18] 18  SpO2:  [93 %-95 %] 94 %  BP: (102-128)/(45-68) 110/50   Weight: 72 kg (158 lb 11.7 oz)  Body mass index is 26.41 kg/m².  SpO2: (!) 94 %     No intake or output data in the 24 hours ending 12/28/24 1459  Lines/Drains/Airways       Drain  Duration                  Urethral Catheter 12/24/24 Non-latex 4 days              Peripheral Intravenous Line  Duration                  Peripheral IV - Single Lumen 12/26/24 1540 18 G Left Antecubital 1 day                    Significant Labs:   Chemistries:   Recent Labs   Lab 12/24/24  0512 12/26/24  1524 12/26/24  2114 12/27/24  0558 12/28/24  0608   * 133*  --  133* 132*   K 3.4* 3.4*  --  4.0 4.9   CL 97* 98  --  97* 98   CO2 28 29  --  28 28   BUN 36.8* 30.3*  --  29.3* 35.8*   CREATININE 1.23* 1.26*  --  1.31* 1.54*   CALCIUM 8.8 9.7  --  9.2 8.9   BILITOT  --  1.3  --  1.0 1.6*   ALKPHOS  --  133  --  119 116   ALT  --  12  --  12 9   AST  --  27  --  31 27   GLUCOSE 100 93  --  97 96   MG  --  2.40  --  2.30 2.30   TROPONINI  --  0.048*  0.034 0.051* 0.047*  --         CBC/Anemia Labs: [facility]:    Recent Labs   Lab 12/26/24  1524 12/26/24  2114 12/27/24  0558 12/27/24  0603 12/28/24  0608   WBC 7.36  --   --  6.67 8.97   HGB 8.7*  --   --  7.4* 7.7*   HCT 26.8*  --   --  22.9* 23.3*     --   --  129* 127*   MCV 98.5*  --   --  100.0* 95.5*   RDW 19.5*  --   --  19.7* 19.6*   IRON  --   --  25*  --   --    FERRITIN  --  266.47*  --   --   --    FOLATE  --  17.6  --   --   --   "  [id number]  --  1,105*  --   --   --     No results for input(s): "PT", "INR", "APTT" in the last 168 hours.     Telemetry:  NSR    Physical Exam  Constitutional:       Appearance: She is ill-appearing.      Comments: Somulent   Cardiovascular:      Rate and Rhythm: Normal rate and regular rhythm.      Pulses: Normal pulses.      Heart sounds: No murmur heard.  Abdominal:      General: Abdomen is flat.   Neurological:      Mental Status: She is oriented to person, place, and time.   Psychiatric:         Mood and Affect: Mood normal.         Behavior: Behavior normal.         Judgment: Judgment normal.         Current Schedule Inpatient Medications:   allopurinoL  300 mg Oral Daily    apixaban  2.5 mg Oral BID    carvediloL  3.125 mg Oral BID    cefTRIAXone (Rocephin) IV (PEDS and ADULTS)  1 g Intravenous Q24H    cetirizine  10 mg Oral Daily    ferrous sulfate  1 tablet Oral BID    furosemide (LASIX) injection  40 mg Intravenous TID    gabapentin  200 mg Oral BID    hyoscyamine  0.125 mg Sublingual TID    latanoprost  1 drop Both Eyes QHS    levothyroxine  25 mcg Oral Before breakfast    lubiprostone  24 mcg Oral BID WM    mupirocin   Nasal BID    pantoprazole  40 mg Oral BID    potassium bicarbonate  20 mEq Oral Daily    risperiDONE  2 mg Oral QHS    rivastigmine tartrate  1.5 mg Oral BID     Continuous Infusions:      Assessment:   NSTEMI Type II s/t acute CHF   - Trop 0.051 --> 0.047   - He denies SOB/CP   - EF 55-60%  Acute on chronic diastolic heart failure    - ECHO (7.20.24): showed preserved ejection fraction of 50-55%, moderate regurgitation of the tricuspid valve and pulmonary hypertension  UTO  PAF ~ currently paced rhythm    - [id number] Score 5 (7.2% Stroke risk Per Year)  HTN  SSS s/p PPM    - [employer]   PAD  CVD  HLD  MICHEL on CKD Stage IV    - Followed by Dr. Chávez  LESLEY    - Mild MR    - Mold to Moderate TR  YARI  GERD  Iron Deficiency Anemia   Thrombocytopenia   Right Knee " Osteoarthritis  Dementia  Glaucoma  Hypothyroidism  History of GI Bleed      Plan:   ECHO reviewed  No need for acute ischemic evaluation ~ Normal coronaries in 3.2024  On Eliquis 2.5 mg (unsure why decreased dosing as age is her only criteria)  Cont. Carvedilol 3.125mg  Nephrology following for diuresis   Obtain accurate I&Os and daily weights   Cardiology will be available           Sabi Montejo NP  Cardiology  [facility]

## 2024-12-29 LAB
ALLENS TEST BLOOD GAS (OHS): YES
ANION GAP SERPL CALC-SCNC: 9 MEQ/L
BACTERIA UR CULT: ABNORMAL
BASE EXCESS BLD CALC-SCNC: 6.1 MMOL/L (ref -2–2)
BLOOD GAS SAMPLE TYPE (OHS): ABNORMAL
BNP BLD-MCNC: 539.3 PG/ML
BUN SERPL-MCNC: 42.5 MG/DL (ref 9.8–20.1)
CA-I BLD-SCNC: 1.29 MMOL/L (ref 1.12–1.23)
CALCIUM SERPL-MCNC: 8.9 MG/DL (ref 8.4–10.2)
CHLORIDE SERPL-SCNC: 96 MMOL/L (ref 98–107)
CO2 BLDA-SCNC: 32.6 MMOL/L
CO2 SERPL-SCNC: 28 MMOL/L (ref 23–31)
COHGB MFR BLDA: 3.6 % (ref 0.5–1.5)
CREAT SERPL-MCNC: 1.76 MG/DL (ref 0.55–1.02)
CREAT/UREA NIT SERPL: 24
DRAWN BY BLOOD GAS (OHS): ABNORMAL
ERYTHROCYTE [DISTWIDTH] IN BLOOD BY AUTOMATED COUNT: 18.5 % (ref 11.5–17)
GFR SERPLBLD CREATININE-BSD FMLA CKD-EPI: 28 ML/MIN/1.73/M2
GLUCOSE SERPL-MCNC: 92 MG/DL (ref 82–115)
HCO3 BLDA-SCNC: 31.2 MMOL/L (ref 22–26)
HCT VFR BLD AUTO: 24.7 % (ref 37–47)
HGB BLD-MCNC: 8.3 G/DL (ref 12–16)
LACTATE SERPL-SCNC: 1.4 MMOL/L (ref 0.5–2.2)
MAGNESIUM SERPL-MCNC: 2.3 MG/DL (ref 1.6–2.6)
MCH RBC QN AUTO: 32 PG (ref 27–31)
MCHC RBC AUTO-ENTMCNC: 33.6 G/DL (ref 33–36)
MCV RBC AUTO: 95.4 FL (ref 80–94)
METHGB MFR BLDA: 1.1 % (ref 0.4–1.5)
NRBC BLD AUTO-RTO: 0 %
O2 HB BLOOD GAS (OHS): 91.4 % (ref 94–97)
OXYGEN DEVICE BLOOD GAS (OHS): ABNORMAL
OXYHGB MFR BLDA: 9.8 G/DL (ref 12–16)
PCO2 BLDA: 47 MMHG (ref 35–45)
PH BLDA: 7.43 [PH] (ref 7.35–7.45)
PLATELET # BLD AUTO: 116 X10(3)/MCL (ref 130–400)
PMV BLD AUTO: 9.8 FL (ref 7.4–10.4)
PO2 BLDA: 67 MMHG (ref 80–100)
POCT GLUCOSE: 112 MG/DL (ref 70–110)
POTASSIUM BLOOD GAS (OHS): 4.6 MMOL/L (ref 3.5–5)
POTASSIUM SERPL-SCNC: 4.6 MMOL/L (ref 3.5–5.1)
RBC # BLD AUTO: 2.59 X10(6)/MCL (ref 4.2–5.4)
SAMPLE SITE BLOOD GAS (OHS): ABNORMAL
SAO2 % BLDA: 93.5 %
SODIUM BLOOD GAS (OHS): 129 MMOL/L (ref 137–145)
SODIUM SERPL-SCNC: 133 MMOL/L (ref 136–145)
WBC # BLD AUTO: 11.22 X10(3)/MCL (ref 4.5–11.5)

## 2024-12-29 PROCEDURE — 25000003 PHARM REV CODE 250: Performed by: INTERNAL MEDICINE

## 2024-12-29 PROCEDURE — 25000003 PHARM REV CODE 250: Performed by: STUDENT IN AN ORGANIZED HEALTH CARE EDUCATION/TRAINING PROGRAM

## 2024-12-29 PROCEDURE — 99900031 HC PATIENT EDUCATION (STAT)

## 2024-12-29 PROCEDURE — 63600175 PHARM REV CODE 636 W HCPCS: Performed by: INTERNAL MEDICINE

## 2024-12-29 PROCEDURE — 21400001 HC TELEMETRY ROOM

## 2024-12-29 PROCEDURE — 83880 ASSAY OF NATRIURETIC PEPTIDE: CPT | Performed by: INTERNAL MEDICINE

## 2024-12-29 PROCEDURE — 87040 BLOOD CULTURE FOR BACTERIA: CPT | Performed by: INTERNAL MEDICINE

## 2024-12-29 PROCEDURE — 83735 ASSAY OF MAGNESIUM: CPT | Performed by: INTERNAL MEDICINE

## 2024-12-29 PROCEDURE — 11000001 HC ACUTE MED/SURG PRIVATE ROOM

## 2024-12-29 PROCEDURE — 82803 BLOOD GASES ANY COMBINATION: CPT

## 2024-12-29 PROCEDURE — 25000003 PHARM REV CODE 250

## 2024-12-29 PROCEDURE — 94760 N-INVAS EAR/PLS OXIMETRY 1: CPT | Mod: XB

## 2024-12-29 PROCEDURE — 36600 WITHDRAWAL OF ARTERIAL BLOOD: CPT

## 2024-12-29 PROCEDURE — 36415 COLL VENOUS BLD VENIPUNCTURE: CPT | Performed by: INTERNAL MEDICINE

## 2024-12-29 PROCEDURE — 83605 ASSAY OF LACTIC ACID: CPT | Performed by: INTERNAL MEDICINE

## 2024-12-29 PROCEDURE — 99900035 HC TECH TIME PER 15 MIN (STAT)

## 2024-12-29 PROCEDURE — 85027 COMPLETE CBC AUTOMATED: CPT | Performed by: INTERNAL MEDICINE

## 2024-12-29 PROCEDURE — 80048 BASIC METABOLIC PNL TOTAL CA: CPT | Performed by: INTERNAL MEDICINE

## 2024-12-29 RX ORDER — FUROSEMIDE 10 MG/ML
60 INJECTION INTRAMUSCULAR; INTRAVENOUS 3 TIMES DAILY
Status: DISCONTINUED | OUTPATIENT
Start: 2024-12-29 | End: 2024-12-31

## 2024-12-29 RX ORDER — SODIUM CHLORIDE 9 MG/ML
INJECTION, SOLUTION INTRAVENOUS CONTINUOUS
Status: DISCONTINUED | OUTPATIENT
Start: 2024-12-29 | End: 2025-01-02

## 2024-12-29 RX ORDER — ACETAMINOPHEN 325 MG/1
650 TABLET ORAL EVERY 4 HOURS PRN
Status: DISCONTINUED | OUTPATIENT
Start: 2024-12-29 | End: 2025-01-29 | Stop reason: HOSPADM

## 2024-12-29 RX ORDER — CEFEPIME HYDROCHLORIDE 1 G/1
1 INJECTION, POWDER, FOR SOLUTION INTRAMUSCULAR; INTRAVENOUS
Status: COMPLETED | OUTPATIENT
Start: 2024-12-29 | End: 2025-01-01

## 2024-12-29 RX ADMIN — RISPERIDONE 2 MG: 1 TABLET, FILM COATED ORAL at 08:12

## 2024-12-29 RX ADMIN — FUROSEMIDE 40 MG: 10 INJECTION, SOLUTION INTRAMUSCULAR; INTRAVENOUS at 10:12

## 2024-12-29 RX ADMIN — APIXABAN 5 MG: 5 TABLET, FILM COATED ORAL at 01:12

## 2024-12-29 RX ADMIN — GABAPENTIN 200 MG: 100 CAPSULE ORAL at 08:12

## 2024-12-29 RX ADMIN — MUPIROCIN: 20 OINTMENT TOPICAL at 09:12

## 2024-12-29 RX ADMIN — CARVEDILOL 3.12 MG: 3.12 TABLET, FILM COATED ORAL at 01:12

## 2024-12-29 RX ADMIN — SODIUM CHLORIDE: 9 INJECTION, SOLUTION INTRAVENOUS at 01:12

## 2024-12-29 RX ADMIN — HYOSCYAMINE SULFATE 0.12 MG: 0.12 TABLET SUBLINGUAL at 08:12

## 2024-12-29 RX ADMIN — LEVOTHYROXINE SODIUM 25 MCG: 25 TABLET ORAL at 01:12

## 2024-12-29 RX ADMIN — CEFEPIME 1 G: 1 INJECTION, POWDER, FOR SOLUTION INTRAMUSCULAR; INTRAVENOUS at 10:12

## 2024-12-29 RX ADMIN — PANTOPRAZOLE SODIUM 40 MG: 40 TABLET, DELAYED RELEASE ORAL at 01:12

## 2024-12-29 RX ADMIN — FERROUS SULFATE TAB 325 MG (65 MG ELEMENTAL FE) 1 EACH: 325 (65 FE) TAB at 08:12

## 2024-12-29 RX ADMIN — FUROSEMIDE 60 MG: 10 INJECTION, SOLUTION INTRAMUSCULAR; INTRAVENOUS at 03:12

## 2024-12-29 RX ADMIN — APIXABAN 5 MG: 5 TABLET, FILM COATED ORAL at 08:12

## 2024-12-29 RX ADMIN — RIVASTIGMINE TARTRATE 1.5 MG: 1.5 CAPSULE ORAL at 08:12

## 2024-12-29 RX ADMIN — PANTOPRAZOLE SODIUM 40 MG: 40 TABLET, DELAYED RELEASE ORAL at 08:12

## 2024-12-29 RX ADMIN — FUROSEMIDE 60 MG: 10 INJECTION, SOLUTION INTRAMUSCULAR; INTRAVENOUS at 08:12

## 2024-12-29 NOTE — PROGRESS NOTES
Progress Note  Nephrology    Admit Date: 12/26/2024   LOS: 3 days     SUBJECTIVE:     Follow-up For:  CKD 3b    Interval History:  88 Y/O female with history of CHF , diastolic dysfunction , Chronuic Anemia, PAF , CKD 3b , chronic respiratory failure , on O2 supplement , admitted to hospital for edema and swelling ,and weakness  Had 2+ edema of lower extrimities and was lethargic on admission   Pt also has chronic cook cath due to bladder dysfunction   Today she does not complaint but she is very lethargic and weak     Review of Systems:  Constitutional: No fever or chills  Respiratory: No cough or shortness of breath  Cardiovascular: No chest pain or palpitations  Gastrointestinal: No nausea or vomiting  Neurological: No confusion or weakness  Has cook cath in  had 600 cc of urine output only     OBJECTIVE:     Vital Signs Range (Last 24H):  Vitals:    12/29/24 1126   BP:    Pulse:    Resp:    Temp: 98.4 °F (36.9 °C)       Temp:  [97.4 °F (36.3 °C)-100.5 °F (38.1 °C)]   Pulse:  [55-65]   Resp:  [12-18]   BP: (102-132)/(49-68)   SpO2:  [93 %-97 %]     I & O (Last 24H):  Intake/Output Summary (Last 24 hours) at 12/29/2024 1236  Last data filed at 12/28/2024 2122  Gross per 24 hour   Intake 240 ml   Output 600 ml   Net -360 ml       Physical Exam:  Alert , lethargic , not verbalizing , weak   Head   normal   Neck   supple   Chest   symmetric   Lungs   clear   Heart   RRR  Abdomen   soft , non tender   Ext 2+ edema bilaterally    Laboratory Data:    Recent Labs   Lab 12/29/24  0700   *   K 4.6   CL 96*   CO2 28   BUN 42.5*   CREATININE 1.76*   GLUCOSE 92   CALCIUM 8.9     Lab Results   Component Value Date    CALCIUM 8.9 12/29/2024    CAION 1.29 (H) 12/29/2024    PHOS 3.5 12/21/2024     Lab Results   Component Value Date    IRON 25 (L) 12/27/2024    TIBC 188 (L) 12/27/2024    FERRITIN 266.47 (H) 12/26/2024       Medications:  Medication list was reviewed and changes noted under Assessment/Plan.    Diagnostic  Results:    Reviewed most recent CT/US/Echo/MRI    ASSESSMENT/PLAN:   1   CKD 3b  2   Cardiorenal   3   Diastolic dysfunction   4   hyponatremia today   5   Anemia of Irobn deficiency  6   HTN  7   urinary Retention and bladder dysfunction   8   UTI , pseudomonas  9   on chronic O2 supplement       Plan    will give low dose saline for hyponatremia  but increase lasix to 60 TID              Lab test in AM              Will DC saline hopefully very soon , may be tomorrow if Na better

## 2024-12-29 NOTE — PT/OT/SLP PROGRESS
SLP orders received, chart reviewed, and nursing consulted. SLP attempting to complete clinical swallow evaluation; however, pt presents with reduced CARMEN and unable to follow directions. Attempts at PO intake remain unsafe at this time, rec: NPO. SLP to continue to follow.

## 2024-12-29 NOTE — PROGRESS NOTES
[facility] 9W MEDICAL TELEMETRY Hospital Medicine Progress Note      Patient Name: Ev Alberts  MRN: [id number]  Admission Date: 12/26/2024   Length of Stay: 3  Attending Physician: Shad Pacheco MD  Primary Care Provider: Santi Walters MD  Face-to-Face encounter date: 12/29/2024    Code Status: Full Code        Chief Complaint:   Fatigue (Family reporting pt is more lethargic than usual. On arrival, pt c/o bilateral LE edema that began this morning. Denies chest pain or sob. Pt GCS 14 on arrival. )        HPI:   Ev Alberts is an 88 y/o F with a PMH of diastolic heart failure LVEF 50-55%, HTN, chronic anemia, paroxysmal A-fib on eliquis, and CKD stage III followed by Dr. Chávez , chronic respiratory failure on 2 L NC at home, dementia and glaucoma presented to the ED with complains of bilateral leg swelling, generalized weakness and deconditioning post hospital discharge. Patient explains that she is bed bound and lives at home with her son and  who take care of her. Patient was recently admitted at this facility for worsening generalized weakness, bilateral leg swelling and urinary retention and discharged home with an indwelling cook, follow up appointment with urology and private sitters and home care with [facility] . Patient denies black or blood in her tool, denies chest pain, denies shortness of breath, denies known fever.     Initial ED vitals: Temp 98.1, HR 90, Resp 18, /58, O2 Sat 98% on RA. ED work up revealed WBC 7.39, H&H 8.7 and 26.8, , K 3.4, BUN 30.3, Creatinine 1.26, .3, Troponin 0.048, Lactic acid 1.1, TSH 3.335 Influenza, COVID, and RSV swab negative, Respiratory panel negative, UA positive for blood, leukocyte esterase, RBC, WBC, and bacteria, X-ray chest read enlarged cardiac silhouette without overt edema, EKG read ventricular paced rhythm at 62 bmp. Cardiology was consulted in the ED and patient was admitted to  Hospital Medicine.    Overview/Hospital Course:  No notes on file       Interval Hx:   The pt is a poor historian she is sluggish this am. CT head was ordered due to lethargy, no acute findings. Patient had temp of 100.5 at midnight. Case d/w with her daughters at bedside and on phone. All questions answered and results reviewed. I encouraged her daughter to avoid feeding the patient while she is drowsy. They are interested in TCU for rehab once patient is medially stable.    Review of Systems   Unable to perform ROS: Mental acuity   All other systems reviewed and are negative.      Objective/physical exam:  General: In no acute distress, afebrile, frail appearing  Chest: Clear to auscultation bilaterally  Heart: RRR, +S1, S2, +murmur, 1-2+ edema b/l LE  Abdomen: Soft, nontender, BS +, +cook  MSK: Warm, no clubbing or cyanosis  Neurologic: lethargic  Psych/mental status: Unable to assess.    VITAL SIGNS: 24 HRS MIN & MAX LAST   Temp  Min: 97.4 °F (36.3 °C)  Max: 100.5 °F (38.1 °C) 98.4 °F (36.9 °C)   BP  Min: 102/61  Max: 132/68 132/68   Pulse  Min: 54  Max: 66  (!) 59   Resp  Min: 12  Max: 18 16   SpO2  Min: 93 %  Max: 97 % 97 %       Recent Labs   Lab 12/27/24  0603 12/28/24  0608 12/29/24  0700   WBC 6.67 8.97 11.22   RBC 2.29* 2.44* 2.59*   HGB 7.4* 7.7* 8.3*   HCT 22.9* 23.3* 24.7*   .0* 95.5* 95.4*   MCH 32.3* 31.6* 32.0*   MCHC 32.3* 33.0 33.6   RDW 19.7* 19.6* 18.5*   * 127* 116*   MPV 11.0* 10.9* 9.8       Recent Labs   Lab 12/26/24  1524 12/27/24  0558 12/28/24  0608 12/29/24  0700 12/29/24  0756   * 133* 132* 133*  --    K 3.4* 4.0 4.9 4.6  --    CL 98 97* 98 96*  --    CO2 29 28 28 28  --    BUN 30.3* 29.3* 35.8* 42.5*  --    CREATININE 1.26* 1.31* 1.54* 1.76*  --    CALCIUM 9.7 9.2 8.9 8.9  --    PH  --   --   --   --  7.430   MG 2.40 2.30 2.30 2.30  --    ALBUMIN 3.0* 2.8* 2.6*  --   --    ALKPHOS 133 119 116  --   --    ALT 12 12 9  --   --    AST 27 31 27  --   --    BILITOT 1.3  1.0 1.6*  --   --         Microbiology Results (last 7 days)       Procedure Component Value Units Date/Time    Urine culture [[id number]]  (Abnormal)  (Susceptibility) Collected: 12/26/24 1539    Order Status: Completed Specimen: Urine Updated: 12/29/24 0641     Urine Culture >/= 100,000 colonies/ml Pseudomonas aeruginosa    Blood Culture #2 **CANNOT BE ORDERED STAT** [[id number]]  (Normal) Collected: 12/26/24 1656    Order Status: Completed Specimen: Blood Updated: 12/28/24 1901     Blood Culture No Growth At 48 Hours    Blood Culture #1 **CANNOT BE ORDERED STAT** [[id number]]  (Normal) Collected: 12/26/24 1656    Order Status: Completed Specimen: Blood Updated: 12/28/24 1901     Blood Culture No Growth At 48 Hours             Radiology:  CT Head Without Contrast  Narrative: EXAMINATION:  CT HEAD WITHOUT CONTRAST    CLINICAL HISTORY:  Mental status change, unknown cause;    TECHNIQUE:  Sequential axial images were performed of the brain without contrast.    Dose product length of 828 mGycm. Automated exposure control was utilized to minimize radiation dose.    COMPARISON:  December 21, 2024..    FINDINGS:  There is no intracranial mass effect, midline shift, hydrocephalus or hemorrhage. There is no sulcal effacement or low attenuation changes to suggest recent large vessel territory infarction. Chronic appearing periventricular and subcortical white matter low attenuation changes are present and are consistent with chronic microangiopathic ischemia. The ventricular system and sulcal markings prominence is consistent with atrophy. There is no acute extra axial fluid collection. Visualized paranasal sinuses are clear without mucosal thickening, polypoidal abnormality or air-fluid levels. Mastoid air cells aeration is optimal.  Impression: 1.  No acute intracranial findings identified.    2.  Chronic microangiopathic ischemia and atrophy..    Electronically signed by: Theodore  Prince  Date:    12/29/2024  Time:    08:22      Scheduled Med:   allopurinoL  300 mg Oral Daily    apixaban  2.5 mg Oral BID    carvediloL  3.125 mg Oral BID    cefTRIAXone (Rocephin) IV (PEDS and ADULTS)  1 g Intravenous Q24H    cetirizine  10 mg Oral Daily    ferrous sulfate  1 tablet Oral BID    furosemide (LASIX) injection  40 mg Intravenous TID    gabapentin  200 mg Oral BID    hyoscyamine  0.125 mg Sublingual TID    latanoprost  1 drop Both Eyes QHS    levothyroxine  25 mcg Oral Before breakfast    lubiprostone  24 mcg Oral BID WM    mupirocin   Nasal BID    pantoprazole  40 mg Oral BID    potassium bicarbonate  20 mEq Oral Daily    risperiDONE  2 mg Oral QHS    rivastigmine tartrate  1.5 mg Oral BID            Nutrition Status:      Assessment/Plan:      Acute HFpEF Exacerbation  -repeat echo reviewed, EF 55%, Severe TR, mod pulm htn  -cont lasix     NSTEMI, Hx SSS s/p PPM  -Type II, in setting of acute CHF exacerbation  -asymptomatic   -case d/w cards yesterday, no intervention planned  -PPM interrogation report reviewed with RN -no acute findings       Paroxysmal A-fib    UTI Pseudomonas aeruginosa -POA    Hx Urinary retention  Urology eval noted  Levsin prn bladder spasms  Cont cook until uti tx completed then voiding trial  F/u in office for hematuria w/u    Acute anemia, iron def  Transfused 2 unit prbc total    CKD 3      Cont therapy as tolerated     History of: HTN, chronic anemia , chronic respiratory failure on 2 L NC at home, dementia and glaucoma     Plan  DC rocephin  Start cefepime based on cx results  Renal eval noted  Cont lasix, edema improved, Monitor cr  Consult CM for TCU  Change eliquis to 5mg bid  Dc tramadol to avoid sedation  Family report she has been on risperidone for 4 years will cont    ADDENDUM 11:14am  I reevaluated the patient. She remains lethargic but with stimulation she opened here eyes briefly and said no when I asked if anything was bothering her. VSS stable O2 sat  94%.    I spoke to her daughter Mellissa Alberts about the pt's code status. She confirmed the patient and family wants her to be do not intubate.    Critical care time 45 mins    All diagnosis and differential diagnosis have been reviewed; assessment and plan has been documented; I have personally reviewed the labs and test results that are presently available; I have reviewed the patients medication list; I have reviewed the consulting providers response and recommendations. I have reviewed or attempted to review medical records based upon their availability      _____________________________________________________________________            Shad Pacheco MD   12/29/2024

## 2024-12-30 LAB
ANION GAP SERPL CALC-SCNC: 8 MEQ/L
BUN SERPL-MCNC: 44.6 MG/DL (ref 9.8–20.1)
CALCIUM SERPL-MCNC: 8.7 MG/DL (ref 8.4–10.2)
CHLORIDE SERPL-SCNC: 100 MMOL/L (ref 98–107)
CO2 SERPL-SCNC: 24 MMOL/L (ref 23–31)
CREAT SERPL-MCNC: 1.82 MG/DL (ref 0.55–1.02)
CREAT/UREA NIT SERPL: 25
ERYTHROCYTE [DISTWIDTH] IN BLOOD BY AUTOMATED COUNT: 18.3 % (ref 11.5–17)
GFR SERPLBLD CREATININE-BSD FMLA CKD-EPI: 27 ML/MIN/1.73/M2
GLUCOSE SERPL-MCNC: 81 MG/DL (ref 82–115)
HCT VFR BLD AUTO: 23.3 % (ref 37–47)
HGB BLD-MCNC: 8.1 G/DL (ref 12–16)
MCH RBC QN AUTO: 32.9 PG (ref 27–31)
MCHC RBC AUTO-ENTMCNC: 34.8 G/DL (ref 33–36)
MCV RBC AUTO: 94.7 FL (ref 80–94)
NRBC BLD AUTO-RTO: 0 %
PLATELET # BLD AUTO: 122 X10(3)/MCL (ref 130–400)
PMV BLD AUTO: 10.9 FL (ref 7.4–10.4)
POTASSIUM SERPL-SCNC: 4 MMOL/L (ref 3.5–5.1)
RBC # BLD AUTO: 2.46 X10(6)/MCL (ref 4.2–5.4)
SODIUM SERPL-SCNC: 132 MMOL/L (ref 136–145)
WBC # BLD AUTO: 8.2 X10(3)/MCL (ref 4.5–11.5)

## 2024-12-30 PROCEDURE — 36415 COLL VENOUS BLD VENIPUNCTURE: CPT | Performed by: INTERNAL MEDICINE

## 2024-12-30 PROCEDURE — 25000003 PHARM REV CODE 250: Performed by: STUDENT IN AN ORGANIZED HEALTH CARE EDUCATION/TRAINING PROGRAM

## 2024-12-30 PROCEDURE — 85027 COMPLETE CBC AUTOMATED: CPT | Performed by: INTERNAL MEDICINE

## 2024-12-30 PROCEDURE — 92610 EVALUATE SWALLOWING FUNCTION: CPT

## 2024-12-30 PROCEDURE — 25000003 PHARM REV CODE 250

## 2024-12-30 PROCEDURE — 63600175 PHARM REV CODE 636 W HCPCS: Performed by: INTERNAL MEDICINE

## 2024-12-30 PROCEDURE — 11000001 HC ACUTE MED/SURG PRIVATE ROOM

## 2024-12-30 PROCEDURE — 80048 BASIC METABOLIC PNL TOTAL CA: CPT | Performed by: INTERNAL MEDICINE

## 2024-12-30 PROCEDURE — 21400001 HC TELEMETRY ROOM

## 2024-12-30 PROCEDURE — 25000003 PHARM REV CODE 250: Performed by: INTERNAL MEDICINE

## 2024-12-30 RX ORDER — RISPERIDONE 1 MG/1
1 TABLET ORAL NIGHTLY
Status: COMPLETED | OUTPATIENT
Start: 2024-12-30 | End: 2024-12-31

## 2024-12-30 RX ADMIN — SODIUM CHLORIDE: 9 INJECTION, SOLUTION INTRAVENOUS at 07:12

## 2024-12-30 RX ADMIN — RIVASTIGMINE TARTRATE 1.5 MG: 1.5 CAPSULE ORAL at 01:12

## 2024-12-30 RX ADMIN — HYOSCYAMINE SULFATE 0.12 MG: 0.12 TABLET SUBLINGUAL at 04:12

## 2024-12-30 RX ADMIN — RIVASTIGMINE TARTRATE 1.5 MG: 1.5 CAPSULE ORAL at 09:12

## 2024-12-30 RX ADMIN — PANTOPRAZOLE SODIUM 40 MG: 40 TABLET, DELAYED RELEASE ORAL at 09:12

## 2024-12-30 RX ADMIN — FUROSEMIDE 60 MG: 10 INJECTION, SOLUTION INTRAMUSCULAR; INTRAVENOUS at 04:12

## 2024-12-30 RX ADMIN — ACETAMINOPHEN 650 MG: 325 TABLET, FILM COATED ORAL at 06:12

## 2024-12-30 RX ADMIN — RISPERIDONE 1 MG: 1 TABLET, FILM COATED ORAL at 09:12

## 2024-12-30 RX ADMIN — LUBIPROSTONE 24 MCG: 24 CAPSULE, GELATIN COATED ORAL at 04:12

## 2024-12-30 RX ADMIN — ACETAMINOPHEN 650 MG: 325 TABLET, FILM COATED ORAL at 11:12

## 2024-12-30 RX ADMIN — APIXABAN 5 MG: 5 TABLET, FILM COATED ORAL at 09:12

## 2024-12-30 RX ADMIN — FUROSEMIDE 60 MG: 10 INJECTION, SOLUTION INTRAMUSCULAR; INTRAVENOUS at 11:12

## 2024-12-30 RX ADMIN — LEVOTHYROXINE SODIUM 25 MCG: 25 TABLET ORAL at 11:12

## 2024-12-30 RX ADMIN — FUROSEMIDE 60 MG: 10 INJECTION, SOLUTION INTRAMUSCULAR; INTRAVENOUS at 09:12

## 2024-12-30 RX ADMIN — CEFEPIME 1 G: 1 INJECTION, POWDER, FOR SOLUTION INTRAMUSCULAR; INTRAVENOUS at 11:12

## 2024-12-30 RX ADMIN — PANTOPRAZOLE SODIUM 40 MG: 40 TABLET, DELAYED RELEASE ORAL at 11:12

## 2024-12-30 RX ADMIN — CARVEDILOL 3.12 MG: 3.12 TABLET, FILM COATED ORAL at 11:12

## 2024-12-30 RX ADMIN — ALLOPURINOL 300 MG: 300 TABLET ORAL at 11:12

## 2024-12-30 RX ADMIN — FERROUS SULFATE TAB 325 MG (65 MG ELEMENTAL FE) 1 EACH: 325 (65 FE) TAB at 11:12

## 2024-12-30 RX ADMIN — APIXABAN 5 MG: 5 TABLET, FILM COATED ORAL at 11:12

## 2024-12-30 RX ADMIN — HYOSCYAMINE SULFATE 0.12 MG: 0.12 TABLET SUBLINGUAL at 09:12

## 2024-12-30 RX ADMIN — FERROUS SULFATE TAB 325 MG (65 MG ELEMENTAL FE) 1 EACH: 325 (65 FE) TAB at 09:12

## 2024-12-30 RX ADMIN — CEFEPIME 1 G: 1 INJECTION, POWDER, FOR SOLUTION INTRAMUSCULAR; INTRAVENOUS at 09:12

## 2024-12-30 RX ADMIN — POTASSIUM BICARBONATE 20 MEQ: 391 TABLET, EFFERVESCENT ORAL at 11:12

## 2024-12-30 RX ADMIN — HYOSCYAMINE SULFATE 0.12 MG: 0.12 TABLET SUBLINGUAL at 11:12

## 2024-12-30 NOTE — PROGRESS NOTES
[facility] OLGH 9W MEDICAL TELEMETRY Hospital Medicine Progress Note      Patient Name: Ev Alberts  MRN: [id number]  Admission Date: 12/26/2024   Length of Stay: 4  Attending Physician: Shad Pacheco MD  Primary Care Provider: Santi Walters MD  Face-to-Face encounter date: 12/30/2024    Code Status: Partial Code        Chief Complaint:   Fatigue (Family reporting pt is more lethargic than usual. On arrival, pt c/o bilateral LE edema that began this morning. Denies chest pain or sob. Pt GCS 14 on arrival. )        HPI:   Ev Alberts is an 89 y/o F with a PMH of diastolic heart failure LVEF 50-55%, HTN, chronic anemia, paroxysmal A-fib on eliquis, and CKD stage III followed by Dr. Chávez , chronic respiratory failure on 2 L NC at home, dementia and glaucoma presented to the ED with complains of bilateral leg swelling, generalized weakness and deconditioning post hospital discharge. Patient explains that she is bed bound and lives at home with her son and  who take care of her. Patient was recently admitted at this facility for worsening generalized weakness, bilateral leg swelling and urinary retention and discharged home with an indwelling cook, follow up appointment with urology and private sitters and home care with [facility] . Patient denies black or blood in her tool, denies chest pain, denies shortness of breath, denies known fever.     Initial ED vitals: Temp 98.1, HR 90, Resp 18, /58, O2 Sat 98% on RA. ED work up revealed WBC 7.39, H&H 8.7 and 26.8, , K 3.4, BUN 30.3, Creatinine 1.26, .3, Troponin 0.048, Lactic acid 1.1, TSH 3.335 Influenza, COVID, and RSV swab negative, Respiratory panel negative, UA positive for blood, leukocyte esterase, RBC, WBC, and bacteria, X-ray chest read enlarged cardiac silhouette without overt edema, EKG read ventricular paced rhythm at 62 bmp. Cardiology was consulted in the ED and patient was admitted  to Hospital Medicine.    Overview/Hospital Course:  No notes on file       Interval Hx:   The pt is much more alert an interactive. The post she's been in 4 days. She only c/o low back pain. Her  and daughter are at the bedside. They agree with holding any sedatives that may be contributing to her lethargy.    Review of Systems   All other systems reviewed and are negative.      Objective/physical exam:  General: In no acute distress, afebrile, frail appearing  Chest: Clear to auscultation bilaterally  Heart: RRR, +S1, S2, +murmur, 1+ edema b/l LE, +dependent edema.  Abdomen: Soft, nontender, BS +, +cook  MSK: Warm, no clubbing or cyanosis  Neurologic: lethargic  Psych/mental status: Unable to assess.    VITAL SIGNS: 24 HRS MIN & MAX LAST   Temp  Min: 97.5 °F (36.4 °C)  Max: 98.6 °F (37 °C) 98.1 °F (36.7 °C)   BP  Min: 106/61  Max: 145/64 (!) 145/64   Pulse  Min: 50  Max: 62  62   Resp  Min: 16  Max: 16 16   SpO2  Min: 92 %  Max: 96 % (!) 92 %       Recent Labs   Lab 12/28/24  0608 12/29/24  0700 12/30/24  0717   WBC 8.97 11.22 8.20   RBC 2.44* 2.59* 2.46*   HGB 7.7* 8.3* 8.1*   HCT 23.3* 24.7* 23.3*   MCV 95.5* 95.4* 94.7*   MCH 31.6* 32.0* 32.9*   MCHC 33.0 33.6 34.8   RDW 19.6* 18.5* 18.3*   * 116* 122*   MPV 10.9* 9.8 10.9*       Recent Labs   Lab 12/26/24  1524 12/27/24  0558 12/28/24  0608 12/29/24  0700 12/29/24  0756 12/30/24  0717   * 133* 132* 133*  --  132*   K 3.4* 4.0 4.9 4.6  --  4.0   CL 98 97* 98 96*  --  100   CO2 29 28 28 28  --  24   BUN 30.3* 29.3* 35.8* 42.5*  --  44.6*   CREATININE 1.26* 1.31* 1.54* 1.76*  --  1.82*   CALCIUM 9.7 9.2 8.9 8.9  --  8.7   PH  --   --   --   --  7.430  --    MG 2.40 2.30 2.30 2.30  --   --    ALBUMIN 3.0* 2.8* 2.6*  --   --   --    ALKPHOS 133 119 116  --   --   --    ALT 12 12 9  --   --   --    AST 27 31 27  --   --   --    BILITOT 1.3 1.0 1.6*  --   --   --         Microbiology Results (last 7 days)       Procedure Component Value Units  Date/Time    Blood Culture #1 **CANNOT BE ORDERED STAT** [[id number]]  (Normal) Collected: 12/26/24 1656    Order Status: Completed Specimen: Blood Updated: 12/29/24 1901     Blood Culture No Growth At 72 Hours    Blood Culture #2 **CANNOT BE ORDERED STAT** [[id number]]  (Normal) Collected: 12/26/24 1656    Order Status: Completed Specimen: Blood Updated: 12/29/24 1901     Blood Culture No Growth At 72 Hours    Blood Culture [[id number]] Collected: 12/29/24 1747    Order Status: Resulted Specimen: Blood Updated: 12/29/24 1809    Blood Culture [[id number]] Collected: 12/29/24 1142    Order Status: Resulted Specimen: Blood Updated: 12/29/24 1158    Urine culture [[id number]]  (Abnormal)  (Susceptibility) Collected: 12/26/24 1539    Order Status: Completed Specimen: Urine Updated: 12/29/24 0641     Urine Culture >/= 100,000 colonies/ml Pseudomonas aeruginosa             Radiology:  CT Head Without Contrast  Narrative: EXAMINATION:  CT HEAD WITHOUT CONTRAST    CLINICAL HISTORY:  Mental status change, unknown cause;    TECHNIQUE:  Sequential axial images were performed of the brain without contrast.    Dose product length of 828 mGycm. Automated exposure control was utilized to minimize radiation dose.    COMPARISON:  December 21, 2024..    FINDINGS:  There is no intracranial mass effect, midline shift, hydrocephalus or hemorrhage. There is no sulcal effacement or low attenuation changes to suggest recent large vessel territory infarction. Chronic appearing periventricular and subcortical white matter low attenuation changes are present and are consistent with chronic microangiopathic ischemia. The ventricular system and sulcal markings prominence is consistent with atrophy. There is no acute extra axial fluid collection. Visualized paranasal sinuses are clear without mucosal thickening, polypoidal abnormality or air-fluid levels. Mastoid air cells aeration is optimal.  Impression: 1.  No acute intracranial findings  identified.    2.  Chronic microangiopathic ischemia and atrophy..    Electronically signed by: Theodore Prince  Date:    12/29/2024  Time:    08:22      Scheduled Med:   allopurinoL  300 mg Oral Daily    apixaban  5 mg Oral BID    carvediloL  3.125 mg Oral BID    ceFEPime IV (PEDS and ADULTS)  1 g Intravenous Q12H    cetirizine  10 mg Oral Daily    ferrous sulfate  1 tablet Oral BID    furosemide (LASIX) injection  60 mg Intravenous TID    gabapentin  200 mg Oral BID    hyoscyamine  0.125 mg Sublingual TID    latanoprost  1 drop Both Eyes QHS    levothyroxine  25 mcg Oral Before breakfast    lubiprostone  24 mcg Oral BID WM    mupirocin   Nasal BID    pantoprazole  40 mg Oral BID    potassium bicarbonate  20 mEq Oral Daily    risperiDONE  2 mg Oral QHS    rivastigmine tartrate  1.5 mg Oral BID            Nutrition Status:      Assessment/Plan:      Acute HFpEF Exacerbation  -repeat echo reviewed, EF 55%, Severe TR, mod pulm htn     NSTEMI, Hx SSS s/p PPM  -Type II, in setting of acute CHF exacerbation    Paroxysmal A-fib    UTI Pseudomonas aeruginosa -POA    Hx Urinary retention  Urology eval noted  Levsin prn bladder spasms  Cont cook until uti tx completed then voiding trial  F/u in office for hematuria w/u    Acute anemia, iron def  Transfused 2 unit prbc total    CKD 3b w/ chirag      Cont therapy as tolerated     History of: HTN, chronic anemia , chronic respiratory failure on 2 L NC at home, dementia and glaucoma     Plan  cont cefepime for UTI  Voiding trial once abx completed for UTI  Renal following  Lasix increased yesterday, Monitor cr  Consulted CM for TCU  cont eliquis monitor h/h  Avoid sedatives  Dc neurontin  Decrease then dc Risperdal -monitor response resume prn  DC coreg due to bradycardic episodes  Monitor BP          All diagnosis and differential diagnosis have been reviewed; assessment and plan has been documented; I have personally reviewed the labs and test results that are presently available;  I have reviewed the patients medication list; I have reviewed the consulting providers response and recommendations. I have reviewed or attempted to review medical records based upon their availability      _____________________________________________________________________            Shad Pacheco MD   12/30/2024

## 2024-12-30 NOTE — PT/OT/SLP EVAL
[facility]  Speech Language Pathology Department  Clinical Swallow Evaluation    Patient Name:  Ev Alberts   MRN:  [id number]    Recommendations     General recommendations:  SLP follow up x1 regarding diet tolerance  Solid texture recommendation:  Regular Diet - IDDSI Level 7  Liquid consistency recommendation: Thin liquids - IDDSI Level 0   Medications: per patient preference  Swallow strategies/precautions: upright for PO intake and feed only when fully alert  Precautions: aspiration    History     Ev Alberts is a/n 89 y.o. female admitted  with a medical diagnosis of acute HF exacerbation, NSTEMI, catheter associated UTI, PAF, acute anemia, hx of dementia.     Past Medical History:   Diagnosis Date    Acute kidney injury superimposed on chronic kidney disease     Congestive heart failure     Dementia     Hypertension     Shingles of eyelid     Sick sinus syndrome     Thyroid disease     Unspecified glaucoma      Past Surgical History:   Procedure Laterality Date    cataract surgery       SECTION      CHOLECYSTECTOMY      EGD, WITH HEMORRHAGE CONTROL Left 2024    Procedure: EGD,WITH HEMORRHAGE CONTROL;  Surgeon: Blake Adorno MD;  Location: [facility] OR;  Service: Gastroenterology;  Laterality: Left;    HYSTERECTOMY      INSERTION OF PACEMAKER      LEFT HEART CATHETERIZATION Left 3/4/2024    Procedure: Left heart cath;  Surgeon: Mark Raymundo Jr., MD;  Location: [facility] CATH LAB;  Service: Cardiology;  Laterality: Left;    NEPHRECTOMY       Home diet texture/consistency: Regular and thin liquids  Current method of nutrition: PO diet (regular solids, thin liquids)    Patient complaint: to eat/drink    Imaging   Results for orders placed during the hospital encounter of 24    X-Ray Chest 1 View    Narrative  EXAMINATION:  XR CHEST 1 VIEW    CLINICAL HISTORY:  Shortness of breath    TECHNIQUE:  One    COMPARISON:  2024.    FINDINGS:  Cardiopericardial  silhouette appearance similar.  Cardiac device electrodes terminate within the right atrium and the right ventricle.  Generalized lungs hypoventilatory changes without overt edema.  There is no dense consolidation.  Possible small pleural effusions.  No pneumothorax.    Impression  Suspect small pleural effusions.      Electronically signed by: Theodore Prince  Date:    11/19/2024  Time:    11:32    No results found for this or any previous visit.    No results found for this or any previous visit.    Subjective     Patient awake, alert, calm, and cooperative.    Patient goals: to eat/drink   Spiritual/Cultural/Baptism Beliefs/Practices that affect care: no    Pain/Comfort: Pain Rating 1: 0/10    Restraints/positioning devices: none    Objective     ORAL MUSCULATURE  Dentition: upper dentures and lower dentures  Secretion Management: adequate  Mucosal Quality: good  Facial Movement: WFL  Buccal Strength & Mobility: WFL  Mandibular Strength & Mobility: WFL  Oral Labial Strength & Mobility: WFL  Lingual Strength & Mobility: WFL  Vocal Quality: adequate    PO TRIALS  Consistency Fed By Oral Symptoms Pharyngeal Symptoms   Thin liquid by straw SLP None None   Chewable solid SLP None None     Assessment     Patient presents with no signs/sx of aspiration.  Ok for regular solids and thin liquids when awake, alert, and upright.    Outcome Measures     Functional Oral Intake Scale: 7 - Total oral diet with no restrictions    Goals     Multidisciplinary Problems       SLP Goals       Not on file                  Education     Patient and family were provided with verbal education regarding recommendations.  Understanding was verbalized.    Plan     SLP Follow-Up:  Yes   Patient to be seen:      Plan of Care expires:     Plan of Care reviewed with:  patient, family     Time Tracking     SLP Treatment Date:   12/30/24  Speech Start Time:  1105  Speech Stop Time:  1115     Speech Total Time (min):  10 min    Billable  minutes:  Swallow and Oral Function Evaluation, 10 minutes     12/30/2024

## 2024-12-30 NOTE — PLAN OF CARE
12/30/24 1141   Discharge Reassessment   Assessment Type Discharge Planning Reassessment   Did the patient's condition or plan change since previous assessment? No   Discharge Plan discussed with: Patient;Spouse/sig other;POA   Name(s) and Number(s) POA: Mellissa Alberts (Daughter)  [phone] (Mobile)   Communicated LUISITO with patient/caregiver Date not available/Unable to determine   Discharge Plan A Skilled Nursing Facility  (The patient and her family were given a choice list through [employer] for review re: (Swing Bed/SNF) services. They are requesting referrals be sent to: 1. [facility] 2. [facility] 3. [facility] [place].)   Discharge Plan B Skilled Nursing Facility   DME Needed Upon Discharge  none   Transition of Care Barriers None   Why the patient remains in the hospital Requires continued medical care   Post-Acute Status   Post-Acute Authorization Placement   Post-Acute Placement Status Patient List Provided   Coverage Payor: [facility] DUAL COMPLETE HMO [facility] -   Hospital Resources/Appts/Education Provided Appointments scheduled and added to AVS   Patient choice form signed by patient/caregiver List with quality metrics by geographic area provided   Discharge Delays None known at this time     The patient and her daughter/POA: Mellissa Alberts and : Dayron Alberts were given a choice list through [employer] for review. They are requesting the (Swing Bed/SNF) referrals be sent to: 1. [facility] 2. [facility] 3. [facility] at [facility] [place]. Will keep the patient and family informed as updates are received.

## 2024-12-30 NOTE — PROGRESS NOTES
Progress Note  Nephrology    Admit Date: 12/26/2024   LOS: 4 days     SUBJECTIVE:     Follow-up For:  CKD 3b    Interval History:  89 Y/O female with History of CHF , diastolic dysfunction , chronic Anemia , PAF , CKD 3b , chronic respiratory failure , admitted to hospital for weakness and swelling   Had 2+ edema of lower extrimities and also was lethargic ,   Has chronic cook cath in due to bladder dysfunction   Today pt is much more aleert and oriented and verbalizing better     Review of Systems:  Complaining of back pain otherwise no other complaint  Constitutional: No fever or chills  Respiratory: No cough or shortness of breath  Cardiovascular: No chest pain or palpitations  Gastrointestinal: No nausea or vomiting  Neurological: No confusion or weakness    OBJECTIVE:     Vital Signs Range (Last 24H):  Vitals:    12/30/24 1129   BP:    Pulse:    Resp:    Temp: 98.1 °F (36.7 °C)       Temp:  [97.5 °F (36.4 °C)-98.6 °F (37 °C)]   Pulse:  [50-62]   Resp:  [16]   BP: (106-145)/(57-79)   SpO2:  [92 %-96 %]     I & O (Last 24H):  Intake/Output Summary (Last 24 hours) at 12/30/2024 1255  Last data filed at 12/30/2024 0728  Gross per 24 hour   Intake --   Output 950 ml   Net -950 ml       Physical Exam:  Alert , oriented , in NAD  Head   normal   Neck   supple   Chest   symmetric   Lungs   clear   Heart   RRR  Abdomen   soft , non tender   Ext   no edema     Laboratory Data:    Recent Labs   Lab 12/30/24  0717   *   K 4.0      CO2 24   BUN 44.6*   CREATININE 1.82*   GLUCOSE 81*   CALCIUM 8.7     Lab Results   Component Value Date    CALCIUM 8.7 12/30/2024    CAION 1.29 (H) 12/29/2024    PHOS 3.5 12/21/2024     Lab Results   Component Value Date    IRON 25 (L) 12/27/2024    TIBC 188 (L) 12/27/2024    FERRITIN 266.47 (H) 12/26/2024       Medications:  Medication list was reviewed and changes noted under Assessment/Plan.    Diagnostic Results:    Reviewed most recent CT/US/Echo/MRI    ASSESSMENT/PLAN:   1    CKD 3b  2   cardiorenal   3    Diastolic dysfunction   4   hyponatremia  5   Anemia  6   HTN  7   urinary retention , S/P Fol;ey  chronic  8   UTI  9   Chronic O2 supplement     Plan    FU             DC NS            Labs in AM including BNP

## 2024-12-30 NOTE — PLAN OF CARE
Received an update by Kelly Martin RN ([facility]: Clinical Liaison) who reports TCU is also OON with [facility] Dual complete.

## 2024-12-31 PROBLEM — N30.00 ACUTE CYSTITIS WITHOUT HEMATURIA: Status: ACTIVE | Noted: 2024-12-31

## 2024-12-31 LAB
ALBUMIN SERPL-MCNC: 2.4 G/DL (ref 3.4–4.8)
BACTERIA BLD CULT: NORMAL
BACTERIA BLD CULT: NORMAL
BNP BLD-MCNC: 477 PG/ML
BUN SERPL-MCNC: 42.9 MG/DL (ref 9.8–20.1)
CALCIUM SERPL-MCNC: 8.7 MG/DL (ref 8.4–10.2)
CHLORIDE SERPL-SCNC: 99 MMOL/L (ref 98–107)
CO2 SERPL-SCNC: 25 MMOL/L (ref 23–31)
CREAT SERPL-MCNC: 1.62 MG/DL (ref 0.55–1.02)
GFR SERPLBLD CREATININE-BSD FMLA CKD-EPI: 31 ML/MIN/1.73/M2
GLUCOSE SERPL-MCNC: 106 MG/DL (ref 82–115)
MAGNESIUM SERPL-MCNC: 2.1 MG/DL (ref 1.6–2.6)
PHOSPHATE SERPL-MCNC: 2.3 MG/DL (ref 2.3–4.7)
POTASSIUM SERPL-SCNC: 3.6 MMOL/L (ref 3.5–5.1)
SODIUM SERPL-SCNC: 131 MMOL/L (ref 136–145)

## 2024-12-31 PROCEDURE — 36415 COLL VENOUS BLD VENIPUNCTURE: CPT | Performed by: INTERNAL MEDICINE

## 2024-12-31 PROCEDURE — 83880 ASSAY OF NATRIURETIC PEPTIDE: CPT | Performed by: INTERNAL MEDICINE

## 2024-12-31 PROCEDURE — 97530 THERAPEUTIC ACTIVITIES: CPT

## 2024-12-31 PROCEDURE — 25000003 PHARM REV CODE 250: Performed by: INTERNAL MEDICINE

## 2024-12-31 PROCEDURE — 11000001 HC ACUTE MED/SURG PRIVATE ROOM

## 2024-12-31 PROCEDURE — 80069 RENAL FUNCTION PANEL: CPT | Performed by: INTERNAL MEDICINE

## 2024-12-31 PROCEDURE — 63600175 PHARM REV CODE 636 W HCPCS: Performed by: INTERNAL MEDICINE

## 2024-12-31 PROCEDURE — 25000003 PHARM REV CODE 250: Performed by: STUDENT IN AN ORGANIZED HEALTH CARE EDUCATION/TRAINING PROGRAM

## 2024-12-31 PROCEDURE — 25000003 PHARM REV CODE 250

## 2024-12-31 PROCEDURE — 83735 ASSAY OF MAGNESIUM: CPT | Performed by: INTERNAL MEDICINE

## 2024-12-31 PROCEDURE — 21400001 HC TELEMETRY ROOM

## 2024-12-31 RX ORDER — FUROSEMIDE 10 MG/ML
60 INJECTION INTRAMUSCULAR; INTRAVENOUS EVERY 12 HOURS
Status: DISCONTINUED | OUTPATIENT
Start: 2024-12-31 | End: 2025-01-05

## 2024-12-31 RX ADMIN — CEFEPIME 1 G: 1 INJECTION, POWDER, FOR SOLUTION INTRAMUSCULAR; INTRAVENOUS at 08:12

## 2024-12-31 RX ADMIN — LUBIPROSTONE 24 MCG: 24 CAPSULE, GELATIN COATED ORAL at 04:12

## 2024-12-31 RX ADMIN — APIXABAN 5 MG: 5 TABLET, FILM COATED ORAL at 11:12

## 2024-12-31 RX ADMIN — PANTOPRAZOLE SODIUM 40 MG: 40 TABLET, DELAYED RELEASE ORAL at 08:12

## 2024-12-31 RX ADMIN — ACETAMINOPHEN 650 MG: 325 TABLET, FILM COATED ORAL at 04:12

## 2024-12-31 RX ADMIN — RISPERIDONE 1 MG: 1 TABLET, FILM COATED ORAL at 08:12

## 2024-12-31 RX ADMIN — CEFEPIME 1 G: 1 INJECTION, POWDER, FOR SOLUTION INTRAMUSCULAR; INTRAVENOUS at 11:12

## 2024-12-31 RX ADMIN — ACETAMINOPHEN 650 MG: 325 TABLET, FILM COATED ORAL at 08:12

## 2024-12-31 RX ADMIN — HYOSCYAMINE SULFATE 0.12 MG: 0.12 TABLET SUBLINGUAL at 08:12

## 2024-12-31 RX ADMIN — RIVASTIGMINE TARTRATE 1.5 MG: 1.5 CAPSULE ORAL at 11:12

## 2024-12-31 RX ADMIN — Medication 3 MG: at 11:12

## 2024-12-31 RX ADMIN — RIVASTIGMINE TARTRATE 1.5 MG: 1.5 CAPSULE ORAL at 08:12

## 2024-12-31 RX ADMIN — APIXABAN 5 MG: 5 TABLET, FILM COATED ORAL at 08:12

## 2024-12-31 RX ADMIN — LATANOPROST 1 DROP: 50 SOLUTION OPHTHALMIC at 08:12

## 2024-12-31 RX ADMIN — LEVOTHYROXINE SODIUM 25 MCG: 25 TABLET ORAL at 05:12

## 2024-12-31 RX ADMIN — MUPIROCIN: 20 OINTMENT TOPICAL at 08:12

## 2024-12-31 RX ADMIN — POTASSIUM BICARBONATE 20 MEQ: 391 TABLET, EFFERVESCENT ORAL at 11:12

## 2024-12-31 RX ADMIN — HYOSCYAMINE SULFATE 0.12 MG: 0.12 TABLET SUBLINGUAL at 04:12

## 2024-12-31 RX ADMIN — PANTOPRAZOLE SODIUM 40 MG: 40 TABLET, DELAYED RELEASE ORAL at 11:12

## 2024-12-31 RX ADMIN — FUROSEMIDE 60 MG: 10 INJECTION, SOLUTION INTRAMUSCULAR; INTRAVENOUS at 08:12

## 2024-12-31 RX ADMIN — SODIUM CHLORIDE: 9 INJECTION, SOLUTION INTRAVENOUS at 06:12

## 2024-12-31 RX ADMIN — FERROUS SULFATE TAB 325 MG (65 MG ELEMENTAL FE) 1 EACH: 325 (65 FE) TAB at 08:12

## 2024-12-31 RX ADMIN — FERROUS SULFATE TAB 325 MG (65 MG ELEMENTAL FE) 1 EACH: 325 (65 FE) TAB at 11:12

## 2024-12-31 RX ADMIN — CETIRIZINE HYDROCHLORIDE 10 MG: 10 TABLET, FILM COATED ORAL at 11:12

## 2024-12-31 RX ADMIN — ALLOPURINOL 300 MG: 300 TABLET ORAL at 11:12

## 2024-12-31 RX ADMIN — MUPIROCIN: 20 OINTMENT TOPICAL at 11:12

## 2024-12-31 RX ADMIN — POLYETHYLENE GLYCOL 3350 17 G: 17 POWDER, FOR SOLUTION ORAL at 08:12

## 2024-12-31 RX ADMIN — HYOSCYAMINE SULFATE 0.12 MG: 0.12 TABLET SUBLINGUAL at 11:12

## 2024-12-31 NOTE — ASSESSMENT & PLAN NOTE
Nutrition consulted. Most recent weight and BMI monitored-     Measurements:  Wt Readings from Last 1 Encounters:   12/30/24 74.5 kg (164 lb 3.9 oz)   Body mass index is 27.33 kg/m².    Patient has been screened and assessed by RD.    Malnutrition Type:  Context:    Level:      Malnutrition Characteristic Summary:       Interventions/Recommendations (treatment strategy):

## 2024-12-31 NOTE — PLAN OF CARE
I spoke with the patient's POA/Daughter: Mellissa Alberts who was updated regarding (SNF) referrals sent. She was informed of (2) denials by 1. [facility]- out of network and 2. [facility] at [facility]. I requested she review the choice list for new (SNF) referrals to be sent this afternoon. Daughter (Mellissa) will be in contact after reviewing options for (SNF) referrals.

## 2024-12-31 NOTE — PT/OT/SLP DISCHARGE
[facility]  Speech Language Pathology Department  Diet Tolerance Follow-up    Patient Name:  Ev Alberts   MRN:  [id number]  Admitting Diagnosis: acute HF exacerbation, NSTEMI, catheter associated UTI, PAF, acute anemia, hx of dementia     Recommendations     General recommendations:  SLP intervention not indicated  Solid texture recommendation:  Regular Diet - IDDSI Level 7  Liquid consistency recommendation: Thin liquids - IDDSI Level 0   Medications: per patient preference  Swallow strategies/precautions: upright for PO intake and feed only when fully alert   Precautions: aspiration    Diet Tolerance     Nursing reports no difficulty regarding diet tolerance.    Outcome Measures     Functional Oral Intake Scale: 7 - Total oral diet with no restrictions    Plan     SLP Follow-Up:  Yes    Patient to be seen:      Plan of Care expires:     Plan of Care reviewed with:  patient, family     12/31/2024

## 2024-12-31 NOTE — PLAN OF CARE
Problem: Adult Inpatient Plan of Care  Goal: Plan of Care Review  12/31/2024 0855 by Adwoa Chou RN  Outcome: Progressing  12/31/2024 0855 by Adwoa Chou RN  Outcome: Progressing  Goal: Patient-Specific Goal (Individualized)  12/31/2024 0855 by Adwoa Chou RN  Outcome: Progressing  12/31/2024 0855 by Adwoa Chou RN  Outcome: Progressing  Goal: Absence of Hospital-Acquired Illness or Injury  12/31/2024 0855 by Adwoa Chou RN  Outcome: Progressing  12/31/2024 0855 by Adwoa Chou RN  Outcome: Progressing  Goal: Optimal Comfort and Wellbeing  12/31/2024 0855 by Adwoa Chou RN  Outcome: Progressing  12/31/2024 0855 by Adwoa Chou RN  Outcome: Progressing  Goal: Readiness for Transition of Care  12/31/2024 0855 by Adwoa Chou RN  Outcome: Progressing  12/31/2024 0855 by Adwoa Chou RN  Outcome: Progressing     Problem: Infection  Goal: Absence of Infection Signs and Symptoms  12/31/2024 0855 by Adwoa Chou RN  Outcome: Progressing  12/31/2024 0855 by Adwoa Chou RN  Outcome: Progressing     Problem: Wound  Goal: Optimal Coping  12/31/2024 0855 by Adwoa Chou RN  Outcome: Progressing  12/31/2024 0855 by Adwoa Chou RN  Outcome: Progressing  Goal: Optimal Functional Ability  12/31/2024 0855 by Adwoa Chou RN  Outcome: Progressing  12/31/2024 0855 by Adwoa Chou RN  Outcome: Progressing  Goal: Absence of Infection Signs and Symptoms  12/31/2024 0855 by Adwoa Chou RN  Outcome: Progressing  12/31/2024 0855 by Adwoa Chou RN  Outcome: Progressing  Goal: Improved Oral Intake  12/31/2024 0855 by Adwoa Chou RN  Outcome: Progressing  12/31/2024 0855 by Adwoa Chou RN  Outcome: Progressing  Goal: Optimal Pain Control and Function  12/31/2024 0855 by Adwoa Chou RN  Outcome: Progressing  12/31/2024 0855 by Adwoa Chou RN  Outcome: Progressing  Goal: Skin Health and Integrity  12/31/2024 0855 by Adwoa Chou, MIO  Outcome:  Progressing  12/31/2024 0855 by Adwoa Chou RN  Outcome: Progressing  Goal: Optimal Wound Healing  12/31/2024 0855 by Adwoa Chou RN  Outcome: Progressing  12/31/2024 0855 by Adwoa Chou RN  Outcome: Progressing     Problem: Skin Injury Risk Increased  Goal: Skin Health and Integrity  12/31/2024 0855 by Adwoa Chou RN  Outcome: Progressing  12/31/2024 0855 by dAwoa Chou RN  Outcome: Progressing     Problem: Fall Injury Risk  Goal: Absence of Fall and Fall-Related Injury  12/31/2024 0855 by Adwoa Chou RN  Outcome: Progressing  12/31/2024 0855 by Adwoa Chou RN  Outcome: Progressing

## 2024-12-31 NOTE — HPI
89 y/o F with a PMH of diastolic heart failure LVEF 50-55%, HTN, chronic anemia, paroxysmal A-fib on eliquis, and CKD stage III followed by Dr. Chávez , chronic respiratory failure on 2 L NC at home, dementia and glaucoma presented to the ED with complains of bilateral leg swelling, generalized weakness and deconditioning post hospital discharge. Patient explains that she is bed bound and lives at home with her son and  who take care of her. Patient was recently admitted at this facility for worsening generalized weakness, bilateral leg swelling and urinary retention and discharged home with an indwelling cook, follow up appointment with urology and private sitters and home care with [facility] . Patient denies black or blood in her tool, denies chest pain, denies shortness of breath, denies known fever.     Initial ED vitals: Temp 98.1, HR 90, Resp 18, /58, O2 Sat 98% on RA. ED work up revealed WBC 7.39, H&H 8.7 and 26.8, , K 3.4, BUN 30.3, Creatinine 1.26, .3, Troponin 0.048, Lactic acid 1.1, TSH 3.335 Influenza, COVID, and RSV swab negative, Respiratory panel negative, UA positive for blood, leukocyte esterase, RBC, WBC, and bacteria, X-ray chest read enlarged cardiac silhouette without overt edema, EKG read ventricular paced rhythm at 62 bmp. Cardiology was consulted in the ED and patient was admitted to Hospital Medicine.

## 2024-12-31 NOTE — PT/OT/SLP PROGRESS
Physical Therapy Treatment    Patient Name:  Ev Alberts   MRN:  [id number]    Recommendations:     Discharge therapy intensity: Moderate Intensity Therapy   Discharge Equipment Recommendations: to be determined by next level of care  Barriers to discharge: Impaired mobility and Ongoing medical needs    Assessment:     Ev Alberts is a 89 y.o. female admitted with a medical diagnosis of acute HF exacerbation, NSTEMI, catheter associated UTI, PAF, acute anemia, hx of dementia.  She presents with the following impairments/functional limitations: weakness, impaired endurance, impaired self care skills, impaired functional mobility, impaired balance, impaired cognition, decreased upper extremity function, decreased lower extremity function . Pt with significant improvements in alertness and ability to participate in therapy this date. Required maxA for bed mobility, modAx2 for transfers. Unable to progress to ambulation today. Cont to recommend mod intensity therapy at discharge as pt is deconditioned from prior baseline.    Rehab Prognosis: Good; patient would benefit from acute skilled PT services to address these deficits and reach maximum level of function.    Recent Surgery: * No surgery found *      Plan:     During this hospitalization, patient would benefit from acute PT services 5 x/week to address the identified rehab impairments via gait training, therapeutic activities, therapeutic exercises, neuromuscular re-education and progress toward the following goals:    Plan of Care Expires:  01/27/25    Subjective     Chief Complaint: back pain  Patient/Family Comments/goals: PLOF  Pain/Comfort:  Pain Rating 1:  (c/o chronic back pain, no rating given)      Objective:     Communicated with RN prior to session.  Patient found HOB elevated with cook catheter, pulse ox (continuous), telemetry, peripheral IV upon PT entry to room.     General Precautions: Standard, fall  Orthopedic Precautions:    Braces:     Respiratory Status: Room air  Blood Pressure: NT  Skin Integrity: Visible skin intact    Pt oriented to self, place, and time.    Functional Mobility:  Bed Mobility:     Supine to Sit: maximal assistance  Transfers:     Sit to Stand:  moderate assistance and of 2 persons with rolling walker  Bed to Chair: moderate assistance and of 2 persons with  rolling walker  using  Stand Pivot  Balance: sitting balance = Claudette initially progressing to SBA    Therapeutic Activities/Exercises:  1 STS from EOB with modAx2, 1 STS from recliner again modAx2 due to poor anterior weight shift and hip extension    Education:  Patient and spouse were provided with verbal education education regarding PT role/goals/POC, fall prevention, safety awareness, and discharge/DME recommendations.  Understanding was verbalized, however additional teaching warranted.     Patient left up in chair with all lines intact, call button in reach, bryanna pad in place, RN notified, and  present    GOALS:   Multidisciplinary Problems       Physical Therapy Goals          Problem: Physical Therapy    Goal Priority Disciplines Outcome Interventions   Physical Therapy Goal     PT, PT/OT Progressing    Description: Goals to be met by: 24     Patient will increase functional independence with mobility by performin. Supine to sit with MInimal Assistance  2. Sit to stand transfer with Minimal Assistance  3. Bed to chair transfer with Minimal Assistance using Rolling Walker vs. Squat pivot  4. Gait  x 25 feet with Minimal Assistance using Rolling Walker.   5. Sitting at edge of bed x10 minutes with Stand-by Assistance                         Time Tracking:     PT Received On: 24  PT Start Time: 1037     PT Stop Time: 1102  PT Total Time (min): 25 min     Billable Minutes: Therapeutic Activity 25 min    Treatment Type: Treatment  PT/PTA: PT     Number of PTA visits since last PT visit: 2024

## 2024-12-31 NOTE — PROGRESS NOTES
Progress Note  Nephrology    Admit Date: 12/26/2024   LOS: 5 days     SUBJECTIVE:     Follow-up For:  CKD 3b    Interval History:  89 Y/O female with history of CHF , Diastolic dysfunction , chronic Anemia , PAF , CKD 3b , Chronic respiratory failure , admitted to hospital for weakness and swelling and edema   Was 2+ edema and was lethargic but currently doing better and more responsive   Edema is improving     Review of Systems:  Constitutional: No fever or chills  Respiratory: No cough or shortness of breath  Cardiovascular: No chest pain or palpitations  Gastrointestinal: No nausea or vomiting  Neurological: No confusion or weakness  No dysuria or frequency or hematuria     OBJECTIVE:     Vital Signs Range (Last 24H):  Vitals:    12/31/24 1106   BP: (!) 106/52   Pulse: (!) 52   Resp:    Temp: 97.5 °F (36.4 °C)       Temp:  [97.5 °F (36.4 °C)-99 °F (37.2 °C)]   Pulse:  [52-63]   Resp:  [18]   BP: (106-143)/(52-75)   SpO2:  [93 %-97 %]     I & O (Last 24H):  Intake/Output Summary (Last 24 hours) at 12/31/2024 1346  Last data filed at 12/31/2024 0634  Gross per 24 hour   Intake --   Output 1250 ml   Net -1250 ml       Physical Exam:  Alert , oriented , in NAD  Head   normal   Neck   supple   Chest   symmetric   Lungs   Clear , no Rales   Heart   RRR  Abdomen   soft , non tender   Ext   no edema today     Laboratory Data:    Recent Labs   Lab 12/31/24  0403   *   K 3.6   CL 99   CO2 25   BUN 42.9*   CREATININE 1.62*   GLUCOSE 106   CALCIUM 8.7   PHOS 2.3     Lab Results   Component Value Date    CALCIUM 8.7 12/31/2024    CAION 1.29 (H) 12/29/2024    PHOS 2.3 12/31/2024     Lab Results   Component Value Date    IRON 25 (L) 12/27/2024    TIBC 188 (L) 12/27/2024    FERRITIN 266.47 (H) 12/26/2024       Medications:  Medication list was reviewed and changes noted under Assessment/Plan.    Diagnostic Results:    Reviewed most recent CT/US/Echo/MRI    ASSESSMENT/PLAN:   1   CKD 3b  2   Diastolic dysfunction   3    Cardiorenal   4   hyponatremia  5   Anemia  6   HTN  7   urinary retention , cook was placed in  8   UTI  9   chronic O2 supplement      Plan   kphos 20 mmol x 1            Labs in AM

## 2024-12-31 NOTE — PROGRESS NOTES
[facility] Hospital Medicine  Progress Note    Patient Name: Ev Alberts  MRN: [id number]  Patient Class: IP- Inpatient   Admission Date: 12/26/2024  Length of Stay: 5 days  Attending Physician: Kamaljit Chaney MD  Primary Care Provider: Santi Walters MD        Subjective     Principal Problem:Acute on chronic congestive heart failure        HPI:  88 y/o F with a PMH of diastolic heart failure LVEF 50-55%, HTN, chronic anemia, paroxysmal A-fib on eliquis, and CKD stage III followed by Dr. Chávez , chronic respiratory failure on 2 L NC at home, dementia and glaucoma presented to the ED with complains of bilateral leg swelling, generalized weakness and deconditioning post hospital discharge. Patient explains that she is bed bound and lives at home with her son and  who take care of her. Patient was recently admitted at this facility for worsening generalized weakness, bilateral leg swelling and urinary retention and discharged home with an indwelling cook, follow up appointment with urology and private sitters and home care with [facility]. Patient denies black or blood in her tool, denies chest pain, denies shortness of breath, denies known fever.     Initial ED vitals: Temp 98.1, HR 90, Resp 18, /58, O2 Sat 98% on RA. ED work up revealed WBC 7.39, H&H 8.7 and 26.8, , K 3.4, BUN 30.3, Creatinine 1.26, .3, Troponin 0.048, Lactic acid 1.1, TSH 3.335 Influenza, COVID, and RSV swab negative, Respiratory panel negative, UA positive for blood, leukocyte esterase, RBC, WBC, and bacteria, X-ray chest read enlarged cardiac silhouette without overt edema, EKG read ventricular paced rhythm at 62 bmp. Cardiology was consulted in the ED and patient was admitted to Hospital Medicine.    Overview/Hospital Course:  12/31/24-No changes today.  Will continue with current treatment.      Interval History:     Review of Systems   Unable to perform  ROS: Dementia     Objective:     Vital Signs (Most Recent):  Temp: 97.5 °F (36.4 °C) (12/31/24 1106)  Pulse: (!) 52 (12/31/24 1106)  Resp: 18 (12/31/24 0405)  BP: (!) 106/52 (12/31/24 1106)  SpO2: 97 % (12/31/24 1106) Vital Signs (24h Range):  Temp:  [97.5 °F (36.4 °C)-99 °F (37.2 °C)] 97.5 °F (36.4 °C)  Pulse:  [52-63] 52  Resp:  [18] 18  SpO2:  [93 %-97 %] 97 %  BP: (106-143)/(52-75) 106/52     Weight: 74.5 kg (164 lb 3.9 oz)  Body mass index is 27.33 kg/m².    Intake/Output Summary (Last 24 hours) at 12/31/2024 1318  Last data filed at 12/31/2024 0634  Gross per 24 hour   Intake --   Output 1250 ml   Net -1250 ml         Physical Exam  Constitutional:       General: She is awake.      Appearance: Normal appearance. She is normal weight.   HENT:      Head: Normocephalic and atraumatic.      Nose: Nose normal.      Mouth/Throat:      Mouth: Mucous membranes are moist.      Pharynx: Oropharynx is clear.   Eyes:      Extraocular Movements: Extraocular movements intact.      Conjunctiva/sclera: Conjunctivae normal.      Pupils: Pupils are equal, round, and reactive to light.   Cardiovascular:      Rate and Rhythm: Normal rate and regular rhythm.      Pulses: Normal pulses.      Heart sounds: Normal heart sounds.   Pulmonary:      Effort: Pulmonary effort is normal.      Breath sounds: Normal breath sounds.   Abdominal:      General: Bowel sounds are normal.   Musculoskeletal:         General: Normal range of motion.      Cervical back: Normal range of motion and neck supple.   Skin:     General: Skin is warm and dry.      Capillary Refill: Capillary refill takes 2 to 3 seconds.   Neurological:      Mental Status: She is disoriented.   Psychiatric:         Cognition and Memory: Cognition is impaired. Memory is impaired. She exhibits impaired recent memory and impaired remote memory.             Significant Labs: All pertinent labs within the past 24 hours have been reviewed.  BMP:   Recent Labs   Lab 12/31/24  7073    *   K 3.6   CL 99   CO2 25   BUN 42.9*   CREATININE 1.62*   CALCIUM 8.7   MG 2.10     CBC:   Recent Labs   Lab 12/30/24  0717   WBC 8.20   HGB 8.1*   HCT 23.3*   *     CMP:   Recent Labs   Lab 12/30/24  0717 12/31/24  0403   * 131*   K 4.0 3.6    99   CO2 24 25   BUN 44.6* 42.9*   CREATININE 1.82* 1.62*   CALCIUM 8.7 8.7   ALBUMIN  --  2.4*     Magnesium:   Recent Labs   Lab 12/31/24  0403   MG 2.10       Significant Imaging: I have reviewed all pertinent imaging results/findings within the past 24 hours.    Assessment and Plan     * Acute on chronic congestive heart failure  Patient has Diastolic (HFpEF) heart failure that is Acute on chronic. On presentation their CHF was well compensated. Most recent BNP and echo results are listed below.  Recent Labs     12/29/24  0700 12/31/24  0403   .3* 477.0*     Latest ECHO  Results for orders placed during the hospital encounter of 12/26/24    Echo    Interpretation Summary    Left Ventricle: The left ventricle is normal in size. Moderately increased wall thickness. Normal wall motion. Septal flattening in systole consistent with right ventricular pressure overload. There is normal systolic function with a visually estimated ejection fraction of 55 - 60%. There is normal diastolic function.    Right Ventricle: Moderate right ventricular enlargement. Systolic function is moderately reduced. TAPSE is 1.19 cm. Pacemaker lead present in the ventricle.    Left Atrium: Left atrium is moderately dilated.    Right Atrium: Right atrium is severely dilated. Lead present in the right atrium.    Mitral Valve: There is mild regurgitation.    Tricuspid Valve: There is severe regurgitation. There is moderate pulmonary hypertension.    Pulmonic Valve: There is mild regurgitation.    Pulmonary Artery: There is moderate pulmonary hypertension. The estimated pulmonary artery systolic pressure is 47 mmHg.    IVC/SVC: Elevated venous pressure at 15  mmHg.    Current Heart Failure Medications  furosemide injection 60 mg, Every 12 hours, Intravenous    Plan  - Monitor strict I&Os and daily weights.    - Place on telemetry  - Low sodium diet  - Place on fluid restriction of 1.5 L.   - Cardiology has been consulted  - The patient's volume status is improving but not at their baseline as indicated by edema and shortness of breath  - .        Acute cystitis without hematuria  Resume treatment      Severe malnutrition  Nutrition consulted. Most recent weight and BMI monitored-     Measurements:  Wt Readings from Last 1 Encounters:   12/30/24 74.5 kg (164 lb 3.9 oz)   Body mass index is 27.33 kg/m².    Patient has been screened and assessed by RD.    Malnutrition Type:  Context:    Level:      Malnutrition Characteristic Summary:       Interventions/Recommendations (treatment strategy):           VTE Risk Mitigation (From admission, onward)           Ordered     apixaban tablet 5 mg  2 times daily         12/29/24 0855     Place sequential compression device  Until discontinued         12/26/24 2046     Reason for No Pharmacological VTE Prophylaxis  Once        Question:  Reasons:  Answer:  Already adequately anticoagulated on oral Anticoagulants    12/26/24 2046     IP VTE HIGH RISK PATIENT  Once         12/26/24 1707                DVT prophylaxis  Decrease lasix  Follow labs  therapy    Discharge Planning   LUISITO:      Code Status: Partial Code   Medical Readiness for Discharge Date:   Discharge Plan A: Skilled Nursing Facility (The patient and her family were given a choice list through [facility] for review re: (Swing Bed/SNF) services. They are requesting referrals be sent to: 1. [facility] 2. [facility] 3. [facility].)   Discharge Delays: None known at this time            Please place Justification for DME        Quentin Mcgraw MD  Department of Hospital Medicine   [facility]

## 2024-12-31 NOTE — SUBJECTIVE & OBJECTIVE
Interval History:     Review of Systems   Unable to perform ROS: Dementia     Objective:     Vital Signs (Most Recent):  Temp: 97.5 °F (36.4 °C) (12/31/24 1106)  Pulse: (!) 52 (12/31/24 1106)  Resp: 18 (12/31/24 0405)  BP: (!) 106/52 (12/31/24 1106)  SpO2: 97 % (12/31/24 1106) Vital Signs (24h Range):  Temp:  [97.5 °F (36.4 °C)-99 °F (37.2 °C)] 97.5 °F (36.4 °C)  Pulse:  [52-63] 52  Resp:  [18] 18  SpO2:  [93 %-97 %] 97 %  BP: (106-143)/(52-75) 106/52     Weight: 74.5 kg (164 lb 3.9 oz)  Body mass index is 27.33 kg/m².    Intake/Output Summary (Last 24 hours) at 12/31/2024 1318  Last data filed at 12/31/2024 0634  Gross per 24 hour   Intake --   Output 1250 ml   Net -1250 ml         Physical Exam  Constitutional:       General: She is awake.      Appearance: Normal appearance. She is normal weight.   HENT:      Head: Normocephalic and atraumatic.      Nose: Nose normal.      Mouth/Throat:      Mouth: Mucous membranes are moist.      Pharynx: Oropharynx is clear.   Eyes:      Extraocular Movements: Extraocular movements intact.      Conjunctiva/sclera: Conjunctivae normal.      Pupils: Pupils are equal, round, and reactive to light.   Cardiovascular:      Rate and Rhythm: Normal rate and regular rhythm.      Pulses: Normal pulses.      Heart sounds: Normal heart sounds.   Pulmonary:      Effort: Pulmonary effort is normal.      Breath sounds: Normal breath sounds.   Abdominal:      General: Bowel sounds are normal.   Musculoskeletal:         General: Normal range of motion.      Cervical back: Normal range of motion and neck supple.   Skin:     General: Skin is warm and dry.      Capillary Refill: Capillary refill takes 2 to 3 seconds.   Neurological:      Mental Status: She is disoriented.   Psychiatric:         Cognition and Memory: Cognition is impaired. Memory is impaired. She exhibits impaired recent memory and impaired remote memory.             Significant Labs: All pertinent labs within the past 24 hours have  been reviewed.  BMP:   Recent Labs   Lab 12/31/24  0403   *   K 3.6   CL 99   CO2 25   BUN 42.9*   CREATININE 1.62*   CALCIUM 8.7   MG 2.10     CBC:   Recent Labs   Lab 12/30/24  0717   WBC 8.20   HGB 8.1*   HCT 23.3*   *     CMP:   Recent Labs   Lab 12/30/24  0717 12/31/24  0403   * 131*   K 4.0 3.6    99   CO2 24 25   BUN 44.6* 42.9*   CREATININE 1.82* 1.62*   CALCIUM 8.7 8.7   ALBUMIN  --  2.4*     Magnesium:   Recent Labs   Lab 12/31/24  0403   MG 2.10       Significant Imaging: I have reviewed all pertinent imaging results/findings within the past 24 hours.

## 2024-12-31 NOTE — ASSESSMENT & PLAN NOTE
Patient has Diastolic (HFpEF) heart failure that is Acute on chronic. On presentation their CHF was well compensated. Most recent BNP and echo results are listed below.  Recent Labs     12/29/24  0700 12/31/24  0403   .3* 477.0*     Latest ECHO  Results for orders placed during the hospital encounter of 12/26/24    Echo    Interpretation Summary    Left Ventricle: The left ventricle is normal in size. Moderately increased wall thickness. Normal wall motion. Septal flattening in systole consistent with right ventricular pressure overload. There is normal systolic function with a visually estimated ejection fraction of 55 - 60%. There is normal diastolic function.    Right Ventricle: Moderate right ventricular enlargement. Systolic function is moderately reduced. TAPSE is 1.19 cm. Pacemaker lead present in the ventricle.    Left Atrium: Left atrium is moderately dilated.    Right Atrium: Right atrium is severely dilated. Lead present in the right atrium.    Mitral Valve: There is mild regurgitation.    Tricuspid Valve: There is severe regurgitation. There is moderate pulmonary hypertension.    Pulmonic Valve: There is mild regurgitation.    Pulmonary Artery: There is moderate pulmonary hypertension. The estimated pulmonary artery systolic pressure is 47 mmHg.    IVC/SVC: Elevated venous pressure at 15 mmHg.    Current Heart Failure Medications  furosemide injection 60 mg, Every 12 hours, Intravenous    Plan  - Monitor strict I&Os and daily weights.    - Place on telemetry  - Low sodium diet  - Place on fluid restriction of 1.5 L.   - Cardiology has been consulted  - The patient's volume status is improving but not at their baseline as indicated by edema and shortness of breath  - .

## 2024-12-31 NOTE — PT/OT/SLP PROGRESS
Occupational Therapy   Treatment    Name: Ev Alberts  MRN: [id number]  Admitting Diagnosis:  Acute on chronic congestive heart failure      Recommendations:     Recommended therapy intensity at discharge: Moderate Intensity Therapy   Discharge Equipment Recommendations:  to be determined by next level of care  Barriers to discharge:  Other (Comment) (impaired functional mobility from baseline)    Assessment:     Ev Alberts is a 89 y.o. female with a medical diagnosis of  acute HF exacerbation, NSTEMI, catheter associated UTI, PAF, acute anemia, hx of dementia.  She presents with the following performance deficits affecting function: weakness, impaired endurance, impaired self care skills, impaired functional mobility, gait instability, impaired balance, decreased upper extremity function, decreased lower extremity function, pain, decreased safety awareness.     Pt much more alert today than initial OT evaluation, improved activity tolerance noted as pt able to transfer to chair and sit up for 1.5 hours this date.     Rehab Prognosis:  Good; patient would benefit from acute skilled OT services to address these deficits and reach maximum level of function.       Plan:     Patient to be seen 4 x/week to address the above listed problems via self-care/home management, therapeutic activities  Plan of Care Expires: 01/27/25  Plan of Care Reviewed with: patient, spouse    Subjective     Pain/Comfort:  Pain Rating 1: other (see comments) (back pain; did not rate)  Pain Addressed 1: Cessation of Activity, Nurse notified    Objective:     Communicated with: RN prior to session.  Patient found up in chair with cook catheter, pulse ox (continuous), telemetry, peripheral IV upon OT entry to room.    General Precautions: Standard, fall    Orthopedic Precautions:N/A  Braces: N/A  Respiratory Status: Room air     Occupational Performance:     Bed Mobility:    Patient completed Sit to Supine with maximal assistance  x2    Functional Mobility/Transfers:  Patient completed Sit <> Stand Transfer with moderate assistance x2 with hand-held assist   Patient completed Bed <> Chair Transfer using Stand Pivot technique with maximal assistance x2 with hand-held assist, pt unable to pivot hips and RLE toward bed    Activities of Daily Living:  Pt declining all ADLs at this time, requesting back to bed as she completed her ADLs already    Therapeutic Activities:  Pt fatigued from sitting in chair for 1.5 hours, requesting back to bed and declining further activity. Pt positioned appropriately at bed level for skin protection.      Therapeutic Positioning    OT interventions performed during the course of today's session in an effort to prevent and/or reduce acquired pressure injuries:   Education was provided on benefits of and recommendations for therapeutic positioning  Therapeutic positioning was provided at the conclusion of session to offload all bony prominences for the prevention and/or reduction of pressure injuries    Skin assessment: known sacral and R heel wound    [facility] 6 Click ADL: 15    Patient Education:  Patient and spouse were provided with verbal education education regarding OT role/goals/POC, fall prevention, safety awareness, and pressure ulcer prevention.  Understanding was verbalized, however additional teaching warranted.    Patient left right sidelying with all lines intact, call button in reach, wedge under L side, pressure relief boots, RN notified, and spouse present.    GOALS:   Multidisciplinary Problems       Occupational Therapy Goals          Problem: Occupational Therapy    Goal Priority Disciplines Outcome Interventions   Occupational Therapy Goal     OT, PT/OT Progressing    Description: Goals to be met by: 1/27/25     Patient will increase functional independence with ADLs by performing:    Feeding with Set-up Assistance.  UE Dressing with Minimal Assistance.  LE Dressing with Minimal  Assistance.  Grooming while seated at sink with Stand-by Assistance.  Toileting from bedside commode with Minimal Assistance for hygiene and clothing management.   Toilet transfer to bedside commode with Minimal Assistance.                       Time Tracking:     OT Date of Treatment: 12/31/24  OT Start Time: 1222  OT Stop Time: 1236  OT Total Time (min): 14 min    Billable Minutes:Therapeutic Activity 14 mins    OT/SARANYA: OT     Number of SARANYA visits since last OT visit: 1    12/31/2024

## 2025-01-01 LAB
ALBUMIN SERPL-MCNC: 2.3 G/DL (ref 3.4–4.8)
ALBUMIN/GLOB SERPL: 0.7 RATIO (ref 1.1–2)
ALP SERPL-CCNC: 94 UNIT/L (ref 40–150)
ALT SERPL-CCNC: 8 UNIT/L (ref 0–55)
ANION GAP SERPL CALC-SCNC: 5 MEQ/L
AST SERPL-CCNC: 17 UNIT/L (ref 5–34)
BASOPHILS # BLD AUTO: 0.04 X10(3)/MCL
BASOPHILS NFR BLD AUTO: 0.5 %
BILIRUB SERPL-MCNC: 1.4 MG/DL
BUN SERPL-MCNC: 38.5 MG/DL (ref 9.8–20.1)
CALCIUM SERPL-MCNC: 8.6 MG/DL (ref 8.4–10.2)
CHLORIDE SERPL-SCNC: 101 MMOL/L (ref 98–107)
CO2 SERPL-SCNC: 28 MMOL/L (ref 23–31)
CREAT SERPL-MCNC: 1.49 MG/DL (ref 0.55–1.02)
CREAT/UREA NIT SERPL: 26
EOSINOPHIL # BLD AUTO: 0.18 X10(3)/MCL (ref 0–0.9)
EOSINOPHIL NFR BLD AUTO: 2.4 %
ERYTHROCYTE [DISTWIDTH] IN BLOOD BY AUTOMATED COUNT: 18.6 % (ref 11.5–17)
GFR SERPLBLD CREATININE-BSD FMLA CKD-EPI: 34 ML/MIN/1.73/M2
GLOBULIN SER-MCNC: 3.2 GM/DL (ref 2.4–3.5)
GLUCOSE SERPL-MCNC: 95 MG/DL (ref 82–115)
HCT VFR BLD AUTO: 22.5 % (ref 37–47)
HGB BLD-MCNC: 7.5 G/DL (ref 12–16)
IMM GRANULOCYTES # BLD AUTO: 0.03 X10(3)/MCL (ref 0–0.04)
IMM GRANULOCYTES NFR BLD AUTO: 0.4 %
LYMPHOCYTES # BLD AUTO: 1.68 X10(3)/MCL (ref 0.6–4.6)
LYMPHOCYTES NFR BLD AUTO: 22 %
MAGNESIUM SERPL-MCNC: 2 MG/DL (ref 1.6–2.6)
MCH RBC QN AUTO: 32.6 PG (ref 27–31)
MCHC RBC AUTO-ENTMCNC: 33.3 G/DL (ref 33–36)
MCV RBC AUTO: 97.8 FL (ref 80–94)
MONOCYTES # BLD AUTO: 0.91 X10(3)/MCL (ref 0.1–1.3)
MONOCYTES NFR BLD AUTO: 11.9 %
NEUTROPHILS # BLD AUTO: 4.78 X10(3)/MCL (ref 2.1–9.2)
NEUTROPHILS NFR BLD AUTO: 62.8 %
NRBC BLD AUTO-RTO: 0 %
PLATELET # BLD AUTO: 113 X10(3)/MCL (ref 130–400)
PMV BLD AUTO: 9.3 FL (ref 7.4–10.4)
POCT GLUCOSE: 382 MG/DL (ref 70–110)
POTASSIUM SERPL-SCNC: 3.5 MMOL/L (ref 3.5–5.1)
PROT SERPL-MCNC: 5.5 GM/DL (ref 5.8–7.6)
RBC # BLD AUTO: 2.3 X10(6)/MCL (ref 4.2–5.4)
SODIUM SERPL-SCNC: 134 MMOL/L (ref 136–145)
WBC # BLD AUTO: 7.62 X10(3)/MCL (ref 4.5–11.5)

## 2025-01-01 PROCEDURE — 21400001 HC TELEMETRY ROOM

## 2025-01-01 PROCEDURE — 83735 ASSAY OF MAGNESIUM: CPT | Performed by: INTERNAL MEDICINE

## 2025-01-01 PROCEDURE — 85025 COMPLETE CBC W/AUTO DIFF WBC: CPT | Performed by: INTERNAL MEDICINE

## 2025-01-01 PROCEDURE — 80053 COMPREHEN METABOLIC PANEL: CPT | Performed by: INTERNAL MEDICINE

## 2025-01-01 PROCEDURE — 83880 ASSAY OF NATRIURETIC PEPTIDE: CPT | Performed by: INTERNAL MEDICINE

## 2025-01-01 PROCEDURE — 25000003 PHARM REV CODE 250

## 2025-01-01 PROCEDURE — 63600175 PHARM REV CODE 636 W HCPCS: Performed by: INTERNAL MEDICINE

## 2025-01-01 PROCEDURE — 25000003 PHARM REV CODE 250: Performed by: STUDENT IN AN ORGANIZED HEALTH CARE EDUCATION/TRAINING PROGRAM

## 2025-01-01 PROCEDURE — 25000003 PHARM REV CODE 250: Performed by: INTERNAL MEDICINE

## 2025-01-01 PROCEDURE — 36415 COLL VENOUS BLD VENIPUNCTURE: CPT | Performed by: INTERNAL MEDICINE

## 2025-01-01 PROCEDURE — 11000001 HC ACUTE MED/SURG PRIVATE ROOM

## 2025-01-01 RX ORDER — INSULIN ASPART 100 [IU]/ML
0-5 INJECTION, SOLUTION INTRAVENOUS; SUBCUTANEOUS
Status: DISCONTINUED | OUTPATIENT
Start: 2025-01-01 | End: 2025-01-29 | Stop reason: HOSPADM

## 2025-01-01 RX ORDER — GLUCAGON 1 MG
1 KIT INJECTION
Status: DISCONTINUED | OUTPATIENT
Start: 2025-01-01 | End: 2025-01-29 | Stop reason: HOSPADM

## 2025-01-01 RX ORDER — IBUPROFEN 200 MG
24 TABLET ORAL
Status: DISCONTINUED | OUTPATIENT
Start: 2025-01-01 | End: 2025-01-29 | Stop reason: HOSPADM

## 2025-01-01 RX ORDER — IBUPROFEN 200 MG
16 TABLET ORAL
Status: DISCONTINUED | OUTPATIENT
Start: 2025-01-01 | End: 2025-01-29 | Stop reason: HOSPADM

## 2025-01-01 RX ADMIN — PANTOPRAZOLE SODIUM 40 MG: 40 TABLET, DELAYED RELEASE ORAL at 08:01

## 2025-01-01 RX ADMIN — CEFEPIME 1 G: 1 INJECTION, POWDER, FOR SOLUTION INTRAMUSCULAR; INTRAVENOUS at 10:01

## 2025-01-01 RX ADMIN — HYOSCYAMINE SULFATE 0.12 MG: 0.12 TABLET SUBLINGUAL at 08:01

## 2025-01-01 RX ADMIN — FERROUS SULFATE TAB 325 MG (65 MG ELEMENTAL FE) 1 EACH: 325 (65 FE) TAB at 08:01

## 2025-01-01 RX ADMIN — LUBIPROSTONE 24 MCG: 24 CAPSULE, GELATIN COATED ORAL at 03:01

## 2025-01-01 RX ADMIN — APIXABAN 5 MG: 5 TABLET, FILM COATED ORAL at 08:01

## 2025-01-01 RX ADMIN — ACETAMINOPHEN 650 MG: 325 TABLET, FILM COATED ORAL at 03:01

## 2025-01-01 RX ADMIN — ACETAMINOPHEN 650 MG: 325 TABLET, FILM COATED ORAL at 10:01

## 2025-01-01 RX ADMIN — PANTOPRAZOLE SODIUM 40 MG: 40 TABLET, DELAYED RELEASE ORAL at 10:01

## 2025-01-01 RX ADMIN — ALLOPURINOL 300 MG: 300 TABLET ORAL at 10:01

## 2025-01-01 RX ADMIN — HYOSCYAMINE SULFATE 0.12 MG: 0.12 TABLET SUBLINGUAL at 10:01

## 2025-01-01 RX ADMIN — POLYETHYLENE GLYCOL 3350 17 G: 17 POWDER, FOR SOLUTION ORAL at 10:01

## 2025-01-01 RX ADMIN — LEVOTHYROXINE SODIUM 25 MCG: 25 TABLET ORAL at 06:01

## 2025-01-01 RX ADMIN — RIVASTIGMINE TARTRATE 1.5 MG: 1.5 CAPSULE ORAL at 08:01

## 2025-01-01 RX ADMIN — INSULIN ASPART 5 UNITS: 100 INJECTION, SOLUTION INTRAVENOUS; SUBCUTANEOUS at 02:01

## 2025-01-01 RX ADMIN — FUROSEMIDE 60 MG: 10 INJECTION, SOLUTION INTRAMUSCULAR; INTRAVENOUS at 10:01

## 2025-01-01 RX ADMIN — FUROSEMIDE 60 MG: 10 INJECTION, SOLUTION INTRAMUSCULAR; INTRAVENOUS at 08:01

## 2025-01-01 RX ADMIN — LUBIPROSTONE 24 MCG: 24 CAPSULE, GELATIN COATED ORAL at 10:01

## 2025-01-01 RX ADMIN — Medication 3 MG: at 10:01

## 2025-01-01 RX ADMIN — HYOSCYAMINE SULFATE 0.12 MG: 0.12 TABLET SUBLINGUAL at 03:01

## 2025-01-01 RX ADMIN — RIVASTIGMINE TARTRATE 1.5 MG: 1.5 CAPSULE ORAL at 10:01

## 2025-01-01 RX ADMIN — CETIRIZINE HYDROCHLORIDE 10 MG: 10 TABLET, FILM COATED ORAL at 10:01

## 2025-01-01 RX ADMIN — FERROUS SULFATE TAB 325 MG (65 MG ELEMENTAL FE) 1 EACH: 325 (65 FE) TAB at 10:01

## 2025-01-01 RX ADMIN — APIXABAN 5 MG: 5 TABLET, FILM COATED ORAL at 10:01

## 2025-01-01 RX ADMIN — LATANOPROST 1 DROP: 50 SOLUTION OPHTHALMIC at 08:01

## 2025-01-01 RX ADMIN — ACETAMINOPHEN 650 MG: 325 TABLET, FILM COATED ORAL at 11:01

## 2025-01-01 RX ADMIN — POTASSIUM BICARBONATE 20 MEQ: 391 TABLET, EFFERVESCENT ORAL at 10:01

## 2025-01-01 NOTE — PROGRESS NOTES
[facility] Hospital Medicine Progress Note        Chief Complaint: Inpatient Follow-up for .  Renal function better.  CHF, MICHEL    HPI:     Interval Hx:     Objective/p alert, oriented, resting in the bed.   at bedside.  Patient reports no new complaints or concerns.  Doing well on room air today.  She is yet to ambulate.  Motivated to move with physical therapy.  Hemoglobin 7.5 today, mild thrombocytopenia noted      hysical exam:  Vitals:    12/31/24 2054 12/31/24 2334 01/01/25 0333 01/01/25 0812   BP:  (!) 122/53  104/66   Pulse:  60  (!) 50   Resp:    20   Temp: 98.2 °F (36.8 °C) 98.6 °F (37 °C) 98.6 °F (37 °C) 98.5 °F (36.9 °C)   TempSrc:  Axillary  Oral   SpO2:  95%  98%   Weight:       Height:         General: In no acute distress, afebrile  Respiratory: Clear to auscultation bilaterally  Cardiovascular: S1, S2, no appreciable murmur  Abdomen: Soft, nontender, BS +  MSK: Warm, no lower extremity edema, no clubbing or cyanosis  Neurologic: Alert and oriented x4, moving all extremities with good strength     Lab Results   Component Value Date     (L) 01/01/2025    K 3.5 01/01/2025     01/01/2025    CO2 28 01/01/2025    BUN 38.5 (H) 01/01/2025    CREATININE 1.49 (H) 01/01/2025    CALCIUM 8.6 01/01/2025    EGFRNONAA 41 09/06/2019      Lab Results   Component Value Date    ALT 8 01/01/2025    AST 17 01/01/2025    ALKPHOS 94 01/01/2025    BILITOT 1.4 01/01/2025      Lab Results   Component Value Date    WBC 7.62 01/01/2025    HGB 7.5 (L) 01/01/2025    HCT 22.5 (L) 01/01/2025    MCV 97.8 (H) 01/01/2025     (L) 01/01/2025           Medications:   allopurinoL  300 mg Oral Daily    apixaban  5 mg Oral BID    ceFEPime IV (PEDS and ADULTS)  1 g Intravenous Q12H    cetirizine  10 mg Oral Daily    ferrous sulfate  1 tablet Oral BID    furosemide (LASIX) injection  60 mg Intravenous Q12H    hyoscyamine  0.125 mg Sublingual TID    latanoprost  1 drop Both Eyes QHS     levothyroxine  25 mcg Oral Before breakfast    lubiprostone  24 mcg Oral BID WM    pantoprazole  40 mg Oral BID    potassium bicarbonate  20 mEq Oral Daily    rivastigmine tartrate  1.5 mg Oral BID        Current Facility-Administered Medications:     acetaminophen, 650 mg, Oral, Q4H PRN    albuterol, 1 puff, Inhalation, PRN    aluminum-magnesium hydroxide-simethicone, 30 mL, Oral, QID PRN    bisacodyL, 10 mg, Rectal, Daily PRN    glycopyrrolate, 1 mg, Oral, BID PRN    melatonin, 6 mg, Oral, Nightly PRN    nitroGLYCERIN, 0.4 mg, Sublingual, Q5 Min PRN    ondansetron, 4 mg, Intravenous, Q8H PRN    pneumoc 20-rod conj-dip cr(PF), 0.5 mL, Intramuscular, vaccine x 1 dose    polyethylene glycol, 17 g, Oral, BID PRN    sodium chloride 0.9%, 10 mL, Intravenous, PRN     Assessment/Plan:    Acute HFpEF exacerbation, 55%   Severe TR, moderate pulmonary hypertension  Shortness a breath due to above   NSTEMI type 2 from above-improving   Paroxysmal AFib on Eliquis  Pseudomonas UTI-POA   Urinary retention requiring Grey  MICHEL on CKD stage IIIB   Anemia of chronic disease, iron-deficiency     HX:  O2 dependent chronic respiratory failure (improving), dementia, HTN, anemia, glaucoma     Plan:   -much better from respiratory standpoint.  Encourage mobility with PT.  Recommended placement   -renal function better.  Nephrology on board, adjusting diuresis.  appreciate assistance  -complete cefepime for Pseudomonas UTI  -discontinue Grey when appropriate  -HGB stable.  We will transfuse as needed.  Continue Eliquis for now  Otherwise continue current medical management    Funmilayo Carrillo MD

## 2025-01-01 NOTE — PROGRESS NOTES
Progress Note  Nephrology    Admit Date: 12/26/2024   LOS: 6 days     SUBJECTIVE:     Follow-up For:  CKD 3b    Interval History:  89 Y/O female with history of CHF , Diastolic dysfunction , Chronic Anemia , PAF , CKD 3b , chronic respiratory failure  on O 2 supplement at home admitted to hospital for weakness and swelling and edema  also was very lethargic   Currently doing better and denies any complaint    Review of Systems:  Constitutional: No fever or chills  Respiratory: No cough or shortness of breath  Cardiovascular: No chest pain or palpitations  Gastrointestinal: No nausea or vomiting  Neurological: No confusion or weakness    OBJECTIVE:     Vital Signs Range (Last 24H):  Vitals:    01/01/25 1152   BP: (!) 111/53   Pulse: 66   Resp: 20   Temp: 98.1 °F (36.7 °C)       Temp:  [98.1 °F (36.7 °C)-98.6 °F (37 °C)]   Pulse:  [50-66]   Resp:  [20]   BP: (102-122)/(53-66)   SpO2:  [95 %-98 %]     I & O (Last 24H):  Intake/Output Summary (Last 24 hours) at 1/1/2025 1234  Last data filed at 1/1/2025 0321  Gross per 24 hour   Intake --   Output 1800 ml   Net -1800 ml       Physical Exam:  Alert , oriented , in NAD  Head   normal   Neck   supple   Chest   symmetric   Lungs   clear   Heart   RRR  Abdomen   soft non tender   Ext   no edema today     Laboratory Data:    Recent Labs   Lab 12/31/24  0403 01/01/25  0531   * 134*   K 3.6 3.5   CL 99 101   CO2 25 28   BUN 42.9* 38.5*   CREATININE 1.62* 1.49*   GLUCOSE 106 95   CALCIUM 8.7 8.6   PHOS 2.3  --      Lab Results   Component Value Date    CALCIUM 8.6 01/01/2025    CAION 1.29 (H) 12/29/2024    PHOS 2.3 12/31/2024     Lab Results   Component Value Date    IRON 25 (L) 12/27/2024    TIBC 188 (L) 12/27/2024    FERRITIN 266.47 (H) 12/26/2024       Medications:  Medication list was reviewed and changes noted under Assessment/Plan.    Diagnostic Results:    Reviewed most recent CT/US/Echo/MRI    ASSESSMENT/PLAN:   1   CKD 3b  2   Diastolic dysfunction   3    cardiorenal   4   hyponatremia   5   Anemia  / Iron deficiency   6   HTN  7   urinary Retentiobn , chronic cook  8   UTI  9   Chronic O2 supplement      Plan   labs in AM

## 2025-01-02 LAB
ALBUMIN SERPL-MCNC: 2.4 G/DL (ref 3.4–4.8)
ALBUMIN/GLOB SERPL: 0.6 RATIO (ref 1.1–2)
ALP SERPL-CCNC: 103 UNIT/L (ref 40–150)
ALT SERPL-CCNC: 8 UNIT/L (ref 0–55)
ANION GAP SERPL CALC-SCNC: 7 MEQ/L
AST SERPL-CCNC: 18 UNIT/L (ref 5–34)
BASOPHILS # BLD AUTO: 0.03 X10(3)/MCL
BASOPHILS NFR BLD AUTO: 0.4 %
BILIRUB SERPL-MCNC: 1.6 MG/DL
BUN SERPL-MCNC: 30.9 MG/DL (ref 9.8–20.1)
CALCIUM SERPL-MCNC: 8.8 MG/DL (ref 8.4–10.2)
CHLORIDE SERPL-SCNC: 101 MMOL/L (ref 98–107)
CO2 SERPL-SCNC: 27 MMOL/L (ref 23–31)
COLOR STL: NORMAL
CONSISTENCY STL: NORMAL
CREAT SERPL-MCNC: 1.38 MG/DL (ref 0.55–1.02)
CREAT/UREA NIT SERPL: 22
EOSINOPHIL # BLD AUTO: 0.12 X10(3)/MCL (ref 0–0.9)
EOSINOPHIL NFR BLD AUTO: 1.4 %
ERYTHROCYTE [DISTWIDTH] IN BLOOD BY AUTOMATED COUNT: 18.7 % (ref 11.5–17)
EST. AVERAGE GLUCOSE BLD GHB EST-MCNC: 96.8 MG/DL
GFR SERPLBLD CREATININE-BSD FMLA CKD-EPI: 37 ML/MIN/1.73/M2
GLOBULIN SER-MCNC: 3.7 GM/DL (ref 2.4–3.5)
GLUCOSE SERPL-MCNC: 81 MG/DL (ref 82–115)
HBA1C MFR BLD: 5 %
HCT VFR BLD AUTO: 25.7 % (ref 37–47)
HEMOCCULT SP1 STL QL: NEGATIVE
HGB BLD-MCNC: 8.6 G/DL (ref 12–16)
IMM GRANULOCYTES # BLD AUTO: 0.03 X10(3)/MCL (ref 0–0.04)
IMM GRANULOCYTES NFR BLD AUTO: 0.4 %
LYMPHOCYTES # BLD AUTO: 2.1 X10(3)/MCL (ref 0.6–4.6)
LYMPHOCYTES NFR BLD AUTO: 25 %
MAGNESIUM SERPL-MCNC: 2 MG/DL (ref 1.6–2.6)
MCH RBC QN AUTO: 32.5 PG (ref 27–31)
MCHC RBC AUTO-ENTMCNC: 33.5 G/DL (ref 33–36)
MCV RBC AUTO: 97 FL (ref 80–94)
MONOCYTES # BLD AUTO: 1.07 X10(3)/MCL (ref 0.1–1.3)
MONOCYTES NFR BLD AUTO: 12.7 %
NEUTROPHILS # BLD AUTO: 5.06 X10(3)/MCL (ref 2.1–9.2)
NEUTROPHILS NFR BLD AUTO: 60.1 %
NRBC BLD AUTO-RTO: 0 %
PHOSPHATE SERPL-MCNC: 1.8 MG/DL (ref 2.3–4.7)
PLATELET # BLD AUTO: 137 X10(3)/MCL (ref 130–400)
PMV BLD AUTO: 9.8 FL (ref 7.4–10.4)
POCT GLUCOSE: 108 MG/DL (ref 70–110)
POCT GLUCOSE: 111 MG/DL (ref 70–110)
POCT GLUCOSE: 90 MG/DL (ref 70–110)
POTASSIUM SERPL-SCNC: 3.5 MMOL/L (ref 3.5–5.1)
PROT SERPL-MCNC: 6.1 GM/DL (ref 5.8–7.6)
RBC # BLD AUTO: 2.65 X10(6)/MCL (ref 4.2–5.4)
SODIUM SERPL-SCNC: 135 MMOL/L (ref 136–145)
WBC # BLD AUTO: 8.41 X10(3)/MCL (ref 4.5–11.5)

## 2025-01-02 PROCEDURE — 97530 THERAPEUTIC ACTIVITIES: CPT

## 2025-01-02 PROCEDURE — 25000003 PHARM REV CODE 250

## 2025-01-02 PROCEDURE — 83036 HEMOGLOBIN GLYCOSYLATED A1C: CPT | Performed by: INTERNAL MEDICINE

## 2025-01-02 PROCEDURE — 25000003 PHARM REV CODE 250: Performed by: INTERNAL MEDICINE

## 2025-01-02 PROCEDURE — 11000001 HC ACUTE MED/SURG PRIVATE ROOM

## 2025-01-02 PROCEDURE — 21400001 HC TELEMETRY ROOM

## 2025-01-02 PROCEDURE — 25000003 PHARM REV CODE 250: Performed by: STUDENT IN AN ORGANIZED HEALTH CARE EDUCATION/TRAINING PROGRAM

## 2025-01-02 PROCEDURE — 83735 ASSAY OF MAGNESIUM: CPT | Performed by: INTERNAL MEDICINE

## 2025-01-02 PROCEDURE — 63600175 PHARM REV CODE 636 W HCPCS: Mod: JZ,EC,TB | Performed by: INTERNAL MEDICINE

## 2025-01-02 PROCEDURE — 80053 COMPREHEN METABOLIC PANEL: CPT | Performed by: INTERNAL MEDICINE

## 2025-01-02 PROCEDURE — 36415 COLL VENOUS BLD VENIPUNCTURE: CPT | Performed by: INTERNAL MEDICINE

## 2025-01-02 PROCEDURE — 85025 COMPLETE CBC W/AUTO DIFF WBC: CPT | Performed by: INTERNAL MEDICINE

## 2025-01-02 PROCEDURE — 84100 ASSAY OF PHOSPHORUS: CPT | Performed by: INTERNAL MEDICINE

## 2025-01-02 PROCEDURE — 82272 OCCULT BLD FECES 1-3 TESTS: CPT | Performed by: INTERNAL MEDICINE

## 2025-01-02 PROCEDURE — 63600175 PHARM REV CODE 636 W HCPCS: Performed by: INTERNAL MEDICINE

## 2025-01-02 RX ORDER — SODIUM,POTASSIUM PHOSPHATES 280-250MG
1 POWDER IN PACKET (EA) ORAL
Status: COMPLETED | OUTPATIENT
Start: 2025-01-02 | End: 2025-01-02

## 2025-01-02 RX ORDER — RIVASTIGMINE TARTRATE 1.5 MG/1
1.5 CAPSULE ORAL DAILY
Status: DISCONTINUED | OUTPATIENT
Start: 2025-01-02 | End: 2025-01-29 | Stop reason: HOSPADM

## 2025-01-02 RX ORDER — RISPERIDONE 1 MG/1
2 TABLET ORAL NIGHTLY
Status: DISCONTINUED | OUTPATIENT
Start: 2025-01-02 | End: 2025-01-05

## 2025-01-02 RX ADMIN — APIXABAN 5 MG: 5 TABLET, FILM COATED ORAL at 11:01

## 2025-01-02 RX ADMIN — ACETAMINOPHEN 650 MG: 325 TABLET, FILM COATED ORAL at 04:01

## 2025-01-02 RX ADMIN — EPOETIN ALFA-EPBX 40000 UNITS: 40000 INJECTION, SOLUTION INTRAVENOUS; SUBCUTANEOUS at 08:01

## 2025-01-02 RX ADMIN — PANTOPRAZOLE SODIUM 40 MG: 40 TABLET, DELAYED RELEASE ORAL at 11:01

## 2025-01-02 RX ADMIN — POTASSIUM BICARBONATE 20 MEQ: 391 TABLET, EFFERVESCENT ORAL at 11:01

## 2025-01-02 RX ADMIN — FERROUS SULFATE TAB 325 MG (65 MG ELEMENTAL FE) 1 EACH: 325 (65 FE) TAB at 08:01

## 2025-01-02 RX ADMIN — APIXABAN 5 MG: 5 TABLET, FILM COATED ORAL at 08:01

## 2025-01-02 RX ADMIN — LUBIPROSTONE 24 MCG: 24 CAPSULE, GELATIN COATED ORAL at 06:01

## 2025-01-02 RX ADMIN — POTASSIUM BICARBONATE 25 MEQ: 977.5 TABLET, EFFERVESCENT ORAL at 02:01

## 2025-01-02 RX ADMIN — HYOSCYAMINE SULFATE 0.12 MG: 0.12 TABLET SUBLINGUAL at 11:01

## 2025-01-02 RX ADMIN — HYOSCYAMINE SULFATE 0.12 MG: 0.12 TABLET SUBLINGUAL at 08:01

## 2025-01-02 RX ADMIN — ALLOPURINOL 300 MG: 300 TABLET ORAL at 11:01

## 2025-01-02 RX ADMIN — POTASSIUM & SODIUM PHOSPHATES POWDER PACK 280-160-250 MG 1 PACKET: 280-160-250 PACK at 06:01

## 2025-01-02 RX ADMIN — POTASSIUM & SODIUM PHOSPHATES POWDER PACK 280-160-250 MG 1 PACKET: 280-160-250 PACK at 11:01

## 2025-01-02 RX ADMIN — LATANOPROST 1 DROP: 50 SOLUTION OPHTHALMIC at 08:01

## 2025-01-02 RX ADMIN — FUROSEMIDE 60 MG: 10 INJECTION, SOLUTION INTRAMUSCULAR; INTRAVENOUS at 11:01

## 2025-01-02 RX ADMIN — HYOSCYAMINE SULFATE 0.12 MG: 0.12 TABLET SUBLINGUAL at 04:01

## 2025-01-02 RX ADMIN — FUROSEMIDE 60 MG: 10 INJECTION, SOLUTION INTRAMUSCULAR; INTRAVENOUS at 08:01

## 2025-01-02 RX ADMIN — PANTOPRAZOLE SODIUM 40 MG: 40 TABLET, DELAYED RELEASE ORAL at 08:01

## 2025-01-02 RX ADMIN — RIVASTIGMINE TARTRATE 1.5 MG: 1.5 CAPSULE ORAL at 11:01

## 2025-01-02 RX ADMIN — POTASSIUM & SODIUM PHOSPHATES POWDER PACK 280-160-250 MG 1 PACKET: 280-160-250 PACK at 04:01

## 2025-01-02 RX ADMIN — LEVOTHYROXINE SODIUM 25 MCG: 25 TABLET ORAL at 04:01

## 2025-01-02 RX ADMIN — LUBIPROSTONE 24 MCG: 24 CAPSULE, GELATIN COATED ORAL at 05:01

## 2025-01-02 RX ADMIN — ACETAMINOPHEN 650 MG: 325 TABLET, FILM COATED ORAL at 08:01

## 2025-01-02 RX ADMIN — FERROUS SULFATE TAB 325 MG (65 MG ELEMENTAL FE) 1 EACH: 325 (65 FE) TAB at 11:01

## 2025-01-02 RX ADMIN — RISPERIDONE 2 MG: 1 TABLET, FILM COATED ORAL at 08:01

## 2025-01-02 RX ADMIN — CETIRIZINE HYDROCHLORIDE 10 MG: 10 TABLET, FILM COATED ORAL at 11:01

## 2025-01-02 NOTE — PROGRESS NOTES
[facility] [address] MEDICINE ~ PROGRESS NOTE        CHIEF COMPLAINT   Hospital follow up    HOSPITAL COURSE   88 y/o F with a PMH of diastolic heart failure LVEF 50-55%, HTN, chronic anemia, paroxysmal A-fib on eliquis, and CKD stage III followed by Dr. Chávez , chronic respiratory failure on 2 L NC at home, dementia and glaucoma presented to the ED with complains of bilateral leg swelling, generalized weakness and deconditioning post hospital discharge. Patient explains that she is bed bound and lives at home with her son and  who take care of her. Patient was recently admitted at this facility for worsening generalized weakness, bilateral leg swelling and urinary retention and discharged home with an indwelling cook, follow up appointment with urology and private sitters and home care with [facility] . Patient denies black or blood in her tool, denies chest pain, denies shortness of breath, denies known fever.     Initial ED vitals: Temp 98.1, HR 90, Resp 18, /58, O2 Sat 98% on RA. ED work up revealed WBC 7.39, H&H 8.7 and 26.8, , K 3.4, BUN 30.3, Creatinine 1.26, .3, Troponin 0.048, Lactic acid 1.1, TSH 3.335 Influenza, COVID, and RSV swab negative, Respiratory panel negative, UA positive for blood, leukocyte esterase, RBC, WBC, and bacteria, X-ray chest read enlarged cardiac silhouette without overt edema, EKG read ventricular paced rhythm at 62 bmp. Cardiology was consulted in the ED and patient was admitted to Hospital Medicine.    Today  Seen and examined this morning.  Has persistent leg swelling which reports to be about few weeks old.  Appears weak, on room air.        OBJECTIVE/PHYSICAL EXAM     VITAL SIGNS (MOST RECENT):  Temp: 98.8 °F (37.1 °C) (01/02/25 0443)  Pulse: 70 (01/02/25 0437)  Resp: 20 (01/01/25 1152)  BP: 130/65 (01/02/25 0437)  SpO2: 98 % (01/02/25 0437) VITAL SIGNS (24 HOUR RANGE):  Temp:  [98.1 °F (36.7 °C)-99.4  °F (37.4 °C)] 98.8 °F (37.1 °C)  Pulse:  [59-70] 70  Resp:  [20] 20  SpO2:  [96 %-98 %] 98 %  BP: (105-130)/(53-67) 130/65   GENERAL: In no acute distress, afebrile  HEENT:  CHEST: Clear to auscultation bilaterally  HEART: S1, S2, no appreciable murmur  ABDOMEN: Soft, nontender, BS +  MSK: Warm, 2+ pitting bilateral lower extremity edema, no clubbing or cyanosis  NEUROLOGIC: Alert and oriented x4, moving all extremities with good strength   INTEGUMENTARY:  PSYCHIATRY:        ASSESSMENT/PLAN   Volume overload 2/2 right heart failure  Moderate pulmonary hypertension per echocardiogram  Left heart failure ruled out  Non STEMI type 2  CKD 3B  Pseudomonas urinary tract infection-treated    History of:  Chronic respiratory failure on home O2, dementia, HTN, anemia, glaucoma, CKD 3B, anemia of chronic disease, paroxysmal atrial fibrillation      Nephrology following.  Fluid adjustments per them.  Currently on Lasix 60 iv bid.   We will be difficult managing fluid and may have to live with some edema to the legs given right heart failure with pulmonary hypertension  Case management following.  Pending SNF.  Spoke with daughter and requested something for insomnia.  Resuming her risperidone home medication and reducing excellent to once daily.    DVT prophylaxis: Eliquis    Anticipated discharge and disposition:   __________________________________________________________________________    NUTRITIONAL STATUS     Patient meets ASPEN criteria for   malnutrition of   per RD assessment as evidenced by:                       A minimum of two characteristics is recommended for diagnosis of either severe or non-severe malnutrition.     LABS/MICRO/MEDS/DIAGNOSTICS       LABS  Recent Labs     01/02/25  0412   *   K 3.5   CO2 27   BUN 30.9*   CREATININE 1.38*   GLUCOSE 81*   CALCIUM 8.8   ALKPHOS 103   AST 18   ALT 8   ALBUMIN 2.4*     Recent Labs     01/02/25  0412   WBC 8.41   RBC 2.65*   HCT 25.7*   MCV 97.0*           MICROBIOLOGY  Microbiology Results (last 7 days)       Procedure Component Value Units Date/Time    Blood Culture [[id number]]  (Normal) Collected: 12/29/24 1747    Order Status: Completed Specimen: Blood Updated: 01/01/25 1901     Blood Culture No Growth At 72 Hours    Blood Culture [[id number]]  (Normal) Collected: 12/29/24 1142    Order Status: Completed Specimen: Blood Updated: 01/01/25 1302     Blood Culture No Growth At 72 Hours    Blood Culture #2 **CANNOT BE ORDERED STAT** [[id number]]  (Normal) Collected: 12/26/24 1656    Order Status: Completed Specimen: Blood Updated: 12/31/24 1901     Blood Culture No Growth at 5 days    Blood Culture #1 **CANNOT BE ORDERED STAT** [[id number]]  (Normal) Collected: 12/26/24 1656    Order Status: Completed Specimen: Blood Updated: 12/31/24 1901     Blood Culture No Growth at 5 days    Urine culture [[id number]]  (Abnormal)  (Susceptibility) Collected: 12/26/24 1539    Order Status: Completed Specimen: Urine Updated: 12/29/24 0641     Urine Culture >/= 100,000 colonies/ml Pseudomonas aeruginosa               MEDICATIONS   allopurinoL  300 mg Oral Daily    apixaban  5 mg Oral BID    cetirizine  10 mg Oral Daily    ferrous sulfate  1 tablet Oral BID    furosemide (LASIX) injection  60 mg Intravenous Q12H    hyoscyamine  0.125 mg Sublingual TID    latanoprost  1 drop Both Eyes QHS    levothyroxine  25 mcg Oral Before breakfast    lubiprostone  24 mcg Oral BID WM    pantoprazole  40 mg Oral BID    potassium bicarbonate  20 mEq Oral Daily    potassium, sodium phosphates  1 packet Oral QID (AC & HS)    rivastigmine tartrate  1.5 mg Oral BID         INFUSIONS         DIAGNOSTIC TESTS  CT Head Without Contrast   Final Result      1.  No acute intracranial findings identified.      2.  Chronic microangiopathic ischemia and atrophy..         Electronically signed by: Theodore Prince   Date:    12/29/2024   Time:    08:22      X-Ray Chest AP Portable   Final Result      Enlarged  cardiac silhouette without overt edema.         Electronically signed by: Angélica Diehl   Date:    12/26/2024   Time:    15:31           Echo    Result Date: 12/27/2024    Left Ventricle: The left ventricle is normal in size. Moderately   increased wall thickness. Normal wall motion. Septal flattening in systole   consistent with right ventricular pressure overload. There is normal   systolic function with a visually estimated ejection fraction of 55 - 60%.   There is normal diastolic function.    Right Ventricle: Moderate right ventricular enlargement. Systolic   function is moderately reduced. TAPSE is 1.19 cm. Pacemaker lead present   in the ventricle.    Left Atrium: Left atrium is moderately dilated.    Right Atrium: Right atrium is severely dilated. Lead present in the   right atrium.    Mitral Valve: There is mild regurgitation.    Tricuspid Valve: There is severe regurgitation. There is moderate   pulmonary hypertension.    Pulmonic Valve: There is mild regurgitation.    Pulmonary Artery: There is moderate pulmonary hypertension. The   estimated pulmonary artery systolic pressure is 47 mmHg.    IVC/SVC: Elevated venous pressure at 15 mmHg.              Case related differential diagnoses have been reviewed; assessment and plan has been documented. I have personally reviewed the labs and test results that are currently available; I have reviewed the patients medication list. I have reviewed the consulting providers recommendations. I have reviewed or attempted to review medical records based upon their availability.  All of the patient's and/or family's questions have been addressed and answered to the best of my ability.  I will continue to monitor closely and make adjustments to medical management as needed.  This document was created using M*Modal Fluency Direct.  Transcription errors may have been made.  Please contact me if any questions may rise regarding documentation to clarify transcription.         Jason Vernon MD   Internal Medicine  Department of Hospital Medicine [facility] Tele[facility]

## 2025-01-02 NOTE — PT/OT/SLP PROGRESS
Physical Therapy Treatment    Patient Name:  Ev Alberts   MRN:  [id number]    Recommendations:     Discharge therapy intensity: Moderate Intensity Therapy   Discharge Equipment Recommendations: to be determined by next level of care  Barriers to discharge: Impaired mobility and Ongoing medical needs    Assessment:     Ev Alberts is a 89 y.o. female admitted with a medical diagnosis of  acute HF exacerbation, NSTEMI, catheter associated UTI, PAF, acute anemia, hx of dementia.  She presents with the following impairments/functional limitations: weakness, impaired endurance, impaired self care skills, impaired functional mobility, impaired balance, impaired cognition, decreased upper extremity function, decreased lower extremity function . Pt c/o fatigue this date, agreeable to EOB activities only. Able to stand 1x with maxAx2, with episode of bowel incontinence so had to return pt to supine for cleaning. Continue to recommend moderate intensity therapy at discharge.    Rehab Prognosis: Good; patient would benefit from acute skilled PT services to address these deficits and reach maximum level of function.    Recent Surgery: * No surgery found *      Plan:     During this hospitalization, patient would benefit from acute PT services 5 x/week to address the identified rehab impairments via gait training, therapeutic activities, therapeutic exercises, neuromuscular re-education and progress toward the following goals:    Plan of Care Expires:  01/27/25    Subjective     Chief Complaint: fatigue  Patient/Family Comments/goals: get stronger, move better  Pain/Comfort:  Pain Rating 1:  (c/o chronic LBP)  Pain Addressed 1: Reposition, Distraction, Cessation of Activity      Objective:     Communicated with RN prior to session.  Patient found supine with cook catheter, pulse ox (continuous), telemetry, peripheral IV upon PT entry to room.     General Precautions: Standard, fall  Orthopedic Precautions:    Braces:     Respiratory Status: Room air  Blood Pressure: NT  Skin Integrity:  sacral wound      Functional Mobility:  Bed Mobility:     Rolling Left:  moderate assistance  Rolling Right: moderate assistance  Supine to Sit: maximal assistance and of 2 persons  Sit to Supine: maximal assistance and of 2 persons  Transfers:     Sit to Stand:  maximal assistance and of 2 persons with rolling walker  Balance: sitting balance = Claudette-SBA; standing balance = modAx2 with posterior lean    Therapeutic Activities/Exercises:  (+) BM upon standing, returned pt to supine, modAx2 for rolling, totalA for pericare.     Education:  Patient and spouse were provided with verbal education education regarding PT role/goals/POC, fall prevention, safety awareness, discharge/DME recommendations, and pressure ulcer prevention.  Understanding was verbalized, however additional teaching warranted.     Patient left HOB elevated with all lines intact, call button in reach, wedge under L side, pressure relief boots, RN notified, and  present    GOALS:   Multidisciplinary Problems       Physical Therapy Goals          Problem: Physical Therapy    Goal Priority Disciplines Outcome Interventions   Physical Therapy Goal     PT, PT/OT Progressing    Description: Goals to be met by: 24     Patient will increase functional independence with mobility by performin. Supine to sit with MInimal Assistance  2. Sit to stand transfer with Minimal Assistance  3. Bed to chair transfer with Minimal Assistance using Rolling Walker vs. Squat pivot  4. Gait  x 25 feet with Minimal Assistance using Rolling Walker.   5. Sitting at edge of bed x10 minutes with Stand-by Assistance                         Time Tracking:     PT Received On: 25  PT Start Time: 1042     PT Stop Time: 1115  PT Total Time (min): 33 min     Billable Minutes: Therapeutic Activity 33 min    Treatment Type: Treatment  PT/PTA: PT     Number of PTA visits since last PT visit: 2      01/02/2025

## 2025-01-02 NOTE — PROGRESS NOTES
Chief complaint: none    Interval History:  Pt sleeping though easily arousable, denies SOB, though remains somnolent, not eating much    Review of Systems   Constitutional:  Positive for appetite change and fatigue.   HENT: Negative.     Respiratory: Negative.     Cardiovascular:  Positive for leg swelling.   Gastrointestinal: Negative.    Genitourinary: Negative.    Musculoskeletal: Negative.    Neurological:  Positive for weakness.   Psychiatric/Behavioral: Negative.        all else Neg     allopurinoL  300 mg Oral Daily    apixaban  5 mg Oral BID    cetirizine  10 mg Oral Daily    ferrous sulfate  1 tablet Oral BID    furosemide (LASIX) injection  60 mg Intravenous Q12H    hyoscyamine  0.125 mg Sublingual TID    latanoprost  1 drop Both Eyes QHS    levothyroxine  25 mcg Oral Before breakfast    lubiprostone  24 mcg Oral BID WM    pantoprazole  40 mg Oral BID    potassium bicarbonate  20 mEq Oral Daily    potassium, sodium phosphates  1 packet Oral QID (AC & HS)    risperiDONE  2 mg Oral QHS    rivastigmine tartrate  1.5 mg Oral Daily       Objective     VITAL SIGNS: 24 HR MIN & MAX LAST    Temp  Min: 98.1 °F (36.7 °C)  Max: 99.4 °F (37.4 °C)  98.8 °F (37.1 °C)    BP  Min: 105/55  Max: 130/65  130/65     Pulse  Min: 59  Max: 70  70     No data recorded  20    SpO2  Min: 96 %  Max: 98 %  98 %      Wt Readings from Last 3 Encounters:   12/30/24 74.5 kg (164 lb 3.9 oz)   12/22/24 71.7 kg (158 lb 1.1 oz)   11/19/24 72.6 kg (160 lb)       Intake/Output Summary (Last 24 hours) at 1/2/2025 1226  Last data filed at 1/2/2025 0622  Gross per 24 hour   Intake 280 ml   Output 2150 ml   Net -1870 ml       Physical Exam  Constitutional:       General: She is not in acute distress.     Appearance: She is ill-appearing.   Cardiovascular:      Rate and Rhythm: Normal rate.   Pulmonary:      Effort: Pulmonary effort is normal.      Breath sounds: Normal breath sounds.   Abdominal:      General: Bowel sounds are normal. There  is no distension.      Palpations: Abdomen is soft.      Tenderness: There is no abdominal tenderness.   Genitourinary:     Comments: Grey good Uout  Musculoskeletal:      Cervical back: Normal range of motion.   Neurological:      Motor: Weakness present.   Psychiatric:         Mood and Affect: Mood normal.          Recent Labs     12/31/24  0403 01/01/25  0531 01/02/25  0412   * 134* 135*   K 3.6 3.5 3.5   CO2 25 28 27   BUN 42.9* 38.5* 30.9*   CREATININE 1.62* 1.49* 1.38*   GLUCOSE 106 95 81*   CALCIUM 8.7 8.6 8.8   MG 2.10 2.00 2.00   PHOS 2.3  --  1.8*   ALBUMIN 2.4* 2.3* 2.4*      Recent Labs     01/01/25  0531 01/02/25  0412   WBC 7.62 8.41   HGB 7.5* 8.6*   HCT 22.5* 25.7*   * 137         Assessment & Plan     CKD 3B (baseline creatinine 1.5-1.6), solitary kidney - GFR at baseline tolerating diuresis. For now continue IV lasix  Urinary retention, catheter-related UTI - urology recommending voiding trial p abx completed  Rinaldi, Right sided CHF, pHTN, Fluid overload, edema - improved p diuresis, continue current IV lasix.  Would strongly consider ACE wraps to assist with the B leg edema.  Will d/c IV lasix once she is becoming more azotemic   VHD - mod MR, mod TR, Pulm HTN  Anemia of CKD - Hb stable/improved at 8.6; s/p EPO 40 K 12/10/2024, will redose today; monitor for constipation with oral Fe  Hypokalemia - will give additional K bicarb 25 po  Thrombocytopenia - resolved with diuresis  H/o GIB 7/2024  HTN with ACEi allergy - normotensive off of BP meds  Hypothyroidism, TSH 1.6  Afib, eliquis  Hypophos - on neutraphos  Chronic Home O2 (2 L)

## 2025-01-02 NOTE — PLAN OF CARE
Spoke to pt daughter who left the choices in pt room . New referral sent to jose and Nallely gómez

## 2025-01-03 LAB
ALBUMIN SERPL-MCNC: 2.3 G/DL (ref 3.4–4.8)
BACTERIA BLD CULT: NORMAL
BACTERIA BLD CULT: NORMAL
BUN SERPL-MCNC: 31 MG/DL (ref 9.8–20.1)
CALCIUM SERPL-MCNC: 8.5 MG/DL (ref 8.4–10.2)
CHLORIDE SERPL-SCNC: 98 MMOL/L (ref 98–107)
CO2 SERPL-SCNC: 27 MMOL/L (ref 23–31)
CREAT SERPL-MCNC: 1.36 MG/DL (ref 0.55–1.02)
GFR SERPLBLD CREATININE-BSD FMLA CKD-EPI: 38 ML/MIN/1.73/M2
GLUCOSE SERPL-MCNC: 94 MG/DL (ref 82–115)
HEMOCCULT SP2 STL QL: NEGATIVE
NT-PROBNP SERPL IA-MCNC: 7062 PG/ML
PHOSPHATE SERPL-MCNC: 2.6 MG/DL (ref 2.3–4.7)
POTASSIUM SERPL-SCNC: 3.7 MMOL/L (ref 3.5–5.1)
SODIUM SERPL-SCNC: 133 MMOL/L (ref 136–145)

## 2025-01-03 PROCEDURE — 11000001 HC ACUTE MED/SURG PRIVATE ROOM

## 2025-01-03 PROCEDURE — 25000003 PHARM REV CODE 250

## 2025-01-03 PROCEDURE — 97535 SELF CARE MNGMENT TRAINING: CPT | Mod: CO

## 2025-01-03 PROCEDURE — 82272 OCCULT BLD FECES 1-3 TESTS: CPT | Performed by: INTERNAL MEDICINE

## 2025-01-03 PROCEDURE — 21400001 HC TELEMETRY ROOM

## 2025-01-03 PROCEDURE — 97530 THERAPEUTIC ACTIVITIES: CPT | Mod: CQ

## 2025-01-03 PROCEDURE — 36415 COLL VENOUS BLD VENIPUNCTURE: CPT | Performed by: INTERNAL MEDICINE

## 2025-01-03 PROCEDURE — 97116 GAIT TRAINING THERAPY: CPT | Mod: CQ

## 2025-01-03 PROCEDURE — 63600175 PHARM REV CODE 636 W HCPCS: Performed by: INTERNAL MEDICINE

## 2025-01-03 PROCEDURE — 80069 RENAL FUNCTION PANEL: CPT | Performed by: INTERNAL MEDICINE

## 2025-01-03 PROCEDURE — 25000003 PHARM REV CODE 250: Performed by: INTERNAL MEDICINE

## 2025-01-03 PROCEDURE — 25000242 PHARM REV CODE 250 ALT 637 W/ HCPCS

## 2025-01-03 PROCEDURE — 25000003 PHARM REV CODE 250: Performed by: STUDENT IN AN ORGANIZED HEALTH CARE EDUCATION/TRAINING PROGRAM

## 2025-01-03 RX ORDER — TRAZODONE HYDROCHLORIDE 50 MG/1
50 TABLET ORAL NIGHTLY
Status: DISCONTINUED | OUTPATIENT
Start: 2025-01-03 | End: 2025-01-04

## 2025-01-03 RX ORDER — TALC
6 POWDER (GRAM) TOPICAL
Status: DISCONTINUED | OUTPATIENT
Start: 2025-01-03 | End: 2025-01-29 | Stop reason: HOSPADM

## 2025-01-03 RX ORDER — CETIRIZINE HYDROCHLORIDE 10 MG/1
10 TABLET ORAL NIGHTLY
Status: DISCONTINUED | OUTPATIENT
Start: 2025-01-03 | End: 2025-01-29 | Stop reason: HOSPADM

## 2025-01-03 RX ADMIN — ACETAMINOPHEN 650 MG: 325 TABLET, FILM COATED ORAL at 10:01

## 2025-01-03 RX ADMIN — ALBUTEROL SULFATE 1 PUFF: 90 AEROSOL, METERED RESPIRATORY (INHALATION) at 03:01

## 2025-01-03 RX ADMIN — FERROUS SULFATE TAB 325 MG (65 MG ELEMENTAL FE) 1 EACH: 325 (65 FE) TAB at 08:01

## 2025-01-03 RX ADMIN — LUBIPROSTONE 24 MCG: 24 CAPSULE, GELATIN COATED ORAL at 06:01

## 2025-01-03 RX ADMIN — CETIRIZINE HYDROCHLORIDE 10 MG: 10 TABLET, FILM COATED ORAL at 08:01

## 2025-01-03 RX ADMIN — LEVOTHYROXINE SODIUM 25 MCG: 25 TABLET ORAL at 05:01

## 2025-01-03 RX ADMIN — LUBIPROSTONE 24 MCG: 24 CAPSULE, GELATIN COATED ORAL at 10:01

## 2025-01-03 RX ADMIN — ACETAMINOPHEN 650 MG: 325 TABLET, FILM COATED ORAL at 02:01

## 2025-01-03 RX ADMIN — TRAZODONE HYDROCHLORIDE 50 MG: 50 TABLET ORAL at 08:01

## 2025-01-03 RX ADMIN — FUROSEMIDE 60 MG: 10 INJECTION, SOLUTION INTRAMUSCULAR; INTRAVENOUS at 10:01

## 2025-01-03 RX ADMIN — LATANOPROST 1 DROP: 50 SOLUTION OPHTHALMIC at 08:01

## 2025-01-03 RX ADMIN — MELATONIN TAB 3 MG 6 MG: 3 TAB at 06:01

## 2025-01-03 RX ADMIN — APIXABAN 5 MG: 5 TABLET, FILM COATED ORAL at 10:01

## 2025-01-03 RX ADMIN — RISPERIDONE 2 MG: 1 TABLET, FILM COATED ORAL at 08:01

## 2025-01-03 RX ADMIN — PANTOPRAZOLE SODIUM 40 MG: 40 TABLET, DELAYED RELEASE ORAL at 10:01

## 2025-01-03 RX ADMIN — HYOSCYAMINE SULFATE 0.12 MG: 0.12 TABLET SUBLINGUAL at 10:01

## 2025-01-03 RX ADMIN — ALLOPURINOL 300 MG: 300 TABLET ORAL at 10:01

## 2025-01-03 RX ADMIN — FERROUS SULFATE TAB 325 MG (65 MG ELEMENTAL FE) 1 EACH: 325 (65 FE) TAB at 10:01

## 2025-01-03 RX ADMIN — RIVASTIGMINE TARTRATE 1.5 MG: 1.5 CAPSULE ORAL at 10:01

## 2025-01-03 RX ADMIN — FUROSEMIDE 60 MG: 10 INJECTION, SOLUTION INTRAMUSCULAR; INTRAVENOUS at 08:01

## 2025-01-03 RX ADMIN — ACETAMINOPHEN 650 MG: 325 TABLET, FILM COATED ORAL at 11:01

## 2025-01-03 RX ADMIN — PANTOPRAZOLE SODIUM 40 MG: 40 TABLET, DELAYED RELEASE ORAL at 08:01

## 2025-01-03 RX ADMIN — APIXABAN 5 MG: 5 TABLET, FILM COATED ORAL at 08:01

## 2025-01-03 RX ADMIN — POTASSIUM BICARBONATE 20 MEQ: 391 TABLET, EFFERVESCENT ORAL at 10:01

## 2025-01-03 NOTE — PROGRESS NOTES
[facility] [address] MEDICINE ~ PROGRESS NOTE        CHIEF COMPLAINT   Hospital follow up    HOSPITAL COURSE   88 y/o F with a PMH of diastolic heart failure LVEF 50-55%, HTN, chronic anemia, paroxysmal A-fib on eliquis, and CKD stage III followed by Dr. Chávez , chronic respiratory failure on 2 L NC at home, dementia and glaucoma presented to the ED with complains of bilateral leg swelling, generalized weakness and deconditioning post hospital discharge. Patient explains that she is bed bound and lives at home with her son and  who take care of her. Patient was recently admitted at this facility for worsening generalized weakness, bilateral leg swelling and urinary retention and discharged home with an indwelling cook, follow up appointment with urology and private sitters and home care with [facility] . Patient denies black or blood in her tool, denies chest pain, denies shortness of breath, denies known fever.     Initial ED vitals: Temp 98.1, HR 90, Resp 18, /58, O2 Sat 98% on RA. ED work up revealed WBC 7.39, H&H 8.7 and 26.8, , K 3.4, BUN 30.3, Creatinine 1.26, .3, Troponin 0.048, Lactic acid 1.1, TSH 3.335 Influenza, COVID, and RSV swab negative, Respiratory panel negative, UA positive for blood, leukocyte esterase, RBC, WBC, and bacteria, X-ray chest read enlarged cardiac silhouette without overt edema, EKG read ventricular paced rhythm at 62 bmp. Cardiology was consulted in the ED and patient was admitted to Hospital Medicine.    Today  Seen and examined this morning.  Discuss extensively with family at the bedside.  They are mostly concerned about her sleep, discussed keep her up during the daytime so that she can sleep at night.  Family reports sleeping during the day.        OBJECTIVE/PHYSICAL EXAM     VITAL SIGNS (MOST RECENT):  Temp: 98.7 °F (37.1 °C) (01/03/25 0729)  Pulse: 63 (01/03/25 0729)  Resp: 20 (01/03/25 0729)  BP:  (!) 124/58 (01/03/25 0729)  SpO2: 98 % (01/03/25 0729) VITAL SIGNS (24 HOUR RANGE):  Temp:  [97.8 °F (36.6 °C)-98.7 °F (37.1 °C)] 98.7 °F (37.1 °C)  Pulse:  [63-65] 63  Resp:  [20] 20  SpO2:  [96 %-98 %] 98 %  BP: ()/(46-74) 124/58   GENERAL: In no acute distress, afebrile  HEENT:  CHEST: Clear to auscultation bilaterally  HEART: S1, S2, 2/6 systolic murmur  ABDOMEN: Soft, nontender, BS +  MSK: Warm, 2+ pitting bilateral lower extremity edema, no clubbing or cyanosis  NEUROLOGIC: Alert and oriented x4, moving all extremities with good strength   INTEGUMENTARY:  PSYCHIATRY:        ASSESSMENT/PLAN   Volume overload 2/2 right heart failure  Moderate pulmonary hypertension per echocardiogram  Left heart failure ruled out  Non STEMI type 2  CKD 3B  Pseudomonas urinary tract infection-treated    History of:  Chronic respiratory failure on home O2, dementia, HTN, anemia, glaucoma, CKD 3B, anemia of chronic disease, paroxysmal atrial fibrillation      Nephrology following.  Fluid adjustments per them.  Currently on Lasix 60 iv bid.   We will be difficult managing fluid and may have to live with some edema to the legs given right heart failure with pulmonary hypertension.  Can use Ace wraps/compression stocking.  Case management following.  Pending SNF.  Melatonin at 6:00 p.m., trazodone 50 nightly, continue risperidone.  Regulate sleep-wake cycle.  Avoid nighttime disturbances.  Discontinue vitals between 10:00 p.m. to 6:00 a.m. if otherwise stable.  Plan for voiding trial    DVT prophylaxis: Eliquis    Anticipated discharge and disposition:   __________________________________________________________________________    NUTRITIONAL STATUS     Patient meets ASPEN criteria for   malnutrition of   per RD assessment as evidenced by:                       A minimum of two characteristics is recommended for diagnosis of either severe or non-severe malnutrition.     LABS/MICRO/MEDS/DIAGNOSTICS       LABS  Recent Labs      01/02/25  0412 01/03/25  0400   * 133*   K 3.5 3.7   CO2 27 27   BUN 30.9* 31.0*   CREATININE 1.38* 1.36*   GLUCOSE 81* 94   CALCIUM 8.8 8.5   ALKPHOS 103  --    AST 18  --    ALT 8  --    ALBUMIN 2.4* 2.3*     Recent Labs     01/02/25  0412   WBC 8.41   RBC 2.65*   HCT 25.7*   MCV 97.0*          MICROBIOLOGY  Microbiology Results (last 7 days)       Procedure Component Value Units Date/Time    Blood Culture [[id number]]  (Normal) Collected: 12/29/24 1747    Order Status: Completed Specimen: Blood Updated: 01/02/25 1901     Blood Culture No Growth At 96 Hours    Blood Culture [[id number]]  (Normal) Collected: 12/29/24 1142    Order Status: Completed Specimen: Blood Updated: 01/02/25 1301     Blood Culture No Growth At 96 Hours    Blood Culture #2 **CANNOT BE ORDERED STAT** [[id number]]  (Normal) Collected: 12/26/24 1656    Order Status: Completed Specimen: Blood Updated: 12/31/24 1901     Blood Culture No Growth at 5 days    Blood Culture #1 **CANNOT BE ORDERED STAT** [[id number]]  (Normal) Collected: 12/26/24 1656    Order Status: Completed Specimen: Blood Updated: 12/31/24 1901     Blood Culture No Growth at 5 days    Urine culture [[id number]]  (Abnormal)  (Susceptibility) Collected: 12/26/24 1539    Order Status: Completed Specimen: Urine Updated: 12/29/24 0641     Urine Culture >/= 100,000 colonies/ml Pseudomonas aeruginosa               MEDICATIONS   allopurinoL  300 mg Oral Daily    apixaban  5 mg Oral BID    cetirizine  10 mg Oral QHS    ferrous sulfate  1 tablet Oral BID    furosemide (LASIX) injection  60 mg Intravenous Q12H    hyoscyamine  0.125 mg Sublingual TID    latanoprost  1 drop Both Eyes QHS    levothyroxine  25 mcg Oral Before breakfast    lubiprostone  24 mcg Oral BID WM    pantoprazole  40 mg Oral BID    potassium bicarbonate  20 mEq Oral Daily    risperiDONE  2 mg Oral QHS    rivastigmine tartrate  1.5 mg Oral Daily         INFUSIONS         DIAGNOSTIC TESTS  CT Head Without  Contrast   Final Result      1.  No acute intracranial findings identified.      2.  Chronic microangiopathic ischemia and atrophy..         Electronically signed by: Theodore Prince   Date:    12/29/2024   Time:    08:22      X-Ray Chest AP Portable   Final Result      Enlarged cardiac silhouette without overt edema.         Electronically signed by: Angélica Diehl   Date:    12/26/2024   Time:    15:31           Echo    Result Date: 12/27/2024    Left Ventricle: The left ventricle is normal in size. Moderately   increased wall thickness. Normal wall motion. Septal flattening in systole   consistent with right ventricular pressure overload. There is normal   systolic function with a visually estimated ejection fraction of 55 - 60%.   There is normal diastolic function.    Right Ventricle: Moderate right ventricular enlargement. Systolic   function is moderately reduced. TAPSE is 1.19 cm. Pacemaker lead present   in the ventricle.    Left Atrium: Left atrium is moderately dilated.    Right Atrium: Right atrium is severely dilated. Lead present in the   right atrium.    Mitral Valve: There is mild regurgitation.    Tricuspid Valve: There is severe regurgitation. There is moderate   pulmonary hypertension.    Pulmonic Valve: There is mild regurgitation.    Pulmonary Artery: There is moderate pulmonary hypertension. The   estimated pulmonary artery systolic pressure is 47 mmHg.    IVC/SVC: Elevated venous pressure at 15 mmHg.              Case related differential diagnoses have been reviewed; assessment and plan has been documented. I have personally reviewed the labs and test results that are currently available; I have reviewed the patients medication list. I have reviewed the consulting providers recommendations. I have reviewed or attempted to review medical records based upon their availability.  All of the patient's and/or family's questions have been addressed and answered to the best of my ability.  I will  continue to monitor closely and make adjustments to medical management as needed.  This document was created using M*Modal Fluency Direct.  Transcription errors may have been made.  Please contact me if any questions may rise regarding documentation to clarify transcription.        Jason Vernon MD   Internal Medicine  Department of Hospital Medicine [facility] [address] [facility] Tele[facility]

## 2025-01-03 NOTE — PROGRESS NOTES
Inpatient Nutrition Evaluation    Admit Date: 12/26/2024   Total duration of encounter: 8 days    Nutrition Recommendation/Prescription     Continue oral diet as tolerated; Diet Heart Healthy Standard Tray   Add Boost Plus (provides 360 kcal, 14 g protein per serving) BID    Nutrition Assessment     Chart Review    Reason Seen: continuous nutrition monitoring    Malnutrition Screening Tool Results   Have you recently lost weight without trying?: No  Have you been eating poorly because of a decreased appetite?: No   MST Score: 0     Diagnosis:  Acute HFpEF Exacerbation   NSTEMI   Catheter associated urinary infection  Paroxysmal A-fib   UTI GNR -POA   Acute anemia, iron def     Relevant Medical History:  diastolic heart failure LVEF 50-55%, HTN, chronic anemia, paroxysmal A-fib on eliquis, and CKD stage III     Nutrition-Related Medications: Scheduled Medications:  allopurinoL, 300 mg, Daily  apixaban, 5 mg, BID  cetirizine, 10 mg, QHS  ferrous sulfate, 1 tablet, BID  furosemide (LASIX) injection, 60 mg, Q12H  latanoprost, 1 drop, QHS  levothyroxine, 25 mcg, Before breakfast  lubiprostone, 24 mcg, BID WM  melatonin, 6 mg, Q24H  pantoprazole, 40 mg, BID  [START ON 1/4/2025] potassium bicarbonate, 25 mEq, Daily  risperiDONE, 2 mg, QHS  rivastigmine tartrate, 1.5 mg, Daily  traZODone, 50 mg, QHS    Continuous Infusions:   PRN Medications:    allopurinoL tablet 300 mg    apixaban tablet 5 mg    cetirizine tablet 10 mg    ferrous sulfate tablet 1 each    furosemide injection 60 mg    latanoprost 0.005 % ophthalmic solution 1 drop    levothyroxine tablet 25 mcg    lubiprostone capsule 24 mcg    melatonin tablet 6 mg    pantoprazole EC tablet 40 mg    [START ON 1/4/2025] potassium bicarbonate disintegrating tablet 25 mEq    risperiDONE tablet 2 mg    rivastigmine tartrate capsule 1.5 mg    traZODone tablet 50 mg        Nutrition-Related Labs:  Recent Labs   Lab 12/28/24  0608 12/29/24  0700 12/30/24  0717 12/31/24  0403  "01/01/25  0531 01/02/25  0412 01/03/25  0400   * 133* 132* 131* 134* 135* 133*   K 4.9 4.6 4.0 3.6 3.5 3.5 3.7   CALCIUM 8.9 8.9 8.7 8.7 8.6 8.8 8.5   PHOS  --   --   --  2.3  --  1.8* 2.6   MG 2.30 2.30  --  2.10 2.00 2.00  --    CL 98 96* 100 99 101 101 98   CO2 28 28 24 25 28 27 27   BUN 35.8* 42.5* 44.6* 42.9* 38.5* 30.9* 31.0*   CREATININE 1.54* 1.76* 1.82* 1.62* 1.49* 1.38* 1.36*   EGFRNORACEVR 33 28 27 31 34 37 38   GLUCOSE 96 92 81* 106 95 81* 94   BILITOT 1.6*  --   --   --  1.4 1.6*  --    ALKPHOS 116  --   --   --  94 103  --    ALT 9  --   --   --  8 8  --    AST 27  --   --   --  17 18  --    ALBUMIN 2.6*  --   --  2.4* 2.3* 2.4* 2.3*   HGBA1C  --   --   --   --   --  5.0  --    WBC 8.97 11.22 8.20  --  7.62 8.41  --    HGB 7.7* 8.3* 8.1*  --  7.5* 8.6*  --    HCT 23.3* 24.7* 23.3*  --  22.5* 25.7*  --         Diet Order: Diet Heart Healthy Standard Tray  Oral Supplement Order: none  Appetite/Oral Intake: good/50-75% of meals  Factors Affecting Nutritional Intake: none identified  Food/Faith/Cultural Preferences: none reported  Food Allergies: no known food allergies       Wound(s):      Altered Skin Integrity 03/10/24 0705 Sacral spine-Tissue loss description: Partial thickness       Wound 12/10/24 0800 Pressure Injury Sacral spine #1-Tissue loss description: Partial thickness     Comments    12/28/24 Pt tolerating oral diet, unable to provide much hx, ate breakfast this morning; no unintentional weight loss reported prior to admit.    1/3/24: Pt continues with good appetite today. Noted last BM was 12/31.       Anthropometrics    Height: 5' 5" (165.1 cm) Height Method: Stated  Last Weight: 74.5 kg (164 lb 3.9 oz) (12/30/24 0715) Weight Method: Bed Scale  BMI (Calculated): 27.3  BMI Classification: overweight (BMI 25-29.9)     Ideal Body Weight (IBW), Female: 125 lb     % Ideal Body Weight, Female (lb): 126.98 %                             Usual Weight Provided By: EMR weight history    Wt " Readings from Last 5 Encounters:   12/30/24 74.5 kg (164 lb 3.9 oz)   12/22/24 71.7 kg (158 lb 1.1 oz)   11/19/24 72.6 kg (160 lb)   10/23/24 68.9 kg (152 lb)   09/17/24 69.9 kg (154 lb)     Weight Change(s) Since Admission:  Admit Weight: 72.6 kg (160 lb) (12/26/24 1501)      Patient Education    Not applicable.    Monitoring & Evaluation     Dietitian will monitor food and beverage intake and weight change.  Nutrition Risk/Follow-Up: low (follow-up in 5-7 days)  Patients assigned 'low nutrition risk' status do not qualify for a full nutritional assessment but will be monitored and re-evaluated in a 5-7 day time period. Please consult if re-evaluation needed sooner.

## 2025-01-03 NOTE — PROGRESS NOTES
[facility] [address]  Wound Care    Patient Name:  Ev Alberts   MRN:  [id number]  Date: 1/3/2025  Diagnosis: Acute on chronic congestive heart failure    History:     Past Medical History:   Diagnosis Date    Acute kidney injury superimposed on chronic kidney disease     Congestive heart failure     Dementia     Hypertension     Shingles of eyelid     Sick sinus syndrome     Thyroid disease     Unspecified glaucoma        Social History     Socioeconomic History    Marital status:    Tobacco Use    Smoking status: Never    Smokeless tobacco: Never   Substance and Sexual Activity    Alcohol use: Never    Drug use: Not Currently     Social Drivers of Health     Financial Resource Strain: Low Risk  (12/27/2024)    Overall Financial Resource Strain (CARDIA)     Difficulty of Paying Living Expenses: Not very hard   Food Insecurity: No Food Insecurity (12/27/2024)    Hunger Vital Sign     Worried About Running Out of Food in the Last Year: Never true     Ran Out of Food in the Last Year: Never true   Transportation Needs: No Transportation Needs (12/27/2024)    TRANSPORTATION NEEDS     Transportation : No   Physical Activity: Inactive (12/10/2024)    Exercise Vital Sign     Days of Exercise per Week: 0 days     Minutes of Exercise per Session: 0 min   Stress: Patient Unable To Answer (12/27/2024)    [employer] [place] [employer] [facility] [employer] [facility] - Occupational Stress Questionnaire     Feeling of Stress : Patient unable to answer   Housing Stability: Low Risk  (12/27/2024)    Housing Stability Vital Sign     Unable to Pay for Housing in the Last Year: No     Homeless in the Last Year: No       Precautions:     Allergies as of 12/26/2024 - Reviewed 12/26/2024   Allergen Reaction Noted    Amlodipine-benazepril Edema 06/01/2022    Corticosteroids (glucocorticoids) Edema and Swelling 05/11/2011    Rofecoxib Anaphylaxis and Swelling 05/11/2011    Sulfa (sulfonamide antibiotics) Edema  06/01/2022    Atorvastatin  10/26/2018    Ibuprofen  06/01/2022       WOC Assessment Details/Treatment   WOCN follow up for sacrum. Spouse at bedside. Explained reason for follow up. Pt agreed to be seen and re-evaluated. Pt able to turn with assistance in bed. No dressing in place to sacrum. Cleansed well with remedy skin cleanser. Patted dry, photos obtained for EMR. Scattered areas from MASD. Treatment recommendations to be continued. Keep patient clean and dry. No adult diapers while in bed. Pt is on Immerse AMARILIS mattress. Pup pack in use. Nursing to continue with present wound care orders. Will follow up.  01/03/2025

## 2025-01-03 NOTE — PROGRESS NOTES
Chief complaint: none    Interval History:  Pt much more alert today, smiling, interactive, only notes swelling in back.  No SOB/CP    Review of Systems   Constitutional:  Positive for fatigue.   HENT: Negative.     Respiratory: Negative.     Cardiovascular:  Positive for leg swelling.   Gastrointestinal: Negative.    Genitourinary: Negative.    Musculoskeletal: Negative.    Neurological:  Positive for weakness.   Psychiatric/Behavioral: Negative.        all else Neg     allopurinoL  300 mg Oral Daily    apixaban  5 mg Oral BID    cetirizine  10 mg Oral QHS    ferrous sulfate  1 tablet Oral BID    furosemide (LASIX) injection  60 mg Intravenous Q12H    hyoscyamine  0.125 mg Sublingual TID    latanoprost  1 drop Both Eyes QHS    levothyroxine  25 mcg Oral Before breakfast    lubiprostone  24 mcg Oral BID WM    melatonin  6 mg Oral Q24H    pantoprazole  40 mg Oral BID    potassium bicarbonate  20 mEq Oral Daily    risperiDONE  2 mg Oral QHS    rivastigmine tartrate  1.5 mg Oral Daily    traZODone  50 mg Oral QHS       Objective     VITAL SIGNS: 24 HR MIN & MAX LAST    Temp  Min: 97.8 °F (36.6 °C)  Max: 98.7 °F (37.1 °C)  98.7 °F (37.1 °C)    BP  Min: 98/57  Max: 124/58  (!) 124/58     Pulse  Min: 63  Max: 65  63     Resp  Min: 20  Max: 20  20    SpO2  Min: 96 %  Max: 98 %  98 %      Wt Readings from Last 3 Encounters:   12/30/24 74.5 kg (164 lb 3.9 oz)   12/22/24 71.7 kg (158 lb 1.1 oz)   11/19/24 72.6 kg (160 lb)       Intake/Output Summary (Last 24 hours) at 1/3/2025 1048  Last data filed at 1/2/2025 1441  Gross per 24 hour   Intake --   Output 650 ml   Net -650 ml       Physical Exam  Constitutional:       General: She is not in acute distress.  Cardiovascular:      Rate and Rhythm: Normal rate.   Pulmonary:      Effort: Pulmonary effort is normal. No respiratory distress.   Abdominal:      General: Bowel sounds are normal.      Palpations: Abdomen is soft.      Tenderness: There is no abdominal tenderness.    Musculoskeletal:         General: Swelling present.      Cervical back: Normal range of motion.   Neurological:      Mental Status: She is alert. Mental status is at baseline.      Motor: Weakness present.   Psychiatric:         Mood and Affect: Mood normal.          Recent Labs     01/01/25  0531 01/01/25  0531 01/02/25 0412 01/03/25  0400   *  --  135* 133*   K 3.5  --  3.5 3.7   CO2 28  --  27 27   BUN 38.5*  --  30.9* 31.0*   CREATININE 1.49*  --  1.38* 1.36*   GLUCOSE 95  --  81* 94   CALCIUM 8.6  --  8.8 8.5   MG 2.00  --  2.00  --    PHOS  --    < > 1.8* 2.6   ALBUMIN 2.3*  --  2.4* 2.3*    < > = values in this interval not displayed.      Recent Labs     01/01/25 0531 01/02/25 0412   WBC 7.62 8.41   HGB 7.5* 8.6*   HCT 22.5* 25.7*   * 137         Assessment & Plan     CKD 3B (baseline creatinine 1.5-1.6), solitary kidney - GFR at baseline tolerating diuresis. For now continue IV lasix, ok for mild permissive azotemia to assist with 3rd spacing from R CHF  Urinary retention, catheter-related UTI - urology recommended voiding trial p abx completed  Rinaldi, Right sided CHF, pHTN, Fluid overload, edema - improved p diuresis, continue current IV lasix.  Consider ACE compression wraps to assist with the B leg edema.  Will d/c IV lasix once she is becoming more azotemic   VHD - mod MR, mod TR, Pulm HTN  Anemia of CKD - Hb stable/improved at 8.6; s/p EPO 40 K 12/10/2024 which was re-dosed yesterday; monitor for constipation with oral Fe  Hypokalemia - continue K bicarb po  Thrombocytopenia - resolved with diuresis  H/o GIB 7/2024  HTN with ACEi allergy - normotensive off of BP meds  Hypothyroidism, TSH 1.6  Afib, eliquis  Hypophos - on neutraphos  Chronic Home O2 (2 L)

## 2025-01-03 NOTE — PT/OT/SLP PROGRESS
Occupational Therapy   Treatment    Name: Ev Alberts  MRN: [id number]  Admitting Diagnosis:  Acute on chronic congestive heart failure       Recommendations:     Recommended therapy intensity at discharge: Moderate Intensity Therapy   Discharge Equipment Recommendations:  to be determined by next level of care  Barriers to discharge:       Assessment:     Ev Alberts is a 89 y.o. female with a medical diagnosis of Acute on chronic congestive heart failure.  Performance deficits affecting function are weakness, impaired endurance, impaired self care skills, impaired functional mobility, gait instability, impaired balance, decreased upper extremity function, decreased lower extremity function, pain, decreased safety awareness.     Rehab Prognosis:  Fair; patient would benefit from acute skilled OT services to address these deficits and reach maximum level of function.       Plan:     Patient to be seen 4 x/week to address the above listed problems via self-care/home management, therapeutic activities  Plan of Care Expires: 01/27/25  Plan of Care Reviewed with: patient, spouse    Subjective     Pain/Comfort:       Objective:     Communicated with: RN prior to session.  Patient found HOB elevated with   upon OT entry to room.    General Precautions: Standard, fall    Orthopedic Precautions:N/A  Braces: N/A     Occupational Performance:     Bed Mobility:    Patient completed Rolling/Turning to Left with  moderate assistance  Patient completed Rolling/Turning to Right with moderate assistance  Patient completed Supine to Sit with moderate assistance  Patient completed Sit to Supine with moderate assistance     Functional Mobility/Transfers:  Patient completed Sit <> Stand Transfer with moderate assistance  with  hand-held assist     Activities of Daily Living:  Toileting: total assistance pericare following +BM performed in sidelying    Patient Education:  Patient provided with verbal education education regarding OT  role/goals/POC.  Understanding was verbalized.      Patient left HOB elevated with all lines intact, call button in reach, and wedge under L side.    GOALS:   Multidisciplinary Problems       Occupational Therapy Goals          Problem: Occupational Therapy    Goal Priority Disciplines Outcome Interventions   Occupational Therapy Goal     OT, PT/OT Progressing    Description: Goals to be met by: 1/27/25     Patient will increase functional independence with ADLs by performing:    Feeding with Set-up Assistance.  UE Dressing with Minimal Assistance.  LE Dressing with Minimal Assistance.  Grooming while seated at sink with Stand-by Assistance.  Toileting from bedside commode with Minimal Assistance for hygiene and clothing management.   Toilet transfer to bedside commode with Minimal Assistance.                       Time Tracking:     OT Date of Treatment: 01/03/25  OT Start Time: 1429  OT Stop Time: 1500  OT Total Time (min): 31 min    Billable Minutes:Self Care/Home Management 2    OT/SARANYA: SARANYA     Number of SARANYA visits since last OT visit: 2    1/3/2025

## 2025-01-03 NOTE — PT/OT/SLP PROGRESS
Physical Therapy Treatment    Patient Name:  Ev Alberts   MRN:  [id number]    Recommendations:     Discharge therapy intensity: Moderate Intensity Therapy   Discharge Equipment Recommendations: to be determined by next level of care  Barriers to discharge: Impaired mobility and Ongoing medical needs    Assessment:     Ev Alberts is a 89 y.o. female admitted with a medical diagnosis of acute HF exacerbation, NSTEMI, catheter associated UTI, PAF, acute anemia, hx of dementia.  She presents with the following impairments/functional limitations: weakness, impaired endurance, impaired self care skills, impaired functional mobility, impaired balance, impaired cognition, decreased upper extremity function, decreased lower extremity function.    Rehab Prognosis: Good; patient would benefit from acute skilled PT services to address these deficits and reach maximum level of function.    Recent Surgery: * No surgery found *      Plan:     During this hospitalization, patient would benefit from acute PT services 5 x/week to address the identified rehab impairments via gait training, therapeutic activities, therapeutic exercises, neuromuscular re-education and progress toward the following goals:    Plan of Care Expires:  01/27/25    Subjective     Chief Complaint: pain  Patient/Family Comments/goals: none stated  Pain/Comfort:         Objective:     Communicated with nursing prior to session.  Patient found HOB elevated with cook catheter, telemetry, peripheral IV upon PT entry to room.     General Precautions: Standard, fall  Orthopedic Precautions:    Braces:    Respiratory Status: Room air  Blood Pressure: NT    Functional Mobility:  Bed Mobility:     Rolling Left:  moderate assistance  Rolling Right: moderate assistance  Scooting: maximal assistance  Supine to Sit: moderate assistance  Sit to Supine: moderate assistance  Transfers:     Sit to Stand:  moderate assistance with hand-held assist  X2 reps  Therapist on  Upper GI Endoscopy: Before Your Procedure  What is an upper GI endoscopy? An upper gastrointestinal (or GI) endoscopy is a test that allows your doctor to look at the inside of your esophagus, stomach, and the first part of your small intestine, called the duodenum. The esophagus is the tube that carries food to your stomach. The doctor uses a thin, lighted tube that bends. It is called an endoscope, or scope. The doctor puts the tip of the scope in your mouth and gently moves it down your throat. The scope is a flexible video camera. The doctor looks at a monitor (like a TV set or a computer screen) as he or she moves the scope. A doctor may do this procedure to look for ulcers, tumors, infection, or bleeding. It also can be used to look for signs of acid backing up into your esophagus. This is called gastroesophageal reflux disease, or GERD. The doctor can use the scope to take a sample of tissue for study (a biopsy). The doctor also can use the scope to take out growths or stop bleeding. Follow-up care is a key part of your treatment and safety. Be sure to make and go to all appointments, and call your doctor if you are having problems. It's also a good idea to know your test results and keep a list of the medicines you take. How do you prepare for the procedure? Procedures can be stressful. This information will help you understand what you can expect. And it will help you safely prepare for your procedure. Preparing for the procedure  · Do not eat or drink anything for 6 to 8 hours before the test. An empty stomach helps your doctor see your stomach clearly during the test. It also reduces your chances of vomiting. If you vomit, there is a small risk that the vomit could enter your lungs. (This is called aspiration.) If the test is done in an emergency, a tube may be inserted through your nose or mouth to empty your stomach.   · Do not take sucralfate (Carafate) or antacids on the day of the test. These medicines can make it hard for your doctor to see your upper GI tract. · If your doctor tells you to, stop taking iron supplements 7 to 14 days before the test.  · Be sure you have someone to take you home. Anesthesia and pain medicine will make it unsafe for you to drive or get home on your own. · Understand exactly what procedure is planned, along with the risks, benefits, and other options. · Tell your doctor ALL the medicines, vitamins, supplements, and herbal remedies you take. Some may increase the risk of problems during your procedure. Your doctor will tell you if you should stop taking any of them before the procedure and how soon to do it. · If you take aspirin or some other blood thinner, ask your doctor if you should stop taking it before your procedure. Make sure that you understand exactly what your doctor wants you to do. These medicines increase the risk of bleeding. · Make sure your doctor and the hospital have a copy of your advance directive. If you don't have one, you may want to prepare one. It lets others know your health care wishes. It's a good thing to have before any type of surgery or procedure. What happens on the day of the procedure? · Follow the instructions exactly about when to stop eating and drinking. If you don't, your procedure may be canceled. If your doctor told you to take your medicines on the day of the procedure, take them with only a sip of water. · Take a bath or shower before you come in for your procedure. Do not apply lotions, perfumes, deodorants, or nail polish. · Take off all jewelry and piercings. And take out contact lenses, if you wear them. At the hospital or surgery center    · Bring a picture ID. · The test may take 15 to 30 minutes. · The doctor may spray medicine on the back of your throat to numb it. You also will get medicine to prevent pain and to relax you. · You will lie on your left side.  The doctor will put the scope in your each side, initially blocking BLEs  Gait: lateral steps along EOB, ModA x2 w/HHA  Assistance needed to advance BLEs, weight shifting, VC for sequencing    Therapeutic Activities/Exercises:  Pt soiled upon entry rolling performed for total A posterior pericare and to don brief. Pt sat EOB ~15 mins, stood twice and was able to take step EOB. Pt B2B, brief doffed.    Education:  Patient provided with verbal education education regarding PT role/goals/POC, fall prevention, safety awareness, and pressure ulcer prevention.  Understanding was verbalized, however additional teaching warranted.     Patient left right sidelying with all lines intact, call button in reach, wedge under L side, pressure relief boots, nurse notified, and spouse present    GOALS:   Multidisciplinary Problems       Physical Therapy Goals          Problem: Physical Therapy    Goal Priority Disciplines Outcome Interventions   Physical Therapy Goal     PT, PT/OT Progressing    Description: Goals to be met by: 24     Patient will increase functional independence with mobility by performin. Supine to sit with MInimal Assistance  2. Sit to stand transfer with Minimal Assistance  3. Bed to chair transfer with Minimal Assistance using Rolling Walker vs. Squat pivot  4. Gait  x 25 feet with Minimal Assistance using Rolling Walker.   5. Sitting at edge of bed x10 minutes with Stand-by Assistance                         Time Tracking:     PT Received On: 25  PT Start Time: 1431     PT Stop Time: 1501  PT Total Time (min): 30 min     Billable Minutes: Gait Training 8 and Therapeutic Activity 22    Treatment Type: Treatment  PT/PTA: PTA     Number of PTA visits since last PT visit: 3     2025    mouth and toward the back of your throat. The doctor will tell you when to swallow. This helps the scope move down your throat. You will be able to breathe normally. The doctor will move the scope down your esophagus into your stomach. The doctor also may look at the duodenum. · If your doctor wants to take a sample of tissue for a biopsy, he or she may use small surgical tools, which are put into the scope, to cut off some tissue. You will not feel a biopsy, if one is taken. The doctor also can use the tools to stop bleeding or to do other treatments, if needed. · You will stay at the hospital or surgery center for 1 to 2 hours until the medicine you were given wears off. What happens after an upper GI endoscopy? · After the test, you may belch and feel bloated for a while. · You may have a tickling, dry throat or mouth. You may feel a bit hoarse, and you may have a mild sore throat. These symptoms may last several days. Throat lozenges and warm saltwater gargles can help relieve the throat symptoms. · Don't drive or operate machinery for 12 hours after the test.  · Your doctor will tell you when you can go back to your usual diet and activities. · Don't drink alcohol for 12 to 24 hours after the test.  When should you call your doctor? · You have questions or concerns. · You don't understand how to prepare for your procedure. · You become ill before the procedure (such as fever, flu, or a cold). · You need to reschedule or have changed your mind about having the procedure. Where can you learn more? Go to http://www.gray.com/  Enter P790 in the search box to learn more about \"Upper GI Endoscopy: Before Your Procedure. \"  Current as of: August 12, 2019               Content Version: 12.5  © 8969-2108 [employer]. Care instructions adapted under license by [employer] (which disclaims liability or warranty for this information).  If you have questions about a medical condition or this instruction, always ask your healthcare professional. [place] [address] any warranty or liability for your use of this information.

## 2025-01-04 LAB
ALBUMIN SERPL-MCNC: 2.4 G/DL (ref 3.4–4.8)
BASOPHILS # BLD AUTO: 0.06 X10(3)/MCL
BASOPHILS NFR BLD AUTO: 0.7 %
BUN SERPL-MCNC: 29.1 MG/DL (ref 9.8–20.1)
CALCIUM SERPL-MCNC: 8.8 MG/DL (ref 8.4–10.2)
CHLORIDE SERPL-SCNC: 99 MMOL/L (ref 98–107)
CO2 SERPL-SCNC: 27 MMOL/L (ref 23–31)
CREAT SERPL-MCNC: 1.39 MG/DL (ref 0.55–1.02)
EOSINOPHIL # BLD AUTO: 0.1 X10(3)/MCL (ref 0–0.9)
EOSINOPHIL NFR BLD AUTO: 1.2 %
ERYTHROCYTE [DISTWIDTH] IN BLOOD BY AUTOMATED COUNT: 19.2 % (ref 11.5–17)
GFR SERPLBLD CREATININE-BSD FMLA CKD-EPI: 37 ML/MIN/1.73/M2
GLUCOSE SERPL-MCNC: 117 MG/DL (ref 82–115)
HCT VFR BLD AUTO: 24.5 % (ref 37–47)
HGB BLD-MCNC: 8.1 G/DL (ref 12–16)
IMM GRANULOCYTES # BLD AUTO: 0.03 X10(3)/MCL (ref 0–0.04)
IMM GRANULOCYTES NFR BLD AUTO: 0.4 %
LYMPHOCYTES # BLD AUTO: 1.99 X10(3)/MCL (ref 0.6–4.6)
LYMPHOCYTES NFR BLD AUTO: 23.9 %
MCH RBC QN AUTO: 32.5 PG (ref 27–31)
MCHC RBC AUTO-ENTMCNC: 33.1 G/DL (ref 33–36)
MCV RBC AUTO: 98.4 FL (ref 80–94)
MONOCYTES # BLD AUTO: 1 X10(3)/MCL (ref 0.1–1.3)
MONOCYTES NFR BLD AUTO: 12 %
NEUTROPHILS # BLD AUTO: 5.15 X10(3)/MCL (ref 2.1–9.2)
NEUTROPHILS NFR BLD AUTO: 61.8 %
NRBC BLD AUTO-RTO: 0 %
PHOSPHATE SERPL-MCNC: 2.2 MG/DL (ref 2.3–4.7)
PLATELET # BLD AUTO: 139 X10(3)/MCL (ref 130–400)
PMV BLD AUTO: 10.4 FL (ref 7.4–10.4)
POCT GLUCOSE: 135 MG/DL (ref 70–110)
POTASSIUM SERPL-SCNC: 3.5 MMOL/L (ref 3.5–5.1)
RBC # BLD AUTO: 2.49 X10(6)/MCL (ref 4.2–5.4)
SODIUM SERPL-SCNC: 134 MMOL/L (ref 136–145)
WBC # BLD AUTO: 8.33 X10(3)/MCL (ref 4.5–11.5)

## 2025-01-04 PROCEDURE — 25000003 PHARM REV CODE 250: Performed by: STUDENT IN AN ORGANIZED HEALTH CARE EDUCATION/TRAINING PROGRAM

## 2025-01-04 PROCEDURE — 25000003 PHARM REV CODE 250: Performed by: INTERNAL MEDICINE

## 2025-01-04 PROCEDURE — 80069 RENAL FUNCTION PANEL: CPT | Performed by: INTERNAL MEDICINE

## 2025-01-04 PROCEDURE — 25000003 PHARM REV CODE 250

## 2025-01-04 PROCEDURE — 11000001 HC ACUTE MED/SURG PRIVATE ROOM

## 2025-01-04 PROCEDURE — 63600175 PHARM REV CODE 636 W HCPCS: Performed by: INTERNAL MEDICINE

## 2025-01-04 PROCEDURE — 21400001 HC TELEMETRY ROOM

## 2025-01-04 PROCEDURE — 85025 COMPLETE CBC W/AUTO DIFF WBC: CPT | Performed by: INTERNAL MEDICINE

## 2025-01-04 PROCEDURE — 36415 COLL VENOUS BLD VENIPUNCTURE: CPT | Performed by: INTERNAL MEDICINE

## 2025-01-04 RX ORDER — TRAZODONE HYDROCHLORIDE 100 MG/1
100 TABLET ORAL NIGHTLY
Status: DISCONTINUED | OUTPATIENT
Start: 2025-01-04 | End: 2025-01-08

## 2025-01-04 RX ORDER — SODIUM,POTASSIUM PHOSPHATES 280-250MG
1 POWDER IN PACKET (EA) ORAL
Status: DISPENSED | OUTPATIENT
Start: 2025-01-04 | End: 2025-01-05

## 2025-01-04 RX ADMIN — FUROSEMIDE 60 MG: 10 INJECTION, SOLUTION INTRAMUSCULAR; INTRAVENOUS at 09:01

## 2025-01-04 RX ADMIN — ALLOPURINOL 300 MG: 300 TABLET ORAL at 09:01

## 2025-01-04 RX ADMIN — POTASSIUM & SODIUM PHOSPHATES POWDER PACK 280-160-250 MG 1 PACKET: 280-160-250 PACK at 06:01

## 2025-01-04 RX ADMIN — MELATONIN TAB 3 MG 6 MG: 3 TAB at 06:01

## 2025-01-04 RX ADMIN — RIVASTIGMINE TARTRATE 1.5 MG: 1.5 CAPSULE ORAL at 09:01

## 2025-01-04 RX ADMIN — APIXABAN 5 MG: 5 TABLET, FILM COATED ORAL at 09:01

## 2025-01-04 RX ADMIN — PANTOPRAZOLE SODIUM 40 MG: 40 TABLET, DELAYED RELEASE ORAL at 09:01

## 2025-01-04 RX ADMIN — CETIRIZINE HYDROCHLORIDE 10 MG: 10 TABLET, FILM COATED ORAL at 09:01

## 2025-01-04 RX ADMIN — ACETAMINOPHEN 650 MG: 325 TABLET, FILM COATED ORAL at 09:01

## 2025-01-04 RX ADMIN — TRAZODONE HYDROCHLORIDE 100 MG: 100 TABLET ORAL at 09:01

## 2025-01-04 RX ADMIN — FERROUS SULFATE TAB 325 MG (65 MG ELEMENTAL FE) 1 EACH: 325 (65 FE) TAB at 09:01

## 2025-01-04 RX ADMIN — ACETAMINOPHEN 650 MG: 325 TABLET, FILM COATED ORAL at 03:01

## 2025-01-04 RX ADMIN — POTASSIUM & SODIUM PHOSPHATES POWDER PACK 280-160-250 MG 1 PACKET: 280-160-250 PACK at 12:01

## 2025-01-04 RX ADMIN — LEVOTHYROXINE SODIUM 25 MCG: 25 TABLET ORAL at 03:01

## 2025-01-04 RX ADMIN — RISPERIDONE 2 MG: 1 TABLET, FILM COATED ORAL at 09:01

## 2025-01-04 RX ADMIN — LUBIPROSTONE 24 MCG: 24 CAPSULE, GELATIN COATED ORAL at 09:01

## 2025-01-04 RX ADMIN — SODIUM CHLORIDE 125 MG: 9 INJECTION, SOLUTION INTRAVENOUS at 09:01

## 2025-01-04 RX ADMIN — POTASSIUM BICARBONATE 25 MEQ: 977.5 TABLET, EFFERVESCENT ORAL at 09:01

## 2025-01-04 NOTE — PHYSICIAN QUERY
Due to the conflicting clinical picture, please clinically validate the diagnosis of Severe Malnutrition. If validated, please provide additional clinical support for the Severe Malnutrition diagnosis.   The condition is not confirmed and/or it has been ruled out

## 2025-01-04 NOTE — PROGRESS NOTES
[facility] [address] MEDICINE ~ PROGRESS NOTE        CHIEF COMPLAINT   Hospital follow up    HOSPITAL COURSE   89 y/o F with a PMH of diastolic heart failure LVEF 50-55%, HTN, chronic anemia, paroxysmal A-fib on eliquis, and CKD stage III followed by Dr. Chávez , chronic respiratory failure on 2 L NC at home, dementia and glaucoma presented to the ED with complains of bilateral leg swelling, generalized weakness and deconditioning post hospital discharge. Patient explains that she is bed bound and lives at home with her son and  who take care of her. Patient was recently admitted at this facility for worsening generalized weakness, bilateral leg swelling and urinary retention and discharged home with an indwelling cook, follow up appointment with urology and private sitters and home care with [facility] . Patient denies black or blood in her tool, denies chest pain, denies shortness of breath, denies known fever.     Initial ED vitals: Temp 98.1, HR 90, Resp 18, /58, O2 Sat 98% on RA. ED work up revealed WBC 7.39, H&H 8.7 and 26.8, , K 3.4, BUN 30.3, Creatinine 1.26, .3, Troponin 0.048, Lactic acid 1.1, TSH 3.335 Influenza, COVID, and RSV swab negative, Respiratory panel negative, UA positive for blood, leukocyte esterase, RBC, WBC, and bacteria, X-ray chest read enlarged cardiac silhouette without overt edema, EKG read ventricular paced rhythm at 62 bmp. Cardiology was consulted in the ED and patient was admitted to Hospital Medicine.    Today  Per nurse report slept about 4 hours last night.  She was awake this morning.  Cook still remains in place, removed today.        OBJECTIVE/PHYSICAL EXAM     VITAL SIGNS (MOST RECENT):  Temp: 97.9 °F (36.6 °C) (01/04/25 0718)  Pulse: 78 (01/04/25 0718)  Resp: 18 (01/03/25 1606)  BP: 116/67 (01/04/25 0718)  SpO2: 98 % (01/04/25 0718) VITAL SIGNS (24 HOUR RANGE):  Temp:  [97.5 °F (36.4 °C)-98.5 °F  (36.9 °C)] 97.9 °F (36.6 °C)  Pulse:  [64-78] 78  Resp:  [18-20] 18  SpO2:  [96 %-98 %] 98 %  BP: (109-125)/(41-69) 116/67   GENERAL: In no acute distress, afebrile  HEENT:  CHEST: Clear to auscultation bilaterally  HEART: S1, S2, 2/6 systolic murmur  ABDOMEN: Soft, nontender, BS +  MSK: Warm, 2+ pitting bilateral lower extremity edema, no clubbing or cyanosis  NEUROLOGIC: Alert and oriented x4, moving all extremities with good strength   INTEGUMENTARY:  PSYCHIATRY:        ASSESSMENT/PLAN   Volume overload 2/2 right heart failure  Moderate pulmonary hypertension per echocardiogram  Left heart failure ruled out  Non STEMI type 2  CKD 3B  Pseudomonas urinary tract infection-treated    History of:  Chronic respiratory failure on home O2, dementia, HTN, anemia, glaucoma, CKD 3B, anemia of chronic disease, paroxysmal atrial fibrillation      Nephrology following.  Fluid adjustments per them.  Currently on Lasix 60 iv bid.   We will be difficult managing fluid and may have to live with some edema to the legs given right heart failure with pulmonary hypertension.    Case management following.  Pending SNF.  Melatonin at 6:00 p.m., trazodone 50 nightly, continue risperidone.  Regulate sleep-wake cycle.  Avoid nighttime disturbances.  Discontinue vitals between 10:00 p.m. to 6:00 a.m. if otherwise stable.  We will try to get her some thigh-high compression stockings, discuss with nurse.  Remove Grey catheter today  Ordered for 3 doses of Ferrlecit once daily    DVT prophylaxis: Eliquis    Anticipated discharge and disposition:   __________________________________________________________________________    NUTRITIONAL STATUS     Patient meets ASPEN criteria for   malnutrition of   per RD assessment as evidenced by:                       A minimum of two characteristics is recommended for diagnosis of either severe or non-severe malnutrition.     LABS/MICRO/MEDS/DIAGNOSTICS       LABS  Recent Labs     01/02/25  7173  01/03/25  0400 01/04/25  0400   *   < > 134*   K 3.5   < > 3.5   CO2 27   < > 27   BUN 30.9*   < > 29.1*   CREATININE 1.38*   < > 1.39*   GLUCOSE 81*   < > 117*   CALCIUM 8.8   < > 8.8   ALKPHOS 103  --   --    AST 18  --   --    ALT 8  --   --    ALBUMIN 2.4*   < > 2.4*    < > = values in this interval not displayed.     Recent Labs     01/04/25  0400   WBC 8.33   RBC 2.49*   HCT 24.5*   MCV 98.4*          MICROBIOLOGY  Microbiology Results (last 7 days)       Procedure Component Value Units Date/Time    Blood Culture [[id number]]  (Normal) Collected: 12/29/24 1747    Order Status: Completed Specimen: Blood Updated: 01/03/25 1901     Blood Culture No Growth at 5 days    Blood Culture [[id number]]  (Normal) Collected: 12/29/24 1142    Order Status: Completed Specimen: Blood Updated: 01/03/25 1302     Blood Culture No Growth at 5 days    Blood Culture #2 **CANNOT BE ORDERED STAT** [[id number]]  (Normal) Collected: 12/26/24 1656    Order Status: Completed Specimen: Blood Updated: 12/31/24 1901     Blood Culture No Growth at 5 days    Blood Culture #1 **CANNOT BE ORDERED STAT** [[id number]]  (Normal) Collected: 12/26/24 1656    Order Status: Completed Specimen: Blood Updated: 12/31/24 1901     Blood Culture No Growth at 5 days    Urine culture [[id number]]  (Abnormal)  (Susceptibility) Collected: 12/26/24 1539    Order Status: Completed Specimen: Urine Updated: 12/29/24 0641     Urine Culture >/= 100,000 colonies/ml Pseudomonas aeruginosa               MEDICATIONS   allopurinoL  300 mg Oral Daily    apixaban  5 mg Oral BID    cetirizine  10 mg Oral QHS    ferrous sulfate  1 tablet Oral BID    furosemide (LASIX) injection  60 mg Intravenous Q12H    latanoprost  1 drop Both Eyes QHS    levothyroxine  25 mcg Oral Before breakfast    lubiprostone  24 mcg Oral BID WM    melatonin  6 mg Oral Q24H    pantoprazole  40 mg Oral BID    potassium bicarbonate  25 mEq Oral Daily    potassium, sodium phosphates  1 packet  Oral Q6H    risperiDONE  2 mg Oral QHS    rivastigmine tartrate  1.5 mg Oral Daily    traZODone  50 mg Oral QHS         INFUSIONS         DIAGNOSTIC TESTS  CT Head Without Contrast   Final Result      1.  No acute intracranial findings identified.      2.  Chronic microangiopathic ischemia and atrophy..         Electronically signed by: Theodore Prince   Date:    12/29/2024   Time:    08:22      X-Ray Chest AP Portable   Final Result      Enlarged cardiac silhouette without overt edema.         Electronically signed by: Angélica Diehl   Date:    12/26/2024   Time:    15:31           Echo    Result Date: 12/27/2024    Left Ventricle: The left ventricle is normal in size. Moderately   increased wall thickness. Normal wall motion. Septal flattening in systole   consistent with right ventricular pressure overload. There is normal   systolic function with a visually estimated ejection fraction of 55 - 60%.   There is normal diastolic function.    Right Ventricle: Moderate right ventricular enlargement. Systolic   function is moderately reduced. TAPSE is 1.19 cm. Pacemaker lead present   in the ventricle.    Left Atrium: Left atrium is moderately dilated.    Right Atrium: Right atrium is severely dilated. Lead present in the   right atrium.    Mitral Valve: There is mild regurgitation.    Tricuspid Valve: There is severe regurgitation. There is moderate   pulmonary hypertension.    Pulmonic Valve: There is mild regurgitation.    Pulmonary Artery: There is moderate pulmonary hypertension. The   estimated pulmonary artery systolic pressure is 47 mmHg.    IVC/SVC: Elevated venous pressure at 15 mmHg.              Case related differential diagnoses have been reviewed; assessment and plan has been documented. I have personally reviewed the labs and test results that are currently available; I have reviewed the patients medication list. I have reviewed the consulting providers recommendations. I have reviewed or attempted to  review medical records based upon their availability.  All of the patient's and/or family's questions have been addressed and answered to the best of my ability.  I will continue to monitor closely and make adjustments to medical management as needed.  This document was created using M*Modal Fluency Direct.  Transcription errors may have been made.  Please contact me if any questions may rise regarding documentation to clarify transcription.        Jason Vernon MD   Internal Medicine  Department of Hospital Medicine [facility] [address] [facility]

## 2025-01-04 NOTE — PROGRESS NOTES
Chief complaint: none    Interval History:  Pt continues to improve, more alert and interactive, notes appetite, swelling improving    Review of Systems   Constitutional: Negative.    HENT: Negative.     Respiratory: Negative.     Cardiovascular:  Positive for leg swelling (improving).   Gastrointestinal: Negative.    Genitourinary: Negative.    Musculoskeletal: Negative.    Neurological:  Positive for weakness.   Psychiatric/Behavioral: Negative.        all else Neg     allopurinoL  300 mg Oral Daily    apixaban  5 mg Oral BID    cetirizine  10 mg Oral QHS    ferric gluconate (FERRLECIT) 125 mg in 0.9% NaCl 100 mL IVPB  125 mg Intravenous Daily    ferrous sulfate  1 tablet Oral BID    furosemide (LASIX) injection  60 mg Intravenous Q12H    latanoprost  1 drop Both Eyes QHS    levothyroxine  25 mcg Oral Before breakfast    lubiprostone  24 mcg Oral BID WM    melatonin  6 mg Oral Q24H    pantoprazole  40 mg Oral BID    potassium bicarbonate  25 mEq Oral Daily    potassium, sodium phosphates  1 packet Oral Q6H    risperiDONE  2 mg Oral QHS    rivastigmine tartrate  1.5 mg Oral Daily    traZODone  50 mg Oral QHS       Objective     VITAL SIGNS: 24 HR MIN & MAX LAST    Temp  Min: 97.5 °F (36.4 °C)  Max: 98.5 °F (36.9 °C)  97.9 °F (36.6 °C)    BP  Min: 109/69  Max: 125/60  (!) 110/52     Pulse  Min: 60  Max: 78  60     Resp  Min: 18  Max: 20  18    SpO2  Min: 96 %  Max: 98 %  98 %      Wt Readings from Last 3 Encounters:   12/30/24 74.5 kg (164 lb 3.9 oz)   12/22/24 71.7 kg (158 lb 1.1 oz)   11/19/24 72.6 kg (160 lb)       Intake/Output Summary (Last 24 hours) at 1/4/2025 1005  Last data filed at 1/4/2025 0300  Gross per 24 hour   Intake --   Output 1800 ml   Net -1800 ml       Physical Exam  Constitutional:       General: She is not in acute distress.  Cardiovascular:      Rate and Rhythm: Normal rate.   Pulmonary:      Effort: Pulmonary effort is normal.      Breath sounds: Normal breath sounds.   Abdominal:       General: Bowel sounds are normal.      Palpations: Abdomen is soft.      Tenderness: There is no abdominal tenderness.   Musculoskeletal:         General: Swelling (improved) present.      Cervical back: Normal range of motion.   Neurological:      General: No focal deficit present.      Mental Status: She is alert.      Motor: Weakness present.   Psychiatric:         Mood and Affect: Mood normal.          Recent Labs     01/02/25  0412 01/03/25  0400 01/04/25  0400   * 133* 134*   K 3.5 3.7 3.5   CO2 27 27 27   BUN 30.9* 31.0* 29.1*   CREATININE 1.38* 1.36* 1.39*   GLUCOSE 81* 94 117*   CALCIUM 8.8 8.5 8.8   MG 2.00  --   --    PHOS 1.8* 2.6 2.2*   ALBUMIN 2.4* 2.3* 2.4*      Recent Labs     01/02/25  0412 01/04/25  0400   WBC 8.41 8.33   HGB 8.6* 8.1*   HCT 25.7* 24.5*    139         Assessment & Plan     CKD 3B (baseline creatinine 1.5-1.6), solitary kidney - GFR at baseline tolerating diuresis. For now continue IV lasix, ok for mild permissive azotemia to assist with 3rd spacing from R CHF  Urinary retention, catheter-related UTI - cook removed today, monitor for recurrent retention  Rinaldi, Right sided CHF, pHTN, Fluid overload, edema - improved p diuresis, continue current IV lasix.  Agree thigh high compression wraps/stockings to assist with the B leg edema.  Will d/c IV lasix once she is becoming more azotemic   VHD - mod MR, mod TR, Pulm HTN  Anemia of CKD - Hb stable at 8.1; s/p EPO 40 K 12/10/2024 which was re-dosed 12/25; monitor for constipation with oral Fe  Hypokalemia - continue K bicarb po  Thrombocytopenia - resolved with diuresis  H/o GIB 7/2024  HTN with ACEi allergy - normotensive off of BP meds  Hypothyroidism, TSH 1.6  Afib, eliquis  Hypophos - on neutraphos  Chronic Home O2 (2 L)

## 2025-01-05 LAB
ANION GAP SERPL CALC-SCNC: 7 MEQ/L
BASOPHILS # BLD AUTO: 0.03 X10(3)/MCL
BASOPHILS NFR BLD AUTO: 0.4 %
BUN SERPL-MCNC: 29.9 MG/DL (ref 9.8–20.1)
CALCIUM SERPL-MCNC: 8.6 MG/DL (ref 8.4–10.2)
CHLORIDE SERPL-SCNC: 100 MMOL/L (ref 98–107)
CO2 SERPL-SCNC: 28 MMOL/L (ref 23–31)
CREAT SERPL-MCNC: 1.24 MG/DL (ref 0.55–1.02)
CREAT/UREA NIT SERPL: 24
EOSINOPHIL # BLD AUTO: 0.15 X10(3)/MCL (ref 0–0.9)
EOSINOPHIL NFR BLD AUTO: 2 %
ERYTHROCYTE [DISTWIDTH] IN BLOOD BY AUTOMATED COUNT: 19.1 % (ref 11.5–17)
GFR SERPLBLD CREATININE-BSD FMLA CKD-EPI: 42 ML/MIN/1.73/M2
GLUCOSE SERPL-MCNC: 90 MG/DL (ref 70–110)
GLUCOSE SERPL-MCNC: 90 MG/DL (ref 82–115)
HCT VFR BLD AUTO: 23.2 % (ref 37–47)
HGB BLD-MCNC: 7.6 G/DL (ref 12–16)
IMM GRANULOCYTES # BLD AUTO: 0.04 X10(3)/MCL (ref 0–0.04)
IMM GRANULOCYTES NFR BLD AUTO: 0.5 %
LYMPHOCYTES # BLD AUTO: 1.73 X10(3)/MCL (ref 0.6–4.6)
LYMPHOCYTES NFR BLD AUTO: 23.5 %
MCH RBC QN AUTO: 32.6 PG (ref 27–31)
MCHC RBC AUTO-ENTMCNC: 32.8 G/DL (ref 33–36)
MCV RBC AUTO: 99.6 FL (ref 80–94)
MONOCYTES # BLD AUTO: 0.86 X10(3)/MCL (ref 0.1–1.3)
MONOCYTES NFR BLD AUTO: 11.7 %
NEUTROPHILS # BLD AUTO: 4.54 X10(3)/MCL (ref 2.1–9.2)
NEUTROPHILS NFR BLD AUTO: 61.9 %
NRBC BLD AUTO-RTO: 0 %
PLATELET # BLD AUTO: 135 X10(3)/MCL (ref 130–400)
PMV BLD AUTO: 9.6 FL (ref 7.4–10.4)
POCT GLUCOSE: 111 MG/DL (ref 70–110)
POCT GLUCOSE: 118 MG/DL (ref 70–110)
POCT GLUCOSE: 136 MG/DL (ref 70–110)
POTASSIUM SERPL-SCNC: 4 MMOL/L (ref 3.5–5.1)
RBC # BLD AUTO: 2.33 X10(6)/MCL (ref 4.2–5.4)
SODIUM SERPL-SCNC: 135 MMOL/L (ref 136–145)
WBC # BLD AUTO: 7.35 X10(3)/MCL (ref 4.5–11.5)

## 2025-01-05 PROCEDURE — 25000003 PHARM REV CODE 250: Performed by: INTERNAL MEDICINE

## 2025-01-05 PROCEDURE — 21400001 HC TELEMETRY ROOM

## 2025-01-05 PROCEDURE — 63600175 PHARM REV CODE 636 W HCPCS: Performed by: INTERNAL MEDICINE

## 2025-01-05 PROCEDURE — 80048 BASIC METABOLIC PNL TOTAL CA: CPT | Performed by: INTERNAL MEDICINE

## 2025-01-05 PROCEDURE — 36415 COLL VENOUS BLD VENIPUNCTURE: CPT | Performed by: INTERNAL MEDICINE

## 2025-01-05 PROCEDURE — 25000003 PHARM REV CODE 250

## 2025-01-05 PROCEDURE — 85025 COMPLETE CBC W/AUTO DIFF WBC: CPT | Performed by: INTERNAL MEDICINE

## 2025-01-05 PROCEDURE — 11000001 HC ACUTE MED/SURG PRIVATE ROOM

## 2025-01-05 RX ORDER — FUROSEMIDE 40 MG/1
80 TABLET ORAL DAILY
Status: DISCONTINUED | OUTPATIENT
Start: 2025-01-06 | End: 2025-01-08

## 2025-01-05 RX ORDER — LUBIPROSTONE 24 UG/1
24 CAPSULE ORAL
Status: DISCONTINUED | OUTPATIENT
Start: 2025-01-06 | End: 2025-01-08

## 2025-01-05 RX ORDER — RISPERIDONE 1 MG/1
4 TABLET ORAL NIGHTLY
Status: DISCONTINUED | OUTPATIENT
Start: 2025-01-05 | End: 2025-01-08

## 2025-01-05 RX ADMIN — PANTOPRAZOLE SODIUM 40 MG: 40 TABLET, DELAYED RELEASE ORAL at 08:01

## 2025-01-05 RX ADMIN — LEVOTHYROXINE SODIUM 25 MCG: 25 TABLET ORAL at 05:01

## 2025-01-05 RX ADMIN — POTASSIUM BICARBONATE 25 MEQ: 977.5 TABLET, EFFERVESCENT ORAL at 10:01

## 2025-01-05 RX ADMIN — SODIUM CHLORIDE 125 MG: 9 INJECTION, SOLUTION INTRAVENOUS at 10:01

## 2025-01-05 RX ADMIN — APIXABAN 5 MG: 5 TABLET, FILM COATED ORAL at 10:01

## 2025-01-05 RX ADMIN — MELATONIN TAB 3 MG 6 MG: 3 TAB at 05:01

## 2025-01-05 RX ADMIN — CETIRIZINE HYDROCHLORIDE 10 MG: 10 TABLET, FILM COATED ORAL at 08:01

## 2025-01-05 RX ADMIN — FUROSEMIDE 60 MG: 10 INJECTION, SOLUTION INTRAMUSCULAR; INTRAVENOUS at 10:01

## 2025-01-05 RX ADMIN — APIXABAN 5 MG: 5 TABLET, FILM COATED ORAL at 08:01

## 2025-01-05 RX ADMIN — PANTOPRAZOLE SODIUM 40 MG: 40 TABLET, DELAYED RELEASE ORAL at 10:01

## 2025-01-05 RX ADMIN — RISPERIDONE 4 MG: 1 TABLET, FILM COATED ORAL at 08:01

## 2025-01-05 RX ADMIN — FERROUS SULFATE TAB 325 MG (65 MG ELEMENTAL FE) 1 EACH: 325 (65 FE) TAB at 10:01

## 2025-01-05 RX ADMIN — ALLOPURINOL 300 MG: 300 TABLET ORAL at 10:01

## 2025-01-05 RX ADMIN — TRAZODONE HYDROCHLORIDE 100 MG: 100 TABLET ORAL at 08:01

## 2025-01-05 RX ADMIN — FERROUS SULFATE TAB 325 MG (65 MG ELEMENTAL FE) 1 EACH: 325 (65 FE) TAB at 08:01

## 2025-01-05 RX ADMIN — LATANOPROST 1 DROP: 50 SOLUTION OPHTHALMIC at 08:01

## 2025-01-05 RX ADMIN — RIVASTIGMINE TARTRATE 1.5 MG: 1.5 CAPSULE ORAL at 10:01

## 2025-01-05 NOTE — PROGRESS NOTES
"Called and notified there was concern for patients device "not capturing continuously." Tele strips reviewed. HR has been in the 60s-70s with no bradycardia noted.   "

## 2025-01-05 NOTE — PROGRESS NOTES
[facility] [address] MEDICINE ~ PROGRESS NOTE        CHIEF COMPLAINT   Hospital follow up    HOSPITAL COURSE   89 y/o F with a PMH of diastolic heart failure LVEF 50-55%, HTN, chronic anemia, paroxysmal A-fib on eliquis, and CKD stage III followed by Dr. Chávez , chronic respiratory failure on 2 L NC at home, dementia and glaucoma presented to the ED with complains of bilateral leg swelling, generalized weakness and deconditioning post hospital discharge. Patient explains that she is bed bound and lives at home with her son and  who take care of her. Patient was recently admitted at this facility for worsening generalized weakness, bilateral leg swelling and urinary retention and discharged home with an indwelling cook, follow up appointment with urology and private sitters and home care with [facility] . Patient denies black or blood in her tool, denies chest pain, denies shortness of breath, denies known fever.     Initial ED vitals: Temp 98.1, HR 90, Resp 18, /58, O2 Sat 98% on RA. ED work up revealed WBC 7.39, H&H 8.7 and 26.8, , K 3.4, BUN 30.3, Creatinine 1.26, .3, Troponin 0.048, Lactic acid 1.1, TSH 3.335 Influenza, COVID, and RSV swab negative, Respiratory panel negative, UA positive for blood, leukocyte esterase, RBC, WBC, and bacteria, X-ray chest read enlarged cardiac silhouette without overt edema, EKG read ventricular paced rhythm at 62 bmp. Cardiology was consulted in the ED and patient was admitted to Hospital Medicine.    Today  Seen and examined this morning.  Cook catheter removed yesterday and urinating on her own.  We will check a bladder scan on her today.  Only slept about 4 hours yesterday.  Discuss with family.  Improved edema to the legs with the compression stockings.        OBJECTIVE/PHYSICAL EXAM     VITAL SIGNS (MOST RECENT):  Temp: 97.9 °F (36.6 °C) (01/05/25 0733)  Pulse: 64 (01/05/25 0733)  Resp: 18  (01/03/25 1606)  BP: 123/63 (01/05/25 0733)  SpO2: 96 % (01/05/25 0733) VITAL SIGNS (24 HOUR RANGE):  Temp:  [97.9 °F (36.6 °C)-99.4 °F (37.4 °C)] 97.9 °F (36.6 °C)  Pulse:  [59-83] 64  SpO2:  [94 %-99 %] 96 %  BP: ()/(42-64) 123/63   GENERAL: In no acute distress, afebrile  HEENT:  CHEST: Clear to auscultation bilaterally  HEART: S1, S2, 2/6 systolic murmur  ABDOMEN: Soft, nontender, BS +  MSK: Warm, 2+ pitting bilateral lower extremity edema, no clubbing or cyanosis  NEUROLOGIC: Alert and oriented x4, moving all extremities with good strength   INTEGUMENTARY:  PSYCHIATRY:        ASSESSMENT/PLAN   Volume overload 2/2 right heart failure  Moderate pulmonary hypertension per echocardiogram  Left heart failure ruled out  Non STEMI type 2  CKD 3B  Pseudomonas urinary tract infection-treated    History of:  Chronic respiratory failure on home O2, dementia, HTN, anemia, glaucoma, CKD 3B, anemia of chronic disease, paroxysmal atrial fibrillation      Nephrology following.  Fluid adjustments per them.  Currently on Lasix 60 iv bid.   We will be difficult managing fluid and may have to live with some edema to the legs given right heart failure with pulmonary hypertension.    Case management following.  Pending SNF.  Melatonin at 6:00 p.m., trazodone 50 nightly, increase risperidone.  Regulate sleep-wake cycle.  Avoid nighttime disturbances.  Discontinue vitals between 10:00 p.m. to 6:00 a.m. if otherwise stable.  Check postvoid bladder scan  3 doses of Ferrlecit once daily.  Hemoglobin overall is stable.  Repeat FOBT today given nurse mentioning black stools.  Reduced lubiprostone to once daily.    DVT prophylaxis: Eliquis    Anticipated discharge and disposition:   __________________________________________________________________________    NUTRITIONAL STATUS     Patient meets ASPEN criteria for   malnutrition of   per RD assessment as evidenced by:                       A minimum of two characteristics is  recommended for diagnosis of either severe or non-severe malnutrition.     LABS/MICRO/MEDS/DIAGNOSTICS       LABS  Recent Labs     01/04/25  0400 01/05/25  0354   * 135*   K 3.5 4.0   CO2 27 28   BUN 29.1* 29.9*   CREATININE 1.39* 1.24*   GLUCOSE 117* 90   CALCIUM 8.8 8.6   ALBUMIN 2.4*  --      Recent Labs     01/05/25  0354   WBC 7.35   RBC 2.33*   HCT 23.2*   MCV 99.6*          MICROBIOLOGY  Microbiology Results (last 7 days)       Procedure Component Value Units Date/Time    Blood Culture [[id number]]  (Normal) Collected: 12/29/24 1747    Order Status: Completed Specimen: Blood Updated: 01/03/25 1901     Blood Culture No Growth at 5 days    Blood Culture [[id number]]  (Normal) Collected: 12/29/24 1142    Order Status: Completed Specimen: Blood Updated: 01/03/25 1302     Blood Culture No Growth at 5 days    Blood Culture #2 **CANNOT BE ORDERED STAT** [[id number]]  (Normal) Collected: 12/26/24 1656    Order Status: Completed Specimen: Blood Updated: 12/31/24 1901     Blood Culture No Growth at 5 days    Blood Culture #1 **CANNOT BE ORDERED STAT** [[id number]]  (Normal) Collected: 12/26/24 1656    Order Status: Completed Specimen: Blood Updated: 12/31/24 1901     Blood Culture No Growth at 5 days               MEDICATIONS   allopurinoL  300 mg Oral Daily    apixaban  5 mg Oral BID    cetirizine  10 mg Oral QHS    ferric gluconate (FERRLECIT) 125 mg in 0.9% NaCl 100 mL IVPB  125 mg Intravenous Daily    ferrous sulfate  1 tablet Oral BID    furosemide (LASIX) injection  60 mg Intravenous Q12H    latanoprost  1 drop Both Eyes QHS    levothyroxine  25 mcg Oral Before breakfast    lubiprostone  24 mcg Oral BID WM    melatonin  6 mg Oral Q24H    pantoprazole  40 mg Oral BID    potassium bicarbonate  25 mEq Oral Daily    risperiDONE  2 mg Oral QHS    rivastigmine tartrate  1.5 mg Oral Daily    traZODone  100 mg Oral QHS         INFUSIONS         DIAGNOSTIC TESTS  CT Head Without Contrast   Final Result       1.  No acute intracranial findings identified.      2.  Chronic microangiopathic ischemia and atrophy..         Electronically signed by: Theodore Prince   Date:    12/29/2024   Time:    08:22      X-Ray Chest AP Portable   Final Result      Enlarged cardiac silhouette without overt edema.         Electronically signed by: Angélica Diehl   Date:    12/26/2024   Time:    15:31           Echo    Result Date: 12/27/2024    Left Ventricle: The left ventricle is normal in size. Moderately   increased wall thickness. Normal wall motion. Septal flattening in systole   consistent with right ventricular pressure overload. There is normal   systolic function with a visually estimated ejection fraction of 55 - 60%.   There is normal diastolic function.    Right Ventricle: Moderate right ventricular enlargement. Systolic   function is moderately reduced. TAPSE is 1.19 cm. Pacemaker lead present   in the ventricle.    Left Atrium: Left atrium is moderately dilated.    Right Atrium: Right atrium is severely dilated. Lead present in the   right atrium.    Mitral Valve: There is mild regurgitation.    Tricuspid Valve: There is severe regurgitation. There is moderate   pulmonary hypertension.    Pulmonic Valve: There is mild regurgitation.    Pulmonary Artery: There is moderate pulmonary hypertension. The   estimated pulmonary artery systolic pressure is 47 mmHg.    IVC/SVC: Elevated venous pressure at 15 mmHg.              Case related differential diagnoses have been reviewed; assessment and plan has been documented. I have personally reviewed the labs and test results that are currently available; I have reviewed the patients medication list. I have reviewed the consulting providers recommendations. I have reviewed or attempted to review medical records based upon their availability.  All of the patient's and/or family's questions have been addressed and answered to the best of my ability.  I will continue to monitor closely and  make adjustments to medical management as needed.  This document was created using M*Modal Fluency Direct.  Transcription errors may have been made.  Please contact me if any questions may rise regarding documentation to clarify transcription.        Jason Vernon MD   Internal Medicine  Department of Hospital Medicine [facility] - [address] [facility]

## 2025-01-05 NOTE — PROGRESS NOTES
Chief complaint: None    Interval History:  Pt continues to improve, alertness, appetite much better, getting more sleep at night.  No SOB/CP    Review of Systems   Constitutional: Negative.    HENT: Negative.     Respiratory: Negative.     Cardiovascular:  Positive for leg swelling (much improved).   Gastrointestinal: Negative.    Genitourinary: Negative.    Musculoskeletal: Negative.    Neurological:  Positive for weakness.   Psychiatric/Behavioral: Negative.        all else Neg     allopurinoL  300 mg Oral Daily    apixaban  5 mg Oral BID    cetirizine  10 mg Oral QHS    ferric gluconate (FERRLECIT) 125 mg in 0.9% NaCl 100 mL IVPB  125 mg Intravenous Daily    ferrous sulfate  1 tablet Oral BID    furosemide (LASIX) injection  60 mg Intravenous Q12H    latanoprost  1 drop Both Eyes QHS    levothyroxine  25 mcg Oral Before breakfast    [START ON 1/6/2025] lubiprostone  24 mcg Oral Daily with breakfast    melatonin  6 mg Oral Q24H    pantoprazole  40 mg Oral BID    potassium bicarbonate  25 mEq Oral Daily    risperiDONE  4 mg Oral QHS    rivastigmine tartrate  1.5 mg Oral Daily    traZODone  100 mg Oral QHS       Objective     VITAL SIGNS: 24 HR MIN & MAX LAST    Temp  Min: 97.9 °F (36.6 °C)  Max: 99.4 °F (37.4 °C)  97.9 °F (36.6 °C)    BP  Min: 108/52  Max: 149/55  (!) 149/55     Pulse  Min: 59  Max: 83  73     No data recorded  18    SpO2  Min: 94 %  Max: 99 %  96 %      Wt Readings from Last 3 Encounters:   12/30/24 74.5 kg (164 lb 3.9 oz)   12/22/24 71.7 kg (158 lb 1.1 oz)   11/19/24 72.6 kg (160 lb)       Intake/Output Summary (Last 24 hours) at 1/5/2025 1050  Last data filed at 1/5/2025 0620  Gross per 24 hour   Intake 240 ml   Output 250 ml   Net -10 ml       Physical Exam  Constitutional:       General: She is not in acute distress.  Cardiovascular:      Rate and Rhythm: Normal rate.   Pulmonary:      Effort: Pulmonary effort is normal. No respiratory distress.   Abdominal:      General: Bowel sounds are  normal.      Palpations: Abdomen is soft.      Tenderness: There is no abdominal tenderness.   Musculoskeletal:         General: Swelling (MUCH improved with compression stockings) present.      Cervical back: Normal range of motion.   Neurological:      General: No focal deficit present.      Mental Status: She is alert. Mental status is at baseline.      Motor: Weakness present.   Psychiatric:         Mood and Affect: Mood normal.          Recent Labs     01/03/25  0400 01/04/25  0400 01/05/25  0354   * 134* 135*   K 3.7 3.5 4.0   CO2 27 27 28   BUN 31.0* 29.1* 29.9*   CREATININE 1.36* 1.39* 1.24*   GLUCOSE 94 117* 90   CALCIUM 8.5 8.8 8.6   PHOS 2.6 2.2*  --    ALBUMIN 2.3* 2.4*  --       Recent Labs     01/04/25  0400 01/05/25  0354   WBC 8.33 7.35   HGB 8.1* 7.6*   HCT 24.5* 23.2*    135         Assessment & Plan     CKD 3B (baseline creatinine 1.5), solitary kidney - GFR at baseline tolerating diuresis, edema much improved with compression stockings.  Will d/c IV lasix p this morning's dose and switch to 80 mg po daily, monitor response.  Urinary retention, catheter-related UTI - cook removed yesterday, monitor for recurrent retention  Rinaldi, Right sided CHF, pHTN, Fluid overload, edema - improved p diuresis, thigh high compression stockings assisting with the B leg edema.  Will d/c IV lasix, change to oral lasix 80 mg daily as above for now  VHD - mod MR, mod TR, Pulm HTN  Anemia of CKD - Hb down 7.6 despite EPO 40 K 12/10, 12/25; monitor for constipation with oral Fe.  ? BM failure?  Hypokalemia - continue K bicarb po for now, K+ up to 4.0  Thrombocytopenia - resolved with diuresis  H/o GIB 7/2024  HTN with ACEi allergy - normotensive off of BP meds  Hypothyroidism, TSH 1.6  Afib, eliquis  Hypophos - on neutraphos, rpt level in am  Chronic Home O2 (2 L)

## 2025-01-05 NOTE — NURSING
Informed by fellow staff member that patient's PM is not capturing continuously; spoke with the tele tech who confirmed that PM was not capturing and pt would be in Afib; contacted LEVI Nunez NP to inform of patient's status, and to see if [facility] needed to be re-consulted; NP agreed

## 2025-01-06 LAB
ALBUMIN SERPL-MCNC: 2.4 G/DL (ref 3.4–4.8)
BASOPHILS # BLD AUTO: 0.03 X10(3)/MCL
BASOPHILS NFR BLD AUTO: 0.4 %
BUN SERPL-MCNC: 28.5 MG/DL (ref 9.8–20.1)
CALCIUM SERPL-MCNC: 9 MG/DL (ref 8.4–10.2)
CHLORIDE SERPL-SCNC: 99 MMOL/L (ref 98–107)
CO2 SERPL-SCNC: 30 MMOL/L (ref 23–31)
CREAT SERPL-MCNC: 1.32 MG/DL (ref 0.55–1.02)
EOSINOPHIL # BLD AUTO: 0.16 X10(3)/MCL (ref 0–0.9)
EOSINOPHIL NFR BLD AUTO: 2.3 %
ERYTHROCYTE [DISTWIDTH] IN BLOOD BY AUTOMATED COUNT: 19 % (ref 11.5–17)
GFR SERPLBLD CREATININE-BSD FMLA CKD-EPI: 39 ML/MIN/1.73/M2
GLUCOSE SERPL-MCNC: 93 MG/DL (ref 70–110)
GLUCOSE SERPL-MCNC: 93 MG/DL (ref 82–115)
HCT VFR BLD AUTO: 23.9 % (ref 37–47)
HGB BLD-MCNC: 7.8 G/DL (ref 12–16)
IMM GRANULOCYTES # BLD AUTO: 0.03 X10(3)/MCL (ref 0–0.04)
IMM GRANULOCYTES NFR BLD AUTO: 0.4 %
LYMPHOCYTES # BLD AUTO: 1.48 X10(3)/MCL (ref 0.6–4.6)
LYMPHOCYTES NFR BLD AUTO: 20.8 %
MCH RBC QN AUTO: 32.2 PG (ref 27–31)
MCHC RBC AUTO-ENTMCNC: 32.6 G/DL (ref 33–36)
MCV RBC AUTO: 98.8 FL (ref 80–94)
MONOCYTES # BLD AUTO: 0.73 X10(3)/MCL (ref 0.1–1.3)
MONOCYTES NFR BLD AUTO: 10.3 %
NEUTROPHILS # BLD AUTO: 4.68 X10(3)/MCL (ref 2.1–9.2)
NEUTROPHILS NFR BLD AUTO: 65.8 %
NRBC BLD AUTO-RTO: 0 %
PHOSPHATE SERPL-MCNC: 2.8 MG/DL (ref 2.3–4.7)
PLATELET # BLD AUTO: 151 X10(3)/MCL (ref 130–400)
PMV BLD AUTO: 9.7 FL (ref 7.4–10.4)
POCT GLUCOSE: 117 MG/DL (ref 70–110)
POCT GLUCOSE: 120 MG/DL (ref 70–110)
POCT GLUCOSE: 128 MG/DL (ref 70–110)
POTASSIUM SERPL-SCNC: 3.9 MMOL/L (ref 3.5–5.1)
RBC # BLD AUTO: 2.42 X10(6)/MCL (ref 4.2–5.4)
SODIUM SERPL-SCNC: 136 MMOL/L (ref 136–145)
WBC # BLD AUTO: 7.11 X10(3)/MCL (ref 4.5–11.5)

## 2025-01-06 PROCEDURE — 85025 COMPLETE CBC W/AUTO DIFF WBC: CPT | Performed by: INTERNAL MEDICINE

## 2025-01-06 PROCEDURE — 25000003 PHARM REV CODE 250: Performed by: INTERNAL MEDICINE

## 2025-01-06 PROCEDURE — 36415 COLL VENOUS BLD VENIPUNCTURE: CPT | Performed by: INTERNAL MEDICINE

## 2025-01-06 PROCEDURE — 80069 RENAL FUNCTION PANEL: CPT | Performed by: INTERNAL MEDICINE

## 2025-01-06 PROCEDURE — 21400001 HC TELEMETRY ROOM

## 2025-01-06 PROCEDURE — 25000003 PHARM REV CODE 250

## 2025-01-06 PROCEDURE — 63600175 PHARM REV CODE 636 W HCPCS: Performed by: INTERNAL MEDICINE

## 2025-01-06 PROCEDURE — 11000001 HC ACUTE MED/SURG PRIVATE ROOM

## 2025-01-06 RX ADMIN — POTASSIUM BICARBONATE 25 MEQ: 977.5 TABLET, EFFERVESCENT ORAL at 09:01

## 2025-01-06 RX ADMIN — MELATONIN TAB 3 MG 6 MG: 3 TAB at 07:01

## 2025-01-06 RX ADMIN — LUBIPROSTONE 24 MCG: 24 CAPSULE, GELATIN COATED ORAL at 09:01

## 2025-01-06 RX ADMIN — APIXABAN 5 MG: 5 TABLET, FILM COATED ORAL at 09:01

## 2025-01-06 RX ADMIN — RISPERIDONE 4 MG: 1 TABLET, FILM COATED ORAL at 08:01

## 2025-01-06 RX ADMIN — FERROUS SULFATE TAB 325 MG (65 MG ELEMENTAL FE) 1 EACH: 325 (65 FE) TAB at 08:01

## 2025-01-06 RX ADMIN — TRAZODONE HYDROCHLORIDE 100 MG: 100 TABLET ORAL at 08:01

## 2025-01-06 RX ADMIN — PANTOPRAZOLE SODIUM 40 MG: 40 TABLET, DELAYED RELEASE ORAL at 08:01

## 2025-01-06 RX ADMIN — ACETAMINOPHEN 650 MG: 325 TABLET, FILM COATED ORAL at 10:01

## 2025-01-06 RX ADMIN — SODIUM CHLORIDE 125 MG: 9 INJECTION, SOLUTION INTRAVENOUS at 09:01

## 2025-01-06 RX ADMIN — APIXABAN 5 MG: 5 TABLET, FILM COATED ORAL at 08:01

## 2025-01-06 RX ADMIN — FUROSEMIDE 80 MG: 40 TABLET ORAL at 09:01

## 2025-01-06 RX ADMIN — ALLOPURINOL 300 MG: 300 TABLET ORAL at 09:01

## 2025-01-06 RX ADMIN — PANTOPRAZOLE SODIUM 40 MG: 40 TABLET, DELAYED RELEASE ORAL at 09:01

## 2025-01-06 RX ADMIN — FERROUS SULFATE TAB 325 MG (65 MG ELEMENTAL FE) 1 EACH: 325 (65 FE) TAB at 09:01

## 2025-01-06 RX ADMIN — LEVOTHYROXINE SODIUM 25 MCG: 25 TABLET ORAL at 05:01

## 2025-01-06 RX ADMIN — CETIRIZINE HYDROCHLORIDE 10 MG: 10 TABLET, FILM COATED ORAL at 08:01

## 2025-01-06 RX ADMIN — RIVASTIGMINE TARTRATE 1.5 MG: 1.5 CAPSULE ORAL at 09:01

## 2025-01-06 NOTE — PROGRESS NOTES
[facility] [address] MEDICINE ~ PROGRESS NOTE        CHIEF COMPLAINT   Hospital follow up    HOSPITAL COURSE   88 y/o F with a PMH of diastolic heart failure LVEF 50-55%, HTN, chronic anemia, paroxysmal A-fib on eliquis, and CKD stage III followed by Dr. Chávez , chronic respiratory failure on 2 L NC at home, dementia and glaucoma presented to the ED with complains of bilateral leg swelling, generalized weakness and deconditioning post hospital discharge. Patient explains that she is bed bound and lives at home with her son and  who take care of her. Patient was recently admitted at this facility for worsening generalized weakness, bilateral leg swelling and urinary retention and discharged home with an indwelling cook, follow up appointment with urology and private sitters and home care with [facility] . Patient denies black or blood in her tool, denies chest pain, denies shortness of breath, denies known fever.     Initial ED vitals: Temp 98.1, HR 90, Resp 18, /58, O2 Sat 98% on RA. ED work up revealed WBC 7.39, H&H 8.7 and 26.8, , K 3.4, BUN 30.3, Creatinine 1.26, .3, Troponin 0.048, Lactic acid 1.1, TSH 3.335 Influenza, COVID, and RSV swab negative, Respiratory panel negative, UA positive for blood, leukocyte esterase, RBC, WBC, and bacteria, X-ray chest read enlarged cardiac silhouette without overt edema, EKG read ventricular paced rhythm at 62 bmp. Cardiology was consulted in the ED and patient was admitted to Hospital Medicine.    Today  Seen and examined this morning.  Spoke with the night nurse and she slept well with the current regimen.  We will continue that.  Has been changed to oral Lasix as well.  Pending SNF.        OBJECTIVE/PHYSICAL EXAM     VITAL SIGNS (MOST RECENT):  Temp: 97.8 °F (36.6 °C) (01/06/25 0739)  Pulse: 81 (01/06/25 0739)  Resp: 20 (01/06/25 0739)  BP: (!) 145/71 (01/06/25 0901)  SpO2: 97 % (01/06/25 0739)  VITAL SIGNS (24 HOUR RANGE):  Temp:  [97.8 °F (36.6 °C)-98.2 °F (36.8 °C)] 97.8 °F (36.6 °C)  Pulse:  [64-81] 81  Resp:  [20] 20  SpO2:  [95 %-99 %] 97 %  BP: (114-145)/(57-71) 145/71   GENERAL: In no acute distress, afebrile  HEENT:  CHEST: Clear to auscultation bilaterally  HEART: S1, S2, 2/6 systolic murmur  ABDOMEN: Soft, nontender, BS +  MSK: Warm, 2+ pitting bilateral lower extremity edema, no clubbing or cyanosis  NEUROLOGIC: Alert and oriented x4, moving all extremities with good strength   INTEGUMENTARY:  PSYCHIATRY:        ASSESSMENT/PLAN   Volume overload 2/2 right heart failure  Moderate pulmonary hypertension per echocardiogram  Left heart failure ruled out  Non STEMI type 2  CKD 3B  Pseudomonas urinary tract infection-treated    History of:  Chronic respiratory failure on home O2, dementia, HTN, anemia, glaucoma, CKD 3B, anemia of chronic disease, paroxysmal atrial fibrillation      Nephrology following.  Fluid adjustments per them.  Oral Lasix.  We will be difficult managing fluid and may have to live with some edema to the legs given right heart failure with pulmonary hypertension.    Case management following.  Pending SNF.  Regulate sleep-wake cycle.  Avoid nighttime disturbances.  Discontinue vitals between 10:00 p.m. to 6:00 a.m. if otherwise stable.  Continue current sleep medications.  3 doses of Ferrlecit once daily.  Hemoglobin overall is stable.  Reduced lubiprostone to once daily.    DVT prophylaxis: Eliquis    Anticipated discharge and disposition:  Medically stable for SNF placement  __________________________________________________________________________    NUTRITIONAL STATUS     Patient meets ASPEN criteria for   malnutrition of   per RD assessment as evidenced by:                       A minimum of two characteristics is recommended for diagnosis of either severe or non-severe malnutrition.     LABS/MICRO/MEDS/DIAGNOSTICS       LABS  Recent Labs     01/06/25  0346      K 3.9    CO2 30   BUN 28.5*   CREATININE 1.32*   GLUCOSE 93   CALCIUM 9.0   ALBUMIN 2.4*     Recent Labs     01/06/25  0346   WBC 7.11   RBC 2.42*   HCT 23.9*   MCV 98.8*          MICROBIOLOGY  Microbiology Results (last 7 days)       Procedure Component Value Units Date/Time    Blood Culture [[id number]]  (Normal) Collected: 12/29/24 1747    Order Status: Completed Specimen: Blood Updated: 01/03/25 1901     Blood Culture No Growth at 5 days    Blood Culture [[id number]]  (Normal) Collected: 12/29/24 1142    Order Status: Completed Specimen: Blood Updated: 01/03/25 1302     Blood Culture No Growth at 5 days    Blood Culture #2 **CANNOT BE ORDERED STAT** [[id number]]  (Normal) Collected: 12/26/24 1656    Order Status: Completed Specimen: Blood Updated: 12/31/24 1901     Blood Culture No Growth at 5 days    Blood Culture #1 **CANNOT BE ORDERED STAT** [[id number]]  (Normal) Collected: 12/26/24 1656    Order Status: Completed Specimen: Blood Updated: 12/31/24 1901     Blood Culture No Growth at 5 days               MEDICATIONS   allopurinoL  300 mg Oral Daily    apixaban  5 mg Oral BID    cetirizine  10 mg Oral QHS    ferrous sulfate  1 tablet Oral BID    furosemide  80 mg Oral Daily    latanoprost  1 drop Both Eyes QHS    levothyroxine  25 mcg Oral Before breakfast    lubiprostone  24 mcg Oral Daily with breakfast    melatonin  6 mg Oral Q24H    pantoprazole  40 mg Oral BID    potassium bicarbonate  25 mEq Oral Daily    risperiDONE  4 mg Oral QHS    rivastigmine tartrate  1.5 mg Oral Daily    traZODone  100 mg Oral QHS         INFUSIONS         DIAGNOSTIC TESTS  CT Head Without Contrast   Final Result      1.  No acute intracranial findings identified.      2.  Chronic microangiopathic ischemia and atrophy..         Electronically signed by: Theodore Prince   Date:    12/29/2024   Time:    08:22      X-Ray Chest AP Portable   Final Result      Enlarged cardiac silhouette without overt edema.         Electronically signed  by: Angélica Fazal   Date:    12/26/2024   Time:    15:31           Echo    Result Date: 12/27/2024    Left Ventricle: The left ventricle is normal in size. Moderately   increased wall thickness. Normal wall motion. Septal flattening in systole   consistent with right ventricular pressure overload. There is normal   systolic function with a visually estimated ejection fraction of 55 - 60%.   There is normal diastolic function.    Right Ventricle: Moderate right ventricular enlargement. Systolic   function is moderately reduced. TAPSE is 1.19 cm. Pacemaker lead present   in the ventricle.    Left Atrium: Left atrium is moderately dilated.    Right Atrium: Right atrium is severely dilated. Lead present in the   right atrium.    Mitral Valve: There is mild regurgitation.    Tricuspid Valve: There is severe regurgitation. There is moderate   pulmonary hypertension.    Pulmonic Valve: There is mild regurgitation.    Pulmonary Artery: There is moderate pulmonary hypertension. The   estimated pulmonary artery systolic pressure is 47 mmHg.    IVC/SVC: Elevated venous pressure at 15 mmHg.              Case related differential diagnoses have been reviewed; assessment and plan has been documented. I have personally reviewed the labs and test results that are currently available; I have reviewed the patients medication list. I have reviewed the consulting providers recommendations. I have reviewed or attempted to review medical records based upon their availability.  All of the patient's and/or family's questions have been addressed and answered to the best of my ability.  I will continue to monitor closely and make adjustments to medical management as needed.  This document was created using M*Modal Fluency Direct.  Transcription errors may have been made.  Please contact me if any questions may rise regarding documentation to clarify transcription.        Jason Vernon MD   Internal Medicine  Department of [facility]  Medicine [facility] - [address] [place] [address]

## 2025-01-07 LAB
POCT GLUCOSE: 112 MG/DL (ref 70–110)
POCT GLUCOSE: 122 MG/DL (ref 70–110)
POCT GLUCOSE: 138 MG/DL (ref 70–110)

## 2025-01-07 PROCEDURE — 97535 SELF CARE MNGMENT TRAINING: CPT

## 2025-01-07 PROCEDURE — 97530 THERAPEUTIC ACTIVITIES: CPT

## 2025-01-07 PROCEDURE — 21400001 HC TELEMETRY ROOM

## 2025-01-07 PROCEDURE — 25000003 PHARM REV CODE 250

## 2025-01-07 PROCEDURE — 25000003 PHARM REV CODE 250: Performed by: INTERNAL MEDICINE

## 2025-01-07 PROCEDURE — 11000001 HC ACUTE MED/SURG PRIVATE ROOM

## 2025-01-07 RX ADMIN — RISPERIDONE 4 MG: 1 TABLET, FILM COATED ORAL at 09:01

## 2025-01-07 RX ADMIN — PANTOPRAZOLE SODIUM 40 MG: 40 TABLET, DELAYED RELEASE ORAL at 09:01

## 2025-01-07 RX ADMIN — TRAZODONE HYDROCHLORIDE 100 MG: 100 TABLET ORAL at 09:01

## 2025-01-07 RX ADMIN — RIVASTIGMINE TARTRATE 1.5 MG: 1.5 CAPSULE ORAL at 09:01

## 2025-01-07 RX ADMIN — FERROUS SULFATE TAB 325 MG (65 MG ELEMENTAL FE) 1 EACH: 325 (65 FE) TAB at 09:01

## 2025-01-07 RX ADMIN — APIXABAN 5 MG: 5 TABLET, FILM COATED ORAL at 09:01

## 2025-01-07 RX ADMIN — ACETAMINOPHEN 650 MG: 325 TABLET, FILM COATED ORAL at 09:01

## 2025-01-07 RX ADMIN — MELATONIN TAB 3 MG 6 MG: 3 TAB at 05:01

## 2025-01-07 RX ADMIN — CETIRIZINE HYDROCHLORIDE 10 MG: 10 TABLET, FILM COATED ORAL at 09:01

## 2025-01-07 RX ADMIN — LATANOPROST 1 DROP: 50 SOLUTION OPHTHALMIC at 09:01

## 2025-01-07 RX ADMIN — ACETAMINOPHEN 650 MG: 325 TABLET, FILM COATED ORAL at 06:01

## 2025-01-07 RX ADMIN — FUROSEMIDE 80 MG: 40 TABLET ORAL at 09:01

## 2025-01-07 RX ADMIN — LEVOTHYROXINE SODIUM 25 MCG: 25 TABLET ORAL at 06:01

## 2025-01-07 RX ADMIN — ACETAMINOPHEN 650 MG: 325 TABLET, FILM COATED ORAL at 05:01

## 2025-01-07 RX ADMIN — ALLOPURINOL 300 MG: 300 TABLET ORAL at 09:01

## 2025-01-07 RX ADMIN — POTASSIUM BICARBONATE 25 MEQ: 977.5 TABLET, EFFERVESCENT ORAL at 09:01

## 2025-01-07 RX ADMIN — LUBIPROSTONE 24 MCG: 24 CAPSULE, GELATIN COATED ORAL at 09:01

## 2025-01-07 NOTE — PROGRESS NOTES
Renal    Initial 12/28/24 HPI: 88 Y/O female, known to Dr. Chávez, with history of CHF diastolic dysfunction , HTN , Chronic Anemia , PAF , on eliquis , CKD 3b , Chronic respiratory failure on chronic O2 supplement at home admitted to hospital again for edema of lower extrimities as well as weakness. Also patient has urinary retention and has chronic cook cath     Chief complaint:   Good    Interval History:  Change IV Lasix to 80 p.o. daily.  No issues reported.  Feeling well.    ROS:  all else Neg     allopurinoL  300 mg Oral Daily    apixaban  5 mg Oral BID    cetirizine  10 mg Oral QHS    ferrous sulfate  1 tablet Oral BID    furosemide  80 mg Oral Daily    latanoprost  1 drop Both Eyes QHS    levothyroxine  25 mcg Oral Before breakfast    lubiprostone  24 mcg Oral Daily with breakfast    melatonin  6 mg Oral Q24H    pantoprazole  40 mg Oral BID    potassium bicarbonate  25 mEq Oral Daily    risperiDONE  4 mg Oral QHS    rivastigmine tartrate  1.5 mg Oral Daily    traZODone  100 mg Oral QHS       VITAL SIGNS: 24 HR MIN & MAX LAST    Temp  Min: 97.8 °F (36.6 °C)  Max: 98.3 °F (36.8 °C)  98.3 °F (36.8 °C)        BP  Min: 115/57  Max: 145/71  (!) 143/59     Pulse  Min: 66  Max: 90  90     Resp  Min: 20  Max: 24  (!) 24    SpO2  Min: 88 %  Max: 98 %  (!) 88 %      Wt Readings from Last 3 Encounters:   12/30/24 74.5 kg (164 lb 3.9 oz)   12/22/24 71.7 kg (158 lb 1.1 oz)   11/19/24 72.6 kg (160 lb)       Intake/Output Summary (Last 24 hours) at 1/6/2025 1841  Last data filed at 1/6/2025 0800  Gross per 24 hour   Intake 120 ml   Output --   Net 120 ml     A&O x 4  EOMI  (B) symmetrical  Unlabored  Soft, NT, ND  Edema 1-2    Recent Labs     01/04/25  0400 01/05/25  0354 01/06/25  0346   * 135* 136   K 3.5 4.0 3.9   CO2 27 28 30   BUN 29.1* 29.9* 28.5*   CREATININE 1.39* 1.24* 1.32*   GLUCOSE 117* 90 93   CALCIUM 8.8 8.6 9.0   PHOS 2.2*  --  2.8   ALBUMIN 2.4*  --  2.4*      Recent Labs     01/04/25  0149  01/05/25  0354 01/06/25  0346   WBC 8.33 7.35 7.11   HGB 8.1* 7.6* 7.8*   HCT 24.5* 23.2* 23.9*    135 151       ASSESSMENT / PLAN    Fluid overload    CKD 3B (baseline creatinine 1.5), solitary kidney - GFR at baseline tolerating diuresis, edema much improved with compression stockings.  Cont Lasix 80 mg po daily  Urinary retention, catheter-related UTI - cook removed 1/4/25, monitor for recurrent retention  Rinaldi, Right sided CHF, pHTN, Fluid overload, edema - improved p diuresis, thigh high compression stockings assisting with the B leg edema.  Lasix as above  VHD - mod MR, mod TR, Pulm HTN  Anemia of CKD - s/p EPO 40 K 12/10, 12/25; monitor for constipation with oral Fe.  ? BM failure?  Hypokalemia - continue K bicarb po for now  Thrombocytopenia - resolved with diuresis  H/o GIB 7/2024  HTN with ACEi allergy - normotensive off of BP meds  Hypothyroidism, TSH 1.6  Afib, eliquis  Hypophos - replaced, d/c neutraphos  Chronic Home O2 (2 L)    Stable from renal  Will sign off  F/u in office with Dr. Chávez as scheduled.  Please re-consult PRN        This is a medical document written in a [id number] manner with standard medical terminology and conventions. It is a communication tool for medical professional. It is not intended for the lay public. Any inference of insensitivity or offence is solely a product with the reader's imagination. This is a document written devoid of emotion, as it is a medical document and it's only purpose is to convey information to medical professionals.

## 2025-01-07 NOTE — PLAN OF CARE
SEBASTIAN spoke with Pt Daughter Mellissa @ [phone] To inform her of the denials and to update her on the referral she asked [facility] to [facility] to [facility] Yesterday Autumn @ [facility] text and stated they are not ion Network with the pt insurance . She Stated if the Pt Have 3 inpatient denials she can come. I informed the daughter that the pt does have a in network accepting facility  .which is [facility] the patient daughter stated that it is to far and she want her in  [place] .SEBASTIAN explained to her the facilities  in [place] Has denied her and there is no other options Pt daughter stated yes it is she can go home .

## 2025-01-07 NOTE — PT/OT/SLP PROGRESS
Occupational Therapy   Treatment    Name: Ev Alberts  MRN: [id number]  Admitting Diagnosis:  Acute on chronic congestive heart failure     1. Acute on chronic congestive heart failure, unspecified heart failure type    2. Fatigue    3. Leg swelling    4. Chest pain    5. CHF (congestive heart failure)    6. Drooling    7. Acute cystitis without hematuria    8. Postherpetic neuralgia    9. Chronic anemia    10. MGUS (monoclonal gammopathy of unknown significance)    11. Acute on chronic diastolic heart failure    12. Severe malnutrition    13. Weakness          Recommendations:     Recommended therapy intensity at discharge: Moderate Intensity Therapy   Discharge Equipment Recommendations:  to be determined by next level of care  Barriers to discharge:   (ongoing medical and physical assist required)    Assessment:     Ev Alberts is a 89 y.o. female with a medical diagnosis of Acute on chronic congestive heart failure.  She presents with The primary encounter diagnosis was Acute on chronic congestive heart failure, unspecified heart failure type. Diagnoses of Fatigue, Leg swelling, Chest pain, CHF (congestive heart failure), Drooling, Acute cystitis without hematuria, Postherpetic neuralgia, Chronic anemia, MGUS (monoclonal gammopathy of unknown significance), Acute on chronic diastolic heart failure, Severe malnutrition, and Weakness were also pertinent to this visit.  . Performance deficits affecting function are weakness, impaired endurance, impaired self care skills, impaired functional mobility, gait instability, impaired balance, pain, impaired cardiopulmonary response to activity, decreased safety awareness, edema, decreased lower extremity function, decreased upper extremity function, impaired skin, impaired coordination, decreased coordination, decreased ROM, impaired cognition.     Pt continues to endorse pain in BLEs, as well as pitting edema. Limited ax tolerance this AM and declining t/f to chair  2/2 pain. Ablet o stand x 2 trials from EOB max A and take lateral steps to HOB prior to requesting to return to supine. Will progress pt as able.     Rehab Prognosis:  Good; patient would benefit from acute skilled OT services to address these deficits and reach maximum level of function.       Plan:     Patient to be seen 4 x/week to address the above listed problems via self-care/home management, therapeutic activities, therapeutic exercises  Plan of Care Expires: 01/27/25  Plan of Care Reviewed with: patient, spouse, daughter    Subjective     Pain/Comfort:  Pain Rating 1:  (does not rate)  Location - Side 1: Bilateral  Location - Orientation 1: generalized  Location 1: leg  Pain Addressed 1: Reposition, Distraction, Cessation of Activity, Nurse notified  Pain Rating Post-Intervention 1:  (does not rate)    Objective:     Communicated with: be prior to session.  Patient found supine with PureWick, telemetry, pulse ox (continuous) upon OT entry to room.    General Precautions: Standard, fall    Orthopedic Precautions:N/A  Braces: N/A         Occupational Performance:     Bed Mobility:    Patient completed Rolling/Turning to Right with maximal assistance  Patient completed Scooting/Bridging with maximal assistance  Patient completed Supine to Sit with maximal assistance  Patient completed Sit to Supine with maximal assistance     Functional Mobility/Transfers:  Patient completed Sit <> Stand Transfer with maximal assistance  with  rolling walker   Functional Mobility: max A lateral steps to HOB with RW; forward flexed over RW, requires weight shifting instance in order to advance BLEs; requiring seated rest break following 4 steps to HOB each trial     Activities of Daily Living:  Lower Body Dressing: total assistance teresa socks   Toileting: total assistance pt found with purewick; soiled in urine; unable to stand for hygiene; total A for hygiene and clothing management while supine ; increased assist required  for rolling     Therapeutic Activities:  Pt requiring increased assist and time for all axs 2/2 pain this date. Pt stood from EOB x 2 trials; lateral steps to HOB with RW        Therapeutic Positioning    OT interventions performed during the course of today's session in an effort to prevent and/or reduce acquired pressure injuries:   Education was provided on benefits of and recommendations for therapeutic positioning  Therapeutic positioning was provided at the conclusion of session to offload all bony prominences for the prevention and/or reduction of pressure injuries    Skin assessment: full body skin assessment was performed    Findings: known areas at sacrum     [facility] 6 Click ADL: 11    Patient Education:  Patient and daughter/s were provided with verbal education and demonstrations education regarding OT role/goals/POC, fall prevention, safety awareness, and pressure ulcer prevention.  Understanding was verbalized.      Patient left left sidelying with all lines intact, call button in reach, wedge under R side, nsg notified, and family  present.    GOALS:   Multidisciplinary Problems       Occupational Therapy Goals          Problem: Occupational Therapy    Goal Priority Disciplines Outcome Interventions   Occupational Therapy Goal     OT, PT/OT Progressing    Description: Goals to be met by: 1/27/25     Patient will increase functional independence with ADLs by performing:    Feeding with Set-up Assistance.  UE Dressing with Minimal Assistance.  LE Dressing with Minimal Assistance.  Grooming while seated at sink with Stand-by Assistance.  Toileting from bedside commode with Minimal Assistance for hygiene and clothing management.   Toilet transfer to bedside commode with Minimal Assistance.                       Time Tracking:     OT Date of Treatment: 01/07/25  OT Start Time: 1014  OT Stop Time: 1041  OT Total Time (min): 27 min    Billable Minutes:Self Care/Home Management 13  Therapeutic Activity  14    OT/SARANYA: OT     Number of SARANYA visits since last OT visit: 3    1/7/2025

## 2025-01-07 NOTE — PT/OT/SLP PROGRESS
Physical Therapy Treatment    Patient Name:  Ev Alberts   MRN:  [id number]    Recommendations:     Discharge therapy intensity: Moderate Intensity Therapy   Discharge Equipment Recommendations: to be determined by next level of care  Barriers to discharge: Impaired mobility and Ongoing medical needs    Assessment:     Ev Alberts is a 89 y.o. female admitted with a medical diagnosis of acute HF exacerbation, NSTEMI, catheter associated UTI, PAF, acute anemia, hx of dementia.  She presents with the following impairments/functional limitations: weakness, impaired endurance, impaired self care skills, impaired functional mobility, impaired balance, impaired cognition, decreased upper extremity function, decreased lower extremity function. Pt remains 2 person maxA for bed mobility and t/f. Activity tolerance significantly limited by pain and weakness.    Rehab Prognosis: Good; patient would benefit from acute skilled PT services to address these deficits and reach maximum level of function.    Recent Surgery: * No surgery found *      Plan:     During this hospitalization, patient would benefit from acute PT services 5 x/week to address the identified rehab impairments via gait training, therapeutic activities, therapeutic exercises, neuromuscular re-education and progress toward the following goals:    Plan of Care Expires:  01/27/25    Subjective     Chief Complaint: pain  Patient/Family Comments/goals: PLOF  Pain/Comfort:  Pain Rating 1:  (c/o LBP)      Objective:     Communicated with RN prior to session.  Patient found supine with PureWick, telemetry, pulse ox (continuous) upon PT entry to room.     General Precautions: Standard, fall  Orthopedic Precautions:    Braces:    Respiratory Status: Room air  Blood Pressure: NT  Skin Integrity:  sacral wound      Functional Mobility:  Bed Mobility:     Rolling Left:  moderate assistance  Rolling Right: moderate assistance  Supine to Sit: maximal assistance and of 2  persons  Sit to Supine: maximal assistance and of 2 persons  Transfers:     Sit to Stand:  maximal assistance and of 2 persons with rolling walker  Balance: sitting balance = SBA    Therapeutic Activities/Exercises:  2 STS from EOB with maxAx2, difficulty shifting weight anteriorly over toes  Pt rolled R and L x 4 trials, modA with cues to flex contralateral knee and reach across to assist. (+) BM on arrival and after standing requiring totalA for pericare    Education:  Patient provided with verbal education education regarding PT role/goals/POC, fall prevention, safety awareness, and discharge/DME recommendations.  Understanding was verbalized.     Patient left HOB elevated with all lines intact, call button in reach, wedge under R side, pressure relief boots, RN notified, and daughter present    GOALS:   Multidisciplinary Problems       Physical Therapy Goals          Problem: Physical Therapy    Goal Priority Disciplines Outcome Interventions   Physical Therapy Goal     PT, PT/OT Progressing    Description: Goals to be met by: 24     Patient will increase functional independence with mobility by performin. Supine to sit with MInimal Assistance  2. Sit to stand transfer with Minimal Assistance  3. Bed to chair transfer with Minimal Assistance using Rolling Walker vs. Squat pivot  4. Gait  x 25 feet with Minimal Assistance using Rolling Walker.   5. Sitting at edge of bed x10 minutes with Stand-by Assistance                         Time Tracking:     PT Received On: 25  PT Start Time: 1433     PT Stop Time: 1456  PT Total Time (min): 23 min     Billable Minutes: Therapeutic Activity 23 min    Treatment Type: Treatment  PT/PTA: PT     Number of PTA visits since last PT visit: 4     2025

## 2025-01-07 NOTE — PROGRESS NOTES
[facility] [address] MEDICINE ~ PROGRESS NOTE        CHIEF COMPLAINT   Hospital follow up    HOSPITAL COURSE   89 y/o F with a PMH of diastolic heart failure LVEF 50-55%, HTN, chronic anemia, paroxysmal A-fib on eliquis, and CKD stage III followed by Dr. Chávez , chronic respiratory failure on 2 L NC at home, dementia and glaucoma presented to the ED with complains of bilateral leg swelling, generalized weakness and deconditioning post hospital discharge. Patient explains that she is bed bound and lives at home with her son and  who take care of her. Patient was recently admitted at this facility for worsening generalized weakness, bilateral leg swelling and urinary retention and discharged home with an indwelling cook, follow up appointment with urology and private sitters and home care with [facility] . Patient denies black or blood in her tool, denies chest pain, denies shortness of breath, denies known fever.     Initial ED vitals: Temp 98.1, HR 90, Resp 18, /58, O2 Sat 98% on RA. ED work up revealed WBC 7.39, H&H 8.7 and 26.8, , K 3.4, BUN 30.3, Creatinine 1.26, .3, Troponin 0.048, Lactic acid 1.1, TSH 3.335 Influenza, COVID, and RSV swab negative, Respiratory panel negative, UA positive for blood, leukocyte esterase, RBC, WBC, and bacteria, X-ray chest read enlarged cardiac silhouette without overt edema, EKG read ventricular paced rhythm at 62 bmp. Cardiology was consulted in the ED and patient was admitted to Hospital Medicine.  She is found to have right heart failure and pulmonary hypertension which was the cause of her leg edema.  Nephrology was consulted and continue to remove fluid with IV Lasix.  Left ventricle systolic function is good.    Today  Slept well, no issues this morning.  Has some edema to the legs, currently on oral Lasix 80.        OBJECTIVE/PHYSICAL EXAM     VITAL SIGNS (MOST RECENT):  Temp: 97.8 °F (36.6 °C)  (01/07/25 0746)  Pulse: 65 (01/07/25 0746)  Resp: 20 (01/07/25 0746)  BP: 116/62 (01/07/25 0927)  SpO2: 95 % (01/07/25 0746) VITAL SIGNS (24 HOUR RANGE):  Temp:  [97.7 °F (36.5 °C)-98.3 °F (36.8 °C)] 97.8 °F (36.6 °C)  Pulse:  [65-90] 65  Resp:  [20-24] 20  SpO2:  [88 %-96 %] 95 %  BP: (114-143)/(52-70) 116/62   GENERAL: In no acute distress, afebrile  HEENT:  CHEST: Clear to auscultation bilaterally  HEART: S1, S2, 2/6 systolic murmur  ABDOMEN: Soft, nontender, BS +  MSK: Warm, 2+ pitting bilateral lower extremity edema, no clubbing or cyanosis  NEUROLOGIC: Alert and oriented x4, moving all extremities with good strength   INTEGUMENTARY:  PSYCHIATRY:        ASSESSMENT/PLAN   Volume overload 2/2 right heart failure  Moderate pulmonary hypertension per echocardiogram  Left heart failure ruled out  Non STEMI type 2  CKD 3B  Pseudomonas urinary tract infection-treated    History of:  Chronic respiratory failure on home O2, dementia, HTN, anemia, glaucoma, CKD 3B, anemia of chronic disease, paroxysmal atrial fibrillation      Nephrology following.  Fluid adjustments per them.  Oral Lasix.  We will be difficult managing fluid and may have to live with some edema to the legs given right heart failure with pulmonary hypertension.    Regulate sleep-wake cycle.  Avoid nighttime disturbances.  Discontinue vitals between 10:00 p.m. to 6:00 a.m. if otherwise stable.  Continue current sleep medications.  Case management following.  Pending SNF.    DVT prophylaxis: Eliquis    Anticipated discharge and disposition:  Medically stable for SNF placement  __________________________________________________________________________    NUTRITIONAL STATUS     Patient meets ASPEN criteria for   malnutrition of   per RD assessment as evidenced by:                       A minimum of two characteristics is recommended for diagnosis of either severe or non-severe malnutrition.     LABS/MICRO/MEDS/DIAGNOSTICS       LABS  Recent Labs      01/06/25  0346      K 3.9   CO2 30   BUN 28.5*   CREATININE 1.32*   GLUCOSE 93   CALCIUM 9.0   ALBUMIN 2.4*     Recent Labs     01/06/25  0346   WBC 7.11   RBC 2.42*   HCT 23.9*   MCV 98.8*          MICROBIOLOGY  Microbiology Results (last 7 days)       Procedure Component Value Units Date/Time    Blood Culture [[id number]]  (Normal) Collected: 12/29/24 1747    Order Status: Completed Specimen: Blood Updated: 01/03/25 1901     Blood Culture No Growth at 5 days    Blood Culture [[id number]]  (Normal) Collected: 12/29/24 1142    Order Status: Completed Specimen: Blood Updated: 01/03/25 1302     Blood Culture No Growth at 5 days    Blood Culture #2 **CANNOT BE ORDERED STAT** [[id number]]  (Normal) Collected: 12/26/24 1656    Order Status: Completed Specimen: Blood Updated: 12/31/24 1901     Blood Culture No Growth at 5 days    Blood Culture #1 **CANNOT BE ORDERED STAT** [[id number]]  (Normal) Collected: 12/26/24 1656    Order Status: Completed Specimen: Blood Updated: 12/31/24 1901     Blood Culture No Growth at 5 days               MEDICATIONS   allopurinoL  300 mg Oral Daily    apixaban  5 mg Oral BID    cetirizine  10 mg Oral QHS    ferrous sulfate  1 tablet Oral BID    furosemide  80 mg Oral Daily    latanoprost  1 drop Both Eyes QHS    levothyroxine  25 mcg Oral Before breakfast    lubiprostone  24 mcg Oral Daily with breakfast    melatonin  6 mg Oral Q24H    pantoprazole  40 mg Oral BID    potassium bicarbonate  25 mEq Oral Daily    risperiDONE  4 mg Oral QHS    rivastigmine tartrate  1.5 mg Oral Daily    traZODone  100 mg Oral QHS         INFUSIONS         DIAGNOSTIC TESTS  CT Head Without Contrast   Final Result      1.  No acute intracranial findings identified.      2.  Chronic microangiopathic ischemia and atrophy..         Electronically signed by: Theodore Prince   Date:    12/29/2024   Time:    08:22      X-Ray Chest AP Portable   Final Result      Enlarged cardiac silhouette without overt  edema.         Electronically signed by: Angélica Diehl   Date:    12/26/2024   Time:    15:31           Echo    Result Date: 12/27/2024    Left Ventricle: The left ventricle is normal in size. Moderately   increased wall thickness. Normal wall motion. Septal flattening in systole   consistent with right ventricular pressure overload. There is normal   systolic function with a visually estimated ejection fraction of 55 - 60%.   There is normal diastolic function.    Right Ventricle: Moderate right ventricular enlargement. Systolic   function is moderately reduced. TAPSE is 1.19 cm. Pacemaker lead present   in the ventricle.    Left Atrium: Left atrium is moderately dilated.    Right Atrium: Right atrium is severely dilated. Lead present in the   right atrium.    Mitral Valve: There is mild regurgitation.    Tricuspid Valve: There is severe regurgitation. There is moderate   pulmonary hypertension.    Pulmonic Valve: There is mild regurgitation.    Pulmonary Artery: There is moderate pulmonary hypertension. The   estimated pulmonary artery systolic pressure is 47 mmHg.    IVC/SVC: Elevated venous pressure at 15 mmHg.              Case related differential diagnoses have been reviewed; assessment and plan has been documented. I have personally reviewed the labs and test results that are currently available; I have reviewed the patients medication list. I have reviewed the consulting providers recommendations. I have reviewed or attempted to review medical records based upon their availability.  All of the patient's and/or family's questions have been addressed and answered to the best of my ability.  I will continue to monitor closely and make adjustments to medical management as needed.  This document was created using M*Modal Fluency Direct.  Transcription errors may have been made.  Please contact me if any questions may rise regarding documentation to clarify transcription.        Jason Vernon MD   Internal  Medicine  Department of [facility]  [facility] - [facility] Medical Telemetry

## 2025-01-08 LAB
ALBUMIN SERPL-MCNC: 2.4 G/DL (ref 3.4–4.8)
BUN SERPL-MCNC: 28.3 MG/DL (ref 9.8–20.1)
CALCIUM SERPL-MCNC: 9.1 MG/DL (ref 8.4–10.2)
CHLORIDE SERPL-SCNC: 97 MMOL/L (ref 98–107)
CO2 SERPL-SCNC: 31 MMOL/L (ref 23–31)
CREAT SERPL-MCNC: 1.37 MG/DL (ref 0.55–1.02)
GFR SERPLBLD CREATININE-BSD FMLA CKD-EPI: 37 ML/MIN/1.73/M2
GLUCOSE SERPL-MCNC: 115 MG/DL (ref 82–115)
GLUCOSE SERPL-MCNC: 126 MG/DL (ref 70–110)
MAGNESIUM SERPL-MCNC: 2.2 MG/DL (ref 1.6–2.6)
PHOSPHATE SERPL-MCNC: 2.4 MG/DL (ref 2.3–4.7)
POCT GLUCOSE: 112 MG/DL (ref 70–110)
POCT GLUCOSE: 113 MG/DL (ref 70–110)
POCT GLUCOSE: 113 MG/DL (ref 70–110)
POCT GLUCOSE: 122 MG/DL (ref 70–110)
POCT GLUCOSE: 122 MG/DL (ref 70–110)
POTASSIUM SERPL-SCNC: 3.9 MMOL/L (ref 3.5–5.1)
SODIUM SERPL-SCNC: 132 MMOL/L (ref 136–145)

## 2025-01-08 PROCEDURE — 97116 GAIT TRAINING THERAPY: CPT

## 2025-01-08 PROCEDURE — 25000003 PHARM REV CODE 250

## 2025-01-08 PROCEDURE — 97110 THERAPEUTIC EXERCISES: CPT

## 2025-01-08 PROCEDURE — 25000003 PHARM REV CODE 250: Performed by: INTERNAL MEDICINE

## 2025-01-08 PROCEDURE — 97535 SELF CARE MNGMENT TRAINING: CPT

## 2025-01-08 PROCEDURE — 21400001 HC TELEMETRY ROOM

## 2025-01-08 PROCEDURE — 83735 ASSAY OF MAGNESIUM: CPT | Performed by: INTERNAL MEDICINE

## 2025-01-08 PROCEDURE — 11000001 HC ACUTE MED/SURG PRIVATE ROOM

## 2025-01-08 PROCEDURE — 36415 COLL VENOUS BLD VENIPUNCTURE: CPT | Performed by: INTERNAL MEDICINE

## 2025-01-08 PROCEDURE — 80069 RENAL FUNCTION PANEL: CPT | Performed by: INTERNAL MEDICINE

## 2025-01-08 RX ORDER — RISPERIDONE 1 MG/1
2 TABLET ORAL NIGHTLY
Status: DISCONTINUED | OUTPATIENT
Start: 2025-01-08 | End: 2025-01-29 | Stop reason: HOSPADM

## 2025-01-08 RX ORDER — AMOXICILLIN 250 MG
4 CAPSULE ORAL DAILY
Status: DISCONTINUED | OUTPATIENT
Start: 2025-01-08 | End: 2025-01-29 | Stop reason: HOSPADM

## 2025-01-08 RX ORDER — LUBIPROSTONE 24 UG/1
24 CAPSULE ORAL 2 TIMES DAILY WITH MEALS
Status: DISCONTINUED | OUTPATIENT
Start: 2025-01-08 | End: 2025-01-29 | Stop reason: HOSPADM

## 2025-01-08 RX ORDER — FUROSEMIDE 40 MG/1
80 TABLET ORAL DAILY
Status: DISCONTINUED | OUTPATIENT
Start: 2025-01-10 | End: 2025-01-20

## 2025-01-08 RX ORDER — TRAZODONE HYDROCHLORIDE 150 MG/1
150 TABLET ORAL NIGHTLY
Status: DISCONTINUED | OUTPATIENT
Start: 2025-01-08 | End: 2025-01-25

## 2025-01-08 RX ADMIN — APIXABAN 5 MG: 5 TABLET, FILM COATED ORAL at 10:01

## 2025-01-08 RX ADMIN — RIVASTIGMINE TARTRATE 1.5 MG: 1.5 CAPSULE ORAL at 10:01

## 2025-01-08 RX ADMIN — SENNOSIDES AND DOCUSATE SODIUM 4 TABLET: 50; 8.6 TABLET ORAL at 06:01

## 2025-01-08 RX ADMIN — FERROUS SULFATE TAB 325 MG (65 MG ELEMENTAL FE) 1 EACH: 325 (65 FE) TAB at 10:01

## 2025-01-08 RX ADMIN — RISPERIDONE 2 MG: 1 TABLET, FILM COATED ORAL at 08:01

## 2025-01-08 RX ADMIN — POTASSIUM BICARBONATE 25 MEQ: 977.5 TABLET, EFFERVESCENT ORAL at 10:01

## 2025-01-08 RX ADMIN — LATANOPROST 1 DROP: 50 SOLUTION OPHTHALMIC at 08:01

## 2025-01-08 RX ADMIN — LEVOTHYROXINE SODIUM 25 MCG: 25 TABLET ORAL at 05:01

## 2025-01-08 RX ADMIN — PANTOPRAZOLE SODIUM 40 MG: 40 TABLET, DELAYED RELEASE ORAL at 08:01

## 2025-01-08 RX ADMIN — LACTULOSE 30 G: 10 SOLUTION ORAL at 11:01

## 2025-01-08 RX ADMIN — CETIRIZINE HYDROCHLORIDE 10 MG: 10 TABLET, FILM COATED ORAL at 08:01

## 2025-01-08 RX ADMIN — FUROSEMIDE 80 MG: 40 TABLET ORAL at 10:01

## 2025-01-08 RX ADMIN — FERROUS SULFATE TAB 325 MG (65 MG ELEMENTAL FE) 1 EACH: 325 (65 FE) TAB at 08:01

## 2025-01-08 RX ADMIN — APIXABAN 5 MG: 5 TABLET, FILM COATED ORAL at 08:01

## 2025-01-08 RX ADMIN — MELATONIN TAB 3 MG 6 MG: 3 TAB at 06:01

## 2025-01-08 RX ADMIN — PANTOPRAZOLE SODIUM 40 MG: 40 TABLET, DELAYED RELEASE ORAL at 10:01

## 2025-01-08 RX ADMIN — TRAZODONE HYDROCHLORIDE 150 MG: 150 TABLET ORAL at 08:01

## 2025-01-08 RX ADMIN — ACETAMINOPHEN 650 MG: 325 TABLET, FILM COATED ORAL at 07:01

## 2025-01-08 RX ADMIN — LUBIPROSTONE 24 MCG: 24 CAPSULE, GELATIN COATED ORAL at 10:01

## 2025-01-08 RX ADMIN — ALLOPURINOL 300 MG: 300 TABLET ORAL at 10:01

## 2025-01-08 RX ADMIN — LUBIPROSTONE 24 MCG: 24 CAPSULE, GELATIN COATED ORAL at 06:01

## 2025-01-08 NOTE — PROGRESS NOTES
[facility] [address]  Wound Care    Patient Name:  Ev Alberts   MRN:  [id number]  Date: 1/8/2025  Diagnosis: Acute on chronic congestive heart failure    History:     Past Medical History:   Diagnosis Date    Acute kidney injury superimposed on chronic kidney disease     Congestive heart failure     Dementia     Hypertension     Shingles of eyelid     Sick sinus syndrome     Thyroid disease     Unspecified glaucoma        Social History     Socioeconomic History    Marital status:    Tobacco Use    Smoking status: Never    Smokeless tobacco: Never   Substance and Sexual Activity    Alcohol use: Never    Drug use: Not Currently     Social Drivers of Health     Financial Resource Strain: Low Risk  (12/27/2024)    Overall Financial Resource Strain (CARDIA)     Difficulty of Paying Living Expenses: Not very hard   Food Insecurity: No Food Insecurity (12/27/2024)    Hunger Vital Sign     Worried About Running Out of Food in the Last Year: Never true     Ran Out of Food in the Last Year: Never true   Transportation Needs: No Transportation Needs (12/27/2024)    TRANSPORTATION NEEDS     Transportation : No   Physical Activity: Inactive (12/10/2024)    Exercise Vital Sign     Days of Exercise per Week: 0 days     Minutes of Exercise per Session: 0 min   Stress: Patient Unable To Answer (12/27/2024)    [employer] - Occupational Stress Questionnaire     Feeling of Stress : Patient unable to answer   Housing Stability: Low Risk  (12/27/2024)    Housing Stability Vital Sign     Unable to Pay for Housing in the Last Year: No     Homeless in the Last Year: No       Precautions:     Allergies as of 12/26/2024 - Reviewed 12/26/2024   Allergen Reaction Noted    Amlodipine-benazepril Edema 06/01/2022    Corticosteroids (glucocorticoids) Edema and Swelling 05/11/2011    Rofecoxib Anaphylaxis and Swelling 05/11/2011    Sulfa (sulfonamide antibiotics) Edema  06/01/2022    Atorvastatin  10/26/2018    Ibuprofen  06/01/2022       [facility] Assessment Details/Treatment      01/08/25 1025   WOCN Assessment   Visit Date 01/08/25   Visit Time 1025   Consult Type Follow Up   [facility] Speciality Wound   Intervention chart review;assessed;applied   Teaching on-going        Wound 12/10/24 0800 Pressure Injury Sacral spine #1   Date First Assessed/Time First Assessed: 12/10/24 0800   Present on Original Admission: Yes  Primary Wound Type: Pressure Injury  Location: Sacral spine  Wound Number: #1   Wound Image     Dressing Appearance Intact;Dry;Clean   Drainage Amount None   Appearance Pink;Red;Moist   Tissue loss description Partial thickness   Black (%), Wound Tissue Color 0 %   Red (%), Wound Tissue Color 100 %   Yellow (%), Wound Tissue Color 0 %   Periwound Area Intact;Dry;Other (see comments)  (normal for ethnicity)   Wound Edges Irregular   Wound Length (cm) 3 cm   Wound Width (cm) 3.5 cm   Wound Depth (cm) 0.1 cm   Wound Volume (cm^3) 1.05 cm^3   Wound Surface Area (cm^2) 10.5 cm^2   Care Cleansed with:;Soap and water   Dressing Applied;Other (comment)  (zinc oxide, abd pad, tape)     [facility] follow up for sacral wound. Nurse present upon entry to room. Family present at bedside. Pt was rolled onto side. Dressing removed , cleansed well. Measurements obtained, and photos taken. Present treatment appears to be improving sacrum. Pt is on AMARILIS immerse bed. Heel protectors in use . Plan of care reviewed with nurse. Reviewed chart and saw recommendations for ace wrap to assist with lower extremity swelling. Explained wound care will need to be consulted as we are presently treating sacrum. Will follow up.  01/08/2025

## 2025-01-08 NOTE — PT/OT/SLP PROGRESS
Occupational Therapy   Treatment    Name: Ev Alberts  MRN: [id number]  Admitting Diagnosis:  Acute on chronic congestive heart failure       Recommendations:     Recommended therapy intensity at discharge: Moderate Intensity Therapy   Discharge Equipment Recommendations:  to be determined by next level of care  Barriers to discharge:  Other (Comment) (ongoing medical needs and impaired functional mobility)    Assessment:     Ev Alberts is a 89 y.o. female with a medical diagnosis of acute HF exacerbation, NSTEMI, catheter associated UTI, PAF, acute anemia, hx of dementia.  She presents with the following performance deficits affecting function: weakness, impaired endurance, impaired self care skills, impaired functional mobility, gait instability, impaired balance, pain, decreased safety awareness, decreased lower extremity function, decreased upper extremity function.     Pt with improved mobility this date, able to walk ~4 ft with mod A x2 using RW. Pt able to complete seated ADLs at bathroom sink and remained in chair post session.     Rehab Prognosis:  Good; patient would benefit from acute skilled OT services to address these deficits and reach maximum level of function.       Plan:     Patient to be seen 4 x/week to address the above listed problems via self-care/home management, therapeutic activities, therapeutic exercises  Plan of Care Expires: 01/27/25  Plan of Care Reviewed with: patient, spouse, daughter    Subjective     Pain/Comfort:  Pain Rating 1: other (see comments) (pt with pain at sacral wound, did not rate)  Pain Addressed 1: Reposition, Distraction, Cessation of Activity    Objective:     Communicated with: RN prior to session.  Patient found HOB elevated with PureWick, peripheral IV, telemetry, pulse ox (continuous), pressure relief boots upon OT entry to room.    General Precautions: Standard, fall    Orthopedic Precautions:N/A  Braces: N/A  Respiratory Status: Room air  Vital Signs:  Sp02: 96-98%     Occupational Performance:     Bed Mobility:    Patient completed Rolling/Turning to Left with  moderate assistance  Patient completed Rolling/Turning to Right with moderate assistance  Patient completed Supine to Sit with moderate assistance x2    Functional Mobility/Transfers:  Patient completed Sit <> Stand Transfer with moderate assistance x2 with  rolling walker   Patient completed Bed <> Chair Transfer using Step Transfer technique with moderate assistance x2 with rolling walker  Functional Mobility: pt able to ambulate ~4 ft forward using RW with mod A x2, cues for weight shifting and progressing RW    Activities of Daily Living:  Grooming: stand by assistance seated at bathroom sink for face washing  Upper Body Dressing: minimum assistance donning back gown as robe sitting EOB  Lower Body Dressing: maximal assistance donning brief at bed level in prep for functional mobility. Pt with good participation in rolling left<>right    Therapeutic Positioning    OT interventions performed during the course of today's session in an effort to prevent and/or reduce acquired pressure injuries:   Education was provided on benefits of and recommendations for therapeutic positioning  Therapeutic positioning was provided at the conclusion of session to offload all bony prominences for the prevention and/or reduction of pressure injuries    Skin assessment: all bony prominences were assessed    Findings: known area of altered skin integrity at sacrum (dressing intact)    [facility] 6 Click ADL: 15    Patient Education:  Patient and daughter/s were provided with verbal education education regarding OT role/goals/POC, fall prevention, safety awareness, Discharge/DME recommendations, and pressure ulcer prevention.  Understanding was verbalized, however additional teaching warranted.    Patient left up in chair with all lines intact, call button in reach, geomat cushion, bryanna pad in place, and spouse and daughter  present.    GOALS:   Multidisciplinary Problems       Occupational Therapy Goals          Problem: Occupational Therapy    Goal Priority Disciplines Outcome Interventions   Occupational Therapy Goal     OT, PT/OT Progressing    Description: Goals to be met by: 1/27/25     Patient will increase functional independence with ADLs by performing:    Feeding with Set-up Assistance.  UE Dressing with Minimal Assistance.  LE Dressing with Minimal Assistance.  Grooming while seated at sink with Stand-by Assistance.  Toileting from bedside commode with Minimal Assistance for hygiene and clothing management.   Toilet transfer to bedside commode with Minimal Assistance.                       Time Tracking:     OT Date of Treatment: 01/08/25  OT Start Time: 1058  OT Stop Time: 1121  OT Total Time (min): 23 min    Billable Minutes:Self Care/Home Management 23 mins    OT/SARANYA: OT     Number of SARANYA visits since last OT visit: 4    1/8/2025

## 2025-01-08 NOTE — PLAN OF CARE
Psych consult placed with [facility] related to seeking (SNF) placement. She will trigger a level II related to Dx: BiPolar and takes Risperdal 4mg (PO) nightly. I spoke with Yolis ([facility]: Intake Clinician) and informed her of the above.

## 2025-01-08 NOTE — NURSING
Lantus 24u given to patient in error. MD notified and aware. Will monitor patient blood sugar closely.

## 2025-01-08 NOTE — PROGRESS NOTES
[facility] [address] MEDICINE ~ PROGRESS NOTE        CHIEF COMPLAINT   Hospital follow up    HOSPITAL COURSE   89 y/o F with a PMH of diastolic heart failure LVEF 50-55%, HTN, chronic anemia, paroxysmal A-fib on eliquis, and CKD stage III followed by Dr. Chávez , chronic respiratory failure on 2 L NC at home, dementia and glaucoma presented to the ED with complains of bilateral leg swelling, generalized weakness and deconditioning post hospital discharge. Patient explains that she is bed bound and lives at home with her son and  who take care of her. Patient was recently admitted at this facility for worsening generalized weakness, bilateral leg swelling and urinary retention and discharged home with an indwelling cook, follow up appointment with urology and private sitters and home care with [facility] . Patient denies black or blood in her tool, denies chest pain, denies shortness of breath, denies known fever.     Initial ED vitals: Temp 98.1, HR 90, Resp 18, /58, O2 Sat 98% on RA. ED work up revealed WBC 7.39, H&H 8.7 and 26.8, , K 3.4, BUN 30.3, Creatinine 1.26, .3, Troponin 0.048, Lactic acid 1.1, TSH 3.335 Influenza, COVID, and RSV swab negative, Respiratory panel negative, UA positive for blood, leukocyte esterase, RBC, WBC, and bacteria, X-ray chest read enlarged cardiac silhouette without overt edema, EKG read ventricular paced rhythm at 62 bmp. Cardiology was consulted in the ED and patient was admitted to Hospital Medicine.  She is found to have right heart failure and pulmonary hypertension which was the cause of her leg edema.  Nephrology was consulted and continue to remove fluid with IV Lasix.  Left ventricle systolic function is good.    Today  Seen and examined this morning.  Daughter at the bedside.  Daughter complains that patient has not slept.  Also daughter has concerned that patient is having spasms and dry mouth  possibly dehydrated from diuresis.  Bladder seems full on exam, bladder scan only revealed 100 mL.  We will check KUB.  Patient was on risperidone 2 mg at home for bipolar per the daughter.  It was increased to 4 mg to help with sleep.        OBJECTIVE/PHYSICAL EXAM     VITAL SIGNS (MOST RECENT):  Temp: 98 °F (36.7 °C) (01/08/25 0811)  Pulse: 80 (01/08/25 0811)  Resp: 20 (01/07/25 1105)  BP: (!) 125/58 (01/08/25 1007)  SpO2: 95 % (01/08/25 0811) VITAL SIGNS (24 HOUR RANGE):  Temp:  [98 °F (36.7 °C)-98.4 °F (36.9 °C)] 98 °F (36.7 °C)  Pulse:  [69-80] 80  Resp:  [20] 20  SpO2:  [95 %-98 %] 95 %  BP: (110-135)/(54-66) 125/58   GENERAL: In no acute distress, afebrile  HEENT:  CHEST: Clear to auscultation bilaterally  HEART: S1, S2, 2/6 systolic murmur  ABDOMEN: Soft, nontender, BS +  MSK: Warm, 2+ pitting bilateral lower extremity edema, no clubbing or cyanosis  NEUROLOGIC: Alert and oriented x4, moving all extremities with good strength   INTEGUMENTARY:  PSYCHIATRY:        ASSESSMENT/PLAN   Volume overload 2/2 right heart failure-improving  Moderate pulmonary hypertension per echocardiogram  Left heart failure ruled out  Non STEMI type 2  CKD 3B  Pseudomonas urinary tract infection-treated    History of:  Chronic respiratory failure on home O2, dementia, HTN, anemia, glaucoma, CKD 3B, anemia of chronic disease, paroxysmal atrial fibrillation, bipolar disorder      Nephrology signed off.  Fluid adjustments per them.  Oral Lasix.  It will be difficult managing fluid and may have to live with some edema to the legs given right heart failure with pulmonary hypertension.  Compression stockings PRN.    Regulate sleep-wake cycle.  Avoid nighttime disturbances.  Discontinue vitals between 10:00 p.m. to 6:00 a.m. if otherwise stable.  Continue current sleep medications.  Case management following.  Pending SNF.  Triggered level 2 due to risperidone and bipolar history, psychiatry consulted.  Check KUB today, having bowel  movements.  Labs reviewed, electrolytes appropriate.  Already received Lasix today but can hold tomorrow's dose.  Water intake as indicated by thirst.    DVT prophylaxis: Eliquis    Anticipated discharge and disposition:  Medically stable for SNF placement  __________________________________________________________________________    NUTRITIONAL STATUS     Patient meets ASPEN criteria for   malnutrition of   per RD assessment as evidenced by:                       A minimum of two characteristics is recommended for diagnosis of either severe or non-severe malnutrition.     LABS/MICRO/MEDS/DIAGNOSTICS       LABS  Recent Labs     01/08/25  0916   *   K 3.9   CO2 31   BUN 28.3*   CREATININE 1.37*   GLUCOSE 115   CALCIUM 9.1   ALBUMIN 2.4*     Recent Labs     01/06/25  0346   WBC 7.11   RBC 2.42*   HCT 23.9*   MCV 98.8*          MICROBIOLOGY  Microbiology Results (last 7 days)       Procedure Component Value Units Date/Time    Blood Culture [[id number]]  (Normal) Collected: 12/29/24 1747    Order Status: Completed Specimen: Blood Updated: 01/03/25 1901     Blood Culture No Growth at 5 days    Blood Culture [[id number]]  (Normal) Collected: 12/29/24 1142    Order Status: Completed Specimen: Blood Updated: 01/03/25 1302     Blood Culture No Growth at 5 days               MEDICATIONS   allopurinoL  300 mg Oral Daily    apixaban  5 mg Oral BID    cetirizine  10 mg Oral QHS    ferrous sulfate  1 tablet Oral BID    furosemide  80 mg Oral Daily    latanoprost  1 drop Both Eyes QHS    levothyroxine  25 mcg Oral Before breakfast    lubiprostone  24 mcg Oral Daily with breakfast    melatonin  6 mg Oral Q24H    pantoprazole  40 mg Oral BID    potassium bicarbonate  25 mEq Oral Daily    risperiDONE  4 mg Oral QHS    rivastigmine tartrate  1.5 mg Oral Daily    traZODone  100 mg Oral QHS         INFUSIONS         DIAGNOSTIC TESTS  CT Head Without Contrast   Final Result      1.  No acute intracranial findings identified.       2.  Chronic microangiopathic ischemia and atrophy..         Electronically signed by: Theodore Prince   Date:    12/29/2024   Time:    08:22      X-Ray Chest AP Portable   Final Result      Enlarged cardiac silhouette without overt edema.         Electronically signed by: Angélica Diehl   Date:    12/26/2024   Time:    15:31           Echo    Result Date: 12/27/2024    Left Ventricle: The left ventricle is normal in size. Moderately   increased wall thickness. Normal wall motion. Septal flattening in systole   consistent with right ventricular pressure overload. There is normal   systolic function with a visually estimated ejection fraction of 55 - 60%.   There is normal diastolic function.    Right Ventricle: Moderate right ventricular enlargement. Systolic   function is moderately reduced. TAPSE is 1.19 cm. Pacemaker lead present   in the ventricle.    Left Atrium: Left atrium is moderately dilated.    Right Atrium: Right atrium is severely dilated. Lead present in the   right atrium.    Mitral Valve: There is mild regurgitation.    Tricuspid Valve: There is severe regurgitation. There is moderate   pulmonary hypertension.    Pulmonic Valve: There is mild regurgitation.    Pulmonary Artery: There is moderate pulmonary hypertension. The   estimated pulmonary artery systolic pressure is 47 mmHg.    IVC/SVC: Elevated venous pressure at 15 mmHg.              Case related differential diagnoses have been reviewed; assessment and plan has been documented. I have personally reviewed the labs and test results that are currently available; I have reviewed the patients medication list. I have reviewed the consulting providers recommendations. I have reviewed or attempted to review medical records based upon their availability.  All of the patient's and/or family's questions have been addressed and answered to the best of my ability.  I will continue to monitor closely and make adjustments to medical management as  needed.  This document was created using M*Modal Fluency Direct.  Transcription errors may have been made.  Please contact me if any questions may rise regarding documentation to clarify transcription.        Jason Vernon MD   Internal Medicine  Department of [facility] Medicine  [facility] Telemetry

## 2025-01-08 NOTE — CONSULTS
"1/8/2025  Ev Alberts   1935   [id number]            Psychiatry Initial Consult Note    Date of Admission: 12/26/2024  3:03 PM    Chief Complaint: Psychiatric consult for "Seeking (SNF) placement and will trigger a level II due to Dx: Bipolar and presently receiving Risperdal 4 mg at night. Please evalaute and assess with treatment accordingly."     SUBJECTIVE:   History of Present Illness:   Ev Alberts is a 89 y.o. female with a past medical history of diastolic heart failure, HTN, chronic anemia, paroxysmal A-Fib, CKD stage III, chronic respiratory failure, dementia, and glaucoma who presented to the ED with complaints of bilateral leg swelling, generalized weakness, and deconditioning after discharge from hospital. Had recently been admitted for worsening generalized weakness, bilateral leg swelling, and urinary retention and discharge with indwelling catheter. UA positive for blood, leukocyte esterase, RBC, WBC, and bacteria, X-ray chest read enlarged cardiac silhouette without overt edema, EKG read ventricular paced rhythm at 62 bmp. Pending SNF placement and psychiatry consulted.    Upon evaluation she is sitting in bed and in no apparent distress. Is pleasant, polite, and cooperative. Currently endorses appropriate mood and denies feeling sad, depressed, anxious, or nervous now or in the past month. Has a reported history of bipolar disorder and displays no evidence of kwame at this time. Speech and thought process appear slowed. No evidence of psychosis. Denies auditory/visual hallucinations or paranoia and displays no evidence of responding to internal stimuli. Denies auditory/visual hallucinations or paranoia. Risperidone recently increased to 4mg QHS to help with sleep. Reported to have had poor sleep overnight. She denies any issues with appetite. Denies suicidal ideations or homicidal ideations. AAOx3 and displays grossly intact attention.    Collateral: Spoke with patient's daughter, Mellissa " Mannylamberto, who reports history of psychiatric hospitalization at [facility] several years ago due to paranoia and agitation. This occurred while having a diagnosis of dementia. Reports that mood and behavior have been improved with risperidone. States that she has displayed worsening tremor and slowed thinking in the past month. Has had alterations in sleep with frequently being awake at night.         Past Psychiatric History:   Previous Psychiatric Hospitalizations: Denies   Previous Medication Trials: Risperidone, Trazodone  Previous Suicide Attempts: Denies   Outpatient psychiatrist: None    Current Medications:   Home Psychiatric Meds: Risperidone 2mg PO QHS    Past Medical/Surgical History:   Past Medical History:   Diagnosis Date    Acute kidney injury superimposed on chronic kidney disease     Congestive heart failure     Dementia     Hypertension     Shingles of eyelid     Sick sinus syndrome     Thyroid disease     Unspecified glaucoma      Past Surgical History:   Procedure Laterality Date    cataract surgery       SECTION      CHOLECYSTECTOMY      EGD, WITH HEMORRHAGE CONTROL Left 2024    Procedure: EGD,WITH HEMORRHAGE CONTROL;  Surgeon: Blake Adorno MD;  Location: [facility] OR;  Service: Gastroenterology;  Laterality: Left;    HYSTERECTOMY      INSERTION OF PACEMAKER      LEFT HEART CATHETERIZATION Left 3/4/2024    Procedure: Left heart cath;  Surgeon: Mark Raymundo Jr., MD;  Location: [facility] CATH LAB;  Service: Cardiology;  Laterality: Left;    NEPHRECTOMY         Family Psychiatric History:   Denies     Allergies:   Review of patient's allergies indicates:   Allergen Reactions    Amlodipine-benazepril Edema     Other reaction(s): unknown    Corticosteroids (glucocorticoids) Edema and Swelling    Rofecoxib Anaphylaxis and Swelling    Sulfa (sulfonamide antibiotics) Edema    Atorvastatin      Other reaction(s): unknown    Ibuprofen      Other reaction(s): Allergy to sulfa drugs        Substance Abuse History:   Tobacco: Denies  Alcohol: Denies  Illicit Substances: Denies  Treatment: Denies        Scheduled Meds:    allopurinoL  300 mg Oral Daily    apixaban  5 mg Oral BID    cetirizine  10 mg Oral QHS    ferrous sulfate  1 tablet Oral BID    [START ON 1/10/2025] furosemide  80 mg Oral Daily    latanoprost  1 drop Both Eyes QHS    levothyroxine  25 mcg Oral Before breakfast    lubiprostone  24 mcg Oral BID WM    melatonin  6 mg Oral Q24H    pantoprazole  40 mg Oral BID    potassium bicarbonate  25 mEq Oral Daily    risperiDONE  4 mg Oral QHS    rivastigmine tartrate  1.5 mg Oral Daily    traZODone  100 mg Oral QHS      PRN Meds:   Current Facility-Administered Medications:     acetaminophen, 650 mg, Oral, Q4H PRN    albuterol, 1 puff, Inhalation, PRN    aluminum-magnesium hydroxide-simethicone, 30 mL, Oral, QID PRN    bisacodyL, 10 mg, Rectal, Daily PRN    dextrose 50%, 12.5 g, Intravenous, PRN    dextrose 50%, 25 g, Intravenous, PRN    glucagon (human recombinant), 1 mg, Intramuscular, PRN    glucose, 16 g, Oral, PRN    glucose, 24 g, Oral, PRN    glycopyrrolate, 1 mg, Oral, BID PRN    insulin aspart U-100, 0-5 Units, Subcutaneous, QID (AC + HS) PRN    nitroGLYCERIN, 0.4 mg, Sublingual, Q5 Min PRN    ondansetron, 4 mg, Intravenous, Q8H PRN    pneumoc 20-rod conj-dip cr(PF), 0.5 mL, Intramuscular, vaccine x 1 dose    polyethylene glycol, 17 g, Oral, BID PRN    sodium chloride 0.9%, 10 mL, Intravenous, PRN   Psychotherapeutics (From admission, onward)      Start     Stop Route Frequency Ordered    01/05/25 2100  risperiDONE tablet 4 mg         -- Oral Nightly 01/05/25 0814    01/04/25 2100  traZODone tablet 100 mg         -- Oral Nightly 01/04/25 1652              Social History:  Housing Status: Lives with  and son  Relationship Status/Sexual Orientation:    Children: 5  Education: 8th grade   Employment Status/Info: Not currently working    history: Denies  History of  "physical/sexual abuse: Denies   Access to gun: Denies       Legal History:   Past Charges/Incarcerations: Denies   Pending charges: Denies      OBJECTIVE:     Vitals   Vitals:    01/08/25 1146   BP: 110/67   Pulse: 71   Resp:    Temp: 97.5 °F (36.4 °C)        Labs/Imaging/Studies:   Recent Results (from the past 48 hours)   POCT glucose    Collection Time: 01/06/25  5:17 PM   Result Value Ref Range    POCT Glucose 117 (H) 70 - 110 mg/dL   POCT glucose    Collection Time: 01/06/25  8:18 PM   Result Value Ref Range    POCT Glucose 128 (H) 70 - 110 mg/dL   POCT glucose    Collection Time: 01/07/25 11:42 AM   Result Value Ref Range    POCT Glucose 112 (H) 70 - 110 mg/dL   POCT glucose    Collection Time: 01/07/25  4:26 PM   Result Value Ref Range    POCT Glucose 138 (H) 70 - 110 mg/dL   POCT glucose    Collection Time: 01/07/25  9:36 PM   Result Value Ref Range    POCT Glucose 122 (H) 70 - 110 mg/dL   Renal Function Panel    Collection Time: 01/08/25  9:16 AM   Result Value Ref Range    Sodium 132 (L) 136 - 145 mmol/L    Potassium 3.9 3.5 - 5.1 mmol/L    Chloride 97 (L) 98 - 107 mmol/L    CO2 31 23 - 31 mmol/L    Glucose 115 82 - 115 mg/dL    Blood Urea Nitrogen 28.3 (H) 9.8 - 20.1 mg/dL    Creatinine 1.37 (H) 0.55 - 1.02 mg/dL    Calcium 9.1 8.4 - 10.2 mg/dL    Albumin 2.4 (L) 3.4 - 4.8 g/dL    Phosphorus Level 2.4 2.3 - 4.7 mg/dL    eGFR 37 mL/min/1.73/m2   Magnesium    Collection Time: 01/08/25  9:16 AM   Result Value Ref Range    Magnesium Level 2.20 1.60 - 2.60 mg/dL   POCT glucose    Collection Time: 01/08/25  9:59 AM   Result Value Ref Range    POCT Glucose 122 (H) 70 - 110 mg/dL   POCT glucose    Collection Time: 01/08/25 11:53 AM   Result Value Ref Range    POCT Glucose 122 (H) 70 - 110 mg/dL      No results found for: "PHENYTOIN", "PHENOBARB", "VALPROATE", "CBMZ"        Psychiatric Mental Status Exam:  General Appearance: appears stated age, dressed in hospital garb, lying in bed, in no acute " distress  Arousal: alert  Behavior: cooperative, polite, appropriate eye-contact, under good behavioral control  Movements and Motor Activity: no tics, no tremors, no akathisia, no dystonia, no evidence of tardive dyskinesia  Orientation: oriented to person, place, and time  Speech: spontaneous, coherent  Mood: Euthymic  Affect: constricted  Thought Process: goal-directed  Associations: no loosening of associations  Thought Content and Perceptions: no suicidal or homicidal ideation, no auditory or visual hallucinations, no paranoid ideation, no ideas of reference, no evidence of delusions or psychosis  Recent and Remote Memory: impaired; per interview/observation with patient  Attention and Concentration: grossly intact; per interview/observation with patient  Fund of Knowledge: vocabulary appropriate; based on history, vocabulary, fund of knowledge, syntax, grammar, and content  Insight: adequate; based on understanding of severity of illness and HPI  Judgment: adequate; based on patient's behavior and HPI        ASSESSMENT/PLAN:   Diagnoses:  Major Neurocognitive Disorder, unspecified type, unspecified severity, without behavioral disturbances    Past Medical History:   Diagnosis Date    Acute kidney injury superimposed on chronic kidney disease     Congestive heart failure     Dementia     Hypertension     Shingles of eyelid     Sick sinus syndrome     Thyroid disease     Unspecified glaucoma           Problem lists and Management Plans:  Medication Management  Decrease risperidone to 2mg PO QHS  Trazodone 150mg PO QHS  Recommend delirium precautions  Legal   PEC not recommended at this time.  Psychiatry will continue to follow          Jesus Marroquin

## 2025-01-08 NOTE — PT/OT/SLP PROGRESS
Physical Therapy Treatment    Patient Name:  Ev Alberts   MRN:  [id number]    Recommendations:     Discharge therapy intensity: Moderate Intensity Therapy   Discharge Equipment Recommendations: to be determined by next level of care  Barriers to discharge: Impaired mobility and Ongoing medical needs    Assessment:     Ev Alberts is a 89 y.o. female admitted with a medical diagnosis of acute HF exacerbation, NSTEMI, catheter associated UTI, PAF, acute anemia, hx of dementia.  She presents with the following impairments/functional limitations: weakness, impaired endurance, impaired self care skills, impaired functional mobility, gait instability, impaired balance, pain, impaired cardiopulmonary response to activity, decreased safety awareness, decreased upper extremity function, decreased lower extremity function, edema, impaired skin, impaired cognition.     Pt mobilized with mod A x2 for bed mobility, transfers, and gait using RW. Pt required constant cues for sequencing and progression of activity, along with facilitation for lateral weight shifting during gait in order to facilitate increased step length. Pt easily fatigued with exertion but is progressing with mobiltiy.    Rehab Prognosis: Good; patient would benefit from acute skilled PT services to address these deficits and reach maximum level of function.    Recent Surgery: * No surgery found *      Plan:     During this hospitalization, patient would benefit from acute PT services 5 x/week to address the identified rehab impairments via gait training, therapeutic activities, therapeutic exercises, neuromuscular re-education and progress toward the following goals:    Plan of Care Expires:  01/27/25    Subjective     Chief Complaint: pain in sacrum  Patient/Family Comments/goals: get stronger before return home  Pain/Comfort:  Pain Rating 1:  (Pt complains of sacral pain but does not objectively rate)      Objective:     Communicated with RN prior to  session.  Patient found supine with PureWick, pulse ox (continuous), telemetry upon PT entry to room.     General Precautions: Standard, fall  Orthopedic Precautions: N/A  Braces: N/A  Respiratory Status: Room air  Blood Pressure: NT  Skin Integrity:  sacral wound      Functional Mobility:  Bed Mobility:     Rolling Left:  moderate assistance  Rolling Right: moderate assistance  Scooting: moderate assistance  Supine to Sit: moderate assistance and of 2 persons  Sit to Supine: moderate assistance and of 2 persons  Transfers:     Sit to Stand:  moderate assistance and of 2 persons with rolling walker  Gait: 4ft with RW mod A with slow sally, wide SARAH, limited foot clearance, PT facilitation for lateral weight shifting and increased step length  Balance: sitting balance = SBA    **Pt required verbal cues for sequencing of task in order to improve efficiency of movement and increase functional independence.    Therapeutic Activities/Exercises:  Seated hip flexion 1x10 reps with ongoing cues for participation    Education:  Patient provided with verbal education education regarding PT role/goals/POC, fall prevention, safety awareness, and discharge/DME recommendations.  Understanding was verbalized.     Patient left HOB elevated with all lines intact, call button in reach, wedge under R side, pressure relief boots, RN notified, and daughter present    GOALS:   Multidisciplinary Problems       Physical Therapy Goals          Problem: Physical Therapy    Goal Priority Disciplines Outcome Interventions   Physical Therapy Goal     PT, PT/OT Progressing    Description: Goals to be met by: 24     Patient will increase functional independence with mobility by performin. Supine to sit with MInimal Assistance  2. Sit to stand transfer with Minimal Assistance  3. Bed to chair transfer with Minimal Assistance using Rolling Walker vs. Squat pivot  4. Gait  x 25 feet with Minimal Assistance using Rolling Walker.   5.  Sitting at edge of bed x10 minutes with Stand-by Assistance                         Time Tracking:     PT Received On: 01/08/25  PT Start Time: 1057     PT Stop Time: 1120  PT Total Time (min): 23 min     Billable Minutes: Gait Training 10 and Therapeutic Exercise 13    Treatment Type: Treatment  PT/PTA: PT     Number of PTA visits since last PT visit: 5     01/08/2025

## 2025-01-09 LAB
POCT GLUCOSE: 102 MG/DL (ref 70–110)
POCT GLUCOSE: 118 MG/DL (ref 70–110)
POCT GLUCOSE: 78 MG/DL (ref 70–110)
POCT GLUCOSE: 95 MG/DL (ref 70–110)

## 2025-01-09 PROCEDURE — 25000003 PHARM REV CODE 250: Performed by: INTERNAL MEDICINE

## 2025-01-09 PROCEDURE — 21400001 HC TELEMETRY ROOM

## 2025-01-09 PROCEDURE — 25000003 PHARM REV CODE 250

## 2025-01-09 PROCEDURE — 11000001 HC ACUTE MED/SURG PRIVATE ROOM

## 2025-01-09 RX ORDER — HYDROXYZINE HYDROCHLORIDE 25 MG/1
25 TABLET, FILM COATED ORAL NIGHTLY PRN
Status: DISCONTINUED | OUTPATIENT
Start: 2025-01-09 | End: 2025-01-29 | Stop reason: HOSPADM

## 2025-01-09 RX ADMIN — LEVOTHYROXINE SODIUM 25 MCG: 25 TABLET ORAL at 06:01

## 2025-01-09 RX ADMIN — PANTOPRAZOLE SODIUM 40 MG: 40 TABLET, DELAYED RELEASE ORAL at 08:01

## 2025-01-09 RX ADMIN — APIXABAN 5 MG: 5 TABLET, FILM COATED ORAL at 09:01

## 2025-01-09 RX ADMIN — POTASSIUM BICARBONATE 25 MEQ: 977.5 TABLET, EFFERVESCENT ORAL at 08:01

## 2025-01-09 RX ADMIN — PANTOPRAZOLE SODIUM 40 MG: 40 TABLET, DELAYED RELEASE ORAL at 09:01

## 2025-01-09 RX ADMIN — LUBIPROSTONE 24 MCG: 24 CAPSULE, GELATIN COATED ORAL at 08:01

## 2025-01-09 RX ADMIN — MELATONIN TAB 3 MG 6 MG: 3 TAB at 09:01

## 2025-01-09 RX ADMIN — RIVASTIGMINE TARTRATE 1.5 MG: 1.5 CAPSULE ORAL at 08:01

## 2025-01-09 RX ADMIN — CETIRIZINE HYDROCHLORIDE 10 MG: 10 TABLET, FILM COATED ORAL at 09:01

## 2025-01-09 RX ADMIN — LATANOPROST 1 DROP: 50 SOLUTION OPHTHALMIC at 09:01

## 2025-01-09 RX ADMIN — ALLOPURINOL 300 MG: 300 TABLET ORAL at 08:01

## 2025-01-09 RX ADMIN — TRAZODONE HYDROCHLORIDE 150 MG: 150 TABLET ORAL at 09:01

## 2025-01-09 RX ADMIN — FERROUS SULFATE TAB 325 MG (65 MG ELEMENTAL FE) 1 EACH: 325 (65 FE) TAB at 09:01

## 2025-01-09 RX ADMIN — RISPERIDONE 2 MG: 1 TABLET, FILM COATED ORAL at 09:01

## 2025-01-09 RX ADMIN — APIXABAN 5 MG: 5 TABLET, FILM COATED ORAL at 08:01

## 2025-01-09 RX ADMIN — FERROUS SULFATE TAB 325 MG (65 MG ELEMENTAL FE) 1 EACH: 325 (65 FE) TAB at 08:01

## 2025-01-09 NOTE — PROGRESS NOTES
[facility] [address]  Wound Care    Patient Name:  Ev Alberts   MRN:  [id number]  Date: 1/9/2025  Diagnosis: Acute on chronic congestive heart failure    History:     Past Medical History:   Diagnosis Date    Acute kidney injury superimposed on chronic kidney disease     Congestive heart failure     Dementia     Hypertension     Shingles of eyelid     Sick sinus syndrome     Thyroid disease     Unspecified glaucoma        Social History     Socioeconomic History    Marital status:    Tobacco Use    Smoking status: Never    Smokeless tobacco: Never   Substance and Sexual Activity    Alcohol use: Never    Drug use: Not Currently     Social Drivers of Health     Financial Resource Strain: Low Risk  (12/27/2024)    Overall Financial Resource Strain (CARDIA)     Difficulty of Paying Living Expenses: Not very hard   Food Insecurity: No Food Insecurity (12/27/2024)    Hunger Vital Sign     Worried About Running Out of Food in the Last Year: Never true     Ran Out of Food in the Last Year: Never true   Transportation Needs: No Transportation Needs (12/27/2024)    TRANSPORTATION NEEDS     Transportation : No   Physical Activity: Inactive (12/10/2024)    Exercise Vital Sign     Days of Exercise per Week: 0 days     Minutes of Exercise per Session: 0 min   Stress: Patient Unable To Answer (12/27/2024)    [place] [employer] - Occupational Stress Questionnaire     Feeling of Stress : Patient unable to answer   Housing Stability: Low Risk  (12/27/2024)    Housing Stability Vital Sign     Unable to Pay for Housing in the Last Year: No     Homeless in the Last Year: No       Precautions:     Allergies as of 12/26/2024 - Reviewed 12/26/2024   Allergen Reaction Noted    Amlodipine-benazepril Edema 06/01/2022    Corticosteroids (glucocorticoids) Edema and Swelling 05/11/2011    Rofecoxib Anaphylaxis and Swelling 05/11/2011    Sulfa (sulfonamide antibiotics) Edema  06/01/2022    Atorvastatin  10/26/2018    Ibuprofen  06/01/2022       WOC Assessment Details/Treatment   WOCN consult for ace wraps to bilateral lower extremity swelling. Chart reviewed. Dr. Chávez and Dr. Vernon recommended for swelling. Upon entry to room ; family at bedside. Pt is asleep . Assessed lower extremities. No skin concerns at this time. Explained reason for ace wraps. No significant swelling at this time. Applied ace wraps then heel protectors. Pt tolerated well. Recommendations: apply q am ; off q pm. Nursing to continue with treatment recommendations.       01/09/2025

## 2025-01-09 NOTE — PT/OT/SLP PROGRESS
Attempting A.M session however pt. Declining at this time wanting to rest despite max encouragement, follow up as schedule permits.

## 2025-01-09 NOTE — PROGRESS NOTES
"Hospital Medicine  Progress Note    Patient Name: Ev Alberts  MRN: [id number]  Status: IP- Inpatient   Admission Date: 12/26/2024  Length of Stay: 14  Date of Service: 01/09/2025       CC: hospital follow-up for heart failure       SUBJECTIVE   "89 y/o F with a PMH of diastolic heart failure LVEF 50-55%, HTN, chronic anemia, paroxysmal A-fib on eliquis, and CKD stage III followed by Dr. Chávez , chronic respiratory failure on 2 L NC at home, dementia and glaucoma presented to the ED with complains of bilateral leg swelling, generalized weakness and deconditioning post hospital discharge. Patient explains that she is bed bound and lives at home with her son and  who take care of her. Patient was recently admitted at this facility for worsening generalized weakness, bilateral leg swelling and urinary retention and discharged home with an indwelling cook, follow up appointment with urology and private sitters and home care with [facility]. Patient denies black or blood in her tool, denies chest pain, denies shortness of breath, denies known fever.     Initial ED vitals: Temp 98.1, HR 90, Resp 18, /58, O2 Sat 98% on RA. ED work up revealed WBC 7.39, H&H 8.7 and 26.8, , K 3.4, BUN 30.3, Creatinine 1.26, .3, Troponin 0.048, Lactic acid 1.1, TSH 3.335 Influenza, COVID, and RSV swab negative, Respiratory panel negative, UA positive for blood, leukocyte esterase, RBC, WBC, and bacteria, X-ray chest read enlarged cardiac silhouette without overt edema, EKG read ventricular paced rhythm at 62 bmp. Cardiology was consulted in the ED and patient was admitted to Hospital Medicine.  She is found to have right heart failure and pulmonary hypertension which was the cause of her leg edema.  Nephrology was consulted and continue to remove fluid with IV Lasix.  Left ventricle systolic function is good."    Today: Patient seen and examined at bedside, and chart reviewed.  No new complaints or " "issues.  Stable on room air.      MEDICATIONS   Scheduled   allopurinoL  300 mg Oral Daily    apixaban  5 mg Oral BID    cetirizine  10 mg Oral QHS    ferrous sulfate  1 tablet Oral BID    [START ON 1/10/2025] furosemide  80 mg Oral Daily    latanoprost  1 drop Both Eyes QHS    levothyroxine  25 mcg Oral Before breakfast    lubiprostone  24 mcg Oral BID WM    melatonin  6 mg Oral Q24H    pantoprazole  40 mg Oral BID    potassium bicarbonate  25 mEq Oral Daily    risperiDONE  2 mg Oral QHS    rivastigmine tartrate  1.5 mg Oral Daily    senna-docusate 8.6-50 mg  4 tablet Oral Daily    traZODone  150 mg Oral QHS     Continuous Infusions  None      PHYSICAL EXAM   VITALS: T 98.4 °F (36.9 °C)   BP (!) 117/49   P 76   RR (!) 24   O2 95 %    GENERAL: Awake and in NAD  LUNGS: CTA anteriorly  CVS: Normal rate  GI/: Soft, NT, bowel sounds positive.  EXTREMITIES: 1+ B/L LE edema  NEURO: AAOx3  PSYCH: Cooperative      LABS   CBC  No results for input(s): "WBC", "RBC", "HGB", "HCT", "MCV", "MCH", "MCHC", "RDW", "PLT", "RETIC", "ESR" in the last 72 hours.  CHEM  Recent Labs     01/08/25  0916   *   K 3.9   CO2 31   BUN 28.3*   CREATININE 1.37*   GLUCOSE 115   CALCIUM 9.1   MG 2.20   PHOS 2.4   ALBUMIN 2.4*         MICROBIOLOGY     Microbiology Results (last 7 days)       Procedure Component Value Units Date/Time    Blood Culture [[id number]]  (Normal) Collected: 12/29/24 1747    Order Status: Completed Specimen: Blood Updated: 01/03/25 1901     Blood Culture No Growth at 5 days    Blood Culture [[id number]]  (Normal) Collected: 12/29/24 1142    Order Status: Completed Specimen: Blood Updated: 01/03/25 1302     Blood Culture No Growth at 5 days              ASSESSMENT   HFpEF, acutely decompensated, improved  Pulmonary hypertension (moderate per Echo)  NSTEMI type II s/t above  CKD IIIB  Pseudomonas urinary tract infection, resolved  Chronic hypoxic respiratory failure  Baseline dementia  Essential HTN  Anemia of chronic " disease  Paroxysmal atrial fibrillation  h/o bipolar disorder      PLAN   Continue current management including maintenance Lasix therapy   CM working on SNF placement  Continue PT/OT in here in the interim  Appreciate psych input med adjustments        DVT Prophylaxis: Eliquis  Disposition: Awaiting SNF placement          Lance Gonzales MD  [facility]

## 2025-01-09 NOTE — PT/OT/SLP PROGRESS
Physical Therapy      Patient Name:  Ev Alberts   MRN:  [id number]    Patient not seen today secondary to declining participation this AM due to fatigue and requesting to rest. Followed up this PM pt asleep, aroused briefly to PT entry then returned to sleep . Will continue to follow.    1/9/25

## 2025-01-09 NOTE — PROGRESS NOTES
1/9/2025  Ev Alberts   1935   [id number]        Psychiatry Progress Note       SUBJECTIVE:   Ev Alberts is a 89 y.o. female with a past medical history of diastolic heart failure, HTN, chronic anemia, paroxysmal A-Fib, CKD stage III, chronic respiratory failure, dementia, and glaucoma who presented to the ED with complaints of bilateral leg swelling, generalized weakness, and deconditioning after discharge from hospital. Had recently been admitted for worsening generalized weakness, bilateral leg swelling, and urinary retention and discharge with indwelling catheter. UA positive for blood, leukocyte esterase, RBC, WBC, and bacteria, X-ray chest read enlarged cardiac silhouette without overt edema, EKG read ventricular paced rhythm at 62 bmp. Pending SNF placement and psychiatry consulted.     Seen at the bedside where she is resting quietly and in no apparent distress. Reports euthymic mood and displays a constricted affect. No evidence of psychosis. Denies suicidal ideations, homicidal ideations, or auditory/visual hallucinations. Reports no issues with sleep overnight, but nursing staff reports minimal sleep overnight.        Current Medications:   Scheduled Meds:    allopurinoL  300 mg Oral Daily    apixaban  5 mg Oral BID    cetirizine  10 mg Oral QHS    ferrous sulfate  1 tablet Oral BID    [START ON 1/10/2025] furosemide  80 mg Oral Daily    latanoprost  1 drop Both Eyes QHS    levothyroxine  25 mcg Oral Before breakfast    lubiprostone  24 mcg Oral BID WM    melatonin  6 mg Oral Q24H    pantoprazole  40 mg Oral BID    potassium bicarbonate  25 mEq Oral Daily    risperiDONE  2 mg Oral QHS    rivastigmine tartrate  1.5 mg Oral Daily    senna-docusate 8.6-50 mg  4 tablet Oral Daily    traZODone  150 mg Oral QHS      PRN Meds:   Current Facility-Administered Medications:     acetaminophen, 650 mg, Oral, Q4H PRN    albuterol, 1 puff, Inhalation, PRN    aluminum-magnesium hydroxide-simethicone, 30 mL,  Oral, QID PRN    bisacodyL, 10 mg, Rectal, Daily PRN    dextrose 50%, 12.5 g, Intravenous, PRN    dextrose 50%, 25 g, Intravenous, PRN    glucagon (human recombinant), 1 mg, Intramuscular, PRN    glucose, 16 g, Oral, PRN    glucose, 24 g, Oral, PRN    glycopyrrolate, 1 mg, Oral, BID PRN    insulin aspart U-100, 0-5 Units, Subcutaneous, QID (AC + HS) PRN    nitroGLYCERIN, 0.4 mg, Sublingual, Q5 Min PRN    ondansetron, 4 mg, Intravenous, Q8H PRN    pneumoc 20-rod conj-dip cr(PF), 0.5 mL, Intramuscular, vaccine x 1 dose    polyethylene glycol, 17 g, Oral, BID PRN    sodium chloride 0.9%, 10 mL, Intravenous, PRN   Psychotherapeutics (From admission, onward)      Start     Stop Route Frequency Ordered    01/08/25 2100  risperiDONE tablet 2 mg         -- Oral Nightly 01/08/25 1543    01/08/25 2100  traZODone tablet 150 mg         -- Oral Nightly 01/08/25 1543            Allergies:   Review of patient's allergies indicates:   Allergen Reactions    Amlodipine-benazepril Edema     Other reaction(s): unknown    Corticosteroids (glucocorticoids) Edema and Swelling    Rofecoxib Anaphylaxis and Swelling    Sulfa (sulfonamide antibiotics) Edema    Atorvastatin      Other reaction(s): unknown    Ibuprofen      Other reaction(s): Allergy to sulfa drugs        OBJECTIVE:   Vitals   Vitals:    01/09/25 0806   BP: 136/69   Pulse: 89   Resp: (!) 24   Temp: 97.5 °F (36.4 °C)        Labs/Imaging/Studies:   Recent Results (from the past 36 hours)   Renal Function Panel    Collection Time: 01/08/25  9:16 AM   Result Value Ref Range    Sodium 132 (L) 136 - 145 mmol/L    Potassium 3.9 3.5 - 5.1 mmol/L    Chloride 97 (L) 98 - 107 mmol/L    CO2 31 23 - 31 mmol/L    Glucose 115 82 - 115 mg/dL    Blood Urea Nitrogen 28.3 (H) 9.8 - 20.1 mg/dL    Creatinine 1.37 (H) 0.55 - 1.02 mg/dL    Calcium 9.1 8.4 - 10.2 mg/dL    Albumin 2.4 (L) 3.4 - 4.8 g/dL    Phosphorus Level 2.4 2.3 - 4.7 mg/dL    eGFR 37 mL/min/1.73/m2   Magnesium    Collection Time:  01/08/25  9:16 AM   Result Value Ref Range    Magnesium Level 2.20 1.60 - 2.60 mg/dL   POCT glucose    Collection Time: 01/08/25  9:59 AM   Result Value Ref Range    POCT Glucose 122 (H) 70 - 110 mg/dL   POCT glucose    Collection Time: 01/08/25 11:53 AM   Result Value Ref Range    POCT Glucose 122 (H) 70 - 110 mg/dL   POCT Glucose, Hand-Held Device    Collection Time: 01/08/25  2:10 PM   Result Value Ref Range    POC Glucose 126 (A) 70 - 110 MG/DL   POCT glucose    Collection Time: 01/08/25  4:14 PM   Result Value Ref Range    POCT Glucose 112 (H) 70 - 110 mg/dL   POCT glucose    Collection Time: 01/08/25  6:17 PM   Result Value Ref Range    POCT Glucose 113 (H) 70 - 110 mg/dL   POCT glucose    Collection Time: 01/08/25  8:17 PM   Result Value Ref Range    POCT Glucose 113 (H) 70 - 110 mg/dL   POCT glucose    Collection Time: 01/09/25  5:27 AM   Result Value Ref Range    POCT Glucose 78 70 - 110 mg/dL   POCT glucose    Collection Time: 01/09/25  9:04 AM   Result Value Ref Range    POCT Glucose 118 (H) 70 - 110 mg/dL        Psychiatric Mental Status Exam:  General Appearance: appears stated age, dressed in hospital garb, lying in bed, in no acute distress  Arousal: drowsy  Behavior: cooperative, polite, under good behavioral control, poor eye contact  Movements and Motor Activity: no psychomotor agitation or retardation  Orientation: oriented to person and place  Speech: coherent  Mood: Euthymic  Affect: constricted  Thought Process: goal-directed  Associations: no loosening of associations  Thought Content and Perceptions: no suicidal or homicidal ideation, no auditory or visual hallucinations, no paranoid ideation, no ideas of reference, no evidence of delusions or psychosis  Recent and Remote Memory: impaired; per interview/observation with patient  Attention and Concentration: easily distractible; per interview/observation with patient  Fund of Knowledge: vocabulary appropriate; based on history, vocabulary,  fund of knowledge, syntax, grammar, and content  Insight: questionable; based on understanding of severity of illness and HPI  Judgment: adequate; based on patient's behavior and HPI    ASSESSMENT/PLAN:   Problems Addressed/Diagnoses:  Major Neurocognitive Disorder, unspecified type, unspecified severity, without behavioral disturbances     Past Medical History:   Diagnosis Date    Acute kidney injury superimposed on chronic kidney disease     Congestive heart failure     Dementia     Hypertension     Shingles of eyelid     Sick sinus syndrome     Thyroid disease     Unspecified glaucoma         Plan:  Medication Management  Risperidone 2mg PO QHS  Trazodone 150mg PO QHS  Hydroxyzine 25mg PO QHS PRN insomnia  Psychiatry will continue to follow        Jesus Marroquin

## 2025-01-10 LAB
BASOPHILS # BLD AUTO: 0.05 X10(3)/MCL
BASOPHILS NFR BLD AUTO: 0.7 %
EOSINOPHIL # BLD AUTO: 0.1 X10(3)/MCL (ref 0–0.9)
EOSINOPHIL NFR BLD AUTO: 1.4 %
ERYTHROCYTE [DISTWIDTH] IN BLOOD BY AUTOMATED COUNT: 19.9 % (ref 11.5–17)
HCT VFR BLD AUTO: 23.1 % (ref 37–47)
HGB BLD-MCNC: 7.7 G/DL (ref 12–16)
IMM GRANULOCYTES # BLD AUTO: 0.03 X10(3)/MCL (ref 0–0.04)
IMM GRANULOCYTES NFR BLD AUTO: 0.4 %
LYMPHOCYTES # BLD AUTO: 1.52 X10(3)/MCL (ref 0.6–4.6)
LYMPHOCYTES NFR BLD AUTO: 20.9 %
MCH RBC QN AUTO: 33.5 PG (ref 27–31)
MCHC RBC AUTO-ENTMCNC: 33.3 G/DL (ref 33–36)
MCV RBC AUTO: 100.4 FL (ref 80–94)
MONOCYTES # BLD AUTO: 0.85 X10(3)/MCL (ref 0.1–1.3)
MONOCYTES NFR BLD AUTO: 11.7 %
NEUTROPHILS # BLD AUTO: 4.74 X10(3)/MCL (ref 2.1–9.2)
NEUTROPHILS NFR BLD AUTO: 64.9 %
NRBC BLD AUTO-RTO: 0 %
PLATELET # BLD AUTO: 154 X10(3)/MCL (ref 130–400)
PMV BLD AUTO: 9.7 FL (ref 7.4–10.4)
POCT GLUCOSE: 131 MG/DL (ref 70–110)
POCT GLUCOSE: 132 MG/DL (ref 70–110)
POCT GLUCOSE: 137 MG/DL (ref 70–110)
POCT GLUCOSE: 99 MG/DL (ref 70–110)
RBC # BLD AUTO: 2.3 X10(6)/MCL (ref 4.2–5.4)
WBC # BLD AUTO: 7.29 X10(3)/MCL (ref 4.5–11.5)

## 2025-01-10 PROCEDURE — 21400001 HC TELEMETRY ROOM

## 2025-01-10 PROCEDURE — 25000003 PHARM REV CODE 250: Performed by: INTERNAL MEDICINE

## 2025-01-10 PROCEDURE — 97168 OT RE-EVAL EST PLAN CARE: CPT

## 2025-01-10 PROCEDURE — 11000001 HC ACUTE MED/SURG PRIVATE ROOM

## 2025-01-10 PROCEDURE — 85025 COMPLETE CBC W/AUTO DIFF WBC: CPT | Performed by: INTERNAL MEDICINE

## 2025-01-10 PROCEDURE — 97164 PT RE-EVAL EST PLAN CARE: CPT

## 2025-01-10 PROCEDURE — 25000003 PHARM REV CODE 250

## 2025-01-10 PROCEDURE — 36415 COLL VENOUS BLD VENIPUNCTURE: CPT | Performed by: INTERNAL MEDICINE

## 2025-01-10 RX ADMIN — LATANOPROST 1 DROP: 50 SOLUTION OPHTHALMIC at 08:01

## 2025-01-10 RX ADMIN — APIXABAN 5 MG: 5 TABLET, FILM COATED ORAL at 09:01

## 2025-01-10 RX ADMIN — FERROUS SULFATE TAB 325 MG (65 MG ELEMENTAL FE) 1 EACH: 325 (65 FE) TAB at 09:01

## 2025-01-10 RX ADMIN — RIVASTIGMINE TARTRATE 1.5 MG: 1.5 CAPSULE ORAL at 09:01

## 2025-01-10 RX ADMIN — ALLOPURINOL 300 MG: 300 TABLET ORAL at 09:01

## 2025-01-10 RX ADMIN — ACETAMINOPHEN 650 MG: 325 TABLET, FILM COATED ORAL at 10:01

## 2025-01-10 RX ADMIN — POTASSIUM BICARBONATE 25 MEQ: 977.5 TABLET, EFFERVESCENT ORAL at 09:01

## 2025-01-10 RX ADMIN — LEVOTHYROXINE SODIUM 25 MCG: 25 TABLET ORAL at 06:01

## 2025-01-10 RX ADMIN — RISPERIDONE 2 MG: 1 TABLET, FILM COATED ORAL at 08:01

## 2025-01-10 RX ADMIN — APIXABAN 5 MG: 5 TABLET, FILM COATED ORAL at 08:01

## 2025-01-10 RX ADMIN — PANTOPRAZOLE SODIUM 40 MG: 40 TABLET, DELAYED RELEASE ORAL at 08:01

## 2025-01-10 RX ADMIN — CETIRIZINE HYDROCHLORIDE 10 MG: 10 TABLET, FILM COATED ORAL at 08:01

## 2025-01-10 RX ADMIN — PANTOPRAZOLE SODIUM 40 MG: 40 TABLET, DELAYED RELEASE ORAL at 09:01

## 2025-01-10 RX ADMIN — MELATONIN TAB 3 MG 6 MG: 3 TAB at 08:01

## 2025-01-10 RX ADMIN — TRAZODONE HYDROCHLORIDE 150 MG: 150 TABLET ORAL at 08:01

## 2025-01-10 RX ADMIN — FERROUS SULFATE TAB 325 MG (65 MG ELEMENTAL FE) 1 EACH: 325 (65 FE) TAB at 08:01

## 2025-01-10 RX ADMIN — FUROSEMIDE 80 MG: 40 TABLET ORAL at 09:01

## 2025-01-10 NOTE — PT/OT/SLP RE-EVAL
Occupational Therapy   Re-evaluation    Name: Ev Alberts  MRN: [id number]  Admitting Diagnosis:   1. Acute on chronic congestive heart failure, unspecified heart failure type    2. Fatigue    3. Leg swelling    4. Chest pain    5. CHF (congestive heart failure)    6. Drooling    7. Acute cystitis without hematuria    8. Postherpetic neuralgia    9. Chronic anemia    10. MGUS (monoclonal gammopathy of unknown significance)    11. Acute on chronic diastolic heart failure    12. Severe malnutrition    13. Weakness        Recent Surgery: * No surgery found *      Recommendations:     Discharge therapy intensity: Moderate Intensity Therapy   Discharge Equipment Recommendations:  to be determined by next level of care  Barriers to discharge:   (onin medical and physical needs)    Assessment:     Ev Alberts is a 89 y.o. female with a medical diagnosis of The primary encounter diagnosis was Acute on chronic congestive heart failure, unspecified heart failure type. Diagnoses of Fatigue, Leg swelling, Chest pain, CHF (congestive heart failure), Drooling, Acute cystitis without hematuria, Postherpetic neuralgia, Chronic anemia, MGUS (monoclonal gammopathy of unknown significance), Acute on chronic diastolic heart failure, Severe malnutrition, and Weakness were also pertinent to this visit.  .  She presents with the following performance deficits affecting function: weakness, impaired endurance, impaired cognition, impaired self care skills, gait instability, impaired functional mobility, decreased lower extremity function, decreased upper extremity function, impaired skin, edema, decreased safety awareness, pain, impaired balance, impaired cardiopulmonary response to activity, decreased coordination.        Pt progressing towards goals. COnt POC. Pain, edema and impaired endurance limiting factors at this time. Pt able to ambulate short distance in room this date; continues to require increased assist for ADLs.      Rehab Prognosis: Good; patient would benefit from acute skilled OT services to address these deficits and reach maximum level of function.       Plan:     Patient to be seen 4 x/week to address the above listed problems via self-care/home management, therapeutic activities, therapeutic exercises  Plan of Care Expires: 01/27/25  Plan of Care Reviewed with: patient, spouse    Subjective     Chief Complaint: pain in sacrum   Patient/Family Comments/goals: return to PLOF     Pain/Comfort:  Pain Rating 1: 8/10  Location - Orientation 1: generalized  Location 1: sacral spine  Pain Addressed 1: Reposition, Distraction, Cessation of Activity, Nurse notified  Pain Rating Post-Intervention 1:  (did not rate)    Patients cultural, spiritual, Advent conflicts given the current situation: no    Objective:     OT communicated with nsg prior to session.      Patient was found left sidelying with PureWick upon OT entry to room.    General Precautions: Standard, fall  Orthopedic Precautions: N/A  Braces: N/A        Bed Mobility:    Patient completed Scooting/Bridging with maximal assistance  Patient completed Supine to Sit with maximal assistance  Patient completed Sit to Supine with moderate assistance, maximal assistance, and 2 persons    Functional Mobility/Transfers:  Patient completed Sit <> Stand Transfer with moderate assistance and of 2 persons  with  rolling walker   Patient completed Bed <> Chair Transfer using Step Transfer technique with moderate assistance with rolling walker  Functional Mobility: Mod A with RW; RW management required; cues for upright posture and maintaining boundaries of RW; slow sally     Activities of Daily Living:  Grooming: minimum assistance at sink; attempted hand hygiene in stance, however   Lower Body Dressing: total assistance    Toileting: total assistance purewick in place ; soiled in urine     AMPAC 6 Click ADL:  AMPAC Total Score: 14    Functional Cognition:  Impaired insight;  impaired LTM/STM     Visual Perceptual Skills:  Intact    Upper Extremity Function:  Right Upper Extremity:   Impaired shoulder ROM, WFL distally   Grossly 3 to 3+/5     Left Upper Extremity:  Grossly 3 to 3+/5   Impaired end shoulder ROM     Balance:   Impaired. Standing balance     Therapeutic Positioning  Risk for acquired pressure injuries is significantly increased due to relative decrease in mobility d/t hospitalization , impaired mobility, and incontinence.    OT interventions performed during the course of today's session in an effort to prevent and/or reduce acquired pressure injuries:   Education was provided on benefits of and recommendations for therapeutic positioning    Skin assessment: all bony prominences were assessed    Findings: known area of altered skin integrity at sacrum     OT recommendations for therapeutic positioning throughout hospitalization:   Follow [facility] Pressure Injury Prevention Protocol    Additional Treatment:  Pt performing 1 functional mobility trial; pt reporting impaired ax tolerance; stood subsequent trial from b/sc ahiir but unable to sustain stand for G&H axs; attempted to position pt in chair, but increased pain and requiring return to supine     Patient Education:  Patient and spouse were provided with verbal education education regarding OT role/goals/POC, fall prevention, and safety awareness.  Understanding was verbalized.     Patient left left sidelying with all lines intact, call button in reach, and bed alarm on    GOALS:   Multidisciplinary Problems       Occupational Therapy Goals          Problem: Occupational Therapy    Goal Priority Disciplines Outcome Interventions   Occupational Therapy Goal     OT, PT/OT Progressing    Description: Goals to be met by: 1/27/25     Patient will increase functional independence with ADLs by performing:    Feeding with Set-up Assistance.  UE Dressing with Minimal Assistance.  LE Dressing with Minimal Assistance.  Grooming while  seated at sink with Stand-by Assistance.  Toileting from bedside commode with Minimal Assistance for hygiene and clothing management.   Toilet transfer to bedside commode with Minimal Assistance.                       History:     Past Medical History:   Diagnosis Date    Acute kidney injury superimposed on chronic kidney disease     Congestive heart failure     Dementia     Hypertension     Shingles of eyelid     Sick sinus syndrome     Thyroid disease     Unspecified glaucoma          Past Surgical History:   Procedure Laterality Date    cataract surgery       SECTION      CHOLECYSTECTOMY      EGD, WITH HEMORRHAGE CONTROL Left 2024    Procedure: EGD,WITH HEMORRHAGE CONTROL;  Surgeon: Blake Adorno MD;  Location: [facility] OR;  Service: Gastroenterology;  Laterality: Left;    HYSTERECTOMY      INSERTION OF PACEMAKER      LEFT HEART CATHETERIZATION Left 3/4/2024    Procedure: Left heart cath;  Surgeon: Mark Raymundo Jr., MD;  Location: [facility] CATH LAB;  Service: Cardiology;  Laterality: Left;    NEPHRECTOMY         Time Tracking:     OT Date of Treatment: 01/10/25  OT Start Time: 1004  OT Stop Time: 1030  OT Total Time (min): 26 min    Billable Minutes:Re-eval 26    1/10/2025

## 2025-01-10 NOTE — PROGRESS NOTES
1/10/2025  Ev Alberts   1935   [id number]        Psychiatry Progress Note       SUBJECTIVE:   Ev Alberts is a 89 y.o. female with a past medical history of diastolic heart failure, HTN, chronic anemia, paroxysmal A-Fib, CKD stage III, chronic respiratory failure, dementia, and glaucoma who presented to the ED with complaints of bilateral leg swelling, generalized weakness, and deconditioning after discharge from hospital. Had recently been admitted for worsening generalized weakness, bilateral leg swelling, and urinary retention and discharge with indwelling catheter. UA positive for blood, leukocyte esterase, RBC, WBC, and bacteria, X-ray chest read enlarged cardiac silhouette without overt edema, EKG read ventricular paced rhythm at 62 bmp. Pending SNF placement and psychiatry consulted.    Seen at the bedside where she is awake, polite, and cooperative with interview. Reports euthymic mood and displays mood-congruent affect. Denies feeling depressed, anxious, or irritable. Oriented to person, place, and month. Denies auditory/visual hallucinations, paranoia, suicidal ideations, or homicidal ideations. Reports no issues with sleep overnight.         Current Medications:   Scheduled Meds:    allopurinoL  300 mg Oral Daily    apixaban  5 mg Oral BID    cetirizine  10 mg Oral QHS    ferrous sulfate  1 tablet Oral BID    furosemide  80 mg Oral Daily    latanoprost  1 drop Both Eyes QHS    levothyroxine  25 mcg Oral Before breakfast    lubiprostone  24 mcg Oral BID WM    melatonin  6 mg Oral Q24H    pantoprazole  40 mg Oral BID    potassium bicarbonate  25 mEq Oral Daily    risperiDONE  2 mg Oral QHS    rivastigmine tartrate  1.5 mg Oral Daily    senna-docusate 8.6-50 mg  4 tablet Oral Daily    traZODone  150 mg Oral QHS      PRN Meds:   Current Facility-Administered Medications:     acetaminophen, 650 mg, Oral, Q4H PRN    albuterol, 1 puff, Inhalation, PRN    aluminum-magnesium hydroxide-simethicone, 30 mL,  Oral, QID PRN    bisacodyL, 10 mg, Rectal, Daily PRN    dextrose 50%, 12.5 g, Intravenous, PRN    dextrose 50%, 25 g, Intravenous, PRN    glucagon (human recombinant), 1 mg, Intramuscular, PRN    glucose, 16 g, Oral, PRN    glucose, 24 g, Oral, PRN    glycopyrrolate, 1 mg, Oral, BID PRN    hydrOXYzine HCL, 25 mg, Oral, Nightly PRN    insulin aspart U-100, 0-5 Units, Subcutaneous, QID (AC + HS) PRN    nitroGLYCERIN, 0.4 mg, Sublingual, Q5 Min PRN    ondansetron, 4 mg, Intravenous, Q8H PRN    pneumoc 20-rod conj-dip cr(PF), 0.5 mL, Intramuscular, vaccine x 1 dose    polyethylene glycol, 17 g, Oral, BID PRN    sodium chloride 0.9%, 10 mL, Intravenous, PRN   Psychotherapeutics (From admission, onward)      Start     Stop Route Frequency Ordered    01/08/25 2100  risperiDONE tablet 2 mg         -- Oral Nightly 01/08/25 1543    01/08/25 2100  traZODone tablet 150 mg         -- Oral Nightly 01/08/25 1543            Allergies:   Review of patient's allergies indicates:   Allergen Reactions    Amlodipine-benazepril Edema     Other reaction(s): unknown    Corticosteroids (glucocorticoids) Edema and Swelling    Rofecoxib Anaphylaxis and Swelling    Sulfa (sulfonamide antibiotics) Edema    Atorvastatin      Other reaction(s): unknown    Ibuprofen      Other reaction(s): Allergy to sulfa drugs        OBJECTIVE:   Vitals   Vitals:    01/10/25 1117   BP: 126/69   Pulse: 66   Resp:    Temp: 98 °F (36.7 °C)        Labs/Imaging/Studies:   Recent Results (from the past 36 hours)   POCT glucose    Collection Time: 01/09/25  5:27 AM   Result Value Ref Range    POCT Glucose 78 70 - 110 mg/dL   POCT glucose    Collection Time: 01/09/25  9:04 AM   Result Value Ref Range    POCT Glucose 118 (H) 70 - 110 mg/dL   POCT glucose    Collection Time: 01/09/25  1:11 PM   Result Value Ref Range    POCT Glucose 95 70 - 110 mg/dL   POCT glucose    Collection Time: 01/09/25  5:34 PM   Result Value Ref Range    POCT Glucose 131 (H) 70 - 110 mg/dL   POCT  glucose    Collection Time: 01/09/25  9:07 PM   Result Value Ref Range    POCT Glucose 102 70 - 110 mg/dL   POCT glucose    Collection Time: 01/10/25  6:16 AM   Result Value Ref Range    POCT Glucose 99 70 - 110 mg/dL   CBC with Differential    Collection Time: 01/10/25  6:38 AM   Result Value Ref Range    WBC 7.29 4.50 - 11.50 x10(3)/mcL    RBC 2.30 (L) 4.20 - 5.40 x10(6)/mcL    Hgb 7.7 (L) 12.0 - 16.0 g/dL    Hct 23.1 (L) 37.0 - 47.0 %    .4 (H) 80.0 - 94.0 fL    MCH 33.5 (H) 27.0 - 31.0 pg    MCHC 33.3 33.0 - 36.0 g/dL    RDW 19.9 (H) 11.5 - 17.0 %    Platelet 154 130 - 400 x10(3)/mcL    MPV 9.7 7.4 - 10.4 fL    Neut % 64.9 %    Lymph % 20.9 %    Mono % 11.7 %    Eos % 1.4 %    Basophil % 0.7 %    Imm Grans % 0.4 %    Neut # 4.74 2.1 - 9.2 x10(3)/mcL    Lymph # 1.52 0.6 - 4.6 x10(3)/mcL    Mono # 0.85 0.1 - 1.3 x10(3)/mcL    Eos # 0.10 0 - 0.9 x10(3)/mcL    Baso # 0.05 <=0.2 x10(3)/mcL    Imm Gran # 0.03 0.00 - 0.04 x10(3)/mcL    NRBC% 0.0 %   POCT glucose    Collection Time: 01/10/25 11:19 AM   Result Value Ref Range    POCT Glucose 132 (H) 70 - 110 mg/dL          Psychiatric Mental Status Exam:  General Appearance: appears stated age, dressed in hospital garb, lying in bed, in no acute distress  Arousal: alert  Behavior: cooperative, polite, under good behavioral control, poor eye contact  Movements and Motor Activity: no psychomotor agitation or retardation  Orientation: oriented to person and place  Speech: coherent  Mood: Euthymic  Affect: mood-congruent  Thought Process: goal-directed  Associations: no loosening of associations  Thought Content and Perceptions: no suicidal or homicidal ideation, no auditory or visual hallucinations, no paranoid ideation, no ideas of reference, no evidence of delusions or psychosis  Recent and Remote Memory: impaired; per interview/observation with patient  Attention and Concentration: easily distractible; per interview/observation with patient  Fund of Knowledge:  vocabulary appropriate; based on history, vocabulary, fund of knowledge, syntax, grammar, and content  Insight: questionable; based on understanding of severity of illness and HPI  Judgment: adequate; based on patient's behavior and HPI    ASSESSMENT/PLAN:   Problems Addressed/Diagnoses:  Major Neurocognitive Disorder, unspecified type, unspecified severity, without behavioral disturbances     Past Medical History:   Diagnosis Date    Acute kidney injury superimposed on chronic kidney disease     Congestive heart failure     Dementia     Hypertension     Shingles of eyelid     Sick sinus syndrome     Thyroid disease     Unspecified glaucoma         Plan:  Medication Management  Risperidone 2mg PO QHS  Trazodone 150mg PO QHS  Hydroxyzine 25mg PO QHS PRN insomnia  Psychiatry will sign-off        Jesus Marroquin

## 2025-01-10 NOTE — PROGRESS NOTES
"Hospital Medicine  Progress Note    Patient Name: Ev Alberts  MRN: [id number]  Status: IP- Inpatient   Admission Date: 12/26/2024  Length of Stay: 15  Date of Service: 01/10/2025       CC: hospital follow-up for heart failure       SUBJECTIVE   "88 y/o F with a PMH of diastolic heart failure LVEF 50-55%, HTN, chronic anemia, paroxysmal A-fib on eliquis, and CKD stage III followed by Dr. Chávez , chronic respiratory failure on 2 L NC at home, dementia and glaucoma presented to the ED with complains of bilateral leg swelling, generalized weakness and deconditioning post hospital discharge. Patient explains that she is bed bound and lives at home with her son and  who take care of her. Patient was recently admitted at this facility for worsening generalized weakness, bilateral leg swelling and urinary retention and discharged home with an indwelling cook, follow up appointment with urology and private sitters and home care with [facility]. Patient denies black or blood in her tool, denies chest pain, denies shortness of breath, denies known fever.     Initial ED vitals: Temp 98.1, HR 90, Resp 18, /58, O2 Sat 98% on RA. ED work up revealed WBC 7.39, H&H 8.7 and 26.8, , K 3.4, BUN 30.3, Creatinine 1.26, .3, Troponin 0.048, Lactic acid 1.1, TSH 3.335 Influenza, COVID, and RSV swab negative, Respiratory panel negative, UA positive for blood, leukocyte esterase, RBC, WBC, and bacteria, X-ray chest read enlarged cardiac silhouette without overt edema, EKG read ventricular paced rhythm at 62 bmp. Cardiology was consulted in the ED and patient was admitted to Hospital Medicine.  She is found to have right heart failure and pulmonary hypertension which was the cause of her leg edema.  Nephrology was consulted and continue to remove fluid with IV Lasix.  Left ventricle systolic function is good."    Today: Patient seen and examined at bedside, and chart reviewed.  No new complaints or " issues.      MEDICATIONS   Scheduled   allopurinoL  300 mg Oral Daily    apixaban  5 mg Oral BID    cetirizine  10 mg Oral QHS    ferrous sulfate  1 tablet Oral BID    furosemide  80 mg Oral Daily    latanoprost  1 drop Both Eyes QHS    levothyroxine  25 mcg Oral Before breakfast    lubiprostone  24 mcg Oral BID WM    melatonin  6 mg Oral Q24H    pantoprazole  40 mg Oral BID    potassium bicarbonate  25 mEq Oral Daily    risperiDONE  2 mg Oral QHS    rivastigmine tartrate  1.5 mg Oral Daily    senna-docusate 8.6-50 mg  4 tablet Oral Daily    traZODone  150 mg Oral QHS     Continuous Infusions  None      PHYSICAL EXAM   VITALS: T 98 °F (36.7 °C)   /69   P 66   RR 16   O2 96 %    GENERAL: Awake and in NAD  LUNGS: CTA anteriorly  CVS: Normal rate  GI/: Soft, NT, bowel sounds positive.  EXTREMITIES: 1+ B/L LE edema  NEURO: AAOx3  PSYCH: Cooperative      LABS   CBC  Recent Labs     01/10/25  0638   WBC 7.29   RBC 2.30*   HGB 7.7*   HCT 23.1*   .4*   MCH 33.5*   MCHC 33.3   RDW 19.9*        CHEM  Recent Labs     01/08/25  0916   *   K 3.9   CO2 31   BUN 28.3*   CREATININE 1.37*   GLUCOSE 115   CALCIUM 9.1   MG 2.20   PHOS 2.4   ALBUMIN 2.4*         MICROBIOLOGY     Microbiology Results (last 7 days)       Procedure Component Value Units Date/Time    Blood Culture [[id number]]  (Normal) Collected: 12/29/24 2858    Order Status: Completed Specimen: Blood Updated: 01/03/25 1901     Blood Culture No Growth at 5 days              ASSESSMENT   HFpEF, acutely decompensated, improved  Pulmonary hypertension (moderate per Echo)  NSTEMI type II s/t above  CKD IIIB  Pseudomonas urinary tract infection, resolved  Chronic hypoxic respiratory failure  Baseline dementia  Essential HTN  Anemia of chronic disease  Paroxysmal atrial fibrillation  h/o bipolar disorder      PLAN   Continue current management including maintenance Lasix therapy   CM working on SNF placement  Continue PT/OT in here in the  interim  Appreciate psych input        DVT Prophylaxis: Eliquis  Disposition: Awaiting SNF placement          Lance Gonzales MD  [facility]

## 2025-01-10 NOTE — PROGRESS NOTES
Inpatient Nutrition Evaluation    Admit Date: 12/26/2024   Total duration of encounter: 15 days    Nutrition Recommendation/Prescription     Continue oral diet as tolerated; Diet Heart Healthy Standard Tray   Add Boost Plus (provides 360 kcal, 14 g protein per serving) BID    Nutrition Assessment     Chart Review    Reason Seen: continuous nutrition monitoring    Malnutrition Screening Tool Results   Have you recently lost weight without trying?: No  Have you been eating poorly because of a decreased appetite?: No   MST Score: 0     Diagnosis:  Acute HFpEF Exacerbation   NSTEMI   Catheter associated urinary infection  Paroxysmal A-fib   UTI GNR -POA   Acute anemia, iron def     Relevant Medical History:  diastolic heart failure LVEF 50-55%, HTN, chronic anemia, paroxysmal A-fib on eliquis, and CKD stage III     Nutrition-Related Medications: Scheduled Medications:  allopurinoL, 300 mg, Daily  apixaban, 5 mg, BID  cetirizine, 10 mg, QHS  ferrous sulfate, 1 tablet, BID  furosemide, 80 mg, Daily  latanoprost, 1 drop, QHS  levothyroxine, 25 mcg, Before breakfast  lubiprostone, 24 mcg, BID WM  melatonin, 6 mg, Q24H  pantoprazole, 40 mg, BID  potassium bicarbonate, 25 mEq, Daily  risperiDONE, 2 mg, QHS  rivastigmine tartrate, 1.5 mg, Daily  senna-docusate 8.6-50 mg, 4 tablet, Daily  traZODone, 150 mg, QHS    Continuous Infusions:   PRN Medications:    allopurinoL tablet 300 mg    apixaban tablet 5 mg    cetirizine tablet 10 mg    ferrous sulfate tablet 1 each    furosemide tablet 80 mg    latanoprost 0.005 % ophthalmic solution 1 drop    levothyroxine tablet 25 mcg    lubiprostone capsule 24 mcg    melatonin tablet 6 mg    pantoprazole EC tablet 40 mg    potassium bicarbonate disintegrating tablet 25 mEq    risperiDONE tablet 2 mg    rivastigmine tartrate capsule 1.5 mg    senna-docusate 8.6-50 mg per tablet 4 tablet    traZODone tablet 150 mg        Nutrition-Related Labs:  Recent Labs   Lab 01/04/25  0400 01/05/25  2321  "01/06/25  0346 01/08/25  0916 01/10/25  0638   * 135* 136 132*  --    K 3.5 4.0 3.9 3.9  --    CALCIUM 8.8 8.6 9.0 9.1  --    PHOS 2.2*  --  2.8 2.4  --    MG  --   --   --  2.20  --    CL 99 100 99 97*  --    CO2 27 28 30 31  --    BUN 29.1* 29.9* 28.5* 28.3*  --    CREATININE 1.39* 1.24* 1.32* 1.37*  --    EGFRNORACEVR 37 42 39 37  --    GLUCOSE 117* 90 93 115  --    ALBUMIN 2.4*  --  2.4* 2.4*  --    WBC 8.33 7.35 7.11  --  7.29   HGB 8.1* 7.6* 7.8*  --  7.7*   HCT 24.5* 23.2* 23.9*  --  23.1*        Diet Order: Diet Heart Healthy Standard Tray  Oral Supplement Order: none  Appetite/Oral Intake: good/50-75% of meals  Factors Affecting Nutritional Intake: none identified  Food/Hinduism/Cultural Preferences: none reported  Food Allergies: no known food allergies       Wound(s):      Altered Skin Integrity 03/10/24 0705 Sacral spine-Tissue loss description: Partial thickness       Wound 12/10/24 0800 Pressure Injury Sacral spine #1-Tissue loss description: Partial thickness     Comments    12/28/24 Pt tolerating oral diet, unable to provide much hx, ate breakfast this morning; no unintentional weight loss reported prior to admit.    1/3/24: Pt continues with good appetite today. Noted last BM was 12/31.       1/10/25 Pt reports good appetite, says she is not drinking the Boost Plus so will d/c    Anthropometrics    Height: 5' 5" (165.1 cm) Height Method: Stated  Last Weight: 76.7 kg (169 lb) (01/10/25 0500) Weight Method: Bed Scale  BMI (Calculated): 28.1  BMI Classification: overweight (BMI 25-29.9)     Ideal Body Weight (IBW), Female: 125 lb     % Ideal Body Weight, Female (lb): 126.98 %                             Usual Weight Provided By: EMR weight history    Wt Readings from Last 5 Encounters:   01/10/25 76.7 kg (169 lb)   12/22/24 71.7 kg (158 lb 1.1 oz)   11/19/24 72.6 kg (160 lb)   10/23/24 68.9 kg (152 lb)   09/17/24 69.9 kg (154 lb)     Weight Change(s) Since Admission:  Admit Weight: 72.6 kg (160 " katy) (12/26/24 9409)      Patient Education    Not applicable.    Monitoring & Evaluation     Dietitian will monitor food and beverage intake and weight change.  Nutrition Risk/Follow-Up: low (follow-up in 5-7 days)  Patients assigned 'low nutrition risk' status do not qualify for a full nutritional assessment but will be monitored and re-evaluated in a 5-7 day time period. Please consult if re-evaluation needed sooner.

## 2025-01-10 NOTE — PT/OT/SLP RE-EVAL
"Physical Therapy Re-Evaluation    Patient Name:  Ev Alberts   MRN:  [id number]    Recommendations:     Discharge therapy intensity: Moderate Intensity Therapy   Discharge Equipment Recommendations: to be determined by next level of care   Barriers to discharge: Impaired mobility and Ongoing medical needs    Assessment:     Ev Alberts is a 89 y.o. female admitted with a medical diagnosis of acute HF exacerbation, NSTEMI, catheter associated UTI, PAF, acute anemia, hx of dementia.  She presents with the following impairments/functional limitations: weakness, impaired endurance, impaired self care skills, impaired functional mobility, gait instability, impaired balance, pain, impaired cardiopulmonary response to activity, decreased safety awareness, decreased upper extremity function, decreased lower extremity function, edema, impaired skin, impaired cognition.    Pt continues to req max A for bed mobility and Mod A x2 for transfers. Pt amb 5ft mod A using RW. Req constant VC for sequencing, walker management, and assistance for lateral weight shifting during ambulation to facilitate stepping. Continue to recommend moderate intensity therapy due to current mobility deficits.       Rehab Prognosis: Fair; patient would benefit from acute skilled PT services to address these deficits and reach maximum level of function.    Recent Surgery: * No surgery found *      Plan:     During this hospitalization, patient would benefit from acute PT services 5 x/week to address the identified rehab impairments via gait training, therapeutic activities, therapeutic exercises, neuromuscular re-education and progress toward the following goals:    Plan of Care Expires:  02/10/25    PT/PTA conference to discuss PT POC and patient's progression towards goals held with Bindu Palacio PTA.     Subjective     Chief Complaint: "come on sha"  Patient/Family Comments/goals: get better  Pain/Comfort:   Buttock pain with sitting in " chair    Patients cultural, spiritual, Bahai conflicts given the current situation: no    Objective:      Patient found sitting edge of bed with (OT) PureWick  upon PT entry to room.    General Precautions: Standard, fall  Orthopedic Precautions:N/A   Braces: N/A  Respiratory Status: Room air  Blood Pressure: NT      Exams:  BLE Strength: 3+/5 quad, 2-/5 PF        Functional Mobility:  Bed Mobility:     Sit to Supine: maximal assistance  Transfers:     Sit to Stand:  moderate assistance and of 2 persons with rolling walker  1 rep from bed   2 reps from chair  Stand stand transfer from chair to bed: Mod A x2 pp with RW  Gait: Pt amb 5ft mod A using RW. Req constant VC for sequencing, walker management, and assistance for lateral weight shifting due to decreased initiation and shuffling. Fatigues easily.       AM-PAC 6 CLICK MOBILITY  Total Score:11       Treatment & Education:  Pt declining to sit up in chair due to buttock pain. Requesting to get back to bed after mobility.       Patient provided with verbal education education regarding PT role/goals/POC, fall prevention, and safety awareness.  Understanding was verbalized, however additional teaching warranted.     Patient left left sidelying with all lines intact, call button in reach, and wedge under R side.    GOALS:   Multidisciplinary Problems       Physical Therapy Goals          Problem: Physical Therapy    Goal Priority Disciplines Outcome Interventions   Physical Therapy Goal     PT, PT/OT Progressing    Description: Goals to be met by: 24     Patient will increase functional independence with mobility by performin. Supine to sit with MInimal Assistance  2. Sit to stand transfer with Minimal Assistance  3. Bed to chair transfer with Minimal Assistance using Rolling Walker vs. Squat pivot  4. Gait  x 25 feet with Minimal Assistance using Rolling Walker.   5. Sitting at edge of bed x10 minutes with Stand-by Assistance                          History:     Past Medical History:   Diagnosis Date    Acute kidney injury superimposed on chronic kidney disease     Congestive heart failure     Dementia     Hypertension     Shingles of eyelid     Sick sinus syndrome     Thyroid disease     Unspecified glaucoma        Past Surgical History:   Procedure Laterality Date    cataract surgery       SECTION      CHOLECYSTECTOMY      EGD, WITH HEMORRHAGE CONTROL Left 2024    Procedure: EGD,WITH HEMORRHAGE CONTROL;  Surgeon: Blake Adorno MD;  Location: [facility] OR;  Service: Gastroenterology;  Laterality: Left;    HYSTERECTOMY      INSERTION OF PACEMAKER      LEFT HEART CATHETERIZATION Left 3/4/2024    Procedure: Left heart cath;  Surgeon: Mark Raymundo Jr., MD;  Location: [facility] CATH LAB;  Service: Cardiology;  Laterality: Left;    NEPHRECTOMY         Time Tracking:     PT Received On: 01/10/25  PT Start Time: 1009     PT Stop Time: 1029  PT Total Time (min): 20 min     Billable Minutes: Re-eval 20      01/10/2025

## 2025-01-11 LAB
COLOR STL: NORMAL
CONSISTENCY STL: NORMAL
HEMOCCULT SP3 STL QL: NEGATIVE
POCT GLUCOSE: 103 MG/DL (ref 70–110)
POCT GLUCOSE: 153 MG/DL (ref 70–110)
POCT GLUCOSE: 95 MG/DL (ref 70–110)

## 2025-01-11 PROCEDURE — 25000003 PHARM REV CODE 250: Performed by: INTERNAL MEDICINE

## 2025-01-11 PROCEDURE — 82272 OCCULT BLD FECES 1-3 TESTS: CPT | Performed by: INTERNAL MEDICINE

## 2025-01-11 PROCEDURE — 21400001 HC TELEMETRY ROOM

## 2025-01-11 PROCEDURE — 11000001 HC ACUTE MED/SURG PRIVATE ROOM

## 2025-01-11 PROCEDURE — 25000003 PHARM REV CODE 250

## 2025-01-11 RX ADMIN — LEVOTHYROXINE SODIUM 25 MCG: 25 TABLET ORAL at 06:01

## 2025-01-11 RX ADMIN — RIVASTIGMINE TARTRATE 1.5 MG: 1.5 CAPSULE ORAL at 09:01

## 2025-01-11 RX ADMIN — LATANOPROST 1 DROP: 50 SOLUTION OPHTHALMIC at 08:01

## 2025-01-11 RX ADMIN — LUBIPROSTONE 24 MCG: 24 CAPSULE, GELATIN COATED ORAL at 09:01

## 2025-01-11 RX ADMIN — CETIRIZINE HYDROCHLORIDE 10 MG: 10 TABLET, FILM COATED ORAL at 08:01

## 2025-01-11 RX ADMIN — SENNOSIDES AND DOCUSATE SODIUM 4 TABLET: 50; 8.6 TABLET ORAL at 09:01

## 2025-01-11 RX ADMIN — POTASSIUM BICARBONATE 25 MEQ: 977.5 TABLET, EFFERVESCENT ORAL at 09:01

## 2025-01-11 RX ADMIN — FERROUS SULFATE TAB 325 MG (65 MG ELEMENTAL FE) 1 EACH: 325 (65 FE) TAB at 09:01

## 2025-01-11 RX ADMIN — APIXABAN 5 MG: 5 TABLET, FILM COATED ORAL at 09:01

## 2025-01-11 RX ADMIN — MELATONIN TAB 3 MG 6 MG: 3 TAB at 05:01

## 2025-01-11 RX ADMIN — LUBIPROSTONE 24 MCG: 24 CAPSULE, GELATIN COATED ORAL at 05:01

## 2025-01-11 RX ADMIN — RISPERIDONE 2 MG: 1 TABLET, FILM COATED ORAL at 08:01

## 2025-01-11 RX ADMIN — FUROSEMIDE 80 MG: 40 TABLET ORAL at 09:01

## 2025-01-11 RX ADMIN — APIXABAN 5 MG: 5 TABLET, FILM COATED ORAL at 08:01

## 2025-01-11 RX ADMIN — PANTOPRAZOLE SODIUM 40 MG: 40 TABLET, DELAYED RELEASE ORAL at 09:01

## 2025-01-11 RX ADMIN — TRAZODONE HYDROCHLORIDE 150 MG: 150 TABLET ORAL at 08:01

## 2025-01-11 RX ADMIN — ALLOPURINOL 300 MG: 300 TABLET ORAL at 09:01

## 2025-01-11 RX ADMIN — FERROUS SULFATE TAB 325 MG (65 MG ELEMENTAL FE) 1 EACH: 325 (65 FE) TAB at 08:01

## 2025-01-11 RX ADMIN — PANTOPRAZOLE SODIUM 40 MG: 40 TABLET, DELAYED RELEASE ORAL at 08:01

## 2025-01-11 NOTE — PROGRESS NOTES
"[facility] Hospital Medicine Progress Note        Chief Complaint: Inpatient Follow-up for bilateral leg swelling, generalized weakness     HPI: "89 y/o F with a PMH of diastolic heart failure LVEF 50-55%, HTN, chronic anemia, paroxysmal A-fib on eliquis, and CKD stage III followed by Dr. Chávez , chronic respiratory failure on 2 L NC at home, dementia and glaucoma presented to the ED with complains of bilateral leg swelling, generalized weakness and deconditioning post hospital discharge. Patient explains that she is bed bound and lives at home with her son and  who take care of her. Patient was recently admitted at this facility for worsening generalized weakness, bilateral leg swelling and urinary retention and discharged home with an indwelling cook, follow up appointment with urology and private sitters and home care with [facility] . Patient denies black or blood in her tool, denies chest pain, denies shortness of breath, denies known fever.     Initial ED vitals: Temp 98.1, HR 90, Resp 18, /58, O2 Sat 98% on RA. ED work up revealed WBC 7.39, H&H 8.7 and 26.8, , K 3.4, BUN 30.3, Creatinine 1.26, .3, Troponin 0.048, Lactic acid 1.1, TSH 3.335 Influenza, COVID, and RSV swab negative, Respiratory panel negative, UA positive for blood, leukocyte esterase, RBC, WBC, and bacteria, X-ray chest read enlarged cardiac silhouette without overt edema, EKG read ventricular paced rhythm at 62 bmp. Cardiology was consulted in the ED and patient was admitted to Hospital Medicine.  She is found to have right heart failure and pulmonary hypertension which was the cause of her leg edema.  Nephrology was consulted and continue to remove fluid with IV Lasix.  Left ventricle systolic function is good."    Interval Hx:   Discharge planning in progress, awaits placement.  Case was discussed with  and the nurse. No Overnight events reported    Patient was " seen and examined at bedside.  No complaints of any shortness a breath or dizziness.  Denies hematemesis or hematochezia.  hds    Objective/physical exam:  General: In no acute distress, afebrile  Chest: Clear to auscultation bilaterally  Heart: RRR, +S1, S2, no appreciable murmur  Abdomen: Soft, nontender, BS +  MSK: Warm, no lower extremity edema, no clubbing or cyanosis  Neurologic: Alert and oriented x4, Cranial nerve II-XII intact, Strength 5/5 in all 4 extremities    VITAL SIGNS: 24 HRS MIN & MAX LAST   Temp  Min: 97.8 °F (36.6 °C)  Max: 98 °F (36.7 °C) 97.9 °F (36.6 °C)   BP  Min: 117/66  Max: 146/70 (!) 146/70   Pulse  Min: 64  Max: 73  73   Resp  Min: 20  Max: 20 20   SpO2  Min: 93 %  Max: 96 % (!) 93 %     I have reviewed the following labs:  Recent Labs   Lab 01/05/25  0354 01/06/25  0346 01/10/25  0638   WBC 7.35 7.11 7.29   RBC 2.33* 2.42* 2.30*   HGB 7.6* 7.8* 7.7*   HCT 23.2* 23.9* 23.1*   MCV 99.6* 98.8* 100.4*   MCH 32.6* 32.2* 33.5*   MCHC 32.8* 32.6* 33.3   RDW 19.1* 19.0* 19.9*    151 154   MPV 9.6 9.7 9.7     Recent Labs   Lab 01/05/25  0354 01/06/25  0346 01/08/25  0916   * 136 132*   K 4.0 3.9 3.9    99 97*   CO2 28 30 31   BUN 29.9* 28.5* 28.3*   CREATININE 1.24* 1.32* 1.37*   CALCIUM 8.6 9.0 9.1   MG  --   --  2.20   ALBUMIN  --  2.4* 2.4*     Microbiology Results (last 7 days)       ** No results found for the last 168 hours. **             See below for Radiology    Assessment/Plan:  HFpEF, acutely decompensated, improved  Pulmonary hypertension (moderate per Echo)  NSTEMI type II s/t above  CKD IIIB  Pseudomonas urinary tract infection, resolved  Anemia of chronic disease  Major Neurocognitive Disorder, unspecified   Chronic hypoxic respiratory failure  Baseline dementia  Essential HTN  Paroxysmal atrial fibrillation  h/o bipolar disorder    Plan:  No Need for ischemic evaluation per Cardiology.  Diuresis per Nephrology.  Recommend to Continue current Lasix.  Strict  Is&Os.  Nephrology signed off now  Afebrile without any leukocytosis  Monitor hemoglobin,  received IV,now  on oral iron supplementation.  Keep hemoglobin above 7.5.  Recommend outpatient anemia evaluation if she may benefit from bone marrow biopsy?  as a part of anemia workup.  FOBT negative  Psych following, medications adjusted  PTOT recommending moderate intensity therapy.  Case management working on discharge planning      VTE prophylaxis: eliquis      Anticipated discharge and Disposition:   snf placement pending      All diagnosis and differential diagnosis have been reviewed; assessment and plan has been documented; I have personally reviewed the labs and test results that are presently available; I have reviewed the patients medication list; I have reviewed the consulting providers response and recommendations. I have reviewed or attempted to review medical records based upon their availability    All of the patient's questions have been  addressed and answered. Patient's is agreeable to the above stated plan. I will continue to monitor closely and make adjustments to medical management as needed.    Portions of this note dictated using EMR integrated voice recognition software, and may be subject to voice recognition errors not corrected at proofreading. Please contact writer for clarification if needed.   _____________________________________________________________________    Malnutrition Status:  Nutrition consulted. Most recent weight and BMI monitored-     Measurements:  Wt Readings from Last 1 Encounters:   01/10/25 76.7 kg (169 lb)   Body mass index is 28.12 kg/m².    Patient has been screened and assessed by RD.    Malnutrition Type:  Context:    Level:      Malnutrition Characteristic Summary:       Interventions/Recommendations (treatment strategy):        Scheduled Med:   allopurinoL  300 mg Oral Daily    apixaban  5 mg Oral BID    cetirizine  10 mg Oral QHS    ferrous sulfate  1 tablet Oral BID     furosemide  80 mg Oral Daily    latanoprost  1 drop Both Eyes QHS    levothyroxine  25 mcg Oral Before breakfast    lubiprostone  24 mcg Oral BID WM    melatonin  6 mg Oral Q24H    pantoprazole  40 mg Oral BID    potassium bicarbonate  25 mEq Oral Daily    risperiDONE  2 mg Oral QHS    rivastigmine tartrate  1.5 mg Oral Daily    senna-docusate 8.6-50 mg  4 tablet Oral Daily    traZODone  150 mg Oral QHS      Continuous Infusions:     PRN Meds:    Current Facility-Administered Medications:     acetaminophen, 650 mg, Oral, Q4H PRN    albuterol, 1 puff, Inhalation, PRN    aluminum-magnesium hydroxide-simethicone, 30 mL, Oral, QID PRN    bisacodyL, 10 mg, Rectal, Daily PRN    dextrose 50%, 12.5 g, Intravenous, PRN    dextrose 50%, 25 g, Intravenous, PRN    glucagon (human recombinant), 1 mg, Intramuscular, PRN    glucose, 16 g, Oral, PRN    glucose, 24 g, Oral, PRN    glycopyrrolate, 1 mg, Oral, BID PRN    hydrOXYzine HCL, 25 mg, Oral, Nightly PRN    insulin aspart U-100, 0-5 Units, Subcutaneous, QID (AC + HS) PRN    nitroGLYCERIN, 0.4 mg, Sublingual, Q5 Min PRN    ondansetron, 4 mg, Intravenous, Q8H PRN    pneumoc 20-rod conj-dip cr(PF), 0.5 mL, Intramuscular, vaccine x 1 dose    polyethylene glycol, 17 g, Oral, BID PRN    sodium chloride 0.9%, 10 mL, Intravenous, PRN     Radiology:  I have personally reviewed the following imaging and agree with the radiologist.     X-Ray Abdomen AP 1 View  EXAMINATION  XR ABDOMEN AP 1 VIEW    CLINICAL HISTORY  Suprapubic fullness, eval constipation;    TECHNIQUE  A total of 2 AP image(s) of the abdomen.    COMPARISON  30 September 2016    FINDINGS  Lines/tubes/devices: Multiple ECG leads overlie the imaged region.  Left chest wall pacemaker and partially visualized leads also noted.    Notable amount of residual stool is present throughout the colon.  A non-obstructed bowel gas pattern is present. No intra-abdominal mass effect is appreciated.   Detection of air-fluid levels and  low-volume pneumoperitoneum is limited due to supine technique. There is similar right upper quadrant surgical clips.  Ill-defined hazy radiopacity projects over the left lower abdomen, of otherwise indeterminate etiology.    Included lower thoracic cavity and osseous structures are without acute abnormality.    IMPRESSION  1. Findings suggestive of constipation.  No convincing acute intra-abdominal process.  2. Ill-defined hazy opacity projecting over the left lower quadrant could represent artifact but is otherwise of indeterminate etiology.  3. Additional chronic secondary details discussed above.    Electronically signed by: Mark Jolley  Date:    01/08/2025  Time:    11:16      Vanessa Mays MD  Department of Hospital Medicine   [facility]   01/11/2025

## 2025-01-12 LAB
HCT VFR BLD AUTO: 23.2 % (ref 37–47)
HGB BLD-MCNC: 7.9 G/DL (ref 12–16)
POCT GLUCOSE: 126 MG/DL (ref 70–110)
POCT GLUCOSE: 131 MG/DL (ref 70–110)
POCT GLUCOSE: 140 MG/DL (ref 70–110)
POCT GLUCOSE: 86 MG/DL (ref 70–110)

## 2025-01-12 PROCEDURE — 25000003 PHARM REV CODE 250: Performed by: INTERNAL MEDICINE

## 2025-01-12 PROCEDURE — 11000001 HC ACUTE MED/SURG PRIVATE ROOM

## 2025-01-12 PROCEDURE — 36415 COLL VENOUS BLD VENIPUNCTURE: CPT | Performed by: INTERNAL MEDICINE

## 2025-01-12 PROCEDURE — 85018 HEMOGLOBIN: CPT | Performed by: INTERNAL MEDICINE

## 2025-01-12 PROCEDURE — 25000003 PHARM REV CODE 250

## 2025-01-12 PROCEDURE — 21400001 HC TELEMETRY ROOM

## 2025-01-12 RX ADMIN — RISPERIDONE 2 MG: 1 TABLET, FILM COATED ORAL at 08:01

## 2025-01-12 RX ADMIN — FERROUS SULFATE TAB 325 MG (65 MG ELEMENTAL FE) 1 EACH: 325 (65 FE) TAB at 08:01

## 2025-01-12 RX ADMIN — MELATONIN TAB 3 MG 6 MG: 3 TAB at 05:01

## 2025-01-12 RX ADMIN — CETIRIZINE HYDROCHLORIDE 10 MG: 10 TABLET, FILM COATED ORAL at 08:01

## 2025-01-12 RX ADMIN — PANTOPRAZOLE SODIUM 40 MG: 40 TABLET, DELAYED RELEASE ORAL at 08:01

## 2025-01-12 RX ADMIN — LATANOPROST 1 DROP: 50 SOLUTION OPHTHALMIC at 08:01

## 2025-01-12 RX ADMIN — LUBIPROSTONE 24 MCG: 24 CAPSULE, GELATIN COATED ORAL at 05:01

## 2025-01-12 RX ADMIN — RIVASTIGMINE TARTRATE 1.5 MG: 1.5 CAPSULE ORAL at 08:01

## 2025-01-12 RX ADMIN — APIXABAN 5 MG: 5 TABLET, FILM COATED ORAL at 08:01

## 2025-01-12 RX ADMIN — SENNOSIDES AND DOCUSATE SODIUM 4 TABLET: 50; 8.6 TABLET ORAL at 08:01

## 2025-01-12 RX ADMIN — POTASSIUM BICARBONATE 25 MEQ: 977.5 TABLET, EFFERVESCENT ORAL at 08:01

## 2025-01-12 RX ADMIN — LEVOTHYROXINE SODIUM 25 MCG: 25 TABLET ORAL at 07:01

## 2025-01-12 RX ADMIN — ALLOPURINOL 300 MG: 300 TABLET ORAL at 08:01

## 2025-01-12 RX ADMIN — FUROSEMIDE 80 MG: 40 TABLET ORAL at 08:01

## 2025-01-12 RX ADMIN — TRAZODONE HYDROCHLORIDE 150 MG: 150 TABLET ORAL at 08:01

## 2025-01-12 RX ADMIN — LUBIPROSTONE 24 MCG: 24 CAPSULE, GELATIN COATED ORAL at 08:01

## 2025-01-12 NOTE — PROGRESS NOTES
"[facility] Hospital Medicine Progress Note        Chief Complaint: Inpatient Follow-up for bilateral leg swelling, generalized weakness     HPI: "88 y/o F with a PMH of diastolic heart failure LVEF 50-55%, HTN, chronic anemia, paroxysmal A-fib on eliquis, and CKD stage III followed by Dr. Chávez , chronic respiratory failure on 2 L NC at home, dementia and glaucoma presented to the ED with complains of bilateral leg swelling, generalized weakness and deconditioning post hospital discharge. Patient explains that she is bed bound and lives at home with her son and  who take care of her. Patient was recently admitted at this facility for worsening generalized weakness, bilateral leg swelling and urinary retention and discharged home with an indwelling cook, follow up appointment with urology and private sitters and home care with [facility] . Patient denies black or blood in her tool, denies chest pain, denies shortness of breath, denies known fever.     Initial ED vitals: Temp 98.1, HR 90, Resp 18, /58, O2 Sat 98% on RA. ED work up revealed WBC 7.39, H&H 8.7 and 26.8, , K 3.4, BUN 30.3, Creatinine 1.26, .3, Troponin 0.048, Lactic acid 1.1, TSH 3.335 Influenza, COVID, and RSV swab negative, Respiratory panel negative, UA positive for blood, leukocyte esterase, RBC, WBC, and bacteria, X-ray chest read enlarged cardiac silhouette without overt edema, EKG read ventricular paced rhythm at 62 bmp. Cardiology was consulted in the ED and patient was admitted to Hospital Medicine.  She is found to have right heart failure and pulmonary hypertension which was the cause of her leg edema.  Nephrology was consulted and continue to remove fluid with IV Lasix.  Left ventricle systolic function is good.  Now transitioned to oral diuretics.  Consultants signed off    Therapy recommending moderate intensity therapy ,case management working on discharge " planning.    Interval Hx:   Discharge planning in progress, awaits placement.  Case was discussed with  and the nurse. No Overnight events reported    Patient was seen and examined at bedside.  No complaints.  HDS    Objective/physical exam:  General: In no acute distress, afebrile  Chest: Clear to auscultation bilaterally  Heart: RRR, +S1, S2, no appreciable murmur  Abdomen: Soft, nontender, BS +  MSK: Warm, no lower extremity edema, no clubbing or cyanosis  Neurologic: Alert and oriented x4, Cranial nerve II-XII intact, Strength 5/5 in all 4 extremities    VITAL SIGNS: 24 HRS MIN & MAX LAST   Temp  Min: 97.8 °F (36.6 °C)  Max: 98.4 °F (36.9 °C) 98.4 °F (36.9 °C)   BP  Min: 110/65  Max: 130/71 110/65   Pulse  Min: 74  Max: 78  74   No data recorded 20   SpO2  Min: 90 %  Max: 96 % 95 %     I have reviewed the following labs:  Recent Labs   Lab 01/06/25  0346 01/10/25  0638 01/12/25  0359   WBC 7.11 7.29  --    RBC 2.42* 2.30*  --    HGB 7.8* 7.7* 7.9*   HCT 23.9* 23.1* 23.2*   MCV 98.8* 100.4*  --    MCH 32.2* 33.5*  --    MCHC 32.6* 33.3  --    RDW 19.0* 19.9*  --     154  --    MPV 9.7 9.7  --      Recent Labs   Lab 01/06/25  0346 01/08/25  0916    132*   K 3.9 3.9   CL 99 97*   CO2 30 31   BUN 28.5* 28.3*   CREATININE 1.32* 1.37*   CALCIUM 9.0 9.1   MG  --  2.20   ALBUMIN 2.4* 2.4*     Microbiology Results (last 7 days)       ** No results found for the last 168 hours. **             See below for Radiology    Assessment/Plan:  HFpEF, acutely decompensated, improved  Pulmonary hypertension (moderate per Echo)  NSTEMI type II s/t above  CKD IIIB  Pseudomonas urinary tract infection, resolved  Anemia of chronic disease  Major Neurocognitive Disorder, unspecified   Chronic hypoxic respiratory failure  Baseline dementia  Essential HTN  Paroxysmal atrial fibrillation  h/o bipolar disorder    Plan:  No Need for ischemic evaluation per Cardiology.  Diuresis per Nephrology.  Recommend to Continue  current Lasix.  Strict Is&Os.  Nephrology signed off now  Afebrile without any leukocytosis  Monitor hemoglobin,  received IV,now  on oral iron supplementation.  Keep hemoglobin above 7.5.  Recommend outpatient anemia evaluation if she may benefit from bone marrow biopsy?  as a part of anemia workup.  FOBT negative  Psych following, medications adjusted  PTOT recommending moderate intensity therapy.  Case management working on discharge planning      VTE prophylaxis: eliquis      Anticipated discharge and Disposition:   snf placement pending      All diagnosis and differential diagnosis have been reviewed; assessment and plan has been documented; I have personally reviewed the labs and test results that are presently available; I have reviewed the patients medication list; I have reviewed the consulting providers response and recommendations. I have reviewed or attempted to review medical records based upon their availability    All of the patient's questions have been  addressed and answered. Patient's is agreeable to the above stated plan. I will continue to monitor closely and make adjustments to medical management as needed.    Portions of this note dictated using EMR integrated voice recognition software, and may be subject to voice recognition errors not corrected at proofreading. Please contact writer for clarification if needed.   _____________________________________________________________________    Malnutrition Status:  Nutrition consulted. Most recent weight and BMI monitored-     Measurements:  Wt Readings from Last 1 Encounters:   01/10/25 76.7 kg (169 lb)   Body mass index is 28.12 kg/m².    Patient has been screened and assessed by RD.    Malnutrition Type:  Context:    Level:      Malnutrition Characteristic Summary:       Interventions/Recommendations (treatment strategy):        Scheduled Med:   allopurinoL  300 mg Oral Daily    apixaban  5 mg Oral BID    cetirizine  10 mg Oral QHS    ferrous  sulfate  1 tablet Oral BID    furosemide  80 mg Oral Daily    latanoprost  1 drop Both Eyes QHS    levothyroxine  25 mcg Oral Before breakfast    lubiprostone  24 mcg Oral BID WM    melatonin  6 mg Oral Q24H    pantoprazole  40 mg Oral BID    potassium bicarbonate  25 mEq Oral Daily    risperiDONE  2 mg Oral QHS    rivastigmine tartrate  1.5 mg Oral Daily    senna-docusate 8.6-50 mg  4 tablet Oral Daily    traZODone  150 mg Oral QHS      Continuous Infusions:     PRN Meds:    Current Facility-Administered Medications:     acetaminophen, 650 mg, Oral, Q4H PRN    albuterol, 1 puff, Inhalation, PRN    aluminum-magnesium hydroxide-simethicone, 30 mL, Oral, QID PRN    bisacodyL, 10 mg, Rectal, Daily PRN    dextrose 50%, 12.5 g, Intravenous, PRN    dextrose 50%, 25 g, Intravenous, PRN    glucagon (human recombinant), 1 mg, Intramuscular, PRN    glucose, 16 g, Oral, PRN    glucose, 24 g, Oral, PRN    glycopyrrolate, 1 mg, Oral, BID PRN    hydrOXYzine HCL, 25 mg, Oral, Nightly PRN    insulin aspart U-100, 0-5 Units, Subcutaneous, QID (AC + HS) PRN    nitroGLYCERIN, 0.4 mg, Sublingual, Q5 Min PRN    ondansetron, 4 mg, Intravenous, Q8H PRN    pneumoc 20-rod conj-dip cr(PF), 0.5 mL, Intramuscular, vaccine x 1 dose    polyethylene glycol, 17 g, Oral, BID PRN    sodium chloride 0.9%, 10 mL, Intravenous, PRN     Radiology:  I have personally reviewed the following imaging and agree with the radiologist.     X-Ray Abdomen AP 1 View  EXAMINATION  XR ABDOMEN AP 1 VIEW    CLINICAL HISTORY  Suprapubic fullness, eval constipation;    TECHNIQUE  A total of 2 AP image(s) of the abdomen.    COMPARISON  30 September 2016    FINDINGS  Lines/tubes/devices: Multiple ECG leads overlie the imaged region.  Left chest wall pacemaker and partially visualized leads also noted.    Notable amount of residual stool is present throughout the colon.  A non-obstructed bowel gas pattern is present. No intra-abdominal mass effect is appreciated.    Detection of air-fluid levels and low-volume pneumoperitoneum is limited due to supine technique. There is similar right upper quadrant surgical clips.  Ill-defined hazy radiopacity projects over the left lower abdomen, of otherwise indeterminate etiology.    Included lower thoracic cavity and osseous structures are without acute abnormality.    IMPRESSION  1. Findings suggestive of constipation.  No convincing acute intra-abdominal process.  2. Ill-defined hazy opacity projecting over the left lower quadrant could represent artifact but is otherwise of indeterminate etiology.  3. Additional chronic secondary details discussed above.    Electronically signed by: Mark Jolley  Date:    01/08/2025  Time:    11:16      Vanessa Mays MD  Department of Hospital Medicine   [facility]   01/12/2025

## 2025-01-13 LAB
HCT VFR BLD AUTO: 24.2 % (ref 37–47)
HGB BLD-MCNC: 8.1 G/DL (ref 12–16)
POCT GLUCOSE: 115 MG/DL (ref 70–110)
POCT GLUCOSE: 119 MG/DL (ref 70–110)
POCT GLUCOSE: 135 MG/DL (ref 70–110)

## 2025-01-13 PROCEDURE — 85018 HEMOGLOBIN: CPT | Performed by: INTERNAL MEDICINE

## 2025-01-13 PROCEDURE — 21400001 HC TELEMETRY ROOM

## 2025-01-13 PROCEDURE — 36415 COLL VENOUS BLD VENIPUNCTURE: CPT | Performed by: INTERNAL MEDICINE

## 2025-01-13 PROCEDURE — 25000003 PHARM REV CODE 250: Performed by: INTERNAL MEDICINE

## 2025-01-13 PROCEDURE — 25000003 PHARM REV CODE 250

## 2025-01-13 RX ORDER — BRIMONIDINE TARTRATE 1.5 MG/ML
1 SOLUTION/ DROPS OPHTHALMIC EVERY 8 HOURS
Status: CANCELLED | OUTPATIENT
Start: 2025-01-13

## 2025-01-13 RX ADMIN — LUBIPROSTONE 24 MCG: 24 CAPSULE, GELATIN COATED ORAL at 05:01

## 2025-01-13 RX ADMIN — FUROSEMIDE 80 MG: 40 TABLET ORAL at 10:01

## 2025-01-13 RX ADMIN — PANTOPRAZOLE SODIUM 40 MG: 40 TABLET, DELAYED RELEASE ORAL at 10:01

## 2025-01-13 RX ADMIN — SENNOSIDES AND DOCUSATE SODIUM 4 TABLET: 50; 8.6 TABLET ORAL at 10:01

## 2025-01-13 RX ADMIN — CETIRIZINE HYDROCHLORIDE 10 MG: 10 TABLET, FILM COATED ORAL at 08:01

## 2025-01-13 RX ADMIN — ALLOPURINOL 300 MG: 300 TABLET ORAL at 10:01

## 2025-01-13 RX ADMIN — MELATONIN TAB 3 MG 6 MG: 3 TAB at 05:01

## 2025-01-13 RX ADMIN — ACETAMINOPHEN 650 MG: 325 TABLET, FILM COATED ORAL at 08:01

## 2025-01-13 RX ADMIN — TRAZODONE HYDROCHLORIDE 150 MG: 150 TABLET ORAL at 08:01

## 2025-01-13 RX ADMIN — LUBIPROSTONE 24 MCG: 24 CAPSULE, GELATIN COATED ORAL at 10:01

## 2025-01-13 RX ADMIN — APIXABAN 5 MG: 5 TABLET, FILM COATED ORAL at 10:01

## 2025-01-13 RX ADMIN — FERROUS SULFATE TAB 325 MG (65 MG ELEMENTAL FE) 1 EACH: 325 (65 FE) TAB at 10:01

## 2025-01-13 RX ADMIN — FERROUS SULFATE TAB 325 MG (65 MG ELEMENTAL FE) 1 EACH: 325 (65 FE) TAB at 08:01

## 2025-01-13 RX ADMIN — LEVOTHYROXINE SODIUM 25 MCG: 25 TABLET ORAL at 05:01

## 2025-01-13 RX ADMIN — PANTOPRAZOLE SODIUM 40 MG: 40 TABLET, DELAYED RELEASE ORAL at 08:01

## 2025-01-13 RX ADMIN — RIVASTIGMINE TARTRATE 1.5 MG: 1.5 CAPSULE ORAL at 10:01

## 2025-01-13 RX ADMIN — RISPERIDONE 2 MG: 1 TABLET, FILM COATED ORAL at 08:01

## 2025-01-13 RX ADMIN — LATANOPROST 1 DROP: 50 SOLUTION OPHTHALMIC at 08:01

## 2025-01-13 RX ADMIN — APIXABAN 5 MG: 5 TABLET, FILM COATED ORAL at 08:01

## 2025-01-13 RX ADMIN — POTASSIUM BICARBONATE 25 MEQ: 977.5 TABLET, EFFERVESCENT ORAL at 10:01

## 2025-01-13 NOTE — PROGRESS NOTES
"[facility] Hospital Medicine Progress Note        Chief Complaint: Inpatient Follow-up for bilateral leg swelling, generalized weakness     HPI: "88 y/o F with a PMH of diastolic heart failure LVEF 50-55%, HTN, chronic anemia, paroxysmal A-fib on eliquis, and CKD stage III followed by Dr. Chávez , chronic respiratory failure on 2 L NC at home, dementia and glaucoma presented to the ED with complains of bilateral leg swelling, generalized weakness and deconditioning post hospital discharge. Patient explains that she is bed bound and lives at home with her son and  who take care of her. Patient was recently admitted at this facility for worsening generalized weakness, bilateral leg swelling and urinary retention and discharged home with an indwelling cook, follow up appointment with urology and private sitters and home care with [facility] . Patient denies black or blood in her tool, denies chest pain, denies shortness of breath, denies known fever.     Initial ED vitals: Temp 98.1, HR 90, Resp 18, /58, O2 Sat 98% on RA. ED work up revealed WBC 7.39, H&H 8.7 and 26.8, , K 3.4, BUN 30.3, Creatinine 1.26, .3, Troponin 0.048, Lactic acid 1.1, TSH 3.335 Influenza, COVID, and RSV swab negative, Respiratory panel negative, UA positive for blood, leukocyte esterase, RBC, WBC, and bacteria, X-ray chest read enlarged cardiac silhouette without overt edema, EKG read ventricular paced rhythm at 62 bmp. Cardiology was consulted in the ED and patient was admitted to Hospital Medicine.  She is found to have right heart failure and pulmonary hypertension which was the cause of her leg edema.  Nephrology was consulted and continue to remove fluid with IV Lasix.  Left ventricle systolic function is good.  Now transitioned to oral diuretics.  Consultants signed off.Recommend outpatient anemia evaluation if she may benefit from bone marrow biopsy? as a part of anemia " workup. FOBT negative .Therapy recommending moderate intensity therapy ,case management working on discharge planning.    Interval Hx:   Discharge planning in progress, awaits placement.  Case was discussed with  and the nurse. No Overnight events reported    Patient was seen and examined at bedside.  No complaints.  HDS    Objective/physical exam:  General: In no acute distress, afebrile  Chest: Clear to auscultation bilaterally  Heart: RRR, +S1, S2, no appreciable murmur  Abdomen: Soft, nontender, BS +  MSK: Warm, no lower extremity edema, no clubbing or cyanosis  Neurologic: Alert and oriented x4, Cranial nerve II-XII intact, Strength 5/5 in all 4 extremities    VITAL SIGNS: 24 HRS MIN & MAX LAST   Temp  Min: 97.3 °F (36.3 °C)  Max: 98.1 °F (36.7 °C) 97.3 °F (36.3 °C)   BP  Min: 105/54  Max: 124/70 (!) 122/55   Pulse  Min: 64  Max: 76  65   No data recorded 20   SpO2  Min: 93 %  Max: 99 % 96 %     I have reviewed the following labs:  Recent Labs   Lab 01/10/25  0638 01/12/25  0359 01/13/25  0544   WBC 7.29  --   --    RBC 2.30*  --   --    HGB 7.7* 7.9* 8.1*   HCT 23.1* 23.2* 24.2*   .4*  --   --    MCH 33.5*  --   --    MCHC 33.3  --   --    RDW 19.9*  --   --      --   --    MPV 9.7  --   --      Recent Labs   Lab 01/08/25  0916   *   K 3.9   CL 97*   CO2 31   BUN 28.3*   CREATININE 1.37*   CALCIUM 9.1   MG 2.20   ALBUMIN 2.4*     Microbiology Results (last 7 days)       ** No results found for the last 168 hours. **             See below for Radiology    Assessment/Plan:  HFpEF, acutely decompensated, improved  Pulmonary hypertension (moderate per Echo)  NSTEMI type II s/t above  CKD IIIB  Pseudomonas urinary tract infection, resolved  Anemia of chronic disease  Major Neurocognitive Disorder, unspecified   Chronic hypoxic respiratory failure  Baseline dementia  Essential HTN  Paroxysmal atrial fibrillation  h/o bipolar disorder    Plan:  No Need for ischemic evaluation per  Cardiology.  Diuresis per Nephrology.  Recommend to Continue current Lasix.  Strict Is&Os.  Nephrology signed off now  Afebrile without any leukocytosis  Monitor hemoglobin,  received IV,now  on oral iron supplementation.  Keep hemoglobin above 7.5.  Recommend outpatient anemia evaluation if she may benefit from bone marrow biopsy?  as a part of anemia workup.  FOBT negative  Psych following, medications adjusted  PTOT recommending moderate intensity therapy.  Case management working on discharge planning      VTE prophylaxis: eliquis      Anticipated discharge and Disposition:   snf placement pending      All diagnosis and differential diagnosis have been reviewed; assessment and plan has been documented; I have personally reviewed the labs and test results that are presently available; I have reviewed the patients medication list; I have reviewed the consulting providers response and recommendations. I have reviewed or attempted to review medical records based upon their availability    All of the patient's questions have been  addressed and answered. Patient's is agreeable to the above stated plan. I will continue to monitor closely and make adjustments to medical management as needed.    Portions of this note dictated using EMR integrated voice recognition software, and may be subject to voice recognition errors not corrected at proofreading. Please contact writer for clarification if needed.   _____________________________________________________________________    Malnutrition Status:  Nutrition consulted. Most recent weight and BMI monitored-     Measurements:  Wt Readings from Last 1 Encounters:   01/10/25 76.7 kg (169 lb)   Body mass index is 28.12 kg/m².    Patient has been screened and assessed by RD.    Malnutrition Type:  Context:    Level:      Malnutrition Characteristic Summary:       Interventions/Recommendations (treatment strategy):        Scheduled Med:   allopurinoL  300 mg Oral Daily     apixaban  5 mg Oral BID    cetirizine  10 mg Oral QHS    ferrous sulfate  1 tablet Oral BID    furosemide  80 mg Oral Daily    latanoprost  1 drop Both Eyes QHS    levothyroxine  25 mcg Oral Before breakfast    lubiprostone  24 mcg Oral BID WM    melatonin  6 mg Oral Q24H    pantoprazole  40 mg Oral BID    potassium bicarbonate  25 mEq Oral Daily    risperiDONE  2 mg Oral QHS    rivastigmine tartrate  1.5 mg Oral Daily    senna-docusate 8.6-50 mg  4 tablet Oral Daily    traZODone  150 mg Oral QHS      Continuous Infusions:     PRN Meds:    Current Facility-Administered Medications:     acetaminophen, 650 mg, Oral, Q4H PRN    albuterol, 1 puff, Inhalation, PRN    aluminum-magnesium hydroxide-simethicone, 30 mL, Oral, QID PRN    bisacodyL, 10 mg, Rectal, Daily PRN    dextrose 50%, 12.5 g, Intravenous, PRN    dextrose 50%, 25 g, Intravenous, PRN    glucagon (human recombinant), 1 mg, Intramuscular, PRN    glucose, 16 g, Oral, PRN    glucose, 24 g, Oral, PRN    glycopyrrolate, 1 mg, Oral, BID PRN    hydrOXYzine HCL, 25 mg, Oral, Nightly PRN    insulin aspart U-100, 0-5 Units, Subcutaneous, QID (AC + HS) PRN    nitroGLYCERIN, 0.4 mg, Sublingual, Q5 Min PRN    ondansetron, 4 mg, Intravenous, Q8H PRN    pneumoc 20-rod conj-dip cr(PF), 0.5 mL, Intramuscular, vaccine x 1 dose    polyethylene glycol, 17 g, Oral, BID PRN    sodium chloride 0.9%, 10 mL, Intravenous, PRN     Radiology:  I have personally reviewed the following imaging and agree with the radiologist.     X-Ray Abdomen AP 1 View  EXAMINATION  XR ABDOMEN AP 1 VIEW    CLINICAL HISTORY  Suprapubic fullness, eval constipation;    TECHNIQUE  A total of 2 AP image(s) of the abdomen.    COMPARISON  30 September 2016    FINDINGS  Lines/tubes/devices: Multiple ECG leads overlie the imaged region.  Left chest wall pacemaker and partially visualized leads also noted.    Notable amount of residual stool is present throughout the colon.  A non-obstructed bowel gas pattern is  present. No intra-abdominal mass effect is appreciated.   Detection of air-fluid levels and low-volume pneumoperitoneum is limited due to supine technique. There is similar right upper quadrant surgical clips.  Ill-defined hazy radiopacity projects over the left lower abdomen, of otherwise indeterminate etiology.    Included lower thoracic cavity and osseous structures are without acute abnormality.    IMPRESSION  1. Findings suggestive of constipation.  No convincing acute intra-abdominal process.  2. Ill-defined hazy opacity projecting over the left lower quadrant could represent artifact but is otherwise of indeterminate etiology.  3. Additional chronic secondary details discussed above.    Electronically signed by: Mark Jolley  Date:    01/08/2025  Time:    11:16      Vanessa Mays MD  Department of Hospital Medicine   [facility]   01/13/2025

## 2025-01-14 LAB
POCT GLUCOSE: 109 MG/DL (ref 70–110)
POCT GLUCOSE: 118 MG/DL (ref 70–110)
POCT GLUCOSE: 129 MG/DL (ref 70–110)
POCT GLUCOSE: 146 MG/DL (ref 70–110)

## 2025-01-14 PROCEDURE — 21400001 HC TELEMETRY ROOM

## 2025-01-14 PROCEDURE — 25000003 PHARM REV CODE 250: Performed by: INTERNAL MEDICINE

## 2025-01-14 PROCEDURE — 25000003 PHARM REV CODE 250

## 2025-01-14 PROCEDURE — 97530 THERAPEUTIC ACTIVITIES: CPT

## 2025-01-14 PROCEDURE — 97535 SELF CARE MNGMENT TRAINING: CPT | Mod: CO

## 2025-01-14 PROCEDURE — 97116 GAIT TRAINING THERAPY: CPT

## 2025-01-14 RX ADMIN — LATANOPROST 1 DROP: 50 SOLUTION OPHTHALMIC at 08:01

## 2025-01-14 RX ADMIN — CETIRIZINE HYDROCHLORIDE 10 MG: 10 TABLET, FILM COATED ORAL at 08:01

## 2025-01-14 RX ADMIN — HYDROXYZINE HYDROCHLORIDE 25 MG: 25 TABLET, FILM COATED ORAL at 08:01

## 2025-01-14 RX ADMIN — LUBIPROSTONE 24 MCG: 24 CAPSULE, GELATIN COATED ORAL at 05:01

## 2025-01-14 RX ADMIN — PANTOPRAZOLE SODIUM 40 MG: 40 TABLET, DELAYED RELEASE ORAL at 08:01

## 2025-01-14 RX ADMIN — LUBIPROSTONE 24 MCG: 24 CAPSULE, GELATIN COATED ORAL at 08:01

## 2025-01-14 RX ADMIN — RIVASTIGMINE TARTRATE 1.5 MG: 1.5 CAPSULE ORAL at 08:01

## 2025-01-14 RX ADMIN — FERROUS SULFATE TAB 325 MG (65 MG ELEMENTAL FE) 1 EACH: 325 (65 FE) TAB at 08:01

## 2025-01-14 RX ADMIN — ALLOPURINOL 300 MG: 300 TABLET ORAL at 08:01

## 2025-01-14 RX ADMIN — FUROSEMIDE 80 MG: 40 TABLET ORAL at 08:01

## 2025-01-14 RX ADMIN — POTASSIUM BICARBONATE 25 MEQ: 977.5 TABLET, EFFERVESCENT ORAL at 08:01

## 2025-01-14 RX ADMIN — MELATONIN TAB 3 MG 6 MG: 3 TAB at 05:01

## 2025-01-14 RX ADMIN — APIXABAN 5 MG: 5 TABLET, FILM COATED ORAL at 08:01

## 2025-01-14 RX ADMIN — LEVOTHYROXINE SODIUM 25 MCG: 25 TABLET ORAL at 08:01

## 2025-01-14 RX ADMIN — ACETAMINOPHEN 650 MG: 325 TABLET, FILM COATED ORAL at 08:01

## 2025-01-14 RX ADMIN — TRAZODONE HYDROCHLORIDE 150 MG: 150 TABLET ORAL at 08:01

## 2025-01-14 RX ADMIN — RISPERIDONE 2 MG: 1 TABLET, FILM COATED ORAL at 08:01

## 2025-01-14 NOTE — PT/OT/SLP PROGRESS
Physical Therapy Treatment    Patient Name:  Ev Alberts   MRN:  [id number]    Recommendations:     Discharge therapy intensity: Moderate Intensity Therapy   Discharge Equipment Recommendations: to be determined by next level of care  Barriers to discharge: Impaired mobility and Ongoing medical needs    Assessment:     Ev Alberts is a 89 y.o. female admitted with a medical diagnosis of  Acute on chronic congestive heart failure .  She presents with the following impairments/functional limitations: weakness, gait instability, impaired balance, impaired cognition, impaired functional mobility, impaired self care skills, decreased safety awareness, impaired endurance     Rehab Prognosis: Good; patient would benefit from acute skilled PT services to address these deficits and reach maximum level of function.    Recent Surgery: * No surgery found *      Plan:     During this hospitalization, patient would benefit from acute PT services 5 x/week to address the identified rehab impairments via therapeutic activities, gait training, therapeutic exercises and progress toward the following goals:    Plan of Care Expires:  02/10/25    Subjective     Chief Complaint:   Patient/Family Comments/goals:   Pain/Comfort:         Objective:     Communicated with nurse prior to session.  Patient found supine with PureWick upon PT entry to room.     General Precautions: Standard, fall  Orthopedic Precautions: N/A  Braces: N/A  Respiratory Status: Room air  Blood Pressure:   Skin Integrity: Visible skin intact      Functional Mobility:  Bed Mobility:     Supine to Sit: maximal assistance  Sit to Supine: maximal assistance  Transfers:     Sit to Stand:  of moda x 2  persons with rolling walker  Bed to Chair: of moda x 2  persons with  rolling walker  using  Step Transfer  Gait: pt ambulated with a rw moda x 2 for about 20feet    Therapeutic Activities/Exercises:      Education:  Patient and spouse were provided with verbal education  education regarding PT role/goals/POC, fall prevention, and safety awareness.  Understanding was verbalized, however additional teaching warranted.     Patient left supine with all lines intact and call button in reach    GOALS:   Multidisciplinary Problems       Physical Therapy Goals          Problem: Physical Therapy    Goal Priority Disciplines Outcome Interventions   Physical Therapy Goal     PT, PT/OT Progressing    Description: Goals to be met by: 24     Patient will increase functional independence with mobility by performin. Supine to sit with MInimal Assistance  2. Sit to stand transfer with Minimal Assistance  3. Bed to chair transfer with Minimal Assistance using Rolling Walker vs. Squat pivot  4. Gait  x 25 feet with Minimal Assistance using Rolling Walker.   5. Sitting at edge of bed x10 minutes with Stand-by Assistance                         Time Tracking:     PT Received On: 25  PT Start Time: 1420     PT Stop Time: 1444  PT Total Time (min): 24 min     Billable Minutes: Gait Training 12 and Therapeutic Activity 12    Treatment Type: Treatment  PT/PTA: PT     Number of PTA visits since last PT visit: 0     2025

## 2025-01-14 NOTE — PLAN OF CARE
Patient spiked a Level II and awaiting 142 determination from [facility].    **Noted via Epic Fax:    [facility]-declined- patient declined due to cost of care.  [facility]-declined- patient declined due to cost of care.

## 2025-01-14 NOTE — PROGRESS NOTES
"[facility] Hospital Medicine Progress Note        Chief Complaint: Inpatient Follow-up for bilateral leg swelling, generalized weakness     HPI: "88 y/o F with a PMH of diastolic heart failure LVEF 50-55%, HTN, chronic anemia, paroxysmal A-fib on eliquis, and CKD stage III followed by Dr. Chávez , chronic respiratory failure on 2 L NC at home, dementia and glaucoma presented to the ED with complains of bilateral leg swelling, generalized weakness and deconditioning post hospital discharge. Patient explains that she is bed bound and lives at home with her son and  who take care of her. Patient was recently admitted at this facility for worsening generalized weakness, bilateral leg swelling and urinary retention and discharged home with an indwelling cook, follow up appointment with urology and private sitters and home care with [facility] . Patient denies black or blood in her tool, denies chest pain, denies shortness of breath, denies known fever.     Initial ED vitals: Temp 98.1, HR 90, Resp 18, /58, O2 Sat 98% on RA. ED work up revealed WBC 7.39, H&H 8.7 and 26.8, , K 3.4, BUN 30.3, Creatinine 1.26, .3, Troponin 0.048, Lactic acid 1.1, TSH 3.335 Influenza, COVID, and RSV swab negative, Respiratory panel negative, UA positive for blood, leukocyte esterase, RBC, WBC, and bacteria, X-ray chest read enlarged cardiac silhouette without overt edema, EKG read ventricular paced rhythm at 62 bmp. Cardiology was consulted in the ED and patient was admitted to Hospital Medicine.  She is found to have right heart failure and pulmonary hypertension which was the cause of her leg edema.  Nephrology was consulted and continue to remove fluid with IV Lasix.  Left ventricle systolic function is good.  Now transitioned to oral diuretics.  Consultants signed off.Recommend outpatient anemia evaluation if she may benefit from bone marrow biopsy? as a part of anemia " workup. FOBT negative .Therapy recommending moderate intensity therapy ,case management working on discharge planning.    Interval Hx:   Discharge planning in progress, awaits placement.  Case was discussed with  and the nurse. No Overnight events reported    Patient was seen and examined at bedside.  No complaints.  Refuses home health, want to go to snf. HDS    Objective/physical exam:  General: In no acute distress, afebrile  Chest: Clear to auscultation bilaterally  Heart: RRR, +S1, S2, no appreciable murmur  Abdomen: Soft, nontender, BS +  MSK: Warm, no lower extremity edema, no clubbing or cyanosis  Neurologic: Alert and oriented x4, Cranial nerve II-XII intact, Strength 5/5 in all 4 extremities    VITAL SIGNS: 24 HRS MIN & MAX LAST   Temp  Min: 97.7 °F (36.5 °C)  Max: 98.7 °F (37.1 °C) 98.3 °F (36.8 °C)   BP  Min: 107/50  Max: 125/57 (!) 119/53   Pulse  Min: 71  Max: 75  74   Resp  Min: 17  Max: 17 17   SpO2  Min: 94 %  Max: 96 % 96 %     I have reviewed the following labs:  Recent Labs   Lab 01/10/25  0638 01/12/25  0359 01/13/25  0544   WBC 7.29  --   --    RBC 2.30*  --   --    HGB 7.7* 7.9* 8.1*   HCT 23.1* 23.2* 24.2*   .4*  --   --    MCH 33.5*  --   --    MCHC 33.3  --   --    RDW 19.9*  --   --      --   --    MPV 9.7  --   --      Recent Labs   Lab 01/08/25  0916   *   K 3.9   CL 97*   CO2 31   BUN 28.3*   CREATININE 1.37*   CALCIUM 9.1   MG 2.20   ALBUMIN 2.4*     Microbiology Results (last 7 days)       ** No results found for the last 168 hours. **             See below for Radiology    Assessment/Plan:  HFpEF, acutely decompensated, improved  Pulmonary hypertension (moderate per Echo)  NSTEMI type II s/t above  CKD IIIB  Pseudomonas urinary tract infection, resolved  Anemia of chronic disease  Major Neurocognitive Disorder, unspecified   Chronic hypoxic respiratory failure  Baseline dementia  Essential HTN  Paroxysmal atrial fibrillation  h/o bipolar  disorder    Plan:  No Need for ischemic evaluation per Cardiology.  Diuresis per Nephrology.  Recommend to Continue current Lasix.  Strict Is&Os.  Nephrology signed off now  Afebrile without any leukocytosis  Monitor hemoglobin,  received IV,now  on oral iron supplementation.  Keep hemoglobin above 7.5.  Recommend outpatient anemia evaluation if she may benefit from bone marrow biopsy?  as a part of anemia workup.  FOBT negative  Psych following, medications adjusted  PTOT recommending moderate intensity therapy.  Case management working on discharge planning.  Introduced to about Home health, patient refuses home health, requests to go to SNF      VTE prophylaxis: eliquis      Anticipated discharge and Disposition:   snf placement pending, outpatient anemia workup      All diagnosis and differential diagnosis have been reviewed; assessment and plan has been documented; I have personally reviewed the labs and test results that are presently available; I have reviewed the patients medication list; I have reviewed the consulting providers response and recommendations. I have reviewed or attempted to review medical records based upon their availability    All of the patient's questions have been  addressed and answered. Patient's is agreeable to the above stated plan. I will continue to monitor closely and make adjustments to medical management as needed.    Portions of this note dictated using EMR integrated voice recognition software, and may be subject to voice recognition errors not corrected at proofreading. Please contact writer for clarification if needed.   _____________________________________________________________________    Malnutrition Status:  Nutrition consulted. Most recent weight and BMI monitored-     Measurements:  Wt Readings from Last 1 Encounters:   01/10/25 76.7 kg (169 lb)   Body mass index is 28.12 kg/m².    Patient has been screened and assessed by RD.    Malnutrition Type:  Context:     Level:      Malnutrition Characteristic Summary:       Interventions/Recommendations (treatment strategy):        Scheduled Med:   allopurinoL  300 mg Oral Daily    apixaban  5 mg Oral BID    cetirizine  10 mg Oral QHS    ferrous sulfate  1 tablet Oral BID    furosemide  80 mg Oral Daily    latanoprost  1 drop Both Eyes QHS    levothyroxine  25 mcg Oral Before breakfast    lubiprostone  24 mcg Oral BID WM    melatonin  6 mg Oral Q24H    pantoprazole  40 mg Oral BID    potassium bicarbonate  25 mEq Oral Daily    risperiDONE  2 mg Oral QHS    rivastigmine tartrate  1.5 mg Oral Daily    senna-docusate 8.6-50 mg  4 tablet Oral Daily    traZODone  150 mg Oral QHS      Continuous Infusions:     PRN Meds:    Current Facility-Administered Medications:     acetaminophen, 650 mg, Oral, Q4H PRN    albuterol, 1 puff, Inhalation, PRN    aluminum-magnesium hydroxide-simethicone, 30 mL, Oral, QID PRN    bisacodyL, 10 mg, Rectal, Daily PRN    dextrose 50%, 12.5 g, Intravenous, PRN    dextrose 50%, 25 g, Intravenous, PRN    glucagon (human recombinant), 1 mg, Intramuscular, PRN    glucose, 16 g, Oral, PRN    glucose, 24 g, Oral, PRN    glycopyrrolate, 1 mg, Oral, BID PRN    hydrOXYzine HCL, 25 mg, Oral, Nightly PRN    insulin aspart U-100, 0-5 Units, Subcutaneous, QID (AC + HS) PRN    nitroGLYCERIN, 0.4 mg, Sublingual, Q5 Min PRN    ondansetron, 4 mg, Intravenous, Q8H PRN    pneumoc 20-rod conj-dip cr(PF), 0.5 mL, Intramuscular, vaccine x 1 dose    polyethylene glycol, 17 g, Oral, BID PRN    sodium chloride 0.9%, 10 mL, Intravenous, PRN     Radiology:  I have personally reviewed the following imaging and agree with the radiologist.     X-Ray Abdomen AP 1 View  EXAMINATION  XR ABDOMEN AP 1 VIEW    CLINICAL HISTORY  Suprapubic fullness, eval constipation;    TECHNIQUE  A total of 2 AP image(s) of the abdomen.    COMPARISON  30 September 2016    FINDINGS  Lines/tubes/devices: Multiple ECG leads overlie the imaged region.  Left chest  wall pacemaker and partially visualized leads also noted.    Notable amount of residual stool is present throughout the colon.  A non-obstructed bowel gas pattern is present. No intra-abdominal mass effect is appreciated.   Detection of air-fluid levels and low-volume pneumoperitoneum is limited due to supine technique. There is similar right upper quadrant surgical clips.  Ill-defined hazy radiopacity projects over the left lower abdomen, of otherwise indeterminate etiology.    Included lower thoracic cavity and osseous structures are without acute abnormality.    IMPRESSION  1. Findings suggestive of constipation.  No convincing acute intra-abdominal process.  2. Ill-defined hazy opacity projecting over the left lower quadrant could represent artifact but is otherwise of indeterminate etiology.  3. Additional chronic secondary details discussed above.    Electronically signed by: Mark Jolley  Date:    01/08/2025  Time:    11:16      Vanessa Mays MD  Department of Hospital Medicine   [facility]   01/14/2025

## 2025-01-15 LAB
POCT GLUCOSE: 100 MG/DL (ref 70–110)
POCT GLUCOSE: 112 MG/DL (ref 70–110)
POCT GLUCOSE: 114 MG/DL (ref 70–110)
POCT GLUCOSE: 94 MG/DL (ref 70–110)

## 2025-01-15 PROCEDURE — 21400001 HC TELEMETRY ROOM

## 2025-01-15 PROCEDURE — 25000003 PHARM REV CODE 250: Performed by: INTERNAL MEDICINE

## 2025-01-15 PROCEDURE — 25000003 PHARM REV CODE 250

## 2025-01-15 RX ADMIN — MELATONIN TAB 3 MG 6 MG: 3 TAB at 06:01

## 2025-01-15 RX ADMIN — TRAZODONE HYDROCHLORIDE 150 MG: 150 TABLET ORAL at 09:01

## 2025-01-15 RX ADMIN — LEVOTHYROXINE SODIUM 25 MCG: 25 TABLET ORAL at 06:01

## 2025-01-15 RX ADMIN — FUROSEMIDE 80 MG: 40 TABLET ORAL at 10:01

## 2025-01-15 RX ADMIN — LATANOPROST 1 DROP: 50 SOLUTION OPHTHALMIC at 09:01

## 2025-01-15 RX ADMIN — APIXABAN 5 MG: 5 TABLET, FILM COATED ORAL at 09:01

## 2025-01-15 RX ADMIN — FERROUS SULFATE TAB 325 MG (65 MG ELEMENTAL FE) 1 EACH: 325 (65 FE) TAB at 09:01

## 2025-01-15 RX ADMIN — FERROUS SULFATE TAB 325 MG (65 MG ELEMENTAL FE) 1 EACH: 325 (65 FE) TAB at 10:01

## 2025-01-15 RX ADMIN — RISPERIDONE 2 MG: 1 TABLET, FILM COATED ORAL at 09:01

## 2025-01-15 RX ADMIN — CETIRIZINE HYDROCHLORIDE 10 MG: 10 TABLET, FILM COATED ORAL at 09:01

## 2025-01-15 RX ADMIN — PANTOPRAZOLE SODIUM 40 MG: 40 TABLET, DELAYED RELEASE ORAL at 10:01

## 2025-01-15 RX ADMIN — APIXABAN 5 MG: 5 TABLET, FILM COATED ORAL at 10:01

## 2025-01-15 RX ADMIN — RIVASTIGMINE TARTRATE 1.5 MG: 1.5 CAPSULE ORAL at 10:01

## 2025-01-15 RX ADMIN — ALLOPURINOL 300 MG: 300 TABLET ORAL at 10:01

## 2025-01-15 RX ADMIN — LUBIPROSTONE 24 MCG: 24 CAPSULE, GELATIN COATED ORAL at 06:01

## 2025-01-15 RX ADMIN — POTASSIUM BICARBONATE 25 MEQ: 977.5 TABLET, EFFERVESCENT ORAL at 10:01

## 2025-01-15 RX ADMIN — PANTOPRAZOLE SODIUM 40 MG: 40 TABLET, DELAYED RELEASE ORAL at 09:01

## 2025-01-15 RX ADMIN — LUBIPROSTONE 24 MCG: 24 CAPSULE, GELATIN COATED ORAL at 10:01

## 2025-01-15 NOTE — PROGRESS NOTES
[facility] [address]  Wound Care    Patient Name:  Ev Alberts   MRN:  [id number]  Date: 1/15/2025  Diagnosis: Acute on chronic congestive heart failure    History:     Past Medical History:   Diagnosis Date    Acute kidney injury superimposed on chronic kidney disease     Congestive heart failure     Dementia     Hypertension     Shingles of eyelid     Sick sinus syndrome     Thyroid disease     Unspecified glaucoma        Social History     Socioeconomic History    Marital status:    Tobacco Use    Smoking status: Never    Smokeless tobacco: Never   Substance and Sexual Activity    Alcohol use: Never    Drug use: Not Currently     Social Drivers of Health     Financial Resource Strain: Low Risk  (12/27/2024)    Overall Financial Resource Strain (CARDIA)     Difficulty of Paying Living Expenses: Not very hard   Food Insecurity: No Food Insecurity (12/27/2024)    Hunger Vital Sign     Worried About Running Out of Food in the Last Year: Never true     Ran Out of Food in the Last Year: Never true   Transportation Needs: No Transportation Needs (12/27/2024)    TRANSPORTATION NEEDS     Transportation : No   Physical Activity: Inactive (12/10/2024)    Exercise Vital Sign     Days of Exercise per Week: 0 days     Minutes of Exercise per Session: 0 min   Stress: Patient Unable To Answer (12/27/2024)    [employer] [place] [employer] - Occupational Stress Questionnaire     Feeling of Stress : Patient unable to answer   Housing Stability: Low Risk  (12/27/2024)    Housing Stability Vital Sign     Unable to Pay for Housing in the Last Year: No     Homeless in the Last Year: No       Precautions:     Allergies as of 12/26/2024 - Reviewed 12/26/2024   Allergen Reaction Noted    Amlodipine-benazepril Edema 06/01/2022    Corticosteroids (glucocorticoids) Edema and Swelling 05/11/2011    Rofecoxib Anaphylaxis and Swelling 05/11/2011    Sulfa (sulfonamide antibiotics) Edema  06/01/2022    Atorvastatin  10/26/2018    Ibuprofen  06/01/2022       WOC Assessment Details/Treatment   WOCN follow up for lower extremity and sacral wound assessment. Spoke with patients nurse prior to visit. Pt is pending SNF placement. Spoke at bedside. Explained reason for follow up. Skin assessment performed. With assistance of staff x 1 pt turned onto side to assess sacrum. Cleansed with remedy skin cleanser. Dried. Photos obtained. Skin appears to be newly epithelialized. Redressed with present orders due to fragility of skin. Lower extremities assessed. No open wounds . This writer placed new ace wraps to lower extremities for edema control. Heel protectors reapplied. Pt is on a AMARILIS mattress. Nursing to continue with present recommendations.   01/15/2025

## 2025-01-16 LAB
POCT GLUCOSE: 116 MG/DL (ref 70–110)
POCT GLUCOSE: 89 MG/DL (ref 70–110)
POCT GLUCOSE: 92 MG/DL (ref 70–110)
POCT GLUCOSE: 97 MG/DL (ref 70–110)

## 2025-01-16 PROCEDURE — 97530 THERAPEUTIC ACTIVITIES: CPT

## 2025-01-16 PROCEDURE — 97535 SELF CARE MNGMENT TRAINING: CPT

## 2025-01-16 PROCEDURE — 25000003 PHARM REV CODE 250: Performed by: INTERNAL MEDICINE

## 2025-01-16 PROCEDURE — 25000003 PHARM REV CODE 250: Performed by: STUDENT IN AN ORGANIZED HEALTH CARE EDUCATION/TRAINING PROGRAM

## 2025-01-16 PROCEDURE — 25000003 PHARM REV CODE 250

## 2025-01-16 PROCEDURE — 21400001 HC TELEMETRY ROOM

## 2025-01-16 PROCEDURE — 97116 GAIT TRAINING THERAPY: CPT

## 2025-01-16 RX ORDER — LOPERAMIDE HYDROCHLORIDE 2 MG/1
2 CAPSULE ORAL 4 TIMES DAILY PRN
Status: DISCONTINUED | OUTPATIENT
Start: 2025-01-16 | End: 2025-01-29 | Stop reason: HOSPADM

## 2025-01-16 RX ADMIN — CETIRIZINE HYDROCHLORIDE 10 MG: 10 TABLET, FILM COATED ORAL at 09:01

## 2025-01-16 RX ADMIN — RIVASTIGMINE TARTRATE 1.5 MG: 1.5 CAPSULE ORAL at 12:01

## 2025-01-16 RX ADMIN — ALLOPURINOL 300 MG: 300 TABLET ORAL at 09:01

## 2025-01-16 RX ADMIN — LATANOPROST 1 DROP: 50 SOLUTION OPHTHALMIC at 09:01

## 2025-01-16 RX ADMIN — POTASSIUM BICARBONATE 25 MEQ: 977.5 TABLET, EFFERVESCENT ORAL at 09:01

## 2025-01-16 RX ADMIN — PANTOPRAZOLE SODIUM 40 MG: 40 TABLET, DELAYED RELEASE ORAL at 09:01

## 2025-01-16 RX ADMIN — FUROSEMIDE 80 MG: 40 TABLET ORAL at 09:01

## 2025-01-16 RX ADMIN — APIXABAN 5 MG: 5 TABLET, FILM COATED ORAL at 09:01

## 2025-01-16 RX ADMIN — FERROUS SULFATE TAB 325 MG (65 MG ELEMENTAL FE) 1 EACH: 325 (65 FE) TAB at 09:01

## 2025-01-16 RX ADMIN — RISPERIDONE 2 MG: 1 TABLET, FILM COATED ORAL at 09:01

## 2025-01-16 RX ADMIN — LEVOTHYROXINE SODIUM 25 MCG: 25 TABLET ORAL at 07:01

## 2025-01-16 RX ADMIN — SENNOSIDES AND DOCUSATE SODIUM 4 TABLET: 50; 8.6 TABLET ORAL at 11:01

## 2025-01-16 RX ADMIN — LUBIPROSTONE 24 MCG: 24 CAPSULE, GELATIN COATED ORAL at 09:01

## 2025-01-16 RX ADMIN — MELATONIN TAB 3 MG 6 MG: 3 TAB at 06:01

## 2025-01-16 RX ADMIN — LOPERAMIDE HYDROCHLORIDE 2 MG: 2 CAPSULE ORAL at 04:01

## 2025-01-16 RX ADMIN — TRAZODONE HYDROCHLORIDE 150 MG: 150 TABLET ORAL at 09:01

## 2025-01-16 NOTE — PT/OT/SLP PROGRESS
Occupational Therapy   Treatment    Name: Ev Alberts  MRN: [id number]  Admitting Diagnosis:  Acute on chronic congestive heart failure     1. Acute on chronic congestive heart failure, unspecified heart failure type    2. Fatigue    3. Leg swelling    4. Chest pain    5. CHF (congestive heart failure)    6. Drooling    7. Acute cystitis without hematuria    8. Postherpetic neuralgia    9. Chronic anemia    10. MGUS (monoclonal gammopathy of unknown significance)    11. Acute on chronic diastolic heart failure    12. Severe malnutrition    13. Weakness          Recommendations:     Recommended therapy intensity at discharge: Moderate Intensity Therapy   Discharge Equipment Recommendations:  to be determined by next level of care  Barriers to discharge:   (increased physical assist required)    Assessment:     Ev Alberts is a 89 y.o. female with a medical diagnosis of Acute on chronic congestive heart failure.  She presents with The primary encounter diagnosis was Acute on chronic congestive heart failure, unspecified heart failure type. Diagnoses of Fatigue, Leg swelling, Chest pain, CHF (congestive heart failure), Drooling, Acute cystitis without hematuria, Postherpetic neuralgia, Chronic anemia, MGUS (monoclonal gammopathy of unknown significance), Acute on chronic diastolic heart failure, Severe malnutrition, and Weakness were also pertinent to this visit.  . Performance deficits affecting function are weakness, impaired endurance, impaired cognition, decreased ROM, impaired self care skills, decreased upper extremity function, decreased lower extremity function, impaired functional mobility, impaired skin, edema, decreased safety awareness, gait instability, impaired balance, pain, impaired cardiopulmonary response to activity.     Pt requires increased encouragement and education on impt of participation; remains with pitting and hard edema to trunk/abdomen and BLEs. Pt with poor ax tolerance and  endurance. Also limited this date by diarrhea/bowel incontinence     Rehab Prognosis:  Fair; patient would benefit from acute skilled OT services to address these deficits and reach maximum level of function.       Plan:     Patient to be seen 4 x/week to address the above listed problems via self-care/home management, therapeutic activities, therapeutic exercises  Plan of Care Expires: 01/27/25  Plan of Care Reviewed with: patient, spouse    Subjective     Pain/Comfort:  Pain Rating 1:  (tenderness to perianal area wtih hygiene)  Pain Addressed 1: Reposition, Distraction, Cessation of Activity, Nurse notified  Pain Rating Post-Intervention 1:  (did not rate)    Objective:     Communicated with: be prior to session.  Patient found supine with pulse ox (continuous), telemetry, pressure relief boots, PureWick upon OT entry to room.    General Precautions: Standard, fall    Orthopedic Precautions:N/A  Braces: N/A  Respiratory Status: Room air  Vital Signs: Sp02: 97-99%     Occupational Performance:     Bed Mobility:    Patient completed Scooting/Bridging with maximal assistance  Patient completed Supine to Sit with maximal assistance  Patient completed Sit to Supine with maximal assistance x 2    Functional Mobility/Transfers:  Patient completed Sit <> Stand Transfer with moderate assistance and of 2 persons  with  rolling walker   Bed> chair mod/max A of 2   Functional Mobility: Mod A of 2 ; requires assist with weight shifting, RW management, verbal and tactile cues to maintain boundaries of RW and to increase step length; 2 trials performed     Activities of Daily Living:  Lower Body Dressing: total assistance    Toileting: total assistance pt with incontinence of bowels throughout session; placed on bed pan x 3 initially during session while seated EOB; pt requiring total A for hygiene and clothing management; unable to control at this time; brief donned following cleaning in stance; numerous standing trials  performed from EOB for cleaning and replacement of pads; pt with poor standing tolerance; brief donned in order to perform functional mobility     Therapeutic Activities:  Functional mobility x 2 trials; increased time and assist to perform ( as above); pt requesting to sit on toilet, however, soiled in BM and unable to tolerate further mobility trials; returned to supine        [facility] 6 Click ADL: 13    Patient Education:  Patient provided with verbal education education regarding OT role/goals/POC, fall prevention, and safety awareness.  Additional teaching is warranted.      Patient left supine with all lines intact, call button in reach, nsg notified, and   present.    GOALS:   Multidisciplinary Problems       Occupational Therapy Goals          Problem: Occupational Therapy    Goal Priority Disciplines Outcome Interventions   Occupational Therapy Goal     OT, PT/OT Progressing    Description: Goals to be met by: 1/27/25     Patient will increase functional independence with ADLs by performing:    Feeding with Set-up Assistance.  UE Dressing with Minimal Assistance.  LE Dressing with Minimal Assistance.  Grooming while seated at sink with Stand-by Assistance.  Toileting from bedside commode with Minimal Assistance for hygiene and clothing management.   Toilet transfer to bedside commode with Minimal Assistance.                       Time Tracking:     OT Date of Treatment: 01/16/25  OT Start Time: 1117  OT Stop Time: 1200  OT Total Time (min): 43 min    Billable Minutes:Self Care/Home Management 25  Therapeutic Activity 18    OT/SARANYA: OT     Number of SARANYA visits since last OT visit: 2    1/16/2025      General

## 2025-01-16 NOTE — PT/OT/SLP PROGRESS
Physical Therapy Treatment    Patient Name:  Ev Alberts   MRN:  [id number]    Recommendations:     Discharge therapy intensity: Moderate Intensity Therapy   Discharge Equipment Recommendations: to be determined by next level of care  Barriers to discharge: Impaired mobility    Assessment:     Ev Alberts is a 89 y.o. female admitted with a medical diagnosis of acute HF exacerbation, NSTEMI, catheter associated UTI, PAF, acute anemia, hx of dementia.  She presents with the following impairments/functional limitations: weakness, gait instability, impaired balance, impaired cognition, impaired functional mobility, impaired self care skills, decreased safety awareness, impaired endurance . Co-tx with OT due to limited activity tolerance. Pt continues to required 2 person modA for mobility with RW, ambulatory distance significantly limited. Requires encouragement to participate in therapy.    Rehab Prognosis: Good; patient would benefit from acute skilled PT services to address these deficits and reach maximum level of function.    Recent Surgery: * No surgery found *      Plan:     During this hospitalization, patient would benefit from acute PT services 5 x/week to address the identified rehab impairments via therapeutic activities, gait training, therapeutic exercises and progress toward the following goals:    Plan of Care Expires:  02/10/25    Subjective     Chief Complaint: frequent BM  Patient/Family Comments/goals: home, PLOF  Pain/Comfort:  Pain Rating 1:  (c/o tenderness perianal area 2/2 frequent BMs)      Objective:     Communicated with RN prior to session.  Patient found  sitting EOB with OT present  with telemetry, pulse ox (continuous) upon PT entry to room.     General Precautions: Standard, fall  Orthopedic Precautions: N/A  Braces: N/A  Respiratory Status: Room air  Blood Pressure: NT  Skin Integrity:  known sacral wound      Functional Mobility:  Bed Mobility:     Sit to Supine: maximal  assistance and of 2 persons  Transfers:     Sit to Stand:  moderate assistance and of 2 persons with rolling walker  Bed to Chair: moderate assistance, maximal assistance, and of 2 persons with  rolling walker  using  Stand Pivot  Balance: sitting balance = SBA    Therapeutic Activities/Exercises:  Pt ambulated 1x6ft and 1x4ft with RW with modAx2, posterior lean, max verbal cues to remain within RW and increase step length (B). Assist needed for lateral weight shifting  Cues for hand placement with transfers  Frequent BM prior to and during session, totalA for pericare. Pt had another BM during gait trials, assisted back to bed and notified nsg of need for cleaning    Education:  Patient provided with verbal education education regarding PT role/goals/POC, fall prevention, safety awareness, and discharge/DME recommendations.  Understanding was verbalized.     Patient left HOB elevated with all lines intact, call button in reach, RN notified, and  present    GOALS:   Multidisciplinary Problems       Physical Therapy Goals          Problem: Physical Therapy    Goal Priority Disciplines Outcome Interventions   Physical Therapy Goal     PT, PT/OT Progressing    Description: Goals to be met by: 24     Patient will increase functional independence with mobility by performin. Supine to sit with MInimal Assistance  2. Sit to stand transfer with Minimal Assistance  3. Bed to chair transfer with Minimal Assistance using Rolling Walker vs. Squat pivot  4. Gait  x 25 feet with Minimal Assistance using Rolling Walker.   5. Sitting at edge of bed x10 minutes with Stand-by Assistance                         Time Tracking:     PT Received On: 25  PT Start Time: 1135     PT Stop Time: 1159  PT Total Time (min): 24 min     Billable Minutes: Gait Training 10 and Therapeutic Activity 14    Treatment Type: Treatment  PT/PTA: PT     Number of PTA visits since last PT visit: 2025

## 2025-01-17 LAB
BASOPHILS # BLD AUTO: 0.04 X10(3)/MCL
BASOPHILS NFR BLD AUTO: 0.8 %
EOSINOPHIL # BLD AUTO: 0.08 X10(3)/MCL (ref 0–0.9)
EOSINOPHIL NFR BLD AUTO: 1.5 %
ERYTHROCYTE [DISTWIDTH] IN BLOOD BY AUTOMATED COUNT: 19.8 % (ref 11.5–17)
HCT VFR BLD AUTO: 23.5 % (ref 37–47)
HGB BLD-MCNC: 7.8 G/DL (ref 12–16)
IMM GRANULOCYTES # BLD AUTO: 0.02 X10(3)/MCL (ref 0–0.04)
IMM GRANULOCYTES NFR BLD AUTO: 0.4 %
LYMPHOCYTES # BLD AUTO: 1.63 X10(3)/MCL (ref 0.6–4.6)
LYMPHOCYTES NFR BLD AUTO: 31 %
MCH RBC QN AUTO: 32.9 PG (ref 27–31)
MCHC RBC AUTO-ENTMCNC: 33.2 G/DL (ref 33–36)
MCV RBC AUTO: 99.2 FL (ref 80–94)
MONOCYTES # BLD AUTO: 0.67 X10(3)/MCL (ref 0.1–1.3)
MONOCYTES NFR BLD AUTO: 12.7 %
NEUTROPHILS # BLD AUTO: 2.82 X10(3)/MCL (ref 2.1–9.2)
NEUTROPHILS NFR BLD AUTO: 53.6 %
NRBC BLD AUTO-RTO: 0 %
PLATELET # BLD AUTO: 150 X10(3)/MCL (ref 130–400)
PMV BLD AUTO: 9.4 FL (ref 7.4–10.4)
POCT GLUCOSE: 122 MG/DL (ref 70–110)
RBC # BLD AUTO: 2.37 X10(6)/MCL (ref 4.2–5.4)
WBC # BLD AUTO: 5.26 X10(3)/MCL (ref 4.5–11.5)

## 2025-01-17 PROCEDURE — 25000003 PHARM REV CODE 250: Performed by: INTERNAL MEDICINE

## 2025-01-17 PROCEDURE — 25000003 PHARM REV CODE 250

## 2025-01-17 PROCEDURE — 85025 COMPLETE CBC W/AUTO DIFF WBC: CPT | Performed by: INTERNAL MEDICINE

## 2025-01-17 PROCEDURE — 36415 COLL VENOUS BLD VENIPUNCTURE: CPT | Performed by: INTERNAL MEDICINE

## 2025-01-17 PROCEDURE — 21400001 HC TELEMETRY ROOM

## 2025-01-17 PROCEDURE — 97530 THERAPEUTIC ACTIVITIES: CPT

## 2025-01-17 RX ADMIN — RISPERIDONE 2 MG: 1 TABLET, FILM COATED ORAL at 09:01

## 2025-01-17 RX ADMIN — POTASSIUM BICARBONATE 25 MEQ: 977.5 TABLET, EFFERVESCENT ORAL at 09:01

## 2025-01-17 RX ADMIN — FERROUS SULFATE TAB 325 MG (65 MG ELEMENTAL FE) 1 EACH: 325 (65 FE) TAB at 09:01

## 2025-01-17 RX ADMIN — LATANOPROST 1 DROP: 50 SOLUTION OPHTHALMIC at 09:01

## 2025-01-17 RX ADMIN — RIVASTIGMINE TARTRATE 1.5 MG: 1.5 CAPSULE ORAL at 09:01

## 2025-01-17 RX ADMIN — PANTOPRAZOLE SODIUM 40 MG: 40 TABLET, DELAYED RELEASE ORAL at 09:01

## 2025-01-17 RX ADMIN — APIXABAN 5 MG: 5 TABLET, FILM COATED ORAL at 09:01

## 2025-01-17 RX ADMIN — SENNOSIDES AND DOCUSATE SODIUM 4 TABLET: 50; 8.6 TABLET ORAL at 09:01

## 2025-01-17 RX ADMIN — LEVOTHYROXINE SODIUM 25 MCG: 25 TABLET ORAL at 07:01

## 2025-01-17 RX ADMIN — MELATONIN TAB 3 MG 6 MG: 3 TAB at 05:01

## 2025-01-17 RX ADMIN — CETIRIZINE HYDROCHLORIDE 10 MG: 10 TABLET, FILM COATED ORAL at 09:01

## 2025-01-17 RX ADMIN — LUBIPROSTONE 24 MCG: 24 CAPSULE, GELATIN COATED ORAL at 09:01

## 2025-01-17 RX ADMIN — ALLOPURINOL 300 MG: 300 TABLET ORAL at 09:01

## 2025-01-17 RX ADMIN — FUROSEMIDE 80 MG: 40 TABLET ORAL at 09:01

## 2025-01-17 RX ADMIN — TRAZODONE HYDROCHLORIDE 150 MG: 150 TABLET ORAL at 09:01

## 2025-01-17 RX ADMIN — ACETAMINOPHEN 650 MG: 325 TABLET, FILM COATED ORAL at 09:01

## 2025-01-17 NOTE — PROGRESS NOTES
Inpatient Nutrition Evaluation    Admit Date: 12/26/2024   Total duration of encounter: 22 days    Nutrition Recommendation/Prescription     Continue oral diet as tolerated; Diet Heart Healthy Standard Tray       Nutrition Assessment     Chart Review    Reason Seen: continuous nutrition monitoring    Malnutrition Screening Tool Results   Have you recently lost weight without trying?: No  Have you been eating poorly because of a decreased appetite?: No   MST Score: 0     Diagnosis:  Acute HFpEF Exacerbation   NSTEMI   Catheter associated urinary infection  Paroxysmal A-fib   UTI GNR -POA   Acute anemia, iron def     Relevant Medical History:  diastolic heart failure LVEF 50-55%, HTN, chronic anemia, paroxysmal A-fib on eliquis, and CKD stage III     Nutrition-Related Medications: Scheduled Medications:  allopurinoL, 300 mg, Daily  apixaban, 5 mg, BID  cetirizine, 10 mg, QHS  ferrous sulfate, 1 tablet, BID  furosemide, 80 mg, Daily  latanoprost, 1 drop, QHS  levothyroxine, 25 mcg, Before breakfast  lubiprostone, 24 mcg, BID WM  melatonin, 6 mg, Q24H  pantoprazole, 40 mg, BID  potassium bicarbonate, 25 mEq, Daily  risperiDONE, 2 mg, QHS  rivastigmine tartrate, 1.5 mg, Daily  senna-docusate 8.6-50 mg, 4 tablet, Daily  traZODone, 150 mg, QHS    Continuous Infusions:   PRN Medications:    allopurinoL tablet 300 mg    apixaban tablet 5 mg    cetirizine tablet 10 mg    ferrous sulfate tablet 1 each    furosemide tablet 80 mg    latanoprost 0.005 % ophthalmic solution 1 drop    levothyroxine tablet 25 mcg    lubiprostone capsule 24 mcg    melatonin tablet 6 mg    pantoprazole EC tablet 40 mg    potassium bicarbonate disintegrating tablet 25 mEq    risperiDONE tablet 2 mg    rivastigmine tartrate capsule 1.5 mg    senna-docusate 8.6-50 mg per tablet 4 tablet    traZODone tablet 150 mg        Nutrition-Related Labs:  Recent Labs   Lab 01/12/25  0359 01/13/25  0544 01/17/25  0546   WBC  --   --  5.26   HGB 7.9* 8.1* 7.8*   HCT  "23.2* 24.2* 23.5*        Diet Order: Diet Heart Healthy Standard Tray  Oral Supplement Order: none  Appetite/Oral Intake: good/50-75% of meals  Factors Affecting Nutritional Intake: none identified  Food/Sikhism/Cultural Preferences: none reported  Food Allergies: no known food allergies       Wound(s): [REMOVED]      Altered Skin Integrity 03/10/24 0705 Sacral spine-Tissue loss description: Partial thickness       Wound 12/10/24 0800 Pressure Injury Sacral spine #1-Tissue loss description: Partial thickness     Comments    12/28/24 Pt tolerating oral diet, unable to provide much hx, ate breakfast this morning; no unintentional weight loss reported prior to admit.    1/3/24: Pt continues with good appetite today. Noted last BM was 12/31.       1/10/25 Pt reports good appetite, says she is not drinking the Boost Plus so will d/c    1/17/25 Nurse reports fair appetite and intake; pt eats well when  feeds her.     Anthropometrics    Height: 5' 5" (165.1 cm) Height Method: Stated  Last Weight: 76.7 kg (169 lb) (01/10/25 0500) Weight Method: Bed Scale  BMI (Calculated): 28.1  BMI Classification: overweight (BMI 25-29.9)     Ideal Body Weight (IBW), Female: 125 lb     % Ideal Body Weight, Female (lb): 126.98 %                             Usual Weight Provided By: EMR weight history    Wt Readings from Last 5 Encounters:   01/10/25 76.7 kg (169 lb)   12/22/24 71.7 kg (158 lb 1.1 oz)   11/19/24 72.6 kg (160 lb)   10/23/24 68.9 kg (152 lb)   09/17/24 69.9 kg (154 lb)     Weight Change(s) Since Admission:  Admit Weight: 72.6 kg (160 lb) (12/26/24 1501)      Patient Education    Not applicable.    Monitoring & Evaluation     Dietitian will monitor food and beverage intake and weight change.  Nutrition Risk/Follow-Up: low (follow-up in 5-7 days)  Patients assigned 'low nutrition risk' status do not qualify for a full nutritional assessment but will be monitored and re-evaluated in a 5-7 day time period. Please consult " if re-evaluation needed sooner.

## 2025-01-17 NOTE — PT/OT/SLP PROGRESS
Physical Therapy Treatment    Patient Name:  Ev Alberts   MRN:  [id number]    Recommendations:     Discharge therapy intensity: Moderate Intensity Therapy   Discharge Equipment Recommendations: to be determined by next level of care  Barriers to discharge: Impaired mobility    Assessment:     Ev Alberts is a 89 y.o. female admitted with a medical diagnosis of acute HF exacerbation, NSTEMI, catheter associated UTI, PAF, acute anemia, hx of dementia .  She presents with the following impairments/functional limitations: weakness, impaired endurance, impaired self care skills, impaired functional mobility, gait instability, impaired balance, impaired cognition, decreased coordination, decreased upper extremity function, decreased lower extremity function, decreased safety awareness, pain. Pt continues to require maxA for bed mobility, 2 person modA for mobility with RW, and self care. Significantly limited mobility and ambulation distance due to gross weakness deficits. Adjusting POC to 3x/week to reflect pt participation and progress towards established goals.    Rehab Prognosis: Good; patient would benefit from acute skilled PT services to address these deficits and reach maximum level of function.    Recent Surgery: * No surgery found *      Plan:     During this hospitalization, patient would benefit from acute PT services 3 x/week to address the identified rehab impairments via gait training, therapeutic activities, therapeutic exercises, neuromuscular re-education and progress toward the following goals:    Plan of Care Expires:  02/10/25    Subjective     Chief Complaint: pain with BM  Patient/Family Comments/goals: PLOF  Pain/Comfort:  Pain Rating 1:  (c/o of pain in rectum with pericare and BM)  Pain Addressed 1: Reposition      Objective:     Communicated with RN prior to session.  Patient found HOB elevated with telemetry, pulse ox (continuous), pressure relief boots upon PT entry to room.     General  Precautions: Standard, fall  Orthopedic Precautions: N/A  Braces: N/A  Respiratory Status: Room air  Blood Pressure: NT  Skin Integrity:  known sacral wound       Functional Mobility:  Bed Mobility:     Rolling Left:  maximal assistance  Rolling Right: maximal assistance  Scooting: maximal assistance  Bridging: maximal assistance  Supine to Sit: maximal assistance  Sit to Supine: maximal assistance  Transfers:     Sit to Stand:  moderate to maximal assistance and of 2 persons with rolling walker  Bed to Chair: maximal assistance and of 2 persons with  rolling walker using stand pivot.  Balance: Sitting balance = CGA and standing balance is poor    Therapeutic Activities/Exercises:  Pregait activities of weight sifting and stepping R/L/forward/backward performed with modA of 2 people and RW. Pt requires max VC for correct RW use and step pattern as well as encouragement and extended time to complete task. Pt had frequent BM during treatment and requires totalA for pericare. Pt was returned to bed due to BM while up and was properly cleaned and RN notified.     Education:  Patient provided with verbal education education regarding PT role/goals/POC, fall prevention, safety awareness, and discharge/DME recommendations.  Understanding was verbalized.     Patient left HOB elevated with all lines intact, call button in reach, pressure relief boots, and RN notified and spouse present.     GOALS:   Multidisciplinary Problems       Physical Therapy Goals          Problem: Physical Therapy    Goal Priority Disciplines Outcome Interventions   Physical Therapy Goal     PT, PT/OT Progressing    Description: Goals to be met by: 24     Patient will increase functional independence with mobility by performin. Supine to sit with MInimal Assistance  2. Sit to stand transfer with Minimal Assistance  3. Bed to chair transfer with Minimal Assistance using Rolling Walker vs. Squat pivot  4. Gait  x 25 feet with Minimal  Assistance using Rolling Walker.   5. Sitting at edge of bed x10 minutes with Stand-by Assistance                         Time Tracking:     PT Received On: 01/17/25  PT Start Time: 1053     PT Stop Time: 1124  PT Total Time (min): 31 min     Billable Minutes: Therapeutic Activity 31    Treatment Type: Treatment  PT/PTA: PT     Number of PTA visits since last PT visit: 1 01/17/2025

## 2025-01-17 NOTE — PROGRESS NOTES
"[facility] Hospital Medicine Progress Note        Chief Complaint: Inpatient Follow-up for bilateral leg swelling, generalized weakness     HPI: "89 y/o F with a PMH of diastolic heart failure LVEF 50-55%, HTN, chronic anemia, paroxysmal A-fib on eliquis, and CKD stage III followed by Dr. Chávez , chronic respiratory failure on 2 L NC at home, dementia and glaucoma presented to the ED with complains of bilateral leg swelling, generalized weakness and deconditioning post hospital discharge. Patient explains that she is bed bound and lives at home with her son and  who take care of her. Patient was recently admitted at this facility for worsening generalized weakness, bilateral leg swelling and urinary retention and discharged home with an indwelling cook, follow up appointment with urology and private sitters and home care with [facility] . Patient denies black or blood in her tool, denies chest pain, denies shortness of breath, denies known fever.     Initial ED vitals: Temp 98.1, HR 90, Resp 18, /58, O2 Sat 98% on RA. ED work up revealed WBC 7.39, H&H 8.7 and 26.8, , K 3.4, BUN 30.3, Creatinine 1.26, .3, Troponin 0.048, Lactic acid 1.1, TSH 3.335 Influenza, COVID, and RSV swab negative, Respiratory panel negative, UA positive for blood, leukocyte esterase, RBC, WBC, and bacteria, X-ray chest read enlarged cardiac silhouette without overt edema, EKG read ventricular paced rhythm at 62 bmp. Cardiology was consulted in the ED and patient was admitted to Hospital Medicine.  She is found to have right heart failure and pulmonary hypertension which was the cause of her leg edema.  Nephrology was consulted and continue to remove fluid with IV Lasix.  Left ventricle systolic function is good.  Now transitioned to oral diuretics.  Consultants signed off.Recommend outpatient anemia evaluation if she may benefit from bone marrow biopsy? as a part of anemia " "workup. FOBT negative .Therapy recommending moderate intensity therapy ,case management working on discharge planning.    Interval Hx:   Patient seen and examined at bedside.  Awaiting placement.  No acute overnight events  Patient was seen and examined at bedside.  No complaints.  Refuses home health, want to go to snf. HDS    Objective/physical exam:  General: In no acute distress, afebrile  Chest: Clear to auscultation bilaterally  Heart: RRR, +S1, S2, no appreciable murmur  Abdomen: Soft, nontender, BS +  MSK: Warm, no lower extremity edema, no clubbing or cyanosis  Neurologic: Alert and oriented x4, Cranial nerve II-XII intact, Strength 5/5 in all 4 extremities    VITAL SIGNS: 24 HRS MIN & MAX LAST   Temp  Min: 98.1 °F (36.7 °C)  Max: 99.2 °F (37.3 °C) 99.2 °F (37.3 °C)   BP  Min: 104/64  Max: 137/67 137/67   Pulse  Min: 70  Max: 72  70   No data recorded 17   SpO2  Min: 92 %  Max: 95 % (!) 94 %     I have reviewed the following labs:  Recent Labs   Lab 01/10/25  0638 01/12/25  0359 01/13/25  0544   WBC 7.29  --   --    RBC 2.30*  --   --    HGB 7.7* 7.9* 8.1*   HCT 23.1* 23.2* 24.2*   .4*  --   --    MCH 33.5*  --   --    MCHC 33.3  --   --    RDW 19.9*  --   --      --   --    MPV 9.7  --   --      No results for input(s): "NA", "K", "CL", "CO2", "ANIONGAP", "BUN", "CREATININE", "GLU", "CALCIUM", "PH", "MG", "ALBUMIN", "PROT", "ALKPHOS", "ALT", "AST", "BILITOT" in the last 168 hours.    Microbiology Results (last 7 days)       ** No results found for the last 168 hours. **             See below for Radiology    Assessment/Plan:  HFpEF, acutely decompensated, improved  Pulmonary hypertension (moderate per Echo)  NSTEMI type II s/t above  CKD IIIB  Pseudomonas urinary tract infection, resolved  Anemia of chronic disease  Major Neurocognitive Disorder, unspecified   Chronic hypoxic respiratory failure  Baseline dementia  Essential HTN  Paroxysmal atrial fibrillation  h/o bipolar " disorder    Plan:  No Need for ischemic evaluation per Cardiology.  Diuresis per Nephrology.  Recommend to Continue current Lasix.  Strict Is&Os.  Nephrology signed off now  Afebrile without any leukocytosis  Monitor hemoglobin,  received IV,now  on oral iron supplementation.  Keep hemoglobin above 7.5.  Recommend outpatient anemia evaluation if she may benefit from bone marrow biopsy?  as a part of anemia workup.  FOBT negative  Psych following, medications adjusted  PTOT recommending moderate intensity therapy.  Case management working on discharge planning.  Introduced to about Home health, patient refuses home health, requests to go to SNF  Imodium p.r.n. for loose stools      VTE prophylaxis: eliquis      Anticipated discharge and Disposition:   snf placement pending, outpatient anemia workup      All diagnosis and differential diagnosis have been reviewed; assessment and plan has been documented; I have personally reviewed the labs and test results that are presently available; I have reviewed the patients medication list; I have reviewed the consulting providers response and recommendations. I have reviewed or attempted to review medical records based upon their availability    All of the patient's questions have been  addressed and answered. Patient's is agreeable to the above stated plan. I will continue to monitor closely and make adjustments to medical management as needed.    Portions of this note dictated using EMR integrated voice recognition software, and may be subject to voice recognition errors not corrected at proofreading. Please contact writer for clarification if needed.   _____________________________________________________________________    Malnutrition Status:  Nutrition consulted. Most recent weight and BMI monitored-     Measurements:  Wt Readings from Last 1 Encounters:   01/10/25 76.7 kg (169 lb)   Body mass index is 28.12 kg/m².    Patient has been screened and assessed by  RD.    Malnutrition Type:  Context:    Level:      Malnutrition Characteristic Summary:       Interventions/Recommendations (treatment strategy):        Scheduled Med:   allopurinoL  300 mg Oral Daily    apixaban  5 mg Oral BID    cetirizine  10 mg Oral QHS    ferrous sulfate  1 tablet Oral BID    furosemide  80 mg Oral Daily    latanoprost  1 drop Both Eyes QHS    levothyroxine  25 mcg Oral Before breakfast    lubiprostone  24 mcg Oral BID WM    melatonin  6 mg Oral Q24H    pantoprazole  40 mg Oral BID    potassium bicarbonate  25 mEq Oral Daily    risperiDONE  2 mg Oral QHS    rivastigmine tartrate  1.5 mg Oral Daily    senna-docusate 8.6-50 mg  4 tablet Oral Daily    traZODone  150 mg Oral QHS      Continuous Infusions:     PRN Meds:    Current Facility-Administered Medications:     acetaminophen, 650 mg, Oral, Q4H PRN    albuterol, 1 puff, Inhalation, PRN    aluminum-magnesium hydroxide-simethicone, 30 mL, Oral, QID PRN    bisacodyL, 10 mg, Rectal, Daily PRN    dextrose 50%, 12.5 g, Intravenous, PRN    dextrose 50%, 25 g, Intravenous, PRN    glucagon (human recombinant), 1 mg, Intramuscular, PRN    glucose, 16 g, Oral, PRN    glucose, 24 g, Oral, PRN    glycopyrrolate, 1 mg, Oral, BID PRN    hydrOXYzine HCL, 25 mg, Oral, Nightly PRN    insulin aspart U-100, 0-5 Units, Subcutaneous, QID (AC + HS) PRN    loperamide, 2 mg, Oral, QID PRN    nitroGLYCERIN, 0.4 mg, Sublingual, Q5 Min PRN    ondansetron, 4 mg, Intravenous, Q8H PRN    pneumoc 20-rod conj-dip cr(PF), 0.5 mL, Intramuscular, vaccine x 1 dose    polyethylene glycol, 17 g, Oral, BID PRN    sodium chloride 0.9%, 10 mL, Intravenous, PRN     Radiology:  I have personally reviewed the following imaging and agree with the radiologist.     X-Ray Abdomen AP 1 View  EXAMINATION  XR ABDOMEN AP 1 VIEW    CLINICAL HISTORY  Suprapubic fullness, eval constipation;    TECHNIQUE  A total of 2 AP image(s) of the abdomen.    COMPARISON  30 September  2016    FINDINGS  Lines/tubes/devices: Multiple ECG leads overlie the imaged region.  Left chest wall pacemaker and partially visualized leads also noted.    Notable amount of residual stool is present throughout the colon.  A non-obstructed bowel gas pattern is present. No intra-abdominal mass effect is appreciated.   Detection of air-fluid levels and low-volume pneumoperitoneum is limited due to supine technique. There is similar right upper quadrant surgical clips.  Ill-defined hazy radiopacity projects over the left lower abdomen, of otherwise indeterminate etiology.    Included lower thoracic cavity and osseous structures are without acute abnormality.    IMPRESSION  1. Findings suggestive of constipation.  No convincing acute intra-abdominal process.  2. Ill-defined hazy opacity projecting over the left lower quadrant could represent artifact but is otherwise of indeterminate etiology.  3. Additional chronic secondary details discussed above.    Electronically signed by: Mark Jolley  Date:    01/08/2025  Time:    11:16      Phyllis Almanza MD  Department of Hospital Medicine   [facility]   01/16/2025

## 2025-01-17 NOTE — PLAN OF CARE
Clinical Updates sent to [facility] . [facility] submitted for Auth on 01-. Pt Auth Is still pending .

## 2025-01-18 LAB
POCT GLUCOSE: 113 MG/DL (ref 70–110)
POCT GLUCOSE: 117 MG/DL (ref 70–110)

## 2025-01-18 PROCEDURE — 25000003 PHARM REV CODE 250: Performed by: INTERNAL MEDICINE

## 2025-01-18 PROCEDURE — 25000003 PHARM REV CODE 250

## 2025-01-18 PROCEDURE — 21400001 HC TELEMETRY ROOM

## 2025-01-18 RX ADMIN — PANTOPRAZOLE SODIUM 40 MG: 40 TABLET, DELAYED RELEASE ORAL at 09:01

## 2025-01-18 RX ADMIN — FERROUS SULFATE TAB 325 MG (65 MG ELEMENTAL FE) 1 EACH: 325 (65 FE) TAB at 09:01

## 2025-01-18 RX ADMIN — MELATONIN TAB 3 MG 6 MG: 3 TAB at 06:01

## 2025-01-18 RX ADMIN — APIXABAN 5 MG: 5 TABLET, FILM COATED ORAL at 09:01

## 2025-01-18 RX ADMIN — LUBIPROSTONE 24 MCG: 24 CAPSULE, GELATIN COATED ORAL at 07:01

## 2025-01-18 RX ADMIN — LATANOPROST 1 DROP: 50 SOLUTION OPHTHALMIC at 09:01

## 2025-01-18 RX ADMIN — TRAZODONE HYDROCHLORIDE 150 MG: 150 TABLET ORAL at 09:01

## 2025-01-18 RX ADMIN — POTASSIUM BICARBONATE 25 MEQ: 977.5 TABLET, EFFERVESCENT ORAL at 09:01

## 2025-01-18 RX ADMIN — RIVASTIGMINE TARTRATE 1.5 MG: 1.5 CAPSULE ORAL at 10:01

## 2025-01-18 RX ADMIN — RISPERIDONE 2 MG: 1 TABLET, FILM COATED ORAL at 09:01

## 2025-01-18 RX ADMIN — CETIRIZINE HYDROCHLORIDE 10 MG: 10 TABLET, FILM COATED ORAL at 09:01

## 2025-01-18 RX ADMIN — LEVOTHYROXINE SODIUM 25 MCG: 25 TABLET ORAL at 07:01

## 2025-01-18 RX ADMIN — ACETAMINOPHEN 650 MG: 325 TABLET, FILM COATED ORAL at 01:01

## 2025-01-18 RX ADMIN — ALLOPURINOL 300 MG: 300 TABLET ORAL at 09:01

## 2025-01-18 RX ADMIN — LUBIPROSTONE 24 MCG: 24 CAPSULE, GELATIN COATED ORAL at 06:01

## 2025-01-18 RX ADMIN — FUROSEMIDE 80 MG: 40 TABLET ORAL at 09:01

## 2025-01-18 NOTE — PROGRESS NOTES
"[facility] Hospital Medicine Progress Note        Chief Complaint: Inpatient Follow-up for bilateral leg swelling, generalized weakness     HPI: "88 y/o F with a PMH of diastolic heart failure LVEF 50-55%, HTN, chronic anemia, paroxysmal A-fib on eliquis, and CKD stage III followed by Dr. Chávez , chronic respiratory failure on 2 L NC at home, dementia and glaucoma presented to the ED with complains of bilateral leg swelling, generalized weakness and deconditioning post hospital discharge. Patient explains that she is bed bound and lives at home with her son and  who take care of her. Patient was recently admitted at this facility for worsening generalized weakness, bilateral leg swelling and urinary retention and discharged home with an indwelling cook, follow up appointment with urology and private sitters and home care with [facility] . Patient denies black or blood in her tool, denies chest pain, denies shortness of breath, denies known fever.     Initial ED vitals: Temp 98.1, HR 90, Resp 18, /58, O2 Sat 98% on RA. ED work up revealed WBC 7.39, H&H 8.7 and 26.8, , K 3.4, BUN 30.3, Creatinine 1.26, .3, Troponin 0.048, Lactic acid 1.1, TSH 3.335 Influenza, COVID, and RSV swab negative, Respiratory panel negative, UA positive for blood, leukocyte esterase, RBC, WBC, and bacteria, X-ray chest read enlarged cardiac silhouette without overt edema, EKG read ventricular paced rhythm at 62 bmp. Cardiology was consulted in the ED and patient was admitted to Hospital Medicine.  She is found to have right heart failure and pulmonary hypertension which was the cause of her leg edema.  Nephrology was consulted and continue to remove fluid with IV Lasix.  Left ventricle systolic function is good.  Now transitioned to oral diuretics.  Consultants signed off.Recommend outpatient anemia evaluation if she may benefit from bone marrow biopsy? as a part of anemia " "workup. FOBT negative .Therapy recommending moderate intensity therapy ,case management working on discharge planning.    Interval Hx:   Patient seen and examined bedside.  No acute overnight events.  Denies any concerns at this time.  Awaiting placement    Objective/physical exam:  General: In no acute distress, afebrile  Chest: Clear to auscultation bilaterally  Heart: RRR, +S1, S2, no appreciable murmur  Abdomen: Soft, nontender, BS +  MSK: Warm, no lower extremity edema, no clubbing or cyanosis  Neurologic: Alert and oriented x4, Cranial nerve II-XII intact, Strength 5/5 in all 4 extremities    VITAL SIGNS: 24 HRS MIN & MAX LAST   Temp  Min: 97.7 °F (36.5 °C)  Max: 99.2 °F (37.3 °C) 98.8 °F (37.1 °C)   BP  Min: 122/63  Max: 145/64 126/60   Pulse  Min: 69  Max: 79  71   No data recorded 17   SpO2  Min: 92 %  Max: 96 % (!) 94 %     I have reviewed the following labs:  Recent Labs   Lab 01/12/25  0359 01/13/25  0544 01/17/25  0546   WBC  --   --  5.26   RBC  --   --  2.37*   HGB 7.9* 8.1* 7.8*   HCT 23.2* 24.2* 23.5*   MCV  --   --  99.2*   MCH  --   --  32.9*   MCHC  --   --  33.2   RDW  --   --  19.8*   PLT  --   --  150   MPV  --   --  9.4     No results for input(s): "NA", "K", "CL", "CO2", "ANIONGAP", "BUN", "CREATININE", "GLU", "CALCIUM", "PH", "MG", "ALBUMIN", "PROT", "ALKPHOS", "ALT", "AST", "BILITOT" in the last 168 hours.    Microbiology Results (last 7 days)       ** No results found for the last 168 hours. **             See below for Radiology    Assessment/Plan:  HFpEF, acutely decompensated, improved  Pulmonary hypertension (moderate per Echo)  NSTEMI type II s/t above  CKD IIIB  Pseudomonas urinary tract infection, resolved  Anemia of chronic disease  Major Neurocognitive Disorder, unspecified   Chronic hypoxic respiratory failure  Baseline dementia  Essential HTN  Paroxysmal atrial fibrillation  h/o bipolar disorder    Plan:  No Need for ischemic evaluation per Cardiology.  Diuresis per Nephrology. "  Recommend to Continue current Lasix.  Strict Is&Os.  Nephrology signed off now  Afebrile without any leukocytosis  Monitor hemoglobin,  received IV,now  on oral iron supplementation.  Keep hemoglobin above 7.5.  Recommend outpatient anemia evaluation if she may benefit from bone marrow biopsy?  as a part of anemia workup.  FOBT negative  Psych following, medications adjusted  PTOT recommending moderate intensity therapy.  Case management working on discharge planning.  Introduced to about Home health, patient refuses home health, requests to go to SNF  Imodium p.r.n. for loose stools  Awaiting placement      VTE prophylaxis: eliquis      Anticipated discharge and Disposition:   snf placement pending, outpatient anemia workup      All diagnosis and differential diagnosis have been reviewed; assessment and plan has been documented; I have personally reviewed the labs and test results that are presently available; I have reviewed the patients medication list; I have reviewed the consulting providers response and recommendations. I have reviewed or attempted to review medical records based upon their availability    All of the patient's questions have been  addressed and answered. Patient's is agreeable to the above stated plan. I will continue to monitor closely and make adjustments to medical management as needed.    Portions of this note dictated using EMR integrated voice recognition software, and may be subject to voice recognition errors not corrected at proofreading. Please contact writer for clarification if needed.   _____________________________________________________________________    Malnutrition Status:  Nutrition consulted. Most recent weight and BMI monitored-     Measurements:  Wt Readings from Last 1 Encounters:   01/10/25 76.7 kg (169 lb)   Body mass index is 28.12 kg/m².    Patient has been screened and assessed by RD.    Malnutrition Type:  Context:    Level:      Malnutrition Characteristic  Summary:       Interventions/Recommendations (treatment strategy):        Scheduled Med:   allopurinoL  300 mg Oral Daily    apixaban  5 mg Oral BID    cetirizine  10 mg Oral QHS    ferrous sulfate  1 tablet Oral BID    furosemide  80 mg Oral Daily    latanoprost  1 drop Both Eyes QHS    levothyroxine  25 mcg Oral Before breakfast    lubiprostone  24 mcg Oral BID WM    melatonin  6 mg Oral Q24H    pantoprazole  40 mg Oral BID    potassium bicarbonate  25 mEq Oral Daily    risperiDONE  2 mg Oral QHS    rivastigmine tartrate  1.5 mg Oral Daily    senna-docusate 8.6-50 mg  4 tablet Oral Daily    traZODone  150 mg Oral QHS      Continuous Infusions:     PRN Meds:    Current Facility-Administered Medications:     acetaminophen, 650 mg, Oral, Q4H PRN    albuterol, 1 puff, Inhalation, PRN    aluminum-magnesium hydroxide-simethicone, 30 mL, Oral, QID PRN    bisacodyL, 10 mg, Rectal, Daily PRN    dextrose 50%, 12.5 g, Intravenous, PRN    dextrose 50%, 25 g, Intravenous, PRN    glucagon (human recombinant), 1 mg, Intramuscular, PRN    glucose, 16 g, Oral, PRN    glucose, 24 g, Oral, PRN    glycopyrrolate, 1 mg, Oral, BID PRN    hydrOXYzine HCL, 25 mg, Oral, Nightly PRN    insulin aspart U-100, 0-5 Units, Subcutaneous, QID (AC + HS) PRN    loperamide, 2 mg, Oral, QID PRN    nitroGLYCERIN, 0.4 mg, Sublingual, Q5 Min PRN    ondansetron, 4 mg, Intravenous, Q8H PRN    pneumoc 20-rod conj-dip cr(PF), 0.5 mL, Intramuscular, vaccine x 1 dose    polyethylene glycol, 17 g, Oral, BID PRN    sodium chloride 0.9%, 10 mL, Intravenous, PRN     Radiology:  I have personally reviewed the following imaging and agree with the radiologist.     X-Ray Abdomen AP 1 View  EXAMINATION  XR ABDOMEN AP 1 VIEW    CLINICAL HISTORY  Suprapubic fullness, eval constipation;    TECHNIQUE  A total of 2 AP image(s) of the abdomen.    COMPARISON  30 September 2016    FINDINGS  Lines/tubes/devices: Multiple ECG leads overlie the imaged region.  Left chest wall  pacemaker and partially visualized leads also noted.    Notable amount of residual stool is present throughout the colon.  A non-obstructed bowel gas pattern is present. No intra-abdominal mass effect is appreciated.   Detection of air-fluid levels and low-volume pneumoperitoneum is limited due to supine technique. There is similar right upper quadrant surgical clips.  Ill-defined hazy radiopacity projects over the left lower abdomen, of otherwise indeterminate etiology.    Included lower thoracic cavity and osseous structures are without acute abnormality.    IMPRESSION  1. Findings suggestive of constipation.  No convincing acute intra-abdominal process.  2. Ill-defined hazy opacity projecting over the left lower quadrant could represent artifact but is otherwise of indeterminate etiology.  3. Additional chronic secondary details discussed above.    Electronically signed by: Mark Jolley  Date:    01/08/2025  Time:    11:16      Phyllis Almanza MD  Department of Hospital Medicine   [facility]   01/17/2025

## 2025-01-19 LAB
ALBUMIN SERPL-MCNC: 2.5 G/DL (ref 3.4–4.8)
ALBUMIN/GLOB SERPL: 0.6 RATIO (ref 1.1–2)
ALP SERPL-CCNC: 107 UNIT/L (ref 40–150)
ALT SERPL-CCNC: 7 UNIT/L (ref 0–55)
ANION GAP SERPL CALC-SCNC: 7 MEQ/L
AST SERPL-CCNC: 19 UNIT/L (ref 5–34)
BILIRUB SERPL-MCNC: 1.1 MG/DL
BUN SERPL-MCNC: 26.1 MG/DL (ref 9.8–20.1)
CALCIUM SERPL-MCNC: 9.1 MG/DL (ref 8.4–10.2)
CHLORIDE SERPL-SCNC: 97 MMOL/L (ref 98–107)
CO2 SERPL-SCNC: 30 MMOL/L (ref 23–31)
COLOR STL: ABNORMAL
CONSISTENCY STL: ABNORMAL
CREAT SERPL-MCNC: 1.26 MG/DL (ref 0.55–1.02)
CREAT/UREA NIT SERPL: 21
ERYTHROCYTE [DISTWIDTH] IN BLOOD BY AUTOMATED COUNT: 19.4 % (ref 11.5–17)
GFR SERPLBLD CREATININE-BSD FMLA CKD-EPI: 41 ML/MIN/1.73/M2
GLOBULIN SER-MCNC: 4 GM/DL (ref 2.4–3.5)
GLUCOSE SERPL-MCNC: 95 MG/DL (ref 82–115)
HCT VFR BLD AUTO: 24.3 % (ref 37–47)
HEMOCCULT SP1 STL QL: POSITIVE
HGB BLD-MCNC: 8.3 G/DL (ref 12–16)
MCH RBC QN AUTO: 33.5 PG (ref 27–31)
MCHC RBC AUTO-ENTMCNC: 34.2 G/DL (ref 33–36)
MCV RBC AUTO: 98 FL (ref 80–94)
NRBC BLD AUTO-RTO: 0 %
PLATELET # BLD AUTO: 177 X10(3)/MCL (ref 130–400)
PMV BLD AUTO: 9.5 FL (ref 7.4–10.4)
POCT GLUCOSE: 101 MG/DL (ref 70–110)
POCT GLUCOSE: 109 MG/DL (ref 70–110)
POTASSIUM SERPL-SCNC: 4 MMOL/L (ref 3.5–5.1)
PROT SERPL-MCNC: 6.5 GM/DL (ref 5.8–7.6)
RBC # BLD AUTO: 2.48 X10(6)/MCL (ref 4.2–5.4)
SODIUM SERPL-SCNC: 134 MMOL/L (ref 136–145)
WBC # BLD AUTO: 6.03 X10(3)/MCL (ref 4.5–11.5)

## 2025-01-19 PROCEDURE — 25000003 PHARM REV CODE 250: Performed by: INTERNAL MEDICINE

## 2025-01-19 PROCEDURE — 36415 COLL VENOUS BLD VENIPUNCTURE: CPT | Performed by: STUDENT IN AN ORGANIZED HEALTH CARE EDUCATION/TRAINING PROGRAM

## 2025-01-19 PROCEDURE — 85027 COMPLETE CBC AUTOMATED: CPT | Performed by: STUDENT IN AN ORGANIZED HEALTH CARE EDUCATION/TRAINING PROGRAM

## 2025-01-19 PROCEDURE — 82272 OCCULT BLD FECES 1-3 TESTS: CPT | Performed by: STUDENT IN AN ORGANIZED HEALTH CARE EDUCATION/TRAINING PROGRAM

## 2025-01-19 PROCEDURE — 80053 COMPREHEN METABOLIC PANEL: CPT | Performed by: STUDENT IN AN ORGANIZED HEALTH CARE EDUCATION/TRAINING PROGRAM

## 2025-01-19 PROCEDURE — 21400001 HC TELEMETRY ROOM

## 2025-01-19 PROCEDURE — 25000003 PHARM REV CODE 250

## 2025-01-19 RX ORDER — PANTOPRAZOLE SODIUM 40 MG/10ML
40 INJECTION, POWDER, LYOPHILIZED, FOR SOLUTION INTRAVENOUS 2 TIMES DAILY
Status: DISCONTINUED | OUTPATIENT
Start: 2025-01-19 | End: 2025-01-23

## 2025-01-19 RX ADMIN — PANTOPRAZOLE SODIUM 40 MG: 40 TABLET, DELAYED RELEASE ORAL at 08:01

## 2025-01-19 RX ADMIN — FERROUS SULFATE TAB 325 MG (65 MG ELEMENTAL FE) 1 EACH: 325 (65 FE) TAB at 10:01

## 2025-01-19 RX ADMIN — LEVOTHYROXINE SODIUM 25 MCG: 25 TABLET ORAL at 06:01

## 2025-01-19 RX ADMIN — LUBIPROSTONE 24 MCG: 24 CAPSULE, GELATIN COATED ORAL at 06:01

## 2025-01-19 RX ADMIN — FERROUS SULFATE TAB 325 MG (65 MG ELEMENTAL FE) 1 EACH: 325 (65 FE) TAB at 08:01

## 2025-01-19 RX ADMIN — SENNOSIDES AND DOCUSATE SODIUM 4 TABLET: 50; 8.6 TABLET ORAL at 08:01

## 2025-01-19 RX ADMIN — TRAZODONE HYDROCHLORIDE 150 MG: 150 TABLET ORAL at 10:01

## 2025-01-19 RX ADMIN — APIXABAN 5 MG: 5 TABLET, FILM COATED ORAL at 08:01

## 2025-01-19 RX ADMIN — ALLOPURINOL 300 MG: 300 TABLET ORAL at 08:01

## 2025-01-19 RX ADMIN — RIVASTIGMINE TARTRATE 1.5 MG: 1.5 CAPSULE ORAL at 08:01

## 2025-01-19 RX ADMIN — RISPERIDONE 2 MG: 1 TABLET, FILM COATED ORAL at 10:01

## 2025-01-19 RX ADMIN — LATANOPROST 1 DROP: 50 SOLUTION OPHTHALMIC at 10:01

## 2025-01-19 RX ADMIN — POTASSIUM BICARBONATE 25 MEQ: 977.5 TABLET, EFFERVESCENT ORAL at 08:01

## 2025-01-19 RX ADMIN — MELATONIN TAB 3 MG 6 MG: 3 TAB at 06:01

## 2025-01-19 RX ADMIN — ACETAMINOPHEN 650 MG: 325 TABLET, FILM COATED ORAL at 06:01

## 2025-01-19 RX ADMIN — CETIRIZINE HYDROCHLORIDE 10 MG: 10 TABLET, FILM COATED ORAL at 10:01

## 2025-01-19 NOTE — PROGRESS NOTES
"[facility] Hospital Medicine Progress Note        Chief Complaint: Inpatient Follow-up for bilateral leg swelling, generalized weakness     HPI: "89 y/o F with a PMH of diastolic heart failure LVEF 50-55%, HTN, chronic anemia, paroxysmal A-fib on eliquis, and CKD stage III followed by Dr. Chávez , chronic respiratory failure on 2 L NC at home, dementia and glaucoma presented to the ED with complains of bilateral leg swelling, generalized weakness and deconditioning post hospital discharge. Patient explains that she is bed bound and lives at home with her son and  who take care of her. Patient was recently admitted at this facility for worsening generalized weakness, bilateral leg swelling and urinary retention and discharged home with an indwelling cook, follow up appointment with urology and private sitters and home care with [facility] . Patient denies black or blood in her tool, denies chest pain, denies shortness of breath, denies known fever.     Initial ED vitals: Temp 98.1, HR 90, Resp 18, /58, O2 Sat 98% on RA. ED work up revealed WBC 7.39, H&H 8.7 and 26.8, , K 3.4, BUN 30.3, Creatinine 1.26, .3, Troponin 0.048, Lactic acid 1.1, TSH 3.335 Influenza, COVID, and RSV swab negative, Respiratory panel negative, UA positive for blood, leukocyte esterase, RBC, WBC, and bacteria, X-ray chest read enlarged cardiac silhouette without overt edema, EKG read ventricular paced rhythm at 62 bmp. Cardiology was consulted in the ED and patient was admitted to Hospital Medicine.  She is found to have right heart failure and pulmonary hypertension which was the cause of her leg edema.  Nephrology was consulted and continue to remove fluid with IV Lasix.  Left ventricle systolic function is good.  Now transitioned to oral diuretics.  Consultants signed off.Recommend outpatient anemia evaluation if she may benefit from bone marrow biopsy? as a part of anemia " "workup. FOBT negative .Therapy recommending moderate intensity therapy ,case management working on discharge planning.    Interval Hx:   Seen and examined bedside.  No acute overnight events.    Objective/physical exam:  General: In no acute distress, afebrile  Chest: Clear to auscultation bilaterally  Heart: RRR, +S1, S2, no appreciable murmur  Abdomen: Soft, nontender, BS +  MSK: Warm, no lower extremity edema, no clubbing or cyanosis  Neurologic: Alert and oriented x4, Cranial nerve II-XII intact, Strength 5/5 in all 4 extremities    VITAL SIGNS: 24 HRS MIN & MAX LAST   Temp  Min: 97.7 °F (36.5 °C)  Max: 98.6 °F (37 °C) 98.6 °F (37 °C)   BP  Min: 116/57  Max: 154/72 125/64   Pulse  Min: 63  Max: 73  68   Resp  Min: 18  Max: 18 18   SpO2  Min: 92 %  Max: 99 % (!) 94 %     I have reviewed the following labs:  Recent Labs   Lab 01/12/25  0359 01/13/25  0544 01/17/25  0546   WBC  --   --  5.26   RBC  --   --  2.37*   HGB 7.9* 8.1* 7.8*   HCT 23.2* 24.2* 23.5*   MCV  --   --  99.2*   MCH  --   --  32.9*   MCHC  --   --  33.2   RDW  --   --  19.8*   PLT  --   --  150   MPV  --   --  9.4     No results for input(s): "NA", "K", "CL", "CO2", "ANIONGAP", "BUN", "CREATININE", "GLU", "CALCIUM", "PH", "MG", "ALBUMIN", "PROT", "ALKPHOS", "ALT", "AST", "BILITOT" in the last 168 hours.    Microbiology Results (last 7 days)       ** No results found for the last 168 hours. **             See below for Radiology    Assessment/Plan:  HFpEF, acutely decompensated, improved  Pulmonary hypertension (moderate per Echo)  NSTEMI type II s/t above  CKD IIIB  Pseudomonas urinary tract infection, resolved  Anemia of chronic disease  Major Neurocognitive Disorder, unspecified   Chronic hypoxic respiratory failure  Baseline dementia  Essential HTN  Paroxysmal atrial fibrillation  h/o bipolar disorder    Plan:  No Need for ischemic evaluation per Cardiology.  Diuresis per Nephrology.  Recommend to Continue current Lasix.  Strict Is&Os.  " Nephrology signed off now  Afebrile without any leukocytosis  Monitor hemoglobin,  received IV,now  on oral iron supplementation.  Keep hemoglobin above 7.5.  Recommend outpatient anemia evaluation if she may benefit from bone marrow biopsy?  as a part of anemia workup.  FOBT negative  Psych following, medications adjusted  PTOT recommending moderate intensity therapy.  Case management working on discharge planning.  Introduced to about Home health, patient refuses home health, requests to go to SNF  Imodium p.r.n. for loose stools  Awaiting placement  Medically stable      VTE prophylaxis: eliquis      Anticipated discharge and Disposition:   snf placement pending, outpatient anemia workup      All diagnosis and differential diagnosis have been reviewed; assessment and plan has been documented; I have personally reviewed the labs and test results that are presently available; I have reviewed the patients medication list; I have reviewed the consulting providers response and recommendations. I have reviewed or attempted to review medical records based upon their availability    All of the patient's questions have been  addressed and answered. Patient's is agreeable to the above stated plan. I will continue to monitor closely and make adjustments to medical management as needed.    Portions of this note dictated using EMR integrated voice recognition software, and may be subject to voice recognition errors not corrected at proofreading. Please contact writer for clarification if needed.   _____________________________________________________________________    Malnutrition Status:  Nutrition consulted. Most recent weight and BMI monitored-     Measurements:  Wt Readings from Last 1 Encounters:   01/10/25 76.7 kg (169 lb)   Body mass index is 28.12 kg/m².    Patient has been screened and assessed by RD.    Malnutrition Type:  Context:    Level:      Malnutrition Characteristic Summary:        Interventions/Recommendations (treatment strategy):        Scheduled Med:   allopurinoL  300 mg Oral Daily    apixaban  5 mg Oral BID    cetirizine  10 mg Oral QHS    ferrous sulfate  1 tablet Oral BID    furosemide  80 mg Oral Daily    latanoprost  1 drop Both Eyes QHS    levothyroxine  25 mcg Oral Before breakfast    lubiprostone  24 mcg Oral BID WM    melatonin  6 mg Oral Q24H    pantoprazole  40 mg Oral BID    potassium bicarbonate  25 mEq Oral Daily    risperiDONE  2 mg Oral QHS    rivastigmine tartrate  1.5 mg Oral Daily    senna-docusate 8.6-50 mg  4 tablet Oral Daily    traZODone  150 mg Oral QHS      Continuous Infusions:     PRN Meds:    Current Facility-Administered Medications:     acetaminophen, 650 mg, Oral, Q4H PRN    albuterol, 1 puff, Inhalation, PRN    aluminum-magnesium hydroxide-simethicone, 30 mL, Oral, QID PRN    bisacodyL, 10 mg, Rectal, Daily PRN    dextrose 50%, 12.5 g, Intravenous, PRN    dextrose 50%, 25 g, Intravenous, PRN    glucagon (human recombinant), 1 mg, Intramuscular, PRN    glucose, 16 g, Oral, PRN    glucose, 24 g, Oral, PRN    glycopyrrolate, 1 mg, Oral, BID PRN    hydrOXYzine HCL, 25 mg, Oral, Nightly PRN    insulin aspart U-100, 0-5 Units, Subcutaneous, QID (AC + HS) PRN    loperamide, 2 mg, Oral, QID PRN    nitroGLYCERIN, 0.4 mg, Sublingual, Q5 Min PRN    ondansetron, 4 mg, Intravenous, Q8H PRN    pneumoc 20-rod conj-dip cr(PF), 0.5 mL, Intramuscular, vaccine x 1 dose    polyethylene glycol, 17 g, Oral, BID PRN    sodium chloride 0.9%, 10 mL, Intravenous, PRN     Radiology:  I have personally reviewed the following imaging and agree with the radiologist.     X-Ray Abdomen AP 1 View  EXAMINATION  XR ABDOMEN AP 1 VIEW    CLINICAL HISTORY  Suprapubic fullness, eval constipation;    TECHNIQUE  A total of 2 AP image(s) of the abdomen.    COMPARISON  30 September 2016    FINDINGS  Lines/tubes/devices: Multiple ECG leads overlie the imaged region.  Left chest wall pacemaker and  partially visualized leads also noted.    Notable amount of residual stool is present throughout the colon.  A non-obstructed bowel gas pattern is present. No intra-abdominal mass effect is appreciated.   Detection of air-fluid levels and low-volume pneumoperitoneum is limited due to supine technique. There is similar right upper quadrant surgical clips.  Ill-defined hazy radiopacity projects over the left lower abdomen, of otherwise indeterminate etiology.    Included lower thoracic cavity and osseous structures are without acute abnormality.    IMPRESSION  1. Findings suggestive of constipation.  No convincing acute intra-abdominal process.  2. Ill-defined hazy opacity projecting over the left lower quadrant could represent artifact but is otherwise of indeterminate etiology.  3. Additional chronic secondary details discussed above.    Electronically signed by: Mark Jolley  Date:    01/08/2025  Time:    11:16      Phyllis Almanza MD  Department of Hospital Medicine   [facility]   01/18/2025

## 2025-01-20 LAB
ANION GAP SERPL CALC-SCNC: 6 MEQ/L
BUN SERPL-MCNC: 26.1 MG/DL (ref 9.8–20.1)
CALCIUM SERPL-MCNC: 8.9 MG/DL (ref 8.4–10.2)
CHLORIDE SERPL-SCNC: 97 MMOL/L (ref 98–107)
CO2 SERPL-SCNC: 29 MMOL/L (ref 23–31)
COLOR STL: NORMAL
CONSISTENCY STL: NORMAL
CREAT SERPL-MCNC: 1.16 MG/DL (ref 0.55–1.02)
CREAT/UREA NIT SERPL: 23
ERYTHROCYTE [DISTWIDTH] IN BLOOD BY AUTOMATED COUNT: 19.9 % (ref 11.5–17)
GFR SERPLBLD CREATININE-BSD FMLA CKD-EPI: 46 ML/MIN/1.73/M2
GLUCOSE SERPL-MCNC: 89 MG/DL (ref 82–115)
HCT VFR BLD AUTO: 23.6 % (ref 37–47)
HCT VFR BLD AUTO: 24.7 % (ref 37–47)
HEMOCCULT SP1 STL QL: NEGATIVE
HEMOCCULT SP2 STL QL: NEGATIVE
HGB BLD-MCNC: 7.8 G/DL (ref 12–16)
HGB BLD-MCNC: 8.2 G/DL (ref 12–16)
MCH RBC QN AUTO: 32.7 PG (ref 27–31)
MCHC RBC AUTO-ENTMCNC: 33.2 G/DL (ref 33–36)
MCV RBC AUTO: 98.4 FL (ref 80–94)
NRBC BLD AUTO-RTO: 0 %
PLATELET # BLD AUTO: 177 X10(3)/MCL (ref 130–400)
PMV BLD AUTO: 9.6 FL (ref 7.4–10.4)
POCT GLUCOSE: 100 MG/DL (ref 70–110)
POCT GLUCOSE: 79 MG/DL (ref 70–110)
POTASSIUM SERPL-SCNC: 4.5 MMOL/L (ref 3.5–5.1)
RBC # BLD AUTO: 2.51 X10(6)/MCL (ref 4.2–5.4)
SODIUM SERPL-SCNC: 132 MMOL/L (ref 136–145)
WBC # BLD AUTO: 5.64 X10(3)/MCL (ref 4.5–11.5)

## 2025-01-20 PROCEDURE — 63600175 PHARM REV CODE 636 W HCPCS: Performed by: STUDENT IN AN ORGANIZED HEALTH CARE EDUCATION/TRAINING PROGRAM

## 2025-01-20 PROCEDURE — 25000003 PHARM REV CODE 250

## 2025-01-20 PROCEDURE — 82272 OCCULT BLD FECES 1-3 TESTS: CPT | Performed by: STUDENT IN AN ORGANIZED HEALTH CARE EDUCATION/TRAINING PROGRAM

## 2025-01-20 PROCEDURE — 02HV33Z INSERTION OF INFUSION DEVICE INTO SUPERIOR VENA CAVA, PERCUTANEOUS APPROACH: ICD-10-PCS | Performed by: INTERNAL MEDICINE

## 2025-01-20 PROCEDURE — 85027 COMPLETE CBC AUTOMATED: CPT | Performed by: STUDENT IN AN ORGANIZED HEALTH CARE EDUCATION/TRAINING PROGRAM

## 2025-01-20 PROCEDURE — 80048 BASIC METABOLIC PNL TOTAL CA: CPT | Performed by: STUDENT IN AN ORGANIZED HEALTH CARE EDUCATION/TRAINING PROGRAM

## 2025-01-20 PROCEDURE — 21400001 HC TELEMETRY ROOM

## 2025-01-20 PROCEDURE — 25000003 PHARM REV CODE 250: Performed by: STUDENT IN AN ORGANIZED HEALTH CARE EDUCATION/TRAINING PROGRAM

## 2025-01-20 PROCEDURE — 85018 HEMOGLOBIN: CPT | Performed by: INTERNAL MEDICINE

## 2025-01-20 PROCEDURE — 36410 VNPNXR 3YR/> PHY/QHP DX/THER: CPT

## 2025-01-20 PROCEDURE — C1751 CATH, INF, PER/CENT/MIDLINE: HCPCS

## 2025-01-20 PROCEDURE — 25000003 PHARM REV CODE 250: Performed by: INTERNAL MEDICINE

## 2025-01-20 PROCEDURE — 36415 COLL VENOUS BLD VENIPUNCTURE: CPT | Performed by: STUDENT IN AN ORGANIZED HEALTH CARE EDUCATION/TRAINING PROGRAM

## 2025-01-20 PROCEDURE — 36415 COLL VENOUS BLD VENIPUNCTURE: CPT | Performed by: INTERNAL MEDICINE

## 2025-01-20 RX ORDER — FUROSEMIDE 40 MG/1
40 TABLET ORAL DAILY
Status: DISCONTINUED | OUTPATIENT
Start: 2025-01-20 | End: 2025-01-29 | Stop reason: HOSPADM

## 2025-01-20 RX ORDER — SODIUM CHLORIDE 0.9 % (FLUSH) 0.9 %
10 SYRINGE (ML) INJECTION EVERY 12 HOURS PRN
Status: DISCONTINUED | OUTPATIENT
Start: 2025-01-20 | End: 2025-01-29 | Stop reason: HOSPADM

## 2025-01-20 RX ADMIN — TRAZODONE HYDROCHLORIDE 150 MG: 150 TABLET ORAL at 08:01

## 2025-01-20 RX ADMIN — APIXABAN 5 MG: 5 TABLET, FILM COATED ORAL at 08:01

## 2025-01-20 RX ADMIN — APIXABAN 5 MG: 5 TABLET, FILM COATED ORAL at 09:01

## 2025-01-20 RX ADMIN — ALLOPURINOL 300 MG: 300 TABLET ORAL at 09:01

## 2025-01-20 RX ADMIN — PANTOPRAZOLE SODIUM 40 MG: 40 INJECTION, POWDER, LYOPHILIZED, FOR SOLUTION INTRAVENOUS at 04:01

## 2025-01-20 RX ADMIN — RIVASTIGMINE TARTRATE 1.5 MG: 1.5 CAPSULE ORAL at 09:01

## 2025-01-20 RX ADMIN — CETIRIZINE HYDROCHLORIDE 10 MG: 10 TABLET, FILM COATED ORAL at 08:01

## 2025-01-20 RX ADMIN — POTASSIUM BICARBONATE 25 MEQ: 977.5 TABLET, EFFERVESCENT ORAL at 09:01

## 2025-01-20 RX ADMIN — LUBIPROSTONE 24 MCG: 24 CAPSULE, GELATIN COATED ORAL at 06:01

## 2025-01-20 RX ADMIN — HYDROXYZINE HYDROCHLORIDE 25 MG: 25 TABLET, FILM COATED ORAL at 08:01

## 2025-01-20 RX ADMIN — SENNOSIDES AND DOCUSATE SODIUM 4 TABLET: 50; 8.6 TABLET ORAL at 09:01

## 2025-01-20 RX ADMIN — LEVOTHYROXINE SODIUM 25 MCG: 25 TABLET ORAL at 06:01

## 2025-01-20 RX ADMIN — FUROSEMIDE 40 MG: 40 TABLET ORAL at 09:01

## 2025-01-20 RX ADMIN — LUBIPROSTONE 24 MCG: 24 CAPSULE, GELATIN COATED ORAL at 05:01

## 2025-01-20 RX ADMIN — FERROUS SULFATE TAB 325 MG (65 MG ELEMENTAL FE) 1 EACH: 325 (65 FE) TAB at 09:01

## 2025-01-20 RX ADMIN — LATANOPROST 1 DROP: 50 SOLUTION OPHTHALMIC at 08:01

## 2025-01-20 RX ADMIN — RISPERIDONE 2 MG: 1 TABLET, FILM COATED ORAL at 08:01

## 2025-01-20 RX ADMIN — FERROUS SULFATE TAB 325 MG (65 MG ELEMENTAL FE) 1 EACH: 325 (65 FE) TAB at 08:01

## 2025-01-20 RX ADMIN — PANTOPRAZOLE SODIUM 40 MG: 40 INJECTION, POWDER, LYOPHILIZED, FOR SOLUTION INTRAVENOUS at 08:01

## 2025-01-20 RX ADMIN — PANTOPRAZOLE SODIUM 40 MG: 40 INJECTION, POWDER, LYOPHILIZED, FOR SOLUTION INTRAVENOUS at 09:01

## 2025-01-20 RX ADMIN — MELATONIN TAB 3 MG 6 MG: 3 TAB at 05:01

## 2025-01-20 NOTE — PROGRESS NOTES
"[facility] Hospital Medicine Progress Note        Chief Complaint: Inpatient Follow-up for bilateral leg swelling, generalized weakness     HPI: "88 y/o F with a PMH of diastolic heart failure LVEF 50-55%, HTN, chronic anemia, paroxysmal A-fib on eliquis, and CKD stage III followed by Dr. Chávez , chronic respiratory failure on 2 L NC at home, dementia and glaucoma presented to the ED with complains of bilateral leg swelling, generalized weakness and deconditioning post hospital discharge. Patient explains that she is bed bound and lives at home with her son and  who take care of her. Patient was recently admitted at this facility for worsening generalized weakness, bilateral leg swelling and urinary retention and discharged home with an indwelling cook, follow up appointment with urology and private sitters and home care with [facility] . Patient denies black or blood in her tool, denies chest pain, denies shortness of breath, denies known fever.     Initial ED vitals: Temp 98.1, HR 90, Resp 18, /58, O2 Sat 98% on RA. ED work up revealed WBC 7.39, H&H 8.7 and 26.8, , K 3.4, BUN 30.3, Creatinine 1.26, .3, Troponin 0.048, Lactic acid 1.1, TSH 3.335 Influenza, COVID, and RSV swab negative, Respiratory panel negative, UA positive for blood, leukocyte esterase, RBC, WBC, and bacteria, X-ray chest read enlarged cardiac silhouette without overt edema, EKG read ventricular paced rhythm at 62 bmp. Cardiology was consulted in the ED and patient was admitted to Hospital Medicine.  She is found to have right heart failure and pulmonary hypertension which was the cause of her leg edema.  Nephrology was consulted and continue to remove fluid with IV Lasix.  Left ventricle systolic function is good.  Now transitioned to oral diuretics.  Consultants signed off.Recommend outpatient anemia evaluation if she may benefit from bone marrow biopsy? as a part of anemia " workup. FOBT negative .Therapy recommending moderate intensity therapy ,case management working on discharge planning.    Interval Hx:   Patient seen and examined by bedside.  Was sleeping however did arouse to verbal stimuli.  Denied any concerns at this time.  Spouse by bedside.  No acute overnight events    Objective/physical exam:  General: In no acute distress, afebrile  Chest: Clear to auscultation bilaterally  Heart: RRR, +S1, S2, no appreciable murmur  Abdomen: Soft, nontender, BS +  MSK: Warm, no lower extremity edema, no clubbing or cyanosis  Neurologic: Alert and oriented x4, Cranial nerve II-XII intact, Strength 5/5 in all 4 extremities    VITAL SIGNS: 24 HRS MIN & MAX LAST   Temp  Min: 97.6 °F (36.4 °C)  Max: 98.6 °F (37 °C) 98.2 °F (36.8 °C)   BP  Min: 107/55  Max: 136/70 136/70   Pulse  Min: 62  Max: 71  66   No data recorded 18   SpO2  Min: 92 %  Max: 96 % 96 %     I have reviewed the following labs:  Recent Labs   Lab 01/13/25  0544 01/17/25  0546 01/19/25  1606   WBC  --  5.26 6.03   RBC  --  2.37* 2.48*   HGB 8.1* 7.8* 8.3*   HCT 24.2* 23.5* 24.3*   MCV  --  99.2* 98.0*   MCH  --  32.9* 33.5*   MCHC  --  33.2 34.2   RDW  --  19.8* 19.4*   PLT  --  150 177   MPV  --  9.4 9.5     Recent Labs   Lab 01/19/25  1606   *   K 4.0   CL 97*   CO2 30   BUN 26.1*   CREATININE 1.26*   CALCIUM 9.1   ALBUMIN 2.5*   ALKPHOS 107   ALT 7   AST 19   BILITOT 1.1       Microbiology Results (last 7 days)       ** No results found for the last 168 hours. **             See below for Radiology    Assessment/Plan:  HFpEF, acutely decompensated, improved  Pulmonary hypertension (moderate per Echo)  NSTEMI type II s/t above  CKD IIIB  Pseudomonas urinary tract infection, resolved  Anemia of chronic disease  Major Neurocognitive Disorder, unspecified   Chronic hypoxic respiratory failure  Baseline dementia  Essential HTN  Paroxysmal atrial fibrillation currently paced rhythm, CHADS2 Vasc score of 5  Sick sinus syndrome  status post pacemaker  h/o bipolar disorder    Plan:  No Need for ischemic evaluation per Cardiology.  Diuresis per Nephrology.  Recommend to Continue current Lasix.  Strict Is&Os.  Nephrology signed off now  Afebrile without any leukocytosis  Monitor hemoglobin,  received IV,now  on oral iron supplementation.  Keep hemoglobin above 7.5.  Recommend outpatient anemia evaluation if she may benefit from bone marrow biopsy?  as a part of anemia workup.  FOBT negative  Psych following, medications adjusted  PTOT recommending moderate intensity therapy.  Case management working on discharge planning.  Introduced to about Home health, patient refuses home health, requests to go to SNF  Imodium p.r.n. for loose stools  Awaiting placement  Per nursing, patient did have a large dark bowel movement this a.m..  Hemoglobin and labs were checked which were largely unremarkable with stable hemoglobin.  Stool was tested for blood   First fecal stool came back positive  We will consult GI in a.m. and hold Eliquis for now  Remains hemodynamically stable  Protonix IV BID  Iron panels done in December did show consistent with iron-deficiency anemia    VTE prophylaxis: scds      Anticipated discharge and Disposition:   snf placement pending, outpatient anemia workup      All diagnosis and differential diagnosis have been reviewed; assessment and plan has been documented; I have personally reviewed the labs and test results that are presently available; I have reviewed the patients medication list; I have reviewed the consulting providers response and recommendations. I have reviewed or attempted to review medical records based upon their availability    All of the patient's questions have been  addressed and answered. Patient's is agreeable to the above stated plan. I will continue to monitor closely and make adjustments to medical management as needed.    Portions of this note dictated using EMR integrated voice recognition software,  and may be subject to voice recognition errors not corrected at proofreading. Please contact writer for clarification if needed.   _____________________________________________________________________    Malnutrition Status:  Nutrition consulted. Most recent weight and BMI monitored-     Measurements:  Wt Readings from Last 1 Encounters:   01/10/25 76.7 kg (169 lb)   Body mass index is 28.12 kg/m².    Patient has been screened and assessed by RD.    Malnutrition Type:  Context:    Level:      Malnutrition Characteristic Summary:       Interventions/Recommendations (treatment strategy):        Scheduled Med:   allopurinoL  300 mg Oral Daily    apixaban  5 mg Oral BID    cetirizine  10 mg Oral QHS    ferrous sulfate  1 tablet Oral BID    furosemide  80 mg Oral Daily    latanoprost  1 drop Both Eyes QHS    levothyroxine  25 mcg Oral Before breakfast    lubiprostone  24 mcg Oral BID WM    melatonin  6 mg Oral Q24H    pantoprazole  40 mg Oral BID    potassium bicarbonate  25 mEq Oral Daily    risperiDONE  2 mg Oral QHS    rivastigmine tartrate  1.5 mg Oral Daily    senna-docusate 8.6-50 mg  4 tablet Oral Daily    traZODone  150 mg Oral QHS      Continuous Infusions:     PRN Meds:    Current Facility-Administered Medications:     acetaminophen, 650 mg, Oral, Q4H PRN    albuterol, 1 puff, Inhalation, PRN    aluminum-magnesium hydroxide-simethicone, 30 mL, Oral, QID PRN    bisacodyL, 10 mg, Rectal, Daily PRN    dextrose 50%, 12.5 g, Intravenous, PRN    dextrose 50%, 25 g, Intravenous, PRN    glucagon (human recombinant), 1 mg, Intramuscular, PRN    glucose, 16 g, Oral, PRN    glucose, 24 g, Oral, PRN    glycopyrrolate, 1 mg, Oral, BID PRN    hydrOXYzine HCL, 25 mg, Oral, Nightly PRN    insulin aspart U-100, 0-5 Units, Subcutaneous, QID (AC + HS) PRN    loperamide, 2 mg, Oral, QID PRN    nitroGLYCERIN, 0.4 mg, Sublingual, Q5 Min PRN    ondansetron, 4 mg, Intravenous, Q8H PRN    pneumoc 20-rod conj-dip cr(PF), 0.5 mL,  Intramuscular, vaccine x 1 dose    polyethylene glycol, 17 g, Oral, BID PRN    sodium chloride 0.9%, 10 mL, Intravenous, PRN     Radiology:  I have personally reviewed the following imaging and agree with the radiologist.     X-Ray Abdomen AP 1 View  Narrative: EXAMINATION:  XR ABDOMEN AP 1 VIEW    CLINICAL HISTORY:  Abdominal pain;    TECHNIQUE:  Single-view of the abdomen    COMPARISON:  01/08/2025    FINDINGS:  Degenerative changes of the spine.  Nonobstructive bowel gas pattern.  Impression: Nonobstructive bowel gas pattern.    Electronically signed by: Wiley Roberts  Date:    01/19/2025  Time:    16:05      Phyllis Almanza DO  Department of Hospital Medicine  [facility]  01/19/2025

## 2025-01-20 NOTE — PROCEDURES
"Ev Alberts is a 89 y.o. female patient.    Temp: 98.4 °F (36.9 °C) (01/19/25 2253)  Pulse: 84 (01/19/25 2253)  Resp: 18 (01/18/25 1108)  BP: 133/64 (01/19/25 2253)  SpO2: (!) 92 % (01/19/25 2253)  Weight: 76.7 kg (169 lb) (01/10/25 0500)  Height: 5' 5" (165.1 cm) (12/27/24 4118)    PICC  Date/Time: 1/20/2025 2:20 AM  Consent Done: Yes  Time out: Immediately prior to procedure a time out was called to verify the correct patient, procedure, equipment, support staff and site/side marked as required  Indications: vascular access  Anesthesia: local infiltration  Local anesthetic: lidocaine 1% without epinephrine  Anesthetic Total (mL): 3  Preparation: skin prepped with ChloraPrep  Skin prep agent dried: skin prep agent completely dried prior to procedure  Sterile barriers: all five maximum sterile barriers used - cap, mask, sterile gown, sterile gloves, and large sterile sheet  Hand hygiene: hand hygiene performed prior to central venous catheter insertion  Location details: left basilic  Catheter type: single lumen  Catheter size: 4 Fr  Catheter Length: 18cm    Ultrasound guidance: yes  Vessel Caliber: medium and patent, compressibility normal  Needle advanced into vessel with real time Ultrasound guidance.  Guidewire confirmed in vessel.  Image recorded and saved.  Sterile sheath used.  Number of attempts: 1  Post-procedure: blood return through all ports and sterile dressing applied    Complications: none          Name kevin recinos  1/20/2025    "

## 2025-01-20 NOTE — PLAN OF CARE
Problem: Adult Inpatient Plan of Care  Goal: Plan of Care Review  1/20/2025 0359 by Adwoa Chou RN  Outcome: Progressing  1/20/2025 0056 by Adwoa Chou RN  Outcome: Progressing  Goal: Patient-Specific Goal (Individualized)  1/20/2025 0359 by Adwoa Chou RN  Outcome: Progressing  1/20/2025 0056 by Adwoa Chou RN  Outcome: Progressing  Goal: Absence of Hospital-Acquired Illness or Injury  1/20/2025 0359 by Adwoa Chou RN  Outcome: Progressing  1/20/2025 0056 by Adwoa Chou RN  Outcome: Progressing  Goal: Optimal Comfort and Wellbeing  1/20/2025 0359 by Adwoa Chou RN  Outcome: Progressing  1/20/2025 0056 by Adwoa Chou RN  Outcome: Progressing  Goal: Readiness for Transition of Care  1/20/2025 0359 by Adwoa Chou RN  Outcome: Progressing  1/20/2025 0056 by Adwoa Chou RN  Outcome: Progressing     Problem: Infection  Goal: Absence of Infection Signs and Symptoms  1/20/2025 0359 by Adwoa Chou RN  Outcome: Progressing  1/20/2025 0056 by Adwoa Chou RN  Outcome: Progressing     Problem: Wound  Goal: Optimal Coping  1/20/2025 0359 by Adwoa Chou RN  Outcome: Progressing  1/20/2025 0056 by Adwoa Chou RN  Outcome: Progressing  Goal: Optimal Functional Ability  1/20/2025 0359 by Adwoa Chou RN  Outcome: Progressing  1/20/2025 0056 by Adwoa Chou RN  Outcome: Progressing  Goal: Absence of Infection Signs and Symptoms  1/20/2025 0359 by Adwoa Chou RN  Outcome: Progressing  1/20/2025 0056 by Adwoa Chou RN  Outcome: Progressing  Goal: Improved Oral Intake  1/20/2025 0359 by Adwoa Chou RN  Outcome: Progressing  1/20/2025 0056 by Adwoa Chou RN  Outcome: Progressing  Goal: Optimal Pain Control and Function  1/20/2025 0359 by Adwoa Chou RN  Outcome: Progressing  1/20/2025 0056 by Adwoa Chou RN  Outcome: Progressing  Goal: Skin Health and Integrity  1/20/2025 0359 by Adwoa Chou RN  Outcome: Progressing  1/20/2025 0056 by José Antonio  MIO [place]  Outcome: Progressing  Goal: Optimal Wound Healing  1/20/2025 0359 by Adwoa Chou RN  Outcome: Progressing  1/20/2025 0056 by Adwoa Chou RN  Outcome: Progressing     Problem: Skin Injury Risk Increased  Goal: Skin Health and Integrity  1/20/2025 0359 by Adwoa Chou RN  Outcome: Progressing  1/20/2025 0056 by Adwoa Chou RN  Outcome: Progressing     Problem: Fall Injury Risk  Goal: Absence of Fall and Fall-Related Injury  1/20/2025 0359 by Adwoa Chou RN  Outcome: Progressing  1/20/2025 0056 by Adwoa Chou RN  Outcome: Progressing

## 2025-01-21 LAB
ANION GAP SERPL CALC-SCNC: 9 MEQ/L
BUN SERPL-MCNC: 24.5 MG/DL (ref 9.8–20.1)
CALCIUM SERPL-MCNC: 9.3 MG/DL (ref 8.4–10.2)
CHLORIDE SERPL-SCNC: 96 MMOL/L (ref 98–107)
CO2 SERPL-SCNC: 29 MMOL/L (ref 23–31)
CREAT SERPL-MCNC: 1.24 MG/DL (ref 0.55–1.02)
CREAT/UREA NIT SERPL: 20
GFR SERPLBLD CREATININE-BSD FMLA CKD-EPI: 42 ML/MIN/1.73/M2
GLUCOSE SERPL-MCNC: 97 MG/DL (ref 82–115)
HCT VFR BLD AUTO: 25.3 % (ref 37–47)
HGB BLD-MCNC: 8.4 G/DL (ref 12–16)
IRON SATN MFR SERPL: 20 % (ref 20–50)
IRON SERPL-MCNC: 30 UG/DL (ref 50–170)
POCT GLUCOSE: 122 MG/DL (ref 70–110)
POTASSIUM SERPL-SCNC: 3.6 MMOL/L (ref 3.5–5.1)
SODIUM SERPL-SCNC: 134 MMOL/L (ref 136–145)
TIBC SERPL-MCNC: 119 UG/DL (ref 70–310)
TIBC SERPL-MCNC: 149 UG/DL (ref 250–450)
TRANSFERRIN SERPL-MCNC: 134 MG/DL

## 2025-01-21 PROCEDURE — 36415 COLL VENOUS BLD VENIPUNCTURE: CPT | Performed by: STUDENT IN AN ORGANIZED HEALTH CARE EDUCATION/TRAINING PROGRAM

## 2025-01-21 PROCEDURE — 63600175 PHARM REV CODE 636 W HCPCS: Performed by: STUDENT IN AN ORGANIZED HEALTH CARE EDUCATION/TRAINING PROGRAM

## 2025-01-21 PROCEDURE — 21400001 HC TELEMETRY ROOM

## 2025-01-21 PROCEDURE — 80048 BASIC METABOLIC PNL TOTAL CA: CPT | Performed by: STUDENT IN AN ORGANIZED HEALTH CARE EDUCATION/TRAINING PROGRAM

## 2025-01-21 PROCEDURE — 25000003 PHARM REV CODE 250: Performed by: INTERNAL MEDICINE

## 2025-01-21 PROCEDURE — 25000003 PHARM REV CODE 250

## 2025-01-21 PROCEDURE — 83540 ASSAY OF IRON: CPT | Performed by: STUDENT IN AN ORGANIZED HEALTH CARE EDUCATION/TRAINING PROGRAM

## 2025-01-21 PROCEDURE — 25000003 PHARM REV CODE 250: Performed by: STUDENT IN AN ORGANIZED HEALTH CARE EDUCATION/TRAINING PROGRAM

## 2025-01-21 PROCEDURE — 85018 HEMOGLOBIN: CPT | Performed by: STUDENT IN AN ORGANIZED HEALTH CARE EDUCATION/TRAINING PROGRAM

## 2025-01-21 RX ADMIN — APIXABAN 5 MG: 5 TABLET, FILM COATED ORAL at 08:01

## 2025-01-21 RX ADMIN — PANTOPRAZOLE SODIUM 40 MG: 40 INJECTION, POWDER, LYOPHILIZED, FOR SOLUTION INTRAVENOUS at 07:01

## 2025-01-21 RX ADMIN — POTASSIUM BICARBONATE 25 MEQ: 977.5 TABLET, EFFERVESCENT ORAL at 08:01

## 2025-01-21 RX ADMIN — ALLOPURINOL 300 MG: 300 TABLET ORAL at 08:01

## 2025-01-21 RX ADMIN — LOPERAMIDE HYDROCHLORIDE 2 MG: 2 CAPSULE ORAL at 08:01

## 2025-01-21 RX ADMIN — SENNOSIDES AND DOCUSATE SODIUM 4 TABLET: 50; 8.6 TABLET ORAL at 08:01

## 2025-01-21 RX ADMIN — CETIRIZINE HYDROCHLORIDE 10 MG: 10 TABLET, FILM COATED ORAL at 07:01

## 2025-01-21 RX ADMIN — APIXABAN 2.5 MG: 2.5 TABLET, FILM COATED ORAL at 07:01

## 2025-01-21 RX ADMIN — MELATONIN TAB 3 MG 6 MG: 3 TAB at 05:01

## 2025-01-21 RX ADMIN — RISPERIDONE 2 MG: 1 TABLET, FILM COATED ORAL at 07:01

## 2025-01-21 RX ADMIN — TRAZODONE HYDROCHLORIDE 150 MG: 150 TABLET ORAL at 07:01

## 2025-01-21 RX ADMIN — FUROSEMIDE 40 MG: 40 TABLET ORAL at 08:01

## 2025-01-21 RX ADMIN — HYDROXYZINE HYDROCHLORIDE 25 MG: 25 TABLET, FILM COATED ORAL at 07:01

## 2025-01-21 RX ADMIN — PANTOPRAZOLE SODIUM 40 MG: 40 INJECTION, POWDER, LYOPHILIZED, FOR SOLUTION INTRAVENOUS at 08:01

## 2025-01-21 RX ADMIN — FERROUS SULFATE TAB 325 MG (65 MG ELEMENTAL FE) 1 EACH: 325 (65 FE) TAB at 08:01

## 2025-01-21 RX ADMIN — LATANOPROST 1 DROP: 50 SOLUTION OPHTHALMIC at 07:01

## 2025-01-21 RX ADMIN — RIVASTIGMINE TARTRATE 1.5 MG: 1.5 CAPSULE ORAL at 08:01

## 2025-01-21 RX ADMIN — LUBIPROSTONE 24 MCG: 24 CAPSULE, GELATIN COATED ORAL at 08:01

## 2025-01-21 RX ADMIN — ACETAMINOPHEN 650 MG: 325 TABLET, FILM COATED ORAL at 08:01

## 2025-01-21 RX ADMIN — LUBIPROSTONE 24 MCG: 24 CAPSULE, GELATIN COATED ORAL at 05:01

## 2025-01-21 RX ADMIN — LEVOTHYROXINE SODIUM 25 MCG: 25 TABLET ORAL at 06:01

## 2025-01-21 RX ADMIN — FERROUS SULFATE TAB 325 MG (65 MG ELEMENTAL FE) 1 EACH: 325 (65 FE) TAB at 07:01

## 2025-01-21 NOTE — PROGRESS NOTES
"[facility] Hospital Medicine Progress Note        Chief Complaint: Inpatient Follow-up for bilateral leg swelling, generalized weakness     HPI: "89 y/o F with a PMH of diastolic heart failure LVEF 50-55%, HTN, chronic anemia, paroxysmal A-fib on eliquis, and CKD stage III followed by Dr. Chávez , chronic respiratory failure on 2 L NC at home, dementia and glaucoma presented to the ED with complains of bilateral leg swelling, generalized weakness and deconditioning post hospital discharge. Patient explains that she is bed bound and lives at home with her son and  who take care of her. Patient was recently admitted at this facility for worsening generalized weakness, bilateral leg swelling and urinary retention and discharged home with an indwelling cook, follow up appointment with urology and private sitters and home care with [facility] . Patient denies black or blood in her tool, denies chest pain, denies shortness of breath, denies known fever.     Initial ED vitals: Temp 98.1, HR 90, Resp 18, /58, O2 Sat 98% on RA. ED work up revealed WBC 7.39, H&H 8.7 and 26.8, , K 3.4, BUN 30.3, Creatinine 1.26, .3, Troponin 0.048, Lactic acid 1.1, TSH 3.335 Influenza, COVID, and RSV swab negative, Respiratory panel negative, UA positive for blood, leukocyte esterase, RBC, WBC, and bacteria, X-ray chest read enlarged cardiac silhouette without overt edema, EKG read ventricular paced rhythm at 62 bmp. Cardiology was consulted in the ED and patient was admitted to Hospital Medicine.  She is found to have right heart failure and pulmonary hypertension which was the cause of her leg edema.  Nephrology was consulted and continue to remove fluid with IV Lasix.  Left ventricle systolic function is good.  Now transitioned to oral diuretics.  Consultants signed off.Recommend outpatient anemia evaluation if she may benefit from bone marrow biopsy? as a part of anemia " workup. FOBT negative .Therapy recommending moderate intensity therapy ,case management working on discharge planning.    Interval Hx:   Patient seen and examined by bedside.  No acute overnight events.  Spouse by bedside.  Awake and alert.  Denies any concerns at this time    Objective/physical exam:  General: In no acute distress, afebrile  Chest: Clear to auscultation bilaterally  Heart: RRR, +S1, S2, no appreciable murmur  Abdomen: Soft, nontender, BS +  MSK: Warm, no lower extremity edema, no clubbing or cyanosis  Neurologic: Alert and oriented x4, Cranial nerve II-XII intact, Strength 5/5 in all 4 extremities    VITAL SIGNS: 24 HRS MIN & MAX LAST   Temp  Min: 98.1 °F (36.7 °C)  Max: 98.3 °F (36.8 °C) 98.1 °F (36.7 °C)   BP  Min: 115/51  Max: 126/58 (!) 126/58   Pulse  Min: 62  Max: 79  62   Resp  Min: 20  Max: 20 20   SpO2  Min: 92 %  Max: 95 % (!) 94 %     I have reviewed the following labs:  Recent Labs   Lab 01/17/25  0546 01/19/25  1606 01/20/25  0444 01/20/25 2119 01/21/25  0907   WBC 5.26 6.03 5.64  --   --    RBC 2.37* 2.48* 2.51*  --   --    HGB 7.8* 8.3* 8.2* 7.8* 8.4*   HCT 23.5* 24.3* 24.7* 23.6* 25.3*   MCV 99.2* 98.0* 98.4*  --   --    MCH 32.9* 33.5* 32.7*  --   --    MCHC 33.2 34.2 33.2  --   --    RDW 19.8* 19.4* 19.9*  --   --     177 177  --   --    MPV 9.4 9.5 9.6  --   --      Recent Labs   Lab 01/19/25  1606 01/20/25  0444 01/21/25  0907   * 132* 134*   K 4.0 4.5 3.6   CL 97* 97* 96*   CO2 30 29 29   BUN 26.1* 26.1* 24.5*   CREATININE 1.26* 1.16* 1.24*   CALCIUM 9.1 8.9 9.3   ALBUMIN 2.5*  --   --    ALKPHOS 107  --   --    ALT 7  --   --    AST 19  --   --    BILITOT 1.1  --   --        Microbiology Results (last 7 days)       ** No results found for the last 168 hours. **             See below for Radiology    Assessment/Plan:  HFpEF, acutely decompensated, improved  Pulmonary hypertension (moderate per Echo)  NSTEMI type II s/t above  CKD IIIB  Pseudomonas urinary tract  infection, resolved  Anemia of chronic disease  Major Neurocognitive Disorder, unspecified   Chronic hypoxic respiratory failure  Baseline dementia  Essential HTN  Paroxysmal atrial fibrillation currently paced rhythm, CHADS2 Vasc score of 5  Sick sinus syndrome status post pacemaker  h/o bipolar disorder    Plan:  No Need for ischemic evaluation per Cardiology.  Diuresis per Nephrology.  Recommend to Continue current Lasix.  Strict Is&Os.  Nephrology signed off now  Afebrile without any leukocytosis  Monitor hemoglobin,  received IV,now  on oral iron supplementation.  Keep hemoglobin above 7.5.  Recommend outpatient anemia evaluation if she may benefit from bone marrow biopsy?  as a part of anemia workup.  FOBT negative  FOBT remains negative   Repeat hemoglobin 8.4 this a.m.   Iron panel pending  Psych following, medications adjusted  PTOT recommending moderate intensity therapy.  Case management working on discharge planning.  Introduced to about Home health, patient refuses home health, requests to go to SNF  Imodium p.r.n. for loose stools  Awaiting placement  Remains hemodynamically stable        VTE prophylaxis: scds      Anticipated discharge and Disposition:   snf placement pending, outpatient anemia workup      All diagnosis and differential diagnosis have been reviewed; assessment and plan has been documented; I have personally reviewed the labs and test results that are presently available; I have reviewed the patients medication list; I have reviewed the consulting providers response and recommendations. I have reviewed or attempted to review medical records based upon their availability    All of the patient's questions have been  addressed and answered. Patient's is agreeable to the above stated plan. I will continue to monitor closely and make adjustments to medical management as needed.    Portions of this note dictated using EMR integrated voice recognition software, and may be subject to voice  recognition errors not corrected at proofreading. Please contact writer for clarification if needed.   _____________________________________________________________________    Malnutrition Status:  Nutrition consulted. Most recent weight and BMI monitored-     Measurements:  Wt Readings from Last 1 Encounters:   01/10/25 76.7 kg (169 lb)   Body mass index is 28.12 kg/m².    Patient has been screened and assessed by RD.    Malnutrition Type:  Context:    Level:      Malnutrition Characteristic Summary:       Interventions/Recommendations (treatment strategy):        Scheduled Med:   allopurinoL  300 mg Oral Daily    apixaban  2.5 mg Oral BID    cetirizine  10 mg Oral QHS    ferrous sulfate  1 tablet Oral BID    furosemide  40 mg Oral Daily    latanoprost  1 drop Both Eyes QHS    levothyroxine  25 mcg Oral Before breakfast    lubiprostone  24 mcg Oral BID WM    melatonin  6 mg Oral Q24H    pantoprazole  40 mg Intravenous BID    potassium bicarbonate  25 mEq Oral Daily    risperiDONE  2 mg Oral QHS    rivastigmine tartrate  1.5 mg Oral Daily    senna-docusate 8.6-50 mg  4 tablet Oral Daily    traZODone  150 mg Oral QHS      Continuous Infusions:     PRN Meds:    Current Facility-Administered Medications:     acetaminophen, 650 mg, Oral, Q4H PRN    albuterol, 1 puff, Inhalation, PRN    aluminum-magnesium hydroxide-simethicone, 30 mL, Oral, QID PRN    bisacodyL, 10 mg, Rectal, Daily PRN    dextrose 50%, 12.5 g, Intravenous, PRN    dextrose 50%, 25 g, Intravenous, PRN    glucagon (human recombinant), 1 mg, Intramuscular, PRN    glucose, 16 g, Oral, PRN    glucose, 24 g, Oral, PRN    glycopyrrolate, 1 mg, Oral, BID PRN    hydrOXYzine HCL, 25 mg, Oral, Nightly PRN    insulin aspart U-100, 0-5 Units, Subcutaneous, QID (AC + HS) PRN    loperamide, 2 mg, Oral, QID PRN    nitroGLYCERIN, 0.4 mg, Sublingual, Q5 Min PRN    ondansetron, 4 mg, Intravenous, Q8H PRN    pneumoc 20-rod conj-dip cr(PF), 0.5 mL, Intramuscular, vaccine x 1  dose    polyethylene glycol, 17 g, Oral, BID PRN    sodium chloride 0.9%, 10 mL, Intravenous, PRN    Flushing PICC/Midline Protocol, , , Until Discontinued **AND** sodium chloride 0.9%, 10 mL, Intravenous, Q12H PRN     Radiology:  I have personally reviewed the following imaging and agree with the radiologist.     X-Ray Abdomen AP 1 View  Narrative: EXAMINATION:  XR ABDOMEN AP 1 VIEW    CLINICAL HISTORY:  Abdominal pain;    TECHNIQUE:  Single-view of the abdomen    COMPARISON:  01/08/2025    FINDINGS:  Degenerative changes of the spine.  Nonobstructive bowel gas pattern.  Impression: Nonobstructive bowel gas pattern.    Electronically signed by: Wiley Roberts  Date:    01/19/2025  Time:    16:05      Phyllis Almanza DO  Department of Hospital Medicine  [facility]  01/21/2025

## 2025-01-22 LAB
POCT GLUCOSE: 125 MG/DL (ref 70–110)
POCT GLUCOSE: 89 MG/DL (ref 70–110)

## 2025-01-22 PROCEDURE — 25000003 PHARM REV CODE 250: Performed by: STUDENT IN AN ORGANIZED HEALTH CARE EDUCATION/TRAINING PROGRAM

## 2025-01-22 PROCEDURE — 25000003 PHARM REV CODE 250: Performed by: INTERNAL MEDICINE

## 2025-01-22 PROCEDURE — 63600175 PHARM REV CODE 636 W HCPCS: Performed by: STUDENT IN AN ORGANIZED HEALTH CARE EDUCATION/TRAINING PROGRAM

## 2025-01-22 PROCEDURE — 21400001 HC TELEMETRY ROOM

## 2025-01-22 PROCEDURE — 25000003 PHARM REV CODE 250

## 2025-01-22 RX ADMIN — TRAZODONE HYDROCHLORIDE 150 MG: 150 TABLET ORAL at 08:01

## 2025-01-22 RX ADMIN — ACETAMINOPHEN 650 MG: 325 TABLET, FILM COATED ORAL at 08:01

## 2025-01-22 RX ADMIN — LEVOTHYROXINE SODIUM 25 MCG: 25 TABLET ORAL at 05:01

## 2025-01-22 RX ADMIN — ALLOPURINOL 300 MG: 300 TABLET ORAL at 10:01

## 2025-01-22 RX ADMIN — FERROUS SULFATE TAB 325 MG (65 MG ELEMENTAL FE) 1 EACH: 325 (65 FE) TAB at 08:01

## 2025-01-22 RX ADMIN — FERROUS SULFATE TAB 325 MG (65 MG ELEMENTAL FE) 1 EACH: 325 (65 FE) TAB at 10:01

## 2025-01-22 RX ADMIN — APIXABAN 2.5 MG: 2.5 TABLET, FILM COATED ORAL at 08:01

## 2025-01-22 RX ADMIN — ACETAMINOPHEN 650 MG: 325 TABLET, FILM COATED ORAL at 02:01

## 2025-01-22 RX ADMIN — APIXABAN 2.5 MG: 2.5 TABLET, FILM COATED ORAL at 10:01

## 2025-01-22 RX ADMIN — CETIRIZINE HYDROCHLORIDE 10 MG: 10 TABLET, FILM COATED ORAL at 08:01

## 2025-01-22 RX ADMIN — HYDROXYZINE HYDROCHLORIDE 25 MG: 25 TABLET, FILM COATED ORAL at 08:01

## 2025-01-22 RX ADMIN — MELATONIN TAB 3 MG 6 MG: 3 TAB at 06:01

## 2025-01-22 RX ADMIN — LATANOPROST 1 DROP: 50 SOLUTION OPHTHALMIC at 08:01

## 2025-01-22 RX ADMIN — POTASSIUM BICARBONATE 25 MEQ: 977.5 TABLET, EFFERVESCENT ORAL at 10:01

## 2025-01-22 RX ADMIN — PANTOPRAZOLE SODIUM 40 MG: 40 INJECTION, POWDER, LYOPHILIZED, FOR SOLUTION INTRAVENOUS at 10:01

## 2025-01-22 RX ADMIN — RISPERIDONE 2 MG: 1 TABLET, FILM COATED ORAL at 08:01

## 2025-01-22 RX ADMIN — FUROSEMIDE 40 MG: 40 TABLET ORAL at 10:01

## 2025-01-22 RX ADMIN — PANTOPRAZOLE SODIUM 40 MG: 40 INJECTION, POWDER, LYOPHILIZED, FOR SOLUTION INTRAVENOUS at 08:01

## 2025-01-22 RX ADMIN — RIVASTIGMINE TARTRATE 1.5 MG: 1.5 CAPSULE ORAL at 10:01

## 2025-01-22 NOTE — PROGRESS NOTES
"[facility] Hospital Medicine Progress Note        Chief Complaint: Inpatient Follow-up for bilateral leg swelling, generalized weakness     HPI: "89 y/o F with a PMH of diastolic heart failure LVEF 50-55%, HTN, chronic anemia, paroxysmal A-fib on eliquis, and CKD stage III followed by Dr. Chávez , chronic respiratory failure on 2 L NC at home, dementia and glaucoma presented to the ED with complains of bilateral leg swelling, generalized weakness and deconditioning post hospital discharge. Patient explains that she is bed bound and lives at home with her son and  who take care of her. Patient was recently admitted at this facility for worsening generalized weakness, bilateral leg swelling and urinary retention and discharged home with an indwelling cook, follow up appointment with urology and private sitters and home care with [facility] . Patient denies black or blood in her tool, denies chest pain, denies shortness of breath, denies known fever.     Initial ED vitals: Temp 98.1, HR 90, Resp 18, /58, O2 Sat 98% on RA. ED work up revealed WBC 7.39, H&H 8.7 and 26.8, , K 3.4, BUN 30.3, Creatinine 1.26, .3, Troponin 0.048, Lactic acid 1.1, TSH 3.335 Influenza, COVID, and RSV swab negative, Respiratory panel negative, UA positive for blood, leukocyte esterase, RBC, WBC, and bacteria, X-ray chest read enlarged cardiac silhouette without overt edema, EKG read ventricular paced rhythm at 62 bmp. Cardiology was consulted in the ED and patient was admitted to Hospital Medicine.  She is found to have right heart failure and pulmonary hypertension which was the cause of her leg edema.  Nephrology was consulted and continue to remove fluid with IV Lasix.  Left ventricle systolic function is good.  Now transitioned to oral diuretics.  Consultants signed off.Recommend outpatient anemia evaluation if she may benefit from bone marrow biopsy? as a part of anemia " workup. FOBT negative .Therapy recommending moderate intensity therapy ,case management working on discharge planning.    Interval Hx:   Patient seen and examined. No acute events; Spouse by bedside; awake and alert denies any concern    Objective/physical exam:  General: In no acute distress, afebrile  Chest: Clear to auscultation bilaterally  Heart: RRR, +S1, S2, no appreciable murmur  Abdomen: Soft, nontender, BS +  MSK: Warm, no lower extremity edema, no clubbing or cyanosis  Neurologic: Alert and oriented x4, Cranial nerve II-XII intact, Strength 5/5 in all 4 extremities    VITAL SIGNS: 24 HRS MIN & MAX LAST   Temp  Min: 97.3 °F (36.3 °C)  Max: 98.6 °F (37 °C) 98 °F (36.7 °C)   BP  Min: 114/60  Max: 130/63 114/60   Pulse  Min: 62  Max: 82  62   Resp  Min: 18  Max: 20 20   SpO2  Min: 93 %  Max: 97 % 97 %     I have reviewed the following labs:  Recent Labs   Lab 01/17/25  0546 01/19/25  1606 01/20/25 0444 01/20/25 2119 01/21/25  0907   WBC 5.26 6.03 5.64  --   --    RBC 2.37* 2.48* 2.51*  --   --    HGB 7.8* 8.3* 8.2* 7.8* 8.4*   HCT 23.5* 24.3* 24.7* 23.6* 25.3*   MCV 99.2* 98.0* 98.4*  --   --    MCH 32.9* 33.5* 32.7*  --   --    MCHC 33.2 34.2 33.2  --   --    RDW 19.8* 19.4* 19.9*  --   --     177 177  --   --    MPV 9.4 9.5 9.6  --   --      Recent Labs   Lab 01/19/25  1606 01/20/25 0444 01/21/25  0907   * 132* 134*   K 4.0 4.5 3.6   CL 97* 97* 96*   CO2 30 29 29   BUN 26.1* 26.1* 24.5*   CREATININE 1.26* 1.16* 1.24*   CALCIUM 9.1 8.9 9.3   ALBUMIN 2.5*  --   --    ALKPHOS 107  --   --    ALT 7  --   --    AST 19  --   --    BILITOT 1.1  --   --        Microbiology Results (last 7 days)       ** No results found for the last 168 hours. **             See below for Radiology    Assessment/Plan:  HFpEF, acutely decompensated, improved  Pulmonary hypertension (moderate per Echo)  NSTEMI type II s/t above  CKD IIIB  Pseudomonas urinary tract infection, resolved  Anemia of chronic disease  Major  Neurocognitive Disorder, unspecified   Chronic hypoxic respiratory failure  Baseline dementia  Essential HTN  Paroxysmal atrial fibrillation currently paced rhythm, CHADS2 Vasc score of 5  Sick sinus syndrome status post pacemaker  h/o bipolar disorder    Plan:  No Need for ischemic evaluation per Cardiology.  Diuresis per Nephrology.  Recommend to Continue current Lasix.  Strict Is&Os.  Nephrology signed off now  Afebrile without any leukocytosis  Monitor hemoglobin,  received IV,now  on oral iron supplementation.  Keep hemoglobin above 7.5.  Recommend outpatient anemia evaluation if she may benefit from bone marrow biopsy?  as a part of anemia workup.  FOBT negative  FOBT remains negative   Repeat hemoglobin 8.4 this a.m.   Iron panel pending  Psych following, medications adjusted  PTOT recommending moderate intensity therapy.  Case management working on discharge planning.  Introduced to about Home health, patient refuses home health, requests to go to SNF  Has not worked with PT for more than 4-5 days due to weather and coverage  Imodium p.r.n. for loose stools  Awaiting placement  Remains hemodynamically stable        VTE prophylaxis: scds      Anticipated discharge and Disposition:   snf placement pending, outpatient anemia workup      All diagnosis and differential diagnosis have been reviewed; assessment and plan has been documented; I have personally reviewed the labs and test results that are presently available; I have reviewed the patients medication list; I have reviewed the consulting providers response and recommendations. I have reviewed or attempted to review medical records based upon their availability    All of the patient's questions have been  addressed and answered. Patient's is agreeable to the above stated plan. I will continue to monitor closely and make adjustments to medical management as needed.    Portions of this note dictated using EMR integrated voice recognition software, and may be  subject to voice recognition errors not corrected at proofreading. Please contact writer for clarification if needed.   _____________________________________________________________________    Malnutrition Status:  Nutrition consulted. Most recent weight and BMI monitored-     Measurements:  Wt Readings from Last 1 Encounters:   01/10/25 76.7 kg (169 lb)   Body mass index is 28.12 kg/m².    Patient has been screened and assessed by RD.    Malnutrition Type:  Context:    Level:      Malnutrition Characteristic Summary:       Interventions/Recommendations (treatment strategy):        Scheduled Med:   allopurinoL  300 mg Oral Daily    apixaban  2.5 mg Oral BID    cetirizine  10 mg Oral QHS    ferrous sulfate  1 tablet Oral BID    furosemide  40 mg Oral Daily    latanoprost  1 drop Both Eyes QHS    levothyroxine  25 mcg Oral Before breakfast    lubiprostone  24 mcg Oral BID WM    melatonin  6 mg Oral Q24H    pantoprazole  40 mg Intravenous BID    potassium bicarbonate  25 mEq Oral Daily    risperiDONE  2 mg Oral QHS    rivastigmine tartrate  1.5 mg Oral Daily    senna-docusate 8.6-50 mg  4 tablet Oral Daily    traZODone  150 mg Oral QHS      Continuous Infusions:     PRN Meds:    Current Facility-Administered Medications:     acetaminophen, 650 mg, Oral, Q4H PRN    albuterol, 1 puff, Inhalation, PRN    aluminum-magnesium hydroxide-simethicone, 30 mL, Oral, QID PRN    bisacodyL, 10 mg, Rectal, Daily PRN    dextrose 50%, 12.5 g, Intravenous, PRN    dextrose 50%, 25 g, Intravenous, PRN    glucagon (human recombinant), 1 mg, Intramuscular, PRN    glucose, 16 g, Oral, PRN    glucose, 24 g, Oral, PRN    glycopyrrolate, 1 mg, Oral, BID PRN    hydrOXYzine HCL, 25 mg, Oral, Nightly PRN    insulin aspart U-100, 0-5 Units, Subcutaneous, QID (AC + HS) PRN    loperamide, 2 mg, Oral, QID PRN    nitroGLYCERIN, 0.4 mg, Sublingual, Q5 Min PRN    ondansetron, 4 mg, Intravenous, Q8H PRN    pneumoc 20-rod conj-dip cr(PF), 0.5 mL,  Intramuscular, vaccine x 1 dose    polyethylene glycol, 17 g, Oral, BID PRN    sodium chloride 0.9%, 10 mL, Intravenous, PRN    Flushing PICC/Midline Protocol, , , Until Discontinued **AND** sodium chloride 0.9%, 10 mL, Intravenous, Q12H PRN     Radiology:  I have personally reviewed the following imaging and agree with the radiologist.     X-Ray Abdomen AP 1 View  Narrative: EXAMINATION:  XR ABDOMEN AP 1 VIEW    CLINICAL HISTORY:  Abdominal pain;    TECHNIQUE:  Single-view of the abdomen    COMPARISON:  01/08/2025    FINDINGS:  Degenerative changes of the spine.  Nonobstructive bowel gas pattern.  Impression: Nonobstructive bowel gas pattern.    Electronically signed by: Wiley Roberts  Date:    01/19/2025  Time:    16:05      Phyllis Almanza DO  Department of Hospital Medicine  [facility]  01/22/2025

## 2025-01-23 LAB
POCT GLUCOSE: 112 MG/DL (ref 70–110)
POCT GLUCOSE: 122 MG/DL (ref 70–110)

## 2025-01-23 PROCEDURE — 25000003 PHARM REV CODE 250

## 2025-01-23 PROCEDURE — 25000003 PHARM REV CODE 250: Performed by: INTERNAL MEDICINE

## 2025-01-23 PROCEDURE — 21400001 HC TELEMETRY ROOM

## 2025-01-23 PROCEDURE — 25000003 PHARM REV CODE 250: Performed by: STUDENT IN AN ORGANIZED HEALTH CARE EDUCATION/TRAINING PROGRAM

## 2025-01-23 PROCEDURE — 63600175 PHARM REV CODE 636 W HCPCS: Performed by: STUDENT IN AN ORGANIZED HEALTH CARE EDUCATION/TRAINING PROGRAM

## 2025-01-23 RX ORDER — PANTOPRAZOLE SODIUM 40 MG/1
40 TABLET, DELAYED RELEASE ORAL
Status: DISCONTINUED | OUTPATIENT
Start: 2025-01-23 | End: 2025-01-29 | Stop reason: HOSPADM

## 2025-01-23 RX ADMIN — RISPERIDONE 2 MG: 1 TABLET, FILM COATED ORAL at 09:01

## 2025-01-23 RX ADMIN — TRAZODONE HYDROCHLORIDE 150 MG: 150 TABLET ORAL at 09:01

## 2025-01-23 RX ADMIN — APIXABAN 2.5 MG: 2.5 TABLET, FILM COATED ORAL at 09:01

## 2025-01-23 RX ADMIN — PANTOPRAZOLE SODIUM 40 MG: 40 TABLET, DELAYED RELEASE ORAL at 05:01

## 2025-01-23 RX ADMIN — LATANOPROST 1 DROP: 50 SOLUTION OPHTHALMIC at 09:01

## 2025-01-23 RX ADMIN — ALLOPURINOL 300 MG: 300 TABLET ORAL at 09:01

## 2025-01-23 RX ADMIN — LEVOTHYROXINE SODIUM 25 MCG: 25 TABLET ORAL at 06:01

## 2025-01-23 RX ADMIN — FUROSEMIDE 40 MG: 40 TABLET ORAL at 09:01

## 2025-01-23 RX ADMIN — ACETAMINOPHEN 650 MG: 325 TABLET, FILM COATED ORAL at 09:01

## 2025-01-23 RX ADMIN — CETIRIZINE HYDROCHLORIDE 10 MG: 10 TABLET, FILM COATED ORAL at 09:01

## 2025-01-23 RX ADMIN — MELATONIN TAB 3 MG 6 MG: 3 TAB at 05:01

## 2025-01-23 RX ADMIN — PANTOPRAZOLE SODIUM 40 MG: 40 INJECTION, POWDER, LYOPHILIZED, FOR SOLUTION INTRAVENOUS at 10:01

## 2025-01-23 RX ADMIN — FERROUS SULFATE TAB 325 MG (65 MG ELEMENTAL FE) 1 EACH: 325 (65 FE) TAB at 09:01

## 2025-01-23 RX ADMIN — ACETAMINOPHEN 650 MG: 325 TABLET, FILM COATED ORAL at 10:01

## 2025-01-23 RX ADMIN — RIVASTIGMINE TARTRATE 1.5 MG: 1.5 CAPSULE ORAL at 09:01

## 2025-01-23 RX ADMIN — POTASSIUM BICARBONATE 25 MEQ: 977.5 TABLET, EFFERVESCENT ORAL at 09:01

## 2025-01-23 NOTE — PROGRESS NOTES
"[facility] Hospital Medicine Progress Note        Chief Complaint: Inpatient Follow-up for bilateral leg swelling, generalized weakness     HPI: "88 y/o F with a PMH of diastolic heart failure LVEF 50-55%, HTN, chronic anemia, paroxysmal A-fib on eliquis, and CKD stage III followed by Dr. Chávez , chronic respiratory failure on 2 L NC at home, dementia and glaucoma presented to the ED with complains of bilateral leg swelling, generalized weakness and deconditioning post hospital discharge. Patient explains that she is bed bound and lives at home with her son and  who take care of her. Patient was recently admitted at this facility for worsening generalized weakness, bilateral leg swelling and urinary retention and discharged home with an indwelling cook, follow up appointment with urology and private sitters and home care with [facility] . Patient denies black or blood in her tool, denies chest pain, denies shortness of breath, denies known fever.     Initial ED vitals: Temp 98.1, HR 90, Resp 18, /58, O2 Sat 98% on RA. ED work up revealed WBC 7.39, H&H 8.7 and 26.8, , K 3.4, BUN 30.3, Creatinine 1.26, .3, Troponin 0.048, Lactic acid 1.1, TSH 3.335 Influenza, COVID, and RSV swab negative, Respiratory panel negative, UA positive for blood, leukocyte esterase, RBC, WBC, and bacteria, X-ray chest read enlarged cardiac silhouette without overt edema, EKG read ventricular paced rhythm at 62 bmp. Cardiology was consulted in the ED and patient was admitted to Hospital Medicine.  She is found to have right heart failure and pulmonary hypertension which was the cause of her leg edema.  Nephrology was consulted and continue to remove fluid with IV Lasix.  Left ventricle systolic function is good.  Now transitioned to oral diuretics.  Consultants signed off.Recommend outpatient anemia evaluation if she may benefit from bone marrow biopsy? as a part of anemia " workup. FOBT negative .Therapy recommending moderate intensity therapy ,case management working on discharge planning.    Interval Hx:   1/22/25-Patient seen and examined. No acute events; Spouse by bedside; awake and alert denies any concern      1/23/25 dr taylor - no new issues . Family waiting for PT/OT for snf placement per case management    Objective/physical exam:  General: In no acute distress, afebrile  Chest: Clear to auscultation bilaterally  Heart: RRR, +S1, S2, no appreciable murmur  Abdomen: Soft, nontender, BS +  MSK: Warm, no lower extremity edema, no clubbing or cyanosis  Neurologic: Alert and oriented x4, Cranial nerve II-XII intact, Strength 5/5 in all 4 extremities    VITAL SIGNS: 24 HRS MIN & MAX LAST   Temp  Min: 97.2 °F (36.2 °C)  Max: 98.5 °F (36.9 °C) 98.5 °F (36.9 °C)   BP  Min: 113/67  Max: 122/63 113/67   Pulse  Min: 61  Max: 63  62   Resp  Min: 18  Max: 20 20   SpO2  Min: 93 %  Max: 96 % (!) 94 %     I have reviewed the following labs:  Recent Labs   Lab 01/17/25  0546 01/19/25  1606 01/20/25 0444 01/20/25 2119 01/21/25  0907   WBC 5.26 6.03 5.64  --   --    RBC 2.37* 2.48* 2.51*  --   --    HGB 7.8* 8.3* 8.2* 7.8* 8.4*   HCT 23.5* 24.3* 24.7* 23.6* 25.3*   MCV 99.2* 98.0* 98.4*  --   --    MCH 32.9* 33.5* 32.7*  --   --    MCHC 33.2 34.2 33.2  --   --    RDW 19.8* 19.4* 19.9*  --   --     177 177  --   --    MPV 9.4 9.5 9.6  --   --      Recent Labs   Lab 01/19/25  1606 01/20/25 0444 01/21/25  0907   * 132* 134*   K 4.0 4.5 3.6   CL 97* 97* 96*   CO2 30 29 29   BUN 26.1* 26.1* 24.5*   CREATININE 1.26* 1.16* 1.24*   CALCIUM 9.1 8.9 9.3   ALBUMIN 2.5*  --   --    ALKPHOS 107  --   --    ALT 7  --   --    AST 19  --   --    BILITOT 1.1  --   --        Microbiology Results (last 7 days)       ** No results found for the last 168 hours. **             See below for Radiology    Assessment/Plan:  HFpEF, acutely decompensated, improved  Pulmonary hypertension (moderate per  Echo)  NSTEMI type II s/t above  CKD IIIB  Pseudomonas urinary tract infection, resolved  Anemia of chronic disease  -will do work up   Major Neurocognitive Disorder, unspecified   Chronic hypoxic respiratory failure  Baseline dementia  Essential HTN  Paroxysmal atrial fibrillation currently paced rhythm, CHADS2 Vasc score of 5  Sick sinus syndrome status post pacemaker  h/o bipolar disorder    Plan:  No Need for ischemic evaluation per Cardiology.  Diuresis per Nephrology.  Recommend to Continue current Lasix.  Strict Is&Os.  Nephrology signed off now  Afebrile without any leukocytosis  Monitor hemoglobin,  received IV,now  on oral iron supplementation.  Keep hemoglobin above 7.5.   Anemia work up    FOBT remains negative   Repeat hemoglobin 8.4 this a.m.   Psych following, medications adjusted  PT/OT recommending moderate intensity therapy.  Case management working on discharge planning. -snf Introduced to about Home health, patient refuses home health, requests to go to SNF  Has not worked with PT for more than 4-5 days due to weather and coverage  Imodium p.r.n. for loose stools  Awaiting placement  Remains hemodynamically stable          Pt will need to be re- eval by PT for  to look into snf        VTE prophylaxis: seliquis       Anticipated discharge and Disposition:   snf placement pending, outpatient anemia workup      All diagnosis and differential diagnosis have been reviewed; assessment and plan has been documented; I have personally reviewed the labs and test results that are presently available; I have reviewed the patients medication list; I have reviewed the consulting providers response and recommendations. I have reviewed or attempted to review medical records based upon their availability    All of the patient's questions have been  addressed and answered. Patient's is agreeable to the above stated plan. I will continue to monitor closely and make adjustments to medical management as  needed.    Portions of this note dictated using EMR integrated voice recognition software, and may be subject to voice recognition errors not corrected at proofreading. Please contact writer for clarification if needed.   _____________________________________________________________________    Malnutrition Status:  Nutrition consulted. Most recent weight and BMI monitored-     Measurements:  Wt Readings from Last 1 Encounters:   01/10/25 76.7 kg (169 lb)   Body mass index is 28.12 kg/m².    Patient has been screened and assessed by RD.    Malnutrition Type:  Context:    Level:      Malnutrition Characteristic Summary:       Interventions/Recommendations (treatment strategy):        Scheduled Med:   allopurinoL  300 mg Oral Daily    apixaban  2.5 mg Oral BID    cetirizine  10 mg Oral QHS    ferrous sulfate  1 tablet Oral BID    furosemide  40 mg Oral Daily    latanoprost  1 drop Both Eyes QHS    levothyroxine  25 mcg Oral Before breakfast    lubiprostone  24 mcg Oral BID WM    melatonin  6 mg Oral Q24H    pantoprazole  40 mg Intravenous BID    potassium bicarbonate  25 mEq Oral Daily    risperiDONE  2 mg Oral QHS    rivastigmine tartrate  1.5 mg Oral Daily    senna-docusate 8.6-50 mg  4 tablet Oral Daily    traZODone  150 mg Oral QHS      Continuous Infusions:     PRN Meds:    Current Facility-Administered Medications:     acetaminophen, 650 mg, Oral, Q4H PRN    albuterol, 1 puff, Inhalation, PRN    aluminum-magnesium hydroxide-simethicone, 30 mL, Oral, QID PRN    bisacodyL, 10 mg, Rectal, Daily PRN    dextrose 50%, 12.5 g, Intravenous, PRN    dextrose 50%, 25 g, Intravenous, PRN    glucagon (human recombinant), 1 mg, Intramuscular, PRN    glucose, 16 g, Oral, PRN    glucose, 24 g, Oral, PRN    glycopyrrolate, 1 mg, Oral, BID PRN    hydrOXYzine HCL, 25 mg, Oral, Nightly PRN    insulin aspart U-100, 0-5 Units, Subcutaneous, QID (AC + HS) PRN    loperamide, 2 mg, Oral, QID PRN    nitroGLYCERIN, 0.4 mg, Sublingual, Q5  Min PRN    ondansetron, 4 mg, Intravenous, Q8H PRN    pneumoc 20-rod conj-dip cr(PF), 0.5 mL, Intramuscular, vaccine x 1 dose    polyethylene glycol, 17 g, Oral, BID PRN    sodium chloride 0.9%, 10 mL, Intravenous, PRN    Flushing PICC/Midline Protocol, , , Until Discontinued **AND** sodium chloride 0.9%, 10 mL, Intravenous, Q12H PRN     Radiology:  I have personally reviewed the following imaging and agree with the radiologist.     X-Ray Abdomen AP 1 View  Narrative: EXAMINATION:  XR ABDOMEN AP 1 VIEW    CLINICAL HISTORY:  Abdominal pain;    TECHNIQUE:  Single-view of the abdomen    COMPARISON:  01/08/2025    FINDINGS:  Degenerative changes of the spine.  Nonobstructive bowel gas pattern.  Impression: Nonobstructive bowel gas pattern.    Electronically signed by: Wiley Roberts  Date:    01/19/2025  Time:    16:05      Phyllis Almanza DO  Department of Hospital Medicine  [facility]  01/23/2025

## 2025-01-23 NOTE — PLAN OF CARE
Problem: Adult Inpatient Plan of Care  Goal: Plan of Care Review  Outcome: Not Progressing  Goal: Patient-Specific Goal (Individualized)  Outcome: Not Progressing  Goal: Absence of Hospital-Acquired Illness or Injury  Outcome: Not Progressing  Goal: Optimal Comfort and Wellbeing  Outcome: Not Progressing  Goal: Readiness for Transition of Care  Outcome: Not Progressing     Problem: Infection  Goal: Absence of Infection Signs and Symptoms  Outcome: Not Progressing     Problem: Wound  Goal: Optimal Coping  Outcome: Not Progressing  Goal: Optimal Functional Ability  Outcome: Not Progressing  Goal: Absence of Infection Signs and Symptoms  Outcome: Not Progressing  Goal: Improved Oral Intake  Outcome: Not Progressing  Goal: Optimal Pain Control and Function  Outcome: Not Progressing  Goal: Skin Health and Integrity  Outcome: Not Progressing  Goal: Optimal Wound Healing  Outcome: Not Progressing     Problem: Skin Injury Risk Increased  Goal: Skin Health and Integrity  Outcome: Not Progressing     Problem: Fall Injury Risk  Goal: Absence of Fall and Fall-Related Injury  Outcome: Not Progressing

## 2025-01-24 LAB
FERRITIN SERPL-MCNC: 629.97 NG/ML (ref 4.63–204)
FOLATE SERPL-MCNC: 14.6 NG/ML (ref 7–31.4)
IRON SATN MFR SERPL: 23 % (ref 20–50)
IRON SERPL-MCNC: 32 UG/DL (ref 50–170)
POCT GLUCOSE: 115 MG/DL (ref 70–110)
POCT GLUCOSE: 131 MG/DL (ref 70–110)
POCT GLUCOSE: 150 MG/DL (ref 70–110)
TIBC SERPL-MCNC: 107 UG/DL (ref 70–310)
TIBC SERPL-MCNC: 139 UG/DL (ref 250–450)
TRANSFERRIN SERPL-MCNC: 131 MG/DL
VIT B12 SERPL-MCNC: 787 PG/ML (ref 213–816)

## 2025-01-24 PROCEDURE — 25000003 PHARM REV CODE 250: Performed by: INTERNAL MEDICINE

## 2025-01-24 PROCEDURE — 82607 VITAMIN B-12: CPT | Performed by: INTERNAL MEDICINE

## 2025-01-24 PROCEDURE — 21400001 HC TELEMETRY ROOM

## 2025-01-24 PROCEDURE — 97530 THERAPEUTIC ACTIVITIES: CPT | Mod: CQ

## 2025-01-24 PROCEDURE — 25000003 PHARM REV CODE 250

## 2025-01-24 PROCEDURE — 97535 SELF CARE MNGMENT TRAINING: CPT

## 2025-01-24 PROCEDURE — 82728 ASSAY OF FERRITIN: CPT | Performed by: INTERNAL MEDICINE

## 2025-01-24 PROCEDURE — 82746 ASSAY OF FOLIC ACID SERUM: CPT | Performed by: INTERNAL MEDICINE

## 2025-01-24 PROCEDURE — 25000003 PHARM REV CODE 250: Performed by: STUDENT IN AN ORGANIZED HEALTH CARE EDUCATION/TRAINING PROGRAM

## 2025-01-24 PROCEDURE — 36415 COLL VENOUS BLD VENIPUNCTURE: CPT | Performed by: INTERNAL MEDICINE

## 2025-01-24 PROCEDURE — 97530 THERAPEUTIC ACTIVITIES: CPT

## 2025-01-24 PROCEDURE — 83540 ASSAY OF IRON: CPT | Performed by: INTERNAL MEDICINE

## 2025-01-24 RX ADMIN — LATANOPROST 1 DROP: 50 SOLUTION OPHTHALMIC at 09:01

## 2025-01-24 RX ADMIN — APIXABAN 2.5 MG: 2.5 TABLET, FILM COATED ORAL at 10:01

## 2025-01-24 RX ADMIN — MELATONIN TAB 3 MG 6 MG: 3 TAB at 06:01

## 2025-01-24 RX ADMIN — FUROSEMIDE 40 MG: 40 TABLET ORAL at 10:01

## 2025-01-24 RX ADMIN — ALLOPURINOL 300 MG: 300 TABLET ORAL at 10:01

## 2025-01-24 RX ADMIN — RISPERIDONE 2 MG: 1 TABLET, FILM COATED ORAL at 09:01

## 2025-01-24 RX ADMIN — FERROUS SULFATE TAB 325 MG (65 MG ELEMENTAL FE) 1 EACH: 325 (65 FE) TAB at 08:01

## 2025-01-24 RX ADMIN — LOPERAMIDE HYDROCHLORIDE 2 MG: 2 CAPSULE ORAL at 09:01

## 2025-01-24 RX ADMIN — TRAZODONE HYDROCHLORIDE 150 MG: 150 TABLET ORAL at 08:01

## 2025-01-24 RX ADMIN — LEVOTHYROXINE SODIUM 25 MCG: 25 TABLET ORAL at 07:01

## 2025-01-24 RX ADMIN — LUBIPROSTONE 24 MCG: 24 CAPSULE, GELATIN COATED ORAL at 07:01

## 2025-01-24 RX ADMIN — FERROUS SULFATE TAB 325 MG (65 MG ELEMENTAL FE) 1 EACH: 325 (65 FE) TAB at 10:01

## 2025-01-24 RX ADMIN — APIXABAN 2.5 MG: 2.5 TABLET, FILM COATED ORAL at 08:01

## 2025-01-24 RX ADMIN — PANTOPRAZOLE SODIUM 40 MG: 40 TABLET, DELAYED RELEASE ORAL at 07:01

## 2025-01-24 RX ADMIN — POTASSIUM BICARBONATE 25 MEQ: 977.5 TABLET, EFFERVESCENT ORAL at 10:01

## 2025-01-24 RX ADMIN — CETIRIZINE HYDROCHLORIDE 10 MG: 10 TABLET, FILM COATED ORAL at 08:01

## 2025-01-24 RX ADMIN — PANTOPRAZOLE SODIUM 40 MG: 40 TABLET, DELAYED RELEASE ORAL at 04:01

## 2025-01-24 RX ADMIN — RIVASTIGMINE TARTRATE 1.5 MG: 1.5 CAPSULE ORAL at 10:01

## 2025-01-24 NOTE — PROGRESS NOTES
Inpatient Nutrition Evaluation    Admit Date: 12/26/2024   Total duration of encounter: 29 days    Nutrition Recommendation/Prescription     Continue oral diet as tolerated; Diet Heart Healthy Standard Tray       Nutrition Assessment     Chart Review    Reason Seen: continuous nutrition monitoring    Malnutrition Screening Tool Results   Have you recently lost weight without trying?: No  Have you been eating poorly because of a decreased appetite?: No   MST Score: 0     Diagnosis:  Acute HFpEF Exacerbation   NSTEMI   Catheter associated urinary infection  Paroxysmal A-fib   UTI GNR -POA   Acute anemia, iron def     Relevant Medical History:  diastolic heart failure LVEF 50-55%, HTN, chronic anemia, paroxysmal A-fib on eliquis, and CKD stage III     Nutrition-Related Medications: Scheduled Medications:  allopurinoL, 300 mg, Daily  apixaban, 2.5 mg, BID  cetirizine, 10 mg, QHS  ferrous sulfate, 1 tablet, BID  furosemide, 40 mg, Daily  latanoprost, 1 drop, QHS  levothyroxine, 25 mcg, Before breakfast  lubiprostone, 24 mcg, BID WM  melatonin, 6 mg, Q24H  pantoprazole, 40 mg, BID AC  potassium bicarbonate, 25 mEq, Daily  risperiDONE, 2 mg, QHS  rivastigmine tartrate, 1.5 mg, Daily  senna-docusate 8.6-50 mg, 4 tablet, Daily  traZODone, 150 mg, QHS    Continuous Infusions:   PRN Medications:    allopurinoL tablet 300 mg    apixaban tablet 2.5 mg    cetirizine tablet 10 mg    ferrous sulfate tablet 1 each    furosemide tablet 40 mg    latanoprost 0.005 % ophthalmic solution 1 drop    levothyroxine tablet 25 mcg    lubiprostone capsule 24 mcg    melatonin tablet 6 mg    pantoprazole EC tablet 40 mg    potassium bicarbonate disintegrating tablet 25 mEq    risperiDONE tablet 2 mg    rivastigmine tartrate capsule 1.5 mg    senna-docusate 8.6-50 mg per tablet 4 tablet    traZODone tablet 150 mg        Nutrition-Related Labs:  Recent Labs   Lab 01/19/25  1606 01/20/25  0444 01/20/25  2119 01/21/25  0907   * 132*  --  134*  "  K 4.0 4.5  --  3.6   CALCIUM 9.1 8.9  --  9.3   CL 97* 97*  --  96*   CO2 30 29  --  29   BUN 26.1* 26.1*  --  24.5*   CREATININE 1.26* 1.16*  --  1.24*   EGFRNORACEVR 41 46  --  42   GLUCOSE 95 89  --  97   BILITOT 1.1  --   --   --    ALKPHOS 107  --   --   --    ALT 7  --   --   --    AST 19  --   --   --    ALBUMIN 2.5*  --   --   --    WBC 6.03 5.64  --   --    HGB 8.3* 8.2* 7.8* 8.4*   HCT 24.3* 24.7* 23.6* 25.3*        Diet Order: Diet Heart Healthy Standard Tray  Oral Supplement Order: none  Appetite/Oral Intake: good/50-75% of meals  Factors Affecting Nutritional Intake: none identified  Food/Pent[employer]alism/Cultural Preferences: none reported  Food Allergies: no known food allergies       Wound(s): [REMOVED]      Altered Skin Integrity 03/10/24 0705 Sacral spine-Tissue loss description: Partial thickness       Wound 12/10/24 0800 Pressure Injury Sacral spine #1-Tissue loss description: Partial thickness     Comments    12/28/24 Pt tolerating oral diet, unable to provide much hx, ate breakfast this morning; no unintentional weight loss reported prior to admit.    1/3/24: Pt continues with good appetite today. Noted last BM was 12/31.       1/10/25 Pt reports good appetite, says she is not drinking the Boost Plus so will d/c    1/17/25 Nurse reports fair appetite and intake; pt eats well when  feeds her.     1/24/25 Eating lunch with assistance from family, good appetite and intake reported, drinking Boost     Anthropometrics    Height: 5' 5" (165.1 cm) Height Method: Stated  Last Weight: 76.7 kg (169 lb) (01/10/25 0500) Weight Method: Bed Scale  BMI (Calculated): 28.1  BMI Classification: overweight (BMI 25-29.9)     Ideal Body Weight (IBW), Female: 125 lb     % Ideal Body Weight, Female (lb): 126.98 %                             Usual Weight Provided By: EMR weight history    Wt Readings from Last 5 Encounters:   01/10/25 76.7 kg (169 lb)   12/22/24 71.7 kg (158 lb 1.1 oz)   11/19/24 72.6 kg (160 lb) "   10/23/24 68.9 kg (152 lb)   09/17/24 69.9 kg (154 lb)     Weight Change(s) Since Admission:  Admit Weight: 72.6 kg (160 lb) (12/26/24 1501)      Patient Education    Not applicable.    Monitoring & Evaluation     Dietitian will monitor food and beverage intake and weight change.  Nutrition Risk/Follow-Up: low (follow-up in 5-7 days)  Patients assigned 'low nutrition risk' status do not qualify for a full nutritional assessment but will be monitored and re-evaluated in a 5-7 day time period. Please consult if re-evaluation needed sooner.

## 2025-01-24 NOTE — PT/OT/SLP PROGRESS
Physical Therapy Treatment    Patient Name:  Ev Alberts   MRN:  [id number]    Recommendations:     Discharge therapy intensity: Moderate Intensity Therapy   Discharge Equipment Recommendations: to be determined by next level of care  Barriers to discharge: Impaired mobility    Assessment:     Ev Alberts is a 89 y.o. female admitted with a medical diagnosis of acute HF exacerbation, NSTEMI, catheter associated UTI, PAF, acute anemia, hx of dementia.  She presents with the following impairments/functional limitations: weakness, impaired endurance, impaired self care skills, impaired functional mobility, gait instability, impaired balance, impaired cognition, decreased coordination, decreased upper extremity function, decreased lower extremity function, decreased safety awareness, pain.    Rehab Prognosis: Good; patient would benefit from acute skilled PT services to address these deficits and reach maximum level of function.    Recent Surgery: * No surgery found *      Plan:     During this hospitalization, patient would benefit from acute PT services 3 x/week to address the identified rehab impairments via gait training, therapeutic activities, therapeutic exercises, neuromuscular re-education and progress toward the following goals:    Plan of Care Expires:  02/10/25    Subjective     Chief Complaint: none stated  Patient/Family Comments/goals: none stated  Pain/Comfort:         Objective:     Communicated with nursing prior to session.  Patient found HOB elevated with pulse ox (continuous), telemetry, pressure relief boots upon PT entry to room.     General Precautions: Standard, fall  Orthopedic Precautions: N/A  Braces: N/A  Respiratory Status: Room air  Blood Pressure: NT    Functional Mobility:  Bed Mobility:     Supine to Sit: maximal assistance and of 2 persons  Sit to Supine: maximal assistance and of 2 persons  Transfers:     Sit to Stand:  maximal assistance and of 2 persons with rolling walker  X3  reps - posterior lean noted  Gait: lateral steps toward HOB w/RW, MaxA x2    Therapeutic Activities/Exercises:  Standing marches x3-5 reps x2 sets - poor foot clearance    Education:  Patient provided with verbal education education regarding PT role/goals/POC, fall prevention, and safety awareness.  Additional teaching is warranted.     Patient left HOB elevated with all lines intact, call button in reach, nurse notified, and daughter present    GOALS:   Multidisciplinary Problems       Physical Therapy Goals          Problem: Physical Therapy    Goal Priority Disciplines Outcome Interventions   Physical Therapy Goal     PT, PT/OT Progressing    Description: Goals to be met by: 24     Patient will increase functional independence with mobility by performin. Supine to sit with MInimal Assistance  2. Sit to stand transfer with Minimal Assistance  3. Bed to chair transfer with Minimal Assistance using Rolling Walker vs. Squat pivot  4. Gait  x 25 feet with Minimal Assistance using Rolling Walker.   5. Sitting at edge of bed x10 minutes with Stand-by Assistance                         Time Tracking:     PT Received On: 25  PT Start Time: 1346     PT Stop Time: 1409  PT Total Time (min): 23 min     Billable Minutes: Therapeutic Activity 23    Treatment Type: Treatment  PT/PTA: PTA     Number of PTA visits since last PT visit: 2     2025

## 2025-01-24 NOTE — PLAN OF CARE
Received a call by the patient's daughter: Mellissa Alberts: ([phone]) who is requesting a (SNF) referral be sent to: [facility] She reports that she spoke with someone there and they will review the referral for acceptance re: (SNF) services.

## 2025-01-24 NOTE — PT/OT/SLP PROGRESS
Occupational Therapy   Treatment    Name: Ev Alberts  MRN: [id number]  Admitting Diagnosis:  Acute on chronic congestive heart failure     1. Acute on chronic congestive heart failure, unspecified heart failure type    2. Fatigue    3. Leg swelling    4. Chest pain    5. CHF (congestive heart failure)    6. Drooling    7. Acute cystitis without hematuria    8. Postherpetic neuralgia    9. Chronic anemia    10. MGUS (monoclonal gammopathy of unknown significance)    11. Acute on chronic diastolic heart failure    12. Severe malnutrition    13. Weakness    14. Acute on chronic congestive heart failure          Recommendations:     Recommended therapy intensity at discharge: Moderate Intensity Therapy   Discharge Equipment Recommendations:  to be determined by next level of care  Barriers to discharge:   (severity of deficits)    Assessment:     Ev Alberts is a 89 y.o. female with a medical diagnosis of Acute on chronic congestive heart failure.  She presents with The primary encounter diagnosis was Acute on chronic congestive heart failure, unspecified heart failure type. Diagnoses of Fatigue, Leg swelling, Chest pain, CHF (congestive heart failure), Drooling, Acute cystitis without hematuria, Postherpetic neuralgia, Chronic anemia, MGUS (monoclonal gammopathy of unknown significance), Acute on chronic diastolic heart failure, Severe malnutrition, Weakness, and Acute on chronic congestive heart failure were also pertinent to this visit.  . Performance deficits affecting function are weakness, impaired endurance, impaired cognition, decreased ROM, impaired self care skills, impaired functional mobility, gait instability, impaired balance, pain, decreased safety awareness, impaired cardiopulmonary response to activity, edema, decreased lower extremity function, decreased upper extremity function.     Pt with increased lethargy and command following this date. Required increased assist for all axs and to maintain  alertness throughout session. Will progress pt as able     Rehab Prognosis:  Fair; patient would benefit from acute skilled OT services to address these deficits and reach maximum level of function.       Plan:     Patient to be seen 4 x/week to address the above listed problems via self-care/home management, therapeutic exercises, therapeutic activities  Plan of Care Expires: 01/27/25  Plan of Care Reviewed with: patient, daughter    Subjective     Pain/Comfort:  Pain Rating 1:  (does not rate)  Location - Orientation 1: generalized  Location 1: sacral spine  Pain Addressed 1: Reposition, Distraction, Cessation of Activity  Pain Rating Post-Intervention 1:  (does not rate)    Objective:     Communicated with: be prior to session.  Patient found supine with pulse ox (continuous), telemetry upon OT entry to room.    General Precautions: Standard, fall    Orthopedic Precautions:N/A  Braces: N/A  Respiratory Status: Nasal cannula, flow 2 L/min  Vital Signs: O2 low 90's throughout session      Occupational Performance:     Bed Mobility:    Patient completed Rolling/Turning to Left with  maximal assistance  Patient completed Rolling/Turning to Right with maximal assistance  Patient completed Scooting/Bridging with maximal assistance and 2 persons  Patient completed Supine to Sit with moderate assistance, maximal assistance, and 2 persons  Patient completed Sit to Supine with maximal assistance and 2 persons     Functional Mobility/Transfers:  Patient completed Sit <> Stand Transfer with moderate assistance, maximal assistance, and of 2 persons  with  rolling walker   Functional Mobility: Max A of 2 lateral steps to HOB with RW x 2 trials with seated break between ; pt requires assist with weight shifting, advancement of BLEs, and assist for balance and RW management    Activities of Daily Living:  Lower Body Dressing: total assistance    Toileting: total assistance    Feeding - Min- max A ; dghtr assisting with set up  of food; increased time required to load utensil and bring to mouth     Therapeutic Activities:  3 stands performed during session with increased assist; posterior lean throughout; unsafe to perform gait trials this date 2/2 lethargy; pt max A of 2 lateral steps to HOB with RW as above        [facility] 6 Click ADL: 13    Patient Education:  Patient and dghtr provided with verbal education education regarding OT role/goals/POC, fall prevention, safety awareness, and home exercise program .  Understanding was verbalized.      Patient left supine with all lines intact, call button in reach, nsg notified, and dghtr present.    GOALS:   Multidisciplinary Problems       Occupational Therapy Goals          Problem: Occupational Therapy    Goal Priority Disciplines Outcome Interventions   Occupational Therapy Goal     OT, PT/OT Progressing    Description: Goals to be met by: 1/27/25     Patient will increase functional independence with ADLs by performing:    Feeding with Set-up Assistance.  UE Dressing with Minimal Assistance.  LE Dressing with Minimal Assistance.  Grooming while seated at sink with Stand-by Assistance.  Toileting from bedside commode with Minimal Assistance for hygiene and clothing management.   Toilet transfer to bedside commode with Minimal Assistance.                       Time Tracking:     OT Date of Treatment: 01/24/25  OT Start Time: 1344  OT Stop Time: 1409  OT Total Time (min): 25 min    Billable Minutes:Self Care/Home Management 15  Therapeutic Activity 10    OT/SARANYA: OT     Number of SARANYA visits since last OT visit: 3    1/24/2025

## 2025-01-25 LAB
POCT GLUCOSE: 133 MG/DL (ref 70–110)
POCT GLUCOSE: 136 MG/DL (ref 70–110)
POCT GLUCOSE: 93 MG/DL (ref 70–110)
POCT GLUCOSE: 95 MG/DL (ref 70–110)

## 2025-01-25 PROCEDURE — 25000003 PHARM REV CODE 250: Performed by: STUDENT IN AN ORGANIZED HEALTH CARE EDUCATION/TRAINING PROGRAM

## 2025-01-25 PROCEDURE — 25000003 PHARM REV CODE 250: Performed by: INTERNAL MEDICINE

## 2025-01-25 PROCEDURE — 25000003 PHARM REV CODE 250

## 2025-01-25 PROCEDURE — 21400001 HC TELEMETRY ROOM

## 2025-01-25 RX ORDER — BRIMONIDINE TARTRATE AND TIMOLOL MALEATE 2; 5 MG/ML; MG/ML
1 SOLUTION OPHTHALMIC 2 TIMES DAILY
Status: DISCONTINUED | OUTPATIENT
Start: 2025-01-25 | End: 2025-01-25

## 2025-01-25 RX ORDER — TIMOLOL MALEATE 5 MG/ML
1 SOLUTION/ DROPS OPHTHALMIC 2 TIMES DAILY
Status: DISCONTINUED | OUTPATIENT
Start: 2025-01-25 | End: 2025-01-29 | Stop reason: HOSPADM

## 2025-01-25 RX ORDER — TRAZODONE HYDROCHLORIDE 100 MG/1
100 TABLET ORAL NIGHTLY
Status: DISCONTINUED | OUTPATIENT
Start: 2025-01-25 | End: 2025-01-29 | Stop reason: HOSPADM

## 2025-01-25 RX ORDER — BRIMONIDINE TARTRATE 1.5 MG/ML
1 SOLUTION/ DROPS OPHTHALMIC 2 TIMES DAILY
Status: DISCONTINUED | OUTPATIENT
Start: 2025-01-25 | End: 2025-01-29 | Stop reason: HOSPADM

## 2025-01-25 RX ADMIN — ALLOPURINOL 300 MG: 300 TABLET ORAL at 09:01

## 2025-01-25 RX ADMIN — MELATONIN TAB 3 MG 6 MG: 3 TAB at 06:01

## 2025-01-25 RX ADMIN — CETIRIZINE HYDROCHLORIDE 10 MG: 10 TABLET, FILM COATED ORAL at 08:01

## 2025-01-25 RX ADMIN — RISPERIDONE 2 MG: 1 TABLET, FILM COATED ORAL at 08:01

## 2025-01-25 RX ADMIN — APIXABAN 2.5 MG: 2.5 TABLET, FILM COATED ORAL at 08:01

## 2025-01-25 RX ADMIN — LATANOPROST 1 DROP: 50 SOLUTION OPHTHALMIC at 08:01

## 2025-01-25 RX ADMIN — LEVOTHYROXINE SODIUM 25 MCG: 25 TABLET ORAL at 06:01

## 2025-01-25 RX ADMIN — FUROSEMIDE 40 MG: 40 TABLET ORAL at 09:01

## 2025-01-25 RX ADMIN — ACETAMINOPHEN 650 MG: 325 TABLET, FILM COATED ORAL at 10:01

## 2025-01-25 RX ADMIN — PANTOPRAZOLE SODIUM 40 MG: 40 TABLET, DELAYED RELEASE ORAL at 06:01

## 2025-01-25 RX ADMIN — TRAZODONE HYDROCHLORIDE 100 MG: 100 TABLET ORAL at 08:01

## 2025-01-25 RX ADMIN — FERROUS SULFATE TAB 325 MG (65 MG ELEMENTAL FE) 1 EACH: 325 (65 FE) TAB at 09:01

## 2025-01-25 RX ADMIN — RIVASTIGMINE TARTRATE 1.5 MG: 1.5 CAPSULE ORAL at 09:01

## 2025-01-25 RX ADMIN — FERROUS SULFATE TAB 325 MG (65 MG ELEMENTAL FE) 1 EACH: 325 (65 FE) TAB at 08:01

## 2025-01-25 RX ADMIN — APIXABAN 2.5 MG: 2.5 TABLET, FILM COATED ORAL at 09:01

## 2025-01-25 RX ADMIN — PANTOPRAZOLE SODIUM 40 MG: 40 TABLET, DELAYED RELEASE ORAL at 04:01

## 2025-01-25 RX ADMIN — POTASSIUM BICARBONATE 25 MEQ: 977.5 TABLET, EFFERVESCENT ORAL at 09:01

## 2025-01-25 NOTE — PROGRESS NOTES
"[facility] Hospital Medicine Progress Note        Chief Complaint: Inpatient Follow-up for bilateral leg swelling, generalized weakness     HPI: "88 y/o F with a PMH of diastolic heart failure LVEF 50-55%, HTN, chronic anemia, paroxysmal A-fib on eliquis, and CKD stage III followed by Dr. Chávez , chronic respiratory failure on 2 L NC at home, dementia and glaucoma presented to the ED with complains of bilateral leg swelling, generalized weakness and deconditioning post hospital discharge. Patient explains that she is bed bound and lives at home with her son and  who take care of her. Patient was recently admitted at this facility for worsening generalized weakness, bilateral leg swelling and urinary retention and discharged home with an indwelling cook, follow up appointment with urology and private sitters and home care with [facility] . Patient denies black or blood in her tool, denies chest pain, denies shortness of breath, denies known fever.     Initial ED vitals: Temp 98.1, HR 90, Resp 18, /58, O2 Sat 98% on RA. ED work up revealed WBC 7.39, H&H 8.7 and 26.8, , K 3.4, BUN 30.3, Creatinine 1.26, .3, Troponin 0.048, Lactic acid 1.1, TSH 3.335 Influenza, COVID, and RSV swab negative, Respiratory panel negative, UA positive for blood, leukocyte esterase, RBC, WBC, and bacteria, X-ray chest read enlarged cardiac silhouette without overt edema, EKG read ventricular paced rhythm at 62 bmp. Cardiology was consulted in the ED and patient was admitted to Hospital Medicine.  She is found to have right heart failure and pulmonary hypertension which was the cause of her leg edema.  Nephrology was consulted and continue to remove fluid with IV Lasix.  Left ventricle systolic function is good.  Now transitioned to oral diuretics.  Consultants signed off.Recommend outpatient anemia evaluation if she may benefit from bone marrow biopsy? as a part of anemia " workup. FOBT negative .Therapy recommending moderate intensity therapy ,case management working on discharge planning.    Interval Hx:   1/22/25-Patient seen and examined. No acute events; Spouse by bedside; awake and alert denies any concern      1/23/25 dr taylor - no new issues . Family waiting for PT/OT for snf placement per case management      1/24/25 chay taylor - daughter at bedside refers before this admit her mom was able to do transfers . Here she is max assist .    Objective/physical exam:  General: In no acute distress, afebrile  Chest: Clear to auscultation bilaterally  Heart: RRR, +S1, S2, no appreciable murmur  Abdomen: Soft, nontender, BS +  MSK: Warm, no lower extremity edema, no clubbing or cyanosis  Neurologic: Alert and oriented x4, Cranial nerve II-XII intact, Strength 5/5 in all 4 extremities    VITAL SIGNS: 24 HRS MIN & MAX LAST   Temp  Min: 97.8 °F (36.6 °C)  Max: 98.8 °F (37.1 °C) 98.8 °F (37.1 °C)   BP  Min: 116/65  Max: 149/71 (!) 149/71   Pulse  Min: 62  Max: 72  71   Resp  Min: 22  Max: 22 (!) 22   SpO2  Min: 93 %  Max: 100 % 100 %     I have reviewed the following labs:  Recent Labs   Lab 01/19/25  1606 01/20/25 0444 01/20/25 2119 01/21/25  0907   WBC 6.03 5.64  --   --    RBC 2.48* 2.51*  --   --    HGB 8.3* 8.2* 7.8* 8.4*   HCT 24.3* 24.7* 23.6* 25.3*   MCV 98.0* 98.4*  --   --    MCH 33.5* 32.7*  --   --    MCHC 34.2 33.2  --   --    RDW 19.4* 19.9*  --   --     177  --   --    MPV 9.5 9.6  --   --      Recent Labs   Lab 01/19/25  1606 01/20/25 0444 01/21/25  0907   * 132* 134*   K 4.0 4.5 3.6   CL 97* 97* 96*   CO2 30 29 29   BUN 26.1* 26.1* 24.5*   CREATININE 1.26* 1.16* 1.24*   CALCIUM 9.1 8.9 9.3   ALBUMIN 2.5*  --   --    ALKPHOS 107  --   --    ALT 7  --   --    AST 19  --   --    BILITOT 1.1  --   --        Microbiology Results (last 7 days)       ** No results found for the last 168 hours. **             See below for Radiology    Assessment/Plan:  HFpEF,  acutely decompensated, improved  Pulmonary hypertension (moderate per Echo)  NSTEMI type II s/t above  CKD IIIB  Pseudomonas urinary tract infection, resolved  Anemia of chronic disease  -will do work up   Major Neurocognitive Disorder, unspecified   Chronic hypoxic respiratory failure  Baseline dementia  Essential HTN  Paroxysmal atrial fibrillation currently paced rhythm, CHADS2 Vasc score of 5  Sick sinus syndrome status post pacemaker  h/o bipolar disorder    Plan:  No Need for ischemic evaluation per Cardiology.  Diuresis per Nephrology.  Recommend to Continue current Lasix.  Strict Is&Os.  Nephrology signed off now  Afebrile without any leukocytosis  Monitor hemoglobin,  received IV,now  on oral iron supplementation.  Keep hemoglobin above 7.5.   Anemia work up    FOBT remains negative   Repeat hemoglobin 8.4 this a.m.   Psych following, medications adjusted  PT/OT recommending moderate intensity therapy.  Case management working on discharge planning. -snf Introduced to about Home health, patient refuses home health, requests to go to SNF  Has not worked with PT for more than 4-5 days due to weather and coverage  Imodium p.r.n. for loose stools  Awaiting placement  Remains hemodynamically stable          Pt will need to be re- eval by PT for  to look into snf        VTE prophylaxis: eliquis       Anticipated discharge and Disposition:   snf placement pending, outpatient anemia workup      All diagnosis and differential diagnosis have been reviewed; assessment and plan has been documented; I have personally reviewed the labs and test results that are presently available; I have reviewed the patients medication list; I have reviewed the consulting providers response and recommendations. I have reviewed or attempted to review medical records based upon their availability    All of the patient's questions have been  addressed and answered. Patient's is agreeable to the above stated plan. I will  continue to monitor closely and make adjustments to medical management as needed.    Portions of this note dictated using EMR integrated voice recognition software, and may be subject to voice recognition errors not corrected at proofreading. Please contact writer for clarification if needed.   _____________________________________________________________________    Malnutrition Status:  Nutrition consulted. Most recent weight and BMI monitored-     Measurements:  Wt Readings from Last 1 Encounters:   01/10/25 76.7 kg (169 lb)   Body mass index is 28.12 kg/m².    Patient has been screened and assessed by RD.    Malnutrition Type:  Context:    Level:      Malnutrition Characteristic Summary:       Interventions/Recommendations (treatment strategy):        Scheduled Med:   allopurinoL  300 mg Oral Daily    apixaban  2.5 mg Oral BID    cetirizine  10 mg Oral QHS    ferrous sulfate  1 tablet Oral BID    furosemide  40 mg Oral Daily    latanoprost  1 drop Both Eyes QHS    levothyroxine  25 mcg Oral Before breakfast    lubiprostone  24 mcg Oral BID WM    melatonin  6 mg Oral Q24H    pantoprazole  40 mg Oral BID AC    potassium bicarbonate  25 mEq Oral Daily    risperiDONE  2 mg Oral QHS    rivastigmine tartrate  1.5 mg Oral Daily    senna-docusate 8.6-50 mg  4 tablet Oral Daily    traZODone  150 mg Oral QHS      Continuous Infusions:     PRN Meds:    Current Facility-Administered Medications:     acetaminophen, 650 mg, Oral, Q4H PRN    albuterol, 1 puff, Inhalation, PRN    aluminum-magnesium hydroxide-simethicone, 30 mL, Oral, QID PRN    bisacodyL, 10 mg, Rectal, Daily PRN    dextrose 50%, 12.5 g, Intravenous, PRN    dextrose 50%, 25 g, Intravenous, PRN    glucagon (human recombinant), 1 mg, Intramuscular, PRN    glucose, 16 g, Oral, PRN    glucose, 24 g, Oral, PRN    glycopyrrolate, 1 mg, Oral, BID PRN    hydrOXYzine HCL, 25 mg, Oral, Nightly PRN    insulin aspart U-100, 0-5 Units, Subcutaneous, QID (AC + HS) PRN     loperamide, 2 mg, Oral, QID PRN    nitroGLYCERIN, 0.4 mg, Sublingual, Q5 Min PRN    ondansetron, 4 mg, Intravenous, Q8H PRN    pneumoc 20-rod conj-dip cr(PF), 0.5 mL, Intramuscular, vaccine x 1 dose    polyethylene glycol, 17 g, Oral, BID PRN    sodium chloride 0.9%, 10 mL, Intravenous, PRN    Flushing PICC/Midline Protocol, , , Until Discontinued **AND** sodium chloride 0.9%, 10 mL, Intravenous, Q12H PRN     Radiology:  I have personally reviewed the following imaging and agree with the radiologist.     X-Ray Abdomen AP 1 View  Narrative: EXAMINATION:  XR ABDOMEN AP 1 VIEW    CLINICAL HISTORY:  Abdominal pain;    TECHNIQUE:  Single-view of the abdomen    COMPARISON:  01/08/2025    FINDINGS:  Degenerative changes of the spine.  Nonobstructive bowel gas pattern.  Impression: Nonobstructive bowel gas pattern.    Electronically signed by: Wiley Roberts  Date:    01/19/2025  Time:    16:05      Phyllis Almanza DO  Department of Hospital Medicine  [facility]  01/24/2025

## 2025-01-25 NOTE — PROGRESS NOTES
"[facility] Hospital Medicine Progress Note        Chief Complaint: Inpatient Follow-up for bilateral leg swelling, generalized weakness     HPI: "88 y/o F with a PMH of diastolic heart failure LVEF 50-55%, HTN, chronic anemia, paroxysmal A-fib on eliquis, and CKD stage III followed by Dr. Chávez , chronic respiratory failure on 2 L NC at home, dementia and glaucoma presented to the ED with complains of bilateral leg swelling, generalized weakness and deconditioning post hospital discharge. Patient explains that she is bed bound and lives at home with her son and  who take care of her. Patient was recently admitted at this facility for worsening generalized weakness, bilateral leg swelling and urinary retention and discharged home with an indwelling cook, follow up appointment with urology and private sitters and home care with [facility] . Patient denies black or blood in her tool, denies chest pain, denies shortness of breath, denies known fever.     Initial ED vitals: Temp 98.1, HR 90, Resp 18, /58, O2 Sat 98% on RA. ED work up revealed WBC 7.39, H&H 8.7 and 26.8, , K 3.4, BUN 30.3, Creatinine 1.26, .3, Troponin 0.048, Lactic acid 1.1, TSH 3.335 Influenza, COVID, and RSV swab negative, Respiratory panel negative, UA positive for blood, leukocyte esterase, RBC, WBC, and bacteria, X-ray chest read enlarged cardiac silhouette without overt edema, EKG read ventricular paced rhythm at 62 bmp. Cardiology was consulted in the ED and patient was admitted to Hospital Medicine.  She is found to have right heart failure and pulmonary hypertension which was the cause of her leg edema.  Nephrology was consulted and continue to remove fluid with IV Lasix.  Left ventricle systolic function is good.  Now transitioned to oral diuretics.  Consultants signed off.Recommend outpatient anemia evaluation if she may benefit from bone marrow biopsy? as a part of anemia " workup. FOBT negative .Therapy recommending moderate intensity therapy ,case management working on discharge planning.    Interval Hx:   1/22/25-Patient seen and examined. No acute events; Spouse by bedside; awake and alert denies any concern      1/23/25 dr lucas - no new issues . Family waiting for PT/OT for snf placement per case management      1/24/25 chay lucas - daughter at bedside refers before this admit her mom was able to do transfers . Here she is max assist .    01/25/2025 DR. LUCAS-CHART REVIEWED PATIENT EXAMINED.  PATIENT IS AWAKE ALERT AND ACTIVE.  FAMILY MEMBERS REQUESTING TO DECREASE TRAZODONE FROM 150  MG AT NIGHT SO PATIENT WILL NOT BE TOO GROGGY IN A.M. AND CAN PARTICIPATE WITH THE SDTOT/PT    Objective/physical exam:  General: In no acute distress, afebrile  Chest: Clear to auscultation bilaterally  Heart: RRR, +S1, S2, no appreciable murmur  Abdomen: Soft, nontender, BS +  MSK: Warm, no lower extremity edema, no clubbing or cyanosis  Neurologic: Alert and oriented x4, Cranial nerve II-XII intact, Strength 5/5 in all 4 extremities    VITAL SIGNS: 24 HRS MIN & MAX LAST   Temp  Min: 96.6 °F (35.9 °C)  Max: 99.3 °F (37.4 °C) 98.1 °F (36.7 °C)   BP  Min: 100/57  Max: 149/71 112/69   Pulse  Min: 58  Max: 74  (!) 59   Resp  Min: 16  Max: 22 16   SpO2  Min: 98 %  Max: 100 % 98 %     I have reviewed the following labs:  Recent Labs   Lab 01/19/25  1606 01/20/25  0444 01/20/25  2119 01/21/25  0907   WBC 6.03 5.64  --   --    RBC 2.48* 2.51*  --   --    HGB 8.3* 8.2* 7.8* 8.4*   HCT 24.3* 24.7* 23.6* 25.3*   MCV 98.0* 98.4*  --   --    MCH 33.5* 32.7*  --   --    MCHC 34.2 33.2  --   --    RDW 19.4* 19.9*  --   --     177  --   --    MPV 9.5 9.6  --   --      Recent Labs   Lab 01/19/25  1606 01/20/25  0444 01/21/25  0907   * 132* 134*   K 4.0 4.5 3.6   CL 97* 97* 96*   CO2 30 29 29   BUN 26.1* 26.1* 24.5*   CREATININE 1.26* 1.16* 1.24*   CALCIUM 9.1 8.9 9.3   ALBUMIN 2.5*  --   --     ALKPHOS 107  --   --    ALT 7  --   --    AST 19  --   --    BILITOT 1.1  --   --        Microbiology Results (last 7 days)       ** No results found for the last 168 hours. **             See below for Radiology    Assessment/Plan:  HFpEF, acutely decompensated, improved  Pulmonary hypertension (moderate per Echo)  NSTEMI type II s/t above  CKD IIIB  Pseudomonas urinary tract infection, resolved  Anemia of chronic disease  -FERNANDEZ ON REPLACEMENT   Major Neurocognitive Disorder, unspecified   Chronic hypoxic respiratory failure  Baseline dementia  Essential HTN  Paroxysmal atrial fibrillation currently paced rhythm, CHADS2 Vasc score of 5  Sick sinus syndrome status post pacemaker  h/o bipolar disorder    Plan:  No Need for ischemic evaluation per Cardiology.  Continue current Lasix.  Strict Is&Os.  Nephrology signed off now  Afebrile without any leukocytosis  Monitor hemoglobin,  received IV,now  on oral iron supplementation.  Keep hemoglobin above 7.5.   FOBT remains negative   Repeat hemoglobin 8.4 this a.m.   Psych following, medications adjusted  PT/OT recommending moderate intensity therapy.  Case management working on discharge planning. -snf Introduced to about Home health, patient refuses home health, requests to go to SNF  Has not worked with PT for more than 4-5 days due to weather and coverage  Imodium p.r.n. for loose stools  Awaiting placement  Remains hemodynamically stable          Moderate intensity therapy by PT recs         VTE prophylaxis: eliquis       Anticipated discharge and Disposition:   snf placement pending, outpatient anemia workup      All diagnosis and differential diagnosis have been reviewed; assessment and plan has been documented; I have personally reviewed the labs and test results that are presently available; I have reviewed the patients medication list; I have reviewed the consulting providers response and recommendations. I have reviewed or attempted to review medical records  based upon their availability    All of the patient's questions have been  addressed and answered. Patient's is agreeable to the above stated plan. I will continue to monitor closely and make adjustments to medical management as needed.    Portions of this note dictated using EMR integrated voice recognition software, and may be subject to voice recognition errors not corrected at proofreading. Please contact writer for clarification if needed.   _____________________________________________________________________    Malnutrition Status:  Nutrition consulted. Most recent weight and BMI monitored-     Measurements:  Wt Readings from Last 1 Encounters:   01/10/25 76.7 kg (169 lb)   Body mass index is 28.12 kg/m².    Patient has been screened and assessed by RD.    Malnutrition Type:  Context:    Level:      Malnutrition Characteristic Summary:       Interventions/Recommendations (treatment strategy):        Scheduled Med:   allopurinoL  300 mg Oral Daily    apixaban  2.5 mg Oral BID    cetirizine  10 mg Oral QHS    ferrous sulfate  1 tablet Oral BID    furosemide  40 mg Oral Daily    latanoprost  1 drop Both Eyes QHS    levothyroxine  25 mcg Oral Before breakfast    lubiprostone  24 mcg Oral BID WM    melatonin  6 mg Oral Q24H    pantoprazole  40 mg Oral BID AC    potassium bicarbonate  25 mEq Oral Daily    risperiDONE  2 mg Oral QHS    rivastigmine tartrate  1.5 mg Oral Daily    senna-docusate 8.6-50 mg  4 tablet Oral Daily    traZODone  100 mg Oral QHS      Continuous Infusions:     PRN Meds:    Current Facility-Administered Medications:     acetaminophen, 650 mg, Oral, Q4H PRN    albuterol, 1 puff, Inhalation, PRN    aluminum-magnesium hydroxide-simethicone, 30 mL, Oral, QID PRN    bisacodyL, 10 mg, Rectal, Daily PRN    dextrose 50%, 12.5 g, Intravenous, PRN    dextrose 50%, 25 g, Intravenous, PRN    glucagon (human recombinant), 1 mg, Intramuscular, PRN    glucose, 16 g, Oral, PRN    glucose, 24 g, Oral, PRN     glycopyrrolate, 1 mg, Oral, BID PRN    hydrOXYzine HCL, 25 mg, Oral, Nightly PRN    insulin aspart U-100, 0-5 Units, Subcutaneous, QID (AC + HS) PRN    loperamide, 2 mg, Oral, QID PRN    nitroGLYCERIN, 0.4 mg, Sublingual, Q5 Min PRN    ondansetron, 4 mg, Intravenous, Q8H PRN    pneumoc 20-rod conj-dip cr(PF), 0.5 mL, Intramuscular, vaccine x 1 dose    polyethylene glycol, 17 g, Oral, BID PRN    sodium chloride 0.9%, 10 mL, Intravenous, PRN    Flushing PICC/Midline Protocol, , , Until Discontinued **AND** sodium chloride 0.9%, 10 mL, Intravenous, Q12H PRN     Radiology:  I have personally reviewed the following imaging and agree with the radiologist.     X-Ray Abdomen AP 1 View  Narrative: EXAMINATION:  XR ABDOMEN AP 1 VIEW    CLINICAL HISTORY:  Abdominal pain;    TECHNIQUE:  Single-view of the abdomen    COMPARISON:  01/08/2025    FINDINGS:  Degenerative changes of the spine.  Nonobstructive bowel gas pattern.  Impression: Nonobstructive bowel gas pattern.    Electronically signed by: Wiley Roberts  Date:    01/19/2025  Time:    16:05      Phyllis Almanza DO  Department of Hospital Medicine  [facility]  01/25/2025

## 2025-01-25 NOTE — PROGRESS NOTES
[facility] [address]  Wound Care    Patient Name:  Ev Alberts   MRN:  [id number]  Date: 1/25/2025  Diagnosis: Acute on chronic congestive heart failure    History:     Past Medical History:   Diagnosis Date    Acute kidney injury superimposed on chronic kidney disease     Congestive heart failure     Dementia     Hypertension     Shingles of eyelid     Sick sinus syndrome     Thyroid disease     Unspecified glaucoma        Social History     Socioeconomic History    Marital status:    Tobacco Use    Smoking status: Never    Smokeless tobacco: Never   Substance and Sexual Activity    Alcohol use: Never    Drug use: Not Currently     Social Drivers of Health     Financial Resource Strain: Low Risk  (12/27/2024)    Overall Financial Resource Strain (CARDIA)     Difficulty of Paying Living Expenses: Not very hard   Food Insecurity: No Food Insecurity (12/27/2024)    Hunger Vital Sign     Worried About Running Out of Food in the Last Year: Never true     Ran Out of Food in the Last Year: Never true   Transportation Needs: No Transportation Needs (12/27/2024)    TRANSPORTATION NEEDS     Transportation : No   Physical Activity: Inactive (12/10/2024)    Exercise Vital Sign     Days of Exercise per Week: 0 days     Minutes of Exercise per Session: 0 min   Stress: Patient Unable To Answer (12/27/2024)    [employer] [place] [employer] - Occupational Stress Questionnaire     Feeling of Stress : Patient unable to answer   Housing Stability: Low Risk  (12/27/2024)    Housing Stability Vital Sign     Unable to Pay for Housing in the Last Year: No     Homeless in the Last Year: No       Precautions:     Allergies as of 12/26/2024 - Reviewed 12/26/2024   Allergen Reaction Noted    Amlodipine-benazepril Edema 06/01/2022    Corticosteroids (glucocorticoids) Edema and Swelling 05/11/2011    Rofecoxib Anaphylaxis and Swelling 05/11/2011    Sulfa (sulfonamide antibiotics) Edema  06/01/2022    Atorvastatin  10/26/2018    Ibuprofen  06/01/2022       WO Assessment Details/Treatment        01/25/25 0941   WOCN Assessment   Visit Date 01/25/25   Visit Time 0941   Consult Type Follow Up   WO Speciality Wound   Intervention assessed;changed;chart review;coordination of care   Teaching on-going        Wound 12/10/24 0800 Pressure Injury Sacral spine #1   Date First Assessed/Time First Assessed: 12/10/24 0800   Present on Original Admission: Yes  Primary Wound Type: Pressure Injury  Location: Sacral spine  Wound Number: #1   Wound Image    Dressing Appearance Open to air   Drainage Amount None   Drainage Characteristics/Odor No odor   Appearance Pink   Tissue loss description Partial thickness   Black (%), Wound Tissue Color 0 %   Red (%), Wound Tissue Color 100 %   Yellow (%), Wound Tissue Color 0 %   Periwound Area Dry;Intact   Wound Edges Irregular;Defined   Wound Length (cm) 2 cm   Wound Width (cm) 0.6 cm   Wound Depth (cm) 0.1 cm   Wound Volume (cm^3) 0.12 cm^3   Wound Surface Area (cm^2) 1.2 cm^2   Care Cleansed with:;Soap and water   Dressing Applied;Silver;Foam     WOCN follow up for sacral wound. Spoke with nurse Abigal prior to entry to patient room. Patient in bed, family at bedside. Patient on pulsate bed. Treatment recommendations: cleanse with soap and water, pat dry, apply aquacel ag bordered foam, change q2days and PRN soilage. Patient refuses wearing heel protector boots, however patient agreed to float heels with wedge. Heels floated. Educated patient on importance of floating heels to prevent wounds. Patient verbalized understanding. Nursing to continue with current treatment recommendations. Wound care will follow.     01/25/2025

## 2025-01-26 LAB
POCT GLUCOSE: 124 MG/DL (ref 70–110)
POCT GLUCOSE: 127 MG/DL (ref 70–110)
POCT GLUCOSE: 154 MG/DL (ref 70–110)

## 2025-01-26 PROCEDURE — 25000003 PHARM REV CODE 250

## 2025-01-26 PROCEDURE — 25000003 PHARM REV CODE 250: Performed by: INTERNAL MEDICINE

## 2025-01-26 PROCEDURE — 25000003 PHARM REV CODE 250: Performed by: STUDENT IN AN ORGANIZED HEALTH CARE EDUCATION/TRAINING PROGRAM

## 2025-01-26 PROCEDURE — 21400001 HC TELEMETRY ROOM

## 2025-01-26 RX ADMIN — TIMOLOL MALEATE 1 DROP: 5 SOLUTION OPHTHALMIC at 08:01

## 2025-01-26 RX ADMIN — BRIMONIDINE TARTRATE 1 DROP: 1.5 SOLUTION OPHTHALMIC at 02:01

## 2025-01-26 RX ADMIN — LATANOPROST 1 DROP: 50 SOLUTION OPHTHALMIC at 08:01

## 2025-01-26 RX ADMIN — FERROUS SULFATE TAB 325 MG (65 MG ELEMENTAL FE) 1 EACH: 325 (65 FE) TAB at 08:01

## 2025-01-26 RX ADMIN — BRIMONIDINE TARTRATE 1 DROP: 1.5 SOLUTION OPHTHALMIC at 08:01

## 2025-01-26 RX ADMIN — MELATONIN TAB 3 MG 6 MG: 3 TAB at 06:01

## 2025-01-26 RX ADMIN — CETIRIZINE HYDROCHLORIDE 10 MG: 10 TABLET, FILM COATED ORAL at 08:01

## 2025-01-26 RX ADMIN — PANTOPRAZOLE SODIUM 40 MG: 40 TABLET, DELAYED RELEASE ORAL at 05:01

## 2025-01-26 RX ADMIN — POTASSIUM BICARBONATE 25 MEQ: 977.5 TABLET, EFFERVESCENT ORAL at 08:01

## 2025-01-26 RX ADMIN — TRAZODONE HYDROCHLORIDE 100 MG: 100 TABLET ORAL at 08:01

## 2025-01-26 RX ADMIN — RIVASTIGMINE TARTRATE 1.5 MG: 1.5 CAPSULE ORAL at 08:01

## 2025-01-26 RX ADMIN — RISPERIDONE 2 MG: 1 TABLET, FILM COATED ORAL at 08:01

## 2025-01-26 RX ADMIN — APIXABAN 2.5 MG: 2.5 TABLET, FILM COATED ORAL at 08:01

## 2025-01-26 RX ADMIN — PANTOPRAZOLE SODIUM 40 MG: 40 TABLET, DELAYED RELEASE ORAL at 06:01

## 2025-01-26 RX ADMIN — LEVOTHYROXINE SODIUM 25 MCG: 25 TABLET ORAL at 06:01

## 2025-01-26 RX ADMIN — TIMOLOL MALEATE 1 DROP: 5 SOLUTION OPHTHALMIC at 02:01

## 2025-01-26 RX ADMIN — ALLOPURINOL 300 MG: 300 TABLET ORAL at 08:01

## 2025-01-26 RX ADMIN — FUROSEMIDE 40 MG: 40 TABLET ORAL at 08:01

## 2025-01-26 NOTE — PROGRESS NOTES
"[facility] Hospital Medicine Progress Note        Chief Complaint: Inpatient Follow-up for bilateral leg swelling, generalized weakness     HPI: "88 y/o F with a PMH of diastolic heart failure LVEF 50-55%, HTN, chronic anemia, paroxysmal A-fib on eliquis, and CKD stage III followed by Dr. Chávez , chronic respiratory failure on 2 L NC at home, dementia and glaucoma presented to the ED with complains of bilateral leg swelling, generalized weakness and deconditioning post hospital discharge. Patient explains that she is bed bound and lives at home with her son and  who take care of her. Patient was recently admitted at this facility for worsening generalized weakness, bilateral leg swelling and urinary retention and discharged home with an indwelling cook, follow up appointment with urology and private sitters and home care with [facility] . Patient denies black or blood in her tool, denies chest pain, denies shortness of breath, denies known fever.     Initial ED vitals: Temp 98.1, HR 90, Resp 18, /58, O2 Sat 98% on RA. ED work up revealed WBC 7.39, H&H 8.7 and 26.8, , K 3.4, BUN 30.3, Creatinine 1.26, .3, Troponin 0.048, Lactic acid 1.1, TSH 3.335 Influenza, COVID, and RSV swab negative, Respiratory panel negative, UA positive for blood, leukocyte esterase, RBC, WBC, and bacteria, X-ray chest read enlarged cardiac silhouette without overt edema, EKG read ventricular paced rhythm at 62 bmp. Cardiology was consulted in the ED and patient was admitted to Hospital Medicine.  She is found to have right heart failure and pulmonary hypertension which was the cause of her leg edema.  Nephrology was consulted and continue to remove fluid with IV Lasix.  Left ventricle systolic function is good.  Now transitioned to oral diuretics.  Consultants signed off.Recommend outpatient anemia evaluation if she may benefit from bone marrow biopsy? as a part of anemia " workup. FOBT negative .Therapy recommending moderate intensity therapy ,case management working on discharge planning.    Interval Hx:   1/22/25-Patient seen and examined. No acute events; Spouse by bedside; awake and alert denies any concern      1/23/25 dr lucas - no new issues . Family waiting for PT/OT for snf placement per case management      1/24/25 chay lucas - daughter at bedside refers before this admit her mom was able to do transfers . Here she is max assist .    01/25/2025 DR. LUCAS-CHART REVIEWED PATIENT EXAMINED.  PATIENT IS AWAKE ALERT AND ACTIVE.  FAMILY MEMBERS REQUESTING TO DECREASE TRAZODONE FROM 150  MG AT NIGHT SO PATIENT WILL NOT BE TOO GROGGY IN A.M. AND CAN PARTICIPATE WITH THE ST/OT/PT    1/26/25 dr lucas-physical therapy recommended moderate intensity therapy.  Evaluation showed max assistance and with a history of bed-bound.  Why placement    Objective/physical exam:  General: In no acute distress, afebrile  Chest: Clear to auscultation bilaterally  Heart: RRR, +S1, S2, no appreciable murmur  Abdomen: Soft, nontender, BS +  MSK: Warm, no lower extremity edema, no clubbing or cyanosis  Neurologic: Alert and oriented x4, Cranial nerve II-XII intact, Strength 5/5 in all 4 extremities    VITAL SIGNS: 24 HRS MIN & MAX LAST   Temp  Min: 98.1 °F (36.7 °C)  Max: 98.8 °F (37.1 °C) 98.2 °F (36.8 °C)   BP  Min: 112/69  Max: 135/58 130/66   Pulse  Min: 54  Max: 70  (!) 54   Resp  Min: 16  Max: 16 16   SpO2  Min: 95 %  Max: 100 % 95 %     I have reviewed the following labs:  Recent Labs   Lab 01/19/25  1606 01/20/25  0444 01/20/25  2119 01/21/25  0907   WBC 6.03 5.64  --   --    RBC 2.48* 2.51*  --   --    HGB 8.3* 8.2* 7.8* 8.4*   HCT 24.3* 24.7* 23.6* 25.3*   MCV 98.0* 98.4*  --   --    MCH 33.5* 32.7*  --   --    MCHC 34.2 33.2  --   --    RDW 19.4* 19.9*  --   --     177  --   --    MPV 9.5 9.6  --   --      Recent Labs   Lab 01/19/25  1606 01/20/25  0444 01/21/25  0907   *  132* 134*   K 4.0 4.5 3.6   CL 97* 97* 96*   CO2 30 29 29   BUN 26.1* 26.1* 24.5*   CREATININE 1.26* 1.16* 1.24*   CALCIUM 9.1 8.9 9.3   ALBUMIN 2.5*  --   --    ALKPHOS 107  --   --    ALT 7  --   --    AST 19  --   --    BILITOT 1.1  --   --        Microbiology Results (last 7 days)       ** No results found for the last 168 hours. **             See below for Radiology    Assessment/Plan:  HFpEF, acutely decompensated  - now compensated  Pulmonary hypertension (moderate per Echo)  NSTEMI type II s/t above  CKD IIIB  Pseudomonas urinary tract infection, resolved  Anemia of chronic disease  -FERNANDEZ ON REPLACEMENT   Major Neurocognitive Disorder, unspecified   Chronic hypoxic respiratory failure  Baseline dementia  Essential HTN  Paroxysmal atrial fibrillation currently paced rhythm, CHADS2 Vasc score of 5  Sick sinus syndrome status post pacemaker  h/o bipolar disorder    Plan:  No Need for ischemic evaluation per Cardiology.  Continue current Lasix.  Strict Is&Os.  Nephrology signed off now  Afebrile without any leukocytosis  Monitor hemoglobin,  received IV iron ,now  on oral iron supplementation.  Keep hemoglobin above 7.5.   FOBT remains negative   Repeat hemoglobin 8.4 this a.m.   Psych following, medications adjusted  PT/OT recommending moderate intensity therapy.  On a patient that is max assist with a history bed-bound at home  Case management working on discharge planning. -patient refuses home health, requests to go to SNF when she is max assist and bed-bound at home    Remains hemodynamically stable          Am labs           Moderate intensity therapy by PT recs         VTE prophylaxis: eliquis       Anticipated discharge and Disposition:   snf placement pending,         All diagnosis and differential diagnosis have been reviewed; assessment and plan has been documented; I have personally reviewed the labs and test results that are presently available; I have reviewed the patients medication list; I have  reviewed the consulting providers response and recommendations. I have reviewed or attempted to review medical records based upon their availability    All of the patient's questions have been  addressed and answered. Patient's is agreeable to the above stated plan. I will continue to monitor closely and make adjustments to medical management as needed.    Portions of this note dictated using EMR integrated voice recognition software, and may be subject to voice recognition errors not corrected at proofreading. Please contact writer for clarification if needed.   _____________________________________________________________________    Malnutrition Status:  Nutrition consulted. Most recent weight and BMI monitored-     Measurements:  Wt Readings from Last 1 Encounters:   01/10/25 76.7 kg (169 lb)   Body mass index is 28.12 kg/m².    Patient has been screened and assessed by RD.    Malnutrition Type:  Context:    Level:      Malnutrition Characteristic Summary:       Interventions/Recommendations (treatment strategy):        Scheduled Med:   allopurinoL  300 mg Oral Daily    apixaban  2.5 mg Oral BID    brimonidine 0.15 % OPTH DROP  1 drop Both Eyes BID    And    timolol maleate 0.5%  1 drop Both Eyes BID    cetirizine  10 mg Oral QHS    ferrous sulfate  1 tablet Oral BID    furosemide  40 mg Oral Daily    latanoprost  1 drop Both Eyes QHS    levothyroxine  25 mcg Oral Before breakfast    lubiprostone  24 mcg Oral BID WM    melatonin  6 mg Oral Q24H    pantoprazole  40 mg Oral BID AC    potassium bicarbonate  25 mEq Oral Daily    risperiDONE  2 mg Oral QHS    rivastigmine tartrate  1.5 mg Oral Daily    senna-docusate 8.6-50 mg  4 tablet Oral Daily    traZODone  100 mg Oral QHS      Continuous Infusions:     PRN Meds:    Current Facility-Administered Medications:     acetaminophen, 650 mg, Oral, Q4H PRN    albuterol, 1 puff, Inhalation, PRN    aluminum-magnesium hydroxide-simethicone, 30 mL, Oral, QID PRN    bisacodyL,  10 mg, Rectal, Daily PRN    dextrose 50%, 12.5 g, Intravenous, PRN    dextrose 50%, 25 g, Intravenous, PRN    glucagon (human recombinant), 1 mg, Intramuscular, PRN    glucose, 16 g, Oral, PRN    glucose, 24 g, Oral, PRN    glycopyrrolate, 1 mg, Oral, BID PRN    hydrOXYzine HCL, 25 mg, Oral, Nightly PRN    insulin aspart U-100, 0-5 Units, Subcutaneous, QID (AC + HS) PRN    loperamide, 2 mg, Oral, QID PRN    nitroGLYCERIN, 0.4 mg, Sublingual, Q5 Min PRN    ondansetron, 4 mg, Intravenous, Q8H PRN    pneumoc 20-rod conj-dip cr(PF), 0.5 mL, Intramuscular, vaccine x 1 dose    polyethylene glycol, 17 g, Oral, BID PRN    sodium chloride 0.9%, 10 mL, Intravenous, PRN    Flushing PICC/Midline Protocol, , , Until Discontinued **AND** sodium chloride 0.9%, 10 mL, Intravenous, Q12H PRN     Radiology:  I have personally reviewed the following imaging and agree with the radiologist.     X-Ray Abdomen AP 1 View  Narrative: EXAMINATION:  XR ABDOMEN AP 1 VIEW    CLINICAL HISTORY:  Abdominal pain;    TECHNIQUE:  Single-view of the abdomen    COMPARISON:  01/08/2025    FINDINGS:  Degenerative changes of the spine.  Nonobstructive bowel gas pattern.  Impression: Nonobstructive bowel gas pattern.    Electronically signed by: Wiley Roberts  Date:    01/19/2025  Time:    16:05      Matthew MEAD  Department of Hospital Medicine  [facility]  01/26/2025

## 2025-01-27 LAB
BASOPHILS # BLD AUTO: 0.03 X10(3)/MCL
BASOPHILS NFR BLD AUTO: 0.5 %
EOSINOPHIL # BLD AUTO: 0.08 X10(3)/MCL (ref 0–0.9)
EOSINOPHIL NFR BLD AUTO: 1.4 %
ERYTHROCYTE [DISTWIDTH] IN BLOOD BY AUTOMATED COUNT: 19.1 % (ref 11.5–17)
HCT VFR BLD AUTO: 25.2 % (ref 37–47)
HGB BLD-MCNC: 8.3 G/DL (ref 12–16)
IMM GRANULOCYTES # BLD AUTO: 0.01 X10(3)/MCL (ref 0–0.04)
IMM GRANULOCYTES NFR BLD AUTO: 0.2 %
LYMPHOCYTES # BLD AUTO: 1.71 X10(3)/MCL (ref 0.6–4.6)
LYMPHOCYTES NFR BLD AUTO: 30.6 %
MAGNESIUM SERPL-MCNC: 2 MG/DL (ref 1.6–2.6)
MCH RBC QN AUTO: 32.2 PG (ref 27–31)
MCHC RBC AUTO-ENTMCNC: 32.9 G/DL (ref 33–36)
MCV RBC AUTO: 97.7 FL (ref 80–94)
MONOCYTES # BLD AUTO: 0.68 X10(3)/MCL (ref 0.1–1.3)
MONOCYTES NFR BLD AUTO: 12.2 %
NEUTROPHILS # BLD AUTO: 3.07 X10(3)/MCL (ref 2.1–9.2)
NEUTROPHILS NFR BLD AUTO: 55.1 %
NRBC BLD AUTO-RTO: 0 %
PLATELET # BLD AUTO: 143 X10(3)/MCL (ref 130–400)
PMV BLD AUTO: 9.9 FL (ref 7.4–10.4)
POCT GLUCOSE: 106 MG/DL (ref 70–110)
POCT GLUCOSE: 132 MG/DL (ref 70–110)
POCT GLUCOSE: 163 MG/DL (ref 70–110)
RBC # BLD AUTO: 2.58 X10(6)/MCL (ref 4.2–5.4)
WBC # BLD AUTO: 5.58 X10(3)/MCL (ref 4.5–11.5)

## 2025-01-27 PROCEDURE — 25000003 PHARM REV CODE 250: Performed by: INTERNAL MEDICINE

## 2025-01-27 PROCEDURE — 21400001 HC TELEMETRY ROOM

## 2025-01-27 PROCEDURE — 25000003 PHARM REV CODE 250

## 2025-01-27 PROCEDURE — 25000003 PHARM REV CODE 250: Performed by: STUDENT IN AN ORGANIZED HEALTH CARE EDUCATION/TRAINING PROGRAM

## 2025-01-27 PROCEDURE — 83735 ASSAY OF MAGNESIUM: CPT | Performed by: INTERNAL MEDICINE

## 2025-01-27 PROCEDURE — 85025 COMPLETE CBC W/AUTO DIFF WBC: CPT | Performed by: INTERNAL MEDICINE

## 2025-01-27 PROCEDURE — 36415 COLL VENOUS BLD VENIPUNCTURE: CPT | Performed by: INTERNAL MEDICINE

## 2025-01-27 RX ADMIN — LUBIPROSTONE 24 MCG: 24 CAPSULE, GELATIN COATED ORAL at 10:01

## 2025-01-27 RX ADMIN — LATANOPROST 1 DROP: 50 SOLUTION OPHTHALMIC at 08:01

## 2025-01-27 RX ADMIN — RIVASTIGMINE TARTRATE 1.5 MG: 1.5 CAPSULE ORAL at 10:01

## 2025-01-27 RX ADMIN — MELATONIN TAB 3 MG 6 MG: 3 TAB at 06:01

## 2025-01-27 RX ADMIN — APIXABAN 2.5 MG: 2.5 TABLET, FILM COATED ORAL at 10:01

## 2025-01-27 RX ADMIN — SENNOSIDES AND DOCUSATE SODIUM 4 TABLET: 50; 8.6 TABLET ORAL at 10:01

## 2025-01-27 RX ADMIN — PANTOPRAZOLE SODIUM 40 MG: 40 TABLET, DELAYED RELEASE ORAL at 06:01

## 2025-01-27 RX ADMIN — POTASSIUM BICARBONATE 25 MEQ: 977.5 TABLET, EFFERVESCENT ORAL at 10:01

## 2025-01-27 RX ADMIN — LEVOTHYROXINE SODIUM 25 MCG: 25 TABLET ORAL at 05:01

## 2025-01-27 RX ADMIN — FUROSEMIDE 40 MG: 40 TABLET ORAL at 10:01

## 2025-01-27 RX ADMIN — TIMOLOL MALEATE 1 DROP: 5 SOLUTION OPHTHALMIC at 09:01

## 2025-01-27 RX ADMIN — RISPERIDONE 2 MG: 1 TABLET, FILM COATED ORAL at 08:01

## 2025-01-27 RX ADMIN — PANTOPRAZOLE SODIUM 40 MG: 40 TABLET, DELAYED RELEASE ORAL at 05:01

## 2025-01-27 RX ADMIN — APIXABAN 2.5 MG: 2.5 TABLET, FILM COATED ORAL at 08:01

## 2025-01-27 RX ADMIN — FERROUS SULFATE TAB 325 MG (65 MG ELEMENTAL FE) 1 EACH: 325 (65 FE) TAB at 08:01

## 2025-01-27 RX ADMIN — CETIRIZINE HYDROCHLORIDE 10 MG: 10 TABLET, FILM COATED ORAL at 08:01

## 2025-01-27 RX ADMIN — FERROUS SULFATE TAB 325 MG (65 MG ELEMENTAL FE) 1 EACH: 325 (65 FE) TAB at 10:01

## 2025-01-27 RX ADMIN — TIMOLOL MALEATE 1 DROP: 5 SOLUTION OPHTHALMIC at 08:01

## 2025-01-27 RX ADMIN — BRIMONIDINE TARTRATE 1 DROP: 1.5 SOLUTION OPHTHALMIC at 08:01

## 2025-01-27 RX ADMIN — BRIMONIDINE TARTRATE 1 DROP: 1.5 SOLUTION OPHTHALMIC at 09:01

## 2025-01-27 RX ADMIN — ALLOPURINOL 300 MG: 300 TABLET ORAL at 10:01

## 2025-01-27 RX ADMIN — TRAZODONE HYDROCHLORIDE 100 MG: 100 TABLET ORAL at 08:01

## 2025-01-28 PROBLEM — N30.00 ACUTE CYSTITIS WITHOUT HEMATURIA: Status: RESOLVED | Noted: 2024-12-31 | Resolved: 2025-01-28

## 2025-01-28 PROBLEM — N18.9 ACUTE ON CHRONIC RENAL FAILURE: Status: ACTIVE | Noted: 2025-01-28

## 2025-01-28 PROBLEM — T83.511A CATHETER-ASSOCIATED URINARY TRACT INFECTION: Status: ACTIVE | Noted: 2025-01-28

## 2025-01-28 PROBLEM — F03.918 DEMENTIA WITH BEHAVIORAL DISTURBANCE: Status: ACTIVE | Noted: 2025-01-28

## 2025-01-28 PROBLEM — N39.0 CATHETER-ASSOCIATED URINARY TRACT INFECTION: Status: ACTIVE | Noted: 2025-01-28

## 2025-01-28 PROBLEM — N17.9 ACUTE ON CHRONIC RENAL FAILURE: Status: RESOLVED | Noted: 2025-01-28 | Resolved: 2025-01-28

## 2025-01-28 PROBLEM — N17.9 ACUTE ON CHRONIC RENAL FAILURE: Status: ACTIVE | Noted: 2025-01-28

## 2025-01-28 PROBLEM — N18.9 ACUTE ON CHRONIC RENAL FAILURE: Status: RESOLVED | Noted: 2025-01-28 | Resolved: 2025-01-28

## 2025-01-28 LAB
POCT GLUCOSE: 106 MG/DL (ref 70–110)
POCT GLUCOSE: 140 MG/DL (ref 70–110)

## 2025-01-28 PROCEDURE — 25000003 PHARM REV CODE 250

## 2025-01-28 PROCEDURE — 25000003 PHARM REV CODE 250: Performed by: INTERNAL MEDICINE

## 2025-01-28 PROCEDURE — 27000221 HC OXYGEN, UP TO 24 HOURS

## 2025-01-28 PROCEDURE — 97535 SELF CARE MNGMENT TRAINING: CPT | Mod: CO

## 2025-01-28 PROCEDURE — 97116 GAIT TRAINING THERAPY: CPT | Mod: CQ

## 2025-01-28 PROCEDURE — 25000003 PHARM REV CODE 250: Performed by: STUDENT IN AN ORGANIZED HEALTH CARE EDUCATION/TRAINING PROGRAM

## 2025-01-28 PROCEDURE — 99900035 HC TECH TIME PER 15 MIN (STAT)

## 2025-01-28 PROCEDURE — 97530 THERAPEUTIC ACTIVITIES: CPT | Mod: CO

## 2025-01-28 PROCEDURE — 97110 THERAPEUTIC EXERCISES: CPT | Mod: CQ

## 2025-01-28 PROCEDURE — 21400001 HC TELEMETRY ROOM

## 2025-01-28 RX ORDER — FUROSEMIDE 40 MG/1
40 TABLET ORAL DAILY
Qty: 30 TABLET | Refills: 11 | Status: SHIPPED | OUTPATIENT
Start: 2025-01-29 | End: 2026-01-29

## 2025-01-28 RX ORDER — CIPROFLOXACIN 500 MG/1
250 TABLET ORAL EVERY 12 HOURS
Qty: 5 TABLET | Refills: 0 | Status: SHIPPED | OUTPATIENT
Start: 2025-01-28 | End: 2025-02-02

## 2025-01-28 RX ORDER — FERROUS SULFATE 325(65) MG
325 TABLET, DELAYED RELEASE (ENTERIC COATED) ORAL DAILY
Qty: 90 TABLET | Refills: 0 | Status: SHIPPED | OUTPATIENT
Start: 2025-01-28 | End: 2025-04-28

## 2025-01-28 RX ORDER — TRAZODONE HYDROCHLORIDE 100 MG/1
100 TABLET ORAL NIGHTLY
Qty: 30 TABLET | Refills: 11 | Status: SHIPPED | OUTPATIENT
Start: 2025-01-28 | End: 2026-01-28

## 2025-01-28 RX ADMIN — TRAZODONE HYDROCHLORIDE 100 MG: 100 TABLET ORAL at 09:01

## 2025-01-28 RX ADMIN — BRIMONIDINE TARTRATE 1 DROP: 1.5 SOLUTION OPHTHALMIC at 09:01

## 2025-01-28 RX ADMIN — APIXABAN 2.5 MG: 2.5 TABLET, FILM COATED ORAL at 09:01

## 2025-01-28 RX ADMIN — TIMOLOL MALEATE 1 DROP: 5 SOLUTION OPHTHALMIC at 10:01

## 2025-01-28 RX ADMIN — APIXABAN 2.5 MG: 2.5 TABLET, FILM COATED ORAL at 10:01

## 2025-01-28 RX ADMIN — ALLOPURINOL 300 MG: 300 TABLET ORAL at 10:01

## 2025-01-28 RX ADMIN — ALBUTEROL SULFATE 1 PUFF: 90 AEROSOL, METERED RESPIRATORY (INHALATION) at 09:01

## 2025-01-28 RX ADMIN — MELATONIN TAB 3 MG 6 MG: 3 TAB at 06:01

## 2025-01-28 RX ADMIN — CETIRIZINE HYDROCHLORIDE 10 MG: 10 TABLET, FILM COATED ORAL at 09:01

## 2025-01-28 RX ADMIN — ALBUTEROL SULFATE 1 PUFF: 90 AEROSOL, METERED RESPIRATORY (INHALATION) at 10:01

## 2025-01-28 RX ADMIN — BRIMONIDINE TARTRATE 1 DROP: 1.5 SOLUTION OPHTHALMIC at 10:01

## 2025-01-28 RX ADMIN — PANTOPRAZOLE SODIUM 40 MG: 40 TABLET, DELAYED RELEASE ORAL at 04:01

## 2025-01-28 RX ADMIN — FERROUS SULFATE TAB 325 MG (65 MG ELEMENTAL FE) 1 EACH: 325 (65 FE) TAB at 09:01

## 2025-01-28 RX ADMIN — PANTOPRAZOLE SODIUM 40 MG: 40 TABLET, DELAYED RELEASE ORAL at 07:01

## 2025-01-28 RX ADMIN — HYDROXYZINE HYDROCHLORIDE 25 MG: 25 TABLET, FILM COATED ORAL at 12:01

## 2025-01-28 RX ADMIN — POTASSIUM BICARBONATE 25 MEQ: 977.5 TABLET, EFFERVESCENT ORAL at 10:01

## 2025-01-28 RX ADMIN — TIMOLOL MALEATE 1 DROP: 5 SOLUTION OPHTHALMIC at 09:01

## 2025-01-28 RX ADMIN — FERROUS SULFATE TAB 325 MG (65 MG ELEMENTAL FE) 1 EACH: 325 (65 FE) TAB at 10:01

## 2025-01-28 RX ADMIN — LEVOTHYROXINE SODIUM 25 MCG: 25 TABLET ORAL at 07:01

## 2025-01-28 RX ADMIN — RISPERIDONE 2 MG: 1 TABLET, FILM COATED ORAL at 09:01

## 2025-01-28 RX ADMIN — FUROSEMIDE 40 MG: 40 TABLET ORAL at 10:01

## 2025-01-28 RX ADMIN — LATANOPROST 1 DROP: 50 SOLUTION OPHTHALMIC at 09:01

## 2025-01-28 NOTE — NURSING
Boo the patient's daughter, Astrid, became aggressive with staff as she was assisting the tech with changing her mother; she stated that her mother was wheezing and was I going to listen to her; I listened and heard no wheezing; she stated that the whole staff was lazy including the nurse manager; informed her that she was interfering with patient care and being aggressive; she then stated that I had an attitude from the minute I came in to the patient's room; I told her that I did not have an attitude and that she did not know me like to to even come up with that assumption; informed my charge nurse to call security due to the patient's daughter being aggressive with the staff; security escorted the daughter off the unit

## 2025-01-28 NOTE — PLAN OF CARE
Received an update by Agnes (Admission's Coordinator: Tita Ponce/Demond) that they are ready to admit the patient today for (SNF) services. They are requesting the patient be sent by 2:00 PM this afternoon. I have sent a chat to Dr. Nuñez (Hospitalist) requesting D/C orders be placed.

## 2025-01-28 NOTE — PROGRESS NOTES
"[facility] Hospital Medicine Progress Note        Chief Complaint: Inpatient Follow-up for bilateral leg swelling, generalized weakness     HPI: "89 y/o F with a PMH of diastolic heart failure LVEF 50-55%, HTN, chronic anemia, paroxysmal A-fib on eliquis, and CKD stage III followed by Dr. Chávez , chronic respiratory failure on 2 L NC at home, dementia and glaucoma presented to the ED with complains of bilateral leg swelling, generalized weakness and deconditioning post hospital discharge. Patient explains that she is bed bound and lives at home with her son and  who take care of her. Patient was recently admitted at this facility for worsening generalized weakness, bilateral leg swelling and urinary retention and discharged home with an indwelling cook, follow up appointment with urology and private sitters and home care with [facility] . Patient denies black or blood in her tool, denies chest pain, denies shortness of breath, denies known fever.     Initial ED vitals: Temp 98.1, HR 90, Resp 18, /58, O2 Sat 98% on RA. ED work up revealed WBC 7.39, H&H 8.7 and 26.8, , K 3.4, BUN 30.3, Creatinine 1.26, .3, Troponin 0.048, Lactic acid 1.1, TSH 3.335 Influenza, COVID, and RSV swab negative, Respiratory panel negative, UA positive for blood, leukocyte esterase, RBC, WBC, and bacteria, X-ray chest read enlarged cardiac silhouette without overt edema, EKG read ventricular paced rhythm at 62 bmp. Cardiology was consulted in the ED and patient was admitted to Hospital Medicine.  She is found to have right heart failure and pulmonary hypertension which was the cause of her leg edema.  Nephrology was consulted and continue to remove fluid with IV Lasix.  Left ventricle systolic function is good.  Now transitioned to oral diuretics.  Consultants signed off.Recommend outpatient anemia evaluation if she may benefit from bone marrow biopsy? as a part of anemia " workup. FOBT negative .Therapy recommending moderate intensity therapy ,case management working on discharge planning.    Interval Hx:   1/22/25-Patient seen and examined. No acute events; Spouse by bedside; awake and alert denies any concern      1/23/25 dr lucas - no new issues . Family waiting for PT/OT for snf placement per case management      1/24/25 chay lucas - daughter at bedside refers before this admit her mom was able to do transfers . Here she is max assist .    01/25/2025 DR. LUCAS-CHART REVIEWED PATIENT EXAMINED.  PATIENT IS AWAKE ALERT AND ACTIVE.  FAMILY MEMBERS REQUESTING TO DECREASE TRAZODONE FROM 150  MG AT NIGHT SO PATIENT WILL NOT BE TOO GROGGY IN A.M. AND CAN PARTICIPATE WITH THE ST/OT/PT    1/26/25 dr lucas-physical therapy recommended moderate intensity therapy.  Evaluation showed max assistance and with a history of bed-bound.  Why placement    01/27/2025 Dr. Lucas-chart review and patient examined.  Clinical update sent to [facility].  By history this patient is bed-bound.  Via evaluation by Physical therapy she is max assist and family wants her to go sniff.  We will discuss case with  will probably have to have a family meeting.  Expectations at this moment are way too high for an 89-year-old female that has been bed-bound for a while    Objective/physical exam:  General: In no acute distress, afebrile  Chest: Clear to auscultation bilaterally  Heart: RRR, +S1, S2, no appreciable murmur  Abdomen: Soft, nontender, BS +  MSK: Warm, no lower extremity edema, no clubbing or cyanosis  Neurologic: Alert and oriented x4, Cranial nerve II-XII intact, Strength 5/5 in all 4 extremities    VITAL SIGNS: 24 HRS MIN & MAX LAST   Temp  Min: 97.3 °F (36.3 °C)  Max: 98.2 °F (36.8 °C) 97.9 °F (36.6 °C)   BP  Min: 129/69  Max: 153/64 (!) 153/64   Pulse  Min: 60  Max: 76  76   Resp  Min: 18  Max: 18 18   SpO2  Min: 91 %  Max: 98 % (!) 91 %     I have reviewed the following  labs:  Recent Labs   Lab 01/20/25 2119 01/21/25  0907 01/27/25  0520   WBC  --   --  5.58   RBC  --   --  2.58*   HGB 7.8* 8.4* 8.3*   HCT 23.6* 25.3* 25.2*   MCV  --   --  97.7*   MCH  --   --  32.2*   MCHC  --   --  32.9*   RDW  --   --  19.1*   PLT  --   --  143   MPV  --   --  9.9     Recent Labs   Lab 01/21/25  0907 01/27/25  0520   *  --    K 3.6  --    CL 96*  --    CO2 29  --    BUN 24.5*  --    CREATININE 1.24*  --    CALCIUM 9.3  --    MG  --  2.00       Microbiology Results (last 7 days)       ** No results found for the last 168 hours. **             See below for Radiology    Assessment/Plan:  HFpEF, acutely decompensated  - now compensated  Pulmonary hypertension (moderate per Echo)  NSTEMI type II s/t above  CKD IIIB  Pseudomonas urinary tract infection, resolved  Anemia of chronic disease  -FERNANDEZ ON REPLACEMENT   Major Neurocognitive Disorder, unspecified   Chronic hypoxic respiratory failure  Baseline dementia  Essential HTN  Paroxysmal atrial fibrillation currently paced rhythm, CHADS2 Vasc score of 5  Sick sinus syndrome status post pacemaker  h/o bipolar disorder    Plan:  No Need for ischemic evaluation per Cardiology.  Continue current Lasix.  Strict Is&Os.  Nephrology signed off now  Afebrile without any leukocytosis  Monitor hemoglobin,  received IV iron ,now  on oral iron supplementation.  Keep hemoglobin above 7.5.   FOBT remains negative   Repeat hemoglobin 8.4 this a.m.   Psych following, medications adjusted  PT/OT recommending moderate intensity therapy.  On a patient that is max assist with a history bed-bound at home  Case management working on discharge planning. -patient refuses home health, requests to go to SNF when she is max assist and bed-bound at home    Remains hemodynamically stable          Am labs           Moderate intensity therapy by PT recs         VTE prophylaxis: eliquis       Anticipated discharge and Disposition:   snf placement pending,         All diagnosis  and differential diagnosis have been reviewed; assessment and plan has been documented; I have personally reviewed the labs and test results that are presently available; I have reviewed the patients medication list; I have reviewed the consulting providers response and recommendations. I have reviewed or attempted to review medical records based upon their availability    All of the patient's questions have been  addressed and answered. Patient's is agreeable to the above stated plan. I will continue to monitor closely and make adjustments to medical management as needed.    Portions of this note dictated using EMR integrated voice recognition software, and may be subject to voice recognition errors not corrected at proofreading. Please contact writer for clarification if needed.   _____________________________________________________________________    Malnutrition Status:  Nutrition consulted. Most recent weight and BMI monitored-     Measurements:  Wt Readings from Last 1 Encounters:   01/10/25 76.7 kg (169 lb)   Body mass index is 28.12 kg/m².    Patient has been screened and assessed by RD.    Malnutrition Type:  Context:    Level:      Malnutrition Characteristic Summary:       Interventions/Recommendations (treatment strategy):        Scheduled Med:   allopurinoL  300 mg Oral Daily    apixaban  2.5 mg Oral BID    brimonidine 0.15 % OPTH DROP  1 drop Both Eyes BID    And    timolol maleate 0.5%  1 drop Both Eyes BID    cetirizine  10 mg Oral QHS    ferrous sulfate  1 tablet Oral BID    furosemide  40 mg Oral Daily    latanoprost  1 drop Both Eyes QHS    levothyroxine  25 mcg Oral Before breakfast    lubiprostone  24 mcg Oral BID WM    melatonin  6 mg Oral Q24H    pantoprazole  40 mg Oral BID AC    potassium bicarbonate  25 mEq Oral Daily    risperiDONE  2 mg Oral QHS    rivastigmine tartrate  1.5 mg Oral Daily    senna-docusate 8.6-50 mg  4 tablet Oral Daily    traZODone  100 mg Oral QHS      Continuous  Infusions:     PRN Meds:    Current Facility-Administered Medications:     acetaminophen, 650 mg, Oral, Q4H PRN    albuterol, 1 puff, Inhalation, PRN    aluminum-magnesium hydroxide-simethicone, 30 mL, Oral, QID PRN    bisacodyL, 10 mg, Rectal, Daily PRN    dextrose 50%, 12.5 g, Intravenous, PRN    dextrose 50%, 25 g, Intravenous, PRN    glucagon (human recombinant), 1 mg, Intramuscular, PRN    glucose, 16 g, Oral, PRN    glucose, 24 g, Oral, PRN    glycopyrrolate, 1 mg, Oral, BID PRN    hydrOXYzine HCL, 25 mg, Oral, Nightly PRN    insulin aspart U-100, 0-5 Units, Subcutaneous, QID (AC + HS) PRN    loperamide, 2 mg, Oral, QID PRN    nitroGLYCERIN, 0.4 mg, Sublingual, Q5 Min PRN    ondansetron, 4 mg, Intravenous, Q8H PRN    pneumoc 20-rod conj-dip cr(PF), 0.5 mL, Intramuscular, vaccine x 1 dose    polyethylene glycol, 17 g, Oral, BID PRN    sodium chloride 0.9%, 10 mL, Intravenous, PRN    Flushing PICC/Midline Protocol, , , Until Discontinued **AND** sodium chloride 0.9%, 10 mL, Intravenous, Q12H PRN     Radiology:  I have personally reviewed the following imaging and agree with the radiologist.     X-Ray Abdomen AP 1 View  Narrative: EXAMINATION:  XR ABDOMEN AP 1 VIEW    CLINICAL HISTORY:  Abdominal pain;    TECHNIQUE:  Single-view of the abdomen    COMPARISON:  01/08/2025    FINDINGS:  Degenerative changes of the spine.  Nonobstructive bowel gas pattern.  Impression: Nonobstructive bowel gas pattern.    Electronically signed by: Wiley Roberts  Date:    01/19/2025  Time:    16:05      Matthew MEAD  Department of Hospital Medicine  [facility]  01/27/2025

## 2025-01-28 NOTE — PT/OT/SLP PROGRESS
Physical Therapy Treatment    Patient Name:  Ev Alberts   MRN:  [id number]    Recommendations:     Discharge therapy intensity: Moderate Intensity Therapy   Discharge Equipment Recommendations: to be determined by next level of care  Barriers to discharge: Impaired mobility and Ongoing medical needs    Assessment:     Ev Alberts is a 89 y.o. female admitted with a medical diagnosis of acute HF exacerbation, NSTEMI, catheter associated UTI, PAF, acute anemia, hx of dementia.  She presents with the following impairments/functional limitations: weakness, impaired endurance, impaired self care skills, impaired functional mobility, gait instability, impaired balance, impaired cognition, decreased coordination, decreased upper extremity function, decreased lower extremity function, decreased safety awareness, pain .    Rehab Prognosis: Good; patient would benefit from acute skilled PT services to address these deficits and reach maximum level of function.    Recent Surgery: * No surgery found *      Plan:     During this hospitalization, patient would benefit from acute PT services 3 x/week to address the identified rehab impairments via gait training, therapeutic activities, therapeutic exercises, neuromuscular re-education and progress toward the following goals:    Plan of Care Expires:  02/10/25    Subjective     Chief Complaint: none  Patient/Family Comments/goals: to get stronger and bring mother home  Pain/Comfort:  Pain Rating 1: 0/10      Objective:     Communicated with pts nurse prior to session.  Patient found HOB elevated with pulse ox (continuous) upon PT entry to room.     General Precautions: Standard, fall  Orthopedic Precautions: N/A  Braces: N/A  Respiratory Status: Room air Monitored during session 90 to 96%   Skin Integrity: Visible skin intact      Functional Mobility:  Bed Mobility:     Rolling Left:  maximal assistance and of 2 persons  Rolling Right: maximal assistance, of 2 persons, and pt  rolled side to side to be cleaned prior to exercise and after due to pt urinating.   Scooting: moderate assistance and of 2 persons  Supine to Sit: maximal assistance and of 2 persons  Sit to Supine: maximal assistance and of 2 persons  Transfers:     Sit to Stand:  moderate assistance and of 2 persons with rolling walker and performed 4 reps w/ emphasis on increase forward lean and brining COM over SARAH   Gait: Pt ambulated forward and back x  2 trials, 5ft forward and back.  Pt ambulated w/ RW, Mod A x 2 and occasional assistance advancing LE and stepping back.   Balance: Pt required Mod A to obtain sit balance decreasing to Min/CGA.  Pt requires Mod A for stand balance due to posterior lean    Therapeutic Activities/Exercises:  The pt rec'd AAROM to B LE's in supine, all planes prior to functional mobility tasks due to joint stiffness.  Tolerated well.     Education:  Patient and family were provided with verbal education and demonstrations education regarding PT role/goals/POC and functional mobility .  Understanding was verbalized, however additional teaching warranted.     Patient left HOB elevated with all lines intact, call button in reach, nurse notified, and family and friends present    GOALS:   Multidisciplinary Problems       Physical Therapy Goals          Problem: Physical Therapy    Goal Priority Disciplines Outcome Interventions   Physical Therapy Goal     PT, PT/OT Progressing    Description: Goals to be met by: 24     Patient will increase functional independence with mobility by performin. Supine to sit with MInimal Assistance  2. Sit to stand transfer with Minimal Assistance  3. Bed to chair transfer with Minimal Assistance using Rolling Walker vs. Squat pivot  4. Gait  x 25 feet with Minimal Assistance using Rolling Walker.   5. Sitting at edge of bed x10 minutes with Stand-by Assistance                         Time Tracking:     PT Received On: 25  PT Start Time: 1502     PT  Stop Time: 1540  PT Total Time (min): 38 min     Billable Minutes: Gait Training 12 min and Therapeutic Exercise 26 min    Treatment Type: Treatment  PT/PTA: PTA     Number of PTA visits since last PT visit: 3     01/28/2025

## 2025-01-28 NOTE — PT/OT/SLP PROGRESS
Occupational Therapy   Treatment    Name: Ev Alberts  MRN: [id number]  Admitting Diagnosis:  Acute on chronic congestive heart failure       Recommendations:     Recommended therapy intensity at discharge: Moderate Intensity Therapy   Discharge Equipment Recommendations:  to be determined by next level of care  Barriers to discharge:       Assessment:     Ev Alberts is a 89 y.o. female with a medical diagnosis of Acute on chronic congestive heart failure.  She presents with good participation. Performance deficits affecting function are weakness, impaired endurance, impaired cognition, decreased ROM, impaired self care skills, impaired functional mobility, gait instability, impaired balance, pain, decreased safety awareness, impaired cardiopulmonary response to activity, edema, decreased lower extremity function, decreased upper extremity function.     Rehab Prognosis:  Good; patient would benefit from acute skilled OT services to address these deficits and reach maximum level of function.       Plan:     Patient to be seen 4 x/week to address the above listed problems via self-care/home management, therapeutic exercises, therapeutic activities  Plan of Care Expires: 01/27/25  Plan of Care Reviewed with: patient, spouse, daughter    Subjective     Pain/Comfort:  Pain Rating 1: 0/10    Objective:     Communicated with: nurse prior to session.  Patient found HOB elevated with pulse ox (continuous) upon OT entry to room.    General Precautions: Standard, fall    Orthopedic Precautions:N/A  Braces: N/A  Respiratory Status: Room air     Occupational Performance:     Bed Mobility:    Patient completed Rolling/Turning to Left with  moderate assistance  Patient completed Rolling/Turning to Right with moderate assistance  Patient completed Scooting/Bridging with maximal assistance  Patient completed Supine to Sit with maximal assistance and 2 persons  Patient completed Sit to Supine with maximal assistance and 2  persons   Maintain sitting edge of bed with Min A    Functional Mobility/Transfers:  Patient completed Sit <> Stand Transfer with moderate assistance and of 2 persons  with  rolling walker x 2 trials    Activities of Daily Living:  Toileting: total assistance supine and side lying in bed x 2 instances    Therapeutic Activities:  Dynamic standing ax with RW     Therapeutic Positioning    OT interventions performed during the course of today's session in an effort to prevent and/or reduce acquired pressure injuries:   Therapeutic positioning was provided at the conclusion of session to offload all bony prominences for the prevention and/or reduction of pressure injuries    [facility] 6 Click ADL: 13    Patient Education:  Patient and family were provided with verbal education and demonstrations education regarding fall prevention and safety awareness.  Understanding was verbalized, however additional teaching warranted.      Patient left HOB elevated with all lines intact, call button in reach, and family present.    GOALS:   Multidisciplinary Problems       Occupational Therapy Goals          Problem: Occupational Therapy    Goal Priority Disciplines Outcome Interventions   Occupational Therapy Goal     OT, PT/OT Progressing    Description: Goals to be met by: 1/27/25     Patient will increase functional independence with ADLs by performing:    Feeding with Set-up Assistance.  UE Dressing with Minimal Assistance.  LE Dressing with Minimal Assistance.  Grooming while seated at sink with Stand-by Assistance.  Toileting from bedside commode with Minimal Assistance for hygiene and clothing management.   Toilet transfer to bedside commode with Minimal Assistance.                       Time Tracking:     OT Date of Treatment: 01/28/25  OT Start Time: 1502  OT Stop Time: 1540  OT Total Time (min): 38 min    Billable Minutes:Self Care/Home Management 15  Therapeutic Activity 23    OT/SARANYA: SARANYA     Number of SARANYA visits since last  OT visit: 4    1/28/2025

## 2025-01-29 VITALS
BODY MASS INDEX: 28.16 KG/M2 | WEIGHT: 169 LBS | RESPIRATION RATE: 18 BRPM | HEART RATE: 71 BPM | SYSTOLIC BLOOD PRESSURE: 119 MMHG | OXYGEN SATURATION: 98 % | HEIGHT: 65 IN | TEMPERATURE: 98 F | DIASTOLIC BLOOD PRESSURE: 75 MMHG

## 2025-01-29 PROCEDURE — 25000003 PHARM REV CODE 250: Performed by: INTERNAL MEDICINE

## 2025-01-29 PROCEDURE — 25000003 PHARM REV CODE 250: Performed by: STUDENT IN AN ORGANIZED HEALTH CARE EDUCATION/TRAINING PROGRAM

## 2025-01-29 PROCEDURE — 25000003 PHARM REV CODE 250

## 2025-01-29 RX ADMIN — FERROUS SULFATE TAB 325 MG (65 MG ELEMENTAL FE) 1 EACH: 325 (65 FE) TAB at 09:01

## 2025-01-29 RX ADMIN — POTASSIUM BICARBONATE 25 MEQ: 977.5 TABLET, EFFERVESCENT ORAL at 09:01

## 2025-01-29 RX ADMIN — APIXABAN 2.5 MG: 2.5 TABLET, FILM COATED ORAL at 09:01

## 2025-01-29 RX ADMIN — LEVOTHYROXINE SODIUM 25 MCG: 25 TABLET ORAL at 07:01

## 2025-01-29 RX ADMIN — PANTOPRAZOLE SODIUM 40 MG: 40 TABLET, DELAYED RELEASE ORAL at 07:01

## 2025-01-29 RX ADMIN — ALLOPURINOL 300 MG: 300 TABLET ORAL at 09:01

## 2025-01-29 RX ADMIN — FUROSEMIDE 40 MG: 40 TABLET ORAL at 09:01

## 2025-01-29 NOTE — PLAN OF CARE
01/29/25 1010   Final Note   Assessment Type Final Discharge Note   Anticipated Discharge Disposition SNF  (FOC: The patient will be discharged from [facility] to [facility] ([place]) for (SNF) services.)   Hospital Resources/Appts/Education Provided Appointments scheduled and added to AVS   Post-Acute Status   Post-Acute Authorization Placement   Post-Acute Placement Status Set-up Complete/Auth obtained   Coverage Payor: [facility] DUAL COMPLETE O SNP   Patient choice form signed by patient/caregiver List with quality metrics by geographic area provided   Discharge Delays None known at this time     The patient has been accepted for (SNF) services and will be discharged from [facility] to [facility]. Clinical notes/updates and AVS & D/C summary were uploaded and sent by SEBASTIAN Verdugo via Epic.The patient's POA/Daughter: Mellissa Alberts: ([phone]) was informed of the above discharge plans and she is in agreement.  Transportation has been arranged by BENIGNO Aalrcon (Admissions Coordinator).

## 2025-02-01 NOTE — PHYSICIAN QUERY
Due to conflicting documentation, please clarify the Acute on Chronic diastolic heart failure diagnosis.  Acute on chronic diastolic heart failure (HFpEF) ruled in

## 2025-02-01 NOTE — PHYSICIAN QUERY
Please provide the integumentary diagnosis related to the documentation of the Sacral spine.  Pressure Injury/Decubitus Ulcer, Stage 2

## 2025-04-09 PROBLEM — K11.7 SIALORRHEA: Status: ACTIVE | Noted: 2025-04-09

## 2025-04-10 ENCOUNTER — OFFICE VISIT (OUTPATIENT)
Dept: NEUROLOGY | Facility: CLINIC | Age: 89
End: 2025-04-10
Payer: MEDICARE

## 2025-04-10 VITALS
WEIGHT: 149 LBS | SYSTOLIC BLOOD PRESSURE: 138 MMHG | BODY MASS INDEX: 24.83 KG/M2 | HEIGHT: 65 IN | DIASTOLIC BLOOD PRESSURE: 86 MMHG

## 2025-04-10 DIAGNOSIS — I63.9 CEREBROVASCULAR ACCIDENT (CVA), UNSPECIFIED MECHANISM: ICD-10-CM

## 2025-04-10 DIAGNOSIS — I63.89 OTHER CEREBRAL INFARCTION: ICD-10-CM

## 2025-04-10 DIAGNOSIS — B02.29 POSTHERPETIC NEURALGIA: Primary | ICD-10-CM

## 2025-04-10 DIAGNOSIS — K11.7 SIALORRHEA: ICD-10-CM

## 2025-04-10 PROCEDURE — 99999 PR PBB SHADOW E&M-EST. PATIENT-LVL IV: CPT | Mod: PBBFAC,,, | Performed by: NURSE PRACTITIONER

## 2025-04-10 RX ORDER — BREXPIPRAZOLE 0.5 MG/1
TABLET ORAL
COMMUNITY
Start: 2025-04-07

## 2025-04-10 NOTE — PROGRESS NOTES
"Subjective     Patient ID: Ev Alberts is a 89 y.o. female.    Chief Complaint: Peripheral Neuropathy and bells palsy    HPI  L V1 shingles back in June 2024  Residual scalp itching/burning  Gabapentin prev of know help (was on it for something else)  Failed topical lotions/steroids/otc products  The cmpd cream from TD helps better than GLPC cream      Here today w/ daughter Mellissa to discuss her mother's aphasia...  Last seen here in oct '24  Was admitted to [facility] in nov w/ uti and then again in December w/ acute HF  She'd prev been d/c'd home from the hospital in April 2024 w/ supplemental O2  Daughter reports that she was not given supplemental O2 while inpt at the end of 2024 despite her rqst for her mother to have it (reportedly had O2 sats anywhere from 89%-94%)  Mellissa states that her mother went from speaking fluent full sentences & requesting that someone bring her makeup & wig to her and then suddenly 'not speaking' by the next day  CTH was done inpt    Knows what she wants to say, but can't get the words out  Hoarse today, but daughter states that she yells at staff when she needs to be changed (she is bed bound & wears depends; on lasix)    C/o back pain (can't tolerate morphine or tramadol - slept for 1-2 days when she was given that)    She was not ambulatory before the hospital admits  Sialorrhea resolved    Now at [facility]  Mellissa moved from [place] to [place] to help care for her  Hoping to take her home & care for her there if she can fine sitters for at night.    Per her d/c summary on 1/28/25, "Patient also with a history of valvular heart disease with moderate mitral regurgitation/tricuspid regurgitation as well as pulmonary hypertension.  Patient on chronic supplemental O2 at home."    Review of Systems  A 14pt ROS was reviewed & is negative unless otherwise documented in the HPI       Objective     Physical Exam  GENERAL: NAD, calm, cooperative, appropriate  Looks frail & " deconditioned  Awake/alert  Well groomed  RESP: CTAB  HEART: RRR  Pitting edema to rue & rle  MENTAL STATUS: oriented, follow commands & nods appropriately  SPEECH/LANGUAGE: courtney aphasia, hoarse  CN:  Perrla, eomi, vff, gaze conjugate  No tactile or motor facial asymmetry  Tongue protrudes midline  Motor: generally weak  Diffuse muscle atrophy  Trunk weakness, leans to R  Cerebellar: no tremor or dysmetria  Sensory: normal to tactile stim/vibration  DTRs: normal +2, symmetric  Gait: on stretcher       Assessment and Plan     1. Postherpetic neuralgia    2. Sialorrhea    3. Cerebrovascular accident (CVA), unspecified mechanism  -     MRI Brain Without Contrast; Future; Expected date: 04/10/2025    4. Other cerebral infarction  -     MRI Brain Without Contrast; Future; Expected date: 04/10/2025      PLAN:  Get MRI brain w/o to r/o cva (vs. progression of chronic underlying dz given low O2 in the setting of acute HF/VHD/pulm HTN)  Cont TD 6A cream for PHN  F/u p MRI    RAAD Hernandes-BC     Follow up mri brain.  Total visit time of >74 min includes time spent preparing to see the patient (chart review), obtaining and/or reviewing separately obtained history, performing an exam, counseling/educating the patient and/or caregiver, ordering medications, tests or procedures, documenting clinical info in the EHR and care coordination.

## 2025-04-28 NOTE — PROGRESS NOTES
C3 nurse spoke with Ev Alberts  for a TCC post hospital discharge follow up call. The patient has a scheduled [facility] appointment with  Santi Walters MD (Family Medicine) on 4/18/2024 @ 10:15 AM          How Severe Is Your Acne?: mild Is This A New Presentation, Or A Follow-Up?: Acne

## 2025-05-21 ENCOUNTER — HOSPITAL ENCOUNTER (INPATIENT)
Facility: HOSPITAL | Age: 89
LOS: 3 days | Discharge: HOSPICE | DRG: 641 | End: 2025-05-24
Attending: EMERGENCY MEDICINE | Admitting: INTERNAL MEDICINE
Payer: MEDICARE

## 2025-05-21 DIAGNOSIS — M25.552 LEFT HIP PAIN: ICD-10-CM
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
## 2025-05-21 DIAGNOSIS — R07.9 CHEST PAIN: ICD-10-CM

## 2025-05-21 DIAGNOSIS — R62.7 FAILURE TO THRIVE IN ADULT: ICD-10-CM

## 2025-05-21 DIAGNOSIS — M25.552 ACUTE HIP PAIN, LEFT: ICD-10-CM

## 2025-05-21 LAB
ALBUMIN SERPL-MCNC: 2.7 G/DL (ref 3.4–4.8)
ALBUMIN/GLOB SERPL: 0.6 RATIO (ref 1.1–2)
ALP SERPL-CCNC: 115 UNIT/L (ref 40–150)
ALT SERPL-CCNC: 6 UNIT/L (ref 0–55)
ANION GAP SERPL CALC-SCNC: 6 MEQ/L
ANISOCYTOSIS BLD QL SMEAR: ABNORMAL
APTT PPP: 34.1 SECONDS (ref 23.2–33.7)
AST SERPL-CCNC: 23 UNIT/L (ref 11–45)
BASOPHILS # BLD AUTO: 0.04 X10(3)/MCL
BASOPHILS NFR BLD AUTO: 0.7 %
BILIRUB SERPL-MCNC: 1.1 MG/DL
BUN SERPL-MCNC: 39.2 MG/DL (ref 9.8–20.1)
CALCIUM SERPL-MCNC: 9.6 MG/DL (ref 8.4–10.2)
CHLORIDE SERPL-SCNC: 115 MMOL/L (ref 98–107)
CO2 SERPL-SCNC: 17 MMOL/L (ref 23–31)
CREAT SERPL-MCNC: 1.37 MG/DL (ref 0.55–1.02)
CREAT/UREA NIT SERPL: 29
EOSINOPHIL # BLD AUTO: 0.06 X10(3)/MCL (ref 0–0.9)
EOSINOPHIL NFR BLD AUTO: 1 %
ERYTHROCYTE [DISTWIDTH] IN BLOOD BY AUTOMATED COUNT: 25.3 % (ref 11.5–17)
GFR SERPLBLD CREATININE-BSD FMLA CKD-EPI: 37 ML/MIN/1.73/M2
GLOBULIN SER-MCNC: 4.5 GM/DL (ref 2.4–3.5)
GLUCOSE SERPL-MCNC: 96 MG/DL (ref 82–115)
HCT VFR BLD AUTO: 29.6 % (ref 37–47)
HGB BLD-MCNC: 9.4 G/DL (ref 12–16)
IMM GRANULOCYTES # BLD AUTO: 0.02 X10(3)/MCL (ref 0–0.04)
IMM GRANULOCYTES NFR BLD AUTO: 0.3 %
INR PPP: 1.7
LYMPHOCYTES # BLD AUTO: 1.78 X10(3)/MCL (ref 0.6–4.6)
LYMPHOCYTES NFR BLD AUTO: 30.3 %
MACROCYTES BLD QL SMEAR: ABNORMAL
MAGNESIUM SERPL-MCNC: 2.4 MG/DL (ref 1.6–2.6)
MCH RBC QN AUTO: 31.8 PG (ref 27–31)
MCHC RBC AUTO-ENTMCNC: 31.8 G/DL (ref 33–36)
MCV RBC AUTO: 100 FL (ref 80–94)
MONOCYTES # BLD AUTO: 0.47 X10(3)/MCL (ref 0.1–1.3)
MONOCYTES NFR BLD AUTO: 8 %
NEUTROPHILS # BLD AUTO: 3.51 X10(3)/MCL (ref 2.1–9.2)
NEUTROPHILS NFR BLD AUTO: 59.7 %
NRBC BLD AUTO-RTO: 0.3 %
OVALOCYTES (OLG): ABNORMAL
PLATELET # BLD AUTO: 149 X10(3)/MCL (ref 130–400)
PLATELET # BLD EST: NORMAL 10*3/UL
PMV BLD AUTO: 10.6 FL (ref 7.4–10.4)
POIKILOCYTOSIS BLD QL SMEAR: ABNORMAL
POTASSIUM SERPL-SCNC: 4.5 MMOL/L (ref 3.5–5.1)
PROT SERPL-MCNC: 7.2 GM/DL (ref 5.8–7.6)
PROTHROMBIN TIME: 20 SECONDS (ref 12.5–14.5)
RBC # BLD AUTO: 2.96 X10(6)/MCL (ref 4.2–5.4)
RBC MORPH BLD: ABNORMAL
SCHISTOCYTE (OLG): ABNORMAL
SODIUM SERPL-SCNC: 138 MMOL/L (ref 136–145)
TARGETS BLD QL SMEAR: ABNORMAL
WBC # BLD AUTO: 5.88 X10(3)/MCL (ref 4.5–11.5)

## 2025-05-21 PROCEDURE — 85025 COMPLETE CBC W/AUTO DIFF WBC: CPT | Performed by: EMERGENCY MEDICINE

## 2025-05-21 PROCEDURE — 96376 TX/PRO/DX INJ SAME DRUG ADON: CPT

## 2025-05-21 PROCEDURE — 63600175 PHARM REV CODE 636 W HCPCS: Performed by: INTERNAL MEDICINE

## 2025-05-21 PROCEDURE — 11000001 HC ACUTE MED/SURG PRIVATE ROOM

## 2025-05-21 PROCEDURE — 96375 TX/PRO/DX INJ NEW DRUG ADDON: CPT

## 2025-05-21 PROCEDURE — 25000003 PHARM REV CODE 250: Performed by: INTERNAL MEDICINE

## 2025-05-21 PROCEDURE — 63600175 PHARM REV CODE 636 W HCPCS

## 2025-05-21 PROCEDURE — 85610 PROTHROMBIN TIME: CPT | Performed by: EMERGENCY MEDICINE

## 2025-05-21 PROCEDURE — 99285 EMERGENCY DEPT VISIT HI MDM: CPT | Mod: 25

## 2025-05-21 PROCEDURE — 63600175 PHARM REV CODE 636 W HCPCS: Performed by: EMERGENCY MEDICINE

## 2025-05-21 PROCEDURE — 85730 THROMBOPLASTIN TIME PARTIAL: CPT | Performed by: EMERGENCY MEDICINE

## 2025-05-21 PROCEDURE — 83735 ASSAY OF MAGNESIUM: CPT | Performed by: EMERGENCY MEDICINE

## 2025-05-21 PROCEDURE — 96374 THER/PROPH/DIAG INJ IV PUSH: CPT

## 2025-05-21 PROCEDURE — 80053 COMPREHEN METABOLIC PANEL: CPT | Performed by: EMERGENCY MEDICINE

## 2025-05-21 RX ORDER — SODIUM CHLORIDE 0.9 % (FLUSH) 0.9 %
3 SYRINGE (ML) INJECTION EVERY 6 HOURS PRN
Status: DISCONTINUED | OUTPATIENT
Start: 2025-05-21 | End: 2025-05-24 | Stop reason: HOSPADM

## 2025-05-21 RX ORDER — MORPHINE SULFATE 4 MG/ML
2 INJECTION, SOLUTION INTRAMUSCULAR; INTRAVENOUS
Status: DISCONTINUED | OUTPATIENT
Start: 2025-05-21 | End: 2025-05-21

## 2025-05-21 RX ORDER — PROCHLORPERAZINE EDISYLATE 5 MG/ML
5 INJECTION INTRAMUSCULAR; INTRAVENOUS EVERY 6 HOURS PRN
Status: DISCONTINUED | OUTPATIENT
Start: 2025-05-21 | End: 2025-05-24 | Stop reason: HOSPADM

## 2025-05-21 RX ORDER — IBUPROFEN 200 MG
24 TABLET ORAL
Status: DISCONTINUED | OUTPATIENT
Start: 2025-05-21 | End: 2025-05-21

## 2025-05-21 RX ORDER — LUBIPROSTONE 24 UG/1
24 CAPSULE ORAL 2 TIMES DAILY WITH MEALS
Status: DISCONTINUED | OUTPATIENT
Start: 2025-05-22 | End: 2025-05-24 | Stop reason: HOSPADM

## 2025-05-21 RX ORDER — MORPHINE SULFATE 4 MG/ML
2 INJECTION, SOLUTION INTRAMUSCULAR; INTRAVENOUS
Refills: 0 | Status: COMPLETED | OUTPATIENT
Start: 2025-05-21 | End: 2025-05-21

## 2025-05-21 RX ORDER — PANTOPRAZOLE SODIUM 40 MG/1
40 TABLET, DELAYED RELEASE ORAL DAILY
COMMUNITY

## 2025-05-21 RX ORDER — SODIUM CHLORIDE, SODIUM LACTATE, POTASSIUM CHLORIDE, CALCIUM CHLORIDE 600; 310; 30; 20 MG/100ML; MG/100ML; MG/100ML; MG/100ML
INJECTION, SOLUTION INTRAVENOUS CONTINUOUS
Status: ACTIVE | OUTPATIENT
Start: 2025-05-21 | End: 2025-05-22

## 2025-05-21 RX ORDER — PROMETHAZINE HYDROCHLORIDE 25 MG/1
25 TABLET ORAL EVERY 6 HOURS PRN
Status: DISCONTINUED | OUTPATIENT
Start: 2025-05-21 | End: 2025-05-21

## 2025-05-21 RX ORDER — ACETAMINOPHEN 325 MG/1
650 TABLET ORAL EVERY 8 HOURS PRN
Status: DISCONTINUED | OUTPATIENT
Start: 2025-05-21 | End: 2025-05-21

## 2025-05-21 RX ORDER — HEPARIN SODIUM 5000 [USP'U]/ML
5000 INJECTION, SOLUTION INTRAVENOUS; SUBCUTANEOUS EVERY 12 HOURS
Status: DISCONTINUED | OUTPATIENT
Start: 2025-05-21 | End: 2025-05-23

## 2025-05-21 RX ORDER — NALOXONE HCL 0.4 MG/ML
0.02 VIAL (ML) INJECTION
Status: DISCONTINUED | OUTPATIENT
Start: 2025-05-21 | End: 2025-05-24 | Stop reason: HOSPADM

## 2025-05-21 RX ORDER — TRAZODONE HYDROCHLORIDE 100 MG/1
100 TABLET ORAL NIGHTLY
Status: DISCONTINUED | OUTPATIENT
Start: 2025-05-22 | End: 2025-05-23

## 2025-05-21 RX ORDER — ONDANSETRON HYDROCHLORIDE 2 MG/ML
4 INJECTION, SOLUTION INTRAVENOUS EVERY 8 HOURS PRN
Status: DISCONTINUED | OUTPATIENT
Start: 2025-05-21 | End: 2025-05-21

## 2025-05-21 RX ORDER — POLYETHYLENE GLYCOL 3350 17 G/17G
17 POWDER, FOR SOLUTION ORAL DAILY
Status: DISCONTINUED | OUTPATIENT
Start: 2025-05-22 | End: 2025-05-24 | Stop reason: HOSPADM

## 2025-05-21 RX ORDER — SODIUM CHLORIDE 0.9 % (FLUSH) 0.9 %
10 SYRINGE (ML) INJECTION
Status: DISCONTINUED | OUTPATIENT
Start: 2025-05-21 | End: 2025-05-24 | Stop reason: HOSPADM

## 2025-05-21 RX ORDER — CARVEDILOL 3.12 MG/1
3.12 TABLET ORAL 2 TIMES DAILY
Status: DISCONTINUED | OUTPATIENT
Start: 2025-05-21 | End: 2025-05-24 | Stop reason: HOSPADM

## 2025-05-21 RX ORDER — NALOXONE HCL 0.4 MG/ML
0.02 VIAL (ML) INJECTION
Status: DISCONTINUED | OUTPATIENT
Start: 2025-05-21 | End: 2025-05-21

## 2025-05-21 RX ORDER — ATORVASTATIN CALCIUM 10 MG/1
10 TABLET, FILM COATED ORAL NIGHTLY
Status: DISCONTINUED | OUTPATIENT
Start: 2025-05-21 | End: 2025-05-21

## 2025-05-21 RX ORDER — ALLOPURINOL 300 MG/1
300 TABLET ORAL DAILY
Status: DISCONTINUED | OUTPATIENT
Start: 2025-05-22 | End: 2025-05-24 | Stop reason: HOSPADM

## 2025-05-21 RX ORDER — IBUPROFEN 200 MG
16 TABLET ORAL
Status: DISCONTINUED | OUTPATIENT
Start: 2025-05-21 | End: 2025-05-24 | Stop reason: HOSPADM

## 2025-05-21 RX ORDER — ONDANSETRON HYDROCHLORIDE 2 MG/ML
4 INJECTION, SOLUTION INTRAVENOUS
Status: COMPLETED | OUTPATIENT
Start: 2025-05-21 | End: 2025-05-21

## 2025-05-21 RX ORDER — ACETAMINOPHEN 325 MG/1
650 TABLET ORAL EVERY 4 HOURS PRN
Status: DISCONTINUED | OUTPATIENT
Start: 2025-05-21 | End: 2025-05-24 | Stop reason: HOSPADM

## 2025-05-21 RX ORDER — GLUCAGON 1 MG
1 KIT INJECTION
Status: DISCONTINUED | OUTPATIENT
Start: 2025-05-21 | End: 2025-05-21

## 2025-05-21 RX ORDER — PANTOPRAZOLE SODIUM 40 MG/10ML
40 INJECTION, POWDER, LYOPHILIZED, FOR SOLUTION INTRAVENOUS 2 TIMES DAILY
Status: DISCONTINUED | OUTPATIENT
Start: 2025-05-21 | End: 2025-05-24 | Stop reason: HOSPADM

## 2025-05-21 RX ORDER — GLUCAGON 1 MG
1 KIT INJECTION
Status: DISCONTINUED | OUTPATIENT
Start: 2025-05-21 | End: 2025-05-24 | Stop reason: HOSPADM

## 2025-05-21 RX ORDER — IBUPROFEN 200 MG
24 TABLET ORAL
Status: DISCONTINUED | OUTPATIENT
Start: 2025-05-21 | End: 2025-05-24 | Stop reason: HOSPADM

## 2025-05-21 RX ORDER — RIVASTIGMINE TARTRATE 1.5 MG/1
1.5 CAPSULE ORAL 2 TIMES DAILY
Status: DISCONTINUED | OUTPATIENT
Start: 2025-05-21 | End: 2025-05-24 | Stop reason: HOSPADM

## 2025-05-21 RX ORDER — LEVOTHYROXINE SODIUM 25 UG/1
25 TABLET ORAL
Status: DISCONTINUED | OUTPATIENT
Start: 2025-05-22 | End: 2025-05-24 | Stop reason: HOSPADM

## 2025-05-21 RX ORDER — CETIRIZINE HYDROCHLORIDE 10 MG/1
10 TABLET ORAL DAILY
Status: DISCONTINUED | OUTPATIENT
Start: 2025-05-22 | End: 2025-05-24 | Stop reason: HOSPADM

## 2025-05-21 RX ORDER — MORPHINE SULFATE 4 MG/ML
1 INJECTION, SOLUTION INTRAMUSCULAR; INTRAVENOUS EVERY 4 HOURS PRN
Status: DISCONTINUED | OUTPATIENT
Start: 2025-05-21 | End: 2025-05-23

## 2025-05-21 RX ORDER — ACETAMINOPHEN 500 MG
1000 TABLET ORAL EVERY 6 HOURS PRN
Status: DISCONTINUED | OUTPATIENT
Start: 2025-05-21 | End: 2025-05-23

## 2025-05-21 RX ORDER — PANTOPRAZOLE SODIUM 40 MG/1
40 TABLET, DELAYED RELEASE ORAL 2 TIMES DAILY
Status: DISCONTINUED | OUTPATIENT
Start: 2025-05-21 | End: 2025-05-21

## 2025-05-21 RX ORDER — MORPHINE SULFATE 4 MG/ML
1 INJECTION, SOLUTION INTRAMUSCULAR; INTRAVENOUS
Refills: 0 | Status: COMPLETED | OUTPATIENT
Start: 2025-05-21 | End: 2025-05-21

## 2025-05-21 RX ORDER — IBUPROFEN 200 MG
16 TABLET ORAL
Status: DISCONTINUED | OUTPATIENT
Start: 2025-05-21 | End: 2025-05-21

## 2025-05-21 RX ORDER — INSULIN ASPART 100 [IU]/ML
0-5 INJECTION, SOLUTION INTRAVENOUS; SUBCUTANEOUS
Status: DISCONTINUED | OUTPATIENT
Start: 2025-05-21 | End: 2025-05-24 | Stop reason: HOSPADM

## 2025-05-21 RX ORDER — ONDANSETRON HYDROCHLORIDE 2 MG/ML
4 INJECTION, SOLUTION INTRAVENOUS EVERY 4 HOURS PRN
Status: DISCONTINUED | OUTPATIENT
Start: 2025-05-21 | End: 2025-05-24 | Stop reason: HOSPADM

## 2025-05-21 RX ORDER — ACETAMINOPHEN 325 MG/1
650 TABLET ORAL EVERY 4 HOURS PRN
Status: DISCONTINUED | OUTPATIENT
Start: 2025-05-21 | End: 2025-05-21

## 2025-05-21 RX ADMIN — HEPARIN SODIUM 5000 UNITS: 5000 INJECTION, SOLUTION INTRAVENOUS; SUBCUTANEOUS at 07:05

## 2025-05-21 RX ADMIN — MORPHINE SULFATE 2 MG: 4 INJECTION INTRAVENOUS at 04:05

## 2025-05-21 RX ADMIN — SODIUM CHLORIDE, POTASSIUM CHLORIDE, SODIUM LACTATE AND CALCIUM CHLORIDE: 600; 310; 30; 20 INJECTION, SOLUTION INTRAVENOUS at 10:05

## 2025-05-21 RX ADMIN — MORPHINE SULFATE 2 MG: 4 INJECTION INTRAVENOUS at 02:05

## 2025-05-21 RX ADMIN — PANTOPRAZOLE SODIUM 40 MG: 40 INJECTION, POWDER, FOR SOLUTION INTRAVENOUS at 10:05

## 2025-05-21 RX ADMIN — MORPHINE SULFATE 1 MG: 4 INJECTION INTRAVENOUS at 11:05

## 2025-05-21 RX ADMIN — ONDANSETRON 4 MG: 2 INJECTION INTRAMUSCULAR; INTRAVENOUS at 11:05

## 2025-05-21 RX ADMIN — MORPHINE SULFATE 1 MG: 4 INJECTION INTRAVENOUS at 10:05

## 2025-05-21 RX ADMIN — RIVASTIGMINE TARTRATE 1.5 MG: 1.5 CAPSULE ORAL at 10:05

## 2025-05-22 ENCOUNTER — ANESTHESIA EVENT (OUTPATIENT)
Dept: SURGERY | Facility: HOSPITAL | Age: 89
DRG: 640 | End: 2025-05-22
Payer: MEDICARE

## 2025-05-22 PROBLEM — E44.0 MODERATE MALNUTRITION: Status: ACTIVE | Noted: 2025-05-22

## 2025-05-22 LAB
ALBUMIN SERPL-MCNC: 2.5 G/DL (ref 3.4–4.8)
ALBUMIN/GLOB SERPL: 0.6 RATIO (ref 1.1–2)
ALP SERPL-CCNC: 84 UNIT/L (ref 40–150)
ALT SERPL-CCNC: 7 UNIT/L (ref 0–55)
ANION GAP SERPL CALC-SCNC: 3 MEQ/L
AST SERPL-CCNC: 28 UNIT/L (ref 11–45)
BASOPHILS # BLD AUTO: 0.06 X10(3)/MCL
BASOPHILS NFR BLD AUTO: 0.7 %
BILIRUB SERPL-MCNC: 1.3 MG/DL
BUN SERPL-MCNC: 38.5 MG/DL (ref 9.8–20.1)
CALCIUM SERPL-MCNC: 9.2 MG/DL (ref 8.4–10.2)
CHLORIDE SERPL-SCNC: 116 MMOL/L (ref 98–107)
CO2 SERPL-SCNC: 15 MMOL/L (ref 23–31)
CREAT SERPL-MCNC: 1.28 MG/DL (ref 0.55–1.02)
CREAT/UREA NIT SERPL: 30
EOSINOPHIL # BLD AUTO: 0.12 X10(3)/MCL (ref 0–0.9)
EOSINOPHIL NFR BLD AUTO: 1.4 %
ERYTHROCYTE [DISTWIDTH] IN BLOOD BY AUTOMATED COUNT: 25.3 % (ref 11.5–17)
GFR SERPLBLD CREATININE-BSD FMLA CKD-EPI: 40 ML/MIN/1.73/M2
GLOBULIN SER-MCNC: 4.5 GM/DL (ref 2.4–3.5)
GLUCOSE SERPL-MCNC: 77 MG/DL (ref 82–115)
HCT VFR BLD AUTO: 30.5 % (ref 37–47)
HGB BLD-MCNC: 10.1 G/DL (ref 12–16)
IMM GRANULOCYTES # BLD AUTO: 0.08 X10(3)/MCL (ref 0–0.04)
IMM GRANULOCYTES NFR BLD AUTO: 0.9 %
LYMPHOCYTES # BLD AUTO: 2.34 X10(3)/MCL (ref 0.6–4.6)
LYMPHOCYTES NFR BLD AUTO: 27.2 %
MCH RBC QN AUTO: 33 PG (ref 27–31)
MCHC RBC AUTO-ENTMCNC: 33.1 G/DL (ref 33–36)
MCV RBC AUTO: 99.7 FL (ref 80–94)
MONOCYTES # BLD AUTO: 0.51 X10(3)/MCL (ref 0.1–1.3)
MONOCYTES NFR BLD AUTO: 5.9 %
NEUTROPHILS # BLD AUTO: 5.5 X10(3)/MCL (ref 2.1–9.2)
NEUTROPHILS NFR BLD AUTO: 63.9 %
NRBC BLD AUTO-RTO: 0.2 %
PLATELET # BLD AUTO: 114 X10(3)/MCL (ref 130–400)
PMV BLD AUTO: 10.3 FL (ref 7.4–10.4)
POCT GLUCOSE: 70 MG/DL (ref 70–110)
POCT GLUCOSE: 85 MG/DL (ref 70–110)
POCT GLUCOSE: 87 MG/DL (ref 70–110)
POCT GLUCOSE: 89 MG/DL (ref 70–110)
POCT GLUCOSE: 92 MG/DL (ref 70–110)
POTASSIUM SERPL-SCNC: 5.2 MMOL/L (ref 3.5–5.1)
PROT SERPL-MCNC: 7 GM/DL (ref 5.8–7.6)
RBC # BLD AUTO: 3.06 X10(6)/MCL (ref 4.2–5.4)
SODIUM SERPL-SCNC: 134 MMOL/L (ref 136–145)
WBC # BLD AUTO: 8.61 X10(3)/MCL (ref 4.5–11.5)

## 2025-05-22 PROCEDURE — 85025 COMPLETE CBC W/AUTO DIFF WBC: CPT | Performed by: NURSE PRACTITIONER

## 2025-05-22 PROCEDURE — 25000003 PHARM REV CODE 250: Performed by: HOSPITALIST

## 2025-05-22 PROCEDURE — 63600175 PHARM REV CODE 636 W HCPCS: Performed by: INTERNAL MEDICINE

## 2025-05-22 PROCEDURE — 80053 COMPREHEN METABOLIC PANEL: CPT | Performed by: NURSE PRACTITIONER

## 2025-05-22 PROCEDURE — 25000003 PHARM REV CODE 250: Performed by: NURSE PRACTITIONER

## 2025-05-22 PROCEDURE — 36415 COLL VENOUS BLD VENIPUNCTURE: CPT | Performed by: NURSE PRACTITIONER

## 2025-05-22 PROCEDURE — 11000001 HC ACUTE MED/SURG PRIVATE ROOM

## 2025-05-22 PROCEDURE — 99222 1ST HOSP IP/OBS MODERATE 55: CPT | Mod: ,,,

## 2025-05-22 PROCEDURE — 63600175 PHARM REV CODE 636 W HCPCS

## 2025-05-22 PROCEDURE — 25000003 PHARM REV CODE 250: Performed by: INTERNAL MEDICINE

## 2025-05-22 RX ORDER — ALBUTEROL SULFATE 90 UG/1
1 INHALANT RESPIRATORY (INHALATION) EVERY 6 HOURS PRN
Status: DISCONTINUED | OUTPATIENT
Start: 2025-05-22 | End: 2025-05-24 | Stop reason: HOSPADM

## 2025-05-22 RX ORDER — MICONAZOLE NITRATE 2 G/100G
POWDER TOPICAL 2 TIMES DAILY
Status: DISCONTINUED | OUTPATIENT
Start: 2025-05-22 | End: 2025-05-24 | Stop reason: HOSPADM

## 2025-05-22 RX ORDER — LATANOPROST 50 UG/ML
1 SOLUTION/ DROPS OPHTHALMIC NIGHTLY
Status: DISCONTINUED | OUTPATIENT
Start: 2025-05-22 | End: 2025-05-24 | Stop reason: HOSPADM

## 2025-05-22 RX ORDER — FUROSEMIDE 40 MG/1
40 TABLET ORAL DAILY
Status: DISCONTINUED | OUTPATIENT
Start: 2025-05-23 | End: 2025-05-23

## 2025-05-22 RX ORDER — TIMOLOL MALEATE 5 MG/ML
1 SOLUTION/ DROPS OPHTHALMIC 2 TIMES DAILY
COMMUNITY

## 2025-05-22 RX ORDER — DOCUSATE SODIUM 100 MG/1
200 CAPSULE, LIQUID FILLED ORAL 2 TIMES DAILY PRN
Status: DISCONTINUED | OUTPATIENT
Start: 2025-05-22 | End: 2025-05-24 | Stop reason: HOSPADM

## 2025-05-22 RX ADMIN — MICONAZOLE NITRATE: 20 POWDER TOPICAL at 08:05

## 2025-05-22 RX ADMIN — PANTOPRAZOLE SODIUM 40 MG: 40 INJECTION, POWDER, FOR SOLUTION INTRAVENOUS at 08:05

## 2025-05-22 RX ADMIN — POLYETHYLENE GLYCOL 3350 17 G: 17 POWDER, FOR SOLUTION ORAL at 09:05

## 2025-05-22 RX ADMIN — LUBIPROSTONE 24 MCG: 24 CAPSULE, GELATIN COATED ORAL at 09:05

## 2025-05-22 RX ADMIN — CETIRIZINE HYDROCHLORIDE 10 MG: 10 TABLET, FILM COATED ORAL at 09:05

## 2025-05-22 RX ADMIN — CARVEDILOL 3.12 MG: 3.12 TABLET, FILM COATED ORAL at 08:05

## 2025-05-22 RX ADMIN — ALLOPURINOL 300 MG: 300 TABLET ORAL at 09:05

## 2025-05-22 RX ADMIN — RIVASTIGMINE TARTRATE 1.5 MG: 1.5 CAPSULE ORAL at 08:05

## 2025-05-22 RX ADMIN — HEPARIN SODIUM 5000 UNITS: 5000 INJECTION, SOLUTION INTRAVENOUS; SUBCUTANEOUS at 09:05

## 2025-05-22 RX ADMIN — HEPARIN SODIUM 5000 UNITS: 5000 INJECTION, SOLUTION INTRAVENOUS; SUBCUTANEOUS at 08:05

## 2025-05-22 RX ADMIN — PANTOPRAZOLE SODIUM 40 MG: 40 INJECTION, POWDER, FOR SOLUTION INTRAVENOUS at 09:05

## 2025-05-22 RX ADMIN — RIVASTIGMINE TARTRATE 1.5 MG: 1.5 CAPSULE ORAL at 09:05

## 2025-05-22 RX ADMIN — DEXTROSE MONOHYDRATE 12.5 G: 25 INJECTION, SOLUTION INTRAVENOUS at 06:05

## 2025-05-22 RX ADMIN — MORPHINE SULFATE 1 MG: 4 INJECTION INTRAVENOUS at 03:05

## 2025-05-22 RX ADMIN — LATANOPROST 1 DROP: 50 SOLUTION OPHTHALMIC at 08:05

## 2025-05-22 RX ADMIN — TRAZODONE HYDROCHLORIDE 100 MG: 100 TABLET ORAL at 08:05

## 2025-05-22 RX ADMIN — CARVEDILOL 3.12 MG: 3.12 TABLET, FILM COATED ORAL at 09:05

## 2025-05-22 RX ADMIN — SODIUM CHLORIDE, POTASSIUM CHLORIDE, SODIUM LACTATE AND CALCIUM CHLORIDE: 600; 310; 30; 20 INJECTION, SOLUTION INTRAVENOUS at 07:05

## 2025-05-22 RX ADMIN — LUBIPROSTONE 24 MCG: 24 CAPSULE, GELATIN COATED ORAL at 05:05

## 2025-05-22 RX ADMIN — LEVOTHYROXINE SODIUM 25 MCG: 0.03 TABLET ORAL at 06:05

## 2025-05-22 RX ADMIN — MICONAZOLE NITRATE: 20 POWDER TOPICAL at 01:05

## 2025-05-23 ENCOUNTER — ANESTHESIA (OUTPATIENT)
Dept: SURGERY | Facility: HOSPITAL | Age: 89
DRG: 640 | End: 2025-05-23
Payer: MEDICARE
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
## 2025-05-23 LAB
ANION GAP SERPL CALC-SCNC: 4 MEQ/L
BUN SERPL-MCNC: 40.2 MG/DL (ref 9.8–20.1)
CALCIUM SERPL-MCNC: 9.2 MG/DL (ref 8.4–10.2)
CHLORIDE SERPL-SCNC: 117 MMOL/L (ref 98–107)
CO2 SERPL-SCNC: 17 MMOL/L (ref 23–31)
CREAT SERPL-MCNC: 1.64 MG/DL (ref 0.55–1.02)
CREAT/UREA NIT SERPL: 25
GFR SERPLBLD CREATININE-BSD FMLA CKD-EPI: 30 ML/MIN/1.73/M2
GLUCOSE SERPL-MCNC: 104 MG/DL (ref 70–110)
GLUCOSE SERPL-MCNC: 105 MG/DL (ref 82–115)
POCT GLUCOSE: 104 MG/DL (ref 70–110)
POCT GLUCOSE: 107 MG/DL (ref 70–110)
POCT GLUCOSE: 115 MG/DL (ref 70–110)
POCT GLUCOSE: 119 MG/DL (ref 70–110)
POCT GLUCOSE: 93 MG/DL (ref 70–110)
POCT GLUCOSE: 98 MG/DL (ref 70–110)
POTASSIUM SERPL-SCNC: 5 MMOL/L (ref 3.5–5.1)
SODIUM SERPL-SCNC: 138 MMOL/L (ref 136–145)

## 2025-05-23 PROCEDURE — 63600175 PHARM REV CODE 636 W HCPCS

## 2025-05-23 PROCEDURE — 63600175 PHARM REV CODE 636 W HCPCS: Performed by: INTERNAL MEDICINE

## 2025-05-23 PROCEDURE — 99233 SBSQ HOSP IP/OBS HIGH 50: CPT | Mod: ,,,

## 2025-05-23 PROCEDURE — 11000001 HC ACUTE MED/SURG PRIVATE ROOM

## 2025-05-23 PROCEDURE — 36415 COLL VENOUS BLD VENIPUNCTURE: CPT | Performed by: HOSPITALIST

## 2025-05-23 PROCEDURE — 25000003 PHARM REV CODE 250: Performed by: HOSPITALIST

## 2025-05-23 PROCEDURE — 25000003 PHARM REV CODE 250: Performed by: INTERNAL MEDICINE

## 2025-05-23 PROCEDURE — 80048 BASIC METABOLIC PNL TOTAL CA: CPT | Performed by: HOSPITALIST

## 2025-05-23 RX ORDER — FUROSEMIDE 40 MG/1
40 TABLET ORAL DAILY PRN
Status: DISCONTINUED | OUTPATIENT
Start: 2025-05-23 | End: 2025-05-23

## 2025-05-23 RX ORDER — BRIMONIDINE TARTRATE 1.5 MG/ML
1 SOLUTION/ DROPS OPHTHALMIC 2 TIMES DAILY
Status: DISCONTINUED | OUTPATIENT
Start: 2025-05-23 | End: 2025-05-24 | Stop reason: HOSPADM

## 2025-05-23 RX ORDER — MORPHINE SULFATE 4 MG/ML
1 INJECTION, SOLUTION INTRAMUSCULAR; INTRAVENOUS EVERY 4 HOURS PRN
Status: DISCONTINUED | OUTPATIENT
Start: 2025-05-23 | End: 2025-05-24 | Stop reason: HOSPADM

## 2025-05-23 RX ORDER — TRAZODONE HYDROCHLORIDE 50 MG/1
50 TABLET ORAL NIGHTLY
Status: DISCONTINUED | OUTPATIENT
Start: 2025-05-23 | End: 2025-05-24 | Stop reason: HOSPADM

## 2025-05-23 RX ORDER — TRAMADOL HYDROCHLORIDE 50 MG/1
50 TABLET, FILM COATED ORAL 2 TIMES DAILY PRN
Status: DISCONTINUED | OUTPATIENT
Start: 2025-05-23 | End: 2025-05-24 | Stop reason: HOSPADM

## 2025-05-23 RX ORDER — TIMOLOL MALEATE 5 MG/ML
1 SOLUTION/ DROPS OPHTHALMIC 2 TIMES DAILY
Status: DISCONTINUED | OUTPATIENT
Start: 2025-05-23 | End: 2025-05-24 | Stop reason: HOSPADM

## 2025-05-23 RX ORDER — BRIMONIDINE TARTRATE AND TIMOLOL MALEATE 2; 5 MG/ML; MG/ML
1 SOLUTION OPHTHALMIC 2 TIMES DAILY
Status: DISCONTINUED | OUTPATIENT
Start: 2025-05-23 | End: 2025-05-23

## 2025-05-23 RX ORDER — TRAZODONE HYDROCHLORIDE 50 MG/1
50 TABLET ORAL NIGHTLY
Qty: 30 TABLET | Refills: 11 | Status: SHIPPED | OUTPATIENT
Start: 2025-05-23 | End: 2026-05-23

## 2025-05-23 RX ORDER — MORPHINE SULFATE 4 MG/ML
1 INJECTION, SOLUTION INTRAMUSCULAR; INTRAVENOUS ONCE
Status: COMPLETED | OUTPATIENT
Start: 2025-05-23 | End: 2025-05-23

## 2025-05-23 RX ORDER — ACETAMINOPHEN 650 MG/20.3ML
500 LIQUID ORAL EVERY 8 HOURS PRN
Status: DISCONTINUED | OUTPATIENT
Start: 2025-05-23 | End: 2025-05-24 | Stop reason: HOSPADM

## 2025-05-23 RX ADMIN — MORPHINE SULFATE 1 MG: 4 INJECTION INTRAVENOUS at 09:05

## 2025-05-23 RX ADMIN — LEVOTHYROXINE SODIUM 25 MCG: 0.03 TABLET ORAL at 05:05

## 2025-05-23 RX ADMIN — PANTOPRAZOLE SODIUM 40 MG: 40 INJECTION, POWDER, FOR SOLUTION INTRAVENOUS at 09:05

## 2025-05-23 RX ADMIN — MICONAZOLE NITRATE: 20 POWDER TOPICAL at 08:05

## 2025-05-23 RX ADMIN — MICONAZOLE NITRATE: 20 POWDER TOPICAL at 09:05

## 2025-05-23 RX ADMIN — BRIMONIDINE TARTRATE 1 DROP: 1.5 SOLUTION OPHTHALMIC at 09:05

## 2025-05-23 RX ADMIN — MORPHINE SULFATE 1 MG: 4 INJECTION INTRAVENOUS at 11:05

## 2025-05-23 RX ADMIN — LUBIPROSTONE 24 MCG: 24 CAPSULE, GELATIN COATED ORAL at 09:05

## 2025-05-23 RX ADMIN — LATANOPROST 1 DROP: 50 SOLUTION OPHTHALMIC at 08:05

## 2025-05-23 RX ADMIN — PANTOPRAZOLE SODIUM 40 MG: 40 INJECTION, POWDER, FOR SOLUTION INTRAVENOUS at 08:05

## 2025-05-23 RX ADMIN — BRIMONIDINE TARTRATE 1 DROP: 1.5 SOLUTION OPHTHALMIC at 08:05

## 2025-05-23 RX ADMIN — POLYETHYLENE GLYCOL 3350 17 G: 17 POWDER, FOR SOLUTION ORAL at 09:05

## 2025-05-23 RX ADMIN — SODIUM BICARBONATE: 84 INJECTION, SOLUTION INTRAVENOUS at 11:05

## 2025-05-23 RX ADMIN — APIXABAN 2.5 MG: 2.5 TABLET, FILM COATED ORAL at 09:05

## 2025-05-23 RX ADMIN — TIMOLOL MALEATE 1 DROP: 5 SOLUTION OPHTHALMIC at 09:05

## 2025-05-23 RX ADMIN — RIVASTIGMINE TARTRATE 1.5 MG: 1.5 CAPSULE ORAL at 09:05

## 2025-05-23 RX ADMIN — ALLOPURINOL 300 MG: 300 TABLET ORAL at 09:05

## 2025-05-23 RX ADMIN — CETIRIZINE HYDROCHLORIDE 10 MG: 10 TABLET, FILM COATED ORAL at 09:05

## 2025-05-23 RX ADMIN — TIMOLOL MALEATE 1 DROP: 5 SOLUTION OPHTHALMIC at 08:05

## 2025-05-23 NOTE — ANESTHESIA PREPROCEDURE EVALUATION
05/23/2025  Ev Alberts is a 89 y.o., female.  CHF  HTN  P a-fib  Hypothyroid  CKD  Chronic respiratory failure on home O2  Dementia  Moderate PHTN  Recent significant decline in cognitive function      Pt was recently made DNR with palliative care only. Family desires no further investigative studies. MRI is of low or no yield with positive risk of anesthesia outweighing any benefit of MRI. Hence case is cancelled as discussed with care team this AM      Pre-op Assessment    I have reviewed the Patient Summary Reports.     I have reviewed the Nursing Notes. I have reviewed the NPO Status.   I have reviewed the Medications.     Review of Systems          .

## 2025-05-24 VITALS
OXYGEN SATURATION: 100 % | DIASTOLIC BLOOD PRESSURE: 53 MMHG | WEIGHT: 150 LBS | HEART RATE: 50 BPM | HEIGHT: 64 IN | BODY MASS INDEX: 25.61 KG/M2 | TEMPERATURE: 97 F | SYSTOLIC BLOOD PRESSURE: 122 MMHG | RESPIRATION RATE: 18 BRPM

## 2025-05-24 LAB
ANION GAP SERPL CALC-SCNC: 6 MEQ/L
BUN SERPL-MCNC: 48 MG/DL (ref 9.8–20.1)
CALCIUM SERPL-MCNC: 9.1 MG/DL (ref 8.4–10.2)
CHLORIDE SERPL-SCNC: 114 MMOL/L (ref 98–107)
CO2 SERPL-SCNC: 18 MMOL/L (ref 23–31)
CREAT SERPL-MCNC: 1.94 MG/DL (ref 0.55–1.02)
CREAT/UREA NIT SERPL: 25
GFR SERPLBLD CREATININE-BSD FMLA CKD-EPI: 25 ML/MIN/1.73/M2
GLUCOSE SERPL-MCNC: 83 MG/DL (ref 82–115)
POCT GLUCOSE: 108 MG/DL (ref 70–110)
POTASSIUM SERPL-SCNC: 6 MMOL/L (ref 3.5–5.1)
SODIUM SERPL-SCNC: 138 MMOL/L (ref 136–145)

## 2025-05-24 PROCEDURE — 25000003 PHARM REV CODE 250: Performed by: INTERNAL MEDICINE

## 2025-05-24 PROCEDURE — 80048 BASIC METABOLIC PNL TOTAL CA: CPT | Performed by: HOSPITALIST

## 2025-05-24 PROCEDURE — 25000003 PHARM REV CODE 250: Performed by: HOSPITALIST

## 2025-05-24 PROCEDURE — 25000003 PHARM REV CODE 250: Performed by: NURSE PRACTITIONER

## 2025-05-24 PROCEDURE — 63600175 PHARM REV CODE 636 W HCPCS

## 2025-05-24 PROCEDURE — 36415 COLL VENOUS BLD VENIPUNCTURE: CPT | Performed by: HOSPITALIST

## 2025-05-24 RX ORDER — ACETAMINOPHEN 650 MG/20.3ML
500 LIQUID ORAL EVERY 8 HOURS PRN
Start: 2025-05-24

## 2025-05-24 RX ADMIN — ALLOPURINOL 300 MG: 300 TABLET ORAL at 09:05

## 2025-05-24 RX ADMIN — ACETAMINOPHEN 650 MG: 325 TABLET ORAL at 04:05

## 2025-05-24 RX ADMIN — POLYETHYLENE GLYCOL 3350 17 G: 17 POWDER, FOR SOLUTION ORAL at 09:05

## 2025-05-24 RX ADMIN — LUBIPROSTONE 24 MCG: 24 CAPSULE, GELATIN COATED ORAL at 09:05

## 2025-05-24 RX ADMIN — CETIRIZINE HYDROCHLORIDE 10 MG: 10 TABLET, FILM COATED ORAL at 09:05

## 2025-05-24 RX ADMIN — TRAMADOL HYDROCHLORIDE 50 MG: 50 TABLET, COATED ORAL at 09:05

## 2025-05-24 RX ADMIN — BRIMONIDINE TARTRATE 1 DROP: 1.5 SOLUTION OPHTHALMIC at 09:05

## 2025-05-24 RX ADMIN — RIVASTIGMINE TARTRATE 1.5 MG: 1.5 CAPSULE ORAL at 09:05

## 2025-05-24 RX ADMIN — LUBIPROSTONE 24 MCG: 24 CAPSULE, GELATIN COATED ORAL at 04:05

## 2025-05-24 RX ADMIN — APIXABAN 2.5 MG: 2.5 TABLET, FILM COATED ORAL at 09:05

## 2025-05-24 RX ADMIN — PANTOPRAZOLE SODIUM 40 MG: 40 INJECTION, POWDER, FOR SOLUTION INTRAVENOUS at 09:05

## 2025-05-25 LAB
POCT GLUCOSE: 102 MG/DL (ref 70–110)
POCT GLUCOSE: 105 MG/DL (ref 70–110)
POCT GLUCOSE: 107 MG/DL (ref 70–110)

## (undated) DEVICE — KIT MANIFOLD LOW PRESS TUBING

## (undated) DEVICE — WIRE GUIDE INQWIRE STR 180CM

## (undated) DEVICE — BAND TR COMP DEVICE REG 24CM

## (undated) DEVICE — CATH OPTITORQUE RADIAL 5FR

## (undated) DEVICE — PAD DEFIB CADENCE ADULT R2

## (undated) DEVICE — KIT HAND CONTROL HIGH PRESSUR

## (undated) DEVICE — KIT GLIDESHEATH SLEND 6FR 10CM

## (undated) DEVICE — TUBING HP AIRLSS ROT ADPT 30IN

## (undated) DEVICE — CANNULA NASAL ADULT

## (undated) DEVICE — Device